# Patient Record
Sex: MALE | Race: WHITE | NOT HISPANIC OR LATINO | Employment: OTHER | URBAN - METROPOLITAN AREA
[De-identification: names, ages, dates, MRNs, and addresses within clinical notes are randomized per-mention and may not be internally consistent; named-entity substitution may affect disease eponyms.]

---

## 2021-10-04 ENCOUNTER — HOSPITAL ENCOUNTER (INPATIENT)
Facility: HOSPITAL | Age: 66
LOS: 39 days | Discharge: NURSING, REHAB OR LONG-TERM CARE | End: 2021-11-12
Attending: INTERNAL MEDICINE | Admitting: INTERNAL MEDICINE

## 2021-10-04 ENCOUNTER — APPOINTMENT (OUTPATIENT)
Dept: GENERAL RADIOLOGY | Facility: HOSPITAL | Age: 66
End: 2021-10-04

## 2021-10-04 DIAGNOSIS — R04.89 DIFFUSE PULMONARY ALVEOLAR HEMORRHAGE: ICD-10-CM

## 2021-10-04 DIAGNOSIS — R13.12 OROPHARYNGEAL DYSPHAGIA: ICD-10-CM

## 2021-10-04 DIAGNOSIS — R49.9 VOICE IMPAIRMENT: ICD-10-CM

## 2021-10-04 DIAGNOSIS — M31.0: ICD-10-CM

## 2021-10-04 DIAGNOSIS — Z00.6 EXAMINATION FOR NORMAL COMPARISON FOR CLINICAL RESEARCH: ICD-10-CM

## 2021-10-04 DIAGNOSIS — J96.01 ACUTE RESPIRATORY FAILURE WITH HYPOXIA (HCC): Primary | ICD-10-CM

## 2021-10-04 PROBLEM — J96.90 RESPIRATORY FAILURE (HCC): Status: ACTIVE | Noted: 2021-10-04

## 2021-10-04 PROBLEM — N17.9 ACUTE RENAL FAILURE (ARF) (HCC): Status: ACTIVE | Noted: 2021-10-04

## 2021-10-04 LAB
ABO GROUP BLD: NORMAL
AMORPH URATE CRY URNS QL MICRO: ABNORMAL /HPF
ANION GAP SERPL CALCULATED.3IONS-SCNC: 24 MMOL/L (ref 5–15)
APTT PPP: 25.2 SECONDS (ref 22–39)
ARTERIAL PATENCY WRIST A: ABNORMAL
ARTERIAL PATENCY WRIST A: ABNORMAL
ATMOSPHERIC PRESS: ABNORMAL MM[HG]
ATMOSPHERIC PRESS: ABNORMAL MM[HG]
BACTERIA UR QL AUTO: ABNORMAL /HPF
BASE EXCESS BLDA CALC-SCNC: -5.9 MMOL/L (ref 0–2)
BASE EXCESS BLDA CALC-SCNC: -6.3 MMOL/L (ref 0–2)
BASOPHILS # BLD AUTO: 0.01 10*3/MM3 (ref 0–0.2)
BASOPHILS NFR BLD AUTO: 0.1 % (ref 0–1.5)
BDY SITE: ABNORMAL
BDY SITE: ABNORMAL
BILIRUB UR QL STRIP: ABNORMAL
BLD GP AB SCN SERPL QL: POSITIVE
BODY TEMPERATURE: 37 C
BODY TEMPERATURE: 37 C
BUN SERPL-MCNC: 137 MG/DL (ref 8–23)
BUN/CREAT SERPL: 14.2 (ref 7–25)
CALCIUM SPEC-SCNC: 6.6 MG/DL (ref 8.6–10.5)
CHLORIDE SERPL-SCNC: 103 MMOL/L (ref 98–107)
CLARITY UR: ABNORMAL
CO2 BLDA-SCNC: 20.8 MMOL/L (ref 22–33)
CO2 BLDA-SCNC: 20.9 MMOL/L (ref 22–33)
CO2 SERPL-SCNC: 17 MMOL/L (ref 22–29)
COHGB MFR BLD: 1.4 % (ref 0–2)
COHGB MFR BLD: 1.8 % (ref 0–2)
COLOR UR: ABNORMAL
CREAT SERPL-MCNC: 9.64 MG/DL (ref 0.76–1.27)
D-LACTATE SERPL-SCNC: 1.9 MMOL/L (ref 0.5–2)
DEPRECATED RDW RBC AUTO: 52.3 FL (ref 37–54)
EOSINOPHIL # BLD AUTO: 0 10*3/MM3 (ref 0–0.4)
EOSINOPHIL NFR BLD AUTO: 0 % (ref 0.3–6.2)
EPAP: 0
ERYTHROCYTE [DISTWIDTH] IN BLOOD BY AUTOMATED COUNT: 17.2 % (ref 12.3–15.4)
GFR SERPL CREATININE-BSD FRML MDRD: 5 ML/MIN/1.73
GFR SERPL CREATININE-BSD FRML MDRD: ABNORMAL ML/MIN/{1.73_M2}
GLUCOSE BLDC GLUCOMTR-MCNC: 160 MG/DL (ref 70–130)
GLUCOSE SERPL-MCNC: 147 MG/DL (ref 65–99)
GLUCOSE UR STRIP-MCNC: NEGATIVE MG/DL
HCO3 BLDA-SCNC: 19.6 MMOL/L (ref 20–26)
HCO3 BLDA-SCNC: 19.7 MMOL/L (ref 20–26)
HCT VFR BLD AUTO: 22 % (ref 37.5–51)
HCT VFR BLD CALC: 22.6 %
HCT VFR BLD CALC: 23 %
HGB BLD-MCNC: 7.4 G/DL (ref 13–17.7)
HGB BLDA-MCNC: 7.4 G/DL (ref 13.5–17.5)
HGB BLDA-MCNC: 7.5 G/DL (ref 13.5–17.5)
HGB UR QL STRIP.AUTO: ABNORMAL
HYALINE CASTS UR QL AUTO: ABNORMAL /LPF
IMM GRANULOCYTES # BLD AUTO: 0.32 10*3/MM3 (ref 0–0.05)
IMM GRANULOCYTES NFR BLD AUTO: 2.2 % (ref 0–0.5)
INHALED O2 CONCENTRATION: 50 %
INHALED O2 CONCENTRATION: 50 %
INR PPP: 1.11 (ref 0.85–1.16)
IPAP: 0
KETONES UR QL STRIP: NEGATIVE
LEUKOCYTE ESTERASE UR QL STRIP.AUTO: ABNORMAL
LYMPHOCYTES # BLD AUTO: 0.69 10*3/MM3 (ref 0.7–3.1)
LYMPHOCYTES NFR BLD AUTO: 4.8 % (ref 19.6–45.3)
MAGNESIUM SERPL-MCNC: 2.7 MG/DL (ref 1.6–2.4)
MCH RBC QN AUTO: 33.6 PG (ref 26.6–33)
MCHC RBC AUTO-ENTMCNC: 33.6 G/DL (ref 31.5–35.7)
MCV RBC AUTO: 100 FL (ref 79–97)
METHGB BLD QL: 1.1 % (ref 0–1.5)
METHGB BLD QL: ABNORMAL
MODALITY: ABNORMAL
MODALITY: ABNORMAL
MONOCYTES # BLD AUTO: 0.87 10*3/MM3 (ref 0.1–0.9)
MONOCYTES NFR BLD AUTO: 6 % (ref 5–12)
NEUTROPHILS NFR BLD AUTO: 12.6 10*3/MM3 (ref 1.7–7)
NEUTROPHILS NFR BLD AUTO: 86.9 % (ref 42.7–76)
NITRITE UR QL STRIP: NEGATIVE
NOTE: ABNORMAL
NOTE: ABNORMAL
NRBC BLD AUTO-RTO: 0.3 /100 WBC (ref 0–0.2)
OXYHGB MFR BLDV: 88.7 % (ref 94–99)
OXYHGB MFR BLDV: 92.6 % (ref 94–99)
PAW @ PEAK INSP FLOW SETTING VENT: 0 CMH2O
PCO2 BLDA: 38.6 MM HG (ref 35–45)
PCO2 BLDA: 40 MM HG (ref 35–45)
PCO2 TEMP ADJ BLD: 38.6 MM HG (ref 35–48)
PCO2 TEMP ADJ BLD: 40 MM HG (ref 35–48)
PEEP RESPIRATORY: 5 CM[H2O]
PEEP RESPIRATORY: 5 CM[H2O]
PH BLDA: 7.3 PH UNITS (ref 7.35–7.45)
PH BLDA: 7.32 PH UNITS (ref 7.35–7.45)
PH UR STRIP.AUTO: 6.5 [PH] (ref 5–8)
PH, TEMP CORRECTED: 7.3 PH UNITS
PH, TEMP CORRECTED: 7.32 PH UNITS
PHOSPHATE SERPL-MCNC: 12.5 MG/DL (ref 2.5–4.5)
PLATELET # BLD AUTO: 144 10*3/MM3 (ref 140–450)
PMV BLD AUTO: 12.2 FL (ref 6–12)
PO2 BLDA: 67.2 MM HG (ref 83–108)
PO2 BLDA: 69.9 MM HG (ref 83–108)
PO2 TEMP ADJ BLD: 67.2 MM HG (ref 83–108)
PO2 TEMP ADJ BLD: 69.9 MM HG (ref 83–108)
POTASSIUM SERPL-SCNC: 5.3 MMOL/L (ref 3.5–5.2)
PROCALCITONIN SERPL-MCNC: 2.56 NG/ML (ref 0–0.25)
PROT UR QL STRIP: ABNORMAL
PROTHROMBIN TIME: 14 SECONDS (ref 11.4–14.4)
RBC # BLD AUTO: 2.2 10*6/MM3 (ref 4.14–5.8)
RBC # UR: ABNORMAL /HPF
REF LAB TEST METHOD: ABNORMAL
RH BLD: POSITIVE
SARS-COV-2 RNA RESP QL NAA+PROBE: NOT DETECTED
SODIUM SERPL-SCNC: 144 MMOL/L (ref 136–145)
SP GR UR STRIP: 1.02 (ref 1–1.03)
SQUAMOUS #/AREA URNS HPF: ABNORMAL /HPF
T&S EXPIRATION DATE: NORMAL
TOTAL RATE: 16 BREATHS/MINUTE
TOTAL RATE: 30 BREATHS/MINUTE
TROPONIN T SERPL-MCNC: 0.05 NG/ML (ref 0–0.03)
URATE CRY URNS QL MICRO: ABNORMAL /HPF
UROBILINOGEN UR QL STRIP: ABNORMAL
VENTILATOR MODE: ABNORMAL
VENTILATOR MODE: ABNORMAL
VT ON VENT VENT: 620 ML
WBC # BLD AUTO: 14.49 10*3/MM3 (ref 3.4–10.8)
WBC UR QL AUTO: ABNORMAL /HPF

## 2021-10-04 PROCEDURE — 80048 BASIC METABOLIC PNL TOTAL CA: CPT | Performed by: NURSE PRACTITIONER

## 2021-10-04 PROCEDURE — 84145 PROCALCITONIN (PCT): CPT | Performed by: NURSE PRACTITIONER

## 2021-10-04 PROCEDURE — 86850 RBC ANTIBODY SCREEN: CPT | Performed by: NURSE PRACTITIONER

## 2021-10-04 PROCEDURE — 82962 GLUCOSE BLOOD TEST: CPT

## 2021-10-04 PROCEDURE — 5A1955Z RESPIRATORY VENTILATION, GREATER THAN 96 CONSECUTIVE HOURS: ICD-10-PCS | Performed by: INTERNAL MEDICINE

## 2021-10-04 PROCEDURE — 85610 PROTHROMBIN TIME: CPT | Performed by: NURSE PRACTITIONER

## 2021-10-04 PROCEDURE — U0003 INFECTIOUS AGENT DETECTION BY NUCLEIC ACID (DNA OR RNA); SEVERE ACUTE RESPIRATORY SYNDROME CORONAVIRUS 2 (SARS-COV-2) (CORONAVIRUS DISEASE [COVID-19]), AMPLIFIED PROBE TECHNIQUE, MAKING USE OF HIGH THROUGHPUT TECHNOLOGIES AS DESCRIBED BY CMS-2020-01-R: HCPCS | Performed by: NURSE PRACTITIONER

## 2021-10-04 PROCEDURE — 93010 ELECTROCARDIOGRAM REPORT: CPT | Performed by: INTERNAL MEDICINE

## 2021-10-04 PROCEDURE — 86870 RBC ANTIBODY IDENTIFICATION: CPT | Performed by: NURSE PRACTITIONER

## 2021-10-04 PROCEDURE — 83050 HGB METHEMOGLOBIN QUAN: CPT

## 2021-10-04 PROCEDURE — 81001 URINALYSIS AUTO W/SCOPE: CPT | Performed by: NURSE PRACTITIONER

## 2021-10-04 PROCEDURE — 86900 BLOOD TYPING SEROLOGIC ABO: CPT

## 2021-10-04 PROCEDURE — 83605 ASSAY OF LACTIC ACID: CPT | Performed by: NURSE PRACTITIONER

## 2021-10-04 PROCEDURE — 86900 BLOOD TYPING SEROLOGIC ABO: CPT | Performed by: NURSE PRACTITIONER

## 2021-10-04 PROCEDURE — 86901 BLOOD TYPING SEROLOGIC RH(D): CPT | Performed by: NURSE PRACTITIONER

## 2021-10-04 PROCEDURE — 84100 ASSAY OF PHOSPHORUS: CPT | Performed by: NURSE PRACTITIONER

## 2021-10-04 PROCEDURE — 94799 UNLISTED PULMONARY SVC/PX: CPT

## 2021-10-04 PROCEDURE — 83520 IMMUNOASSAY QUANT NOS NONAB: CPT | Performed by: NURSE PRACTITIONER

## 2021-10-04 PROCEDURE — 82375 ASSAY CARBOXYHB QUANT: CPT

## 2021-10-04 PROCEDURE — 93005 ELECTROCARDIOGRAM TRACING: CPT | Performed by: NURSE PRACTITIONER

## 2021-10-04 PROCEDURE — 86901 BLOOD TYPING SEROLOGIC RH(D): CPT

## 2021-10-04 PROCEDURE — 94002 VENT MGMT INPAT INIT DAY: CPT

## 2021-10-04 PROCEDURE — 86256 FLUORESCENT ANTIBODY TITER: CPT | Performed by: NURSE PRACTITIONER

## 2021-10-04 PROCEDURE — 85730 THROMBOPLASTIN TIME PARTIAL: CPT | Performed by: NURSE PRACTITIONER

## 2021-10-04 PROCEDURE — U0005 INFEC AGEN DETEC AMPLI PROBE: HCPCS | Performed by: NURSE PRACTITIONER

## 2021-10-04 PROCEDURE — 82805 BLOOD GASES W/O2 SATURATION: CPT

## 2021-10-04 PROCEDURE — 85025 COMPLETE CBC W/AUTO DIFF WBC: CPT | Performed by: NURSE PRACTITIONER

## 2021-10-04 PROCEDURE — 84484 ASSAY OF TROPONIN QUANT: CPT | Performed by: NURSE PRACTITIONER

## 2021-10-04 PROCEDURE — 83516 IMMUNOASSAY NONANTIBODY: CPT | Performed by: NURSE PRACTITIONER

## 2021-10-04 PROCEDURE — 86920 COMPATIBILITY TEST SPIN: CPT

## 2021-10-04 PROCEDURE — 25010000002 PROPOFOL 10 MG/ML EMULSION: Performed by: INTERNAL MEDICINE

## 2021-10-04 PROCEDURE — 71045 X-RAY EXAM CHEST 1 VIEW: CPT

## 2021-10-04 PROCEDURE — 83735 ASSAY OF MAGNESIUM: CPT | Performed by: NURSE PRACTITIONER

## 2021-10-04 PROCEDURE — 36600 WITHDRAWAL OF ARTERIAL BLOOD: CPT

## 2021-10-04 PROCEDURE — 86922 COMPATIBILITY TEST ANTIGLOB: CPT

## 2021-10-04 PROCEDURE — 99291 CRITICAL CARE FIRST HOUR: CPT | Performed by: INTERNAL MEDICINE

## 2021-10-04 RX ORDER — DEXTROSE MONOHYDRATE 25 G/50ML
25 INJECTION, SOLUTION INTRAVENOUS
Status: DISCONTINUED | OUTPATIENT
Start: 2021-10-04 | End: 2021-11-07

## 2021-10-04 RX ORDER — CHLORHEXIDINE GLUCONATE 0.12 MG/ML
15 RINSE ORAL EVERY 12 HOURS SCHEDULED
Status: DISCONTINUED | OUTPATIENT
Start: 2021-10-04 | End: 2021-11-12 | Stop reason: HOSPADM

## 2021-10-04 RX ORDER — METHYLPREDNISOLONE SODIUM SUCCINATE 40 MG/ML
40 INJECTION, POWDER, LYOPHILIZED, FOR SOLUTION INTRAMUSCULAR; INTRAVENOUS EVERY 12 HOURS
Status: DISCONTINUED | OUTPATIENT
Start: 2021-10-04 | End: 2021-10-08

## 2021-10-04 RX ORDER — FAMOTIDINE 10 MG/ML
20 INJECTION, SOLUTION INTRAVENOUS DAILY
Status: DISCONTINUED | OUTPATIENT
Start: 2021-10-05 | End: 2021-10-21

## 2021-10-04 RX ORDER — NICOTINE POLACRILEX 4 MG
15 LOZENGE BUCCAL
Status: DISCONTINUED | OUTPATIENT
Start: 2021-10-04 | End: 2021-10-20

## 2021-10-04 RX ADMIN — CHLORHEXIDINE GLUCONATE 15 ML: 1.2 SOLUTION ORAL at 20:57

## 2021-10-04 RX ADMIN — PROPOFOL 50 MCG/KG/MIN: 10 INJECTION, EMULSION INTRAVENOUS at 23:02

## 2021-10-05 ENCOUNTER — APPOINTMENT (OUTPATIENT)
Dept: OTHER | Facility: HOSPITAL | Age: 66
End: 2021-10-05

## 2021-10-05 ENCOUNTER — APPOINTMENT (OUTPATIENT)
Dept: NEPHROLOGY | Facility: HOSPITAL | Age: 66
End: 2021-10-05

## 2021-10-05 ENCOUNTER — APPOINTMENT (OUTPATIENT)
Dept: GENERAL RADIOLOGY | Facility: HOSPITAL | Age: 66
End: 2021-10-05

## 2021-10-05 LAB
ABO GROUP BLD: NORMAL
ALBUMIN SERPL-MCNC: 2.7 G/DL (ref 3.5–5.2)
ALP SERPL-CCNC: 49 U/L (ref 39–117)
ALT SERPL W P-5'-P-CCNC: 16 U/L (ref 1–41)
ANION GAP SERPL CALCULATED.3IONS-SCNC: 26 MMOL/L (ref 5–15)
ANION GAP SERPL CALCULATED.3IONS-SCNC: 29 MMOL/L (ref 5–15)
ANTIBODY TO LOW FREQUENCY ANTIGEN: NORMAL
ARTERIAL PATENCY WRIST A: ABNORMAL
AST SERPL-CCNC: 19 U/L (ref 1–40)
ATMOSPHERIC PRESS: ABNORMAL MM[HG]
BASE EXCESS BLDA CALC-SCNC: -7.7 MMOL/L (ref 0–2)
BDY SITE: ABNORMAL
BILIRUB SERPL-MCNC: 0.5 MG/DL (ref 0–1.2)
BODY TEMPERATURE: 37 C
BUN SERPL-MCNC: 154 MG/DL (ref 8–23)
BUN SERPL-MCNC: 154 MG/DL (ref 8–23)
BUN/CREAT SERPL: 14.6 (ref 7–25)
CA-I SERPL ISE-MCNC: 1.11 MMOL/L (ref 1.12–1.32)
CALCIUM SPEC-SCNC: 6.5 MG/DL (ref 8.6–10.5)
CALCIUM SPEC-SCNC: 7.5 MG/DL (ref 8.6–10.5)
CHLORIDE SERPL-SCNC: 100 MMOL/L (ref 98–107)
CHLORIDE SERPL-SCNC: 101 MMOL/L (ref 98–107)
CHOLEST SERPL-MCNC: 223 MG/DL (ref 0–200)
CO2 BLDA-SCNC: 19.1 MMOL/L (ref 22–33)
CO2 SERPL-SCNC: 14 MMOL/L (ref 22–29)
CO2 SERPL-SCNC: 17 MMOL/L (ref 22–29)
COHGB MFR BLD: 1.8 % (ref 0–2)
COLD SCREEN: POSITIVE
CREAT SERPL-MCNC: 10.54 MG/DL (ref 0.76–1.27)
CREAT SERPL-MCNC: 10.76 MG/DL (ref 0.76–1.27)
CRP SERPL-MCNC: 2.26 MG/DL (ref 0–0.5)
DEPRECATED RDW RBC AUTO: 51.6 FL (ref 37–54)
EPAP: 0
ERYTHROCYTE [DISTWIDTH] IN BLOOD BY AUTOMATED COUNT: 17.5 % (ref 12.3–15.4)
GFR SERPL CREATININE-BSD FRML MDRD: 5 ML/MIN/1.73
GFR SERPL CREATININE-BSD FRML MDRD: ABNORMAL ML/MIN/{1.73_M2}
GLUCOSE BLDC GLUCOMTR-MCNC: 147 MG/DL (ref 70–130)
GLUCOSE BLDC GLUCOMTR-MCNC: 171 MG/DL (ref 70–130)
GLUCOSE BLDC GLUCOMTR-MCNC: 207 MG/DL (ref 70–130)
GLUCOSE BLDC GLUCOMTR-MCNC: 285 MG/DL (ref 70–130)
GLUCOSE BLDC GLUCOMTR-MCNC: 291 MG/DL (ref 70–130)
GLUCOSE SERPL-MCNC: 261 MG/DL (ref 65–99)
GLUCOSE SERPL-MCNC: 264 MG/DL (ref 65–99)
HCO3 BLDA-SCNC: 18 MMOL/L (ref 20–26)
HCT VFR BLD AUTO: 22 % (ref 37.5–51)
HCT VFR BLD CALC: 24.3 %
HGB BLD-MCNC: 7.8 G/DL (ref 13–17.7)
HGB BLDA-MCNC: 7.9 G/DL (ref 13.5–17.5)
INHALED O2 CONCENTRATION: 50 %
IPAP: 0
MAGNESIUM SERPL-MCNC: 2.9 MG/DL (ref 1.6–2.4)
MAGNESIUM SERPL-MCNC: 2.9 MG/DL (ref 1.6–2.4)
MCH RBC QN AUTO: 35.3 PG (ref 26.6–33)
MCHC RBC AUTO-ENTMCNC: 35.5 G/DL (ref 31.5–35.7)
MCV RBC AUTO: 99.5 FL (ref 79–97)
METHGB BLD QL: 1.5 % (ref 0–1.5)
MODALITY: ABNORMAL
NONSPECIFIC COLD ANTIBODY: NORMAL
NOTE: ABNORMAL
OXYHGB MFR BLDV: 89.4 % (ref 94–99)
PAW @ PEAK INSP FLOW SETTING VENT: 0 CMH2O
PCO2 BLDA: 36.5 MM HG (ref 35–45)
PCO2 TEMP ADJ BLD: 36.5 MM HG (ref 35–48)
PEEP RESPIRATORY: 5 CM[H2O]
PH BLDA: 7.3 PH UNITS (ref 7.35–7.45)
PH, TEMP CORRECTED: 7.3 PH UNITS
PHOSPHATE SERPL-MCNC: 14.1 MG/DL (ref 2.5–4.5)
PHOSPHATE SERPL-MCNC: 15.2 MG/DL (ref 2.5–4.5)
PLATELET # BLD AUTO: 159 10*3/MM3 (ref 140–450)
PMV BLD AUTO: 13.5 FL (ref 6–12)
PO2 BLDA: 71.2 MM HG (ref 83–108)
PO2 TEMP ADJ BLD: 71.2 MM HG (ref 83–108)
POTASSIUM SERPL-SCNC: 5.4 MMOL/L (ref 3.5–5.2)
POTASSIUM SERPL-SCNC: 5.4 MMOL/L (ref 3.5–5.2)
PREALB SERPL-MCNC: 18.6 MG/DL (ref 20–40)
PROT SERPL-MCNC: 5 G/DL (ref 6–8.5)
QT INTERVAL: 420 MS
QTC INTERVAL: 443 MS
RBC # BLD AUTO: 2.21 10*6/MM3 (ref 4.14–5.8)
RH BLD: POSITIVE
SODIUM SERPL-SCNC: 143 MMOL/L (ref 136–145)
SODIUM SERPL-SCNC: 144 MMOL/L (ref 136–145)
TOTAL RATE: 12 BREATHS/MINUTE
TRIGL SERPL-MCNC: 391 MG/DL (ref 0–150)
VENTILATOR MODE: ABNORMAL
WBC # BLD AUTO: 18.03 10*3/MM3 (ref 3.4–10.8)

## 2021-10-05 PROCEDURE — 94003 VENT MGMT INPAT SUBQ DAY: CPT

## 2021-10-05 PROCEDURE — P9047 ALBUMIN (HUMAN), 25%, 50ML: HCPCS

## 2021-10-05 PROCEDURE — 83735 ASSAY OF MAGNESIUM: CPT | Performed by: INTERNAL MEDICINE

## 2021-10-05 PROCEDURE — 86905 BLOOD TYPING RBC ANTIGENS: CPT

## 2021-10-05 PROCEDURE — 6A551Z3 PHERESIS OF PLASMA, MULTIPLE: ICD-10-PCS | Performed by: INTERNAL MEDICINE

## 2021-10-05 PROCEDURE — 3E0G76Z INTRODUCTION OF NUTRITIONAL SUBSTANCE INTO UPPER GI, VIA NATURAL OR ARTIFICIAL OPENING: ICD-10-PCS | Performed by: INTERNAL MEDICINE

## 2021-10-05 PROCEDURE — 82375 ASSAY CARBOXYHB QUANT: CPT

## 2021-10-05 PROCEDURE — 84478 ASSAY OF TRIGLYCERIDES: CPT | Performed by: INTERNAL MEDICINE

## 2021-10-05 PROCEDURE — 80069 RENAL FUNCTION PANEL: CPT | Performed by: INTERNAL MEDICINE

## 2021-10-05 PROCEDURE — 25010000002 PROPOFOL 10 MG/ML EMULSION: Performed by: INTERNAL MEDICINE

## 2021-10-05 PROCEDURE — 80053 COMPREHEN METABOLIC PANEL: CPT | Performed by: INTERNAL MEDICINE

## 2021-10-05 PROCEDURE — 82330 ASSAY OF CALCIUM: CPT | Performed by: INTERNAL MEDICINE

## 2021-10-05 PROCEDURE — 99291 CRITICAL CARE FIRST HOUR: CPT | Performed by: INTERNAL MEDICINE

## 2021-10-05 PROCEDURE — 36455 BLD EXCHANGE TRUJ OTH THN NB: CPT

## 2021-10-05 PROCEDURE — 83050 HGB METHEMOGLOBIN QUAN: CPT

## 2021-10-05 PROCEDURE — 86140 C-REACTIVE PROTEIN: CPT | Performed by: INTERNAL MEDICINE

## 2021-10-05 PROCEDURE — P9017 PLASMA 1 DONOR FRZ W/IN 8 HR: HCPCS

## 2021-10-05 PROCEDURE — 5A1D70Z PERFORMANCE OF URINARY FILTRATION, INTERMITTENT, LESS THAN 6 HOURS PER DAY: ICD-10-PCS | Performed by: INTERNAL MEDICINE

## 2021-10-05 PROCEDURE — 94799 UNLISTED PULMONARY SVC/PX: CPT

## 2021-10-05 PROCEDURE — 85027 COMPLETE CBC AUTOMATED: CPT | Performed by: NURSE PRACTITIONER

## 2021-10-05 PROCEDURE — 86927 PLASMA FRESH FROZEN: CPT

## 2021-10-05 PROCEDURE — 25010000002 ALBUMIN HUMAN 25% PER 50 ML: Performed by: INTERNAL MEDICINE

## 2021-10-05 PROCEDURE — 63710000001 INSULIN REGULAR HUMAN PER 5 UNITS: Performed by: INTERNAL MEDICINE

## 2021-10-05 PROCEDURE — 25010000002 METHYLPREDNISOLONE PER 40 MG: Performed by: INTERNAL MEDICINE

## 2021-10-05 PROCEDURE — 25010000002 CALCIUM GLUCONATE PER 10 ML: Performed by: INTERNAL MEDICINE

## 2021-10-05 PROCEDURE — 71045 X-RAY EXAM CHEST 1 VIEW: CPT

## 2021-10-05 PROCEDURE — 82962 GLUCOSE BLOOD TEST: CPT

## 2021-10-05 PROCEDURE — 25010000002 DIPHENHYDRAMINE PER 50 MG: Performed by: INTERNAL MEDICINE

## 2021-10-05 PROCEDURE — P9047 ALBUMIN (HUMAN), 25%, 50ML: HCPCS | Performed by: INTERNAL MEDICINE

## 2021-10-05 PROCEDURE — 82465 ASSAY BLD/SERUM CHOLESTEROL: CPT | Performed by: INTERNAL MEDICINE

## 2021-10-05 PROCEDURE — 25010000002 ALBUMIN HUMAN 25% PER 50 ML

## 2021-10-05 PROCEDURE — 25010000002 FENTANYL CITRATE (PF) 50 MCG/ML SOLUTION: Performed by: INTERNAL MEDICINE

## 2021-10-05 PROCEDURE — 84134 ASSAY OF PREALBUMIN: CPT | Performed by: INTERNAL MEDICINE

## 2021-10-05 PROCEDURE — 36600 WITHDRAWAL OF ARTERIAL BLOOD: CPT

## 2021-10-05 PROCEDURE — 82805 BLOOD GASES W/O2 SATURATION: CPT

## 2021-10-05 PROCEDURE — 84100 ASSAY OF PHOSPHORUS: CPT | Performed by: INTERNAL MEDICINE

## 2021-10-05 RX ORDER — ALBUMIN (HUMAN) 12.5 G/50ML
12.5 SOLUTION INTRAVENOUS AS NEEDED
Status: CANCELLED | OUTPATIENT
Start: 2021-10-06 | End: 2021-10-06

## 2021-10-05 RX ORDER — AMINO ACIDS/PROTEIN HYDROLYS 15G-100/30
30 LIQUID (ML) ORAL 2 TIMES DAILY
Status: DISCONTINUED | OUTPATIENT
Start: 2021-10-05 | End: 2021-10-07

## 2021-10-05 RX ORDER — CALCIUM GLUCONATE 94 MG/ML
1 INJECTION, SOLUTION INTRAVENOUS AS NEEDED
Status: DISCONTINUED | OUTPATIENT
Start: 2021-10-05 | End: 2021-10-05 | Stop reason: SDUPTHER

## 2021-10-05 RX ORDER — ACETAMINOPHEN 325 MG/1
650 TABLET ORAL EVERY 6 HOURS PRN
Status: DISCONTINUED | OUTPATIENT
Start: 2021-10-05 | End: 2021-10-20

## 2021-10-05 RX ORDER — MAGNESIUM SULFATE HEPTAHYDRATE 40 MG/ML
2 INJECTION, SOLUTION INTRAVENOUS AS NEEDED
Status: ACTIVE | OUTPATIENT
Start: 2021-10-05 | End: 2021-10-07

## 2021-10-05 RX ORDER — DIPHENHYDRAMINE HCL 50 MG
50 CAPSULE ORAL AS NEEDED
Status: COMPLETED | OUTPATIENT
Start: 2021-10-05 | End: 2021-10-06

## 2021-10-05 RX ORDER — CALCIUM GLUCONATE 94 MG/ML
2 INJECTION, SOLUTION INTRAVENOUS AS NEEDED
Status: DISCONTINUED | OUTPATIENT
Start: 2021-10-05 | End: 2021-10-05 | Stop reason: SDUPTHER

## 2021-10-05 RX ORDER — ALBUMIN (HUMAN) 12.5 G/50ML
SOLUTION INTRAVENOUS
Status: COMPLETED
Start: 2021-10-05 | End: 2021-10-05

## 2021-10-05 RX ORDER — DIPHENHYDRAMINE HCL 25 MG
25 CAPSULE ORAL AS NEEDED
Status: DISCONTINUED | OUTPATIENT
Start: 2021-10-05 | End: 2021-10-05

## 2021-10-05 RX ORDER — ANTICOAGULANT CITRATE DEXTROSE SOLUTION FORMULA A 12.25; 11; 3.65 G/500ML; G/500ML; G/500ML
0-2 SOLUTION INTRAVENOUS AS NEEDED
Status: DISCONTINUED | OUTPATIENT
Start: 2021-10-05 | End: 2021-10-11

## 2021-10-05 RX ORDER — DIPHENHYDRAMINE HYDROCHLORIDE 50 MG/ML
50 INJECTION INTRAMUSCULAR; INTRAVENOUS AS NEEDED
Status: COMPLETED | OUTPATIENT
Start: 2021-10-05 | End: 2021-10-06

## 2021-10-05 RX ORDER — FENTANYL CITRATE 50 UG/ML
50 INJECTION, SOLUTION INTRAMUSCULAR; INTRAVENOUS
Status: DISPENSED | OUTPATIENT
Start: 2021-10-05 | End: 2021-10-12

## 2021-10-05 RX ORDER — DIPHENHYDRAMINE HYDROCHLORIDE 50 MG/ML
25 INJECTION INTRAMUSCULAR; INTRAVENOUS AS NEEDED
Status: DISCONTINUED | OUTPATIENT
Start: 2021-10-05 | End: 2021-10-05

## 2021-10-05 RX ORDER — ATOVAQUONE 750 MG/5ML
1500 SUSPENSION ORAL
Status: DISCONTINUED | OUTPATIENT
Start: 2021-10-05 | End: 2021-10-11

## 2021-10-05 RX ORDER — NOREPINEPHRINE BIT/0.9 % NACL 8 MG/250ML
.02-.3 INFUSION BOTTLE (ML) INTRAVENOUS
Status: DISCONTINUED | OUTPATIENT
Start: 2021-10-05 | End: 2021-10-16

## 2021-10-05 RX ORDER — HEPARIN SODIUM 1000 [USP'U]/ML
1100 INJECTION, SOLUTION INTRAVENOUS; SUBCUTANEOUS AS NEEDED
Status: DISCONTINUED | OUTPATIENT
Start: 2021-10-05 | End: 2021-10-21

## 2021-10-05 RX ORDER — POTASSIUM CHLORIDE 29.8 MG/ML
20 INJECTION INTRAVENOUS AS NEEDED
Status: DISCONTINUED | OUTPATIENT
Start: 2021-10-05 | End: 2021-10-11

## 2021-10-05 RX ORDER — ALBUMIN (HUMAN) 12.5 G/50ML
12.5 SOLUTION INTRAVENOUS AS NEEDED
Status: COMPLETED | OUTPATIENT
Start: 2021-10-05 | End: 2021-10-06

## 2021-10-05 RX ADMIN — CALCIUM GLUCONATE 1 G: 98 INJECTION, SOLUTION INTRAVENOUS at 15:51

## 2021-10-05 RX ADMIN — METHYLPREDNISOLONE SODIUM SUCCINATE 40 MG: 40 INJECTION, POWDER, FOR SOLUTION INTRAMUSCULAR; INTRAVENOUS at 23:42

## 2021-10-05 RX ADMIN — CALCIUM GLUCONATE 1 G: 98 INJECTION, SOLUTION INTRAVENOUS at 16:24

## 2021-10-05 RX ADMIN — CALCIUM GLUCONATE 1 G: 98 INJECTION, SOLUTION INTRAVENOUS at 15:57

## 2021-10-05 RX ADMIN — METHYLPREDNISOLONE SODIUM SUCCINATE 40 MG: 40 INJECTION, POWDER, FOR SOLUTION INTRAMUSCULAR; INTRAVENOUS at 14:05

## 2021-10-05 RX ADMIN — PROPOFOL 50 MCG/KG/MIN: 10 INJECTION, EMULSION INTRAVENOUS at 05:18

## 2021-10-05 RX ADMIN — PROPOFOL 50 MCG/KG/MIN: 10 INJECTION, EMULSION INTRAVENOUS at 02:21

## 2021-10-05 RX ADMIN — CALCIUM GLUCONATE 1 G: 98 INJECTION, SOLUTION INTRAVENOUS at 15:21

## 2021-10-05 RX ADMIN — FAMOTIDINE 20 MG: 10 INJECTION, SOLUTION INTRAVENOUS at 09:18

## 2021-10-05 RX ADMIN — ALBUMIN HUMAN 25 G: 0.25 SOLUTION INTRAVENOUS at 12:23

## 2021-10-05 RX ADMIN — INSULIN HUMAN 4 UNITS: 100 INJECTION, SOLUTION PARENTERAL at 06:43

## 2021-10-05 RX ADMIN — ALBUMIN HUMAN 25 G: 0.25 SOLUTION INTRAVENOUS at 15:02

## 2021-10-05 RX ADMIN — FENTANYL CITRATE 50 MCG: 50 INJECTION INTRAMUSCULAR; INTRAVENOUS at 16:45

## 2021-10-05 RX ADMIN — ACETAMINOPHEN 650 MG: 325 TABLET, FILM COATED ORAL at 14:05

## 2021-10-05 RX ADMIN — CALCIUM GLUCONATE 1 G: 98 INJECTION, SOLUTION INTRAVENOUS at 15:58

## 2021-10-05 RX ADMIN — INSULIN HUMAN 4 UNITS: 100 INJECTION, SOLUTION PARENTERAL at 18:15

## 2021-10-05 RX ADMIN — CALCIUM GLUCONATE 1 G: 98 INJECTION, SOLUTION INTRAVENOUS at 14:56

## 2021-10-05 RX ADMIN — PROPOFOL 50 MCG/KG/MIN: 10 INJECTION, EMULSION INTRAVENOUS at 08:52

## 2021-10-05 RX ADMIN — PROPOFOL 50 MCG/KG/MIN: 10 INJECTION, EMULSION INTRAVENOUS at 23:42

## 2021-10-05 RX ADMIN — PROPOFOL 50 MCG/KG/MIN: 10 INJECTION, EMULSION INTRAVENOUS at 17:19

## 2021-10-05 RX ADMIN — PROPOFOL 50 MCG/KG/MIN: 10 INJECTION, EMULSION INTRAVENOUS at 13:43

## 2021-10-05 RX ADMIN — CALCIUM GLUCONATE 1 G: 98 INJECTION, SOLUTION INTRAVENOUS at 15:29

## 2021-10-05 RX ADMIN — DIPHENHYDRAMINE HYDROCHLORIDE 50 MG: 50 INJECTION INTRAMUSCULAR; INTRAVENOUS at 14:05

## 2021-10-05 RX ADMIN — CALCIUM GLUCONATE 1 G: 98 INJECTION, SOLUTION INTRAVENOUS at 15:13

## 2021-10-05 RX ADMIN — CHLORHEXIDINE GLUCONATE 15 ML: 1.2 SOLUTION ORAL at 20:28

## 2021-10-05 RX ADMIN — METHYLPREDNISOLONE SODIUM SUCCINATE 40 MG: 40 INJECTION, POWDER, FOR SOLUTION INTRAMUSCULAR; INTRAVENOUS at 00:04

## 2021-10-05 RX ADMIN — CHLORHEXIDINE GLUCONATE 15 ML: 1.2 SOLUTION ORAL at 09:18

## 2021-10-05 RX ADMIN — PROPOFOL 50 MCG/KG/MIN: 10 INJECTION, EMULSION INTRAVENOUS at 20:25

## 2021-10-05 RX ADMIN — CALCIUM GLUCONATE 1 G: 98 INJECTION, SOLUTION INTRAVENOUS at 15:02

## 2021-10-05 RX ADMIN — CALCIUM CHLORIDE, MAGNESIUM CHLORIDE, SODIUM CHLORIDE, SODIUM BICARBONATE, POTASSIUM CHLORIDE AND SODIUM PHOSPHATE DIBASIC DIHYDRATE 1000 ML/HR: 3.68; 3.05; 6.34; 3.09; .314; .187 INJECTION INTRAVENOUS at 23:11

## 2021-10-05 RX ADMIN — ATOVAQUONE 1500 MG: 750 SUSPENSION ORAL at 17:19

## 2021-10-05 RX ADMIN — INSULIN HUMAN 2 UNITS: 100 INJECTION, SOLUTION PARENTERAL at 23:42

## 2021-10-05 RX ADMIN — ALBUMIN HUMAN 25 G: 0.25 SOLUTION INTRAVENOUS at 12:03

## 2021-10-05 RX ADMIN — ALBUMIN HUMAN 12.5 G: 0.25 SOLUTION INTRAVENOUS at 14:57

## 2021-10-05 RX ADMIN — FENTANYL CITRATE 50 MCG: 50 INJECTION INTRAMUSCULAR; INTRAVENOUS at 11:18

## 2021-10-06 ENCOUNTER — APPOINTMENT (OUTPATIENT)
Dept: GENERAL RADIOLOGY | Facility: HOSPITAL | Age: 66
End: 2021-10-06

## 2021-10-06 ENCOUNTER — APPOINTMENT (OUTPATIENT)
Dept: NEPHROLOGY | Facility: HOSPITAL | Age: 66
End: 2021-10-06

## 2021-10-06 LAB
ABO GROUP BLD: NORMAL
ALBUMIN SERPL-MCNC: 3.3 G/DL (ref 3.5–5.2)
ALBUMIN SERPL-MCNC: 3.7 G/DL (ref 3.5–5.2)
ANION GAP SERPL CALCULATED.3IONS-SCNC: 19 MMOL/L (ref 5–15)
ANION GAP SERPL CALCULATED.3IONS-SCNC: 20 MMOL/L (ref 5–15)
ANION GAP SERPL CALCULATED.3IONS-SCNC: 20 MMOL/L (ref 5–15)
ARTERIAL PATENCY WRIST A: ABNORMAL
ATMOSPHERIC PRESS: ABNORMAL MM[HG]
BASE EXCESS BLDA CALC-SCNC: 1.4 MMOL/L (ref 0–2)
BASOPHILS # BLD AUTO: 0.01 10*3/MM3 (ref 0–0.2)
BASOPHILS NFR BLD AUTO: 0.1 % (ref 0–1.5)
BDY SITE: ABNORMAL
BH BB BLOOD EXPIRATION DATE: NORMAL
BH BB BLOOD TYPE BARCODE: 600
BH BB BLOOD TYPE BARCODE: 600
BH BB BLOOD TYPE BARCODE: 6200
BH BB DISPENSE STATUS: NORMAL
BH BB PRODUCT CODE: NORMAL
BH BB UNIT NUMBER: NORMAL
BLD GP AB SCN SERPL QL: NEGATIVE
BODY TEMPERATURE: 37 C
BUN SERPL-MCNC: 104 MG/DL (ref 8–23)
BUN SERPL-MCNC: 74 MG/DL (ref 8–23)
BUN SERPL-MCNC: 93 MG/DL (ref 8–23)
BUN/CREAT SERPL: 13.2 (ref 7–25)
BUN/CREAT SERPL: 14.1 (ref 7–25)
BUN/CREAT SERPL: 14.5 (ref 7–25)
CA-I SERPL ISE-MCNC: 1.01 MMOL/L (ref 1.12–1.32)
CA-I SERPL ISE-MCNC: 1.08 MMOL/L (ref 1.12–1.32)
CA-I SERPL ISE-MCNC: 1.3 MMOL/L (ref 1.12–1.32)
CALCIUM SPEC-SCNC: 11.3 MG/DL (ref 8.6–10.5)
CALCIUM SPEC-SCNC: 7.7 MG/DL (ref 8.6–10.5)
CALCIUM SPEC-SCNC: 7.9 MG/DL (ref 8.6–10.5)
CHLORIDE SERPL-SCNC: 100 MMOL/L (ref 98–107)
CHLORIDE SERPL-SCNC: 100 MMOL/L (ref 98–107)
CHLORIDE SERPL-SCNC: 97 MMOL/L (ref 98–107)
CO2 BLDA-SCNC: 27.4 MMOL/L (ref 22–33)
CO2 SERPL-SCNC: 23 MMOL/L (ref 22–29)
CO2 SERPL-SCNC: 23 MMOL/L (ref 22–29)
CO2 SERPL-SCNC: 26 MMOL/L (ref 22–29)
COHGB MFR BLD: 1.8 % (ref 0–2)
CREAT SERPL-MCNC: 5.09 MG/DL (ref 0.76–1.27)
CREAT SERPL-MCNC: 7.02 MG/DL (ref 0.76–1.27)
CREAT SERPL-MCNC: 7.36 MG/DL (ref 0.76–1.27)
DEPRECATED RDW RBC AUTO: 46.4 FL (ref 37–54)
DEPRECATED RDW RBC AUTO: 48.9 FL (ref 37–54)
EOSINOPHIL # BLD AUTO: 0.25 10*3/MM3 (ref 0–0.4)
EOSINOPHIL NFR BLD AUTO: 2 % (ref 0.3–6.2)
EPAP: 0
ERYTHROCYTE [DISTWIDTH] IN BLOOD BY AUTOMATED COUNT: 14.3 % (ref 12.3–15.4)
ERYTHROCYTE [DISTWIDTH] IN BLOOD BY AUTOMATED COUNT: 17.6 % (ref 12.3–15.4)
FIBRINOGEN PPP-MCNC: 268 MG/DL (ref 220–470)
GFR SERPL CREATININE-BSD FRML MDRD: 11 ML/MIN/1.73
GFR SERPL CREATININE-BSD FRML MDRD: 7 ML/MIN/1.73
GFR SERPL CREATININE-BSD FRML MDRD: 8 ML/MIN/1.73
GFR SERPL CREATININE-BSD FRML MDRD: ABNORMAL ML/MIN/{1.73_M2}
GLUCOSE BLDC GLUCOMTR-MCNC: 161 MG/DL (ref 70–130)
GLUCOSE BLDC GLUCOMTR-MCNC: 176 MG/DL (ref 70–130)
GLUCOSE BLDC GLUCOMTR-MCNC: 185 MG/DL (ref 70–130)
GLUCOSE BLDC GLUCOMTR-MCNC: 186 MG/DL (ref 70–130)
GLUCOSE SERPL-MCNC: 142 MG/DL (ref 65–99)
GLUCOSE SERPL-MCNC: 160 MG/DL (ref 65–99)
GLUCOSE SERPL-MCNC: 181 MG/DL (ref 65–99)
HAV IGM SERPL QL IA: NORMAL
HBV CORE IGM SERPL QL IA: NORMAL
HBV SURFACE AG SERPL QL IA: NORMAL
HCO3 BLDA-SCNC: 26.1 MMOL/L (ref 20–26)
HCT VFR BLD AUTO: 15.3 % (ref 37.5–51)
HCT VFR BLD AUTO: 19.2 % (ref 37.5–51)
HCT VFR BLD CALC: 16.8 %
HCV AB SER DONR QL: NORMAL
HGB BLD-MCNC: 5.3 G/DL (ref 13–17.7)
HGB BLD-MCNC: 6.6 G/DL (ref 13–17.7)
HGB BLDA-MCNC: 5.5 G/DL (ref 13.5–17.5)
IMM GRANULOCYTES # BLD AUTO: 0.34 10*3/MM3 (ref 0–0.05)
IMM GRANULOCYTES NFR BLD AUTO: 2.7 % (ref 0–0.5)
INHALED O2 CONCENTRATION: 60 %
IPAP: 0
LYMPHOCYTES # BLD AUTO: 0.83 10*3/MM3 (ref 0.7–3.1)
LYMPHOCYTES NFR BLD AUTO: 6.5 % (ref 19.6–45.3)
Lab: ABNORMAL
MAGNESIUM SERPL-MCNC: 2.3 MG/DL (ref 1.6–2.4)
MAGNESIUM SERPL-MCNC: 2.5 MG/DL (ref 1.6–2.4)
MCH RBC QN AUTO: 32.7 PG (ref 26.6–33)
MCH RBC QN AUTO: 33.8 PG (ref 26.6–33)
MCHC RBC AUTO-ENTMCNC: 34.4 G/DL (ref 31.5–35.7)
MCHC RBC AUTO-ENTMCNC: 34.6 G/DL (ref 31.5–35.7)
MCV RBC AUTO: 95 FL (ref 79–97)
MCV RBC AUTO: 97.5 FL (ref 79–97)
METHGB BLD QL: 1.5 % (ref 0–1.5)
MODALITY: ABNORMAL
MONOCYTES # BLD AUTO: 0.58 10*3/MM3 (ref 0.1–0.9)
MONOCYTES NFR BLD AUTO: 4.6 % (ref 5–12)
NEUTROPHILS NFR BLD AUTO: 10.67 10*3/MM3 (ref 1.7–7)
NEUTROPHILS NFR BLD AUTO: 84.1 % (ref 42.7–76)
NOTE: ABNORMAL
NOTIFIED BY: ABNORMAL
NOTIFIED WHO: ABNORMAL
NRBC BLD AUTO-RTO: 0.2 /100 WBC (ref 0–0.2)
OXYHGB MFR BLDV: 96.1 % (ref 94–99)
PAW @ PEAK INSP FLOW SETTING VENT: 0 CMH2O
PCO2 BLDA: 41.2 MM HG (ref 35–45)
PCO2 TEMP ADJ BLD: 41.2 MM HG (ref 35–48)
PEEP RESPIRATORY: 10 CM[H2O]
PH BLDA: 7.41 PH UNITS (ref 7.35–7.45)
PH, TEMP CORRECTED: 7.41 PH UNITS
PHOSPHATE SERPL-MCNC: 11.3 MG/DL (ref 2.5–4.5)
PHOSPHATE SERPL-MCNC: 12.3 MG/DL (ref 2.5–4.5)
PLATELET # BLD AUTO: 109 10*3/MM3 (ref 140–450)
PLATELET # BLD AUTO: 83 10*3/MM3 (ref 140–450)
PMV BLD AUTO: 13.2 FL (ref 6–12)
PMV BLD AUTO: 13.3 FL (ref 6–12)
PO2 BLDA: 120 MM HG (ref 83–108)
PO2 TEMP ADJ BLD: 120 MM HG (ref 83–108)
POTASSIUM SERPL-SCNC: 4.1 MMOL/L (ref 3.5–5.2)
POTASSIUM SERPL-SCNC: 4.2 MMOL/L (ref 3.5–5.2)
POTASSIUM SERPL-SCNC: 4.4 MMOL/L (ref 3.5–5.2)
RBC # BLD AUTO: 1.57 10*6/MM3 (ref 4.14–5.8)
RBC # BLD AUTO: 2.02 10*6/MM3 (ref 4.14–5.8)
RH BLD: POSITIVE
SODIUM SERPL-SCNC: 142 MMOL/L (ref 136–145)
SODIUM SERPL-SCNC: 143 MMOL/L (ref 136–145)
SODIUM SERPL-SCNC: 143 MMOL/L (ref 136–145)
T&S EXPIRATION DATE: NORMAL
TOTAL RATE: 0 BREATHS/MINUTE
UNIT  ABO: NORMAL
UNIT  RH: NORMAL
VENTILATOR MODE: ABNORMAL
WBC # BLD AUTO: 11.8 10*3/MM3 (ref 3.4–10.8)
WBC # BLD AUTO: 12.68 10*3/MM3 (ref 3.4–10.8)

## 2021-10-06 PROCEDURE — 71045 X-RAY EXAM CHEST 1 VIEW: CPT

## 2021-10-06 PROCEDURE — 86927 PLASMA FRESH FROZEN: CPT

## 2021-10-06 PROCEDURE — P9016 RBC LEUKOCYTES REDUCED: HCPCS

## 2021-10-06 PROCEDURE — 86900 BLOOD TYPING SEROLOGIC ABO: CPT | Performed by: INTERNAL MEDICINE

## 2021-10-06 PROCEDURE — 85025 COMPLETE CBC W/AUTO DIFF WBC: CPT | Performed by: INTERNAL MEDICINE

## 2021-10-06 PROCEDURE — 82962 GLUCOSE BLOOD TEST: CPT

## 2021-10-06 PROCEDURE — 99291 CRITICAL CARE FIRST HOUR: CPT | Performed by: INTERNAL MEDICINE

## 2021-10-06 PROCEDURE — 83050 HGB METHEMOGLOBIN QUAN: CPT

## 2021-10-06 PROCEDURE — 94799 UNLISTED PULMONARY SVC/PX: CPT

## 2021-10-06 PROCEDURE — 25010000002 CALCIUM GLUCONATE PER 10 ML: Performed by: INTERNAL MEDICINE

## 2021-10-06 PROCEDURE — 85027 COMPLETE CBC AUTOMATED: CPT | Performed by: INTERNAL MEDICINE

## 2021-10-06 PROCEDURE — 25010000002 DIPHENHYDRAMINE PER 50 MG: Performed by: INTERNAL MEDICINE

## 2021-10-06 PROCEDURE — P9017 PLASMA 1 DONOR FRZ W/IN 8 HR: HCPCS

## 2021-10-06 PROCEDURE — 86920 COMPATIBILITY TEST SPIN: CPT

## 2021-10-06 PROCEDURE — 25010000002 ALBUMIN HUMAN 5% PER 50 ML: Performed by: INTERNAL MEDICINE

## 2021-10-06 PROCEDURE — 82805 BLOOD GASES W/O2 SATURATION: CPT

## 2021-10-06 PROCEDURE — 80048 BASIC METABOLIC PNL TOTAL CA: CPT | Performed by: INTERNAL MEDICINE

## 2021-10-06 PROCEDURE — 86900 BLOOD TYPING SEROLOGIC ABO: CPT

## 2021-10-06 PROCEDURE — 25010000002 FENTANYL CITRATE (PF) 50 MCG/ML SOLUTION: Performed by: INTERNAL MEDICINE

## 2021-10-06 PROCEDURE — 82330 ASSAY OF CALCIUM: CPT | Performed by: INTERNAL MEDICINE

## 2021-10-06 PROCEDURE — 25010000002 PROPOFOL 10 MG/ML EMULSION: Performed by: INTERNAL MEDICINE

## 2021-10-06 PROCEDURE — 85384 FIBRINOGEN ACTIVITY: CPT | Performed by: INTERNAL MEDICINE

## 2021-10-06 PROCEDURE — 25010000002 FENTANYL CITRATE (PF) 2500 MCG/50ML SOLUTION: Performed by: INTERNAL MEDICINE

## 2021-10-06 PROCEDURE — 83735 ASSAY OF MAGNESIUM: CPT | Performed by: INTERNAL MEDICINE

## 2021-10-06 PROCEDURE — 25010000002 ALBUMIN HUMAN 25% PER 50 ML

## 2021-10-06 PROCEDURE — 80069 RENAL FUNCTION PANEL: CPT | Performed by: INTERNAL MEDICINE

## 2021-10-06 PROCEDURE — 36600 WITHDRAWAL OF ARTERIAL BLOOD: CPT

## 2021-10-06 PROCEDURE — 82375 ASSAY CARBOXYHB QUANT: CPT

## 2021-10-06 PROCEDURE — 36455 BLD EXCHANGE TRUJ OTH THN NB: CPT

## 2021-10-06 PROCEDURE — 86901 BLOOD TYPING SEROLOGIC RH(D): CPT | Performed by: INTERNAL MEDICINE

## 2021-10-06 PROCEDURE — P9047 ALBUMIN (HUMAN), 25%, 50ML: HCPCS

## 2021-10-06 PROCEDURE — 80074 ACUTE HEPATITIS PANEL: CPT | Performed by: INTERNAL MEDICINE

## 2021-10-06 PROCEDURE — 25010000002 METHYLPREDNISOLONE PER 40 MG: Performed by: INTERNAL MEDICINE

## 2021-10-06 PROCEDURE — 63710000001 INSULIN REGULAR HUMAN PER 5 UNITS: Performed by: INTERNAL MEDICINE

## 2021-10-06 PROCEDURE — 94003 VENT MGMT INPAT SUBQ DAY: CPT

## 2021-10-06 PROCEDURE — 86850 RBC ANTIBODY SCREEN: CPT | Performed by: INTERNAL MEDICINE

## 2021-10-06 PROCEDURE — P9041 ALBUMIN (HUMAN),5%, 50ML: HCPCS | Performed by: INTERNAL MEDICINE

## 2021-10-06 PROCEDURE — 86922 COMPATIBILITY TEST ANTIGLOB: CPT

## 2021-10-06 PROCEDURE — 74018 RADEX ABDOMEN 1 VIEW: CPT

## 2021-10-06 RX ORDER — DIPHENHYDRAMINE HCL 25 MG
25 CAPSULE ORAL ONCE
Status: DISCONTINUED | OUTPATIENT
Start: 2021-10-06 | End: 2021-10-07

## 2021-10-06 RX ORDER — MIDAZOLAM IN NACL,ISO-OSMOT/PF 50 MG/50ML
1-10 INFUSION BOTTLE (ML) INTRAVENOUS
Status: DISCONTINUED | OUTPATIENT
Start: 2021-10-06 | End: 2021-10-06 | Stop reason: ALTCHOICE

## 2021-10-06 RX ORDER — ALBUMIN (HUMAN) 12.5 G/50ML
SOLUTION INTRAVENOUS
Status: COMPLETED
Start: 2021-10-06 | End: 2021-10-06

## 2021-10-06 RX ORDER — ACETAMINOPHEN 650 MG/1
650 SUPPOSITORY RECTAL ONCE
Status: COMPLETED | OUTPATIENT
Start: 2021-10-06 | End: 2021-10-06

## 2021-10-06 RX ORDER — CALCIUM GLUCONATE 94 MG/ML
1 INJECTION, SOLUTION INTRAVENOUS AS NEEDED
Status: DISCONTINUED | OUTPATIENT
Start: 2021-10-06 | End: 2021-10-30

## 2021-10-06 RX ORDER — ACETAMINOPHEN 325 MG/1
650 TABLET ORAL ONCE
Status: COMPLETED | OUTPATIENT
Start: 2021-10-06 | End: 2021-10-06

## 2021-10-06 RX ORDER — ALBUMIN, HUMAN INJ 5% 5 %
25 SOLUTION INTRAVENOUS AS NEEDED
Status: DISCONTINUED | OUTPATIENT
Start: 2021-10-06 | End: 2021-10-23

## 2021-10-06 RX ORDER — ACETAMINOPHEN 160 MG/5ML
650 SOLUTION ORAL ONCE
Status: COMPLETED | OUTPATIENT
Start: 2021-10-06 | End: 2021-10-06

## 2021-10-06 RX ORDER — DIPHENHYDRAMINE HYDROCHLORIDE 50 MG/ML
25 INJECTION INTRAMUSCULAR; INTRAVENOUS ONCE
Status: DISCONTINUED | OUTPATIENT
Start: 2021-10-06 | End: 2021-10-07

## 2021-10-06 RX ORDER — ALBUMIN (HUMAN) 12.5 G/50ML
SOLUTION INTRAVENOUS
Status: ACTIVE
Start: 2021-10-06 | End: 2021-10-06

## 2021-10-06 RX ADMIN — CALCIUM GLUCONATE 1 G: 98 INJECTION, SOLUTION INTRAVENOUS at 11:25

## 2021-10-06 RX ADMIN — ACETAMINOPHEN 650 MG: 325 TABLET, FILM COATED ORAL at 10:22

## 2021-10-06 RX ADMIN — PROPOFOL 50 MCG/KG/MIN: 10 INJECTION, EMULSION INTRAVENOUS at 10:33

## 2021-10-06 RX ADMIN — CALCIUM CHLORIDE, MAGNESIUM CHLORIDE, SODIUM CHLORIDE, SODIUM BICARBONATE, POTASSIUM CHLORIDE AND SODIUM PHOSPHATE DIBASIC DIHYDRATE 1000 ML/HR: 3.68; 3.05; 6.34; 3.09; .314; .187 INJECTION INTRAVENOUS at 04:59

## 2021-10-06 RX ADMIN — CALCIUM GLUCONATE 1 G: 98 INJECTION, SOLUTION INTRAVENOUS at 11:01

## 2021-10-06 RX ADMIN — FAMOTIDINE 20 MG: 10 INJECTION, SOLUTION INTRAVENOUS at 09:54

## 2021-10-06 RX ADMIN — PROPOFOL 50 MCG/KG/MIN: 10 INJECTION, EMULSION INTRAVENOUS at 06:45

## 2021-10-06 RX ADMIN — ATOVAQUONE 1500 MG: 750 SUSPENSION ORAL at 13:16

## 2021-10-06 RX ADMIN — CALCIUM CHLORIDE, MAGNESIUM CHLORIDE, SODIUM CHLORIDE, SODIUM BICARBONATE, POTASSIUM CHLORIDE AND SODIUM PHOSPHATE DIBASIC DIHYDRATE 1000 ML/HR: 3.68; 3.05; 6.34; 3.09; .314; .187 INJECTION INTRAVENOUS at 17:58

## 2021-10-06 RX ADMIN — CALCIUM GLUCONATE 1 G: 98 INJECTION, SOLUTION INTRAVENOUS at 11:54

## 2021-10-06 RX ADMIN — FENTANYL CITRATE 50 MCG: 50 INJECTION INTRAMUSCULAR; INTRAVENOUS at 03:01

## 2021-10-06 RX ADMIN — Medication 1 MG/HR: at 03:39

## 2021-10-06 RX ADMIN — INSULIN HUMAN 2 UNITS: 100 INJECTION, SOLUTION PARENTERAL at 17:55

## 2021-10-06 RX ADMIN — CALCIUM GLUCONATE 1 G: 98 INJECTION, SOLUTION INTRAVENOUS at 12:34

## 2021-10-06 RX ADMIN — CALCIUM GLUCONATE 1 G: 98 INJECTION, SOLUTION INTRAVENOUS at 12:12

## 2021-10-06 RX ADMIN — CALCIUM GLUCONATE 1 G: 98 INJECTION, SOLUTION INTRAVENOUS at 12:20

## 2021-10-06 RX ADMIN — CALCIUM GLUCONATE 1 G: 98 INJECTION, SOLUTION INTRAVENOUS at 11:43

## 2021-10-06 RX ADMIN — ALBUMIN HUMAN 25 G: 0.25 SOLUTION INTRAVENOUS at 11:16

## 2021-10-06 RX ADMIN — ALBUMIN HUMAN 25 G: 0.05 INJECTION, SOLUTION INTRAVENOUS at 10:47

## 2021-10-06 RX ADMIN — CHLORHEXIDINE GLUCONATE 15 ML: 1.2 SOLUTION ORAL at 20:26

## 2021-10-06 RX ADMIN — INSULIN HUMAN 2 UNITS: 100 INJECTION, SOLUTION PARENTERAL at 06:34

## 2021-10-06 RX ADMIN — CALCIUM GLUCONATE 1 G: 98 INJECTION, SOLUTION INTRAVENOUS at 11:48

## 2021-10-06 RX ADMIN — CALCIUM CHLORIDE, MAGNESIUM CHLORIDE, SODIUM CHLORIDE, SODIUM BICARBONATE, POTASSIUM CHLORIDE AND SODIUM PHOSPHATE DIBASIC DIHYDRATE 1000 ML/HR: 3.68; 3.05; 6.34; 3.09; .314; .187 INJECTION INTRAVENOUS at 05:00

## 2021-10-06 RX ADMIN — INSULIN HUMAN 2 UNITS: 100 INJECTION, SOLUTION PARENTERAL at 13:11

## 2021-10-06 RX ADMIN — CALCIUM GLUCONATE 1 G: 98 INJECTION, SOLUTION INTRAVENOUS at 11:37

## 2021-10-06 RX ADMIN — FENTANYL CITRATE 50 MCG/HR: 50 INJECTION INTRAVENOUS at 12:30

## 2021-10-06 RX ADMIN — PROPOFOL 50 MCG/KG/MIN: 10 INJECTION, EMULSION INTRAVENOUS at 14:03

## 2021-10-06 RX ADMIN — CALCIUM GLUCONATE 1 G: 98 INJECTION, SOLUTION INTRAVENOUS at 12:07

## 2021-10-06 RX ADMIN — PROPOFOL 50 MCG/KG/MIN: 10 INJECTION, EMULSION INTRAVENOUS at 03:01

## 2021-10-06 RX ADMIN — PROPOFOL 40 MCG/KG/MIN: 10 INJECTION, EMULSION INTRAVENOUS at 21:14

## 2021-10-06 RX ADMIN — Medication 30 ML: at 20:26

## 2021-10-06 RX ADMIN — ALBUMIN HUMAN 25 G: 0.25 SOLUTION INTRAVENOUS at 11:08

## 2021-10-06 RX ADMIN — DIPHENHYDRAMINE HYDROCHLORIDE 50 MG: 50 INJECTION INTRAMUSCULAR; INTRAVENOUS at 10:22

## 2021-10-06 RX ADMIN — CHLORHEXIDINE GLUCONATE 15 ML: 1.2 SOLUTION ORAL at 09:54

## 2021-10-06 RX ADMIN — CALCIUM GLUCONATE 1 G: 98 INJECTION, SOLUTION INTRAVENOUS at 12:01

## 2021-10-06 RX ADMIN — CALCIUM GLUCONATE 1 G: 98 INJECTION, SOLUTION INTRAVENOUS at 12:02

## 2021-10-06 RX ADMIN — PROPOFOL 40 MCG/KG/MIN: 10 INJECTION, EMULSION INTRAVENOUS at 17:28

## 2021-10-06 RX ADMIN — METHYLPREDNISOLONE SODIUM SUCCINATE 40 MG: 40 INJECTION, POWDER, FOR SOLUTION INTRAMUSCULAR; INTRAVENOUS at 22:10

## 2021-10-06 RX ADMIN — CALCIUM GLUCONATE 1 G: 98 INJECTION, SOLUTION INTRAVENOUS at 11:57

## 2021-10-06 RX ADMIN — CALCIUM GLUCONATE 1 G: 98 INJECTION, SOLUTION INTRAVENOUS at 11:02

## 2021-10-06 RX ADMIN — METHYLPREDNISOLONE SODIUM SUCCINATE 40 MG: 40 INJECTION, POWDER, FOR SOLUTION INTRAMUSCULAR; INTRAVENOUS at 11:19

## 2021-10-07 ENCOUNTER — APPOINTMENT (OUTPATIENT)
Dept: NEPHROLOGY | Facility: HOSPITAL | Age: 66
End: 2021-10-07

## 2021-10-07 ENCOUNTER — APPOINTMENT (OUTPATIENT)
Dept: GENERAL RADIOLOGY | Facility: HOSPITAL | Age: 66
End: 2021-10-07

## 2021-10-07 LAB
ALBUMIN SERPL-MCNC: 3.5 G/DL (ref 3.5–5.2)
ALBUMIN SERPL-MCNC: 3.7 G/DL (ref 3.5–5.2)
ALBUMIN SERPL-MCNC: 3.9 G/DL (ref 3.5–5.2)
ANION GAP SERPL CALCULATED.3IONS-SCNC: 12 MMOL/L (ref 5–15)
ANION GAP SERPL CALCULATED.3IONS-SCNC: 13 MMOL/L (ref 5–15)
ANION GAP SERPL CALCULATED.3IONS-SCNC: 13 MMOL/L (ref 5–15)
ANION GAP SERPL CALCULATED.3IONS-SCNC: 14 MMOL/L (ref 5–15)
ARTERIAL PATENCY WRIST A: ABNORMAL
ATMOSPHERIC PRESS: ABNORMAL MM[HG]
BASE EXCESS BLDA CALC-SCNC: 0.1 MMOL/L (ref 0–2)
BDY SITE: ABNORMAL
BH BB BLOOD EXPIRATION DATE: NORMAL
BH BB BLOOD TYPE BARCODE: 600
BH BB BLOOD TYPE BARCODE: 6200
BH BB DISPENSE STATUS: NORMAL
BH BB PRODUCT CODE: NORMAL
BH BB UNIT NUMBER: NORMAL
BODY TEMPERATURE: 37 C
BUN SERPL-MCNC: 48 MG/DL (ref 8–23)
BUN SERPL-MCNC: 49 MG/DL (ref 8–23)
BUN SERPL-MCNC: 56 MG/DL (ref 8–23)
BUN SERPL-MCNC: 59 MG/DL (ref 8–23)
BUN/CREAT SERPL: 15.7 (ref 7–25)
BUN/CREAT SERPL: 15.9 (ref 7–25)
BUN/CREAT SERPL: 16.8 (ref 7–25)
BUN/CREAT SERPL: 17.1 (ref 7–25)
C-ANCA TITR SER IF: NORMAL TITER
CA-I SERPL ISE-MCNC: 1.15 MMOL/L (ref 1.12–1.32)
CA-I SERPL ISE-MCNC: 1.16 MMOL/L (ref 1.12–1.32)
CALCIUM SPEC-SCNC: 7.9 MG/DL (ref 8.6–10.5)
CALCIUM SPEC-SCNC: 8.1 MG/DL (ref 8.6–10.5)
CALCIUM SPEC-SCNC: 8.2 MG/DL (ref 8.6–10.5)
CALCIUM SPEC-SCNC: 9.1 MG/DL (ref 8.6–10.5)
CHLORIDE SERPL-SCNC: 100 MMOL/L (ref 98–107)
CHLORIDE SERPL-SCNC: 101 MMOL/L (ref 98–107)
CHLORIDE SERPL-SCNC: 102 MMOL/L (ref 98–107)
CHLORIDE SERPL-SCNC: 105 MMOL/L (ref 98–107)
CO2 BLDA-SCNC: 27.8 MMOL/L (ref 22–33)
CO2 SERPL-SCNC: 21 MMOL/L (ref 22–29)
CO2 SERPL-SCNC: 25 MMOL/L (ref 22–29)
CO2 SERPL-SCNC: 25 MMOL/L (ref 22–29)
CO2 SERPL-SCNC: 26 MMOL/L (ref 22–29)
COHGB MFR BLD: 1.6 % (ref 0–2)
CREAT SERPL-MCNC: 2.87 MG/DL (ref 0.76–1.27)
CREAT SERPL-MCNC: 3.05 MG/DL (ref 0.76–1.27)
CREAT SERPL-MCNC: 3.34 MG/DL (ref 0.76–1.27)
CREAT SERPL-MCNC: 3.72 MG/DL (ref 0.76–1.27)
CROSSMATCH INTERPRETATION: NORMAL
DEPRECATED RDW RBC AUTO: 45.5 FL (ref 37–54)
DEPRECATED RDW RBC AUTO: 49.1 FL (ref 37–54)
EPAP: 0
ERYTHROCYTE [DISTWIDTH] IN BLOOD BY AUTOMATED COUNT: 14.4 % (ref 12.3–15.4)
ERYTHROCYTE [DISTWIDTH] IN BLOOD BY AUTOMATED COUNT: 16.4 % (ref 12.3–15.4)
FIBRINOGEN PPP-MCNC: 294 MG/DL (ref 220–470)
GBM IGG SER-ACNC: 98 UNITS (ref 0–20)
GFR SERPL CREATININE-BSD FRML MDRD: 16 ML/MIN/1.73
GFR SERPL CREATININE-BSD FRML MDRD: 18 ML/MIN/1.73
GFR SERPL CREATININE-BSD FRML MDRD: 21 ML/MIN/1.73
GFR SERPL CREATININE-BSD FRML MDRD: 22 ML/MIN/1.73
GLUCOSE BLDC GLUCOMTR-MCNC: 157 MG/DL (ref 70–130)
GLUCOSE BLDC GLUCOMTR-MCNC: 166 MG/DL (ref 70–130)
GLUCOSE BLDC GLUCOMTR-MCNC: 177 MG/DL (ref 70–130)
GLUCOSE BLDC GLUCOMTR-MCNC: 196 MG/DL (ref 70–130)
GLUCOSE SERPL-MCNC: 148 MG/DL (ref 65–99)
GLUCOSE SERPL-MCNC: 176 MG/DL (ref 65–99)
GLUCOSE SERPL-MCNC: 179 MG/DL (ref 65–99)
GLUCOSE SERPL-MCNC: 229 MG/DL (ref 65–99)
HCO3 BLDA-SCNC: 26.3 MMOL/L (ref 20–26)
HCT VFR BLD AUTO: 26.3 % (ref 37.5–51)
HCT VFR BLD AUTO: 27.1 % (ref 37.5–51)
HCT VFR BLD CALC: 30.1 %
HGB BLD-MCNC: 9.3 G/DL (ref 13–17.7)
HGB BLD-MCNC: 9.4 G/DL (ref 13–17.7)
HGB BLDA-MCNC: 9.8 G/DL (ref 13.5–17.5)
INHALED O2 CONCENTRATION: 55 %
IPAP: 0
MAGNESIUM SERPL-MCNC: 2.4 MG/DL (ref 1.6–2.4)
MAGNESIUM SERPL-MCNC: 2.5 MG/DL (ref 1.6–2.4)
MAGNESIUM SERPL-MCNC: 2.6 MG/DL (ref 1.6–2.4)
MCH RBC QN AUTO: 31.8 PG (ref 26.6–33)
MCH RBC QN AUTO: 34.7 PG (ref 26.6–33)
MCHC RBC AUTO-ENTMCNC: 34.7 G/DL (ref 31.5–35.7)
MCHC RBC AUTO-ENTMCNC: 35.4 G/DL (ref 31.5–35.7)
MCV RBC AUTO: 91.6 FL (ref 79–97)
MCV RBC AUTO: 98.1 FL (ref 79–97)
METHGB BLD QL: 0.9 % (ref 0–1.5)
MODALITY: ABNORMAL
MYELOPEROXIDASE AB SER IA-ACNC: <9 U/ML (ref 0–9)
NOTE: ABNORMAL
OXYHGB MFR BLDV: 93.6 % (ref 94–99)
P-ANCA ATYPICAL TITR SER IF: NORMAL TITER
P-ANCA TITR SER IF: NORMAL TITER
PAW @ PEAK INSP FLOW SETTING VENT: 0 CMH2O
PCO2 BLDA: 49.3 MM HG (ref 35–45)
PCO2 TEMP ADJ BLD: 49.3 MM HG (ref 35–48)
PH BLDA: 7.34 PH UNITS (ref 7.35–7.45)
PH, TEMP CORRECTED: 7.34 PH UNITS
PHOSPHATE SERPL-MCNC: 6.7 MG/DL (ref 2.5–4.5)
PHOSPHATE SERPL-MCNC: 6.8 MG/DL (ref 2.5–4.5)
PHOSPHATE SERPL-MCNC: 8.3 MG/DL (ref 2.5–4.5)
PLATELET # BLD AUTO: 86 10*3/MM3 (ref 140–450)
PLATELET # BLD AUTO: 87 10*3/MM3 (ref 140–450)
PMV BLD AUTO: 12.7 FL (ref 6–12)
PMV BLD AUTO: 13.9 FL (ref 6–12)
PO2 BLDA: 85.1 MM HG (ref 83–108)
PO2 TEMP ADJ BLD: 85.1 MM HG (ref 83–108)
POTASSIUM SERPL-SCNC: 4.1 MMOL/L (ref 3.5–5.2)
POTASSIUM SERPL-SCNC: 4.1 MMOL/L (ref 3.5–5.2)
POTASSIUM SERPL-SCNC: 4.7 MMOL/L (ref 3.5–5.2)
POTASSIUM SERPL-SCNC: 4.8 MMOL/L (ref 3.5–5.2)
PROTEINASE3 AB SER IA-ACNC: <3.5 U/ML (ref 0–3.5)
RBC # BLD AUTO: 2.68 10*6/MM3 (ref 4.14–5.8)
RBC # BLD AUTO: 2.96 10*6/MM3 (ref 4.14–5.8)
SODIUM SERPL-SCNC: 139 MMOL/L (ref 136–145)
SODIUM SERPL-SCNC: 140 MMOL/L (ref 136–145)
TOTAL RATE: 0 BREATHS/MINUTE
UNIT  ABO: NORMAL
UNIT  RH: NORMAL
WBC # BLD AUTO: 9.5 10*3/MM3 (ref 3.4–10.8)
WBC # BLD AUTO: 9.95 10*3/MM3 (ref 3.4–10.8)

## 2021-10-07 PROCEDURE — 25010000002 PROPOFOL 10 MG/ML EMULSION: Performed by: INTERNAL MEDICINE

## 2021-10-07 PROCEDURE — 80048 BASIC METABOLIC PNL TOTAL CA: CPT | Performed by: INTERNAL MEDICINE

## 2021-10-07 PROCEDURE — 25010000002 CALCIUM GLUCONATE PER 10 ML: Performed by: INTERNAL MEDICINE

## 2021-10-07 PROCEDURE — P9017 PLASMA 1 DONOR FRZ W/IN 8 HR: HCPCS

## 2021-10-07 PROCEDURE — 99291 CRITICAL CARE FIRST HOUR: CPT | Performed by: INTERNAL MEDICINE

## 2021-10-07 PROCEDURE — 82375 ASSAY CARBOXYHB QUANT: CPT

## 2021-10-07 PROCEDURE — 63710000001 INSULIN REGULAR HUMAN PER 5 UNITS: Performed by: INTERNAL MEDICINE

## 2021-10-07 PROCEDURE — 82805 BLOOD GASES W/O2 SATURATION: CPT

## 2021-10-07 PROCEDURE — 36455 BLD EXCHANGE TRUJ OTH THN NB: CPT

## 2021-10-07 PROCEDURE — 25010000002 METHYLPREDNISOLONE PER 40 MG: Performed by: INTERNAL MEDICINE

## 2021-10-07 PROCEDURE — 85027 COMPLETE CBC AUTOMATED: CPT | Performed by: INTERNAL MEDICINE

## 2021-10-07 PROCEDURE — 25010000002 CYCLOPHOSPHAMIDE 1 GM/5ML SOLUTION 5 ML VIAL: Performed by: INTERNAL MEDICINE

## 2021-10-07 PROCEDURE — 80069 RENAL FUNCTION PANEL: CPT | Performed by: INTERNAL MEDICINE

## 2021-10-07 PROCEDURE — 94799 UNLISTED PULMONARY SVC/PX: CPT

## 2021-10-07 PROCEDURE — 36600 WITHDRAWAL OF ARTERIAL BLOOD: CPT

## 2021-10-07 PROCEDURE — 71045 X-RAY EXAM CHEST 1 VIEW: CPT

## 2021-10-07 PROCEDURE — 83735 ASSAY OF MAGNESIUM: CPT | Performed by: INTERNAL MEDICINE

## 2021-10-07 PROCEDURE — 63710000001 DIPHENHYDRAMINE PER 50 MG: Performed by: INTERNAL MEDICINE

## 2021-10-07 PROCEDURE — 86927 PLASMA FRESH FROZEN: CPT

## 2021-10-07 PROCEDURE — 82330 ASSAY OF CALCIUM: CPT | Performed by: INTERNAL MEDICINE

## 2021-10-07 PROCEDURE — 82962 GLUCOSE BLOOD TEST: CPT

## 2021-10-07 PROCEDURE — 25010000002 FENTANYL CITRATE (PF) 2500 MCG/50ML SOLUTION: Performed by: INTERNAL MEDICINE

## 2021-10-07 PROCEDURE — 83050 HGB METHEMOGLOBIN QUAN: CPT

## 2021-10-07 PROCEDURE — 94003 VENT MGMT INPAT SUBQ DAY: CPT

## 2021-10-07 PROCEDURE — P9047 ALBUMIN (HUMAN), 25%, 50ML: HCPCS

## 2021-10-07 PROCEDURE — 25010000002 ALBUMIN HUMAN 25% PER 50 ML

## 2021-10-07 PROCEDURE — 85384 FIBRINOGEN ACTIVITY: CPT | Performed by: INTERNAL MEDICINE

## 2021-10-07 RX ORDER — DIPHENHYDRAMINE HYDROCHLORIDE 50 MG/ML
25 INJECTION INTRAMUSCULAR; INTRAVENOUS ONCE
Status: COMPLETED | OUTPATIENT
Start: 2021-10-07 | End: 2021-10-07

## 2021-10-07 RX ORDER — ACETAMINOPHEN 160 MG/5ML
650 SOLUTION ORAL ONCE
Status: COMPLETED | OUTPATIENT
Start: 2021-10-07 | End: 2021-10-07

## 2021-10-07 RX ORDER — ACETAMINOPHEN 650 MG/1
650 SUPPOSITORY RECTAL ONCE
Status: COMPLETED | OUTPATIENT
Start: 2021-10-07 | End: 2021-10-07

## 2021-10-07 RX ORDER — CYCLOPHOSPHAMIDE 25 MG/1
150 CAPSULE ORAL DAILY
Status: DISCONTINUED | OUTPATIENT
Start: 2021-10-07 | End: 2021-10-07

## 2021-10-07 RX ORDER — CYCLOPHOSPHAMIDE 1 G/50ML
150 INJECTION, POWDER, FOR SOLUTION INTRAVENOUS; ORAL DAILY
Status: DISCONTINUED | OUTPATIENT
Start: 2021-10-07 | End: 2021-10-07

## 2021-10-07 RX ORDER — AMINO ACIDS/PROTEIN HYDROLYS 15G-100/30
30 LIQUID (ML) ORAL 4 TIMES DAILY
Status: DISCONTINUED | OUTPATIENT
Start: 2021-10-07 | End: 2021-10-13

## 2021-10-07 RX ORDER — ALBUMIN (HUMAN) 12.5 G/50ML
SOLUTION INTRAVENOUS
Status: COMPLETED
Start: 2021-10-07 | End: 2021-10-07

## 2021-10-07 RX ORDER — ACETAMINOPHEN 325 MG/1
650 TABLET ORAL ONCE
Status: COMPLETED | OUTPATIENT
Start: 2021-10-07 | End: 2021-10-07

## 2021-10-07 RX ORDER — DIPHENHYDRAMINE HCL 25 MG
25 CAPSULE ORAL ONCE
Status: COMPLETED | OUTPATIENT
Start: 2021-10-07 | End: 2021-10-07

## 2021-10-07 RX ADMIN — CALCIUM CHLORIDE, MAGNESIUM CHLORIDE, SODIUM CHLORIDE, SODIUM BICARBONATE, POTASSIUM CHLORIDE AND SODIUM PHOSPHATE DIBASIC DIHYDRATE 1000 ML/HR: 3.68; 3.05; 6.34; 3.09; .314; .187 INJECTION INTRAVENOUS at 18:03

## 2021-10-07 RX ADMIN — Medication 30 ML: at 18:10

## 2021-10-07 RX ADMIN — METHYLPREDNISOLONE SODIUM SUCCINATE 40 MG: 40 INJECTION, POWDER, FOR SOLUTION INTRAMUSCULAR; INTRAVENOUS at 22:52

## 2021-10-07 RX ADMIN — Medication 30 ML: at 08:07

## 2021-10-07 RX ADMIN — PROPOFOL 50 MCG/KG/MIN: 10 INJECTION, EMULSION INTRAVENOUS at 21:52

## 2021-10-07 RX ADMIN — CALCIUM CHLORIDE, MAGNESIUM CHLORIDE, SODIUM CHLORIDE, SODIUM BICARBONATE, POTASSIUM CHLORIDE AND SODIUM PHOSPHATE DIBASIC DIHYDRATE 1000 ML/HR: 3.68; 3.05; 6.34; 3.09; .314; .187 INJECTION INTRAVENOUS at 09:40

## 2021-10-07 RX ADMIN — INSULIN HUMAN 2 UNITS: 100 INJECTION, SOLUTION PARENTERAL at 23:08

## 2021-10-07 RX ADMIN — INSULIN HUMAN 2 UNITS: 100 INJECTION, SOLUTION PARENTERAL at 18:10

## 2021-10-07 RX ADMIN — Medication 30 ML: at 21:52

## 2021-10-07 RX ADMIN — INSULIN HUMAN 2 UNITS: 100 INJECTION, SOLUTION PARENTERAL at 14:27

## 2021-10-07 RX ADMIN — CHLORHEXIDINE GLUCONATE 15 ML: 1.2 SOLUTION ORAL at 08:06

## 2021-10-07 RX ADMIN — DIPHENHYDRAMINE HYDROCHLORIDE 25 MG: 25 CAPSULE ORAL at 08:06

## 2021-10-07 RX ADMIN — FAMOTIDINE 20 MG: 10 INJECTION, SOLUTION INTRAVENOUS at 08:07

## 2021-10-07 RX ADMIN — CALCIUM GLUCONATE 1 G: 98 INJECTION, SOLUTION INTRAVENOUS at 12:43

## 2021-10-07 RX ADMIN — METHYLPREDNISOLONE SODIUM SUCCINATE 40 MG: 40 INJECTION, POWDER, FOR SOLUTION INTRAMUSCULAR; INTRAVENOUS at 10:37

## 2021-10-07 RX ADMIN — ATOVAQUONE 1500 MG: 750 SUSPENSION ORAL at 14:27

## 2021-10-07 RX ADMIN — ACETAMINOPHEN ORAL SOLUTION 650 MG: 650 SOLUTION ORAL at 08:06

## 2021-10-07 RX ADMIN — CALCIUM GLUCONATE 1 G: 98 INJECTION, SOLUTION INTRAVENOUS at 13:07

## 2021-10-07 RX ADMIN — CALCIUM GLUCONATE 1 G: 98 INJECTION, SOLUTION INTRAVENOUS at 12:53

## 2021-10-07 RX ADMIN — PROPOFOL 50 MCG/KG/MIN: 10 INJECTION, EMULSION INTRAVENOUS at 17:42

## 2021-10-07 RX ADMIN — PROPOFOL 50 MCG/KG/MIN: 10 INJECTION, EMULSION INTRAVENOUS at 10:37

## 2021-10-07 RX ADMIN — CALCIUM CHLORIDE, MAGNESIUM CHLORIDE, SODIUM CHLORIDE, SODIUM BICARBONATE, POTASSIUM CHLORIDE AND SODIUM PHOSPHATE DIBASIC DIHYDRATE 1000 ML/HR: 3.68; 3.05; 6.34; 3.09; .314; .187 INJECTION INTRAVENOUS at 22:00

## 2021-10-07 RX ADMIN — ALBUMIN HUMAN 25 G: 0.25 SOLUTION INTRAVENOUS at 12:16

## 2021-10-07 RX ADMIN — PROPOFOL 50 MCG/KG/MIN: 10 INJECTION, EMULSION INTRAVENOUS at 14:24

## 2021-10-07 RX ADMIN — CYCLOPHOSPHAMIDE 150 MG: 200 INJECTION, SOLUTION INTRAVENOUS at 18:10

## 2021-10-07 RX ADMIN — CHLORHEXIDINE GLUCONATE 15 ML: 1.2 SOLUTION ORAL at 21:52

## 2021-10-07 RX ADMIN — FENTANYL CITRATE 100 MCG/HR: 50 INJECTION INTRAVENOUS at 10:37

## 2021-10-07 RX ADMIN — INSULIN HUMAN 2 UNITS: 100 INJECTION, SOLUTION PARENTERAL at 00:14

## 2021-10-07 RX ADMIN — CALCIUM GLUCONATE 1 G: 98 INJECTION, SOLUTION INTRAVENOUS at 13:24

## 2021-10-07 RX ADMIN — CALCIUM GLUCONATE 1 G: 98 INJECTION, SOLUTION INTRAVENOUS at 13:10

## 2021-10-07 RX ADMIN — PROPOFOL 50 MCG/KG/MIN: 10 INJECTION, EMULSION INTRAVENOUS at 08:08

## 2021-10-07 RX ADMIN — CALCIUM GLUCONATE 1 G: 98 INJECTION, SOLUTION INTRAVENOUS at 12:24

## 2021-10-07 RX ADMIN — PROPOFOL 40 MCG/KG/MIN: 10 INJECTION, EMULSION INTRAVENOUS at 02:40

## 2021-10-07 RX ADMIN — INSULIN HUMAN 2 UNITS: 100 INJECTION, SOLUTION PARENTERAL at 05:27

## 2021-10-08 ENCOUNTER — APPOINTMENT (OUTPATIENT)
Dept: NEPHROLOGY | Facility: HOSPITAL | Age: 66
End: 2021-10-08

## 2021-10-08 LAB
ALBUMIN SERPL-MCNC: 3.7 G/DL (ref 3.5–5.2)
ALBUMIN SERPL-MCNC: 3.7 G/DL (ref 3.5–5.2)
ALBUMIN SERPL-MCNC: 3.8 G/DL (ref 3.5–5.2)
ANION GAP SERPL CALCULATED.3IONS-SCNC: 11 MMOL/L (ref 5–15)
ANION GAP SERPL CALCULATED.3IONS-SCNC: 11 MMOL/L (ref 5–15)
ANION GAP SERPL CALCULATED.3IONS-SCNC: 12 MMOL/L (ref 5–15)
ANION GAP SERPL CALCULATED.3IONS-SCNC: 12 MMOL/L (ref 5–15)
ARTERIAL PATENCY WRIST A: ABNORMAL
ATMOSPHERIC PRESS: ABNORMAL MM[HG]
BASE EXCESS BLDA CALC-SCNC: 0.8 MMOL/L (ref 0–2)
BASE EXCESS BLDA CALC-SCNC: 3.3 MMOL/L (ref 0–2)
BASE EXCESS BLDA CALC-SCNC: 6 MMOL/L (ref 0–2)
BASE EXCESS BLDA CALC-SCNC: 6.4 MMOL/L (ref 0–2)
BDY SITE: ABNORMAL
BH BB BLOOD EXPIRATION DATE: NORMAL
BH BB BLOOD TYPE BARCODE: 600
BH BB BLOOD TYPE BARCODE: 600
BH BB BLOOD TYPE BARCODE: 6200
BH BB DISPENSE STATUS: NORMAL
BH BB PRODUCT CODE: NORMAL
BH BB UNIT NUMBER: NORMAL
BODY TEMPERATURE: 37 C
BUN SERPL-MCNC: 44 MG/DL (ref 8–23)
BUN SERPL-MCNC: 47 MG/DL (ref 8–23)
BUN SERPL-MCNC: 48 MG/DL (ref 8–23)
BUN SERPL-MCNC: 53 MG/DL (ref 8–23)
BUN/CREAT SERPL: 18.2 (ref 7–25)
BUN/CREAT SERPL: 18.3 (ref 7–25)
BUN/CREAT SERPL: 19 (ref 7–25)
BUN/CREAT SERPL: 23.2 (ref 7–25)
CA-I SERPL ISE-MCNC: 1 MMOL/L (ref 1.12–1.32)
CA-I SERPL ISE-MCNC: 1.14 MMOL/L (ref 1.12–1.32)
CA-I SERPL ISE-MCNC: 1.17 MMOL/L (ref 1.12–1.32)
CA-I SERPL ISE-MCNC: 1.19 MMOL/L (ref 1.12–1.32)
CALCIUM SPEC-SCNC: 7.9 MG/DL (ref 8.6–10.5)
CALCIUM SPEC-SCNC: 8 MG/DL (ref 8.6–10.5)
CALCIUM SPEC-SCNC: 8.2 MG/DL (ref 8.6–10.5)
CALCIUM SPEC-SCNC: 8.5 MG/DL (ref 8.6–10.5)
CHLORIDE SERPL-SCNC: 100 MMOL/L (ref 98–107)
CHLORIDE SERPL-SCNC: 101 MMOL/L (ref 98–107)
CHLORIDE SERPL-SCNC: 102 MMOL/L (ref 98–107)
CHLORIDE SERPL-SCNC: 103 MMOL/L (ref 98–107)
CO2 BLDA-SCNC: 28 MMOL/L (ref 22–33)
CO2 BLDA-SCNC: 29.9 MMOL/L (ref 22–33)
CO2 BLDA-SCNC: 32.5 MMOL/L (ref 22–33)
CO2 BLDA-SCNC: 34.6 MMOL/L (ref 22–33)
CO2 SERPL-SCNC: 22 MMOL/L (ref 22–29)
CO2 SERPL-SCNC: 24 MMOL/L (ref 22–29)
CO2 SERPL-SCNC: 25 MMOL/L (ref 22–29)
CO2 SERPL-SCNC: 26 MMOL/L (ref 22–29)
COHGB MFR BLD: 1.6 % (ref 0–2)
COHGB MFR BLD: 1.6 % (ref 0–2)
COHGB MFR BLD: 1.8 % (ref 0–2)
COHGB MFR BLD: 2.1 % (ref 0–2)
CREAT SERPL-MCNC: 2.28 MG/DL (ref 0.76–1.27)
CREAT SERPL-MCNC: 2.4 MG/DL (ref 0.76–1.27)
CREAT SERPL-MCNC: 2.47 MG/DL (ref 0.76–1.27)
CREAT SERPL-MCNC: 2.64 MG/DL (ref 0.76–1.27)
DEPRECATED RDW RBC AUTO: 50.4 FL (ref 37–54)
EPAP: 0
ERYTHROCYTE [DISTWIDTH] IN BLOOD BY AUTOMATED COUNT: 15.7 % (ref 12.3–15.4)
FIBRINOGEN PPP-MCNC: 314 MG/DL (ref 220–470)
GFR SERPL CREATININE-BSD FRML MDRD: 24 ML/MIN/1.73
GFR SERPL CREATININE-BSD FRML MDRD: 26 ML/MIN/1.73
GFR SERPL CREATININE-BSD FRML MDRD: 27 ML/MIN/1.73
GFR SERPL CREATININE-BSD FRML MDRD: 29 ML/MIN/1.73
GLUCOSE BLDC GLUCOMTR-MCNC: 177 MG/DL (ref 70–130)
GLUCOSE BLDC GLUCOMTR-MCNC: 182 MG/DL (ref 70–130)
GLUCOSE BLDC GLUCOMTR-MCNC: 200 MG/DL (ref 70–130)
GLUCOSE BLDC GLUCOMTR-MCNC: 211 MG/DL (ref 70–130)
GLUCOSE SERPL-MCNC: 171 MG/DL (ref 65–99)
GLUCOSE SERPL-MCNC: 172 MG/DL (ref 65–99)
GLUCOSE SERPL-MCNC: 180 MG/DL (ref 65–99)
GLUCOSE SERPL-MCNC: 231 MG/DL (ref 65–99)
HCO3 BLDA-SCNC: 26.6 MMOL/L (ref 20–26)
HCO3 BLDA-SCNC: 28.5 MMOL/L (ref 20–26)
HCO3 BLDA-SCNC: 31.1 MMOL/L (ref 20–26)
HCO3 BLDA-SCNC: 32.8 MMOL/L (ref 20–26)
HCT VFR BLD AUTO: 26.6 % (ref 37.5–51)
HCT VFR BLD CALC: 25.9 %
HCT VFR BLD CALC: 26.7 %
HCT VFR BLD CALC: 27.3 %
HCT VFR BLD CALC: 27.8 %
HGB BLD-MCNC: 8.7 G/DL (ref 13–17.7)
HGB BLDA-MCNC: 8.4 G/DL (ref 13.5–17.5)
HGB BLDA-MCNC: 8.7 G/DL (ref 13.5–17.5)
HGB BLDA-MCNC: 8.9 G/DL (ref 13.5–17.5)
HGB BLDA-MCNC: 9.1 G/DL (ref 13.5–17.5)
INHALED O2 CONCENTRATION: 100 %
INHALED O2 CONCENTRATION: 50 %
INHALED O2 CONCENTRATION: 50 %
INHALED O2 CONCENTRATION: 60 %
IPAP: 0
Lab: ABNORMAL
MAGNESIUM SERPL-MCNC: 2.4 MG/DL (ref 1.6–2.4)
MAGNESIUM SERPL-MCNC: 2.5 MG/DL (ref 1.6–2.4)
MAGNESIUM SERPL-MCNC: 2.5 MG/DL (ref 1.6–2.4)
MCH RBC QN AUTO: 32.6 PG (ref 26.6–33)
MCHC RBC AUTO-ENTMCNC: 32.7 G/DL (ref 31.5–35.7)
MCV RBC AUTO: 99.6 FL (ref 79–97)
METHGB BLD QL: 0.5 % (ref 0–1.5)
METHGB BLD QL: 0.9 % (ref 0–1.5)
METHGB BLD QL: 0.9 % (ref 0–1.5)
METHGB BLD QL: ABNORMAL
MODALITY: ABNORMAL
NOTE: ABNORMAL
NOTIFIED BY: ABNORMAL
NOTIFIED WHO: ABNORMAL
OXYHGB MFR BLDV: 90.7 % (ref 94–99)
OXYHGB MFR BLDV: 93.3 % (ref 94–99)
OXYHGB MFR BLDV: 97 % (ref 94–99)
OXYHGB MFR BLDV: ABNORMAL %
PAW @ PEAK INSP FLOW SETTING VENT: 0 CMH2O
PCO2 BLDA: 45.5 MM HG (ref 35–45)
PCO2 BLDA: 47 MM HG (ref 35–45)
PCO2 BLDA: 47.1 MM HG (ref 35–45)
PCO2 BLDA: 57.7 MM HG (ref 35–45)
PCO2 TEMP ADJ BLD: 45.5 MM HG (ref 35–48)
PCO2 TEMP ADJ BLD: 47 MM HG (ref 35–48)
PCO2 TEMP ADJ BLD: 47.1 MM HG (ref 35–48)
PCO2 TEMP ADJ BLD: 57.7 MM HG (ref 35–48)
PH BLDA: 7.36 PH UNITS (ref 7.35–7.45)
PH BLDA: 7.36 PH UNITS (ref 7.35–7.45)
PH BLDA: 7.41 PH UNITS (ref 7.35–7.45)
PH BLDA: 7.43 PH UNITS (ref 7.35–7.45)
PH, TEMP CORRECTED: 7.36 PH UNITS
PH, TEMP CORRECTED: 7.36 PH UNITS
PH, TEMP CORRECTED: 7.41 PH UNITS
PH, TEMP CORRECTED: 7.43 PH UNITS
PHOSPHATE SERPL-MCNC: 5.7 MG/DL (ref 2.5–4.5)
PHOSPHATE SERPL-MCNC: 6.2 MG/DL (ref 2.5–4.5)
PHOSPHATE SERPL-MCNC: 6.8 MG/DL (ref 2.5–4.5)
PLATELET # BLD AUTO: 81 10*3/MM3 (ref 140–450)
PMV BLD AUTO: 13.8 FL (ref 6–12)
PO2 BLDA: 159 MM HG (ref 83–108)
PO2 BLDA: 214 MM HG (ref 83–108)
PO2 BLDA: 66.6 MM HG (ref 83–108)
PO2 BLDA: 75 MM HG (ref 83–108)
PO2 TEMP ADJ BLD: 159 MM HG (ref 83–108)
PO2 TEMP ADJ BLD: 214 MM HG (ref 83–108)
PO2 TEMP ADJ BLD: 66.6 MM HG (ref 83–108)
PO2 TEMP ADJ BLD: 75 MM HG (ref 83–108)
POTASSIUM SERPL-SCNC: 4.2 MMOL/L (ref 3.5–5.2)
POTASSIUM SERPL-SCNC: 4.2 MMOL/L (ref 3.5–5.2)
POTASSIUM SERPL-SCNC: 4.6 MMOL/L (ref 3.5–5.2)
POTASSIUM SERPL-SCNC: 4.8 MMOL/L (ref 3.5–5.2)
RBC # BLD AUTO: 2.67 10*6/MM3 (ref 4.14–5.8)
SODIUM SERPL-SCNC: 135 MMOL/L (ref 136–145)
SODIUM SERPL-SCNC: 136 MMOL/L (ref 136–145)
SODIUM SERPL-SCNC: 138 MMOL/L (ref 136–145)
SODIUM SERPL-SCNC: 140 MMOL/L (ref 136–145)
TOTAL RATE: 0 BREATHS/MINUTE
UNIT  ABO: NORMAL
UNIT  RH: NORMAL
VENTILATOR MODE: ABNORMAL
WBC # BLD AUTO: 10.77 10*3/MM3 (ref 3.4–10.8)

## 2021-10-08 PROCEDURE — 86927 PLASMA FRESH FROZEN: CPT

## 2021-10-08 PROCEDURE — 63710000001 PREDNISONE PER 1 MG: Performed by: INTERNAL MEDICINE

## 2021-10-08 PROCEDURE — 82330 ASSAY OF CALCIUM: CPT | Performed by: INTERNAL MEDICINE

## 2021-10-08 PROCEDURE — 25010000002 CYCLOPHOSPHAMIDE PER 100 MG: Performed by: INTERNAL MEDICINE

## 2021-10-08 PROCEDURE — 94799 UNLISTED PULMONARY SVC/PX: CPT

## 2021-10-08 PROCEDURE — 99291 CRITICAL CARE FIRST HOUR: CPT | Performed by: INTERNAL MEDICINE

## 2021-10-08 PROCEDURE — 83735 ASSAY OF MAGNESIUM: CPT | Performed by: INTERNAL MEDICINE

## 2021-10-08 PROCEDURE — 85027 COMPLETE CBC AUTOMATED: CPT | Performed by: INTERNAL MEDICINE

## 2021-10-08 PROCEDURE — 25010000002 CALCIUM GLUCONATE PER 10 ML: Performed by: INTERNAL MEDICINE

## 2021-10-08 PROCEDURE — 82962 GLUCOSE BLOOD TEST: CPT

## 2021-10-08 PROCEDURE — 85384 FIBRINOGEN ACTIVITY: CPT | Performed by: INTERNAL MEDICINE

## 2021-10-08 PROCEDURE — 94003 VENT MGMT INPAT SUBQ DAY: CPT

## 2021-10-08 PROCEDURE — 36430 TRANSFUSION BLD/BLD COMPNT: CPT

## 2021-10-08 PROCEDURE — 83050 HGB METHEMOGLOBIN QUAN: CPT

## 2021-10-08 PROCEDURE — 80069 RENAL FUNCTION PANEL: CPT | Performed by: INTERNAL MEDICINE

## 2021-10-08 PROCEDURE — 63710000001 INSULIN REGULAR HUMAN PER 5 UNITS: Performed by: INTERNAL MEDICINE

## 2021-10-08 PROCEDURE — 25010000002 FENTANYL CITRATE (PF) 2500 MCG/50ML SOLUTION: Performed by: INTERNAL MEDICINE

## 2021-10-08 PROCEDURE — 36600 WITHDRAWAL OF ARTERIAL BLOOD: CPT

## 2021-10-08 PROCEDURE — 25010000002 PROPOFOL 10 MG/ML EMULSION: Performed by: INTERNAL MEDICINE

## 2021-10-08 PROCEDURE — 80048 BASIC METABOLIC PNL TOTAL CA: CPT | Performed by: INTERNAL MEDICINE

## 2021-10-08 PROCEDURE — 82805 BLOOD GASES W/O2 SATURATION: CPT

## 2021-10-08 PROCEDURE — P9017 PLASMA 1 DONOR FRZ W/IN 8 HR: HCPCS

## 2021-10-08 PROCEDURE — 82375 ASSAY CARBOXYHB QUANT: CPT

## 2021-10-08 PROCEDURE — 36455 BLD EXCHANGE TRUJ OTH THN NB: CPT

## 2021-10-08 PROCEDURE — 25010000002 MIDAZOLAM PER 1 MG: Performed by: INTERNAL MEDICINE

## 2021-10-08 RX ORDER — MIDAZOLAM HYDROCHLORIDE 1 MG/ML
4 INJECTION INTRAMUSCULAR; INTRAVENOUS ONCE
Status: COMPLETED | OUTPATIENT
Start: 2021-10-08 | End: 2021-10-08

## 2021-10-08 RX ORDER — MIDAZOLAM HYDROCHLORIDE 1 MG/ML
2 INJECTION INTRAMUSCULAR; INTRAVENOUS ONCE
Status: COMPLETED | OUTPATIENT
Start: 2021-10-08 | End: 2021-10-08

## 2021-10-08 RX ORDER — ALBUMIN (HUMAN) 12.5 G/50ML
SOLUTION INTRAVENOUS
Status: COMPLETED
Start: 2021-10-08 | End: 2021-10-13

## 2021-10-08 RX ORDER — PREDNISONE 20 MG/1
60 TABLET ORAL
Status: DISCONTINUED | OUTPATIENT
Start: 2021-10-08 | End: 2021-10-25

## 2021-10-08 RX ADMIN — INSULIN HUMAN 2 UNITS: 100 INJECTION, SOLUTION PARENTERAL at 23:49

## 2021-10-08 RX ADMIN — CALCIUM CHLORIDE, MAGNESIUM CHLORIDE, SODIUM CHLORIDE, SODIUM BICARBONATE, POTASSIUM CHLORIDE AND SODIUM PHOSPHATE DIBASIC DIHYDRATE 1000 ML/HR: 3.68; 3.05; 6.34; 3.09; .314; .187 INJECTION INTRAVENOUS at 08:54

## 2021-10-08 RX ADMIN — PROPOFOL 50 MCG/KG/MIN: 10 INJECTION, EMULSION INTRAVENOUS at 10:28

## 2021-10-08 RX ADMIN — CYCLOPHOSPHAMIDE 150 MG: 200 INJECTION, SOLUTION INTRAVENOUS at 18:29

## 2021-10-08 RX ADMIN — CALCIUM CHLORIDE, MAGNESIUM CHLORIDE, SODIUM CHLORIDE, SODIUM BICARBONATE, POTASSIUM CHLORIDE AND SODIUM PHOSPHATE DIBASIC DIHYDRATE 1000 ML/HR: 3.68; 3.05; 6.34; 3.09; .314; .187 INJECTION INTRAVENOUS at 23:04

## 2021-10-08 RX ADMIN — FENTANYL CITRATE 300 MCG/HR: 50 INJECTION INTRAVENOUS at 19:49

## 2021-10-08 RX ADMIN — CALCIUM CHLORIDE, MAGNESIUM CHLORIDE, SODIUM CHLORIDE, SODIUM BICARBONATE, POTASSIUM CHLORIDE AND SODIUM PHOSPHATE DIBASIC DIHYDRATE 1000 ML/HR: 3.68; 3.05; 6.34; 3.09; .314; .187 INJECTION INTRAVENOUS at 18:17

## 2021-10-08 RX ADMIN — CHLORHEXIDINE GLUCONATE 15 ML: 1.2 SOLUTION ORAL at 08:19

## 2021-10-08 RX ADMIN — CALCIUM GLUCONATE 1 G: 98 INJECTION, SOLUTION INTRAVENOUS at 13:10

## 2021-10-08 RX ADMIN — CALCIUM CHLORIDE, MAGNESIUM CHLORIDE, SODIUM CHLORIDE, SODIUM BICARBONATE, POTASSIUM CHLORIDE AND SODIUM PHOSPHATE DIBASIC DIHYDRATE 1000 ML/HR: 3.68; 3.05; 6.34; 3.09; .314; .187 INJECTION INTRAVENOUS at 03:40

## 2021-10-08 RX ADMIN — PROPOFOL 50 MCG/KG/MIN: 10 INJECTION, EMULSION INTRAVENOUS at 00:08

## 2021-10-08 RX ADMIN — INSULIN HUMAN 3 UNITS: 100 INJECTION, SOLUTION PARENTERAL at 18:29

## 2021-10-08 RX ADMIN — Medication 30 ML: at 17:09

## 2021-10-08 RX ADMIN — CALCIUM GLUCONATE 1 G: 98 INJECTION, SOLUTION INTRAVENOUS at 12:39

## 2021-10-08 RX ADMIN — Medication 30 ML: at 08:19

## 2021-10-08 RX ADMIN — PREDNISONE 60 MG: 20 TABLET ORAL at 10:28

## 2021-10-08 RX ADMIN — ATOVAQUONE 1500 MG: 750 SUSPENSION ORAL at 12:45

## 2021-10-08 RX ADMIN — INSULIN HUMAN 2 UNITS: 100 INJECTION, SOLUTION PARENTERAL at 05:07

## 2021-10-08 RX ADMIN — PROPOFOL 50 MCG/KG/MIN: 10 INJECTION, EMULSION INTRAVENOUS at 03:48

## 2021-10-08 RX ADMIN — CALCIUM GLUCONATE 1 G: 98 INJECTION, SOLUTION INTRAVENOUS at 12:17

## 2021-10-08 RX ADMIN — CHLORHEXIDINE GLUCONATE 15 ML: 1.2 SOLUTION ORAL at 20:19

## 2021-10-08 RX ADMIN — Medication 30 ML: at 20:19

## 2021-10-08 RX ADMIN — MIDAZOLAM HYDROCHLORIDE 4 MG: 1 INJECTION, SOLUTION INTRAMUSCULAR; INTRAVENOUS at 13:24

## 2021-10-08 RX ADMIN — FENTANYL CITRATE 150 MCG/HR: 50 INJECTION INTRAVENOUS at 03:48

## 2021-10-08 RX ADMIN — MIDAZOLAM HYDROCHLORIDE 2 MG: 1 INJECTION, SOLUTION INTRAMUSCULAR; INTRAVENOUS at 16:01

## 2021-10-08 RX ADMIN — CALCIUM GLUCONATE 1 G: 98 INJECTION, SOLUTION INTRAVENOUS at 13:21

## 2021-10-08 RX ADMIN — PROPOFOL 50 MCG/KG/MIN: 10 INJECTION, EMULSION INTRAVENOUS at 06:32

## 2021-10-08 RX ADMIN — FAMOTIDINE 20 MG: 10 INJECTION, SOLUTION INTRAVENOUS at 08:19

## 2021-10-08 RX ADMIN — CALCIUM GLUCONATE 1 G: 98 INJECTION, SOLUTION INTRAVENOUS at 11:44

## 2021-10-08 RX ADMIN — SODIUM CHLORIDE 2000 ML: 9 INJECTION, SOLUTION INTRAVENOUS at 11:48

## 2021-10-08 RX ADMIN — INSULIN HUMAN 3 UNITS: 100 INJECTION, SOLUTION PARENTERAL at 12:45

## 2021-10-08 RX ADMIN — Medication 30 ML: at 12:45

## 2021-10-09 ENCOUNTER — APPOINTMENT (OUTPATIENT)
Dept: GENERAL RADIOLOGY | Facility: HOSPITAL | Age: 66
End: 2021-10-09

## 2021-10-09 ENCOUNTER — APPOINTMENT (OUTPATIENT)
Dept: NEPHROLOGY | Facility: HOSPITAL | Age: 66
End: 2021-10-09

## 2021-10-09 LAB
ALBUMIN SERPL-MCNC: 3.4 G/DL (ref 3.5–5.2)
ALBUMIN SERPL-MCNC: 3.5 G/DL (ref 3.5–5.2)
ALBUMIN SERPL-MCNC: 3.5 G/DL (ref 3.5–5.2)
ANION GAP SERPL CALCULATED.3IONS-SCNC: 10 MMOL/L (ref 5–15)
ANION GAP SERPL CALCULATED.3IONS-SCNC: 11 MMOL/L (ref 5–15)
ANION GAP SERPL CALCULATED.3IONS-SCNC: 11 MMOL/L (ref 5–15)
ANION GAP SERPL CALCULATED.3IONS-SCNC: 12 MMOL/L (ref 5–15)
ARTERIAL PATENCY WRIST A: ABNORMAL
ATMOSPHERIC PRESS: ABNORMAL MM[HG]
BASE EXCESS BLDA CALC-SCNC: 0.1 MMOL/L (ref 0–2)
BDY SITE: ABNORMAL
BH BB BLOOD EXPIRATION DATE: NORMAL
BH BB BLOOD TYPE BARCODE: 600
BH BB BLOOD TYPE BARCODE: 600
BH BB BLOOD TYPE BARCODE: 6200
BH BB BLOOD TYPE BARCODE: NORMAL
BH BB DISPENSE STATUS: NORMAL
BH BB PRODUCT CODE: NORMAL
BH BB UNIT NUMBER: NORMAL
BODY TEMPERATURE: 37 C
BUN SERPL-MCNC: 48 MG/DL (ref 8–23)
BUN SERPL-MCNC: 48 MG/DL (ref 8–23)
BUN SERPL-MCNC: 52 MG/DL (ref 8–23)
BUN SERPL-MCNC: 55 MG/DL (ref 8–23)
BUN/CREAT SERPL: 20.5 (ref 7–25)
BUN/CREAT SERPL: 20.5 (ref 7–25)
BUN/CREAT SERPL: 21.2 (ref 7–25)
BUN/CREAT SERPL: 21.7 (ref 7–25)
CA-I SERPL ISE-MCNC: 1.16 MMOL/L (ref 1.12–1.32)
CA-I SERPL ISE-MCNC: 1.2 MMOL/L (ref 1.12–1.32)
CALCIUM SPEC-SCNC: 7.6 MG/DL (ref 8.6–10.5)
CALCIUM SPEC-SCNC: 7.9 MG/DL (ref 8.6–10.5)
CALCIUM SPEC-SCNC: 7.9 MG/DL (ref 8.6–10.5)
CALCIUM SPEC-SCNC: 8.7 MG/DL (ref 8.6–10.5)
CHLORIDE SERPL-SCNC: 101 MMOL/L (ref 98–107)
CHLORIDE SERPL-SCNC: 101 MMOL/L (ref 98–107)
CHLORIDE SERPL-SCNC: 102 MMOL/L (ref 98–107)
CHLORIDE SERPL-SCNC: 102 MMOL/L (ref 98–107)
CO2 BLDA-SCNC: 26.7 MMOL/L (ref 22–33)
CO2 SERPL-SCNC: 22 MMOL/L (ref 22–29)
CO2 SERPL-SCNC: 24 MMOL/L (ref 22–29)
CO2 SERPL-SCNC: 25 MMOL/L (ref 22–29)
CO2 SERPL-SCNC: 26 MMOL/L (ref 22–29)
COHGB MFR BLD: 2 % (ref 0–2)
CREAT SERPL-MCNC: 2.34 MG/DL (ref 0.76–1.27)
CREAT SERPL-MCNC: 2.34 MG/DL (ref 0.76–1.27)
CREAT SERPL-MCNC: 2.45 MG/DL (ref 0.76–1.27)
CREAT SERPL-MCNC: 2.54 MG/DL (ref 0.76–1.27)
DEPRECATED RDW RBC AUTO: 45.9 FL (ref 37–54)
EPAP: 0
ERYTHROCYTE [DISTWIDTH] IN BLOOD BY AUTOMATED COUNT: 14.8 % (ref 12.3–15.4)
FIBRINOGEN PPP-MCNC: 304 MG/DL (ref 220–470)
GFR SERPL CREATININE-BSD FRML MDRD: 25 ML/MIN/1.73
GFR SERPL CREATININE-BSD FRML MDRD: 26 ML/MIN/1.73
GFR SERPL CREATININE-BSD FRML MDRD: 28 ML/MIN/1.73
GFR SERPL CREATININE-BSD FRML MDRD: 28 ML/MIN/1.73
GLUCOSE BLDC GLUCOMTR-MCNC: 150 MG/DL (ref 70–130)
GLUCOSE BLDC GLUCOMTR-MCNC: 218 MG/DL (ref 70–130)
GLUCOSE BLDC GLUCOMTR-MCNC: 222 MG/DL (ref 70–130)
GLUCOSE BLDC GLUCOMTR-MCNC: 258 MG/DL (ref 70–130)
GLUCOSE SERPL-MCNC: 156 MG/DL (ref 65–99)
GLUCOSE SERPL-MCNC: 166 MG/DL (ref 65–99)
GLUCOSE SERPL-MCNC: 183 MG/DL (ref 65–99)
GLUCOSE SERPL-MCNC: 216 MG/DL (ref 65–99)
HCO3 BLDA-SCNC: 25.3 MMOL/L (ref 20–26)
HCT VFR BLD AUTO: 24.4 % (ref 37.5–51)
HCT VFR BLD CALC: 25.2 %
HGB BLD-MCNC: 8.7 G/DL (ref 13–17.7)
HGB BLDA-MCNC: 8.2 G/DL (ref 13.5–17.5)
INHALED O2 CONCENTRATION: 50 %
IPAP: 0
MAGNESIUM SERPL-MCNC: 2.4 MG/DL (ref 1.6–2.4)
MAGNESIUM SERPL-MCNC: 2.5 MG/DL (ref 1.6–2.4)
MAGNESIUM SERPL-MCNC: 2.6 MG/DL (ref 1.6–2.4)
MCH RBC QN AUTO: 34.1 PG (ref 26.6–33)
MCHC RBC AUTO-ENTMCNC: 35.7 G/DL (ref 31.5–35.7)
MCV RBC AUTO: 95.7 FL (ref 79–97)
METHGB BLD QL: 1.2 % (ref 0–1.5)
MODALITY: ABNORMAL
NOTE: ABNORMAL
OXYHGB MFR BLDV: 94 % (ref 94–99)
PAW @ PEAK INSP FLOW SETTING VENT: 0 CMH2O
PCO2 BLDA: 43.2 MM HG (ref 35–45)
PCO2 TEMP ADJ BLD: 43.2 MM HG (ref 35–48)
PH BLDA: 7.38 PH UNITS (ref 7.35–7.45)
PH, TEMP CORRECTED: 7.38 PH UNITS
PHOSPHATE SERPL-MCNC: 4.1 MG/DL (ref 2.5–4.5)
PHOSPHATE SERPL-MCNC: 4.9 MG/DL (ref 2.5–4.5)
PHOSPHATE SERPL-MCNC: 5.2 MG/DL (ref 2.5–4.5)
PLATELET # BLD AUTO: 102 10*3/MM3 (ref 140–450)
PMV BLD AUTO: 13.5 FL (ref 6–12)
PO2 BLDA: 85.1 MM HG (ref 83–108)
PO2 TEMP ADJ BLD: 85.1 MM HG (ref 83–108)
POTASSIUM SERPL-SCNC: 3.9 MMOL/L (ref 3.5–5.2)
POTASSIUM SERPL-SCNC: 4.1 MMOL/L (ref 3.5–5.2)
RBC # BLD AUTO: 2.55 10*6/MM3 (ref 4.14–5.8)
SODIUM SERPL-SCNC: 135 MMOL/L (ref 136–145)
SODIUM SERPL-SCNC: 136 MMOL/L (ref 136–145)
SODIUM SERPL-SCNC: 136 MMOL/L (ref 136–145)
SODIUM SERPL-SCNC: 140 MMOL/L (ref 136–145)
TOTAL RATE: 0 BREATHS/MINUTE
UNIT  ABO: NORMAL
UNIT  RH: NORMAL
WBC # BLD AUTO: 15.19 10*3/MM3 (ref 3.4–10.8)

## 2021-10-09 PROCEDURE — 83050 HGB METHEMOGLOBIN QUAN: CPT

## 2021-10-09 PROCEDURE — 82962 GLUCOSE BLOOD TEST: CPT

## 2021-10-09 PROCEDURE — 86927 PLASMA FRESH FROZEN: CPT

## 2021-10-09 PROCEDURE — 83735 ASSAY OF MAGNESIUM: CPT | Performed by: INTERNAL MEDICINE

## 2021-10-09 PROCEDURE — 36600 WITHDRAWAL OF ARTERIAL BLOOD: CPT

## 2021-10-09 PROCEDURE — 85027 COMPLETE CBC AUTOMATED: CPT | Performed by: INTERNAL MEDICINE

## 2021-10-09 PROCEDURE — 80069 RENAL FUNCTION PANEL: CPT | Performed by: INTERNAL MEDICINE

## 2021-10-09 PROCEDURE — 25010000002 CALCIUM GLUCONATE PER 10 ML: Performed by: INTERNAL MEDICINE

## 2021-10-09 PROCEDURE — 63710000001 INSULIN REGULAR HUMAN PER 5 UNITS: Performed by: INTERNAL MEDICINE

## 2021-10-09 PROCEDURE — 82330 ASSAY OF CALCIUM: CPT | Performed by: INTERNAL MEDICINE

## 2021-10-09 PROCEDURE — P9017 PLASMA 1 DONOR FRZ W/IN 8 HR: HCPCS

## 2021-10-09 PROCEDURE — 85384 FIBRINOGEN ACTIVITY: CPT | Performed by: INTERNAL MEDICINE

## 2021-10-09 PROCEDURE — 80048 BASIC METABOLIC PNL TOTAL CA: CPT | Performed by: INTERNAL MEDICINE

## 2021-10-09 PROCEDURE — 25010000002 FENTANYL CITRATE (PF) 2500 MCG/50ML SOLUTION: Performed by: INTERNAL MEDICINE

## 2021-10-09 PROCEDURE — 94003 VENT MGMT INPAT SUBQ DAY: CPT

## 2021-10-09 PROCEDURE — 94799 UNLISTED PULMONARY SVC/PX: CPT

## 2021-10-09 PROCEDURE — 71045 X-RAY EXAM CHEST 1 VIEW: CPT

## 2021-10-09 PROCEDURE — 36455 BLD EXCHANGE TRUJ OTH THN NB: CPT

## 2021-10-09 PROCEDURE — 82375 ASSAY CARBOXYHB QUANT: CPT

## 2021-10-09 PROCEDURE — 63710000001 PREDNISONE PER 1 MG: Performed by: INTERNAL MEDICINE

## 2021-10-09 PROCEDURE — 25010000002 HEPARIN (PORCINE) PER 1000 UNITS: Performed by: INTERNAL MEDICINE

## 2021-10-09 PROCEDURE — 25010000002 CYCLOPHOSPHAMIDE PER 100 MG: Performed by: INTERNAL MEDICINE

## 2021-10-09 PROCEDURE — 99291 CRITICAL CARE FIRST HOUR: CPT | Performed by: INTERNAL MEDICINE

## 2021-10-09 PROCEDURE — 82805 BLOOD GASES W/O2 SATURATION: CPT

## 2021-10-09 RX ORDER — LABETALOL HYDROCHLORIDE 5 MG/ML
20 INJECTION, SOLUTION INTRAVENOUS
Status: DISCONTINUED | OUTPATIENT
Start: 2021-10-09 | End: 2021-11-12 | Stop reason: HOSPADM

## 2021-10-09 RX ADMIN — CALCIUM CHLORIDE, MAGNESIUM CHLORIDE, SODIUM CHLORIDE, SODIUM BICARBONATE, POTASSIUM CHLORIDE AND SODIUM PHOSPHATE DIBASIC DIHYDRATE 1000 ML/HR: 3.68; 3.05; 6.34; 3.09; .314; .187 INJECTION INTRAVENOUS at 04:58

## 2021-10-09 RX ADMIN — LABETALOL 20 MG/4 ML (5 MG/ML) INTRAVENOUS SYRINGE 20 MG: at 14:19

## 2021-10-09 RX ADMIN — CYCLOPHOSPHAMIDE 150 MG: 200 INJECTION, SOLUTION INTRAVENOUS at 17:15

## 2021-10-09 RX ADMIN — INSULIN HUMAN 2 UNITS: 100 INJECTION, SOLUTION PARENTERAL at 05:33

## 2021-10-09 RX ADMIN — CALCIUM GLUCONATE 1 G: 98 INJECTION, SOLUTION INTRAVENOUS at 13:42

## 2021-10-09 RX ADMIN — HEPARIN SODIUM 1100 UNITS: 1000 INJECTION INTRAVENOUS; SUBCUTANEOUS at 07:00

## 2021-10-09 RX ADMIN — CHLORHEXIDINE GLUCONATE 15 ML: 1.2 SOLUTION ORAL at 08:06

## 2021-10-09 RX ADMIN — CALCIUM GLUCONATE 1 G: 98 INJECTION, SOLUTION INTRAVENOUS at 12:52

## 2021-10-09 RX ADMIN — CALCIUM GLUCONATE 1 G: 98 INJECTION, SOLUTION INTRAVENOUS at 13:10

## 2021-10-09 RX ADMIN — FENTANYL CITRATE 300 MCG/HR: 50 INJECTION INTRAVENOUS at 10:45

## 2021-10-09 RX ADMIN — FENTANYL CITRATE 300 MCG/HR: 50 INJECTION INTRAVENOUS at 03:00

## 2021-10-09 RX ADMIN — CALCIUM CHLORIDE, MAGNESIUM CHLORIDE, SODIUM CHLORIDE, SODIUM BICARBONATE, POTASSIUM CHLORIDE AND SODIUM PHOSPHATE DIBASIC DIHYDRATE 1000 ML/HR: 3.68; 3.05; 6.34; 3.09; .314; .187 INJECTION INTRAVENOUS at 19:59

## 2021-10-09 RX ADMIN — FENTANYL CITRATE 300 MCG/HR: 50 INJECTION INTRAVENOUS at 19:08

## 2021-10-09 RX ADMIN — Medication 30 ML: at 12:23

## 2021-10-09 RX ADMIN — INSULIN HUMAN 4 UNITS: 100 INJECTION, SOLUTION PARENTERAL at 17:44

## 2021-10-09 RX ADMIN — FAMOTIDINE 20 MG: 10 INJECTION, SOLUTION INTRAVENOUS at 08:06

## 2021-10-09 RX ADMIN — ATOVAQUONE 1500 MG: 750 SUSPENSION ORAL at 12:23

## 2021-10-09 RX ADMIN — Medication 30 ML: at 17:44

## 2021-10-09 RX ADMIN — ACETAMINOPHEN 650 MG: 325 TABLET, FILM COATED ORAL at 14:20

## 2021-10-09 RX ADMIN — Medication 30 ML: at 08:53

## 2021-10-09 RX ADMIN — Medication 30 ML: at 21:45

## 2021-10-09 RX ADMIN — CHLORHEXIDINE GLUCONATE 15 ML: 1.2 SOLUTION ORAL at 21:45

## 2021-10-09 RX ADMIN — CALCIUM GLUCONATE 1 G: 98 INJECTION, SOLUTION INTRAVENOUS at 13:30

## 2021-10-09 RX ADMIN — INSULIN HUMAN 3 UNITS: 100 INJECTION, SOLUTION PARENTERAL at 12:23

## 2021-10-09 RX ADMIN — PREDNISONE 60 MG: 20 TABLET ORAL at 14:20

## 2021-10-09 RX ADMIN — SODIUM CHLORIDE 2000 ML: 9 INJECTION, SOLUTION INTRAVENOUS at 13:33

## 2021-10-09 RX ADMIN — CALCIUM GLUCONATE 1 G: 98 INJECTION, SOLUTION INTRAVENOUS at 11:07

## 2021-10-09 RX ADMIN — LABETALOL 20 MG/4 ML (5 MG/ML) INTRAVENOUS SYRINGE 20 MG: at 18:02

## 2021-10-09 RX ADMIN — CALCIUM GLUCONATE 1 G: 98 INJECTION, SOLUTION INTRAVENOUS at 12:23

## 2021-10-10 ENCOUNTER — APPOINTMENT (OUTPATIENT)
Dept: NEPHROLOGY | Facility: HOSPITAL | Age: 66
End: 2021-10-10

## 2021-10-10 ENCOUNTER — APPOINTMENT (OUTPATIENT)
Dept: GENERAL RADIOLOGY | Facility: HOSPITAL | Age: 66
End: 2021-10-10

## 2021-10-10 PROBLEM — D62 ACUTE BLOOD LOSS ANEMIA: Status: ACTIVE | Noted: 2021-10-10

## 2021-10-10 LAB
ABO GROUP BLD: NORMAL
ALBUMIN SERPL-MCNC: 3.4 G/DL (ref 3.5–5.2)
ALBUMIN SERPL-MCNC: 3.4 G/DL (ref 3.5–5.2)
ALBUMIN SERPL-MCNC: 3.5 G/DL (ref 3.5–5.2)
ANION GAP SERPL CALCULATED.3IONS-SCNC: 11 MMOL/L (ref 5–15)
ANION GAP SERPL CALCULATED.3IONS-SCNC: 13 MMOL/L (ref 5–15)
ANION GAP SERPL CALCULATED.3IONS-SCNC: 13 MMOL/L (ref 5–15)
BH BB BLOOD EXPIRATION DATE: NORMAL
BH BB BLOOD TYPE BARCODE: 600
BH BB BLOOD TYPE BARCODE: 6200
BH BB DISPENSE STATUS: NORMAL
BH BB PRODUCT CODE: NORMAL
BH BB UNIT NUMBER: NORMAL
BLD GP AB SCN SERPL QL: NEGATIVE
BUN SERPL-MCNC: 49 MG/DL (ref 8–23)
BUN SERPL-MCNC: 55 MG/DL (ref 8–23)
BUN SERPL-MCNC: 59 MG/DL (ref 8–23)
BUN/CREAT SERPL: 20.2 (ref 7–25)
BUN/CREAT SERPL: 21.1 (ref 7–25)
BUN/CREAT SERPL: 21.9 (ref 7–25)
CA-I SERPL ISE-MCNC: 1.16 MMOL/L (ref 1.12–1.32)
CA-I SERPL ISE-MCNC: 1.26 MMOL/L (ref 1.12–1.32)
CALCIUM SPEC-SCNC: 7.9 MG/DL (ref 8.6–10.5)
CALCIUM SPEC-SCNC: 8 MG/DL (ref 8.6–10.5)
CALCIUM SPEC-SCNC: 8.9 MG/DL (ref 8.6–10.5)
CHLORIDE SERPL-SCNC: 103 MMOL/L (ref 98–107)
CHLORIDE SERPL-SCNC: 104 MMOL/L (ref 98–107)
CHLORIDE SERPL-SCNC: 99 MMOL/L (ref 98–107)
CO2 SERPL-SCNC: 22 MMOL/L (ref 22–29)
CO2 SERPL-SCNC: 24 MMOL/L (ref 22–29)
CO2 SERPL-SCNC: 28 MMOL/L (ref 22–29)
CREAT SERPL-MCNC: 2.43 MG/DL (ref 0.76–1.27)
CREAT SERPL-MCNC: 2.51 MG/DL (ref 0.76–1.27)
CREAT SERPL-MCNC: 2.79 MG/DL (ref 0.76–1.27)
DEPRECATED RDW RBC AUTO: 46.2 FL (ref 37–54)
DEPRECATED RDW RBC AUTO: 47.1 FL (ref 37–54)
ERYTHROCYTE [DISTWIDTH] IN BLOOD BY AUTOMATED COUNT: 13.9 % (ref 12.3–15.4)
ERYTHROCYTE [DISTWIDTH] IN BLOOD BY AUTOMATED COUNT: 13.9 % (ref 12.3–15.4)
FIBRINOGEN PPP-MCNC: 336 MG/DL (ref 220–470)
GFR SERPL CREATININE-BSD FRML MDRD: 23 ML/MIN/1.73
GFR SERPL CREATININE-BSD FRML MDRD: 26 ML/MIN/1.73
GFR SERPL CREATININE-BSD FRML MDRD: 27 ML/MIN/1.73
GLUCOSE BLDC GLUCOMTR-MCNC: 147 MG/DL (ref 70–130)
GLUCOSE BLDC GLUCOMTR-MCNC: 214 MG/DL (ref 70–130)
GLUCOSE BLDC GLUCOMTR-MCNC: 315 MG/DL (ref 70–130)
GLUCOSE SERPL-MCNC: 157 MG/DL (ref 65–99)
GLUCOSE SERPL-MCNC: 218 MG/DL (ref 65–99)
GLUCOSE SERPL-MCNC: 256 MG/DL (ref 65–99)
HCT VFR BLD AUTO: 21.8 % (ref 37.5–51)
HCT VFR BLD AUTO: 22.2 % (ref 37.5–51)
HGB BLD-MCNC: 7 G/DL (ref 13–17.7)
HGB BLD-MCNC: 7.5 G/DL (ref 13–17.7)
MAGNESIUM SERPL-MCNC: 2.5 MG/DL (ref 1.6–2.4)
MCH RBC QN AUTO: 30.8 PG (ref 26.6–33)
MCH RBC QN AUTO: 31.8 PG (ref 26.6–33)
MCHC RBC AUTO-ENTMCNC: 32.1 G/DL (ref 31.5–35.7)
MCHC RBC AUTO-ENTMCNC: 33.8 G/DL (ref 31.5–35.7)
MCV RBC AUTO: 94.1 FL (ref 79–97)
MCV RBC AUTO: 96 FL (ref 79–97)
PHOSPHATE SERPL-MCNC: 4.6 MG/DL (ref 2.5–4.5)
PHOSPHATE SERPL-MCNC: 5.2 MG/DL (ref 2.5–4.5)
PHOSPHATE SERPL-MCNC: 5.9 MG/DL (ref 2.5–4.5)
PLATELET # BLD AUTO: 101 10*3/MM3 (ref 140–450)
PLATELET # BLD AUTO: 116 10*3/MM3 (ref 140–450)
PMV BLD AUTO: 11.6 FL (ref 6–12)
PMV BLD AUTO: 13.2 FL (ref 6–12)
POTASSIUM SERPL-SCNC: 4.1 MMOL/L (ref 3.5–5.2)
POTASSIUM SERPL-SCNC: 4.3 MMOL/L (ref 3.5–5.2)
POTASSIUM SERPL-SCNC: 4.6 MMOL/L (ref 3.5–5.2)
RBC # BLD AUTO: 2.27 10*6/MM3 (ref 4.14–5.8)
RBC # BLD AUTO: 2.36 10*6/MM3 (ref 4.14–5.8)
RH BLD: POSITIVE
SODIUM SERPL-SCNC: 138 MMOL/L (ref 136–145)
SODIUM SERPL-SCNC: 139 MMOL/L (ref 136–145)
SODIUM SERPL-SCNC: 140 MMOL/L (ref 136–145)
T&S EXPIRATION DATE: NORMAL
UNIT  ABO: NORMAL
UNIT  RH: NORMAL
WBC # BLD AUTO: 10.5 10*3/MM3 (ref 3.4–10.8)
WBC # BLD AUTO: 15.72 10*3/MM3 (ref 3.4–10.8)

## 2021-10-10 PROCEDURE — 86920 COMPATIBILITY TEST SPIN: CPT

## 2021-10-10 PROCEDURE — 25010000002 FENTANYL CITRATE (PF) 2500 MCG/50ML SOLUTION: Performed by: INTERNAL MEDICINE

## 2021-10-10 PROCEDURE — 82330 ASSAY OF CALCIUM: CPT | Performed by: INTERNAL MEDICINE

## 2021-10-10 PROCEDURE — 82962 GLUCOSE BLOOD TEST: CPT

## 2021-10-10 PROCEDURE — P9017 PLASMA 1 DONOR FRZ W/IN 8 HR: HCPCS

## 2021-10-10 PROCEDURE — 86922 COMPATIBILITY TEST ANTIGLOB: CPT

## 2021-10-10 PROCEDURE — 86900 BLOOD TYPING SEROLOGIC ABO: CPT | Performed by: INTERNAL MEDICINE

## 2021-10-10 PROCEDURE — 83735 ASSAY OF MAGNESIUM: CPT | Performed by: INTERNAL MEDICINE

## 2021-10-10 PROCEDURE — 86900 BLOOD TYPING SEROLOGIC ABO: CPT

## 2021-10-10 PROCEDURE — 36455 BLD EXCHANGE TRUJ OTH THN NB: CPT

## 2021-10-10 PROCEDURE — 86850 RBC ANTIBODY SCREEN: CPT | Performed by: INTERNAL MEDICINE

## 2021-10-10 PROCEDURE — 80053 COMPREHEN METABOLIC PANEL: CPT | Performed by: INTERNAL MEDICINE

## 2021-10-10 PROCEDURE — 63710000001 INSULIN REGULAR HUMAN PER 5 UNITS: Performed by: INTERNAL MEDICINE

## 2021-10-10 PROCEDURE — 71045 X-RAY EXAM CHEST 1 VIEW: CPT

## 2021-10-10 PROCEDURE — 25010000002 CALCIUM GLUCONATE PER 10 ML: Performed by: INTERNAL MEDICINE

## 2021-10-10 PROCEDURE — 86927 PLASMA FRESH FROZEN: CPT

## 2021-10-10 PROCEDURE — 85027 COMPLETE CBC AUTOMATED: CPT | Performed by: INTERNAL MEDICINE

## 2021-10-10 PROCEDURE — 99291 CRITICAL CARE FIRST HOUR: CPT | Performed by: INTERNAL MEDICINE

## 2021-10-10 PROCEDURE — 94799 UNLISTED PULMONARY SVC/PX: CPT

## 2021-10-10 PROCEDURE — P9016 RBC LEUKOCYTES REDUCED: HCPCS

## 2021-10-10 PROCEDURE — 94003 VENT MGMT INPAT SUBQ DAY: CPT

## 2021-10-10 PROCEDURE — 25010000002 CYCLOPHOSPHAMIDE PER 100 MG: Performed by: INTERNAL MEDICINE

## 2021-10-10 PROCEDURE — 36430 TRANSFUSION BLD/BLD COMPNT: CPT

## 2021-10-10 PROCEDURE — 86901 BLOOD TYPING SEROLOGIC RH(D): CPT | Performed by: INTERNAL MEDICINE

## 2021-10-10 PROCEDURE — 63710000001 PREDNISONE PER 1 MG: Performed by: INTERNAL MEDICINE

## 2021-10-10 PROCEDURE — 80069 RENAL FUNCTION PANEL: CPT | Performed by: INTERNAL MEDICINE

## 2021-10-10 RX ORDER — CARVEDILOL 3.12 MG/1
3.12 TABLET ORAL 2 TIMES DAILY WITH MEALS
Status: DISCONTINUED | OUTPATIENT
Start: 2021-10-10 | End: 2021-10-11

## 2021-10-10 RX ORDER — ALBUMIN (HUMAN) 12.5 G/50ML
12.5 SOLUTION INTRAVENOUS AS NEEDED
Status: CANCELLED | OUTPATIENT
Start: 2021-10-10 | End: 2021-10-11

## 2021-10-10 RX ADMIN — Medication 30 ML: at 20:44

## 2021-10-10 RX ADMIN — Medication 30 ML: at 11:59

## 2021-10-10 RX ADMIN — PREDNISONE 60 MG: 20 TABLET ORAL at 13:46

## 2021-10-10 RX ADMIN — CALCIUM GLUCONATE 1 G: 98 INJECTION, SOLUTION INTRAVENOUS at 13:00

## 2021-10-10 RX ADMIN — INSULIN HUMAN 3 UNITS: 100 INJECTION, SOLUTION PARENTERAL at 11:45

## 2021-10-10 RX ADMIN — ACETAMINOPHEN 650 MG: 325 TABLET, FILM COATED ORAL at 17:27

## 2021-10-10 RX ADMIN — CALCIUM CHLORIDE, MAGNESIUM CHLORIDE, SODIUM CHLORIDE, SODIUM BICARBONATE, POTASSIUM CHLORIDE AND SODIUM PHOSPHATE DIBASIC DIHYDRATE 1000 ML/HR: 3.68; 3.05; 6.34; 3.09; .314; .187 INJECTION INTRAVENOUS at 01:35

## 2021-10-10 RX ADMIN — LABETALOL 20 MG/4 ML (5 MG/ML) INTRAVENOUS SYRINGE 20 MG: at 00:04

## 2021-10-10 RX ADMIN — FENTANYL CITRATE 300 MCG/HR: 50 INJECTION INTRAVENOUS at 11:46

## 2021-10-10 RX ADMIN — INSULIN HUMAN 3 UNITS: 100 INJECTION, SOLUTION PARENTERAL at 00:15

## 2021-10-10 RX ADMIN — CYCLOPHOSPHAMIDE 150 MG: 200 INJECTION, SOLUTION INTRAVENOUS at 17:28

## 2021-10-10 RX ADMIN — CALCIUM CHLORIDE, MAGNESIUM CHLORIDE, SODIUM CHLORIDE, SODIUM BICARBONATE, POTASSIUM CHLORIDE AND SODIUM PHOSPHATE DIBASIC DIHYDRATE 1000 ML/HR: 3.68; 3.05; 6.34; 3.09; .314; .187 INJECTION INTRAVENOUS at 07:23

## 2021-10-10 RX ADMIN — CALCIUM GLUCONATE 1 G: 98 INJECTION, SOLUTION INTRAVENOUS at 12:13

## 2021-10-10 RX ADMIN — ATOVAQUONE 1500 MG: 750 SUSPENSION ORAL at 11:46

## 2021-10-10 RX ADMIN — CALCIUM GLUCONATE 1 G: 98 INJECTION, SOLUTION INTRAVENOUS at 13:22

## 2021-10-10 RX ADMIN — SODIUM CHLORIDE 2000 ML: 9 INJECTION, SOLUTION INTRAVENOUS at 12:00

## 2021-10-10 RX ADMIN — CHLORHEXIDINE GLUCONATE 15 ML: 1.2 SOLUTION ORAL at 08:33

## 2021-10-10 RX ADMIN — CALCIUM GLUCONATE 1 G: 98 INJECTION, SOLUTION INTRAVENOUS at 12:54

## 2021-10-10 RX ADMIN — Medication 30 ML: at 17:28

## 2021-10-10 RX ADMIN — ANTICOAGULANT CITRATE DEXTROSE SOLUTION FORMULA A 1.1 ML: 12.25; 11; 3.65 SOLUTION INTRAVENOUS at 13:49

## 2021-10-10 RX ADMIN — CALCIUM GLUCONATE 1 G: 98 INJECTION, SOLUTION INTRAVENOUS at 11:58

## 2021-10-10 RX ADMIN — FAMOTIDINE 20 MG: 10 INJECTION, SOLUTION INTRAVENOUS at 08:33

## 2021-10-10 RX ADMIN — CALCIUM GLUCONATE 1 G: 98 INJECTION, SOLUTION INTRAVENOUS at 12:31

## 2021-10-10 RX ADMIN — FENTANYL CITRATE 300 MCG/HR: 50 INJECTION INTRAVENOUS at 20:48

## 2021-10-10 RX ADMIN — LABETALOL 20 MG/4 ML (5 MG/ML) INTRAVENOUS SYRINGE 20 MG: at 14:50

## 2021-10-10 RX ADMIN — FENTANYL CITRATE 300 MCG/HR: 50 INJECTION INTRAVENOUS at 00:15

## 2021-10-10 RX ADMIN — CHLORHEXIDINE GLUCONATE 15 ML: 1.2 SOLUTION ORAL at 20:45

## 2021-10-10 RX ADMIN — CARVEDILOL 3.12 MG: 3.12 TABLET, FILM COATED ORAL at 17:28

## 2021-10-10 RX ADMIN — Medication 30 ML: at 08:33

## 2021-10-10 RX ADMIN — INSULIN HUMAN 5 UNITS: 100 INJECTION, SOLUTION PARENTERAL at 17:50

## 2021-10-11 ENCOUNTER — APPOINTMENT (OUTPATIENT)
Dept: GENERAL RADIOLOGY | Facility: HOSPITAL | Age: 66
End: 2021-10-11

## 2021-10-11 ENCOUNTER — APPOINTMENT (OUTPATIENT)
Dept: NEPHROLOGY | Facility: HOSPITAL | Age: 66
End: 2021-10-11

## 2021-10-11 PROBLEM — R50.9 FEVER: Status: ACTIVE | Noted: 2021-10-11

## 2021-10-11 LAB
ALBUMIN SERPL-MCNC: 3.2 G/DL (ref 3.5–5.2)
ALBUMIN SERPL-MCNC: 3.3 G/DL (ref 3.5–5.2)
ALBUMIN SERPL-MCNC: 3.3 G/DL (ref 3.5–5.2)
ALBUMIN/GLOB SERPL: 1.5 G/DL
ALP SERPL-CCNC: 52 U/L (ref 39–117)
ALT SERPL W P-5'-P-CCNC: 18 U/L (ref 1–41)
ANION GAP SERPL CALCULATED.3IONS-SCNC: 13 MMOL/L (ref 5–15)
ANION GAP SERPL CALCULATED.3IONS-SCNC: 14 MMOL/L (ref 5–15)
ANION GAP SERPL CALCULATED.3IONS-SCNC: 14 MMOL/L (ref 5–15)
AST SERPL-CCNC: 22 U/L (ref 1–40)
BH BB BLOOD EXPIRATION DATE: NORMAL
BH BB BLOOD TYPE BARCODE: 600
BH BB BLOOD TYPE BARCODE: 6200
BH BB DISPENSE STATUS: NORMAL
BH BB PRODUCT CODE: NORMAL
BH BB UNIT NUMBER: NORMAL
BILIRUB SERPL-MCNC: 0.8 MG/DL (ref 0–1.2)
BUN SERPL-MCNC: 75 MG/DL (ref 8–23)
BUN SERPL-MCNC: 75 MG/DL (ref 8–23)
BUN SERPL-MCNC: 87 MG/DL (ref 8–23)
BUN/CREAT SERPL: 19.5 (ref 7–25)
BUN/CREAT SERPL: 19.5 (ref 7–25)
BUN/CREAT SERPL: 20.7 (ref 7–25)
CA-I SERPL ISE-MCNC: 1.19 MMOL/L (ref 1.12–1.32)
CA-I SERPL ISE-MCNC: 1.22 MMOL/L (ref 1.12–1.32)
CALCIUM SPEC-SCNC: 8.5 MG/DL (ref 8.6–10.5)
CHLORIDE SERPL-SCNC: 99 MMOL/L (ref 98–107)
CO2 SERPL-SCNC: 26 MMOL/L (ref 22–29)
CO2 SERPL-SCNC: 26 MMOL/L (ref 22–29)
CO2 SERPL-SCNC: 27 MMOL/L (ref 22–29)
CREAT SERPL-MCNC: 3.85 MG/DL (ref 0.76–1.27)
CREAT SERPL-MCNC: 3.85 MG/DL (ref 0.76–1.27)
CREAT SERPL-MCNC: 4.21 MG/DL (ref 0.76–1.27)
CROSSMATCH INTERPRETATION: NORMAL
DEPRECATED RDW RBC AUTO: 48.7 FL (ref 37–54)
ERYTHROCYTE [DISTWIDTH] IN BLOOD BY AUTOMATED COUNT: 15 % (ref 12.3–15.4)
FIBRINOGEN PPP-MCNC: 335 MG/DL (ref 220–470)
GFR SERPL CREATININE-BSD FRML MDRD: 14 ML/MIN/1.73
GFR SERPL CREATININE-BSD FRML MDRD: 16 ML/MIN/1.73
GFR SERPL CREATININE-BSD FRML MDRD: 16 ML/MIN/1.73
GFR SERPL CREATININE-BSD FRML MDRD: ABNORMAL ML/MIN/{1.73_M2}
GLOBULIN UR ELPH-MCNC: 2.2 GM/DL
GLUCOSE BLDC GLUCOMTR-MCNC: 195 MG/DL (ref 70–130)
GLUCOSE BLDC GLUCOMTR-MCNC: 229 MG/DL (ref 70–130)
GLUCOSE BLDC GLUCOMTR-MCNC: 271 MG/DL (ref 70–130)
GLUCOSE BLDC GLUCOMTR-MCNC: 281 MG/DL (ref 70–130)
GLUCOSE BLDC GLUCOMTR-MCNC: 337 MG/DL (ref 70–130)
GLUCOSE SERPL-MCNC: 221 MG/DL (ref 65–99)
GLUCOSE SERPL-MCNC: 262 MG/DL (ref 65–99)
GLUCOSE SERPL-MCNC: 262 MG/DL (ref 65–99)
HCT VFR BLD AUTO: 19.3 % (ref 37.5–51)
HGB BLD-MCNC: 6.9 G/DL (ref 13–17.7)
INR PPP: 1.09 (ref 0.85–1.16)
MAGNESIUM SERPL-MCNC: 2.7 MG/DL (ref 1.6–2.4)
MAGNESIUM SERPL-MCNC: 2.7 MG/DL (ref 1.6–2.4)
MCH RBC QN AUTO: 34.2 PG (ref 26.6–33)
MCHC RBC AUTO-ENTMCNC: 35.8 G/DL (ref 31.5–35.7)
MCV RBC AUTO: 95.5 FL (ref 79–97)
PHOSPHATE SERPL-MCNC: 5.9 MG/DL (ref 2.5–4.5)
PHOSPHATE SERPL-MCNC: 6.1 MG/DL (ref 2.5–4.5)
PLATELET # BLD AUTO: 135 10*3/MM3 (ref 140–450)
PMV BLD AUTO: 13.1 FL (ref 6–12)
POTASSIUM SERPL-SCNC: 3.9 MMOL/L (ref 3.5–5.2)
POTASSIUM SERPL-SCNC: 4.5 MMOL/L (ref 3.5–5.2)
POTASSIUM SERPL-SCNC: 4.5 MMOL/L (ref 3.5–5.2)
PROT SERPL-MCNC: 5.5 G/DL (ref 6–8.5)
PROTHROMBIN TIME: 13.7 SECONDS (ref 11.4–14.4)
RBC # BLD AUTO: 2.02 10*6/MM3 (ref 4.14–5.8)
SODIUM SERPL-SCNC: 139 MMOL/L (ref 136–145)
UNIT  ABO: NORMAL
UNIT  RH: NORMAL
WBC # BLD AUTO: 13.13 10*3/MM3 (ref 3.4–10.8)

## 2021-10-11 PROCEDURE — 63710000001 PREDNISONE PER 1 MG: Performed by: INTERNAL MEDICINE

## 2021-10-11 PROCEDURE — 83735 ASSAY OF MAGNESIUM: CPT | Performed by: INTERNAL MEDICINE

## 2021-10-11 PROCEDURE — 99291 CRITICAL CARE FIRST HOUR: CPT | Performed by: INTERNAL MEDICINE

## 2021-10-11 PROCEDURE — 86900 BLOOD TYPING SEROLOGIC ABO: CPT

## 2021-10-11 PROCEDURE — 94003 VENT MGMT INPAT SUBQ DAY: CPT

## 2021-10-11 PROCEDURE — 82330 ASSAY OF CALCIUM: CPT | Performed by: INTERNAL MEDICINE

## 2021-10-11 PROCEDURE — 25010000002 FENTANYL CITRATE (PF) 2500 MCG/50ML SOLUTION: Performed by: INTERNAL MEDICINE

## 2021-10-11 PROCEDURE — 82962 GLUCOSE BLOOD TEST: CPT

## 2021-10-11 PROCEDURE — 85384 FIBRINOGEN ACTIVITY: CPT | Performed by: INTERNAL MEDICINE

## 2021-10-11 PROCEDURE — 63710000001 INSULIN DETEMIR PER 5 UNITS: Performed by: INTERNAL MEDICINE

## 2021-10-11 PROCEDURE — 25010000002 CYCLOPHOSPHAMIDE PER 100 MG: Performed by: INTERNAL MEDICINE

## 2021-10-11 PROCEDURE — 25010000002 CALCIUM GLUCONATE PER 10 ML: Performed by: INTERNAL MEDICINE

## 2021-10-11 PROCEDURE — 86927 PLASMA FRESH FROZEN: CPT

## 2021-10-11 PROCEDURE — P9017 PLASMA 1 DONOR FRZ W/IN 8 HR: HCPCS

## 2021-10-11 PROCEDURE — 85027 COMPLETE CBC AUTOMATED: CPT | Performed by: INTERNAL MEDICINE

## 2021-10-11 PROCEDURE — 25010000002 DIPHENHYDRAMINE PER 50 MG: Performed by: INTERNAL MEDICINE

## 2021-10-11 PROCEDURE — 85610 PROTHROMBIN TIME: CPT | Performed by: INTERNAL MEDICINE

## 2021-10-11 PROCEDURE — 36455 BLD EXCHANGE TRUJ OTH THN NB: CPT

## 2021-10-11 PROCEDURE — 80069 RENAL FUNCTION PANEL: CPT | Performed by: INTERNAL MEDICINE

## 2021-10-11 PROCEDURE — 71045 X-RAY EXAM CHEST 1 VIEW: CPT

## 2021-10-11 PROCEDURE — 63710000001 INSULIN REGULAR HUMAN PER 5 UNITS: Performed by: INTERNAL MEDICINE

## 2021-10-11 PROCEDURE — P9016 RBC LEUKOCYTES REDUCED: HCPCS

## 2021-10-11 PROCEDURE — 94799 UNLISTED PULMONARY SVC/PX: CPT

## 2021-10-11 RX ORDER — DIPHENHYDRAMINE HYDROCHLORIDE 50 MG/ML
50 INJECTION INTRAMUSCULAR; INTRAVENOUS ONCE
Status: COMPLETED | OUTPATIENT
Start: 2021-10-11 | End: 2021-10-11

## 2021-10-11 RX ORDER — CARVEDILOL 3.12 MG/1
3.12 TABLET ORAL 2 TIMES DAILY WITH MEALS
Status: DISCONTINUED | OUTPATIENT
Start: 2021-10-11 | End: 2021-10-12

## 2021-10-11 RX ORDER — ATOVAQUONE 750 MG/5ML
1500 SUSPENSION ORAL
Status: DISCONTINUED | OUTPATIENT
Start: 2021-10-12 | End: 2021-10-20

## 2021-10-11 RX ADMIN — INSULIN DETEMIR 10 UNITS: 100 INJECTION, SOLUTION SUBCUTANEOUS at 10:06

## 2021-10-11 RX ADMIN — CALCIUM GLUCONATE 1 G: 98 INJECTION, SOLUTION INTRAVENOUS at 14:59

## 2021-10-11 RX ADMIN — Medication 75 MG: at 18:24

## 2021-10-11 RX ADMIN — FENTANYL CITRATE 200 MCG/HR: 50 INJECTION INTRAVENOUS at 20:25

## 2021-10-11 RX ADMIN — CHLORHEXIDINE GLUCONATE 15 ML: 1.2 SOLUTION ORAL at 20:25

## 2021-10-11 RX ADMIN — FENTANYL CITRATE 300 MCG/HR: 50 INJECTION INTRAVENOUS at 06:02

## 2021-10-11 RX ADMIN — ATOVAQUONE 1500 MG: 750 SUSPENSION ORAL at 11:34

## 2021-10-11 RX ADMIN — ACETAMINOPHEN 650 MG: 325 TABLET, FILM COATED ORAL at 05:19

## 2021-10-11 RX ADMIN — LABETALOL 20 MG/4 ML (5 MG/ML) INTRAVENOUS SYRINGE 20 MG: at 11:34

## 2021-10-11 RX ADMIN — DIPHENHYDRAMINE HYDROCHLORIDE 50 MG: 50 INJECTION INTRAMUSCULAR; INTRAVENOUS at 14:59

## 2021-10-11 RX ADMIN — CALCIUM GLUCONATE 1 G: 98 INJECTION, SOLUTION INTRAVENOUS at 15:43

## 2021-10-11 RX ADMIN — CARVEDILOL 3.12 MG: 3.12 TABLET, FILM COATED ORAL at 18:24

## 2021-10-11 RX ADMIN — Medication 30 ML: at 10:07

## 2021-10-11 RX ADMIN — CALCIUM GLUCONATE 1 G: 98 INJECTION, SOLUTION INTRAVENOUS at 15:26

## 2021-10-11 RX ADMIN — PREDNISONE 60 MG: 20 TABLET ORAL at 14:13

## 2021-10-11 RX ADMIN — INSULIN HUMAN 4 UNITS: 100 INJECTION, SOLUTION PARENTERAL at 00:03

## 2021-10-11 RX ADMIN — INSULIN HUMAN 4 UNITS: 100 INJECTION, SOLUTION PARENTERAL at 11:36

## 2021-10-11 RX ADMIN — Medication 30 ML: at 18:25

## 2021-10-11 RX ADMIN — Medication 30 ML: at 20:25

## 2021-10-11 RX ADMIN — CHLORHEXIDINE GLUCONATE 15 ML: 1.2 SOLUTION ORAL at 08:21

## 2021-10-11 RX ADMIN — INSULIN HUMAN 2 UNITS: 100 INJECTION, SOLUTION PARENTERAL at 18:27

## 2021-10-11 RX ADMIN — INSULIN HUMAN 3 UNITS: 100 INJECTION, SOLUTION PARENTERAL at 05:15

## 2021-10-11 RX ADMIN — FAMOTIDINE 20 MG: 10 INJECTION, SOLUTION INTRAVENOUS at 08:21

## 2021-10-11 RX ADMIN — CALCIUM GLUCONATE 1 G: 98 INJECTION, SOLUTION INTRAVENOUS at 16:05

## 2021-10-11 RX ADMIN — CALCIUM GLUCONATE 1 G: 98 INJECTION, SOLUTION INTRAVENOUS at 15:12

## 2021-10-11 RX ADMIN — CARVEDILOL 3.12 MG: 3.12 TABLET, FILM COATED ORAL at 10:07

## 2021-10-11 RX ADMIN — CALCIUM GLUCONATE 1 G: 98 INJECTION, SOLUTION INTRAVENOUS at 14:46

## 2021-10-12 ENCOUNTER — APPOINTMENT (OUTPATIENT)
Dept: CARDIOLOGY | Facility: HOSPITAL | Age: 66
End: 2021-10-12

## 2021-10-12 ENCOUNTER — APPOINTMENT (OUTPATIENT)
Dept: INTERVENTIONAL RADIOLOGY/VASCULAR | Facility: HOSPITAL | Age: 66
End: 2021-10-12

## 2021-10-12 ENCOUNTER — APPOINTMENT (OUTPATIENT)
Dept: GENERAL RADIOLOGY | Facility: HOSPITAL | Age: 66
End: 2021-10-12

## 2021-10-12 ENCOUNTER — APPOINTMENT (OUTPATIENT)
Dept: NEPHROLOGY | Facility: HOSPITAL | Age: 66
End: 2021-10-12

## 2021-10-12 LAB
ALBUMIN SERPL-MCNC: 3.3 G/DL (ref 3.5–5.2)
ALP SERPL-CCNC: 54 U/L (ref 39–117)
ALT SERPL W P-5'-P-CCNC: 20 U/L (ref 1–41)
ANION GAP SERPL CALCULATED.3IONS-SCNC: 14 MMOL/L (ref 5–15)
AST SERPL-CCNC: 21 U/L (ref 1–40)
BH BB BLOOD EXPIRATION DATE: NORMAL
BH BB BLOOD TYPE BARCODE: 600
BH BB BLOOD TYPE BARCODE: 6200
BH BB DISPENSE STATUS: NORMAL
BH BB PRODUCT CODE: NORMAL
BH BB UNIT NUMBER: NORMAL
BILIRUB SERPL-MCNC: 0.7 MG/DL (ref 0–1.2)
BUN SERPL-MCNC: 81 MG/DL (ref 8–23)
CA-I SERPL ISE-MCNC: 1.15 MMOL/L (ref 1.12–1.32)
CA-I SERPL ISE-MCNC: 1.19 MMOL/L (ref 1.12–1.32)
CALCIUM SPEC-SCNC: 8.1 MG/DL (ref 8.6–10.5)
CHLORIDE SERPL-SCNC: 99 MMOL/L (ref 98–107)
CHOLEST SERPL-MCNC: 133 MG/DL (ref 0–200)
CO2 SERPL-SCNC: 25 MMOL/L (ref 22–29)
CREAT SERPL-MCNC: 4.35 MG/DL (ref 0.76–1.27)
CROSSMATCH INTERPRETATION: NORMAL
CRP SERPL-MCNC: 4.86 MG/DL (ref 0–0.5)
DEPRECATED RDW RBC AUTO: 50.2 FL (ref 37–54)
ERYTHROCYTE [DISTWIDTH] IN BLOOD BY AUTOMATED COUNT: 15.6 % (ref 12.3–15.4)
FIBRINOGEN PPP-MCNC: 291 MG/DL (ref 220–470)
GLUCOSE BLDC GLUCOMTR-MCNC: 239 MG/DL (ref 70–130)
GLUCOSE BLDC GLUCOMTR-MCNC: 244 MG/DL (ref 70–130)
GLUCOSE BLDC GLUCOMTR-MCNC: 320 MG/DL (ref 70–130)
GLUCOSE BLDC GLUCOMTR-MCNC: 329 MG/DL (ref 70–130)
GLUCOSE SERPL-MCNC: 241 MG/DL (ref 65–99)
HCT VFR BLD AUTO: 23.1 % (ref 37.5–51)
HGB BLD-MCNC: 8 G/DL (ref 13–17.7)
INR PPP: 1.06 (ref 0.85–1.16)
MAGNESIUM SERPL-MCNC: 2.4 MG/DL (ref 1.6–2.4)
MCH RBC QN AUTO: 32.3 PG (ref 26.6–33)
MCHC RBC AUTO-ENTMCNC: 34.6 G/DL (ref 31.5–35.7)
MCV RBC AUTO: 93.1 FL (ref 79–97)
MRSA DNA SPEC QL NAA+PROBE: NEGATIVE
PHOSPHATE SERPL-MCNC: 5.3 MG/DL (ref 2.5–4.5)
PLATELET # BLD AUTO: 141 10*3/MM3 (ref 140–450)
PMV BLD AUTO: 12.3 FL (ref 6–12)
POTASSIUM SERPL-SCNC: 4.3 MMOL/L (ref 3.5–5.2)
PREALB SERPL-MCNC: 18.5 MG/DL (ref 20–40)
PROT SERPL-MCNC: 5.4 G/DL (ref 6–8.5)
PROTHROMBIN TIME: 13.5 SECONDS (ref 11.4–14.4)
RBC # BLD AUTO: 2.48 10*6/MM3 (ref 4.14–5.8)
SODIUM SERPL-SCNC: 138 MMOL/L (ref 136–145)
TRIGL SERPL-MCNC: 152 MG/DL (ref 0–150)
UNIT  ABO: NORMAL
UNIT  RH: NORMAL
WBC # BLD AUTO: 14.02 10*3/MM3 (ref 3.4–10.8)

## 2021-10-12 PROCEDURE — P9017 PLASMA 1 DONOR FRZ W/IN 8 HR: HCPCS

## 2021-10-12 PROCEDURE — 36558 INSERT TUNNELED CV CATH: CPT | Performed by: INTERNAL MEDICINE

## 2021-10-12 PROCEDURE — 63710000001 PREDNISONE PER 1 MG: Performed by: INTERNAL MEDICINE

## 2021-10-12 PROCEDURE — 94799 UNLISTED PULMONARY SVC/PX: CPT

## 2021-10-12 PROCEDURE — 82962 GLUCOSE BLOOD TEST: CPT

## 2021-10-12 PROCEDURE — 25010000002 PIPERACILLIN SOD-TAZOBACTAM PER 1 G: Performed by: INTERNAL MEDICINE

## 2021-10-12 PROCEDURE — 93306 TTE W/DOPPLER COMPLETE: CPT

## 2021-10-12 PROCEDURE — 76937 US GUIDE VASCULAR ACCESS: CPT

## 2021-10-12 PROCEDURE — 87186 SC STD MICRODIL/AGAR DIL: CPT | Performed by: INTERNAL MEDICINE

## 2021-10-12 PROCEDURE — 85027 COMPLETE CBC AUTOMATED: CPT | Performed by: INTERNAL MEDICINE

## 2021-10-12 PROCEDURE — 87205 SMEAR GRAM STAIN: CPT | Performed by: INTERNAL MEDICINE

## 2021-10-12 PROCEDURE — 84100 ASSAY OF PHOSPHORUS: CPT | Performed by: INTERNAL MEDICINE

## 2021-10-12 PROCEDURE — 94640 AIRWAY INHALATION TREATMENT: CPT

## 2021-10-12 PROCEDURE — 93306 TTE W/DOPPLER COMPLETE: CPT | Performed by: INTERNAL MEDICINE

## 2021-10-12 PROCEDURE — 63710000001 INSULIN DETEMIR PER 5 UNITS: Performed by: INTERNAL MEDICINE

## 2021-10-12 PROCEDURE — 36455 BLD EXCHANGE TRUJ OTH THN NB: CPT

## 2021-10-12 PROCEDURE — 94003 VENT MGMT INPAT SUBQ DAY: CPT

## 2021-10-12 PROCEDURE — 82465 ASSAY BLD/SERUM CHOLESTEROL: CPT | Performed by: INTERNAL MEDICINE

## 2021-10-12 PROCEDURE — 99291 CRITICAL CARE FIRST HOUR: CPT | Performed by: INTERNAL MEDICINE

## 2021-10-12 PROCEDURE — 85384 FIBRINOGEN ACTIVITY: CPT | Performed by: INTERNAL MEDICINE

## 2021-10-12 PROCEDURE — 87070 CULTURE OTHR SPECIMN AEROBIC: CPT | Performed by: INTERNAL MEDICINE

## 2021-10-12 PROCEDURE — 80053 COMPREHEN METABOLIC PANEL: CPT | Performed by: INTERNAL MEDICINE

## 2021-10-12 PROCEDURE — 25010000002 VANCOMYCIN 10 G RECONSTITUTED SOLUTION: Performed by: INTERNAL MEDICINE

## 2021-10-12 PROCEDURE — 77001 FLUOROGUIDE FOR VEIN DEVICE: CPT

## 2021-10-12 PROCEDURE — 85610 PROTHROMBIN TIME: CPT | Performed by: INTERNAL MEDICINE

## 2021-10-12 PROCEDURE — 82330 ASSAY OF CALCIUM: CPT | Performed by: INTERNAL MEDICINE

## 2021-10-12 PROCEDURE — 0JH60XZ INSERTION OF TUNNELED VASCULAR ACCESS DEVICE INTO CHEST SUBCUTANEOUS TISSUE AND FASCIA, OPEN APPROACH: ICD-10-PCS | Performed by: RADIOLOGY

## 2021-10-12 PROCEDURE — 87641 MR-STAPH DNA AMP PROBE: CPT | Performed by: INTERNAL MEDICINE

## 2021-10-12 PROCEDURE — 02HV33Z INSERTION OF INFUSION DEVICE INTO SUPERIOR VENA CAVA, PERCUTANEOUS APPROACH: ICD-10-PCS | Performed by: RADIOLOGY

## 2021-10-12 PROCEDURE — 25010000002 CALCIUM GLUCONATE PER 10 ML: Performed by: INTERNAL MEDICINE

## 2021-10-12 PROCEDURE — 25010000002 FENTANYL CITRATE (PF) 2500 MCG/50ML SOLUTION: Performed by: INTERNAL MEDICINE

## 2021-10-12 PROCEDURE — C1750 CATH, HEMODIALYSIS,LONG-TERM: HCPCS

## 2021-10-12 PROCEDURE — B548ZZA ULTRASONOGRAPHY OF SUPERIOR VENA CAVA, GUIDANCE: ICD-10-PCS | Performed by: RADIOLOGY

## 2021-10-12 PROCEDURE — 84134 ASSAY OF PREALBUMIN: CPT | Performed by: INTERNAL MEDICINE

## 2021-10-12 PROCEDURE — 86140 C-REACTIVE PROTEIN: CPT | Performed by: INTERNAL MEDICINE

## 2021-10-12 PROCEDURE — C1894 INTRO/SHEATH, NON-LASER: HCPCS

## 2021-10-12 PROCEDURE — 36430 TRANSFUSION BLD/BLD COMPNT: CPT

## 2021-10-12 PROCEDURE — 63710000001 INSULIN REGULAR HUMAN PER 5 UNITS: Performed by: INTERNAL MEDICINE

## 2021-10-12 PROCEDURE — 25010000002 HEPARIN (PORCINE) PER 1000 UNITS

## 2021-10-12 PROCEDURE — 86927 PLASMA FRESH FROZEN: CPT

## 2021-10-12 PROCEDURE — 84478 ASSAY OF TRIGLYCERIDES: CPT | Performed by: INTERNAL MEDICINE

## 2021-10-12 PROCEDURE — 25010000002 CYCLOPHOSPHAMIDE PER 100 MG: Performed by: INTERNAL MEDICINE

## 2021-10-12 PROCEDURE — 71045 X-RAY EXAM CHEST 1 VIEW: CPT

## 2021-10-12 PROCEDURE — 83735 ASSAY OF MAGNESIUM: CPT | Performed by: INTERNAL MEDICINE

## 2021-10-12 PROCEDURE — 36561 INSERT TUNNELED CV CATH: CPT | Performed by: RADIOLOGY

## 2021-10-12 PROCEDURE — 77001 FLUOROGUIDE FOR VEIN DEVICE: CPT | Performed by: RADIOLOGY

## 2021-10-12 RX ORDER — POLYETHYLENE GLYCOL 3350 17 G/17G
17 POWDER, FOR SOLUTION ORAL DAILY PRN
Status: DISCONTINUED | OUTPATIENT
Start: 2021-10-12 | End: 2021-10-20

## 2021-10-12 RX ORDER — BISACODYL 5 MG/1
5 TABLET, DELAYED RELEASE ORAL DAILY PRN
Status: DISCONTINUED | OUTPATIENT
Start: 2021-10-12 | End: 2021-10-20

## 2021-10-12 RX ORDER — IPRATROPIUM BROMIDE AND ALBUTEROL SULFATE 2.5; .5 MG/3ML; MG/3ML
3 SOLUTION RESPIRATORY (INHALATION)
Status: DISCONTINUED | OUTPATIENT
Start: 2021-10-12 | End: 2021-10-21

## 2021-10-12 RX ORDER — BISACODYL 10 MG
10 SUPPOSITORY, RECTAL RECTAL DAILY PRN
Status: DISCONTINUED | OUTPATIENT
Start: 2021-10-12 | End: 2021-10-12

## 2021-10-12 RX ORDER — BISACODYL 5 MG/1
5 TABLET, DELAYED RELEASE ORAL DAILY PRN
Status: DISCONTINUED | OUTPATIENT
Start: 2021-10-12 | End: 2021-10-12

## 2021-10-12 RX ORDER — DEXMEDETOMIDINE HYDROCHLORIDE 4 UG/ML
.2-1.5 INJECTION, SOLUTION INTRAVENOUS
Status: DISCONTINUED | OUTPATIENT
Start: 2021-10-12 | End: 2021-10-16

## 2021-10-12 RX ORDER — ALBUMIN (HUMAN) 12.5 G/50ML
12.5 SOLUTION INTRAVENOUS AS NEEDED
Status: CANCELLED | OUTPATIENT
Start: 2021-10-13 | End: 2021-10-13

## 2021-10-12 RX ORDER — AMOXICILLIN 250 MG
2 CAPSULE ORAL 2 TIMES DAILY
Status: DISCONTINUED | OUTPATIENT
Start: 2021-10-12 | End: 2021-10-20

## 2021-10-12 RX ORDER — HEPARIN SODIUM 1000 [USP'U]/ML
INJECTION, SOLUTION INTRAVENOUS; SUBCUTANEOUS
Status: COMPLETED
Start: 2021-10-12 | End: 2021-10-12

## 2021-10-12 RX ORDER — BISACODYL 10 MG
10 SUPPOSITORY, RECTAL RECTAL DAILY PRN
Status: DISCONTINUED | OUTPATIENT
Start: 2021-10-12 | End: 2021-10-20

## 2021-10-12 RX ORDER — LIDOCAINE HYDROCHLORIDE 10 MG/ML
INJECTION, SOLUTION EPIDURAL; INFILTRATION; INTRACAUDAL; PERINEURAL
Status: COMPLETED
Start: 2021-10-12 | End: 2021-10-12

## 2021-10-12 RX ORDER — POLYETHYLENE GLYCOL 3350 17 G/17G
17 POWDER, FOR SOLUTION ORAL DAILY PRN
Status: DISCONTINUED | OUTPATIENT
Start: 2021-10-12 | End: 2021-10-12

## 2021-10-12 RX ORDER — AMOXICILLIN 250 MG
2 CAPSULE ORAL 2 TIMES DAILY
Status: DISCONTINUED | OUTPATIENT
Start: 2021-10-12 | End: 2021-10-12

## 2021-10-12 RX ADMIN — Medication 30 ML: at 18:08

## 2021-10-12 RX ADMIN — PREDNISONE 60 MG: 20 TABLET ORAL at 13:43

## 2021-10-12 RX ADMIN — CHLORHEXIDINE GLUCONATE 15 ML: 1.2 SOLUTION ORAL at 20:38

## 2021-10-12 RX ADMIN — LABETALOL 20 MG/4 ML (5 MG/ML) INTRAVENOUS SYRINGE 20 MG: at 02:52

## 2021-10-12 RX ADMIN — CALCIUM GLUCONATE 1 G: 98 INJECTION, SOLUTION INTRAVENOUS at 10:22

## 2021-10-12 RX ADMIN — Medication 30 ML: at 12:07

## 2021-10-12 RX ADMIN — CALCIUM GLUCONATE 1 G: 98 INJECTION, SOLUTION INTRAVENOUS at 12:17

## 2021-10-12 RX ADMIN — CALCIUM GLUCONATE 1 G: 98 INJECTION, SOLUTION INTRAVENOUS at 11:31

## 2021-10-12 RX ADMIN — CALCIUM GLUCONATE 1 G: 98 INJECTION, SOLUTION INTRAVENOUS at 11:13

## 2021-10-12 RX ADMIN — ATOVAQUONE 1500 MG: 750 SUSPENSION ORAL at 12:07

## 2021-10-12 RX ADMIN — INSULIN DETEMIR 10 UNITS: 100 INJECTION, SOLUTION SUBCUTANEOUS at 08:08

## 2021-10-12 RX ADMIN — LIDOCAINE HYDROCHLORIDE 10 ML: 10 INJECTION, SOLUTION EPIDURAL; INFILTRATION; INTRACAUDAL; PERINEURAL at 15:41

## 2021-10-12 RX ADMIN — Medication 30 ML: at 09:00

## 2021-10-12 RX ADMIN — Medication 30 ML: at 20:38

## 2021-10-12 RX ADMIN — FAMOTIDINE 20 MG: 10 INJECTION, SOLUTION INTRAVENOUS at 08:08

## 2021-10-12 RX ADMIN — INSULIN HUMAN 3 UNITS: 100 INJECTION, SOLUTION PARENTERAL at 18:08

## 2021-10-12 RX ADMIN — DEXMEDETOMIDINE HYDROCHLORIDE 0.4 MCG/KG/HR: 4 INJECTION, SOLUTION INTRAVENOUS at 19:02

## 2021-10-12 RX ADMIN — TAZOBACTAM SODIUM AND PIPERACILLIN SODIUM 4.5 G: 500; 4 INJECTION, SOLUTION INTRAVENOUS at 10:55

## 2021-10-12 RX ADMIN — IPRATROPIUM BROMIDE AND ALBUTEROL SULFATE 3 ML: 2.5; .5 SOLUTION RESPIRATORY (INHALATION) at 22:56

## 2021-10-12 RX ADMIN — FENTANYL CITRATE 50 MCG/HR: 50 INJECTION INTRAVENOUS at 18:15

## 2021-10-12 RX ADMIN — DEXMEDETOMIDINE HYDROCHLORIDE 0.2 MCG/KG/HR: 4 INJECTION, SOLUTION INTRAVENOUS at 10:52

## 2021-10-12 RX ADMIN — CHLORHEXIDINE GLUCONATE 15 ML: 1.2 SOLUTION ORAL at 08:08

## 2021-10-12 RX ADMIN — SODIUM CHLORIDE 2000 ML: 9 INJECTION, SOLUTION INTRAVENOUS at 10:25

## 2021-10-12 RX ADMIN — INSULIN HUMAN 5 UNITS: 100 INJECTION, SOLUTION PARENTERAL at 12:06

## 2021-10-12 RX ADMIN — CARVEDILOL 3.12 MG: 3.12 TABLET, FILM COATED ORAL at 08:08

## 2021-10-12 RX ADMIN — VANCOMYCIN HYDROCHLORIDE 2000 MG: 10 INJECTION, POWDER, LYOPHILIZED, FOR SOLUTION INTRAVENOUS at 11:30

## 2021-10-12 RX ADMIN — INSULIN HUMAN 5 UNITS: 100 INJECTION, SOLUTION PARENTERAL at 00:30

## 2021-10-12 RX ADMIN — IPRATROPIUM BROMIDE AND ALBUTEROL SULFATE 3 ML: 2.5; .5 SOLUTION RESPIRATORY (INHALATION) at 19:05

## 2021-10-12 RX ADMIN — INSULIN HUMAN 3 UNITS: 100 INJECTION, SOLUTION PARENTERAL at 06:15

## 2021-10-12 RX ADMIN — Medication 75 MG: at 18:08

## 2021-10-12 RX ADMIN — CALCIUM GLUCONATE 1 G: 98 INJECTION, SOLUTION INTRAVENOUS at 11:50

## 2021-10-12 RX ADMIN — CALCIUM GLUCONATE 1 G: 98 INJECTION, SOLUTION INTRAVENOUS at 10:55

## 2021-10-12 RX ADMIN — HEPARIN SODIUM 3200 UNITS: 1000 INJECTION, SOLUTION INTRAVENOUS; SUBCUTANEOUS at 15:41

## 2021-10-12 RX ADMIN — INSULIN DETEMIR 10 UNITS: 100 INJECTION, SOLUTION SUBCUTANEOUS at 20:38

## 2021-10-13 ENCOUNTER — APPOINTMENT (OUTPATIENT)
Dept: CT IMAGING | Facility: HOSPITAL | Age: 66
End: 2021-10-13

## 2021-10-13 ENCOUNTER — APPOINTMENT (OUTPATIENT)
Dept: NEPHROLOGY | Facility: HOSPITAL | Age: 66
End: 2021-10-13

## 2021-10-13 ENCOUNTER — APPOINTMENT (OUTPATIENT)
Dept: GENERAL RADIOLOGY | Facility: HOSPITAL | Age: 66
End: 2021-10-13

## 2021-10-13 LAB
ALBUMIN SERPL-MCNC: 3.2 G/DL (ref 3.5–5.2)
ALBUMIN SERPL-MCNC: 3.5 G/DL (ref 3.5–5.2)
ALBUMIN/GLOB SERPL: 1.7 G/DL
ALP SERPL-CCNC: 56 U/L (ref 39–117)
ALT SERPL W P-5'-P-CCNC: 25 U/L (ref 1–41)
ANION GAP SERPL CALCULATED.3IONS-SCNC: 16 MMOL/L (ref 5–15)
ANION GAP SERPL CALCULATED.3IONS-SCNC: 16 MMOL/L (ref 5–15)
ARTERIAL PATENCY WRIST A: ABNORMAL
ASCENDING AORTA: 3.4 CM
AST SERPL-CCNC: 26 U/L (ref 1–40)
ATMOSPHERIC PRESS: ABNORMAL MM[HG]
BASE EXCESS BLDA CALC-SCNC: 6.6 MMOL/L (ref 0–2)
BASOPHILS # BLD AUTO: 0.02 10*3/MM3 (ref 0–0.2)
BASOPHILS NFR BLD AUTO: 0.2 % (ref 0–1.5)
BDY SITE: ABNORMAL
BH BB BLOOD EXPIRATION DATE: NORMAL
BH BB BLOOD TYPE BARCODE: 600
BH BB BLOOD TYPE BARCODE: 6200
BH BB DISPENSE STATUS: NORMAL
BH BB PRODUCT CODE: NORMAL
BH BB UNIT NUMBER: NORMAL
BH CV ECHO MEAS - AO MAX PG (FULL): 2 MMHG
BH CV ECHO MEAS - AO MAX PG: 11 MMHG
BH CV ECHO MEAS - AO MEAN PG (FULL): 1.7 MMHG
BH CV ECHO MEAS - AO MEAN PG: 6.5 MMHG
BH CV ECHO MEAS - AO ROOT AREA (BSA CORRECTED): 1.7
BH CV ECHO MEAS - AO ROOT AREA: 10.9 CM^2
BH CV ECHO MEAS - AO ROOT DIAM: 3.7 CM
BH CV ECHO MEAS - AO V2 MAX: 168.3 CM/SEC
BH CV ECHO MEAS - AO V2 MEAN: 122.3 CM/SEC
BH CV ECHO MEAS - AO V2 VTI: 34.9 CM
BH CV ECHO MEAS - ASC AORTA: 3.4 CM
BH CV ECHO MEAS - AVA(I,A): 3.5 CM^2
BH CV ECHO MEAS - AVA(I,D): 3.5 CM^2
BH CV ECHO MEAS - AVA(V,A): 3.4 CM^2
BH CV ECHO MEAS - AVA(V,D): 3.4 CM^2
BH CV ECHO MEAS - BSA(HAYCOCK): 2.2 M^2
BH CV ECHO MEAS - BSA: 2.1 M^2
BH CV ECHO MEAS - BZI_BMI: 34.1 KILOGRAMS/M^2
BH CV ECHO MEAS - BZI_METRIC_HEIGHT: 172.7 CM
BH CV ECHO MEAS - BZI_METRIC_WEIGHT: 101.6 KG
BH CV ECHO MEAS - EDV(CUBED): 49.5 ML
BH CV ECHO MEAS - EDV(MOD-SP2): 94.3 ML
BH CV ECHO MEAS - EDV(MOD-SP4): 116 ML
BH CV ECHO MEAS - EDV(TEICH): 57.1 ML
BH CV ECHO MEAS - EF(CUBED): 64.4 %
BH CV ECHO MEAS - EF(MOD-BP): 61.9 %
BH CV ECHO MEAS - EF(MOD-SP2): 60.9 %
BH CV ECHO MEAS - EF(MOD-SP4): 63.4 %
BH CV ECHO MEAS - EF(TEICH): 56.7 %
BH CV ECHO MEAS - ESV(CUBED): 17.7 ML
BH CV ECHO MEAS - ESV(MOD-SP2): 36.9 ML
BH CV ECHO MEAS - ESV(MOD-SP4): 42.4 ML
BH CV ECHO MEAS - ESV(TEICH): 24.7 ML
BH CV ECHO MEAS - FS: 29.1 %
BH CV ECHO MEAS - IVS/LVPW: 0.88
BH CV ECHO MEAS - IVSD: 1.3 CM
BH CV ECHO MEAS - LA DIMENSION: 4 CM
BH CV ECHO MEAS - LA/AO: 1.1
BH CV ECHO MEAS - LAD MAJOR: 4.6 CM
BH CV ECHO MEAS - LAT PEAK E' VEL: 7.9 CM/SEC
BH CV ECHO MEAS - LATERAL E/E' RATIO: 11.7
BH CV ECHO MEAS - LV DIASTOLIC VOL/BSA (35-75): 54.1 ML/M^2
BH CV ECHO MEAS - LV IVRT: 0.08 SEC
BH CV ECHO MEAS - LV MASS(C)D: 185.6 GRAMS
BH CV ECHO MEAS - LV MASS(C)DI: 86.5 GRAMS/M^2
BH CV ECHO MEAS - LV MAX PG: 9 MMHG
BH CV ECHO MEAS - LV MEAN PG: 4.8 MMHG
BH CV ECHO MEAS - LV SYSTOLIC VOL/BSA (12-30): 19.8 ML/M^2
BH CV ECHO MEAS - LV V1 MAX: 149.7 CM/SEC
BH CV ECHO MEAS - LV V1 MEAN: 101.8 CM/SEC
BH CV ECHO MEAS - LV V1 VTI: 31.5 CM
BH CV ECHO MEAS - LVIDD: 3.7 CM
BH CV ECHO MEAS - LVIDS: 2.6 CM
BH CV ECHO MEAS - LVLD AP2: 8.1 CM
BH CV ECHO MEAS - LVLD AP4: 8.8 CM
BH CV ECHO MEAS - LVLS AP2: 6.4 CM
BH CV ECHO MEAS - LVLS AP4: 7.2 CM
BH CV ECHO MEAS - LVOT AREA (M): 3.8 CM^2
BH CV ECHO MEAS - LVOT AREA: 3.8 CM^2
BH CV ECHO MEAS - LVOT DIAM: 2.2 CM
BH CV ECHO MEAS - LVPWD: 1.5 CM
BH CV ECHO MEAS - MED PEAK E' VEL: 7.1 CM/SEC
BH CV ECHO MEAS - MEDIAL E/E' RATIO: 13.2
BH CV ECHO MEAS - MV A MAX VEL: 89.2 CM/SEC
BH CV ECHO MEAS - MV DEC SLOPE: 367.4 CM/SEC^2
BH CV ECHO MEAS - MV DEC TIME: 0.29 SEC
BH CV ECHO MEAS - MV E MAX VEL: 93 CM/SEC
BH CV ECHO MEAS - MV E/A: 1
BH CV ECHO MEAS - MV MAX PG: 4.9 MMHG
BH CV ECHO MEAS - MV MEAN PG: 1.9 MMHG
BH CV ECHO MEAS - MV P1/2T MAX VEL: 105.9 CM/SEC
BH CV ECHO MEAS - MV P1/2T: 84.4 MSEC
BH CV ECHO MEAS - MV V2 MAX: 110.8 CM/SEC
BH CV ECHO MEAS - MV V2 MEAN: 61.3 CM/SEC
BH CV ECHO MEAS - MV V2 VTI: 39.9 CM
BH CV ECHO MEAS - MVA P1/2T LCG: 2.1 CM^2
BH CV ECHO MEAS - MVA(P1/2T): 2.6 CM^2
BH CV ECHO MEAS - MVA(VTI): 3 CM^2
BH CV ECHO MEAS - PA ACC TIME: 0.14 SEC
BH CV ECHO MEAS - PA MAX PG: 10.8 MMHG
BH CV ECHO MEAS - PA PR(ACCEL): 17.2 MMHG
BH CV ECHO MEAS - PA V2 MAX: 163.9 CM/SEC
BH CV ECHO MEAS - SI(AO): 177.6 ML/M^2
BH CV ECHO MEAS - SI(CUBED): 14.9 ML/M^2
BH CV ECHO MEAS - SI(LVOT): 56.1 ML/M^2
BH CV ECHO MEAS - SI(MOD-SP2): 26.8 ML/M^2
BH CV ECHO MEAS - SI(MOD-SP4): 34.3 ML/M^2
BH CV ECHO MEAS - SI(TEICH): 15.1 ML/M^2
BH CV ECHO MEAS - SV(AO): 380.8 ML
BH CV ECHO MEAS - SV(CUBED): 31.9 ML
BH CV ECHO MEAS - SV(LVOT): 120.4 ML
BH CV ECHO MEAS - SV(MOD-SP2): 57.4 ML
BH CV ECHO MEAS - SV(MOD-SP4): 73.6 ML
BH CV ECHO MEAS - SV(TEICH): 32.4 ML
BH CV ECHO MEAS - TAPSE (>1.6): 2 CM
BH CV ECHO MEASUREMENTS AVERAGE E/E' RATIO: 12.4
BH CV VAS BP LEFT ARM: NORMAL MMHG
BH CV XLRA - RV BASE: 3.9 CM
BH CV XLRA - RV LENGTH: 6.8 CM
BH CV XLRA - RV MID: 2.7 CM
BH CV XLRA - TDI S': 16.8 CM/SEC
BILIRUB SERPL-MCNC: 0.7 MG/DL (ref 0–1.2)
BODY TEMPERATURE: 37 C
BUN SERPL-MCNC: 120 MG/DL (ref 8–23)
BUN SERPL-MCNC: 121 MG/DL (ref 8–23)
BUN/CREAT SERPL: 18.8 (ref 7–25)
BUN/CREAT SERPL: 20 (ref 7–25)
CA-I SERPL ISE-MCNC: 1.11 MMOL/L (ref 1.12–1.32)
CA-I SERPL ISE-MCNC: 1.21 MMOL/L (ref 1.12–1.32)
CALCIUM SPEC-SCNC: 8.3 MG/DL (ref 8.6–10.5)
CALCIUM SPEC-SCNC: 9.1 MG/DL (ref 8.6–10.5)
CHLORIDE SERPL-SCNC: 94 MMOL/L (ref 98–107)
CHLORIDE SERPL-SCNC: 96 MMOL/L (ref 98–107)
CO2 BLDA-SCNC: 31.9 MMOL/L (ref 22–33)
CO2 SERPL-SCNC: 27 MMOL/L (ref 22–29)
CO2 SERPL-SCNC: 32 MMOL/L (ref 22–29)
COHGB MFR BLD: 1.7 % (ref 0–2)
CREAT SERPL-MCNC: 6.05 MG/DL (ref 0.76–1.27)
CREAT SERPL-MCNC: 6.37 MG/DL (ref 0.76–1.27)
CRP SERPL-MCNC: 4.06 MG/DL (ref 0–0.5)
DEPRECATED RDW RBC AUTO: 47.2 FL (ref 37–54)
EOSINOPHIL # BLD AUTO: 0.05 10*3/MM3 (ref 0–0.4)
EOSINOPHIL NFR BLD AUTO: 0.5 % (ref 0.3–6.2)
EPAP: 0
ERYTHROCYTE [DISTWIDTH] IN BLOOD BY AUTOMATED COUNT: 14.6 % (ref 12.3–15.4)
ERYTHROCYTE [SEDIMENTATION RATE] IN BLOOD: <1 MM/HR (ref 0–20)
FIBRINOGEN PPP-MCNC: 294 MG/DL (ref 220–470)
GFR SERPL CREATININE-BSD FRML MDRD: 9 ML/MIN/1.73
GFR SERPL CREATININE-BSD FRML MDRD: 9 ML/MIN/1.73
GFR SERPL CREATININE-BSD FRML MDRD: ABNORMAL ML/MIN/{1.73_M2}
GFR SERPL CREATININE-BSD FRML MDRD: ABNORMAL ML/MIN/{1.73_M2}
GLOBULIN UR ELPH-MCNC: 1.9 GM/DL
GLUCOSE BLDC GLUCOMTR-MCNC: 154 MG/DL (ref 70–130)
GLUCOSE BLDC GLUCOMTR-MCNC: 174 MG/DL (ref 70–130)
GLUCOSE BLDC GLUCOMTR-MCNC: 220 MG/DL (ref 70–130)
GLUCOSE BLDC GLUCOMTR-MCNC: 259 MG/DL (ref 70–130)
GLUCOSE SERPL-MCNC: 139 MG/DL (ref 65–99)
GLUCOSE SERPL-MCNC: 273 MG/DL (ref 65–99)
HCO3 BLDA-SCNC: 30.7 MMOL/L (ref 20–26)
HCT VFR BLD AUTO: 18.7 % (ref 37.5–51)
HCT VFR BLD AUTO: 21.3 % (ref 37.5–51)
HCT VFR BLD AUTO: 23.2 % (ref 37.5–51)
HCT VFR BLD CALC: 19.4 %
HGB BLD-MCNC: 6.3 G/DL (ref 13–17.7)
HGB BLD-MCNC: 7.3 G/DL (ref 13–17.7)
HGB BLD-MCNC: 7.9 G/DL (ref 13–17.7)
HGB BLDA-MCNC: 6.3 G/DL (ref 13.5–17.5)
IMM GRANULOCYTES # BLD AUTO: 0.15 10*3/MM3 (ref 0–0.05)
IMM GRANULOCYTES NFR BLD AUTO: 1.6 % (ref 0–0.5)
INHALED O2 CONCENTRATION: 65 %
IPAP: 0
LEFT ATRIUM VOLUME INDEX: 18.9 ML/M^2
LEFT ATRIUM VOLUME: 40.5 ML
LYMPHOCYTES # BLD AUTO: 0.76 10*3/MM3 (ref 0.7–3.1)
LYMPHOCYTES NFR BLD AUTO: 7.9 % (ref 19.6–45.3)
Lab: ABNORMAL
MAGNESIUM SERPL-MCNC: 2.5 MG/DL (ref 1.6–2.4)
MAGNESIUM SERPL-MCNC: 2.6 MG/DL (ref 1.6–2.4)
MCH RBC QN AUTO: 31.5 PG (ref 26.6–33)
MCHC RBC AUTO-ENTMCNC: 33.7 G/DL (ref 31.5–35.7)
MCV RBC AUTO: 93.5 FL (ref 79–97)
METHGB BLD QL: 1 % (ref 0–1.5)
MODALITY: ABNORMAL
MONOCYTES # BLD AUTO: 0.96 10*3/MM3 (ref 0.1–0.9)
MONOCYTES NFR BLD AUTO: 10 % (ref 5–12)
NEUTROPHILS NFR BLD AUTO: 7.63 10*3/MM3 (ref 1.7–7)
NEUTROPHILS NFR BLD AUTO: 79.8 % (ref 42.7–76)
NOTE: ABNORMAL
NOTIFIED BY: ABNORMAL
NOTIFIED WHO: ABNORMAL
NRBC BLD AUTO-RTO: 0 /100 WBC (ref 0–0.2)
OXYHGB MFR BLDV: 91.3 % (ref 94–99)
PAW @ PEAK INSP FLOW SETTING VENT: 0 CMH2O
PCO2 BLDA: 41.2 MM HG (ref 35–45)
PCO2 TEMP ADJ BLD: 41.2 MM HG (ref 35–48)
PEEP RESPIRATORY: 5 CM[H2O]
PH BLDA: 7.48 PH UNITS (ref 7.35–7.45)
PH, TEMP CORRECTED: 7.48 PH UNITS
PHOSPHATE SERPL-MCNC: 7.5 MG/DL (ref 2.5–4.5)
PHOSPHATE SERPL-MCNC: 8.9 MG/DL (ref 2.5–4.5)
PLATELET # BLD AUTO: 124 10*3/MM3 (ref 140–450)
PMV BLD AUTO: 12.6 FL (ref 6–12)
PO2 BLDA: 64.1 MM HG (ref 83–108)
PO2 TEMP ADJ BLD: 64.1 MM HG (ref 83–108)
POTASSIUM SERPL-SCNC: 3.7 MMOL/L (ref 3.5–5.2)
POTASSIUM SERPL-SCNC: 4.2 MMOL/L (ref 3.5–5.2)
PROCALCITONIN SERPL-MCNC: 1.29 NG/ML (ref 0–0.25)
PROT SERPL-MCNC: 5.1 G/DL (ref 6–8.5)
RBC # BLD AUTO: 2 10*6/MM3 (ref 4.14–5.8)
SODIUM SERPL-SCNC: 139 MMOL/L (ref 136–145)
SODIUM SERPL-SCNC: 142 MMOL/L (ref 136–145)
TOTAL RATE: 0 BREATHS/MINUTE
UNIT  ABO: NORMAL
UNIT  RH: NORMAL
VANCOMYCIN SERPL-MCNC: 22.1 MCG/ML (ref 5–40)
VENTILATOR MODE: ABNORMAL
WBC # BLD AUTO: 9.57 10*3/MM3 (ref 3.4–10.8)

## 2021-10-13 PROCEDURE — 82962 GLUCOSE BLOOD TEST: CPT

## 2021-10-13 PROCEDURE — 86927 PLASMA FRESH FROZEN: CPT

## 2021-10-13 PROCEDURE — 85025 COMPLETE CBC W/AUTO DIFF WBC: CPT | Performed by: INTERNAL MEDICINE

## 2021-10-13 PROCEDURE — 94799 UNLISTED PULMONARY SVC/PX: CPT

## 2021-10-13 PROCEDURE — 85018 HEMOGLOBIN: CPT | Performed by: NURSE PRACTITIONER

## 2021-10-13 PROCEDURE — 31645 BRNCHSC W/THER ASPIR 1ST: CPT | Performed by: INTERNAL MEDICINE

## 2021-10-13 PROCEDURE — 36430 TRANSFUSION BLD/BLD COMPNT: CPT

## 2021-10-13 PROCEDURE — 71250 CT THORAX DX C-: CPT

## 2021-10-13 PROCEDURE — 87186 SC STD MICRODIL/AGAR DIL: CPT | Performed by: INTERNAL MEDICINE

## 2021-10-13 PROCEDURE — 82330 ASSAY OF CALCIUM: CPT | Performed by: INTERNAL MEDICINE

## 2021-10-13 PROCEDURE — 85652 RBC SED RATE AUTOMATED: CPT | Performed by: INTERNAL MEDICINE

## 2021-10-13 PROCEDURE — 25010000002 PROPOFOL 10 MG/ML EMULSION: Performed by: INTERNAL MEDICINE

## 2021-10-13 PROCEDURE — 25010000002 PIPERACILLIN SOD-TAZOBACTAM PER 1 G: Performed by: INTERNAL MEDICINE

## 2021-10-13 PROCEDURE — P9016 RBC LEUKOCYTES REDUCED: HCPCS

## 2021-10-13 PROCEDURE — 94003 VENT MGMT INPAT SUBQ DAY: CPT

## 2021-10-13 PROCEDURE — 36600 WITHDRAWAL OF ARTERIAL BLOOD: CPT

## 2021-10-13 PROCEDURE — 86900 BLOOD TYPING SEROLOGIC ABO: CPT

## 2021-10-13 PROCEDURE — 87070 CULTURE OTHR SPECIMN AEROBIC: CPT | Performed by: INTERNAL MEDICINE

## 2021-10-13 PROCEDURE — 84145 PROCALCITONIN (PCT): CPT | Performed by: INTERNAL MEDICINE

## 2021-10-13 PROCEDURE — 36455 BLD EXCHANGE TRUJ OTH THN NB: CPT

## 2021-10-13 PROCEDURE — 80053 COMPREHEN METABOLIC PANEL: CPT | Performed by: INTERNAL MEDICINE

## 2021-10-13 PROCEDURE — 86140 C-REACTIVE PROTEIN: CPT | Performed by: INTERNAL MEDICINE

## 2021-10-13 PROCEDURE — 63710000001 INSULIN REGULAR HUMAN PER 5 UNITS: Performed by: INTERNAL MEDICINE

## 2021-10-13 PROCEDURE — 71045 X-RAY EXAM CHEST 1 VIEW: CPT

## 2021-10-13 PROCEDURE — P9017 PLASMA 1 DONOR FRZ W/IN 8 HR: HCPCS

## 2021-10-13 PROCEDURE — 25010000002 MEROPENEM PER 100 MG: Performed by: INTERNAL MEDICINE

## 2021-10-13 PROCEDURE — 63710000001 PREDNISONE PER 1 MG: Performed by: INTERNAL MEDICINE

## 2021-10-13 PROCEDURE — 25010000002 CALCIUM GLUCONATE PER 10 ML: Performed by: INTERNAL MEDICINE

## 2021-10-13 PROCEDURE — 82805 BLOOD GASES W/O2 SATURATION: CPT

## 2021-10-13 PROCEDURE — 87205 SMEAR GRAM STAIN: CPT | Performed by: INTERNAL MEDICINE

## 2021-10-13 PROCEDURE — 63710000001 INSULIN DETEMIR PER 5 UNITS: Performed by: INTERNAL MEDICINE

## 2021-10-13 PROCEDURE — 25010000002 CYCLOPHOSPHAMIDE PER 100 MG: Performed by: INTERNAL MEDICINE

## 2021-10-13 PROCEDURE — 80069 RENAL FUNCTION PANEL: CPT | Performed by: INTERNAL MEDICINE

## 2021-10-13 PROCEDURE — 85041 AUTOMATED RBC COUNT: CPT | Performed by: INTERNAL MEDICINE

## 2021-10-13 PROCEDURE — 83735 ASSAY OF MAGNESIUM: CPT | Performed by: INTERNAL MEDICINE

## 2021-10-13 PROCEDURE — 82955 ASSAY OF G6PD ENZYME: CPT | Performed by: INTERNAL MEDICINE

## 2021-10-13 PROCEDURE — 85384 FIBRINOGEN ACTIVITY: CPT | Performed by: INTERNAL MEDICINE

## 2021-10-13 PROCEDURE — 25010000002 FENTANYL CITRATE (PF) 2500 MCG/50ML SOLUTION: Performed by: INTERNAL MEDICINE

## 2021-10-13 PROCEDURE — 99291 CRITICAL CARE FIRST HOUR: CPT | Performed by: INTERNAL MEDICINE

## 2021-10-13 PROCEDURE — 25010000002 ALBUMIN HUMAN 25% PER 50 ML

## 2021-10-13 PROCEDURE — 80202 ASSAY OF VANCOMYCIN: CPT | Performed by: INTERNAL MEDICINE

## 2021-10-13 PROCEDURE — 87077 CULTURE AEROBIC IDENTIFY: CPT | Performed by: INTERNAL MEDICINE

## 2021-10-13 PROCEDURE — 85014 HEMATOCRIT: CPT | Performed by: NURSE PRACTITIONER

## 2021-10-13 PROCEDURE — 74176 CT ABD & PELVIS W/O CONTRAST: CPT

## 2021-10-13 PROCEDURE — 84100 ASSAY OF PHOSPHORUS: CPT | Performed by: INTERNAL MEDICINE

## 2021-10-13 PROCEDURE — 83050 HGB METHEMOGLOBIN QUAN: CPT

## 2021-10-13 PROCEDURE — 82375 ASSAY CARBOXYHB QUANT: CPT

## 2021-10-13 PROCEDURE — P9047 ALBUMIN (HUMAN), 25%, 50ML: HCPCS

## 2021-10-13 RX ORDER — ANTICOAGULANT CITRATE DEXTROSE SOLUTION FORMULA A 12.25; 11; 3.65 G/500ML; G/500ML; G/500ML
0-2 SOLUTION INTRAVENOUS AS NEEDED
Status: DISCONTINUED | OUTPATIENT
Start: 2021-10-13 | End: 2021-10-21

## 2021-10-13 RX ORDER — POTASSIUM CHLORIDE 29.8 MG/ML
20 INJECTION INTRAVENOUS AS NEEDED
Status: DISCONTINUED | OUTPATIENT
Start: 2021-10-13 | End: 2021-10-21

## 2021-10-13 RX ORDER — GLYCOPYRROLATE 1 MG/1
2 TABLET ORAL 3 TIMES DAILY
Status: DISCONTINUED | OUTPATIENT
Start: 2021-10-13 | End: 2021-10-16

## 2021-10-13 RX ORDER — MAGNESIUM SULFATE HEPTAHYDRATE 40 MG/ML
2 INJECTION, SOLUTION INTRAVENOUS AS NEEDED
Status: ACTIVE | OUTPATIENT
Start: 2021-10-13 | End: 2021-10-15

## 2021-10-13 RX ADMIN — CALCIUM GLUCONATE 1 G: 98 INJECTION, SOLUTION INTRAVENOUS at 13:41

## 2021-10-13 RX ADMIN — DOCUSATE SODIUM 50 MG AND SENNOSIDES 8.6 MG 2 TABLET: 8.6; 5 TABLET, FILM COATED ORAL at 21:50

## 2021-10-13 RX ADMIN — CALCIUM GLUCONATE 1 G: 98 INJECTION, SOLUTION INTRAVENOUS at 13:35

## 2021-10-13 RX ADMIN — FENTANYL CITRATE 50 MCG/HR: 50 INJECTION INTRAVENOUS at 05:42

## 2021-10-13 RX ADMIN — ALBUMIN HUMAN 50 G: 0.25 SOLUTION INTRAVENOUS at 13:20

## 2021-10-13 RX ADMIN — FAMOTIDINE 20 MG: 10 INJECTION, SOLUTION INTRAVENOUS at 08:30

## 2021-10-13 RX ADMIN — INSULIN HUMAN 2 UNITS: 100 INJECTION, SOLUTION PARENTERAL at 17:15

## 2021-10-13 RX ADMIN — TAZOBACTAM SODIUM AND PIPERACILLIN SODIUM 4.5 G: 500; 4 INJECTION, SOLUTION INTRAVENOUS at 00:50

## 2021-10-13 RX ADMIN — CALCIUM GLUCONATE 1 G: 98 INJECTION, SOLUTION INTRAVENOUS at 12:49

## 2021-10-13 RX ADMIN — GLYCOPYRROLATE 2 MG: 1 TABLET ORAL at 20:18

## 2021-10-13 RX ADMIN — ATOVAQUONE 1500 MG: 750 SUSPENSION ORAL at 12:08

## 2021-10-13 RX ADMIN — CALCIUM CHLORIDE, MAGNESIUM CHLORIDE, SODIUM CHLORIDE, SODIUM BICARBONATE, POTASSIUM CHLORIDE AND SODIUM PHOSPHATE DIBASIC DIHYDRATE 1000 ML/HR: 3.68; 3.05; 6.34; 3.09; .314; .187 INJECTION INTRAVENOUS at 15:35

## 2021-10-13 RX ADMIN — TAZOBACTAM SODIUM AND PIPERACILLIN SODIUM 4.5 G: 500; 4 INJECTION, SOLUTION INTRAVENOUS at 12:07

## 2021-10-13 RX ADMIN — MEROPENEM 1 G: 1 INJECTION, POWDER, FOR SOLUTION INTRAVENOUS at 18:28

## 2021-10-13 RX ADMIN — Medication 75 MG: at 17:16

## 2021-10-13 RX ADMIN — IPRATROPIUM BROMIDE AND ALBUTEROL SULFATE 3 ML: 2.5; .5 SOLUTION RESPIRATORY (INHALATION) at 19:38

## 2021-10-13 RX ADMIN — INSULIN DETEMIR 10 UNITS: 100 INJECTION, SOLUTION SUBCUTANEOUS at 08:30

## 2021-10-13 RX ADMIN — CHLORHEXIDINE GLUCONATE 15 ML: 1.2 SOLUTION ORAL at 20:18

## 2021-10-13 RX ADMIN — IPRATROPIUM BROMIDE AND ALBUTEROL SULFATE 3 ML: 2.5; .5 SOLUTION RESPIRATORY (INHALATION) at 03:03

## 2021-10-13 RX ADMIN — CHLORHEXIDINE GLUCONATE 15 ML: 1.2 SOLUTION ORAL at 08:30

## 2021-10-13 RX ADMIN — GLYCOPYRROLATE 2 MG: 1 TABLET ORAL at 17:16

## 2021-10-13 RX ADMIN — IPRATROPIUM BROMIDE AND ALBUTEROL SULFATE 3 ML: 2.5; .5 SOLUTION RESPIRATORY (INHALATION) at 12:18

## 2021-10-13 RX ADMIN — CALCIUM GLUCONATE 1 G: 98 INJECTION, SOLUTION INTRAVENOUS at 13:09

## 2021-10-13 RX ADMIN — CALCIUM GLUCONATE 1 G: 98 INJECTION, SOLUTION INTRAVENOUS at 12:13

## 2021-10-13 RX ADMIN — PROPOFOL 30 MCG/KG/MIN: 10 INJECTION, EMULSION INTRAVENOUS at 17:37

## 2021-10-13 RX ADMIN — IPRATROPIUM BROMIDE AND ALBUTEROL SULFATE 3 ML: 2.5; .5 SOLUTION RESPIRATORY (INHALATION) at 07:20

## 2021-10-13 RX ADMIN — INSULIN DETEMIR 10 UNITS: 100 INJECTION, SOLUTION SUBCUTANEOUS at 20:19

## 2021-10-13 RX ADMIN — SODIUM CHLORIDE 2000 ML: 9 INJECTION, SOLUTION INTRAVENOUS at 12:10

## 2021-10-13 RX ADMIN — Medication 30 ML: at 08:30

## 2021-10-13 RX ADMIN — IPRATROPIUM BROMIDE AND ALBUTEROL SULFATE 3 ML: 2.5; .5 SOLUTION RESPIRATORY (INHALATION) at 22:32

## 2021-10-13 RX ADMIN — INSULIN HUMAN 5 UNITS: 100 INJECTION, SOLUTION PARENTERAL at 00:34

## 2021-10-13 RX ADMIN — PROPOFOL 30 MCG/KG/MIN: 10 INJECTION, EMULSION INTRAVENOUS at 22:50

## 2021-10-13 RX ADMIN — DEXMEDETOMIDINE HYDROCHLORIDE 0.4 MCG/KG/HR: 4 INJECTION, SOLUTION INTRAVENOUS at 04:05

## 2021-10-13 RX ADMIN — CALCIUM GLUCONATE 1 G: 98 INJECTION, SOLUTION INTRAVENOUS at 13:51

## 2021-10-13 RX ADMIN — CALCIUM GLUCONATE 1 G: 98 INJECTION, SOLUTION INTRAVENOUS at 12:31

## 2021-10-13 RX ADMIN — PROPOFOL 30 MCG/KG/MIN: 10 INJECTION, EMULSION INTRAVENOUS at 09:54

## 2021-10-13 RX ADMIN — INSULIN HUMAN 4 UNITS: 100 INJECTION, SOLUTION PARENTERAL at 05:27

## 2021-10-13 RX ADMIN — PREDNISONE 60 MG: 20 TABLET ORAL at 14:09

## 2021-10-13 RX ADMIN — IPRATROPIUM BROMIDE AND ALBUTEROL SULFATE 3 ML: 2.5; .5 SOLUTION RESPIRATORY (INHALATION) at 15:20

## 2021-10-13 RX ADMIN — INSULIN HUMAN 2 UNITS: 100 INJECTION, SOLUTION PARENTERAL at 12:14

## 2021-10-13 RX ADMIN — PROPOFOL 30 MCG/KG/MIN: 10 INJECTION, EMULSION INTRAVENOUS at 13:03

## 2021-10-14 ENCOUNTER — APPOINTMENT (OUTPATIENT)
Dept: GENERAL RADIOLOGY | Facility: HOSPITAL | Age: 66
End: 2021-10-14

## 2021-10-14 ENCOUNTER — APPOINTMENT (OUTPATIENT)
Dept: NEPHROLOGY | Facility: HOSPITAL | Age: 66
End: 2021-10-14

## 2021-10-14 ENCOUNTER — ANESTHESIA EVENT (OUTPATIENT)
Dept: PERIOP | Facility: HOSPITAL | Age: 66
End: 2021-10-14

## 2021-10-14 LAB
ABO GROUP BLD: NORMAL
ALBUMIN SERPL-MCNC: 3.9 G/DL (ref 3.5–5.2)
ALBUMIN SERPL-MCNC: 3.9 G/DL (ref 3.5–5.2)
ALBUMIN SERPL-MCNC: 4.1 G/DL (ref 3.5–5.2)
ALBUMIN SERPL-MCNC: 4.2 G/DL (ref 3.5–5.2)
ALBUMIN/GLOB SERPL: 1.8 G/DL
ALP SERPL-CCNC: 59 U/L (ref 39–117)
ALT SERPL W P-5'-P-CCNC: 24 U/L (ref 1–41)
ANION GAP SERPL CALCULATED.3IONS-SCNC: 11 MMOL/L (ref 5–15)
ANION GAP SERPL CALCULATED.3IONS-SCNC: 15 MMOL/L (ref 5–15)
ANION GAP SERPL CALCULATED.3IONS-SCNC: 15 MMOL/L (ref 5–15)
ANION GAP SERPL CALCULATED.3IONS-SCNC: 16 MMOL/L (ref 5–15)
APTT PPP: 29.8 SECONDS (ref 22–39)
ARTERIAL PATENCY WRIST A: ABNORMAL
AST SERPL-CCNC: 25 U/L (ref 1–40)
ATMOSPHERIC PRESS: ABNORMAL MM[HG]
BACTERIA SPEC RESP CULT: ABNORMAL
BACTERIA SPEC RESP CULT: ABNORMAL
BASE EXCESS BLDA CALC-SCNC: 4.8 MMOL/L (ref 0–2)
BASOPHILS # BLD AUTO: 0.02 10*3/MM3 (ref 0–0.2)
BASOPHILS NFR BLD AUTO: 0.2 % (ref 0–1.5)
BDY SITE: ABNORMAL
BH BB BLOOD EXPIRATION DATE: NORMAL
BH BB BLOOD TYPE BARCODE: 600
BH BB BLOOD TYPE BARCODE: 6200
BH BB DISPENSE STATUS: NORMAL
BH BB PRODUCT CODE: NORMAL
BH BB UNIT NUMBER: NORMAL
BILIRUB SERPL-MCNC: 1 MG/DL (ref 0–1.2)
BLD GP AB SCN SERPL QL: NEGATIVE
BODY TEMPERATURE: 37 C
BUN SERPL-MCNC: 59 MG/DL (ref 8–23)
BUN SERPL-MCNC: 71 MG/DL (ref 8–23)
BUN SERPL-MCNC: 85 MG/DL (ref 8–23)
BUN SERPL-MCNC: 85 MG/DL (ref 8–23)
BUN/CREAT SERPL: 18.2 (ref 7–25)
BUN/CREAT SERPL: 19.5 (ref 7–25)
BUN/CREAT SERPL: 19.6 (ref 7–25)
BUN/CREAT SERPL: 19.6 (ref 7–25)
CA-I SERPL ISE-MCNC: 1.1 MMOL/L (ref 1.12–1.32)
CA-I SERPL ISE-MCNC: 1.14 MMOL/L (ref 1.12–1.32)
CA-I SERPL ISE-MCNC: 1.19 MMOL/L (ref 1.12–1.32)
CA-I SERPL ISE-MCNC: 1.22 MMOL/L (ref 1.12–1.32)
CALCIUM SPEC-SCNC: 8.4 MG/DL (ref 8.6–10.5)
CALCIUM SPEC-SCNC: 8.5 MG/DL (ref 8.6–10.5)
CALCIUM SPEC-SCNC: 8.5 MG/DL (ref 8.6–10.5)
CALCIUM SPEC-SCNC: 9.1 MG/DL (ref 8.6–10.5)
CATHETER CULTURE: ABNORMAL
CATHETER CULTURE: ABNORMAL
CHLORIDE SERPL-SCNC: 97 MMOL/L (ref 98–107)
CHLORIDE SERPL-SCNC: 97 MMOL/L (ref 98–107)
CHLORIDE SERPL-SCNC: 99 MMOL/L (ref 98–107)
CHLORIDE SERPL-SCNC: 99 MMOL/L (ref 98–107)
CO2 BLDA-SCNC: 29.8 MMOL/L (ref 22–33)
CO2 SERPL-SCNC: 24 MMOL/L (ref 22–29)
CO2 SERPL-SCNC: 26 MMOL/L (ref 22–29)
CO2 SERPL-SCNC: 26 MMOL/L (ref 22–29)
CO2 SERPL-SCNC: 30 MMOL/L (ref 22–29)
COHGB MFR BLD: 1.5 % (ref 0–2)
CREAT SERPL-MCNC: 3.24 MG/DL (ref 0.76–1.27)
CREAT SERPL-MCNC: 3.64 MG/DL (ref 0.76–1.27)
CREAT SERPL-MCNC: 4.34 MG/DL (ref 0.76–1.27)
CREAT SERPL-MCNC: 4.34 MG/DL (ref 0.76–1.27)
CROSSMATCH INTERPRETATION: NORMAL
CRP SERPL-MCNC: 1.99 MG/DL (ref 0–0.5)
DEPRECATED RDW RBC AUTO: 45.3 FL (ref 37–54)
DEPRECATED RDW RBC AUTO: 46.8 FL (ref 37–54)
EOSINOPHIL # BLD AUTO: 0.09 10*3/MM3 (ref 0–0.4)
EOSINOPHIL NFR BLD AUTO: 0.9 % (ref 0.3–6.2)
EPAP: 0
ERYTHROCYTE [DISTWIDTH] IN BLOOD BY AUTOMATED COUNT: 14.3 % (ref 12.3–15.4)
ERYTHROCYTE [DISTWIDTH] IN BLOOD BY AUTOMATED COUNT: 14.5 % (ref 12.3–15.4)
ERYTHROCYTE [SEDIMENTATION RATE] IN BLOOD: 2 MM/HR (ref 0–20)
FIBRINOGEN PPP-MCNC: 291 MG/DL (ref 220–470)
GFR SERPL CREATININE-BSD FRML MDRD: 14 ML/MIN/1.73
GFR SERPL CREATININE-BSD FRML MDRD: 14 ML/MIN/1.73
GFR SERPL CREATININE-BSD FRML MDRD: 17 ML/MIN/1.73
GFR SERPL CREATININE-BSD FRML MDRD: 19 ML/MIN/1.73
GFR SERPL CREATININE-BSD FRML MDRD: ABNORMAL ML/MIN/{1.73_M2}
GFR SERPL CREATININE-BSD FRML MDRD: ABNORMAL ML/MIN/{1.73_M2}
GLOBULIN UR ELPH-MCNC: 2.2 GM/DL
GLUCOSE BLDC GLUCOMTR-MCNC: 140 MG/DL (ref 70–130)
GLUCOSE BLDC GLUCOMTR-MCNC: 141 MG/DL (ref 70–130)
GLUCOSE BLDC GLUCOMTR-MCNC: 147 MG/DL (ref 70–130)
GLUCOSE BLDC GLUCOMTR-MCNC: 167 MG/DL (ref 70–130)
GLUCOSE SERPL-MCNC: 118 MG/DL (ref 65–99)
GLUCOSE SERPL-MCNC: 129 MG/DL (ref 65–99)
GLUCOSE SERPL-MCNC: 195 MG/DL (ref 65–99)
GLUCOSE SERPL-MCNC: 195 MG/DL (ref 65–99)
GRAM STN SPEC: ABNORMAL
HCO3 BLDA-SCNC: 28.6 MMOL/L (ref 20–26)
HCT VFR BLD AUTO: 21.1 % (ref 37.5–51)
HCT VFR BLD AUTO: 26.5 % (ref 37.5–51)
HCT VFR BLD CALC: 21.8 %
HGB BLD-MCNC: 7.3 G/DL (ref 13–17.7)
HGB BLD-MCNC: 8.7 G/DL (ref 13–17.7)
HGB BLDA-MCNC: 7.1 G/DL (ref 13.5–17.5)
IMM GRANULOCYTES # BLD AUTO: 0.13 10*3/MM3 (ref 0–0.05)
IMM GRANULOCYTES NFR BLD AUTO: 1.3 % (ref 0–0.5)
INHALED O2 CONCENTRATION: 50 %
INR PPP: 1.02 (ref 0.85–1.16)
IPAP: 0
LYMPHOCYTES # BLD AUTO: 0.83 10*3/MM3 (ref 0.7–3.1)
LYMPHOCYTES NFR BLD AUTO: 8.3 % (ref 19.6–45.3)
MAGNESIUM SERPL-MCNC: 2.5 MG/DL (ref 1.6–2.4)
MCH RBC QN AUTO: 30.7 PG (ref 26.6–33)
MCH RBC QN AUTO: 30.8 PG (ref 26.6–33)
MCHC RBC AUTO-ENTMCNC: 32.8 G/DL (ref 31.5–35.7)
MCHC RBC AUTO-ENTMCNC: 34.6 G/DL (ref 31.5–35.7)
MCV RBC AUTO: 89 FL (ref 79–97)
MCV RBC AUTO: 93.6 FL (ref 79–97)
METHGB BLD QL: 1.2 % (ref 0–1.5)
MODALITY: ABNORMAL
MONOCYTES # BLD AUTO: 0.79 10*3/MM3 (ref 0.1–0.9)
MONOCYTES NFR BLD AUTO: 7.9 % (ref 5–12)
NEUTROPHILS NFR BLD AUTO: 8.17 10*3/MM3 (ref 1.7–7)
NEUTROPHILS NFR BLD AUTO: 81.4 % (ref 42.7–76)
NOTE: ABNORMAL
NRBC BLD AUTO-RTO: 0 /100 WBC (ref 0–0.2)
OXYHGB MFR BLDV: 94.5 % (ref 94–99)
PAW @ PEAK INSP FLOW SETTING VENT: 0 CMH2O
PCO2 BLDA: 38.1 MM HG (ref 35–45)
PCO2 TEMP ADJ BLD: 38.1 MM HG (ref 35–48)
PEEP RESPIRATORY: 10 CM[H2O]
PH BLDA: 7.49 PH UNITS (ref 7.35–7.45)
PH, TEMP CORRECTED: 7.49 PH UNITS
PHOSPHATE SERPL-MCNC: 5.3 MG/DL (ref 2.5–4.5)
PHOSPHATE SERPL-MCNC: 5.7 MG/DL (ref 2.5–4.5)
PHOSPHATE SERPL-MCNC: 6.5 MG/DL (ref 2.5–4.5)
PLATELET # BLD AUTO: 113 10*3/MM3 (ref 140–450)
PLATELET # BLD AUTO: 119 10*3/MM3 (ref 140–450)
PMV BLD AUTO: 11.9 FL (ref 6–12)
PMV BLD AUTO: 12.4 FL (ref 6–12)
PO2 BLDA: 78.6 MM HG (ref 83–108)
PO2 TEMP ADJ BLD: 78.6 MM HG (ref 83–108)
POTASSIUM SERPL-SCNC: 4 MMOL/L (ref 3.5–5.2)
POTASSIUM SERPL-SCNC: 4.4 MMOL/L (ref 3.5–5.2)
POTASSIUM SERPL-SCNC: 4.4 MMOL/L (ref 3.5–5.2)
POTASSIUM SERPL-SCNC: 4.5 MMOL/L (ref 3.5–5.2)
PROCALCITONIN SERPL-MCNC: 1.01 NG/ML (ref 0–0.25)
PROT SERPL-MCNC: 6.1 G/DL (ref 6–8.5)
PROTHROMBIN TIME: 13.1 SECONDS (ref 11.4–14.4)
RBC # BLD AUTO: 2.37 10*6/MM3 (ref 4.14–5.8)
RBC # BLD AUTO: 2.83 10*6/MM3 (ref 4.14–5.8)
RETICS # AUTO: 0.07 10*6/MM3 (ref 0.02–0.13)
RETICS/RBC NFR AUTO: 3.29 % (ref 0.7–1.9)
RH BLD: POSITIVE
SODIUM SERPL-SCNC: 138 MMOL/L (ref 136–145)
SODIUM SERPL-SCNC: 138 MMOL/L (ref 136–145)
SODIUM SERPL-SCNC: 139 MMOL/L (ref 136–145)
SODIUM SERPL-SCNC: 140 MMOL/L (ref 136–145)
T&S EXPIRATION DATE: NORMAL
TOTAL RATE: 0 BREATHS/MINUTE
UNIT  ABO: NORMAL
UNIT  RH: NORMAL
VENTILATOR MODE: ABNORMAL
WBC # BLD AUTO: 10.03 10*3/MM3 (ref 3.4–10.8)
WBC # BLD AUTO: 11.29 10*3/MM3 (ref 3.4–10.8)

## 2021-10-14 PROCEDURE — 25010000002 MEROPENEM PER 100 MG: Performed by: INTERNAL MEDICINE

## 2021-10-14 PROCEDURE — 80069 RENAL FUNCTION PANEL: CPT | Performed by: INTERNAL MEDICINE

## 2021-10-14 PROCEDURE — 85045 AUTOMATED RETICULOCYTE COUNT: CPT | Performed by: INTERNAL MEDICINE

## 2021-10-14 PROCEDURE — 86927 PLASMA FRESH FROZEN: CPT

## 2021-10-14 PROCEDURE — 25010000002 CYCLOPHOSPHAMIDE PER 100 MG

## 2021-10-14 PROCEDURE — 85652 RBC SED RATE AUTOMATED: CPT | Performed by: INTERNAL MEDICINE

## 2021-10-14 PROCEDURE — 36455 BLD EXCHANGE TRUJ OTH THN NB: CPT

## 2021-10-14 PROCEDURE — 25010000002 CEFEPIME PER 500 MG: Performed by: INTERNAL MEDICINE

## 2021-10-14 PROCEDURE — 86900 BLOOD TYPING SEROLOGIC ABO: CPT | Performed by: NURSE PRACTITIONER

## 2021-10-14 PROCEDURE — 82330 ASSAY OF CALCIUM: CPT | Performed by: INTERNAL MEDICINE

## 2021-10-14 PROCEDURE — 63710000001 INSULIN REGULAR HUMAN PER 5 UNITS: Performed by: INTERNAL MEDICINE

## 2021-10-14 PROCEDURE — 85610 PROTHROMBIN TIME: CPT | Performed by: INTERNAL MEDICINE

## 2021-10-14 PROCEDURE — 25010000002 FENTANYL CITRATE (PF) 2500 MCG/50ML SOLUTION: Performed by: NURSE PRACTITIONER

## 2021-10-14 PROCEDURE — 63710000001 INSULIN DETEMIR PER 5 UNITS: Performed by: INTERNAL MEDICINE

## 2021-10-14 PROCEDURE — 94799 UNLISTED PULMONARY SVC/PX: CPT

## 2021-10-14 PROCEDURE — P9016 RBC LEUKOCYTES REDUCED: HCPCS

## 2021-10-14 PROCEDURE — 82962 GLUCOSE BLOOD TEST: CPT

## 2021-10-14 PROCEDURE — 86140 C-REACTIVE PROTEIN: CPT | Performed by: INTERNAL MEDICINE

## 2021-10-14 PROCEDURE — 82805 BLOOD GASES W/O2 SATURATION: CPT

## 2021-10-14 PROCEDURE — 25010000002 PROPOFOL 10 MG/ML EMULSION: Performed by: INTERNAL MEDICINE

## 2021-10-14 PROCEDURE — 94003 VENT MGMT INPAT SUBQ DAY: CPT

## 2021-10-14 PROCEDURE — 83735 ASSAY OF MAGNESIUM: CPT | Performed by: INTERNAL MEDICINE

## 2021-10-14 PROCEDURE — 86901 BLOOD TYPING SEROLOGIC RH(D): CPT | Performed by: NURSE PRACTITIONER

## 2021-10-14 PROCEDURE — P9017 PLASMA 1 DONOR FRZ W/IN 8 HR: HCPCS

## 2021-10-14 PROCEDURE — 86922 COMPATIBILITY TEST ANTIGLOB: CPT

## 2021-10-14 PROCEDURE — 86920 COMPATIBILITY TEST SPIN: CPT

## 2021-10-14 PROCEDURE — 85025 COMPLETE CBC W/AUTO DIFF WBC: CPT | Performed by: INTERNAL MEDICINE

## 2021-10-14 PROCEDURE — 36430 TRANSFUSION BLD/BLD COMPNT: CPT

## 2021-10-14 PROCEDURE — 84145 PROCALCITONIN (PCT): CPT | Performed by: INTERNAL MEDICINE

## 2021-10-14 PROCEDURE — 83050 HGB METHEMOGLOBIN QUAN: CPT

## 2021-10-14 PROCEDURE — 36600 WITHDRAWAL OF ARTERIAL BLOOD: CPT

## 2021-10-14 PROCEDURE — 99291 CRITICAL CARE FIRST HOUR: CPT | Performed by: INTERNAL MEDICINE

## 2021-10-14 PROCEDURE — 71045 X-RAY EXAM CHEST 1 VIEW: CPT

## 2021-10-14 PROCEDURE — 86850 RBC ANTIBODY SCREEN: CPT | Performed by: NURSE PRACTITIONER

## 2021-10-14 PROCEDURE — 25010000002 CALCIUM GLUCONATE PER 10 ML: Performed by: INTERNAL MEDICINE

## 2021-10-14 PROCEDURE — 85384 FIBRINOGEN ACTIVITY: CPT | Performed by: INTERNAL MEDICINE

## 2021-10-14 PROCEDURE — 80053 COMPREHEN METABOLIC PANEL: CPT | Performed by: INTERNAL MEDICINE

## 2021-10-14 PROCEDURE — 63710000001 PREDNISONE PER 1 MG: Performed by: INTERNAL MEDICINE

## 2021-10-14 PROCEDURE — 86900 BLOOD TYPING SEROLOGIC ABO: CPT

## 2021-10-14 PROCEDURE — 85027 COMPLETE CBC AUTOMATED: CPT | Performed by: INTERNAL MEDICINE

## 2021-10-14 PROCEDURE — 82375 ASSAY CARBOXYHB QUANT: CPT

## 2021-10-14 PROCEDURE — 83516 IMMUNOASSAY NONANTIBODY: CPT | Performed by: INTERNAL MEDICINE

## 2021-10-14 PROCEDURE — 85730 THROMBOPLASTIN TIME PARTIAL: CPT | Performed by: INTERNAL MEDICINE

## 2021-10-14 PROCEDURE — 25010000002 CYCLOPHOSPHAMIDE PER 100 MG: Performed by: INTERNAL MEDICINE

## 2021-10-14 PROCEDURE — 85060 BLOOD SMEAR INTERPRETATION: CPT | Performed by: INTERNAL MEDICINE

## 2021-10-14 RX ORDER — SODIUM CHLORIDE 0.9 % (FLUSH) 0.9 %
10 SYRINGE (ML) INJECTION EVERY 12 HOURS SCHEDULED
Status: CANCELLED | OUTPATIENT
Start: 2021-10-14

## 2021-10-14 RX ORDER — FAMOTIDINE 10 MG/ML
20 INJECTION, SOLUTION INTRAVENOUS ONCE
Status: CANCELLED | OUTPATIENT
Start: 2021-10-14 | End: 2021-10-14

## 2021-10-14 RX ORDER — LIDOCAINE HYDROCHLORIDE 10 MG/ML
0.5 INJECTION, SOLUTION EPIDURAL; INFILTRATION; INTRACAUDAL; PERINEURAL ONCE AS NEEDED
Status: CANCELLED | OUTPATIENT
Start: 2021-10-14

## 2021-10-14 RX ORDER — SODIUM CHLORIDE, SODIUM LACTATE, POTASSIUM CHLORIDE, CALCIUM CHLORIDE 600; 310; 30; 20 MG/100ML; MG/100ML; MG/100ML; MG/100ML
9 INJECTION, SOLUTION INTRAVENOUS CONTINUOUS
Status: CANCELLED | OUTPATIENT
Start: 2021-10-14

## 2021-10-14 RX ORDER — FAMOTIDINE 20 MG/1
20 TABLET, FILM COATED ORAL ONCE
Status: CANCELLED | OUTPATIENT
Start: 2021-10-14 | End: 2021-10-14

## 2021-10-14 RX ORDER — MIDAZOLAM HYDROCHLORIDE 1 MG/ML
0.5 INJECTION INTRAMUSCULAR; INTRAVENOUS
Status: CANCELLED | OUTPATIENT
Start: 2021-10-14

## 2021-10-14 RX ORDER — SODIUM CHLORIDE 0.9 % (FLUSH) 0.9 %
10 SYRINGE (ML) INJECTION AS NEEDED
Status: CANCELLED | OUTPATIENT
Start: 2021-10-14

## 2021-10-14 RX ADMIN — CALCIUM CHLORIDE, MAGNESIUM CHLORIDE, SODIUM CHLORIDE, SODIUM BICARBONATE, POTASSIUM CHLORIDE AND SODIUM PHOSPHATE DIBASIC DIHYDRATE 1000 ML/HR: 3.68; 3.05; 6.34; 3.09; .314; .187 INJECTION INTRAVENOUS at 20:50

## 2021-10-14 RX ADMIN — IPRATROPIUM BROMIDE AND ALBUTEROL SULFATE 3 ML: 2.5; .5 SOLUTION RESPIRATORY (INHALATION) at 15:32

## 2021-10-14 RX ADMIN — FENTANYL CITRATE 200 MCG/HR: 50 INJECTION INTRAVENOUS at 17:27

## 2021-10-14 RX ADMIN — CALCIUM GLUCONATE 1 G: 98 INJECTION, SOLUTION INTRAVENOUS at 11:46

## 2021-10-14 RX ADMIN — DEXMEDETOMIDINE HYDROCHLORIDE 0.8 MCG/KG/HR: 4 INJECTION, SOLUTION INTRAVENOUS at 11:28

## 2021-10-14 RX ADMIN — INSULIN HUMAN 2 UNITS: 100 INJECTION, SOLUTION PARENTERAL at 23:34

## 2021-10-14 RX ADMIN — INSULIN HUMAN 5 UNITS: 100 INJECTION, SOLUTION PARENTERAL at 23:34

## 2021-10-14 RX ADMIN — Medication 75 MG: at 18:19

## 2021-10-14 RX ADMIN — CALCIUM CHLORIDE, MAGNESIUM CHLORIDE, SODIUM CHLORIDE, SODIUM BICARBONATE, POTASSIUM CHLORIDE AND SODIUM PHOSPHATE DIBASIC DIHYDRATE 1000 ML/HR: 3.68; 3.05; 6.34; 3.09; .314; .187 INJECTION INTRAVENOUS at 15:36

## 2021-10-14 RX ADMIN — INSULIN HUMAN 5 UNITS: 100 INJECTION, SOLUTION PARENTERAL at 19:28

## 2021-10-14 RX ADMIN — GLYCOPYRROLATE 2 MG: 1 TABLET ORAL at 15:42

## 2021-10-14 RX ADMIN — CALCIUM GLUCONATE 1 G: 98 INJECTION, SOLUTION INTRAVENOUS at 10:48

## 2021-10-14 RX ADMIN — CALCIUM CHLORIDE, MAGNESIUM CHLORIDE, SODIUM CHLORIDE, SODIUM BICARBONATE, POTASSIUM CHLORIDE AND SODIUM PHOSPHATE DIBASIC DIHYDRATE 1000 ML/HR: 3.68; 3.05; 6.34; 3.09; .314; .187 INJECTION INTRAVENOUS at 08:22

## 2021-10-14 RX ADMIN — CALCIUM GLUCONATE 1 G: 98 INJECTION, SOLUTION INTRAVENOUS at 11:02

## 2021-10-14 RX ADMIN — INSULIN DETEMIR 10 UNITS: 100 INJECTION, SOLUTION SUBCUTANEOUS at 08:22

## 2021-10-14 RX ADMIN — ATOVAQUONE 1500 MG: 750 SUSPENSION ORAL at 13:05

## 2021-10-14 RX ADMIN — IPRATROPIUM BROMIDE AND ALBUTEROL SULFATE 3 ML: 2.5; .5 SOLUTION RESPIRATORY (INHALATION) at 19:06

## 2021-10-14 RX ADMIN — IPRATROPIUM BROMIDE AND ALBUTEROL SULFATE 3 ML: 2.5; .5 SOLUTION RESPIRATORY (INHALATION) at 22:53

## 2021-10-14 RX ADMIN — CALCIUM GLUCONATE 1 G: 98 INJECTION, SOLUTION INTRAVENOUS at 12:06

## 2021-10-14 RX ADMIN — INSULIN HUMAN 5 UNITS: 100 INJECTION, SOLUTION PARENTERAL at 00:22

## 2021-10-14 RX ADMIN — FAMOTIDINE 20 MG: 10 INJECTION, SOLUTION INTRAVENOUS at 08:22

## 2021-10-14 RX ADMIN — PROPOFOL 35 MCG/KG/MIN: 10 INJECTION, EMULSION INTRAVENOUS at 04:10

## 2021-10-14 RX ADMIN — FENTANYL CITRATE 100 MCG/HR: 50 INJECTION INTRAVENOUS at 04:11

## 2021-10-14 RX ADMIN — CEFEPIME HYDROCHLORIDE 2 G: 2 INJECTION, POWDER, FOR SOLUTION INTRAVENOUS at 19:42

## 2021-10-14 RX ADMIN — INSULIN HUMAN 5 UNITS: 100 INJECTION, SOLUTION PARENTERAL at 13:06

## 2021-10-14 RX ADMIN — DEXMEDETOMIDINE HYDROCHLORIDE 0.5 MCG/KG/HR: 4 INJECTION, SOLUTION INTRAVENOUS at 17:27

## 2021-10-14 RX ADMIN — MEROPENEM 1 G: 1 INJECTION, POWDER, FOR SOLUTION INTRAVENOUS at 06:23

## 2021-10-14 RX ADMIN — GLYCOPYRROLATE 2 MG: 1 TABLET ORAL at 20:01

## 2021-10-14 RX ADMIN — CALCIUM GLUCONATE 1 G: 98 INJECTION, SOLUTION INTRAVENOUS at 11:22

## 2021-10-14 RX ADMIN — IPRATROPIUM BROMIDE AND ALBUTEROL SULFATE 3 ML: 2.5; .5 SOLUTION RESPIRATORY (INHALATION) at 12:59

## 2021-10-14 RX ADMIN — GLYCOPYRROLATE 2 MG: 1 TABLET ORAL at 08:22

## 2021-10-14 RX ADMIN — CALCIUM GLUCONATE 1 G: 98 INJECTION, SOLUTION INTRAVENOUS at 12:19

## 2021-10-14 RX ADMIN — CHLORHEXIDINE GLUCONATE 15 ML: 1.2 SOLUTION ORAL at 20:01

## 2021-10-14 RX ADMIN — PREDNISONE 60 MG: 20 TABLET ORAL at 13:05

## 2021-10-14 RX ADMIN — IPRATROPIUM BROMIDE AND ALBUTEROL SULFATE 3 ML: 2.5; .5 SOLUTION RESPIRATORY (INHALATION) at 02:52

## 2021-10-14 RX ADMIN — IPRATROPIUM BROMIDE AND ALBUTEROL SULFATE 3 ML: 2.5; .5 SOLUTION RESPIRATORY (INHALATION) at 07:50

## 2021-10-14 RX ADMIN — CHLORHEXIDINE GLUCONATE 15 ML: 1.2 SOLUTION ORAL at 08:22

## 2021-10-14 RX ADMIN — INSULIN DETEMIR 10 UNITS: 100 INJECTION, SOLUTION SUBCUTANEOUS at 20:01

## 2021-10-14 RX ADMIN — INSULIN HUMAN 3 UNITS: 100 INJECTION, SOLUTION PARENTERAL at 00:22

## 2021-10-15 ENCOUNTER — APPOINTMENT (OUTPATIENT)
Dept: GENERAL RADIOLOGY | Facility: HOSPITAL | Age: 66
End: 2021-10-15

## 2021-10-15 ENCOUNTER — ANESTHESIA (OUTPATIENT)
Dept: PERIOP | Facility: HOSPITAL | Age: 66
End: 2021-10-15

## 2021-10-15 ENCOUNTER — APPOINTMENT (OUTPATIENT)
Dept: NEPHROLOGY | Facility: HOSPITAL | Age: 66
End: 2021-10-15

## 2021-10-15 LAB
ALBUMIN SERPL-MCNC: 3.5 G/DL (ref 3.5–5.2)
ALBUMIN SERPL-MCNC: 3.6 G/DL (ref 3.5–5.2)
ALBUMIN SERPL-MCNC: 4 G/DL (ref 3.5–5.2)
ALBUMIN SERPL-MCNC: 4.2 G/DL (ref 3.5–5.2)
ALBUMIN/GLOB SERPL: 1.5 G/DL
ALP SERPL-CCNC: 74 U/L (ref 39–117)
ALT SERPL W P-5'-P-CCNC: 24 U/L (ref 1–41)
ANION GAP SERPL CALCULATED.3IONS-SCNC: 11 MMOL/L (ref 5–15)
ANION GAP SERPL CALCULATED.3IONS-SCNC: 11 MMOL/L (ref 5–15)
ANION GAP SERPL CALCULATED.3IONS-SCNC: 12 MMOL/L (ref 5–15)
ANION GAP SERPL CALCULATED.3IONS-SCNC: 14 MMOL/L (ref 5–15)
ARTERIAL PATENCY WRIST A: ABNORMAL
AST SERPL-CCNC: 22 U/L (ref 1–40)
ATMOSPHERIC PRESS: ABNORMAL MM[HG]
BACTERIA SPEC RESP CULT: ABNORMAL
BACTERIA SPEC RESP CULT: ABNORMAL
BASE EXCESS BLDA CALC-SCNC: 2.8 MMOL/L (ref 0–2)
BASOPHILS # BLD AUTO: 0.06 10*3/MM3 (ref 0–0.2)
BASOPHILS NFR BLD AUTO: 0.4 % (ref 0–1.5)
BDY SITE: ABNORMAL
BH BB BLOOD EXPIRATION DATE: NORMAL
BH BB BLOOD TYPE BARCODE: 600
BH BB BLOOD TYPE BARCODE: 6200
BH BB DISPENSE STATUS: NORMAL
BH BB PRODUCT CODE: NORMAL
BH BB UNIT NUMBER: NORMAL
BILIRUB SERPL-MCNC: 1.4 MG/DL (ref 0–1.2)
BODY TEMPERATURE: 37 C
BUN SERPL-MCNC: 41 MG/DL (ref 8–23)
BUN SERPL-MCNC: 45 MG/DL (ref 8–23)
BUN SERPL-MCNC: 46 MG/DL (ref 8–23)
BUN SERPL-MCNC: 52 MG/DL (ref 8–23)
BUN/CREAT SERPL: 16.3 (ref 7–25)
BUN/CREAT SERPL: 17.5 (ref 7–25)
BUN/CREAT SERPL: 17.8 (ref 7–25)
BUN/CREAT SERPL: 19.3 (ref 7–25)
CA-I SERPL ISE-MCNC: 1.1 MMOL/L (ref 1.12–1.32)
CA-I SERPL ISE-MCNC: 1.17 MMOL/L (ref 1.12–1.32)
CA-I SERPL ISE-MCNC: 1.21 MMOL/L (ref 1.12–1.32)
CALCIUM SPEC-SCNC: 8 MG/DL (ref 8.6–10.5)
CALCIUM SPEC-SCNC: 8.2 MG/DL (ref 8.6–10.5)
CALCIUM SPEC-SCNC: 8.4 MG/DL (ref 8.6–10.5)
CALCIUM SPEC-SCNC: 9.3 MG/DL (ref 8.6–10.5)
CHLORIDE SERPL-SCNC: 100 MMOL/L (ref 98–107)
CHLORIDE SERPL-SCNC: 101 MMOL/L (ref 98–107)
CHLORIDE SERPL-SCNC: 97 MMOL/L (ref 98–107)
CHLORIDE SERPL-SCNC: 99 MMOL/L (ref 98–107)
CO2 BLDA-SCNC: 28.5 MMOL/L (ref 22–33)
CO2 SERPL-SCNC: 23 MMOL/L (ref 22–29)
CO2 SERPL-SCNC: 24 MMOL/L (ref 22–29)
CO2 SERPL-SCNC: 26 MMOL/L (ref 22–29)
CO2 SERPL-SCNC: 29 MMOL/L (ref 22–29)
COHGB MFR BLD: 2.1 % (ref 0–2)
CREAT SERPL-MCNC: 2.52 MG/DL (ref 0.76–1.27)
CREAT SERPL-MCNC: 2.57 MG/DL (ref 0.76–1.27)
CREAT SERPL-MCNC: 2.59 MG/DL (ref 0.76–1.27)
CREAT SERPL-MCNC: 2.69 MG/DL (ref 0.76–1.27)
CROSSMATCH INTERPRETATION: NORMAL
CYTOLOGIST CVX/VAG CYTO: NORMAL
DEPRECATED RDW RBC AUTO: 47.7 FL (ref 37–54)
EOSINOPHIL # BLD AUTO: 0.14 10*3/MM3 (ref 0–0.4)
EOSINOPHIL NFR BLD AUTO: 1 % (ref 0.3–6.2)
EPAP: 0
ERYTHROCYTE [DISTWIDTH] IN BLOOD BY AUTOMATED COUNT: 14.6 % (ref 12.3–15.4)
FIBRINOGEN PPP-MCNC: 317 MG/DL (ref 220–470)
G6PD BLD QN: 378 U/10E12 RBC (ref 127–427)
GFR SERPL CREATININE-BSD FRML MDRD: 24 ML/MIN/1.73
GFR SERPL CREATININE-BSD FRML MDRD: 25 ML/MIN/1.73
GFR SERPL CREATININE-BSD FRML MDRD: 25 ML/MIN/1.73
GFR SERPL CREATININE-BSD FRML MDRD: 26 ML/MIN/1.73
GLOBULIN UR ELPH-MCNC: 2.7 GM/DL
GLUCOSE BLDC GLUCOMTR-MCNC: 107 MG/DL (ref 70–130)
GLUCOSE BLDC GLUCOMTR-MCNC: 192 MG/DL (ref 70–130)
GLUCOSE BLDC GLUCOMTR-MCNC: 205 MG/DL (ref 70–130)
GLUCOSE BLDC GLUCOMTR-MCNC: 58 MG/DL (ref 70–130)
GLUCOSE BLDC GLUCOMTR-MCNC: 97 MG/DL (ref 70–130)
GLUCOSE SERPL-MCNC: 150 MG/DL (ref 65–99)
GLUCOSE SERPL-MCNC: 159 MG/DL (ref 65–99)
GLUCOSE SERPL-MCNC: 72 MG/DL (ref 65–99)
GLUCOSE SERPL-MCNC: 91 MG/DL (ref 65–99)
GRAM STN SPEC: ABNORMAL
HCO3 BLDA-SCNC: 27.3 MMOL/L (ref 20–26)
HCT VFR BLD AUTO: 26.1 % (ref 37.5–51)
HCT VFR BLD CALC: 27 %
HGB BLD-MCNC: 8.9 G/DL (ref 13–17.7)
HGB BLDA-MCNC: 8.8 G/DL (ref 13.5–17.5)
IMM GRANULOCYTES # BLD AUTO: 0.25 10*3/MM3 (ref 0–0.05)
IMM GRANULOCYTES NFR BLD AUTO: 1.7 % (ref 0–0.5)
INHALED O2 CONCENTRATION: 40 %
IPAP: 0
LYMPHOCYTES # BLD AUTO: 1.91 10*3/MM3 (ref 0.7–3.1)
LYMPHOCYTES NFR BLD AUTO: 13 % (ref 19.6–45.3)
MAGNESIUM SERPL-MCNC: 2.3 MG/DL (ref 1.6–2.4)
MAGNESIUM SERPL-MCNC: 2.4 MG/DL (ref 1.6–2.4)
MAGNESIUM SERPL-MCNC: 2.5 MG/DL (ref 1.6–2.4)
MCH RBC QN AUTO: 31.3 PG (ref 26.6–33)
MCHC RBC AUTO-ENTMCNC: 34.1 G/DL (ref 31.5–35.7)
MCV RBC AUTO: 91.9 FL (ref 79–97)
METHGB BLD QL: 0.8 % (ref 0–1.5)
MODALITY: ABNORMAL
MONOCYTES # BLD AUTO: 1.4 10*3/MM3 (ref 0.1–0.9)
MONOCYTES NFR BLD AUTO: 9.5 % (ref 5–12)
NEUTROPHILS NFR BLD AUTO: 10.94 10*3/MM3 (ref 1.7–7)
NEUTROPHILS NFR BLD AUTO: 74.4 % (ref 42.7–76)
NOTE: ABNORMAL
NRBC BLD AUTO-RTO: 0 /100 WBC (ref 0–0.2)
OXYHGB MFR BLDV: 90.7 % (ref 94–99)
PATH INTERP BLD-IMP: NORMAL
PAW @ PEAK INSP FLOW SETTING VENT: 0 CMH2O
PCO2 BLDA: 40.8 MM HG (ref 35–45)
PCO2 TEMP ADJ BLD: 40.8 MM HG (ref 35–48)
PEEP RESPIRATORY: 10 CM[H2O]
PH BLDA: 7.43 PH UNITS (ref 7.35–7.45)
PH, TEMP CORRECTED: 7.43 PH UNITS
PHOSPHATE SERPL-MCNC: 5 MG/DL (ref 2.5–4.5)
PHOSPHATE SERPL-MCNC: 5.8 MG/DL (ref 2.5–4.5)
PHOSPHATE SERPL-MCNC: 6 MG/DL (ref 2.5–4.5)
PLATELET # BLD AUTO: 123 10*3/MM3 (ref 140–450)
PMV BLD AUTO: 12.4 FL (ref 6–12)
PO2 BLDA: 65.1 MM HG (ref 83–108)
PO2 TEMP ADJ BLD: 65.1 MM HG (ref 83–108)
POTASSIUM SERPL-SCNC: 4.3 MMOL/L (ref 3.5–5.2)
POTASSIUM SERPL-SCNC: 4.4 MMOL/L (ref 3.5–5.2)
POTASSIUM SERPL-SCNC: 4.5 MMOL/L (ref 3.5–5.2)
POTASSIUM SERPL-SCNC: 4.8 MMOL/L (ref 3.5–5.2)
PROCALCITONIN SERPL-MCNC: 0.76 NG/ML (ref 0–0.25)
PROT SERPL-MCNC: 6.7 G/DL (ref 6–8.5)
RBC # BLD AUTO: 2.41 X10E6/UL (ref 4.14–5.8)
RBC # BLD AUTO: 2.84 10*6/MM3 (ref 4.14–5.8)
SODIUM SERPL-SCNC: 136 MMOL/L (ref 136–145)
SODIUM SERPL-SCNC: 136 MMOL/L (ref 136–145)
SODIUM SERPL-SCNC: 137 MMOL/L (ref 136–145)
SODIUM SERPL-SCNC: 138 MMOL/L (ref 136–145)
TOTAL RATE: 0 BREATHS/MINUTE
UNIT  ABO: NORMAL
UNIT  RH: NORMAL
VENTILATOR MODE: ABNORMAL
WBC # BLD AUTO: 14.7 10*3/MM3 (ref 3.4–10.8)

## 2021-10-15 PROCEDURE — 36430 TRANSFUSION BLD/BLD COMPNT: CPT

## 2021-10-15 PROCEDURE — P9017 PLASMA 1 DONOR FRZ W/IN 8 HR: HCPCS

## 2021-10-15 PROCEDURE — 36455 BLD EXCHANGE TRUJ OTH THN NB: CPT

## 2021-10-15 PROCEDURE — 85025 COMPLETE CBC W/AUTO DIFF WBC: CPT | Performed by: INTERNAL MEDICINE

## 2021-10-15 PROCEDURE — 71045 X-RAY EXAM CHEST 1 VIEW: CPT

## 2021-10-15 PROCEDURE — 94003 VENT MGMT INPAT SUBQ DAY: CPT

## 2021-10-15 PROCEDURE — 36600 WITHDRAWAL OF ARTERIAL BLOOD: CPT

## 2021-10-15 PROCEDURE — 25010000002 PHENYLEPHRINE 10 MG/ML SOLUTION: Performed by: NURSE ANESTHETIST, CERTIFIED REGISTERED

## 2021-10-15 PROCEDURE — 25010000002 CEFEPIME PER 500 MG: Performed by: INTERNAL MEDICINE

## 2021-10-15 PROCEDURE — 99291 CRITICAL CARE FIRST HOUR: CPT | Performed by: INTERNAL MEDICINE

## 2021-10-15 PROCEDURE — 25010000002 CEFEPIME PER 500 MG: Performed by: SURGERY

## 2021-10-15 PROCEDURE — 82805 BLOOD GASES W/O2 SATURATION: CPT

## 2021-10-15 PROCEDURE — 80069 RENAL FUNCTION PANEL: CPT | Performed by: SURGERY

## 2021-10-15 PROCEDURE — 0BJ08ZZ INSPECTION OF TRACHEOBRONCHIAL TREE, VIA NATURAL OR ARTIFICIAL OPENING ENDOSCOPIC: ICD-10-PCS | Performed by: SURGERY

## 2021-10-15 PROCEDURE — 80053 COMPREHEN METABOLIC PANEL: CPT | Performed by: INTERNAL MEDICINE

## 2021-10-15 PROCEDURE — 82375 ASSAY CARBOXYHB QUANT: CPT

## 2021-10-15 PROCEDURE — 25010000002 CYCLOPHOSPHAMIDE PER 100 MG: Performed by: SURGERY

## 2021-10-15 PROCEDURE — 25010000002 CALCIUM GLUCONATE PER 10 ML: Performed by: INTERNAL MEDICINE

## 2021-10-15 PROCEDURE — 63710000001 INSULIN DETEMIR PER 5 UNITS: Performed by: SURGERY

## 2021-10-15 PROCEDURE — 83735 ASSAY OF MAGNESIUM: CPT | Performed by: INTERNAL MEDICINE

## 2021-10-15 PROCEDURE — 25010000002 FENTANYL CITRATE (PF) 2500 MCG/50ML SOLUTION: Performed by: SURGERY

## 2021-10-15 PROCEDURE — 80069 RENAL FUNCTION PANEL: CPT | Performed by: INTERNAL MEDICINE

## 2021-10-15 PROCEDURE — 0B110F4 BYPASS TRACHEA TO CUTANEOUS WITH TRACHEOSTOMY DEVICE, OPEN APPROACH: ICD-10-PCS | Performed by: SURGERY

## 2021-10-15 PROCEDURE — 0DH63UZ INSERTION OF FEEDING DEVICE INTO STOMACH, PERCUTANEOUS APPROACH: ICD-10-PCS | Performed by: SURGERY

## 2021-10-15 PROCEDURE — 94799 UNLISTED PULMONARY SVC/PX: CPT

## 2021-10-15 PROCEDURE — 85384 FIBRINOGEN ACTIVITY: CPT | Performed by: INTERNAL MEDICINE

## 2021-10-15 PROCEDURE — 83735 ASSAY OF MAGNESIUM: CPT | Performed by: SURGERY

## 2021-10-15 PROCEDURE — 86927 PLASMA FRESH FROZEN: CPT

## 2021-10-15 PROCEDURE — 63710000001 INSULIN REGULAR HUMAN PER 5 UNITS: Performed by: SURGERY

## 2021-10-15 PROCEDURE — 82330 ASSAY OF CALCIUM: CPT | Performed by: SURGERY

## 2021-10-15 PROCEDURE — 82962 GLUCOSE BLOOD TEST: CPT

## 2021-10-15 PROCEDURE — 83050 HGB METHEMOGLOBIN QUAN: CPT

## 2021-10-15 PROCEDURE — 25010000002 PHENYLEPHRINE 10 MG/ML SOLUTION 1 ML VIAL: Performed by: NURSE ANESTHETIST, CERTIFIED REGISTERED

## 2021-10-15 PROCEDURE — 63710000001 PREDNISONE PER 1 MG: Performed by: INTERNAL MEDICINE

## 2021-10-15 PROCEDURE — 82330 ASSAY OF CALCIUM: CPT | Performed by: INTERNAL MEDICINE

## 2021-10-15 PROCEDURE — 84145 PROCALCITONIN (PCT): CPT | Performed by: INTERNAL MEDICINE

## 2021-10-15 PROCEDURE — 25010000002 FENTANYL CITRATE (PF) 2500 MCG/50ML SOLUTION: Performed by: NURSE PRACTITIONER

## 2021-10-15 RX ORDER — PHENYLEPHRINE HYDROCHLORIDE 10 MG/ML
INJECTION INTRAVENOUS AS NEEDED
Status: DISCONTINUED | OUTPATIENT
Start: 2021-10-15 | End: 2021-10-15 | Stop reason: SURG

## 2021-10-15 RX ORDER — IPRATROPIUM BROMIDE AND ALBUTEROL SULFATE 2.5; .5 MG/3ML; MG/3ML
SOLUTION RESPIRATORY (INHALATION)
Status: COMPLETED
Start: 2021-10-15 | End: 2021-10-18

## 2021-10-15 RX ORDER — HYDROMORPHONE HYDROCHLORIDE 1 MG/ML
0.5 INJECTION, SOLUTION INTRAMUSCULAR; INTRAVENOUS; SUBCUTANEOUS
Status: CANCELLED | OUTPATIENT
Start: 2021-10-15

## 2021-10-15 RX ORDER — DEXTROSE MONOHYDRATE 50 MG/ML
50 INJECTION, SOLUTION INTRAVENOUS CONTINUOUS
Status: DISCONTINUED | OUTPATIENT
Start: 2021-10-15 | End: 2021-10-20

## 2021-10-15 RX ORDER — FOLIC ACID/VIT B COMPLEX AND C 0.8 MG
1 TABLET ORAL DAILY
Status: DISCONTINUED | OUTPATIENT
Start: 2021-10-15 | End: 2021-10-20

## 2021-10-15 RX ORDER — IPRATROPIUM BROMIDE AND ALBUTEROL SULFATE 2.5; .5 MG/3ML; MG/3ML
3 SOLUTION RESPIRATORY (INHALATION) ONCE AS NEEDED
Status: CANCELLED | OUTPATIENT
Start: 2021-10-15

## 2021-10-15 RX ORDER — SODIUM CHLORIDE 9 MG/ML
INJECTION, SOLUTION INTRAVENOUS CONTINUOUS PRN
Status: DISCONTINUED | OUTPATIENT
Start: 2021-10-15 | End: 2021-10-15 | Stop reason: SURG

## 2021-10-15 RX ORDER — ROCURONIUM BROMIDE 10 MG/ML
INJECTION, SOLUTION INTRAVENOUS AS NEEDED
Status: DISCONTINUED | OUTPATIENT
Start: 2021-10-15 | End: 2021-10-15 | Stop reason: SURG

## 2021-10-15 RX ORDER — FENTANYL CITRATE 50 UG/ML
50 INJECTION, SOLUTION INTRAMUSCULAR; INTRAVENOUS
Status: CANCELLED | OUTPATIENT
Start: 2021-10-15

## 2021-10-15 RX ORDER — GLYCOPYRROLATE 0.2 MG/ML
INJECTION INTRAMUSCULAR; INTRAVENOUS AS NEEDED
Status: DISCONTINUED | OUTPATIENT
Start: 2021-10-15 | End: 2021-10-15 | Stop reason: SURG

## 2021-10-15 RX ADMIN — CALCIUM GLUCONATE 1 G: 98 INJECTION, SOLUTION INTRAVENOUS at 12:06

## 2021-10-15 RX ADMIN — IPRATROPIUM BROMIDE AND ALBUTEROL SULFATE 3 ML: 2.5; .5 SOLUTION RESPIRATORY (INHALATION) at 07:24

## 2021-10-15 RX ADMIN — ROCURONIUM BROMIDE 20 MG: 10 INJECTION, SOLUTION INTRAVENOUS at 14:56

## 2021-10-15 RX ADMIN — FENTANYL CITRATE 250 MCG/HR: 50 INJECTION INTRAVENOUS at 10:13

## 2021-10-15 RX ADMIN — PREDNISONE 60 MG: 20 TABLET ORAL at 13:57

## 2021-10-15 RX ADMIN — GLYCOPYRROLATE 2 MG: 1 TABLET ORAL at 20:10

## 2021-10-15 RX ADMIN — CALCIUM CHLORIDE, MAGNESIUM CHLORIDE, SODIUM CHLORIDE, SODIUM BICARBONATE, POTASSIUM CHLORIDE AND SODIUM PHOSPHATE DIBASIC DIHYDRATE 1000 ML/HR: 3.68; 3.05; 6.34; 3.09; .314; .187 INJECTION INTRAVENOUS at 02:36

## 2021-10-15 RX ADMIN — CALCIUM CHLORIDE, MAGNESIUM CHLORIDE, SODIUM CHLORIDE, SODIUM BICARBONATE, POTASSIUM CHLORIDE AND SODIUM PHOSPHATE DIBASIC DIHYDRATE 1100 ML/HR: 3.68; 3.05; 6.34; 3.09; .314; .187 INJECTION INTRAVENOUS at 23:20

## 2021-10-15 RX ADMIN — CEFEPIME HYDROCHLORIDE 2 G: 2 INJECTION, POWDER, FOR SOLUTION INTRAVENOUS at 04:07

## 2021-10-15 RX ADMIN — PHENYLEPHRINE HYDROCHLORIDE 200 MCG: 10 INJECTION, SOLUTION INTRAVENOUS at 14:46

## 2021-10-15 RX ADMIN — INSULIN DETEMIR 10 UNITS: 100 INJECTION, SOLUTION SUBCUTANEOUS at 20:10

## 2021-10-15 RX ADMIN — DEXMEDETOMIDINE HYDROCHLORIDE 0.3 MCG/KG/HR: 4 INJECTION, SOLUTION INTRAVENOUS at 04:39

## 2021-10-15 RX ADMIN — CALCIUM GLUCONATE 1 G: 98 INJECTION, SOLUTION INTRAVENOUS at 13:07

## 2021-10-15 RX ADMIN — IPRATROPIUM BROMIDE AND ALBUTEROL SULFATE 3 ML: 2.5; .5 SOLUTION RESPIRATORY (INHALATION) at 02:51

## 2021-10-15 RX ADMIN — PHENYLEPHRINE HYDROCHLORIDE 0.5 MCG/KG/MIN: 10 INJECTION INTRAVENOUS at 15:00

## 2021-10-15 RX ADMIN — SODIUM CHLORIDE: 9 INJECTION, SOLUTION INTRAVENOUS at 14:32

## 2021-10-15 RX ADMIN — SODIUM CHLORIDE 2000 ML: 9 INJECTION, SOLUTION INTRAVENOUS at 12:07

## 2021-10-15 RX ADMIN — DEXTROSE MONOHYDRATE 25 G: 500 INJECTION PARENTERAL at 05:18

## 2021-10-15 RX ADMIN — ROCURONIUM BROMIDE 50 MG: 10 INJECTION, SOLUTION INTRAVENOUS at 15:14

## 2021-10-15 RX ADMIN — IPRATROPIUM BROMIDE AND ALBUTEROL SULFATE 3 ML: 2.5; .5 SOLUTION RESPIRATORY (INHALATION) at 19:41

## 2021-10-15 RX ADMIN — Medication 1 TABLET: at 13:57

## 2021-10-15 RX ADMIN — ROCURONIUM BROMIDE 20 MG: 10 INJECTION, SOLUTION INTRAVENOUS at 14:48

## 2021-10-15 RX ADMIN — FAMOTIDINE 20 MG: 10 INJECTION, SOLUTION INTRAVENOUS at 08:00

## 2021-10-15 RX ADMIN — CALCIUM GLUCONATE 1 G: 98 INJECTION, SOLUTION INTRAVENOUS at 12:22

## 2021-10-15 RX ADMIN — CALCIUM GLUCONATE 1 G: 98 INJECTION, SOLUTION INTRAVENOUS at 12:36

## 2021-10-15 RX ADMIN — INSULIN HUMAN 3 UNITS: 100 INJECTION, SOLUTION PARENTERAL at 18:41

## 2021-10-15 RX ADMIN — INSULIN HUMAN 2 UNITS: 100 INJECTION, SOLUTION PARENTERAL at 23:18

## 2021-10-15 RX ADMIN — IPRATROPIUM BROMIDE AND ALBUTEROL SULFATE 3 ML: 2.5; .5 SOLUTION RESPIRATORY (INHALATION) at 12:21

## 2021-10-15 RX ADMIN — CHLORHEXIDINE GLUCONATE 15 ML: 1.2 SOLUTION ORAL at 08:00

## 2021-10-15 RX ADMIN — GLYCOPYRROLATE 2 MG: 1 TABLET ORAL at 08:00

## 2021-10-15 RX ADMIN — ATOVAQUONE 1500 MG: 750 SUSPENSION ORAL at 13:57

## 2021-10-15 RX ADMIN — CALCIUM CHLORIDE, MAGNESIUM CHLORIDE, SODIUM CHLORIDE, SODIUM BICARBONATE, POTASSIUM CHLORIDE AND SODIUM PHOSPHATE DIBASIC DIHYDRATE 1000 ML/HR: 3.68; 3.05; 6.34; 3.09; .314; .187 INJECTION INTRAVENOUS at 18:08

## 2021-10-15 RX ADMIN — CEFEPIME HYDROCHLORIDE 2 G: 2 INJECTION, POWDER, FOR SOLUTION INTRAVENOUS at 20:13

## 2021-10-15 RX ADMIN — ROCURONIUM BROMIDE 60 MG: 10 INJECTION, SOLUTION INTRAVENOUS at 14:35

## 2021-10-15 RX ADMIN — CEFEPIME HYDROCHLORIDE 2 G: 2 INJECTION, POWDER, FOR SOLUTION INTRAVENOUS at 13:57

## 2021-10-15 RX ADMIN — CALCIUM CHLORIDE, MAGNESIUM CHLORIDE, SODIUM CHLORIDE, SODIUM BICARBONATE, POTASSIUM CHLORIDE AND SODIUM PHOSPHATE DIBASIC DIHYDRATE 1000 ML/HR: 3.68; 3.05; 6.34; 3.09; .314; .187 INJECTION INTRAVENOUS at 18:57

## 2021-10-15 RX ADMIN — FENTANYL CITRATE 100 MCG/HR: 50 INJECTION INTRAVENOUS at 23:19

## 2021-10-15 RX ADMIN — CALCIUM GLUCONATE 1 G: 98 INJECTION, SOLUTION INTRAVENOUS at 13:29

## 2021-10-15 RX ADMIN — DEXTROSE MONOHYDRATE 50 ML/HR: 50 INJECTION, SOLUTION INTRAVENOUS at 10:19

## 2021-10-15 RX ADMIN — CALCIUM GLUCONATE 1 G: 98 INJECTION, SOLUTION INTRAVENOUS at 12:47

## 2021-10-15 RX ADMIN — IPRATROPIUM BROMIDE AND ALBUTEROL SULFATE 3 ML: 2.5; .5 SOLUTION RESPIRATORY (INHALATION) at 23:04

## 2021-10-15 RX ADMIN — CHLORHEXIDINE GLUCONATE 15 ML: 1.2 SOLUTION ORAL at 20:10

## 2021-10-15 RX ADMIN — Medication 75 MG: at 18:41

## 2021-10-15 RX ADMIN — DEXMEDETOMIDINE HYDROCHLORIDE 0.4 MCG/KG/HR: 4 INJECTION, SOLUTION INTRAVENOUS at 16:35

## 2021-10-15 RX ADMIN — GLYCOPYRROLATE 0.4 MCG: 0.2 INJECTION INTRAMUSCULAR; INTRAVENOUS at 14:51

## 2021-10-15 NOTE — ANESTHESIA PREPROCEDURE EVALUATION
Anesthesia Evaluation     Patient summary reviewed and Nursing notes reviewed                Airway   Comment: INTUBATED/VENTILATED  Dental - normal exam     Pulmonary - negative pulmonary ROS and normal exam     ROS comment: ACUTE RESPIRATORY FAILURE WITH HYPOXIA  Cardiovascular - normal exam    (+) hypertension,       Neuro/Psych- negative ROS  GI/Hepatic/Renal/Endo    (+)   renal disease (GOODPASTEUR'S SYNDROME),     Musculoskeletal (-) negative ROS    Abdominal  - normal exam    Bowel sounds: normal.   Substance History - negative use     OB/GYN negative ob/gyn ROS         Other                        Anesthesia Plan    ASA 4     general     intravenous induction     Anesthetic plan, all risks, benefits, and alternatives have been provided, discussed and informed consent has been obtained with: patient.    Plan discussed with CRNA.

## 2021-10-15 NOTE — ANESTHESIA POSTPROCEDURE EVALUATION
Patient: Keshav Dickens    Procedure Summary     Date: 10/15/21 Room / Location:  [facility] / [facility]    Anesthesia Start: 1432 Anesthesia Stop: 1556    Procedure: TRACHEOSTOMY AND PERCUTANEOUS ENDOSCOPIC GASTROSTOMY TUBE INSERTION (N/A ) Diagnosis:     Surgeons: Abdon Roberts MD Provider: Gustabo Santos MD    Anesthesia Type: general ASA Status: 4          Anesthesia Type: general    Vitals  Vitals Value Taken Time   BP 89/56 10/15/21 1555   Temp     Pulse 79 10/15/21 1555   Resp     SpO2 90 % 10/15/21 1555           Post Anesthesia Care and Evaluation    Patient location during evaluation: ICU  Airway patency: patent  Anesthetic complications: No anesthetic complications  PONV Status: none  Cardiovascular status: acceptable  Respiratory status: acceptable, trach and ventilator

## 2021-10-16 ENCOUNTER — APPOINTMENT (OUTPATIENT)
Dept: GENERAL RADIOLOGY | Facility: HOSPITAL | Age: 66
End: 2021-10-16

## 2021-10-16 ENCOUNTER — APPOINTMENT (OUTPATIENT)
Dept: NEPHROLOGY | Facility: HOSPITAL | Age: 66
End: 2021-10-16

## 2021-10-16 LAB
ALBUMIN SERPL-MCNC: 3.7 G/DL (ref 3.5–5.2)
ALBUMIN SERPL-MCNC: 3.7 G/DL (ref 3.5–5.2)
ALBUMIN SERPL-MCNC: 4 G/DL (ref 3.5–5.2)
ALBUMIN SERPL-MCNC: 4.3 G/DL (ref 3.5–5.2)
ALBUMIN/GLOB SERPL: 1.4 G/DL
ALP SERPL-CCNC: 71 U/L (ref 39–117)
ALT SERPL W P-5'-P-CCNC: 26 U/L (ref 1–41)
ANION GAP SERPL CALCULATED.3IONS-SCNC: 11 MMOL/L (ref 5–15)
ANION GAP SERPL CALCULATED.3IONS-SCNC: 13 MMOL/L (ref 5–15)
ANION GAP SERPL CALCULATED.3IONS-SCNC: 15 MMOL/L (ref 5–15)
ANION GAP SERPL CALCULATED.3IONS-SCNC: 16 MMOL/L (ref 5–15)
ARTERIAL PATENCY WRIST A: ABNORMAL
AST SERPL-CCNC: 27 U/L (ref 1–40)
ATMOSPHERIC PRESS: ABNORMAL MM[HG]
BASE EXCESS BLDA CALC-SCNC: 2 MMOL/L (ref 0–2)
BASOPHILS # BLD AUTO: 0.07 10*3/MM3 (ref 0–0.2)
BASOPHILS NFR BLD AUTO: 0.4 % (ref 0–1.5)
BDY SITE: ABNORMAL
BH BB BLOOD EXPIRATION DATE: NORMAL
BH BB BLOOD TYPE BARCODE: 600
BH BB BLOOD TYPE BARCODE: 6200
BH BB DISPENSE STATUS: NORMAL
BH BB PRODUCT CODE: NORMAL
BH BB UNIT NUMBER: NORMAL
BILIRUB SERPL-MCNC: 1.3 MG/DL (ref 0–1.2)
BODY TEMPERATURE: 37 C
BUN SERPL-MCNC: 42 MG/DL (ref 8–23)
BUN SERPL-MCNC: 42 MG/DL (ref 8–23)
BUN SERPL-MCNC: 44 MG/DL (ref 8–23)
BUN SERPL-MCNC: 45 MG/DL (ref 8–23)
BUN/CREAT SERPL: 15.9 (ref 7–25)
BUN/CREAT SERPL: 16.6 (ref 7–25)
BUN/CREAT SERPL: 17.5 (ref 7–25)
BUN/CREAT SERPL: 17.8 (ref 7–25)
CA-I SERPL ISE-MCNC: 1.12 MMOL/L (ref 1.12–1.32)
CA-I SERPL ISE-MCNC: 1.12 MMOL/L (ref 1.12–1.32)
CA-I SERPL ISE-MCNC: 1.19 MMOL/L (ref 1.12–1.32)
CA-I SERPL ISE-MCNC: 1.19 MMOL/L (ref 1.12–1.32)
CALCIUM SPEC-SCNC: 8.2 MG/DL (ref 8.6–10.5)
CALCIUM SPEC-SCNC: 8.3 MG/DL (ref 8.6–10.5)
CALCIUM SPEC-SCNC: 8.6 MG/DL (ref 8.6–10.5)
CALCIUM SPEC-SCNC: 9.5 MG/DL (ref 8.6–10.5)
CHLORIDE SERPL-SCNC: 102 MMOL/L (ref 98–107)
CHLORIDE SERPL-SCNC: 98 MMOL/L (ref 98–107)
CHLORIDE SERPL-SCNC: 98 MMOL/L (ref 98–107)
CHLORIDE SERPL-SCNC: 99 MMOL/L (ref 98–107)
CO2 BLDA-SCNC: 27.5 MMOL/L (ref 22–33)
CO2 SERPL-SCNC: 21 MMOL/L (ref 22–29)
CO2 SERPL-SCNC: 21 MMOL/L (ref 22–29)
CO2 SERPL-SCNC: 23 MMOL/L (ref 22–29)
CO2 SERPL-SCNC: 29 MMOL/L (ref 22–29)
COHGB MFR BLD: 1.7 % (ref 0–2)
CREAT SERPL-MCNC: 2.36 MG/DL (ref 0.76–1.27)
CREAT SERPL-MCNC: 2.51 MG/DL (ref 0.76–1.27)
CREAT SERPL-MCNC: 2.53 MG/DL (ref 0.76–1.27)
CREAT SERPL-MCNC: 2.83 MG/DL (ref 0.76–1.27)
DEPRECATED RDW RBC AUTO: 48.3 FL (ref 37–54)
EOSINOPHIL # BLD AUTO: 0.05 10*3/MM3 (ref 0–0.4)
EOSINOPHIL NFR BLD AUTO: 0.3 % (ref 0.3–6.2)
EPAP: 0
ERYTHROCYTE [DISTWIDTH] IN BLOOD BY AUTOMATED COUNT: 14.5 % (ref 12.3–15.4)
FERRITIN SERPL-MCNC: 535.7 NG/ML (ref 30–400)
FIBRINOGEN PPP-MCNC: 366 MG/DL (ref 220–470)
GFR SERPL CREATININE-BSD FRML MDRD: 22 ML/MIN/1.73
GFR SERPL CREATININE-BSD FRML MDRD: 25 ML/MIN/1.73
GFR SERPL CREATININE-BSD FRML MDRD: 26 ML/MIN/1.73
GFR SERPL CREATININE-BSD FRML MDRD: 28 ML/MIN/1.73
GLOBULIN UR ELPH-MCNC: 2.8 GM/DL
GLUCOSE BLDC GLUCOMTR-MCNC: 133 MG/DL (ref 70–130)
GLUCOSE BLDC GLUCOMTR-MCNC: 136 MG/DL (ref 70–130)
GLUCOSE BLDC GLUCOMTR-MCNC: 151 MG/DL (ref 70–130)
GLUCOSE BLDC GLUCOMTR-MCNC: 162 MG/DL (ref 70–130)
GLUCOSE SERPL-MCNC: 145 MG/DL (ref 65–99)
GLUCOSE SERPL-MCNC: 146 MG/DL (ref 65–99)
GLUCOSE SERPL-MCNC: 158 MG/DL (ref 65–99)
GLUCOSE SERPL-MCNC: 188 MG/DL (ref 65–99)
HCO3 BLDA-SCNC: 26.3 MMOL/L (ref 20–26)
HCT VFR BLD AUTO: 29.3 % (ref 37.5–51)
HCT VFR BLD CALC: 27.4 %
HGB BLD-MCNC: 9.6 G/DL (ref 13–17.7)
HGB BLDA-MCNC: 9 G/DL (ref 13.5–17.5)
IMM GRANULOCYTES # BLD AUTO: 0.22 10*3/MM3 (ref 0–0.05)
IMM GRANULOCYTES NFR BLD AUTO: 1.3 % (ref 0–0.5)
INHALED O2 CONCENTRATION: 50 %
IPAP: 0
IRON 24H UR-MRATE: 34 MCG/DL (ref 59–158)
IRON SATN MFR SERPL: 10 % (ref 20–50)
LYMPHOCYTES # BLD AUTO: 1.11 10*3/MM3 (ref 0.7–3.1)
LYMPHOCYTES NFR BLD AUTO: 6.4 % (ref 19.6–45.3)
MAGNESIUM SERPL-MCNC: 2.3 MG/DL (ref 1.6–2.4)
MAGNESIUM SERPL-MCNC: 2.3 MG/DL (ref 1.6–2.4)
MAGNESIUM SERPL-MCNC: 2.4 MG/DL (ref 1.6–2.4)
MCH RBC QN AUTO: 31.3 PG (ref 26.6–33)
MCHC RBC AUTO-ENTMCNC: 32.8 G/DL (ref 31.5–35.7)
MCV RBC AUTO: 95.4 FL (ref 79–97)
METHGB BLD QL: 0.9 % (ref 0–1.5)
MODALITY: ABNORMAL
MONOCYTES # BLD AUTO: 1.36 10*3/MM3 (ref 0.1–0.9)
MONOCYTES NFR BLD AUTO: 7.8 % (ref 5–12)
NEUTROPHILS NFR BLD AUTO: 14.56 10*3/MM3 (ref 1.7–7)
NEUTROPHILS NFR BLD AUTO: 83.8 % (ref 42.7–76)
NOTE: ABNORMAL
NRBC BLD AUTO-RTO: 0 /100 WBC (ref 0–0.2)
OXYHGB MFR BLDV: 81 % (ref 94–99)
PAW @ PEAK INSP FLOW SETTING VENT: 0 CMH2O
PCO2 BLDA: 38.6 MM HG (ref 35–45)
PCO2 TEMP ADJ BLD: 38.6 MM HG (ref 35–48)
PEEP RESPIRATORY: 10 CM[H2O]
PH BLDA: 7.44 PH UNITS (ref 7.35–7.45)
PH, TEMP CORRECTED: 7.44 PH UNITS
PHOSPHATE SERPL-MCNC: 5.4 MG/DL (ref 2.5–4.5)
PHOSPHATE SERPL-MCNC: 6.4 MG/DL (ref 2.5–4.5)
PHOSPHATE SERPL-MCNC: 6.4 MG/DL (ref 2.5–4.5)
PLATELET # BLD AUTO: 117 10*3/MM3 (ref 140–450)
PMV BLD AUTO: 13.3 FL (ref 6–12)
PO2 BLDA: 49.2 MM HG (ref 83–108)
PO2 TEMP ADJ BLD: 49.2 MM HG (ref 83–108)
POTASSIUM SERPL-SCNC: 3.8 MMOL/L (ref 3.5–5.2)
POTASSIUM SERPL-SCNC: 4.9 MMOL/L (ref 3.5–5.2)
POTASSIUM SERPL-SCNC: 5.1 MMOL/L (ref 3.5–5.2)
POTASSIUM SERPL-SCNC: 5.6 MMOL/L (ref 3.5–5.2)
PROT SERPL-MCNC: 6.8 G/DL (ref 6–8.5)
RBC # BLD AUTO: 3.07 10*6/MM3 (ref 4.14–5.8)
SODIUM SERPL-SCNC: 135 MMOL/L (ref 136–145)
SODIUM SERPL-SCNC: 135 MMOL/L (ref 136–145)
SODIUM SERPL-SCNC: 136 MMOL/L (ref 136–145)
SODIUM SERPL-SCNC: 140 MMOL/L (ref 136–145)
TIBC SERPL-MCNC: 352 MCG/DL (ref 298–536)
TOTAL RATE: 14 BREATHS/MINUTE
TRANSFERRIN SERPL-MCNC: 236 MG/DL (ref 200–360)
UNIT  ABO: NORMAL
UNIT  RH: NORMAL
VENTILATOR MODE: ABNORMAL
VIT B12 BLD-MCNC: 789 PG/ML (ref 211–946)
WBC # BLD AUTO: 17.37 10*3/MM3 (ref 3.4–10.8)

## 2021-10-16 PROCEDURE — 25010000002 CALCIUM GLUCONATE PER 10 ML: Performed by: SURGERY

## 2021-10-16 PROCEDURE — 82607 VITAMIN B-12: CPT | Performed by: SURGERY

## 2021-10-16 PROCEDURE — 84466 ASSAY OF TRANSFERRIN: CPT | Performed by: SURGERY

## 2021-10-16 PROCEDURE — 80053 COMPREHEN METABOLIC PANEL: CPT | Performed by: SURGERY

## 2021-10-16 PROCEDURE — 85384 FIBRINOGEN ACTIVITY: CPT | Performed by: SURGERY

## 2021-10-16 PROCEDURE — 87070 CULTURE OTHR SPECIMN AEROBIC: CPT | Performed by: INTERNAL MEDICINE

## 2021-10-16 PROCEDURE — 83050 HGB METHEMOGLOBIN QUAN: CPT

## 2021-10-16 PROCEDURE — 25010000002 METOCLOPRAMIDE PER 10 MG: Performed by: INTERNAL MEDICINE

## 2021-10-16 PROCEDURE — 99291 CRITICAL CARE FIRST HOUR: CPT | Performed by: INTERNAL MEDICINE

## 2021-10-16 PROCEDURE — 94799 UNLISTED PULMONARY SVC/PX: CPT

## 2021-10-16 PROCEDURE — 63710000001 INSULIN DETEMIR PER 5 UNITS: Performed by: INTERNAL MEDICINE

## 2021-10-16 PROCEDURE — 82962 GLUCOSE BLOOD TEST: CPT

## 2021-10-16 PROCEDURE — 25010000002 VANCOMYCIN 10 G RECONSTITUTED SOLUTION

## 2021-10-16 PROCEDURE — 25010000002 ONDANSETRON PER 1 MG: Performed by: INTERNAL MEDICINE

## 2021-10-16 PROCEDURE — 25010000002 METHYLNALTREXONE 12 MG/0.6ML SOLUTION: Performed by: INTERNAL MEDICINE

## 2021-10-16 PROCEDURE — 85025 COMPLETE CBC W/AUTO DIFF WBC: CPT | Performed by: SURGERY

## 2021-10-16 PROCEDURE — 25010000002 MICAFUNGIN SODIUM 100 MG RECONSTITUTED SOLUTION: Performed by: INTERNAL MEDICINE

## 2021-10-16 PROCEDURE — P9047 ALBUMIN (HUMAN), 25%, 50ML: HCPCS | Performed by: NURSE PRACTITIONER

## 2021-10-16 PROCEDURE — 36600 WITHDRAWAL OF ARTERIAL BLOOD: CPT

## 2021-10-16 PROCEDURE — 83540 ASSAY OF IRON: CPT | Performed by: SURGERY

## 2021-10-16 PROCEDURE — 25010000002 ALBUMIN HUMAN 25% PER 50 ML: Performed by: NURSE PRACTITIONER

## 2021-10-16 PROCEDURE — 25010000002 CYCLOPHOSPHAMIDE PER 100 MG: Performed by: SURGERY

## 2021-10-16 PROCEDURE — 25010000002 MEROPENEM PER 100 MG: Performed by: INTERNAL MEDICINE

## 2021-10-16 PROCEDURE — 63710000001 PREDNISONE PER 1 MG: Performed by: SURGERY

## 2021-10-16 PROCEDURE — 87205 SMEAR GRAM STAIN: CPT | Performed by: INTERNAL MEDICINE

## 2021-10-16 PROCEDURE — 25010000002 HEPARIN (PORCINE) PER 1000 UNITS: Performed by: SURGERY

## 2021-10-16 PROCEDURE — 82728 ASSAY OF FERRITIN: CPT | Performed by: SURGERY

## 2021-10-16 PROCEDURE — 71045 X-RAY EXAM CHEST 1 VIEW: CPT

## 2021-10-16 PROCEDURE — P9017 PLASMA 1 DONOR FRZ W/IN 8 HR: HCPCS

## 2021-10-16 PROCEDURE — 86927 PLASMA FRESH FROZEN: CPT

## 2021-10-16 PROCEDURE — 82330 ASSAY OF CALCIUM: CPT | Performed by: SURGERY

## 2021-10-16 PROCEDURE — 25010000002 CEFEPIME PER 500 MG: Performed by: SURGERY

## 2021-10-16 PROCEDURE — 80069 RENAL FUNCTION PANEL: CPT | Performed by: SURGERY

## 2021-10-16 PROCEDURE — 87040 BLOOD CULTURE FOR BACTERIA: CPT | Performed by: INTERNAL MEDICINE

## 2021-10-16 PROCEDURE — 82330 ASSAY OF CALCIUM: CPT | Performed by: INTERNAL MEDICINE

## 2021-10-16 PROCEDURE — 94003 VENT MGMT INPAT SUBQ DAY: CPT

## 2021-10-16 PROCEDURE — 82375 ASSAY CARBOXYHB QUANT: CPT

## 2021-10-16 PROCEDURE — 83735 ASSAY OF MAGNESIUM: CPT | Performed by: SURGERY

## 2021-10-16 PROCEDURE — 82805 BLOOD GASES W/O2 SATURATION: CPT

## 2021-10-16 RX ORDER — DIAZEPAM 5 MG/ML
10 INJECTION, SOLUTION INTRAMUSCULAR; INTRAVENOUS
Status: DISCONTINUED | OUTPATIENT
Start: 2021-10-16 | End: 2021-10-20

## 2021-10-16 RX ORDER — ALBUMIN (HUMAN) 12.5 G/50ML
50 SOLUTION INTRAVENOUS ONCE
Status: COMPLETED | OUTPATIENT
Start: 2021-10-16 | End: 2021-10-16

## 2021-10-16 RX ORDER — DIAZEPAM 5 MG/1
10 TABLET ORAL EVERY 6 HOURS
Status: DISCONTINUED | OUTPATIENT
Start: 2021-10-16 | End: 2021-10-18

## 2021-10-16 RX ORDER — ONDANSETRON 2 MG/ML
4 INJECTION INTRAMUSCULAR; INTRAVENOUS EVERY 6 HOURS PRN
Status: DISCONTINUED | OUTPATIENT
Start: 2021-10-16 | End: 2021-11-12 | Stop reason: HOSPADM

## 2021-10-16 RX ORDER — OXYCODONE HCL 5 MG/5 ML
10 SOLUTION, ORAL ORAL EVERY 6 HOURS
Status: DISCONTINUED | OUTPATIENT
Start: 2021-10-16 | End: 2021-10-20

## 2021-10-16 RX ORDER — METOCLOPRAMIDE HYDROCHLORIDE 5 MG/ML
10 INJECTION INTRAMUSCULAR; INTRAVENOUS EVERY 6 HOURS
Status: DISCONTINUED | OUTPATIENT
Start: 2021-10-16 | End: 2021-10-20

## 2021-10-16 RX ADMIN — CHLORHEXIDINE GLUCONATE 15 ML: 1.2 SOLUTION ORAL at 08:18

## 2021-10-16 RX ADMIN — ONDANSETRON 4 MG: 2 INJECTION INTRAMUSCULAR; INTRAVENOUS at 15:00

## 2021-10-16 RX ADMIN — CALCIUM GLUCONATE 1 G: 98 INJECTION, SOLUTION INTRAVENOUS at 14:23

## 2021-10-16 RX ADMIN — DEXMEDETOMIDINE HYDROCHLORIDE 0.2 MCG/KG/HR: 4 INJECTION, SOLUTION INTRAVENOUS at 04:20

## 2021-10-16 RX ADMIN — CALCIUM CHLORIDE, MAGNESIUM CHLORIDE, SODIUM CHLORIDE, SODIUM BICARBONATE, POTASSIUM CHLORIDE AND SODIUM PHOSPHATE DIBASIC DIHYDRATE 800 ML/HR: 3.68; 3.05; 6.34; 3.09; .314; .187 INJECTION INTRAVENOUS at 02:03

## 2021-10-16 RX ADMIN — MICAFUNGIN SODIUM 100 MG: 100 INJECTION, POWDER, LYOPHILIZED, FOR SOLUTION INTRAVENOUS at 12:26

## 2021-10-16 RX ADMIN — CALCIUM GLUCONATE 1 G: 98 INJECTION, SOLUTION INTRAVENOUS at 14:02

## 2021-10-16 RX ADMIN — CEFEPIME HYDROCHLORIDE 2 G: 2 INJECTION, POWDER, FOR SOLUTION INTRAVENOUS at 03:49

## 2021-10-16 RX ADMIN — CALCIUM CHLORIDE, MAGNESIUM CHLORIDE, SODIUM CHLORIDE, SODIUM BICARBONATE, POTASSIUM CHLORIDE AND SODIUM PHOSPHATE DIBASIC DIHYDRATE 1000 ML/HR: 3.68; 3.05; 6.34; 3.09; .314; .187 INJECTION INTRAVENOUS at 21:55

## 2021-10-16 RX ADMIN — METOCLOPRAMIDE 10 MG: 5 INJECTION, SOLUTION INTRAMUSCULAR; INTRAVENOUS at 10:34

## 2021-10-16 RX ADMIN — MEROPENEM 1 G: 1 INJECTION, POWDER, FOR SOLUTION INTRAVENOUS at 14:57

## 2021-10-16 RX ADMIN — CALCIUM GLUCONATE 1 G: 98 INJECTION, SOLUTION INTRAVENOUS at 13:04

## 2021-10-16 RX ADMIN — METOCLOPRAMIDE 10 MG: 5 INJECTION, SOLUTION INTRAMUSCULAR; INTRAVENOUS at 16:21

## 2021-10-16 RX ADMIN — METOCLOPRAMIDE 10 MG: 5 INJECTION, SOLUTION INTRAMUSCULAR; INTRAVENOUS at 21:33

## 2021-10-16 RX ADMIN — IPRATROPIUM BROMIDE AND ALBUTEROL SULFATE 3 ML: 2.5; .5 SOLUTION RESPIRATORY (INHALATION) at 23:22

## 2021-10-16 RX ADMIN — CHLORHEXIDINE GLUCONATE 15 ML: 1.2 SOLUTION ORAL at 20:30

## 2021-10-16 RX ADMIN — CALCIUM GLUCONATE 1 G: 98 INJECTION, SOLUTION INTRAVENOUS at 14:14

## 2021-10-16 RX ADMIN — CALCIUM CHLORIDE, MAGNESIUM CHLORIDE, SODIUM CHLORIDE, SODIUM BICARBONATE, POTASSIUM CHLORIDE AND SODIUM PHOSPHATE DIBASIC DIHYDRATE 800 ML/HR: 3.68; 3.05; 6.34; 3.09; .314; .187 INJECTION INTRAVENOUS at 09:18

## 2021-10-16 RX ADMIN — PREDNISONE 60 MG: 20 TABLET ORAL at 15:00

## 2021-10-16 RX ADMIN — METHYLNALTREXONE BROMIDE 12 MG: 12 INJECTION, SOLUTION SUBCUTANEOUS at 10:34

## 2021-10-16 RX ADMIN — IPRATROPIUM BROMIDE AND ALBUTEROL SULFATE 3 ML: 2.5; .5 SOLUTION RESPIRATORY (INHALATION) at 15:43

## 2021-10-16 RX ADMIN — IPRATROPIUM BROMIDE AND ALBUTEROL SULFATE 3 ML: 2.5; .5 SOLUTION RESPIRATORY (INHALATION) at 07:23

## 2021-10-16 RX ADMIN — VANCOMYCIN HYDROCHLORIDE 2000 MG: 10 INJECTION, POWDER, LYOPHILIZED, FOR SOLUTION INTRAVENOUS at 14:57

## 2021-10-16 RX ADMIN — CALCIUM CHLORIDE, MAGNESIUM CHLORIDE, SODIUM CHLORIDE, SODIUM BICARBONATE, POTASSIUM CHLORIDE AND SODIUM PHOSPHATE DIBASIC DIHYDRATE 1000 ML/HR: 3.68; 3.05; 6.34; 3.09; .314; .187 INJECTION INTRAVENOUS at 00:07

## 2021-10-16 RX ADMIN — Medication 75 MG: at 18:18

## 2021-10-16 RX ADMIN — CALCIUM CHLORIDE, MAGNESIUM CHLORIDE, SODIUM CHLORIDE, SODIUM BICARBONATE, POTASSIUM CHLORIDE AND SODIUM PHOSPHATE DIBASIC DIHYDRATE 1100 ML/HR: 3.68; 3.05; 6.34; 3.09; .314; .187 INJECTION INTRAVENOUS at 09:38

## 2021-10-16 RX ADMIN — IPRATROPIUM BROMIDE AND ALBUTEROL SULFATE 3 ML: 2.5; .5 SOLUTION RESPIRATORY (INHALATION) at 19:12

## 2021-10-16 RX ADMIN — DIAZEPAM 10 MG: 5 TABLET ORAL at 21:33

## 2021-10-16 RX ADMIN — DOCUSATE SODIUM 50 MG AND SENNOSIDES 8.6 MG 2 TABLET: 8.6; 5 TABLET, FILM COATED ORAL at 20:30

## 2021-10-16 RX ADMIN — OXYCODONE HYDROCHLORIDE 10 MG: 5 SOLUTION ORAL at 16:21

## 2021-10-16 RX ADMIN — CALCIUM GLUCONATE 1 G: 98 INJECTION, SOLUTION INTRAVENOUS at 13:29

## 2021-10-16 RX ADMIN — ALBUMIN HUMAN 50 G: 0.25 SOLUTION INTRAVENOUS at 06:51

## 2021-10-16 RX ADMIN — OXYCODONE HYDROCHLORIDE 10 MG: 5 SOLUTION ORAL at 21:33

## 2021-10-16 RX ADMIN — CALCIUM CHLORIDE, MAGNESIUM CHLORIDE, SODIUM CHLORIDE, SODIUM BICARBONATE, POTASSIUM CHLORIDE AND SODIUM PHOSPHATE DIBASIC DIHYDRATE 1100 ML/HR: 3.68; 3.05; 6.34; 3.09; .314; .187 INJECTION INTRAVENOUS at 04:47

## 2021-10-16 RX ADMIN — CALCIUM CHLORIDE, MAGNESIUM CHLORIDE, SODIUM CHLORIDE, SODIUM BICARBONATE, POTASSIUM CHLORIDE AND SODIUM PHOSPHATE DIBASIC DIHYDRATE 1100 ML/HR: 3.68; 3.05; 6.34; 3.09; .314; .187 INJECTION INTRAVENOUS at 19:59

## 2021-10-16 RX ADMIN — CALCIUM CHLORIDE, MAGNESIUM CHLORIDE, SODIUM CHLORIDE, SODIUM BICARBONATE, POTASSIUM CHLORIDE AND SODIUM PHOSPHATE DIBASIC DIHYDRATE 700 ML/HR: 3.68; 3.05; 6.34; 3.09; .314; .187 INJECTION INTRAVENOUS at 22:19

## 2021-10-16 RX ADMIN — CALCIUM GLUCONATE 1 G: 98 INJECTION, SOLUTION INTRAVENOUS at 13:42

## 2021-10-16 RX ADMIN — INSULIN DETEMIR 5 UNITS: 100 INJECTION, SOLUTION SUBCUTANEOUS at 20:29

## 2021-10-16 RX ADMIN — Medication 75 MG: at 18:17

## 2021-10-16 RX ADMIN — DIAZEPAM 10 MG: 5 TABLET ORAL at 16:21

## 2021-10-16 RX ADMIN — ATOVAQUONE 1500 MG: 750 SUSPENSION ORAL at 15:00

## 2021-10-16 RX ADMIN — IPRATROPIUM BROMIDE AND ALBUTEROL SULFATE 3 ML: 2.5; .5 SOLUTION RESPIRATORY (INHALATION) at 03:26

## 2021-10-16 RX ADMIN — CALCIUM CHLORIDE, MAGNESIUM CHLORIDE, SODIUM CHLORIDE, SODIUM BICARBONATE, POTASSIUM CHLORIDE AND SODIUM PHOSPHATE DIBASIC DIHYDRATE 1000 ML/HR: 3.68; 3.05; 6.34; 3.09; .314; .187 INJECTION INTRAVENOUS at 05:47

## 2021-10-16 RX ADMIN — CALCIUM CHLORIDE, MAGNESIUM CHLORIDE, SODIUM CHLORIDE, SODIUM BICARBONATE, POTASSIUM CHLORIDE AND SODIUM PHOSPHATE DIBASIC DIHYDRATE 1000 ML/HR: 3.68; 3.05; 6.34; 3.09; .314; .187 INJECTION INTRAVENOUS at 16:45

## 2021-10-16 RX ADMIN — ONDANSETRON 4 MG: 2 INJECTION INTRAMUSCULAR; INTRAVENOUS at 21:33

## 2021-10-16 RX ADMIN — FAMOTIDINE 20 MG: 10 INJECTION, SOLUTION INTRAVENOUS at 08:18

## 2021-10-16 RX ADMIN — HEPARIN SODIUM 1100 UNITS: 1000 INJECTION INTRAVENOUS; SUBCUTANEOUS at 10:19

## 2021-10-17 ENCOUNTER — APPOINTMENT (OUTPATIENT)
Dept: NEPHROLOGY | Facility: HOSPITAL | Age: 66
End: 2021-10-17

## 2021-10-17 ENCOUNTER — APPOINTMENT (OUTPATIENT)
Dept: GENERAL RADIOLOGY | Facility: HOSPITAL | Age: 66
End: 2021-10-17

## 2021-10-17 LAB
ABO GROUP BLD: NORMAL
ALBUMIN SERPL-MCNC: 3.7 G/DL (ref 3.5–5.2)
ALBUMIN SERPL-MCNC: 3.7 G/DL (ref 3.5–5.2)
ALBUMIN SERPL-MCNC: 3.8 G/DL (ref 3.5–5.2)
ALBUMIN SERPL-MCNC: 3.9 G/DL (ref 3.5–5.2)
ALBUMIN/GLOB SERPL: 1.6 G/DL
ALP SERPL-CCNC: 56 U/L (ref 39–117)
ALT SERPL W P-5'-P-CCNC: 21 U/L (ref 1–41)
ANION GAP SERPL CALCULATED.3IONS-SCNC: 13 MMOL/L (ref 5–15)
ANION GAP SERPL CALCULATED.3IONS-SCNC: 14 MMOL/L (ref 5–15)
ANION GAP SERPL CALCULATED.3IONS-SCNC: 14 MMOL/L (ref 5–15)
ANION GAP SERPL CALCULATED.3IONS-SCNC: 15 MMOL/L (ref 5–15)
ARTERIAL PATENCY WRIST A: ABNORMAL
AST SERPL-CCNC: 25 U/L (ref 1–40)
ATMOSPHERIC PRESS: ABNORMAL MM[HG]
BASE EXCESS BLDA CALC-SCNC: 2.2 MMOL/L (ref 0–2)
BASOPHILS # BLD AUTO: 0.05 10*3/MM3 (ref 0–0.2)
BASOPHILS NFR BLD AUTO: 0.3 % (ref 0–1.5)
BDY SITE: ABNORMAL
BH BB BLOOD EXPIRATION DATE: NORMAL
BH BB BLOOD TYPE BARCODE: 600
BH BB BLOOD TYPE BARCODE: 6200
BH BB DISPENSE STATUS: NORMAL
BH BB PRODUCT CODE: NORMAL
BH BB UNIT NUMBER: NORMAL
BILIRUB SERPL-MCNC: 1.6 MG/DL (ref 0–1.2)
BLD GP AB SCN SERPL QL: NEGATIVE
BODY TEMPERATURE: 37 C
BUN SERPL-MCNC: 34 MG/DL (ref 8–23)
BUN SERPL-MCNC: 35 MG/DL (ref 8–23)
BUN SERPL-MCNC: 39 MG/DL (ref 8–23)
BUN SERPL-MCNC: 39 MG/DL (ref 8–23)
BUN/CREAT SERPL: 14 (ref 7–25)
BUN/CREAT SERPL: 14.7 (ref 7–25)
BUN/CREAT SERPL: 15 (ref 7–25)
BUN/CREAT SERPL: 15.3 (ref 7–25)
CA-I SERPL ISE-MCNC: 1.14 MMOL/L (ref 1.12–1.32)
CA-I SERPL ISE-MCNC: 1.15 MMOL/L (ref 1.12–1.32)
CA-I SERPL ISE-MCNC: 1.19 MMOL/L (ref 1.12–1.32)
CALCIUM SPEC-SCNC: 8.2 MG/DL (ref 8.6–10.5)
CALCIUM SPEC-SCNC: 8.4 MG/DL (ref 8.6–10.5)
CALCIUM SPEC-SCNC: 8.5 MG/DL (ref 8.6–10.5)
CALCIUM SPEC-SCNC: 8.9 MG/DL (ref 8.6–10.5)
CHLORIDE SERPL-SCNC: 100 MMOL/L (ref 98–107)
CHLORIDE SERPL-SCNC: 101 MMOL/L (ref 98–107)
CHLORIDE SERPL-SCNC: 98 MMOL/L (ref 98–107)
CHLORIDE SERPL-SCNC: 99 MMOL/L (ref 98–107)
CO2 BLDA-SCNC: 27.6 MMOL/L (ref 22–33)
CO2 SERPL-SCNC: 22 MMOL/L (ref 22–29)
CO2 SERPL-SCNC: 24 MMOL/L (ref 22–29)
CO2 SERPL-SCNC: 24 MMOL/L (ref 22–29)
CO2 SERPL-SCNC: 25 MMOL/L (ref 22–29)
COHGB MFR BLD: 2.1 % (ref 0–2)
CREAT SERPL-MCNC: 2.32 MG/DL (ref 0.76–1.27)
CREAT SERPL-MCNC: 2.5 MG/DL (ref 0.76–1.27)
CREAT SERPL-MCNC: 2.55 MG/DL (ref 0.76–1.27)
CREAT SERPL-MCNC: 2.6 MG/DL (ref 0.76–1.27)
DEPRECATED RDW RBC AUTO: 48.2 FL (ref 37–54)
DEPRECATED RDW RBC AUTO: 50.9 FL (ref 37–54)
EOSINOPHIL # BLD AUTO: 0.02 10*3/MM3 (ref 0–0.4)
EOSINOPHIL NFR BLD AUTO: 0.1 % (ref 0.3–6.2)
EPAP: 0
ERYTHROCYTE [DISTWIDTH] IN BLOOD BY AUTOMATED COUNT: 14.6 % (ref 12.3–15.4)
ERYTHROCYTE [DISTWIDTH] IN BLOOD BY AUTOMATED COUNT: 14.7 % (ref 12.3–15.4)
FIBRINOGEN PPP-MCNC: 302 MG/DL (ref 220–470)
GFR SERPL CREATININE-BSD FRML MDRD: 25 ML/MIN/1.73
GFR SERPL CREATININE-BSD FRML MDRD: 25 ML/MIN/1.73
GFR SERPL CREATININE-BSD FRML MDRD: 26 ML/MIN/1.73
GFR SERPL CREATININE-BSD FRML MDRD: 28 ML/MIN/1.73
GLOBULIN UR ELPH-MCNC: 2.4 GM/DL
GLUCOSE BLDC GLUCOMTR-MCNC: 146 MG/DL (ref 70–130)
GLUCOSE BLDC GLUCOMTR-MCNC: 158 MG/DL (ref 70–130)
GLUCOSE BLDC GLUCOMTR-MCNC: 94 MG/DL (ref 70–130)
GLUCOSE BLDC GLUCOMTR-MCNC: 95 MG/DL (ref 70–130)
GLUCOSE SERPL-MCNC: 109 MG/DL (ref 65–99)
GLUCOSE SERPL-MCNC: 140 MG/DL (ref 65–99)
GLUCOSE SERPL-MCNC: 160 MG/DL (ref 65–99)
GLUCOSE SERPL-MCNC: 92 MG/DL (ref 65–99)
HCO3 BLDA-SCNC: 26.4 MMOL/L (ref 20–26)
HCT VFR BLD AUTO: 21.9 % (ref 37.5–51)
HCT VFR BLD AUTO: 22.2 % (ref 37.5–51)
HCT VFR BLD CALC: 21.1 %
HGB BLD-MCNC: 7 G/DL (ref 13–17.7)
HGB BLD-MCNC: 7.6 G/DL (ref 13–17.7)
HGB BLDA-MCNC: 6.9 G/DL (ref 13.5–17.5)
IMM GRANULOCYTES # BLD AUTO: 0.2 10*3/MM3 (ref 0–0.05)
IMM GRANULOCYTES NFR BLD AUTO: 1.3 % (ref 0–0.5)
INHALED O2 CONCENTRATION: 50 %
IPAP: 0
LYMPHOCYTES # BLD AUTO: 1.21 10*3/MM3 (ref 0.7–3.1)
LYMPHOCYTES NFR BLD AUTO: 7.7 % (ref 19.6–45.3)
MAGNESIUM SERPL-MCNC: 2.3 MG/DL (ref 1.6–2.4)
MAGNESIUM SERPL-MCNC: 2.5 MG/DL (ref 1.6–2.4)
MAGNESIUM SERPL-MCNC: 2.5 MG/DL (ref 1.6–2.4)
MCH RBC QN AUTO: 31 PG (ref 26.6–33)
MCH RBC QN AUTO: 31.3 PG (ref 26.6–33)
MCHC RBC AUTO-ENTMCNC: 32 G/DL (ref 31.5–35.7)
MCHC RBC AUTO-ENTMCNC: 34.2 G/DL (ref 31.5–35.7)
MCV RBC AUTO: 90.6 FL (ref 79–97)
MCV RBC AUTO: 97.8 FL (ref 79–97)
METHGB BLD QL: 1.2 % (ref 0–1.5)
MODALITY: ABNORMAL
MONOCYTES # BLD AUTO: 0.93 10*3/MM3 (ref 0.1–0.9)
MONOCYTES NFR BLD AUTO: 5.9 % (ref 5–12)
NEUTROPHILS NFR BLD AUTO: 13.33 10*3/MM3 (ref 1.7–7)
NEUTROPHILS NFR BLD AUTO: 84.7 % (ref 42.7–76)
NOTE: ABNORMAL
NRBC BLD AUTO-RTO: 0 /100 WBC (ref 0–0.2)
OXYHGB MFR BLDV: 94.1 % (ref 94–99)
PAW @ PEAK INSP FLOW SETTING VENT: 0 CMH2O
PCO2 BLDA: 37.9 MM HG (ref 35–45)
PCO2 TEMP ADJ BLD: 37.9 MM HG (ref 35–48)
PEEP RESPIRATORY: 5 CM[H2O]
PH BLDA: 7.45 PH UNITS (ref 7.35–7.45)
PH, TEMP CORRECTED: 7.45 PH UNITS
PHOSPHATE SERPL-MCNC: 5.1 MG/DL (ref 2.5–4.5)
PHOSPHATE SERPL-MCNC: 5.9 MG/DL (ref 2.5–4.5)
PHOSPHATE SERPL-MCNC: 6.4 MG/DL (ref 2.5–4.5)
PLATELET # BLD AUTO: 103 10*3/MM3 (ref 140–450)
PLATELET # BLD AUTO: 112 10*3/MM3 (ref 140–450)
PMV BLD AUTO: 12.1 FL (ref 6–12)
PMV BLD AUTO: 12.8 FL (ref 6–12)
PO2 BLDA: 84.1 MM HG (ref 83–108)
PO2 TEMP ADJ BLD: 84.1 MM HG (ref 83–108)
POTASSIUM SERPL-SCNC: 4.2 MMOL/L (ref 3.5–5.2)
POTASSIUM SERPL-SCNC: 4.3 MMOL/L (ref 3.5–5.2)
POTASSIUM SERPL-SCNC: 4.4 MMOL/L (ref 3.5–5.2)
POTASSIUM SERPL-SCNC: 4.7 MMOL/L (ref 3.5–5.2)
PROCALCITONIN SERPL-MCNC: 0.91 NG/ML (ref 0–0.25)
PROT SERPL-MCNC: 6.3 G/DL (ref 6–8.5)
PSV: 10 CMH2O
RBC # BLD AUTO: 2.24 10*6/MM3 (ref 4.14–5.8)
RBC # BLD AUTO: 2.45 10*6/MM3 (ref 4.14–5.8)
RH BLD: POSITIVE
SODIUM SERPL-SCNC: 137 MMOL/L (ref 136–145)
SODIUM SERPL-SCNC: 138 MMOL/L (ref 136–145)
T&S EXPIRATION DATE: NORMAL
TOTAL RATE: 0 BREATHS/MINUTE
UNIT  ABO: NORMAL
UNIT  RH: NORMAL
VANCOMYCIN SERPL-MCNC: 20.7 MCG/ML (ref 5–40)
VENTILATOR MODE: ABNORMAL
WBC # BLD AUTO: 11.81 10*3/MM3 (ref 3.4–10.8)
WBC # BLD AUTO: 15.74 10*3/MM3 (ref 3.4–10.8)

## 2021-10-17 PROCEDURE — 25010000002 MICAFUNGIN SODIUM 100 MG RECONSTITUTED SOLUTION: Performed by: INTERNAL MEDICINE

## 2021-10-17 PROCEDURE — 80053 COMPREHEN METABOLIC PANEL: CPT | Performed by: INTERNAL MEDICINE

## 2021-10-17 PROCEDURE — 86900 BLOOD TYPING SEROLOGIC ABO: CPT

## 2021-10-17 PROCEDURE — 86922 COMPATIBILITY TEST ANTIGLOB: CPT

## 2021-10-17 PROCEDURE — 63710000001 INSULIN REGULAR HUMAN PER 5 UNITS: Performed by: SURGERY

## 2021-10-17 PROCEDURE — 94003 VENT MGMT INPAT SUBQ DAY: CPT

## 2021-10-17 PROCEDURE — 82805 BLOOD GASES W/O2 SATURATION: CPT

## 2021-10-17 PROCEDURE — P9016 RBC LEUKOCYTES REDUCED: HCPCS

## 2021-10-17 PROCEDURE — 63710000001 PREDNISONE PER 1 MG: Performed by: SURGERY

## 2021-10-17 PROCEDURE — 86927 PLASMA FRESH FROZEN: CPT

## 2021-10-17 PROCEDURE — 25010000002 CYCLOPHOSPHAMIDE PER 100 MG: Performed by: SURGERY

## 2021-10-17 PROCEDURE — 82330 ASSAY OF CALCIUM: CPT | Performed by: SURGERY

## 2021-10-17 PROCEDURE — 85025 COMPLETE CBC W/AUTO DIFF WBC: CPT | Performed by: INTERNAL MEDICINE

## 2021-10-17 PROCEDURE — 80069 RENAL FUNCTION PANEL: CPT | Performed by: SURGERY

## 2021-10-17 PROCEDURE — 36430 TRANSFUSION BLD/BLD COMPNT: CPT

## 2021-10-17 PROCEDURE — 80202 ASSAY OF VANCOMYCIN: CPT

## 2021-10-17 PROCEDURE — 86900 BLOOD TYPING SEROLOGIC ABO: CPT | Performed by: INTERNAL MEDICINE

## 2021-10-17 PROCEDURE — 94799 UNLISTED PULMONARY SVC/PX: CPT

## 2021-10-17 PROCEDURE — 25010000002 CALCIUM GLUCONATE PER 10 ML: Performed by: SURGERY

## 2021-10-17 PROCEDURE — 36455 BLD EXCHANGE TRUJ OTH THN NB: CPT

## 2021-10-17 PROCEDURE — 25010000002 ONDANSETRON PER 1 MG: Performed by: INTERNAL MEDICINE

## 2021-10-17 PROCEDURE — 25010000002 MEROPENEM PER 100 MG: Performed by: INTERNAL MEDICINE

## 2021-10-17 PROCEDURE — 86901 BLOOD TYPING SEROLOGIC RH(D): CPT | Performed by: INTERNAL MEDICINE

## 2021-10-17 PROCEDURE — 82962 GLUCOSE BLOOD TEST: CPT

## 2021-10-17 PROCEDURE — 97110 THERAPEUTIC EXERCISES: CPT

## 2021-10-17 PROCEDURE — 86850 RBC ANTIBODY SCREEN: CPT | Performed by: INTERNAL MEDICINE

## 2021-10-17 PROCEDURE — 71045 X-RAY EXAM CHEST 1 VIEW: CPT

## 2021-10-17 PROCEDURE — P9017 PLASMA 1 DONOR FRZ W/IN 8 HR: HCPCS

## 2021-10-17 PROCEDURE — 36600 WITHDRAWAL OF ARTERIAL BLOOD: CPT

## 2021-10-17 PROCEDURE — 99291 CRITICAL CARE FIRST HOUR: CPT | Performed by: INTERNAL MEDICINE

## 2021-10-17 PROCEDURE — 86920 COMPATIBILITY TEST SPIN: CPT

## 2021-10-17 PROCEDURE — 25010000002 METOCLOPRAMIDE PER 10 MG: Performed by: INTERNAL MEDICINE

## 2021-10-17 PROCEDURE — 97162 PT EVAL MOD COMPLEX 30 MIN: CPT

## 2021-10-17 PROCEDURE — 83050 HGB METHEMOGLOBIN QUAN: CPT

## 2021-10-17 PROCEDURE — 85384 FIBRINOGEN ACTIVITY: CPT | Performed by: SURGERY

## 2021-10-17 PROCEDURE — 25010000002 VANCOMYCIN PER 500 MG

## 2021-10-17 PROCEDURE — 83516 IMMUNOASSAY NONANTIBODY: CPT | Performed by: INTERNAL MEDICINE

## 2021-10-17 PROCEDURE — 82375 ASSAY CARBOXYHB QUANT: CPT

## 2021-10-17 PROCEDURE — 84145 PROCALCITONIN (PCT): CPT | Performed by: INTERNAL MEDICINE

## 2021-10-17 PROCEDURE — 83735 ASSAY OF MAGNESIUM: CPT | Performed by: SURGERY

## 2021-10-17 PROCEDURE — 85027 COMPLETE CBC AUTOMATED: CPT | Performed by: INTERNAL MEDICINE

## 2021-10-17 PROCEDURE — 25010000002 DIAZEPAM PER 5 MG: Performed by: INTERNAL MEDICINE

## 2021-10-17 RX ORDER — L.ACID,PARA/B.BIFIDUM/S.THERM 8B CELL
1 CAPSULE ORAL DAILY
Status: DISCONTINUED | OUTPATIENT
Start: 2021-10-17 | End: 2021-11-08

## 2021-10-17 RX ORDER — VANCOMYCIN HYDROCHLORIDE 1 G/200ML
10 INJECTION, SOLUTION INTRAVENOUS ONCE
Status: COMPLETED | OUTPATIENT
Start: 2021-10-17 | End: 2021-10-17

## 2021-10-17 RX ADMIN — CALCIUM CHLORIDE, MAGNESIUM CHLORIDE, SODIUM CHLORIDE, SODIUM BICARBONATE, POTASSIUM CHLORIDE AND SODIUM PHOSPHATE DIBASIC DIHYDRATE 1100 ML/HR: 3.68; 3.05; 6.34; 3.09; .314; .187 INJECTION INTRAVENOUS at 05:57

## 2021-10-17 RX ADMIN — ATOVAQUONE 1500 MG: 750 SUSPENSION ORAL at 11:12

## 2021-10-17 RX ADMIN — DIAZEPAM 10 MG: 5 TABLET ORAL at 03:55

## 2021-10-17 RX ADMIN — FAMOTIDINE 20 MG: 10 INJECTION, SOLUTION INTRAVENOUS at 08:26

## 2021-10-17 RX ADMIN — CALCIUM GLUCONATE 1 G: 98 INJECTION, SOLUTION INTRAVENOUS at 13:54

## 2021-10-17 RX ADMIN — IPRATROPIUM BROMIDE AND ALBUTEROL SULFATE 3 ML: 2.5; .5 SOLUTION RESPIRATORY (INHALATION) at 19:04

## 2021-10-17 RX ADMIN — CALCIUM CHLORIDE, MAGNESIUM CHLORIDE, SODIUM CHLORIDE, SODIUM BICARBONATE, POTASSIUM CHLORIDE AND SODIUM PHOSPHATE DIBASIC DIHYDRATE 1000 ML/HR: 3.68; 3.05; 6.34; 3.09; .314; .187 INJECTION INTRAVENOUS at 03:55

## 2021-10-17 RX ADMIN — CALCIUM CHLORIDE, MAGNESIUM CHLORIDE, SODIUM CHLORIDE, SODIUM BICARBONATE, POTASSIUM CHLORIDE AND SODIUM PHOSPHATE DIBASIC DIHYDRATE 700 ML/HR: 3.68; 3.05; 6.34; 3.09; .314; .187 INJECTION INTRAVENOUS at 06:18

## 2021-10-17 RX ADMIN — CALCIUM CHLORIDE, MAGNESIUM CHLORIDE, SODIUM CHLORIDE, SODIUM BICARBONATE, POTASSIUM CHLORIDE AND SODIUM PHOSPHATE DIBASIC DIHYDRATE 1000 ML/HR: 3.68; 3.05; 6.34; 3.09; .314; .187 INJECTION INTRAVENOUS at 22:45

## 2021-10-17 RX ADMIN — Medication 75 MG: at 17:48

## 2021-10-17 RX ADMIN — CALCIUM GLUCONATE 1 G: 98 INJECTION, SOLUTION INTRAVENOUS at 12:58

## 2021-10-17 RX ADMIN — CALCIUM CHLORIDE, MAGNESIUM CHLORIDE, SODIUM CHLORIDE, SODIUM BICARBONATE, POTASSIUM CHLORIDE AND SODIUM PHOSPHATE DIBASIC DIHYDRATE 1100 ML/HR: 3.68; 3.05; 6.34; 3.09; .314; .187 INJECTION INTRAVENOUS at 23:43

## 2021-10-17 RX ADMIN — DOCUSATE SODIUM 50 MG AND SENNOSIDES 8.6 MG 2 TABLET: 8.6; 5 TABLET, FILM COATED ORAL at 08:26

## 2021-10-17 RX ADMIN — MICAFUNGIN SODIUM 100 MG: 100 INJECTION, POWDER, LYOPHILIZED, FOR SOLUTION INTRAVENOUS at 11:47

## 2021-10-17 RX ADMIN — CALCIUM GLUCONATE 1 G: 98 INJECTION, SOLUTION INTRAVENOUS at 13:42

## 2021-10-17 RX ADMIN — OXYCODONE HYDROCHLORIDE 5 MG: 5 SOLUTION ORAL at 11:12

## 2021-10-17 RX ADMIN — OXYCODONE HYDROCHLORIDE 10 MG: 5 SOLUTION ORAL at 21:52

## 2021-10-17 RX ADMIN — LABETALOL 20 MG/4 ML (5 MG/ML) INTRAVENOUS SYRINGE 20 MG: at 14:40

## 2021-10-17 RX ADMIN — MEROPENEM 1 G: 1 INJECTION, POWDER, FOR SOLUTION INTRAVENOUS at 00:01

## 2021-10-17 RX ADMIN — DIAZEPAM 10 MG: 5 INJECTION, SOLUTION INTRAMUSCULAR; INTRAVENOUS at 06:05

## 2021-10-17 RX ADMIN — IPRATROPIUM BROMIDE AND ALBUTEROL SULFATE 3 ML: 2.5; .5 SOLUTION RESPIRATORY (INHALATION) at 07:22

## 2021-10-17 RX ADMIN — METOCLOPRAMIDE 10 MG: 5 INJECTION, SOLUTION INTRAMUSCULAR; INTRAVENOUS at 17:21

## 2021-10-17 RX ADMIN — VANCOMYCIN HYDROCHLORIDE 1000 MG: 1 INJECTION, SOLUTION INTRAVENOUS at 10:38

## 2021-10-17 RX ADMIN — METOCLOPRAMIDE 10 MG: 5 INJECTION, SOLUTION INTRAMUSCULAR; INTRAVENOUS at 10:26

## 2021-10-17 RX ADMIN — PREDNISONE 60 MG: 20 TABLET ORAL at 14:37

## 2021-10-17 RX ADMIN — METOCLOPRAMIDE 10 MG: 5 INJECTION, SOLUTION INTRAMUSCULAR; INTRAVENOUS at 21:52

## 2021-10-17 RX ADMIN — OXYCODONE HYDROCHLORIDE 10 MG: 5 SOLUTION ORAL at 17:21

## 2021-10-17 RX ADMIN — CALCIUM CHLORIDE, MAGNESIUM CHLORIDE, SODIUM CHLORIDE, SODIUM BICARBONATE, POTASSIUM CHLORIDE AND SODIUM PHOSPHATE DIBASIC DIHYDRATE 600 ML/HR: 3.68; 3.05; 6.34; 3.09; .314; .187 INJECTION INTRAVENOUS at 18:05

## 2021-10-17 RX ADMIN — DIAZEPAM 10 MG: 5 TABLET ORAL at 10:26

## 2021-10-17 RX ADMIN — ONDANSETRON 4 MG: 2 INJECTION INTRAMUSCULAR; INTRAVENOUS at 08:26

## 2021-10-17 RX ADMIN — CHLORHEXIDINE GLUCONATE 15 ML: 1.2 SOLUTION ORAL at 08:26

## 2021-10-17 RX ADMIN — CHLORHEXIDINE GLUCONATE 15 ML: 1.2 SOLUTION ORAL at 20:12

## 2021-10-17 RX ADMIN — DIAZEPAM 10 MG: 5 TABLET ORAL at 21:52

## 2021-10-17 RX ADMIN — CALCIUM CHLORIDE, MAGNESIUM CHLORIDE, SODIUM CHLORIDE, SODIUM BICARBONATE, POTASSIUM CHLORIDE AND SODIUM PHOSPHATE DIBASIC DIHYDRATE 1100 ML/HR: 3.68; 3.05; 6.34; 3.09; .314; .187 INJECTION INTRAVENOUS at 11:10

## 2021-10-17 RX ADMIN — Medication 1 CAPSULE: at 11:10

## 2021-10-17 RX ADMIN — CALCIUM CHLORIDE, MAGNESIUM CHLORIDE, SODIUM CHLORIDE, SODIUM BICARBONATE, POTASSIUM CHLORIDE AND SODIUM PHOSPHATE DIBASIC DIHYDRATE 1000 ML/HR: 3.68; 3.05; 6.34; 3.09; .314; .187 INJECTION INTRAVENOUS at 17:47

## 2021-10-17 RX ADMIN — INSULIN HUMAN 2 UNITS: 100 INJECTION, SOLUTION PARENTERAL at 17:32

## 2021-10-17 RX ADMIN — LABETALOL 20 MG/4 ML (5 MG/ML) INTRAVENOUS SYRINGE 20 MG: at 17:21

## 2021-10-17 RX ADMIN — IPRATROPIUM BROMIDE AND ALBUTEROL SULFATE 3 ML: 2.5; .5 SOLUTION RESPIRATORY (INHALATION) at 04:06

## 2021-10-17 RX ADMIN — MEROPENEM 1 G: 1 INJECTION, POWDER, FOR SOLUTION INTRAVENOUS at 13:10

## 2021-10-17 RX ADMIN — CALCIUM GLUCONATE 1 G: 98 INJECTION, SOLUTION INTRAVENOUS at 13:22

## 2021-10-17 RX ADMIN — METOCLOPRAMIDE 10 MG: 5 INJECTION, SOLUTION INTRAMUSCULAR; INTRAVENOUS at 03:55

## 2021-10-17 RX ADMIN — CALCIUM CHLORIDE, MAGNESIUM CHLORIDE, SODIUM CHLORIDE, SODIUM BICARBONATE, POTASSIUM CHLORIDE AND SODIUM PHOSPHATE DIBASIC DIHYDRATE 1100 ML/HR: 3.68; 3.05; 6.34; 3.09; .314; .187 INJECTION INTRAVENOUS at 00:42

## 2021-10-17 RX ADMIN — OXYCODONE HYDROCHLORIDE 10 MG: 5 SOLUTION ORAL at 03:55

## 2021-10-17 RX ADMIN — OXYCODONE HYDROCHLORIDE 5 MG: 5 SOLUTION ORAL at 10:27

## 2021-10-17 RX ADMIN — DIAZEPAM 10 MG: 5 TABLET ORAL at 17:21

## 2021-10-17 RX ADMIN — DOCUSATE SODIUM 50 MG AND SENNOSIDES 8.6 MG 2 TABLET: 8.6; 5 TABLET, FILM COATED ORAL at 20:12

## 2021-10-17 RX ADMIN — MEROPENEM 1 G: 1 INJECTION, POWDER, FOR SOLUTION INTRAVENOUS at 23:43

## 2021-10-17 RX ADMIN — CALCIUM GLUCONATE 1 G: 98 INJECTION, SOLUTION INTRAVENOUS at 14:10

## 2021-10-17 RX ADMIN — POLYETHYLENE GLYCOL 3350 17 G: 17 POWDER, FOR SOLUTION ORAL at 08:26

## 2021-10-17 RX ADMIN — CALCIUM CHLORIDE, MAGNESIUM CHLORIDE, SODIUM CHLORIDE, SODIUM BICARBONATE, POTASSIUM CHLORIDE AND SODIUM PHOSPHATE DIBASIC DIHYDRATE 1100 ML/HR: 3.68; 3.05; 6.34; 3.09; .314; .187 INJECTION INTRAVENOUS at 19:28

## 2021-10-17 RX ADMIN — CALCIUM GLUCONATE 1 G: 98 INJECTION, SOLUTION INTRAVENOUS at 14:17

## 2021-10-17 RX ADMIN — IPRATROPIUM BROMIDE AND ALBUTEROL SULFATE 3 ML: 2.5; .5 SOLUTION RESPIRATORY (INHALATION) at 12:20

## 2021-10-17 RX ADMIN — IPRATROPIUM BROMIDE AND ALBUTEROL SULFATE 3 ML: 2.5; .5 SOLUTION RESPIRATORY (INHALATION) at 15:53

## 2021-10-17 RX ADMIN — Medication 1 TABLET: at 08:26

## 2021-10-18 ENCOUNTER — APPOINTMENT (OUTPATIENT)
Dept: GENERAL RADIOLOGY | Facility: HOSPITAL | Age: 66
End: 2021-10-18

## 2021-10-18 LAB
ALBUMIN SERPL-MCNC: 3.3 G/DL (ref 3.5–5.2)
ALBUMIN SERPL-MCNC: 3.9 G/DL (ref 3.5–5.2)
ALBUMIN SERPL-MCNC: 4.4 G/DL (ref 3.5–5.2)
ANION GAP SERPL CALCULATED.3IONS-SCNC: 12 MMOL/L (ref 5–15)
ANION GAP SERPL CALCULATED.3IONS-SCNC: 12 MMOL/L (ref 5–15)
ANION GAP SERPL CALCULATED.3IONS-SCNC: 13 MMOL/L (ref 5–15)
ANION GAP SERPL CALCULATED.3IONS-SCNC: 15 MMOL/L (ref 5–15)
BACTERIA SPEC RESP CULT: NORMAL
BH BB BLOOD EXPIRATION DATE: NORMAL
BH BB BLOOD TYPE BARCODE: 600
BH BB BLOOD TYPE BARCODE: 6200
BH BB DISPENSE STATUS: NORMAL
BH BB PRODUCT CODE: NORMAL
BH BB UNIT NUMBER: NORMAL
BUN SERPL-MCNC: 32 MG/DL (ref 8–23)
BUN SERPL-MCNC: 34 MG/DL (ref 8–23)
BUN SERPL-MCNC: 34 MG/DL (ref 8–23)
BUN SERPL-MCNC: 35 MG/DL (ref 8–23)
BUN/CREAT SERPL: 13.3 (ref 7–25)
BUN/CREAT SERPL: 14 (ref 7–25)
BUN/CREAT SERPL: 14.3 (ref 7–25)
BUN/CREAT SERPL: 14.8 (ref 7–25)
CA-I SERPL ISE-MCNC: 1.11 MMOL/L (ref 1.12–1.32)
CA-I SERPL ISE-MCNC: 1.16 MMOL/L (ref 1.12–1.32)
CA-I SERPL ISE-MCNC: 1.19 MMOL/L (ref 1.12–1.32)
CA-I SERPL ISE-MCNC: 1.2 MMOL/L (ref 1.12–1.32)
CALCIUM SPEC-SCNC: 7.5 MG/DL (ref 8.6–10.5)
CALCIUM SPEC-SCNC: 8.3 MG/DL (ref 8.6–10.5)
CALCIUM SPEC-SCNC: 8.4 MG/DL (ref 8.6–10.5)
CALCIUM SPEC-SCNC: 8.9 MG/DL (ref 8.6–10.5)
CHLORIDE SERPL-SCNC: 100 MMOL/L (ref 98–107)
CHLORIDE SERPL-SCNC: 100 MMOL/L (ref 98–107)
CHLORIDE SERPL-SCNC: 98 MMOL/L (ref 98–107)
CHLORIDE SERPL-SCNC: 98 MMOL/L (ref 98–107)
CO2 SERPL-SCNC: 21 MMOL/L (ref 22–29)
CO2 SERPL-SCNC: 22 MMOL/L (ref 22–29)
CO2 SERPL-SCNC: 25 MMOL/L (ref 22–29)
CO2 SERPL-SCNC: 25 MMOL/L (ref 22–29)
CREAT SERPL-MCNC: 2.29 MG/DL (ref 0.76–1.27)
CREAT SERPL-MCNC: 2.37 MG/DL (ref 0.76–1.27)
CREAT SERPL-MCNC: 2.38 MG/DL (ref 0.76–1.27)
CREAT SERPL-MCNC: 2.56 MG/DL (ref 0.76–1.27)
CROSSMATCH INTERPRETATION: NORMAL
DEPRECATED RDW RBC AUTO: 51.8 FL (ref 37–54)
ERYTHROCYTE [DISTWIDTH] IN BLOOD BY AUTOMATED COUNT: 15.3 % (ref 12.3–15.4)
FIBRINOGEN PPP-MCNC: 305 MG/DL (ref 220–470)
GBM IGG SER-ACNC: 29 UNITS (ref 0–20)
GFR SERPL CREATININE-BSD FRML MDRD: 25 ML/MIN/1.73
GFR SERPL CREATININE-BSD FRML MDRD: 27 ML/MIN/1.73
GFR SERPL CREATININE-BSD FRML MDRD: 27 ML/MIN/1.73
GFR SERPL CREATININE-BSD FRML MDRD: 29 ML/MIN/1.73
GLUCOSE BLDC GLUCOMTR-MCNC: 105 MG/DL (ref 70–130)
GLUCOSE BLDC GLUCOMTR-MCNC: 119 MG/DL (ref 70–130)
GLUCOSE BLDC GLUCOMTR-MCNC: 162 MG/DL (ref 70–130)
GLUCOSE BLDC GLUCOMTR-MCNC: 232 MG/DL (ref 70–130)
GLUCOSE SERPL-MCNC: 100 MG/DL (ref 65–99)
GLUCOSE SERPL-MCNC: 133 MG/DL (ref 65–99)
GLUCOSE SERPL-MCNC: 154 MG/DL (ref 65–99)
GLUCOSE SERPL-MCNC: 202 MG/DL (ref 65–99)
GRAM STN SPEC: NORMAL
HCT VFR BLD AUTO: 21.5 % (ref 37.5–51)
HGB BLD-MCNC: 7.1 G/DL (ref 13–17.7)
MAGNESIUM SERPL-MCNC: 2.4 MG/DL (ref 1.6–2.4)
MAGNESIUM SERPL-MCNC: 2.5 MG/DL (ref 1.6–2.4)
MAGNESIUM SERPL-MCNC: 2.7 MG/DL (ref 1.6–2.4)
MCH RBC QN AUTO: 31.3 PG (ref 26.6–33)
MCHC RBC AUTO-ENTMCNC: 33 G/DL (ref 31.5–35.7)
MCV RBC AUTO: 94.7 FL (ref 79–97)
PHOSPHATE SERPL-MCNC: 4.8 MG/DL (ref 2.5–4.5)
PHOSPHATE SERPL-MCNC: 5.2 MG/DL (ref 2.5–4.5)
PHOSPHATE SERPL-MCNC: 6.9 MG/DL (ref 2.5–4.5)
PLATELET # BLD AUTO: 135 10*3/MM3 (ref 140–450)
PMV BLD AUTO: 13.1 FL (ref 6–12)
POTASSIUM SERPL-SCNC: 4.5 MMOL/L (ref 3.5–5.2)
POTASSIUM SERPL-SCNC: 4.6 MMOL/L (ref 3.5–5.2)
POTASSIUM SERPL-SCNC: 4.7 MMOL/L (ref 3.5–5.2)
POTASSIUM SERPL-SCNC: 5.3 MMOL/L (ref 3.5–5.2)
RBC # BLD AUTO: 2.27 10*6/MM3 (ref 4.14–5.8)
SODIUM SERPL-SCNC: 134 MMOL/L (ref 136–145)
SODIUM SERPL-SCNC: 135 MMOL/L (ref 136–145)
SODIUM SERPL-SCNC: 135 MMOL/L (ref 136–145)
SODIUM SERPL-SCNC: 137 MMOL/L (ref 136–145)
UNIT  ABO: NORMAL
UNIT  RH: NORMAL
VANCOMYCIN SERPL-MCNC: 13.5 MCG/ML (ref 5–40)
WBC # BLD AUTO: 12.63 10*3/MM3 (ref 3.4–10.8)

## 2021-10-18 PROCEDURE — 82962 GLUCOSE BLOOD TEST: CPT

## 2021-10-18 PROCEDURE — 85384 FIBRINOGEN ACTIVITY: CPT | Performed by: SURGERY

## 2021-10-18 PROCEDURE — 94799 UNLISTED PULMONARY SVC/PX: CPT

## 2021-10-18 PROCEDURE — 36455 BLD EXCHANGE TRUJ OTH THN NB: CPT

## 2021-10-18 PROCEDURE — P9017 PLASMA 1 DONOR FRZ W/IN 8 HR: HCPCS

## 2021-10-18 PROCEDURE — 86927 PLASMA FRESH FROZEN: CPT

## 2021-10-18 PROCEDURE — 82330 ASSAY OF CALCIUM: CPT | Performed by: SURGERY

## 2021-10-18 PROCEDURE — 82330 ASSAY OF CALCIUM: CPT | Performed by: INTERNAL MEDICINE

## 2021-10-18 PROCEDURE — 25010000002 MEROPENEM PER 100 MG: Performed by: INTERNAL MEDICINE

## 2021-10-18 PROCEDURE — 80048 BASIC METABOLIC PNL TOTAL CA: CPT | Performed by: SURGERY

## 2021-10-18 PROCEDURE — 80202 ASSAY OF VANCOMYCIN: CPT

## 2021-10-18 PROCEDURE — 85027 COMPLETE CBC AUTOMATED: CPT | Performed by: SURGERY

## 2021-10-18 PROCEDURE — 99291 CRITICAL CARE FIRST HOUR: CPT | Performed by: INTERNAL MEDICINE

## 2021-10-18 PROCEDURE — 97110 THERAPEUTIC EXERCISES: CPT

## 2021-10-18 PROCEDURE — 80069 RENAL FUNCTION PANEL: CPT | Performed by: SURGERY

## 2021-10-18 PROCEDURE — 25010000002 VANCOMYCIN PER 500 MG

## 2021-10-18 PROCEDURE — 25010000002 MICAFUNGIN SODIUM 100 MG RECONSTITUTED SOLUTION: Performed by: INTERNAL MEDICINE

## 2021-10-18 PROCEDURE — 83735 ASSAY OF MAGNESIUM: CPT | Performed by: SURGERY

## 2021-10-18 PROCEDURE — 25010000002 METOCLOPRAMIDE PER 10 MG: Performed by: INTERNAL MEDICINE

## 2021-10-18 PROCEDURE — 63710000001 PREDNISONE PER 1 MG: Performed by: SURGERY

## 2021-10-18 PROCEDURE — 25010000002 CYCLOPHOSPHAMIDE PER 100 MG: Performed by: SURGERY

## 2021-10-18 PROCEDURE — 71045 X-RAY EXAM CHEST 1 VIEW: CPT

## 2021-10-18 PROCEDURE — 63710000001 INSULIN REGULAR HUMAN PER 5 UNITS: Performed by: SURGERY

## 2021-10-18 PROCEDURE — 039B3ZZ DRAINAGE OF RIGHT RADIAL ARTERY, PERCUTANEOUS APPROACH: ICD-10-PCS

## 2021-10-18 PROCEDURE — 94003 VENT MGMT INPAT SUBQ DAY: CPT

## 2021-10-18 PROCEDURE — 63710000001 INSULIN DETEMIR PER 5 UNITS: Performed by: INTERNAL MEDICINE

## 2021-10-18 PROCEDURE — 25010000002 CALCIUM GLUCONATE PER 10 ML: Performed by: SURGERY

## 2021-10-18 PROCEDURE — 25010000002 EPOETIN ALFA-EPBX 10000 UNIT/ML SOLUTION: Performed by: INTERNAL MEDICINE

## 2021-10-18 RX ORDER — VANCOMYCIN HYDROCHLORIDE 1 G/200ML
1000 INJECTION, SOLUTION INTRAVENOUS ONCE
Status: COMPLETED | OUTPATIENT
Start: 2021-10-18 | End: 2021-10-18

## 2021-10-18 RX ORDER — FERROUS SULFATE 300 MG/5ML
300 LIQUID (ML) ORAL DAILY
Status: DISCONTINUED | OUTPATIENT
Start: 2021-10-18 | End: 2021-10-20

## 2021-10-18 RX ORDER — DIAZEPAM 5 MG/1
10 TABLET ORAL EVERY 12 HOURS
Status: DISCONTINUED | OUTPATIENT
Start: 2021-10-21 | End: 2021-10-21

## 2021-10-18 RX ORDER — AMINO ACIDS/PROTEIN HYDROLYS 15G-100/30
30 LIQUID (ML) ORAL 3 TIMES DAILY
Status: DISCONTINUED | OUTPATIENT
Start: 2021-10-18 | End: 2021-11-01

## 2021-10-18 RX ORDER — DIAZEPAM 5 MG/1
10 TABLET ORAL EVERY 8 HOURS
Status: COMPLETED | OUTPATIENT
Start: 2021-10-18 | End: 2021-10-20

## 2021-10-18 RX ORDER — DIAZEPAM 5 MG/1
10 TABLET ORAL EVERY 24 HOURS
Status: DISCONTINUED | OUTPATIENT
Start: 2021-10-24 | End: 2021-10-21

## 2021-10-18 RX ADMIN — CALCIUM GLUCONATE 1 G: 98 INJECTION, SOLUTION INTRAVENOUS at 16:25

## 2021-10-18 RX ADMIN — METOCLOPRAMIDE 10 MG: 5 INJECTION, SOLUTION INTRAMUSCULAR; INTRAVENOUS at 22:21

## 2021-10-18 RX ADMIN — IPRATROPIUM BROMIDE AND ALBUTEROL SULFATE 3 ML: 2.5; .5 SOLUTION RESPIRATORY (INHALATION) at 03:35

## 2021-10-18 RX ADMIN — OXYCODONE HYDROCHLORIDE 10 MG: 5 SOLUTION ORAL at 22:21

## 2021-10-18 RX ADMIN — CALCIUM GLUCONATE 1 G: 98 INJECTION, SOLUTION INTRAVENOUS at 17:21

## 2021-10-18 RX ADMIN — CHLORHEXIDINE GLUCONATE 15 ML: 1.2 SOLUTION ORAL at 10:11

## 2021-10-18 RX ADMIN — METOCLOPRAMIDE 10 MG: 5 INJECTION, SOLUTION INTRAMUSCULAR; INTRAVENOUS at 04:00

## 2021-10-18 RX ADMIN — OXYCODONE HYDROCHLORIDE 10 MG: 5 SOLUTION ORAL at 03:59

## 2021-10-18 RX ADMIN — METOCLOPRAMIDE 10 MG: 5 INJECTION, SOLUTION INTRAMUSCULAR; INTRAVENOUS at 17:38

## 2021-10-18 RX ADMIN — OXYCODONE HYDROCHLORIDE 10 MG: 5 SOLUTION ORAL at 10:11

## 2021-10-18 RX ADMIN — CHLORHEXIDINE GLUCONATE 15 ML: 1.2 SOLUTION ORAL at 20:04

## 2021-10-18 RX ADMIN — CALCIUM CHLORIDE, MAGNESIUM CHLORIDE, SODIUM CHLORIDE, SODIUM BICARBONATE, POTASSIUM CHLORIDE AND SODIUM PHOSPHATE DIBASIC DIHYDRATE 1000 ML/HR: 3.68; 3.05; 6.34; 3.09; .314; .187 INJECTION INTRAVENOUS at 10:31

## 2021-10-18 RX ADMIN — INSULIN HUMAN 3 UNITS: 100 INJECTION, SOLUTION PARENTERAL at 18:54

## 2021-10-18 RX ADMIN — CALCIUM GLUCONATE 1 G: 98 INJECTION, SOLUTION INTRAVENOUS at 18:08

## 2021-10-18 RX ADMIN — Medication 75 MG: at 17:39

## 2021-10-18 RX ADMIN — Medication 1 CAPSULE: at 10:12

## 2021-10-18 RX ADMIN — IPRATROPIUM BROMIDE AND ALBUTEROL SULFATE 3 ML: 2.5; .5 SOLUTION RESPIRATORY (INHALATION) at 07:49

## 2021-10-18 RX ADMIN — EPOETIN ALFA-EPBX 10000 UNITS: 10000 INJECTION, SOLUTION INTRAVENOUS; SUBCUTANEOUS at 14:09

## 2021-10-18 RX ADMIN — DIAZEPAM 10 MG: 5 TABLET ORAL at 17:38

## 2021-10-18 RX ADMIN — MINERAL SUPPLEMENT IRON 300 MG / 5 ML STRENGTH LIQUID 100 PER BOX UNFLAVORED 300 MG: at 12:10

## 2021-10-18 RX ADMIN — MEROPENEM 1 G: 1 INJECTION, POWDER, FOR SOLUTION INTRAVENOUS at 11:32

## 2021-10-18 RX ADMIN — CALCIUM CHLORIDE, MAGNESIUM CHLORIDE, SODIUM CHLORIDE, SODIUM BICARBONATE, POTASSIUM CHLORIDE AND SODIUM PHOSPHATE DIBASIC DIHYDRATE 600 ML/HR: 3.68; 3.05; 6.34; 3.09; .314; .187 INJECTION INTRAVENOUS at 19:29

## 2021-10-18 RX ADMIN — VANCOMYCIN HYDROCHLORIDE 1000 MG: 1 INJECTION, SOLUTION INTRAVENOUS at 14:36

## 2021-10-18 RX ADMIN — ATOVAQUONE 1500 MG: 750 SUSPENSION ORAL at 11:32

## 2021-10-18 RX ADMIN — CALCIUM CHLORIDE, MAGNESIUM CHLORIDE, SODIUM CHLORIDE, SODIUM BICARBONATE, POTASSIUM CHLORIDE AND SODIUM PHOSPHATE DIBASIC DIHYDRATE 1000 ML/HR: 3.68; 3.05; 6.34; 3.09; .314; .187 INJECTION INTRAVENOUS at 19:29

## 2021-10-18 RX ADMIN — INSULIN DETEMIR 5 UNITS: 100 INJECTION, SOLUTION SUBCUTANEOUS at 10:12

## 2021-10-18 RX ADMIN — DIAZEPAM 10 MG: 5 TABLET ORAL at 10:35

## 2021-10-18 RX ADMIN — CALCIUM CHLORIDE, MAGNESIUM CHLORIDE, SODIUM CHLORIDE, SODIUM BICARBONATE, POTASSIUM CHLORIDE AND SODIUM PHOSPHATE DIBASIC DIHYDRATE 1100 ML/HR: 3.68; 3.05; 6.34; 3.09; .314; .187 INJECTION INTRAVENOUS at 05:22

## 2021-10-18 RX ADMIN — CALCIUM GLUCONATE 1 G: 98 INJECTION, SOLUTION INTRAVENOUS at 17:45

## 2021-10-18 RX ADMIN — IPRATROPIUM BROMIDE AND ALBUTEROL SULFATE 3 ML: 2.5; .5 SOLUTION RESPIRATORY (INHALATION) at 15:27

## 2021-10-18 RX ADMIN — PREDNISONE 60 MG: 20 TABLET ORAL at 14:10

## 2021-10-18 RX ADMIN — IPRATROPIUM BROMIDE AND ALBUTEROL SULFATE 3 ML: 2.5; .5 SOLUTION RESPIRATORY (INHALATION) at 00:01

## 2021-10-18 RX ADMIN — Medication 30 ML: at 20:04

## 2021-10-18 RX ADMIN — DIAZEPAM 10 MG: 5 TABLET ORAL at 04:00

## 2021-10-18 RX ADMIN — IPRATROPIUM BROMIDE AND ALBUTEROL SULFATE 3 ML: 2.5; .5 SOLUTION RESPIRATORY (INHALATION) at 19:03

## 2021-10-18 RX ADMIN — MICAFUNGIN SODIUM 100 MG: 100 INJECTION, POWDER, LYOPHILIZED, FOR SOLUTION INTRAVENOUS at 11:32

## 2021-10-18 RX ADMIN — CALCIUM GLUCONATE 1 G: 98 INJECTION, SOLUTION INTRAVENOUS at 18:21

## 2021-10-18 RX ADMIN — METOCLOPRAMIDE 10 MG: 5 INJECTION, SOLUTION INTRAMUSCULAR; INTRAVENOUS at 10:12

## 2021-10-18 RX ADMIN — INSULIN DETEMIR 5 UNITS: 100 INJECTION, SOLUTION SUBCUTANEOUS at 20:04

## 2021-10-18 RX ADMIN — CALCIUM CHLORIDE, MAGNESIUM CHLORIDE, SODIUM CHLORIDE, SODIUM BICARBONATE, POTASSIUM CHLORIDE AND SODIUM PHOSPHATE DIBASIC DIHYDRATE 1000 ML/HR: 3.68; 3.05; 6.34; 3.09; .314; .187 INJECTION INTRAVENOUS at 05:22

## 2021-10-18 RX ADMIN — OXYCODONE HYDROCHLORIDE 10 MG: 5 SOLUTION ORAL at 17:38

## 2021-10-18 RX ADMIN — Medication 1 TABLET: at 10:12

## 2021-10-18 RX ADMIN — Medication 75 MG: at 18:55

## 2021-10-18 RX ADMIN — IPRATROPIUM BROMIDE AND ALBUTEROL SULFATE 3 ML: 2.5; .5 SOLUTION RESPIRATORY (INHALATION) at 12:26

## 2021-10-18 RX ADMIN — FAMOTIDINE 20 MG: 10 INJECTION, SOLUTION INTRAVENOUS at 10:12

## 2021-10-18 RX ADMIN — CALCIUM CHLORIDE, MAGNESIUM CHLORIDE, SODIUM CHLORIDE, SODIUM BICARBONATE, POTASSIUM CHLORIDE AND SODIUM PHOSPHATE DIBASIC DIHYDRATE 1100 ML/HR: 3.68; 3.05; 6.34; 3.09; .314; .187 INJECTION INTRAVENOUS at 19:29

## 2021-10-18 RX ADMIN — CALCIUM GLUCONATE 1 G: 98 INJECTION, SOLUTION INTRAVENOUS at 16:00

## 2021-10-18 RX ADMIN — CALCIUM CHLORIDE, MAGNESIUM CHLORIDE, SODIUM CHLORIDE, SODIUM BICARBONATE, POTASSIUM CHLORIDE AND SODIUM PHOSPHATE DIBASIC DIHYDRATE 1100 ML/HR: 3.68; 3.05; 6.34; 3.09; .314; .187 INJECTION INTRAVENOUS at 10:24

## 2021-10-19 ENCOUNTER — APPOINTMENT (OUTPATIENT)
Dept: GENERAL RADIOLOGY | Facility: HOSPITAL | Age: 66
End: 2021-10-19

## 2021-10-19 PROBLEM — J18.9 SEPSIS DUE TO PNEUMONIA (HCC): Status: ACTIVE | Noted: 2021-10-19

## 2021-10-19 PROBLEM — A41.9 SEPSIS DUE TO PNEUMONIA (HCC): Status: ACTIVE | Noted: 2021-10-19

## 2021-10-19 PROBLEM — R50.9 FEVER: Status: RESOLVED | Noted: 2021-10-11 | Resolved: 2021-10-19

## 2021-10-19 PROBLEM — R04.89 DIFFUSE PULMONARY ALVEOLAR HEMORRHAGE: Status: RESOLVED | Noted: 2021-10-04 | Resolved: 2021-10-19

## 2021-10-19 PROBLEM — J96.90 RESPIRATORY FAILURE (HCC): Status: RESOLVED | Noted: 2021-10-04 | Resolved: 2021-10-19

## 2021-10-19 LAB
ALBUMIN SERPL-MCNC: 3.8 G/DL (ref 3.5–5.2)
ALBUMIN SERPL-MCNC: 4 G/DL (ref 3.5–5.2)
ALBUMIN SERPL-MCNC: 4 G/DL (ref 3.5–5.2)
ALP SERPL-CCNC: 60 U/L (ref 39–117)
ALT SERPL W P-5'-P-CCNC: 16 U/L (ref 1–41)
ANION GAP SERPL CALCULATED.3IONS-SCNC: 10 MMOL/L (ref 5–15)
ANION GAP SERPL CALCULATED.3IONS-SCNC: 13 MMOL/L (ref 5–15)
ANION GAP SERPL CALCULATED.3IONS-SCNC: 8 MMOL/L (ref 5–15)
ARTERIAL PATENCY WRIST A: ABNORMAL
AST SERPL-CCNC: 22 U/L (ref 1–40)
ATMOSPHERIC PRESS: ABNORMAL MM[HG]
BASE EXCESS BLDA CALC-SCNC: 1.1 MMOL/L (ref 0–2)
BDY SITE: ABNORMAL
BH BB BLOOD EXPIRATION DATE: NORMAL
BH BB BLOOD TYPE BARCODE: 600
BH BB BLOOD TYPE BARCODE: 6200
BH BB DISPENSE STATUS: NORMAL
BH BB PRODUCT CODE: NORMAL
BH BB UNIT NUMBER: NORMAL
BILIRUB SERPL-MCNC: 1.8 MG/DL (ref 0–1.2)
BODY TEMPERATURE: 37 C
BUN SERPL-MCNC: 32 MG/DL (ref 8–23)
BUN SERPL-MCNC: 32 MG/DL (ref 8–23)
BUN SERPL-MCNC: 37 MG/DL (ref 8–23)
BUN/CREAT SERPL: 14.9 (ref 7–25)
BUN/CREAT SERPL: 15.3 (ref 7–25)
CA-I SERPL ISE-MCNC: 1.17 MMOL/L (ref 1.12–1.32)
CA-I SERPL ISE-MCNC: 1.19 MMOL/L (ref 1.12–1.32)
CA-I SERPL ISE-MCNC: 1.21 MMOL/L (ref 1.12–1.32)
CALCIUM SPEC-SCNC: 8.2 MG/DL (ref 8.6–10.5)
CALCIUM SPEC-SCNC: 8.4 MG/DL (ref 8.6–10.5)
CALCIUM SPEC-SCNC: 8.6 MG/DL (ref 8.6–10.5)
CHLORIDE SERPL-SCNC: 100 MMOL/L (ref 98–107)
CHLORIDE SERPL-SCNC: 100 MMOL/L (ref 98–107)
CHLORIDE SERPL-SCNC: 102 MMOL/L (ref 98–107)
CHOLEST SERPL-MCNC: 138 MG/DL (ref 0–200)
CO2 BLDA-SCNC: 26.3 MMOL/L (ref 22–33)
CO2 SERPL-SCNC: 23 MMOL/L (ref 22–29)
CO2 SERPL-SCNC: 24 MMOL/L (ref 22–29)
CO2 SERPL-SCNC: 28 MMOL/L (ref 22–29)
COHGB MFR BLD: 2 % (ref 0–2)
CREAT SERPL-MCNC: 2.15 MG/DL (ref 0.76–1.27)
CREAT SERPL-MCNC: 2.2 MG/DL (ref 0.76–1.27)
CREAT SERPL-MCNC: 2.42 MG/DL (ref 0.76–1.27)
CRP SERPL-MCNC: 3.5 MG/DL (ref 0–0.5)
DEPRECATED RDW RBC AUTO: 50.4 FL (ref 37–54)
EPAP: 0
ERYTHROCYTE [DISTWIDTH] IN BLOOD BY AUTOMATED COUNT: 14.7 % (ref 12.3–15.4)
FIBRINOGEN PPP-MCNC: 243 MG/DL (ref 220–470)
GFR SERPL CREATININE-BSD FRML MDRD: 27 ML/MIN/1.73
GFR SERPL CREATININE-BSD FRML MDRD: 31 ML/MIN/1.73
GLUCOSE BLDC GLUCOMTR-MCNC: 103 MG/DL (ref 70–130)
GLUCOSE BLDC GLUCOMTR-MCNC: 135 MG/DL (ref 70–130)
GLUCOSE BLDC GLUCOMTR-MCNC: 206 MG/DL (ref 70–130)
GLUCOSE BLDC GLUCOMTR-MCNC: 209 MG/DL (ref 70–130)
GLUCOSE SERPL-MCNC: 100 MG/DL (ref 65–99)
GLUCOSE SERPL-MCNC: 152 MG/DL (ref 65–99)
GLUCOSE SERPL-MCNC: 179 MG/DL (ref 65–99)
HCO3 BLDA-SCNC: 25.2 MMOL/L (ref 20–26)
HCT VFR BLD AUTO: 17.1 % (ref 37.5–51)
HCT VFR BLD AUTO: 23.7 % (ref 37.5–51)
HCT VFR BLD CALC: 21.2 %
HGB BLD-MCNC: 5.6 G/DL (ref 13–17.7)
HGB BLD-MCNC: 7.7 G/DL (ref 13–17.7)
HGB BLDA-MCNC: 6.9 G/DL (ref 13.5–17.5)
INHALED O2 CONCENTRATION: 35 %
IPAP: 0
MAGNESIUM SERPL-MCNC: 2.5 MG/DL (ref 1.6–2.4)
MAGNESIUM SERPL-MCNC: 2.5 MG/DL (ref 1.6–2.4)
MAGNESIUM SERPL-MCNC: 2.6 MG/DL (ref 1.6–2.4)
MCH RBC QN AUTO: 30.8 PG (ref 26.6–33)
MCHC RBC AUTO-ENTMCNC: 32.7 G/DL (ref 31.5–35.7)
MCV RBC AUTO: 94 FL (ref 79–97)
METHGB BLD QL: -0.1 % (ref 0–1.5)
MODALITY: ABNORMAL
NOTE: ABNORMAL
OXYHGB MFR BLDV: 97.7 % (ref 94–99)
PAW @ PEAK INSP FLOW SETTING VENT: 0 CMH2O
PCO2 BLDA: 36.6 MM HG (ref 35–45)
PCO2 TEMP ADJ BLD: 36.6 MM HG (ref 35–48)
PH BLDA: 7.45 PH UNITS (ref 7.35–7.45)
PH, TEMP CORRECTED: 7.45 PH UNITS
PHOSPHATE SERPL-MCNC: 4 MG/DL (ref 2.5–4.5)
PHOSPHATE SERPL-MCNC: 4.7 MG/DL (ref 2.5–4.5)
PHOSPHATE SERPL-MCNC: 6 MG/DL (ref 2.5–4.5)
PLATELET # BLD AUTO: 123 10*3/MM3 (ref 140–450)
PMV BLD AUTO: 12.8 FL (ref 6–12)
PO2 BLDA: 116 MM HG (ref 83–108)
PO2 TEMP ADJ BLD: 116 MM HG (ref 83–108)
POTASSIUM SERPL-SCNC: 4.4 MMOL/L (ref 3.5–5.2)
POTASSIUM SERPL-SCNC: 4.5 MMOL/L (ref 3.5–5.2)
POTASSIUM SERPL-SCNC: 4.7 MMOL/L (ref 3.5–5.2)
PREALB SERPL-MCNC: 20.4 MG/DL (ref 20–40)
PROT SERPL-MCNC: 6.4 G/DL (ref 6–8.5)
RBC # BLD AUTO: 1.82 10*6/MM3 (ref 4.14–5.8)
SODIUM SERPL-SCNC: 134 MMOL/L (ref 136–145)
SODIUM SERPL-SCNC: 136 MMOL/L (ref 136–145)
SODIUM SERPL-SCNC: 138 MMOL/L (ref 136–145)
TOTAL RATE: 0 BREATHS/MINUTE
TRIGL SERPL-MCNC: 151 MG/DL (ref 0–150)
UNIT  ABO: NORMAL
UNIT  RH: NORMAL
WBC # BLD AUTO: 8.3 10*3/MM3 (ref 3.4–10.8)

## 2021-10-19 PROCEDURE — 63710000001 PREDNISONE PER 1 MG: Performed by: SURGERY

## 2021-10-19 PROCEDURE — 63710000001 INSULIN DETEMIR PER 5 UNITS: Performed by: INTERNAL MEDICINE

## 2021-10-19 PROCEDURE — 80069 RENAL FUNCTION PANEL: CPT | Performed by: SURGERY

## 2021-10-19 PROCEDURE — 84478 ASSAY OF TRIGLYCERIDES: CPT | Performed by: SURGERY

## 2021-10-19 PROCEDURE — 25010000002 VANCOMYCIN PER 500 MG

## 2021-10-19 PROCEDURE — 82962 GLUCOSE BLOOD TEST: CPT

## 2021-10-19 PROCEDURE — 86140 C-REACTIVE PROTEIN: CPT | Performed by: SURGERY

## 2021-10-19 PROCEDURE — 25010000002 METOCLOPRAMIDE PER 10 MG: Performed by: INTERNAL MEDICINE

## 2021-10-19 PROCEDURE — 71045 X-RAY EXAM CHEST 1 VIEW: CPT

## 2021-10-19 PROCEDURE — P9016 RBC LEUKOCYTES REDUCED: HCPCS

## 2021-10-19 PROCEDURE — 94799 UNLISTED PULMONARY SVC/PX: CPT

## 2021-10-19 PROCEDURE — 25010000002 DIAZEPAM PER 5 MG: Performed by: INTERNAL MEDICINE

## 2021-10-19 PROCEDURE — 83050 HGB METHEMOGLOBIN QUAN: CPT

## 2021-10-19 PROCEDURE — 86900 BLOOD TYPING SEROLOGIC ABO: CPT

## 2021-10-19 PROCEDURE — 85014 HEMATOCRIT: CPT | Performed by: INTERNAL MEDICINE

## 2021-10-19 PROCEDURE — 63710000001 INSULIN REGULAR HUMAN PER 5 UNITS: Performed by: SURGERY

## 2021-10-19 PROCEDURE — 36600 WITHDRAWAL OF ARTERIAL BLOOD: CPT

## 2021-10-19 PROCEDURE — 82375 ASSAY CARBOXYHB QUANT: CPT

## 2021-10-19 PROCEDURE — 25010000002 MEROPENEM PER 100 MG: Performed by: INTERNAL MEDICINE

## 2021-10-19 PROCEDURE — 84134 ASSAY OF PREALBUMIN: CPT | Performed by: SURGERY

## 2021-10-19 PROCEDURE — 94003 VENT MGMT INPAT SUBQ DAY: CPT

## 2021-10-19 PROCEDURE — 85018 HEMOGLOBIN: CPT | Performed by: INTERNAL MEDICINE

## 2021-10-19 PROCEDURE — 82465 ASSAY BLD/SERUM CHOLESTEROL: CPT | Performed by: SURGERY

## 2021-10-19 PROCEDURE — 84100 ASSAY OF PHOSPHORUS: CPT | Performed by: SURGERY

## 2021-10-19 PROCEDURE — 80053 COMPREHEN METABOLIC PANEL: CPT | Performed by: SURGERY

## 2021-10-19 PROCEDURE — 99291 CRITICAL CARE FIRST HOUR: CPT | Performed by: INTERNAL MEDICINE

## 2021-10-19 PROCEDURE — 25010000002 MICAFUNGIN SODIUM 100 MG RECONSTITUTED SOLUTION: Performed by: INTERNAL MEDICINE

## 2021-10-19 PROCEDURE — 83735 ASSAY OF MAGNESIUM: CPT | Performed by: SURGERY

## 2021-10-19 PROCEDURE — 82330 ASSAY OF CALCIUM: CPT | Performed by: SURGERY

## 2021-10-19 PROCEDURE — 25010000002 CYCLOPHOSPHAMIDE PER 100 MG: Performed by: SURGERY

## 2021-10-19 PROCEDURE — 82805 BLOOD GASES W/O2 SATURATION: CPT

## 2021-10-19 RX ORDER — VANCOMYCIN HYDROCHLORIDE 1 G/200ML
1000 INJECTION, SOLUTION INTRAVENOUS EVERY 24 HOURS
Status: DISCONTINUED | OUTPATIENT
Start: 2021-10-19 | End: 2021-10-19

## 2021-10-19 RX ADMIN — DIAZEPAM 10 MG: 5 TABLET ORAL at 09:18

## 2021-10-19 RX ADMIN — CALCIUM CHLORIDE, MAGNESIUM CHLORIDE, SODIUM CHLORIDE, SODIUM BICARBONATE, POTASSIUM CHLORIDE AND SODIUM PHOSPHATE DIBASIC DIHYDRATE 600 ML/HR: 3.68; 3.05; 6.34; 3.09; .314; .187 INJECTION INTRAVENOUS at 19:59

## 2021-10-19 RX ADMIN — IPRATROPIUM BROMIDE AND ALBUTEROL SULFATE 3 ML: 2.5; .5 SOLUTION RESPIRATORY (INHALATION) at 15:07

## 2021-10-19 RX ADMIN — CALCIUM CHLORIDE, MAGNESIUM CHLORIDE, SODIUM CHLORIDE, SODIUM BICARBONATE, POTASSIUM CHLORIDE AND SODIUM PHOSPHATE DIBASIC DIHYDRATE 1100 ML/HR: 3.68; 3.05; 6.34; 3.09; .314; .187 INJECTION INTRAVENOUS at 04:54

## 2021-10-19 RX ADMIN — MEROPENEM 1 G: 1 INJECTION, POWDER, FOR SOLUTION INTRAVENOUS at 00:04

## 2021-10-19 RX ADMIN — Medication 75 MG: at 18:38

## 2021-10-19 RX ADMIN — MICAFUNGIN SODIUM 100 MG: 100 INJECTION, POWDER, LYOPHILIZED, FOR SOLUTION INTRAVENOUS at 11:16

## 2021-10-19 RX ADMIN — Medication 1 CAPSULE: at 09:28

## 2021-10-19 RX ADMIN — CALCIUM CHLORIDE, MAGNESIUM CHLORIDE, SODIUM CHLORIDE, SODIUM BICARBONATE, POTASSIUM CHLORIDE AND SODIUM PHOSPHATE DIBASIC DIHYDRATE 1100 ML/HR: 3.68; 3.05; 6.34; 3.09; .314; .187 INJECTION INTRAVENOUS at 14:50

## 2021-10-19 RX ADMIN — DOCUSATE SODIUM 50 MG AND SENNOSIDES 8.6 MG 2 TABLET: 8.6; 5 TABLET, FILM COATED ORAL at 20:45

## 2021-10-19 RX ADMIN — CALCIUM CHLORIDE, MAGNESIUM CHLORIDE, SODIUM CHLORIDE, SODIUM BICARBONATE, POTASSIUM CHLORIDE AND SODIUM PHOSPHATE DIBASIC DIHYDRATE 1100 ML/HR: 3.68; 3.05; 6.34; 3.09; .314; .187 INJECTION INTRAVENOUS at 10:04

## 2021-10-19 RX ADMIN — DIAZEPAM 5 MG: 5 INJECTION, SOLUTION INTRAMUSCULAR; INTRAVENOUS at 21:09

## 2021-10-19 RX ADMIN — METOCLOPRAMIDE 10 MG: 5 INJECTION, SOLUTION INTRAMUSCULAR; INTRAVENOUS at 10:15

## 2021-10-19 RX ADMIN — MINERAL SUPPLEMENT IRON 300 MG / 5 ML STRENGTH LIQUID 100 PER BOX UNFLAVORED 300 MG: at 09:18

## 2021-10-19 RX ADMIN — IPRATROPIUM BROMIDE AND ALBUTEROL SULFATE 3 ML: 2.5; .5 SOLUTION RESPIRATORY (INHALATION) at 22:36

## 2021-10-19 RX ADMIN — MEROPENEM 1 G: 1 INJECTION, POWDER, FOR SOLUTION INTRAVENOUS at 12:38

## 2021-10-19 RX ADMIN — Medication 30 ML: at 15:01

## 2021-10-19 RX ADMIN — FAMOTIDINE 20 MG: 10 INJECTION, SOLUTION INTRAVENOUS at 09:18

## 2021-10-19 RX ADMIN — CALCIUM CHLORIDE, MAGNESIUM CHLORIDE, SODIUM CHLORIDE, SODIUM BICARBONATE, POTASSIUM CHLORIDE AND SODIUM PHOSPHATE DIBASIC DIHYDRATE 600 ML/HR: 3.68; 3.05; 6.34; 3.09; .314; .187 INJECTION INTRAVENOUS at 04:05

## 2021-10-19 RX ADMIN — CALCIUM CHLORIDE, MAGNESIUM CHLORIDE, SODIUM CHLORIDE, SODIUM BICARBONATE, POTASSIUM CHLORIDE AND SODIUM PHOSPHATE DIBASIC DIHYDRATE 600 ML/HR: 3.68; 3.05; 6.34; 3.09; .314; .187 INJECTION INTRAVENOUS at 13:15

## 2021-10-19 RX ADMIN — IPRATROPIUM BROMIDE AND ALBUTEROL SULFATE 3 ML: 2.5; .5 SOLUTION RESPIRATORY (INHALATION) at 08:03

## 2021-10-19 RX ADMIN — DIAZEPAM 10 MG: 5 TABLET ORAL at 02:03

## 2021-10-19 RX ADMIN — CALCIUM CHLORIDE, MAGNESIUM CHLORIDE, SODIUM CHLORIDE, SODIUM BICARBONATE, POTASSIUM CHLORIDE AND SODIUM PHOSPHATE DIBASIC DIHYDRATE 1100 ML/HR: 3.68; 3.05; 6.34; 3.09; .314; .187 INJECTION INTRAVENOUS at 19:59

## 2021-10-19 RX ADMIN — ATOVAQUONE 1500 MG: 750 SUSPENSION ORAL at 13:31

## 2021-10-19 RX ADMIN — METOCLOPRAMIDE 10 MG: 5 INJECTION, SOLUTION INTRAMUSCULAR; INTRAVENOUS at 04:05

## 2021-10-19 RX ADMIN — INSULIN DETEMIR 5 UNITS: 100 INJECTION, SOLUTION SUBCUTANEOUS at 20:45

## 2021-10-19 RX ADMIN — CALCIUM CHLORIDE, MAGNESIUM CHLORIDE, SODIUM CHLORIDE, SODIUM BICARBONATE, POTASSIUM CHLORIDE AND SODIUM PHOSPHATE DIBASIC DIHYDRATE 1000 ML/HR: 3.68; 3.05; 6.34; 3.09; .314; .187 INJECTION INTRAVENOUS at 11:35

## 2021-10-19 RX ADMIN — Medication 30 ML: at 20:48

## 2021-10-19 RX ADMIN — VANCOMYCIN HYDROCHLORIDE 1000 MG: 1 INJECTION, SOLUTION INTRAVENOUS at 13:32

## 2021-10-19 RX ADMIN — DIAZEPAM 10 MG: 5 TABLET ORAL at 17:23

## 2021-10-19 RX ADMIN — Medication 75 MG: at 17:24

## 2021-10-19 RX ADMIN — CALCIUM CHLORIDE, MAGNESIUM CHLORIDE, SODIUM CHLORIDE, SODIUM BICARBONATE, POTASSIUM CHLORIDE AND SODIUM PHOSPHATE DIBASIC DIHYDRATE 1100 ML/HR: 3.68; 3.05; 6.34; 3.09; .314; .187 INJECTION INTRAVENOUS at 00:04

## 2021-10-19 RX ADMIN — CHLORHEXIDINE GLUCONATE 15 ML: 1.2 SOLUTION ORAL at 09:18

## 2021-10-19 RX ADMIN — CALCIUM CHLORIDE, MAGNESIUM CHLORIDE, SODIUM CHLORIDE, SODIUM BICARBONATE, POTASSIUM CHLORIDE AND SODIUM PHOSPHATE DIBASIC DIHYDRATE 1000 ML/HR: 3.68; 3.05; 6.34; 3.09; .314; .187 INJECTION INTRAVENOUS at 05:48

## 2021-10-19 RX ADMIN — Medication 30 ML: at 09:33

## 2021-10-19 RX ADMIN — INSULIN HUMAN 2 UNITS: 100 INJECTION, SOLUTION PARENTERAL at 00:04

## 2021-10-19 RX ADMIN — OXYCODONE HYDROCHLORIDE 10 MG: 5 SOLUTION ORAL at 10:15

## 2021-10-19 RX ADMIN — CALCIUM CHLORIDE, MAGNESIUM CHLORIDE, SODIUM CHLORIDE, SODIUM BICARBONATE, POTASSIUM CHLORIDE AND SODIUM PHOSPHATE DIBASIC DIHYDRATE 1000 ML/HR: 3.68; 3.05; 6.34; 3.09; .314; .187 INJECTION INTRAVENOUS at 17:13

## 2021-10-19 RX ADMIN — Medication 1 TABLET: at 09:18

## 2021-10-19 RX ADMIN — INSULIN HUMAN 3 UNITS: 100 INJECTION, SOLUTION PARENTERAL at 18:06

## 2021-10-19 RX ADMIN — IPRATROPIUM BROMIDE AND ALBUTEROL SULFATE 3 ML: 2.5; .5 SOLUTION RESPIRATORY (INHALATION) at 12:32

## 2021-10-19 RX ADMIN — OXYCODONE HYDROCHLORIDE 10 MG: 5 SOLUTION ORAL at 04:05

## 2021-10-19 RX ADMIN — IPRATROPIUM BROMIDE AND ALBUTEROL SULFATE 3 ML: 2.5; .5 SOLUTION RESPIRATORY (INHALATION) at 03:35

## 2021-10-19 RX ADMIN — CALCIUM CHLORIDE, MAGNESIUM CHLORIDE, SODIUM CHLORIDE, SODIUM BICARBONATE, POTASSIUM CHLORIDE AND SODIUM PHOSPHATE DIBASIC DIHYDRATE 1000 ML/HR: 3.68; 3.05; 6.34; 3.09; .314; .187 INJECTION INTRAVENOUS at 00:40

## 2021-10-19 RX ADMIN — METOCLOPRAMIDE 10 MG: 5 INJECTION, SOLUTION INTRAMUSCULAR; INTRAVENOUS at 22:15

## 2021-10-19 RX ADMIN — INSULIN DETEMIR 5 UNITS: 100 INJECTION, SOLUTION SUBCUTANEOUS at 09:18

## 2021-10-19 RX ADMIN — CHLORHEXIDINE GLUCONATE 15 ML: 1.2 SOLUTION ORAL at 20:45

## 2021-10-19 RX ADMIN — METOCLOPRAMIDE 10 MG: 5 INJECTION, SOLUTION INTRAMUSCULAR; INTRAVENOUS at 17:23

## 2021-10-19 RX ADMIN — OXYCODONE HYDROCHLORIDE 10 MG: 5 SOLUTION ORAL at 17:24

## 2021-10-19 RX ADMIN — OXYCODONE HYDROCHLORIDE 10 MG: 5 SOLUTION ORAL at 22:14

## 2021-10-19 RX ADMIN — IPRATROPIUM BROMIDE AND ALBUTEROL SULFATE 3 ML: 2.5; .5 SOLUTION RESPIRATORY (INHALATION) at 00:17

## 2021-10-19 RX ADMIN — PREDNISONE 60 MG: 20 TABLET ORAL at 14:52

## 2021-10-20 ENCOUNTER — APPOINTMENT (OUTPATIENT)
Dept: GENERAL RADIOLOGY | Facility: HOSPITAL | Age: 66
End: 2021-10-20

## 2021-10-20 LAB
ALBUMIN SERPL-MCNC: 3.6 G/DL (ref 3.5–5.2)
ALBUMIN SERPL-MCNC: 3.9 G/DL (ref 3.5–5.2)
ALBUMIN SERPL-MCNC: 4 G/DL (ref 3.5–5.2)
ANION GAP SERPL CALCULATED.3IONS-SCNC: 10 MMOL/L (ref 5–15)
ANION GAP SERPL CALCULATED.3IONS-SCNC: 11 MMOL/L (ref 5–15)
ANION GAP SERPL CALCULATED.3IONS-SCNC: 11 MMOL/L (ref 5–15)
ANION GAP SERPL CALCULATED.3IONS-SCNC: 15 MMOL/L (ref 5–15)
BH BB BLOOD EXPIRATION DATE: NORMAL
BH BB BLOOD TYPE BARCODE: 6200
BH BB DISPENSE STATUS: NORMAL
BH BB PRODUCT CODE: NORMAL
BH BB UNIT NUMBER: NORMAL
BUN SERPL-MCNC: 34 MG/DL (ref 8–23)
BUN SERPL-MCNC: 35 MG/DL (ref 8–23)
BUN SERPL-MCNC: 35 MG/DL (ref 8–23)
BUN SERPL-MCNC: 36 MG/DL (ref 8–23)
BUN/CREAT SERPL: 15.8 (ref 7–25)
BUN/CREAT SERPL: 15.8 (ref 7–25)
BUN/CREAT SERPL: 16.4 (ref 7–25)
BUN/CREAT SERPL: 17.2 (ref 7–25)
CA-I SERPL ISE-MCNC: 1.13 MMOL/L (ref 1.12–1.32)
CA-I SERPL ISE-MCNC: 1.17 MMOL/L (ref 1.12–1.32)
CA-I SERPL ISE-MCNC: 1.17 MMOL/L (ref 1.12–1.32)
CALCIUM SPEC-SCNC: 7.9 MG/DL (ref 8.6–10.5)
CALCIUM SPEC-SCNC: 8 MG/DL (ref 8.6–10.5)
CALCIUM SPEC-SCNC: 8.1 MG/DL (ref 8.6–10.5)
CALCIUM SPEC-SCNC: 8.1 MG/DL (ref 8.6–10.5)
CHLORIDE SERPL-SCNC: 100 MMOL/L (ref 98–107)
CHLORIDE SERPL-SCNC: 100 MMOL/L (ref 98–107)
CHLORIDE SERPL-SCNC: 117 MMOL/L (ref 98–107)
CHLORIDE SERPL-SCNC: 99 MMOL/L (ref 98–107)
CO2 SERPL-SCNC: 21 MMOL/L (ref 22–29)
CO2 SERPL-SCNC: 24 MMOL/L (ref 22–29)
CREAT SERPL-MCNC: 2.04 MG/DL (ref 0.76–1.27)
CREAT SERPL-MCNC: 2.07 MG/DL (ref 0.76–1.27)
CREAT SERPL-MCNC: 2.21 MG/DL (ref 0.76–1.27)
CREAT SERPL-MCNC: 2.28 MG/DL (ref 0.76–1.27)
CROSSMATCH INTERPRETATION: NORMAL
DEPRECATED RDW RBC AUTO: 56.3 FL (ref 37–54)
ERYTHROCYTE [DISTWIDTH] IN BLOOD BY AUTOMATED COUNT: 17.6 % (ref 12.3–15.4)
FIBRINOGEN PPP-MCNC: 268 MG/DL (ref 220–470)
GBM IGG SER-ACNC: 18 UNITS (ref 0–20)
GFR SERPL CREATININE-BSD FRML MDRD: 29 ML/MIN/1.73
GFR SERPL CREATININE-BSD FRML MDRD: 30 ML/MIN/1.73
GFR SERPL CREATININE-BSD FRML MDRD: 32 ML/MIN/1.73
GFR SERPL CREATININE-BSD FRML MDRD: 33 ML/MIN/1.73
GLUCOSE BLDC GLUCOMTR-MCNC: 134 MG/DL (ref 70–130)
GLUCOSE BLDC GLUCOMTR-MCNC: 155 MG/DL (ref 70–130)
GLUCOSE BLDC GLUCOMTR-MCNC: 190 MG/DL (ref 70–130)
GLUCOSE BLDC GLUCOMTR-MCNC: 208 MG/DL (ref 70–130)
GLUCOSE SERPL-MCNC: 114 MG/DL (ref 65–99)
GLUCOSE SERPL-MCNC: 138 MG/DL (ref 65–99)
GLUCOSE SERPL-MCNC: 194 MG/DL (ref 65–99)
GLUCOSE SERPL-MCNC: 205 MG/DL (ref 65–99)
HCT VFR BLD AUTO: 22.6 % (ref 37.5–51)
HGB BLD-MCNC: 7.5 G/DL (ref 13–17.7)
MAGNESIUM SERPL-MCNC: 2.6 MG/DL (ref 1.6–2.4)
MAGNESIUM SERPL-MCNC: 2.7 MG/DL (ref 1.6–2.4)
MAGNESIUM SERPL-MCNC: 3 MG/DL (ref 1.6–2.4)
MCH RBC QN AUTO: 29.6 PG (ref 26.6–33)
MCHC RBC AUTO-ENTMCNC: 33.2 G/DL (ref 31.5–35.7)
MCV RBC AUTO: 89.3 FL (ref 79–97)
PHOSPHATE SERPL-MCNC: 3.6 MG/DL (ref 2.5–4.5)
PHOSPHATE SERPL-MCNC: 4 MG/DL (ref 2.5–4.5)
PHOSPHATE SERPL-MCNC: 4.8 MG/DL (ref 2.5–4.5)
PLATELET # BLD AUTO: 166 10*3/MM3 (ref 140–450)
PMV BLD AUTO: 12.7 FL (ref 6–12)
POTASSIUM SERPL-SCNC: 4.3 MMOL/L (ref 3.5–5.2)
POTASSIUM SERPL-SCNC: 4.4 MMOL/L (ref 3.5–5.2)
POTASSIUM SERPL-SCNC: 5.2 MMOL/L (ref 3.5–5.2)
POTASSIUM SERPL-SCNC: 5.3 MMOL/L (ref 3.5–5.2)
RBC # BLD AUTO: 2.53 10*6/MM3 (ref 4.14–5.8)
SODIUM SERPL-SCNC: 134 MMOL/L (ref 136–145)
SODIUM SERPL-SCNC: 134 MMOL/L (ref 136–145)
SODIUM SERPL-SCNC: 135 MMOL/L (ref 136–145)
SODIUM SERPL-SCNC: 153 MMOL/L (ref 136–145)
UNIT  ABO: NORMAL
UNIT  RH: NORMAL
WBC # BLD AUTO: 10.4 10*3/MM3 (ref 3.4–10.8)

## 2021-10-20 PROCEDURE — 80048 BASIC METABOLIC PNL TOTAL CA: CPT | Performed by: SURGERY

## 2021-10-20 PROCEDURE — 63710000001 INSULIN REGULAR HUMAN PER 5 UNITS: Performed by: SURGERY

## 2021-10-20 PROCEDURE — 94799 UNLISTED PULMONARY SVC/PX: CPT

## 2021-10-20 PROCEDURE — 99291 CRITICAL CARE FIRST HOUR: CPT | Performed by: INTERNAL MEDICINE

## 2021-10-20 PROCEDURE — 63710000001 INSULIN DETEMIR PER 5 UNITS: Performed by: INTERNAL MEDICINE

## 2021-10-20 PROCEDURE — 25010000002 DIAZEPAM PER 5 MG: Performed by: INTERNAL MEDICINE

## 2021-10-20 PROCEDURE — 85384 FIBRINOGEN ACTIVITY: CPT | Performed by: SURGERY

## 2021-10-20 PROCEDURE — 25010000002 MICAFUNGIN SODIUM 100 MG RECONSTITUTED SOLUTION: Performed by: INTERNAL MEDICINE

## 2021-10-20 PROCEDURE — 82962 GLUCOSE BLOOD TEST: CPT

## 2021-10-20 PROCEDURE — 63710000001 PREDNISONE PER 1 MG: Performed by: SURGERY

## 2021-10-20 PROCEDURE — 94003 VENT MGMT INPAT SUBQ DAY: CPT

## 2021-10-20 PROCEDURE — 25010000002 ALBUMIN HUMAN 5% PER 50 ML: Performed by: SURGERY

## 2021-10-20 PROCEDURE — P9041 ALBUMIN (HUMAN),5%, 50ML: HCPCS | Performed by: SURGERY

## 2021-10-20 PROCEDURE — 71045 X-RAY EXAM CHEST 1 VIEW: CPT

## 2021-10-20 PROCEDURE — 97110 THERAPEUTIC EXERCISES: CPT

## 2021-10-20 PROCEDURE — 25010000002 MEROPENEM PER 100 MG: Performed by: INTERNAL MEDICINE

## 2021-10-20 PROCEDURE — 82330 ASSAY OF CALCIUM: CPT | Performed by: SURGERY

## 2021-10-20 PROCEDURE — 25010000002 METOCLOPRAMIDE PER 10 MG: Performed by: INTERNAL MEDICINE

## 2021-10-20 PROCEDURE — 25010000002 CYCLOPHOSPHAMIDE PER 100 MG: Performed by: SURGERY

## 2021-10-20 PROCEDURE — 80069 RENAL FUNCTION PANEL: CPT | Performed by: SURGERY

## 2021-10-20 PROCEDURE — 85027 COMPLETE CBC AUTOMATED: CPT | Performed by: SURGERY

## 2021-10-20 PROCEDURE — 83735 ASSAY OF MAGNESIUM: CPT | Performed by: SURGERY

## 2021-10-20 RX ORDER — ACETAMINOPHEN 325 MG/1
650 TABLET ORAL EVERY 6 HOURS PRN
Status: DISCONTINUED | OUTPATIENT
Start: 2021-10-20 | End: 2021-11-12 | Stop reason: HOSPADM

## 2021-10-20 RX ORDER — FOLIC ACID/VIT B COMPLEX AND C 0.8 MG
1 TABLET ORAL DAILY
Status: DISCONTINUED | OUTPATIENT
Start: 2021-10-20 | End: 2021-11-12 | Stop reason: HOSPADM

## 2021-10-20 RX ORDER — OXYCODONE HCL 5 MG/5 ML
5 SOLUTION, ORAL ORAL EVERY 12 HOURS
Status: DISCONTINUED | OUTPATIENT
Start: 2021-10-23 | End: 2021-10-21

## 2021-10-20 RX ORDER — AMOXICILLIN 250 MG
2 CAPSULE ORAL 2 TIMES DAILY
Status: DISCONTINUED | OUTPATIENT
Start: 2021-10-20 | End: 2021-10-21

## 2021-10-20 RX ORDER — POLYETHYLENE GLYCOL 3350 17 G/17G
17 POWDER, FOR SOLUTION ORAL DAILY PRN
Status: DISCONTINUED | OUTPATIENT
Start: 2021-10-20 | End: 2021-10-21

## 2021-10-20 RX ORDER — DIAZEPAM 5 MG/ML
5 INJECTION, SOLUTION INTRAMUSCULAR; INTRAVENOUS EVERY 4 HOURS PRN
Status: DISCONTINUED | OUTPATIENT
Start: 2021-10-20 | End: 2021-10-29

## 2021-10-20 RX ORDER — OXYCODONE HCL 5 MG/5 ML
5 SOLUTION, ORAL ORAL EVERY 8 HOURS SCHEDULED
Status: DISCONTINUED | OUTPATIENT
Start: 2021-10-20 | End: 2021-10-21

## 2021-10-20 RX ORDER — METOCLOPRAMIDE HYDROCHLORIDE 5 MG/ML
10 INJECTION INTRAMUSCULAR; INTRAVENOUS EVERY 6 HOURS PRN
Status: DISCONTINUED | OUTPATIENT
Start: 2021-10-20 | End: 2021-10-27

## 2021-10-20 RX ORDER — ATOVAQUONE 750 MG/5ML
1500 SUSPENSION ORAL
Status: DISCONTINUED | OUTPATIENT
Start: 2021-10-20 | End: 2021-11-12 | Stop reason: HOSPADM

## 2021-10-20 RX ORDER — FERROUS SULFATE 300 MG/5ML
300 LIQUID (ML) ORAL DAILY
Status: DISCONTINUED | OUTPATIENT
Start: 2021-10-20 | End: 2021-11-12 | Stop reason: HOSPADM

## 2021-10-20 RX ORDER — OXYCODONE HCL 5 MG/5 ML
5 SOLUTION, ORAL ORAL DAILY
Status: DISCONTINUED | OUTPATIENT
Start: 2021-10-26 | End: 2021-10-21

## 2021-10-20 RX ORDER — BISACODYL 10 MG
10 SUPPOSITORY, RECTAL RECTAL DAILY PRN
Status: DISCONTINUED | OUTPATIENT
Start: 2021-10-20 | End: 2021-10-21

## 2021-10-20 RX ORDER — OXYCODONE HCL 5 MG/5 ML
10 SOLUTION, ORAL ORAL EVERY 8 HOURS SCHEDULED
Status: DISCONTINUED | OUTPATIENT
Start: 2021-10-20 | End: 2021-10-20

## 2021-10-20 RX ADMIN — Medication 1 CAPSULE: at 08:12

## 2021-10-20 RX ADMIN — CALCIUM CHLORIDE, MAGNESIUM CHLORIDE, SODIUM CHLORIDE, SODIUM BICARBONATE, POTASSIUM CHLORIDE AND SODIUM PHOSPHATE DIBASIC DIHYDRATE 1000 ML/HR: 3.68; 3.05; 6.34; 3.09; .314; .187 INJECTION INTRAVENOUS at 16:57

## 2021-10-20 RX ADMIN — IPRATROPIUM BROMIDE AND ALBUTEROL SULFATE 3 ML: 2.5; .5 SOLUTION RESPIRATORY (INHALATION) at 16:15

## 2021-10-20 RX ADMIN — CALCIUM CHLORIDE, MAGNESIUM CHLORIDE, SODIUM CHLORIDE, SODIUM BICARBONATE, POTASSIUM CHLORIDE AND SODIUM PHOSPHATE DIBASIC DIHYDRATE 600 ML/HR: 3.68; 3.05; 6.34; 3.09; .314; .187 INJECTION INTRAVENOUS at 03:19

## 2021-10-20 RX ADMIN — INSULIN DETEMIR 5 UNITS: 100 INJECTION, SOLUTION SUBCUTANEOUS at 08:12

## 2021-10-20 RX ADMIN — IPRATROPIUM BROMIDE AND ALBUTEROL SULFATE 3 ML: 2.5; .5 SOLUTION RESPIRATORY (INHALATION) at 13:04

## 2021-10-20 RX ADMIN — CALCIUM CHLORIDE, MAGNESIUM CHLORIDE, SODIUM CHLORIDE, SODIUM BICARBONATE, POTASSIUM CHLORIDE AND SODIUM PHOSPHATE DIBASIC DIHYDRATE 1100 ML/HR: 3.68; 3.05; 6.34; 3.09; .314; .187 INJECTION INTRAVENOUS at 03:19

## 2021-10-20 RX ADMIN — DIAZEPAM 10 MG: 5 TABLET ORAL at 17:36

## 2021-10-20 RX ADMIN — CHLORHEXIDINE GLUCONATE 15 ML: 1.2 SOLUTION ORAL at 20:01

## 2021-10-20 RX ADMIN — MEROPENEM 1 G: 1 INJECTION, POWDER, FOR SOLUTION INTRAVENOUS at 23:06

## 2021-10-20 RX ADMIN — DIAZEPAM 10 MG: 5 TABLET ORAL at 02:20

## 2021-10-20 RX ADMIN — IPRATROPIUM BROMIDE AND ALBUTEROL SULFATE 3 ML: 2.5; .5 SOLUTION RESPIRATORY (INHALATION) at 19:14

## 2021-10-20 RX ADMIN — MICAFUNGIN SODIUM 100 MG: 100 INJECTION, POWDER, LYOPHILIZED, FOR SOLUTION INTRAVENOUS at 11:00

## 2021-10-20 RX ADMIN — MEROPENEM 1 G: 1 INJECTION, POWDER, FOR SOLUTION INTRAVENOUS at 00:13

## 2021-10-20 RX ADMIN — Medication 1 TABLET: at 08:11

## 2021-10-20 RX ADMIN — ALBUMIN HUMAN 25 G: 0.05 INJECTION, SOLUTION INTRAVENOUS at 20:33

## 2021-10-20 RX ADMIN — Medication 30 ML: at 20:04

## 2021-10-20 RX ADMIN — Medication 75 MG: at 17:36

## 2021-10-20 RX ADMIN — CALCIUM CHLORIDE, MAGNESIUM CHLORIDE, SODIUM CHLORIDE, SODIUM BICARBONATE, POTASSIUM CHLORIDE AND SODIUM PHOSPHATE DIBASIC DIHYDRATE 1100 ML/HR: 3.68; 3.05; 6.34; 3.09; .314; .187 INJECTION INTRAVENOUS at 12:49

## 2021-10-20 RX ADMIN — FAMOTIDINE 20 MG: 10 INJECTION, SOLUTION INTRAVENOUS at 08:11

## 2021-10-20 RX ADMIN — PREDNISONE 60 MG: 20 TABLET ORAL at 13:11

## 2021-10-20 RX ADMIN — INSULIN HUMAN 3 UNITS: 100 INJECTION, SOLUTION PARENTERAL at 00:13

## 2021-10-20 RX ADMIN — CALCIUM CHLORIDE, MAGNESIUM CHLORIDE, SODIUM CHLORIDE, SODIUM BICARBONATE, POTASSIUM CHLORIDE AND SODIUM PHOSPHATE DIBASIC DIHYDRATE 1100 ML/HR: 3.68; 3.05; 6.34; 3.09; .314; .187 INJECTION INTRAVENOUS at 22:35

## 2021-10-20 RX ADMIN — INSULIN HUMAN 3 UNITS: 100 INJECTION, SOLUTION PARENTERAL at 17:35

## 2021-10-20 RX ADMIN — CALCIUM CHLORIDE, MAGNESIUM CHLORIDE, SODIUM CHLORIDE, SODIUM BICARBONATE, POTASSIUM CHLORIDE AND SODIUM PHOSPHATE DIBASIC DIHYDRATE 1000 ML/HR: 3.68; 3.05; 6.34; 3.09; .314; .187 INJECTION INTRAVENOUS at 03:19

## 2021-10-20 RX ADMIN — OXYCODONE HYDROCHLORIDE 5 MG: 5 SOLUTION ORAL at 12:07

## 2021-10-20 RX ADMIN — CALCIUM CHLORIDE, MAGNESIUM CHLORIDE, SODIUM CHLORIDE, SODIUM BICARBONATE, POTASSIUM CHLORIDE AND SODIUM PHOSPHATE DIBASIC DIHYDRATE 600 ML/HR: 3.68; 3.05; 6.34; 3.09; .314; .187 INJECTION INTRAVENOUS at 20:39

## 2021-10-20 RX ADMIN — CALCIUM CHLORIDE, MAGNESIUM CHLORIDE, SODIUM CHLORIDE, SODIUM BICARBONATE, POTASSIUM CHLORIDE AND SODIUM PHOSPHATE DIBASIC DIHYDRATE 1000 ML/HR: 3.68; 3.05; 6.34; 3.09; .314; .187 INJECTION INTRAVENOUS at 09:50

## 2021-10-20 RX ADMIN — MEROPENEM 1 G: 1 INJECTION, POWDER, FOR SOLUTION INTRAVENOUS at 12:07

## 2021-10-20 RX ADMIN — METOCLOPRAMIDE 10 MG: 5 INJECTION, SOLUTION INTRAMUSCULAR; INTRAVENOUS at 04:04

## 2021-10-20 RX ADMIN — OXYCODONE HYDROCHLORIDE 10 MG: 5 SOLUTION ORAL at 04:04

## 2021-10-20 RX ADMIN — INSULIN DETEMIR 5 UNITS: 100 INJECTION, SOLUTION SUBCUTANEOUS at 20:02

## 2021-10-20 RX ADMIN — DIAZEPAM 10 MG: 5 TABLET ORAL at 10:33

## 2021-10-20 RX ADMIN — INSULIN HUMAN 2 UNITS: 100 INJECTION, SOLUTION PARENTERAL at 13:11

## 2021-10-20 RX ADMIN — OXYCODONE HYDROCHLORIDE 5 MG: 5 SOLUTION ORAL at 17:36

## 2021-10-20 RX ADMIN — CALCIUM CHLORIDE, MAGNESIUM CHLORIDE, SODIUM CHLORIDE, SODIUM BICARBONATE, POTASSIUM CHLORIDE AND SODIUM PHOSPHATE DIBASIC DIHYDRATE 600 ML/HR: 3.68; 3.05; 6.34; 3.09; .314; .187 INJECTION INTRAVENOUS at 22:35

## 2021-10-20 RX ADMIN — CHLORHEXIDINE GLUCONATE 15 ML: 1.2 SOLUTION ORAL at 08:12

## 2021-10-20 RX ADMIN — Medication 30 ML: at 17:44

## 2021-10-20 RX ADMIN — DIAZEPAM 5 MG: 5 INJECTION, SOLUTION INTRAMUSCULAR; INTRAVENOUS at 21:38

## 2021-10-20 RX ADMIN — Medication 30 ML: at 10:33

## 2021-10-20 RX ADMIN — MINERAL SUPPLEMENT IRON 300 MG / 5 ML STRENGTH LIQUID 100 PER BOX UNFLAVORED 300 MG: at 08:12

## 2021-10-20 RX ADMIN — IPRATROPIUM BROMIDE AND ALBUTEROL SULFATE 3 ML: 2.5; .5 SOLUTION RESPIRATORY (INHALATION) at 23:14

## 2021-10-20 RX ADMIN — IPRATROPIUM BROMIDE AND ALBUTEROL SULFATE 3 ML: 2.5; .5 SOLUTION RESPIRATORY (INHALATION) at 06:53

## 2021-10-20 RX ADMIN — DOCUSATE SODIUM 50 MG AND SENNOSIDES 8.6 MG 2 TABLET: 8.6; 5 TABLET, FILM COATED ORAL at 20:01

## 2021-10-20 RX ADMIN — ATOVAQUONE 1500 MG: 750 SUSPENSION ORAL at 12:07

## 2021-10-20 RX ADMIN — CALCIUM CHLORIDE, MAGNESIUM CHLORIDE, SODIUM CHLORIDE, SODIUM BICARBONATE, POTASSIUM CHLORIDE AND SODIUM PHOSPHATE DIBASIC DIHYDRATE 1100 ML/HR: 3.68; 3.05; 6.34; 3.09; .314; .187 INJECTION INTRAVENOUS at 07:40

## 2021-10-20 RX ADMIN — IPRATROPIUM BROMIDE AND ALBUTEROL SULFATE 3 ML: 2.5; .5 SOLUTION RESPIRATORY (INHALATION) at 03:56

## 2021-10-20 RX ADMIN — CALCIUM CHLORIDE, MAGNESIUM CHLORIDE, SODIUM CHLORIDE, SODIUM BICARBONATE, POTASSIUM CHLORIDE AND SODIUM PHOSPHATE DIBASIC DIHYDRATE 600 ML/HR: 3.68; 3.05; 6.34; 3.09; .314; .187 INJECTION INTRAVENOUS at 06:43

## 2021-10-20 RX ADMIN — CALCIUM CHLORIDE, MAGNESIUM CHLORIDE, SODIUM CHLORIDE, SODIUM BICARBONATE, POTASSIUM CHLORIDE AND SODIUM PHOSPHATE DIBASIC DIHYDRATE 1100 ML/HR: 3.68; 3.05; 6.34; 3.09; .314; .187 INJECTION INTRAVENOUS at 17:28

## 2021-10-21 ENCOUNTER — APPOINTMENT (OUTPATIENT)
Dept: NEPHROLOGY | Facility: HOSPITAL | Age: 66
End: 2021-10-21

## 2021-10-21 ENCOUNTER — APPOINTMENT (OUTPATIENT)
Dept: GENERAL RADIOLOGY | Facility: HOSPITAL | Age: 66
End: 2021-10-21

## 2021-10-21 LAB
ABO GROUP BLD: NORMAL
ALBUMIN SERPL-MCNC: 3.9 G/DL (ref 3.5–5.2)
ALBUMIN SERPL-MCNC: 3.9 G/DL (ref 3.5–5.2)
ANION GAP SERPL CALCULATED.3IONS-SCNC: 11 MMOL/L (ref 5–15)
ANION GAP SERPL CALCULATED.3IONS-SCNC: 12 MMOL/L (ref 5–15)
ANION GAP SERPL CALCULATED.3IONS-SCNC: 14 MMOL/L (ref 5–15)
ARTERIAL PATENCY WRIST A: ABNORMAL
ATMOSPHERIC PRESS: ABNORMAL MM[HG]
BACTERIA SPEC AEROBE CULT: NORMAL
BACTERIA SPEC AEROBE CULT: NORMAL
BASE EXCESS BLDA CALC-SCNC: 1.9 MMOL/L (ref 0–2)
BDY SITE: ABNORMAL
BLD GP AB SCN SERPL QL: NEGATIVE
BODY TEMPERATURE: 37 C
BUN SERPL-MCNC: 34 MG/DL (ref 8–23)
BUN SERPL-MCNC: 42 MG/DL (ref 8–23)
BUN SERPL-MCNC: 50 MG/DL (ref 8–23)
BUN/CREAT SERPL: 16.2 (ref 7–25)
BUN/CREAT SERPL: 16.3 (ref 7–25)
BUN/CREAT SERPL: 17.7 (ref 7–25)
CA-I SERPL ISE-MCNC: 1.08 MMOL/L (ref 1.12–1.32)
CA-I SERPL ISE-MCNC: 1.11 MMOL/L (ref 1.12–1.32)
CALCIUM SPEC-SCNC: 7.7 MG/DL (ref 8.6–10.5)
CALCIUM SPEC-SCNC: 7.8 MG/DL (ref 8.6–10.5)
CALCIUM SPEC-SCNC: 7.8 MG/DL (ref 8.6–10.5)
CHLORIDE SERPL-SCNC: 101 MMOL/L (ref 98–107)
CHLORIDE SERPL-SCNC: 101 MMOL/L (ref 98–107)
CHLORIDE SERPL-SCNC: 102 MMOL/L (ref 98–107)
CO2 BLDA-SCNC: 26.4 MMOL/L (ref 22–33)
CO2 SERPL-SCNC: 21 MMOL/L (ref 22–29)
CO2 SERPL-SCNC: 22 MMOL/L (ref 22–29)
CO2 SERPL-SCNC: 22 MMOL/L (ref 22–29)
COHGB MFR BLD: 2.4 % (ref 0–2)
CREAT SERPL-MCNC: 2.1 MG/DL (ref 0.76–1.27)
CREAT SERPL-MCNC: 2.37 MG/DL (ref 0.76–1.27)
CREAT SERPL-MCNC: 3.06 MG/DL (ref 0.76–1.27)
DEPRECATED RDW RBC AUTO: 56 FL (ref 37–54)
EPAP: 0
ERYTHROCYTE [DISTWIDTH] IN BLOOD BY AUTOMATED COUNT: 17.6 % (ref 12.3–15.4)
FIBRINOGEN PPP-MCNC: 230 MG/DL (ref 220–470)
GFR SERPL CREATININE-BSD FRML MDRD: 20 ML/MIN/1.73
GFR SERPL CREATININE-BSD FRML MDRD: 27 ML/MIN/1.73
GFR SERPL CREATININE-BSD FRML MDRD: 32 ML/MIN/1.73
GLUCOSE BLDC GLUCOMTR-MCNC: 108 MG/DL (ref 70–130)
GLUCOSE BLDC GLUCOMTR-MCNC: 166 MG/DL (ref 70–130)
GLUCOSE BLDC GLUCOMTR-MCNC: 232 MG/DL (ref 70–130)
GLUCOSE SERPL-MCNC: 140 MG/DL (ref 65–99)
GLUCOSE SERPL-MCNC: 186 MG/DL (ref 65–99)
GLUCOSE SERPL-MCNC: 86 MG/DL (ref 65–99)
HCO3 BLDA-SCNC: 25.4 MMOL/L (ref 20–26)
HCT VFR BLD AUTO: 17 % (ref 37.5–51)
HCT VFR BLD AUTO: 26.6 % (ref 37.5–51)
HCT VFR BLD CALC: 27.8 %
HGB BLD-MCNC: 5.6 G/DL (ref 13–17.7)
HGB BLD-MCNC: 8.8 G/DL (ref 13–17.7)
HGB BLDA-MCNC: 9.1 G/DL (ref 13.5–17.5)
INHALED O2 CONCENTRATION: 35 %
IPAP: 0
MAGNESIUM SERPL-MCNC: 2.6 MG/DL (ref 1.6–2.4)
MAGNESIUM SERPL-MCNC: 2.7 MG/DL (ref 1.6–2.4)
MCH RBC QN AUTO: 30.4 PG (ref 26.6–33)
MCHC RBC AUTO-ENTMCNC: 32.9 G/DL (ref 31.5–35.7)
MCV RBC AUTO: 92.4 FL (ref 79–97)
METHGB BLD QL: 0.9 % (ref 0–1.5)
MODALITY: ABNORMAL
NOTE: ABNORMAL
OXYHGB MFR BLDV: 92.2 % (ref 94–99)
PAW @ PEAK INSP FLOW SETTING VENT: 0 CMH2O
PCO2 BLDA: 34.1 MM HG (ref 35–45)
PCO2 TEMP ADJ BLD: 34.1 MM HG (ref 35–48)
PH BLDA: 7.48 PH UNITS (ref 7.35–7.45)
PH, TEMP CORRECTED: 7.48 PH UNITS
PHOSPHATE SERPL-MCNC: 4.5 MG/DL (ref 2.5–4.5)
PHOSPHATE SERPL-MCNC: 4.7 MG/DL (ref 2.5–4.5)
PLATELET # BLD AUTO: 137 10*3/MM3 (ref 140–450)
PMV BLD AUTO: 12.4 FL (ref 6–12)
PO2 BLDA: 66.5 MM HG (ref 83–108)
PO2 TEMP ADJ BLD: 66.5 MM HG (ref 83–108)
POTASSIUM SERPL-SCNC: 4.1 MMOL/L (ref 3.5–5.2)
POTASSIUM SERPL-SCNC: 4.1 MMOL/L (ref 3.5–5.2)
POTASSIUM SERPL-SCNC: 4.6 MMOL/L (ref 3.5–5.2)
RBC # BLD AUTO: 1.84 10*6/MM3 (ref 4.14–5.8)
RH BLD: POSITIVE
SODIUM SERPL-SCNC: 134 MMOL/L (ref 136–145)
SODIUM SERPL-SCNC: 136 MMOL/L (ref 136–145)
SODIUM SERPL-SCNC: 136 MMOL/L (ref 136–145)
T&S EXPIRATION DATE: NORMAL
TOTAL RATE: 0 BREATHS/MINUTE
WBC # BLD AUTO: 9.21 10*3/MM3 (ref 3.4–10.8)

## 2021-10-21 PROCEDURE — 86900 BLOOD TYPING SEROLOGIC ABO: CPT | Performed by: INTERNAL MEDICINE

## 2021-10-21 PROCEDURE — 63710000001 INSULIN DETEMIR PER 5 UNITS: Performed by: INTERNAL MEDICINE

## 2021-10-21 PROCEDURE — 86922 COMPATIBILITY TEST ANTIGLOB: CPT

## 2021-10-21 PROCEDURE — 25010000002 MICAFUNGIN SODIUM 100 MG RECONSTITUTED SOLUTION: Performed by: INTERNAL MEDICINE

## 2021-10-21 PROCEDURE — 25010000002 HEPARIN (PORCINE) PER 1000 UNITS: Performed by: INTERNAL MEDICINE

## 2021-10-21 PROCEDURE — 86901 BLOOD TYPING SEROLOGIC RH(D): CPT | Performed by: INTERNAL MEDICINE

## 2021-10-21 PROCEDURE — P9016 RBC LEUKOCYTES REDUCED: HCPCS

## 2021-10-21 PROCEDURE — 82330 ASSAY OF CALCIUM: CPT | Performed by: SURGERY

## 2021-10-21 PROCEDURE — 83050 HGB METHEMOGLOBIN QUAN: CPT

## 2021-10-21 PROCEDURE — 25010000002 HEPARIN (PORCINE) PER 1000 UNITS: Performed by: SURGERY

## 2021-10-21 PROCEDURE — 94799 UNLISTED PULMONARY SVC/PX: CPT

## 2021-10-21 PROCEDURE — 85014 HEMATOCRIT: CPT | Performed by: INTERNAL MEDICINE

## 2021-10-21 PROCEDURE — 36600 WITHDRAWAL OF ARTERIAL BLOOD: CPT

## 2021-10-21 PROCEDURE — 82962 GLUCOSE BLOOD TEST: CPT

## 2021-10-21 PROCEDURE — 80069 RENAL FUNCTION PANEL: CPT | Performed by: SURGERY

## 2021-10-21 PROCEDURE — 85018 HEMOGLOBIN: CPT | Performed by: INTERNAL MEDICINE

## 2021-10-21 PROCEDURE — 25010000002 MEROPENEM PER 100 MG: Performed by: INTERNAL MEDICINE

## 2021-10-21 PROCEDURE — 63710000001 INSULIN REGULAR HUMAN PER 5 UNITS: Performed by: SURGERY

## 2021-10-21 PROCEDURE — 86920 COMPATIBILITY TEST SPIN: CPT

## 2021-10-21 PROCEDURE — 82805 BLOOD GASES W/O2 SATURATION: CPT

## 2021-10-21 PROCEDURE — 85027 COMPLETE CBC AUTOMATED: CPT | Performed by: SURGERY

## 2021-10-21 PROCEDURE — 63710000001 PREDNISONE PER 1 MG: Performed by: SURGERY

## 2021-10-21 PROCEDURE — 94003 VENT MGMT INPAT SUBQ DAY: CPT

## 2021-10-21 PROCEDURE — 86900 BLOOD TYPING SEROLOGIC ABO: CPT

## 2021-10-21 PROCEDURE — 25010000002 DIAZEPAM PER 5 MG: Performed by: INTERNAL MEDICINE

## 2021-10-21 PROCEDURE — 71045 X-RAY EXAM CHEST 1 VIEW: CPT

## 2021-10-21 PROCEDURE — 82375 ASSAY CARBOXYHB QUANT: CPT

## 2021-10-21 PROCEDURE — 80048 BASIC METABOLIC PNL TOTAL CA: CPT | Performed by: SURGERY

## 2021-10-21 PROCEDURE — 83735 ASSAY OF MAGNESIUM: CPT | Performed by: SURGERY

## 2021-10-21 PROCEDURE — 86850 RBC ANTIBODY SCREEN: CPT | Performed by: INTERNAL MEDICINE

## 2021-10-21 PROCEDURE — 25010000002 CYCLOPHOSPHAMIDE PER 100 MG: Performed by: INTERNAL MEDICINE

## 2021-10-21 PROCEDURE — 85384 FIBRINOGEN ACTIVITY: CPT | Performed by: SURGERY

## 2021-10-21 PROCEDURE — 99291 CRITICAL CARE FIRST HOUR: CPT | Performed by: INTERNAL MEDICINE

## 2021-10-21 RX ORDER — OXYCODONE HCL 5 MG/5 ML
5 SOLUTION, ORAL ORAL EVERY 6 HOURS PRN
Status: DISCONTINUED | OUTPATIENT
Start: 2021-10-21 | End: 2021-10-23

## 2021-10-21 RX ORDER — POLYETHYLENE GLYCOL 3350 17 G/17G
17 POWDER, FOR SOLUTION ORAL DAILY PRN
Status: DISCONTINUED | OUTPATIENT
Start: 2021-10-21 | End: 2021-11-08

## 2021-10-21 RX ORDER — FAMOTIDINE 20 MG/1
20 TABLET, FILM COATED ORAL DAILY
Status: DISCONTINUED | OUTPATIENT
Start: 2021-10-21 | End: 2021-10-23

## 2021-10-21 RX ORDER — HEPARIN SODIUM 1000 [USP'U]/ML
1600 INJECTION, SOLUTION INTRAVENOUS; SUBCUTANEOUS AS NEEDED
Status: DISCONTINUED | OUTPATIENT
Start: 2021-10-21 | End: 2021-11-12 | Stop reason: HOSPADM

## 2021-10-21 RX ORDER — IPRATROPIUM BROMIDE AND ALBUTEROL SULFATE 2.5; .5 MG/3ML; MG/3ML
3 SOLUTION RESPIRATORY (INHALATION)
Status: DISCONTINUED | OUTPATIENT
Start: 2021-10-21 | End: 2021-10-25

## 2021-10-21 RX ORDER — HYDROMORPHONE HYDROCHLORIDE 1 MG/ML
0.5 INJECTION, SOLUTION INTRAMUSCULAR; INTRAVENOUS; SUBCUTANEOUS EVERY 4 HOURS PRN
Status: DISCONTINUED | OUTPATIENT
Start: 2021-10-21 | End: 2021-10-29

## 2021-10-21 RX ORDER — BISACODYL 10 MG
10 SUPPOSITORY, RECTAL RECTAL DAILY PRN
Status: DISCONTINUED | OUTPATIENT
Start: 2021-10-21 | End: 2021-11-08

## 2021-10-21 RX ORDER — AMOXICILLIN 250 MG
2 CAPSULE ORAL 2 TIMES DAILY PRN
Status: DISCONTINUED | OUTPATIENT
Start: 2021-10-21 | End: 2021-11-12 | Stop reason: HOSPADM

## 2021-10-21 RX ADMIN — IPRATROPIUM BROMIDE AND ALBUTEROL SULFATE 3 ML: 2.5; .5 SOLUTION RESPIRATORY (INHALATION) at 19:34

## 2021-10-21 RX ADMIN — IPRATROPIUM BROMIDE AND ALBUTEROL SULFATE 3 ML: 2.5; .5 SOLUTION RESPIRATORY (INHALATION) at 06:39

## 2021-10-21 RX ADMIN — INSULIN HUMAN 2 UNITS: 100 INJECTION, SOLUTION PARENTERAL at 00:54

## 2021-10-21 RX ADMIN — IPRATROPIUM BROMIDE AND ALBUTEROL SULFATE 3 ML: 2.5; .5 SOLUTION RESPIRATORY (INHALATION) at 11:13

## 2021-10-21 RX ADMIN — IPRATROPIUM BROMIDE AND ALBUTEROL SULFATE 3 ML: 2.5; .5 SOLUTION RESPIRATORY (INHALATION) at 03:22

## 2021-10-21 RX ADMIN — INSULIN DETEMIR 5 UNITS: 100 INJECTION, SOLUTION SUBCUTANEOUS at 20:53

## 2021-10-21 RX ADMIN — MINERAL SUPPLEMENT IRON 300 MG / 5 ML STRENGTH LIQUID 100 PER BOX UNFLAVORED 300 MG: at 10:37

## 2021-10-21 RX ADMIN — ATOVAQUONE 1500 MG: 750 SUSPENSION ORAL at 15:13

## 2021-10-21 RX ADMIN — Medication 75 MG: at 17:05

## 2021-10-21 RX ADMIN — Medication 30 ML: at 15:15

## 2021-10-21 RX ADMIN — PREDNISONE 60 MG: 20 TABLET ORAL at 15:14

## 2021-10-21 RX ADMIN — Medication 1 TABLET: at 10:36

## 2021-10-21 RX ADMIN — INSULIN HUMAN 2 UNITS: 100 INJECTION, SOLUTION PARENTERAL at 17:03

## 2021-10-21 RX ADMIN — OXYCODONE HYDROCHLORIDE 5 MG: 5 SOLUTION ORAL at 23:32

## 2021-10-21 RX ADMIN — Medication 30 ML: at 11:01

## 2021-10-21 RX ADMIN — ACETAMINOPHEN 650 MG: 325 TABLET, FILM COATED ORAL at 20:53

## 2021-10-21 RX ADMIN — Medication 30 ML: at 20:54

## 2021-10-21 RX ADMIN — DIAZEPAM 5 MG: 5 INJECTION, SOLUTION INTRAMUSCULAR; INTRAVENOUS at 22:33

## 2021-10-21 RX ADMIN — FAMOTIDINE 20 MG: 10 INJECTION, SOLUTION INTRAVENOUS at 10:36

## 2021-10-21 RX ADMIN — Medication 1 CAPSULE: at 10:36

## 2021-10-21 RX ADMIN — MEROPENEM 1 G: 1 INJECTION, POWDER, FOR SOLUTION INTRAVENOUS at 15:15

## 2021-10-21 RX ADMIN — MICAFUNGIN SODIUM 100 MG: 100 INJECTION, POWDER, LYOPHILIZED, FOR SOLUTION INTRAVENOUS at 15:13

## 2021-10-21 RX ADMIN — HEPARIN SODIUM 1600 UNITS: 1000 INJECTION INTRAVENOUS; SUBCUTANEOUS at 14:09

## 2021-10-21 RX ADMIN — CHLORHEXIDINE GLUCONATE 15 ML: 1.2 SOLUTION ORAL at 20:53

## 2021-10-21 RX ADMIN — HEPARIN SODIUM 1100 UNITS: 1000 INJECTION INTRAVENOUS; SUBCUTANEOUS at 00:31

## 2021-10-21 RX ADMIN — DIAZEPAM 10 MG: 5 TABLET ORAL at 10:36

## 2021-10-21 RX ADMIN — CHLORHEXIDINE GLUCONATE 15 ML: 1.2 SOLUTION ORAL at 10:37

## 2021-10-21 RX ADMIN — INSULIN DETEMIR 5 UNITS: 100 INJECTION, SOLUTION SUBCUTANEOUS at 10:37

## 2021-10-21 RX ADMIN — FAMOTIDINE 20 MG: 20 TABLET, FILM COATED ORAL at 15:13

## 2021-10-22 LAB
ANION GAP SERPL CALCULATED.3IONS-SCNC: 15 MMOL/L (ref 5–15)
BH BB BLOOD EXPIRATION DATE: NORMAL
BH BB BLOOD EXPIRATION DATE: NORMAL
BH BB BLOOD TYPE BARCODE: 6200
BH BB BLOOD TYPE BARCODE: 6200
BH BB DISPENSE STATUS: NORMAL
BH BB DISPENSE STATUS: NORMAL
BH BB PRODUCT CODE: NORMAL
BH BB PRODUCT CODE: NORMAL
BH BB UNIT NUMBER: NORMAL
BH BB UNIT NUMBER: NORMAL
BUN SERPL-MCNC: 48 MG/DL (ref 8–23)
BUN/CREAT SERPL: 13 (ref 7–25)
CALCIUM SPEC-SCNC: 8 MG/DL (ref 8.6–10.5)
CHLORIDE SERPL-SCNC: 100 MMOL/L (ref 98–107)
CO2 SERPL-SCNC: 21 MMOL/L (ref 22–29)
CREAT SERPL-MCNC: 3.68 MG/DL (ref 0.76–1.27)
CROSSMATCH INTERPRETATION: NORMAL
CROSSMATCH INTERPRETATION: NORMAL
DEPRECATED RDW RBC AUTO: 54 FL (ref 37–54)
ERYTHROCYTE [DISTWIDTH] IN BLOOD BY AUTOMATED COUNT: 17.8 % (ref 12.3–15.4)
FIBRINOGEN PPP-MCNC: 312 MG/DL (ref 220–470)
GFR SERPL CREATININE-BSD FRML MDRD: 17 ML/MIN/1.73
GLUCOSE BLDC GLUCOMTR-MCNC: 133 MG/DL (ref 70–130)
GLUCOSE BLDC GLUCOMTR-MCNC: 143 MG/DL (ref 70–130)
GLUCOSE BLDC GLUCOMTR-MCNC: 163 MG/DL (ref 70–130)
GLUCOSE BLDC GLUCOMTR-MCNC: 344 MG/DL (ref 70–130)
GLUCOSE SERPL-MCNC: 211 MG/DL (ref 65–99)
HCT VFR BLD AUTO: 26.1 % (ref 37.5–51)
HGB BLD-MCNC: 8.5 G/DL (ref 13–17.7)
MCH RBC QN AUTO: 29.6 PG (ref 26.6–33)
MCHC RBC AUTO-ENTMCNC: 32.6 G/DL (ref 31.5–35.7)
MCV RBC AUTO: 90.9 FL (ref 79–97)
PLATELET # BLD AUTO: 172 10*3/MM3 (ref 140–450)
PMV BLD AUTO: 12.1 FL (ref 6–12)
POTASSIUM SERPL-SCNC: 4.7 MMOL/L (ref 3.5–5.2)
RBC # BLD AUTO: 2.87 10*6/MM3 (ref 4.14–5.8)
SODIUM SERPL-SCNC: 136 MMOL/L (ref 136–145)
UNIT  ABO: NORMAL
UNIT  ABO: NORMAL
UNIT  RH: NORMAL
UNIT  RH: NORMAL
WBC # BLD AUTO: 10.81 10*3/MM3 (ref 3.4–10.8)

## 2021-10-22 PROCEDURE — 94799 UNLISTED PULMONARY SVC/PX: CPT

## 2021-10-22 PROCEDURE — 99291 CRITICAL CARE FIRST HOUR: CPT | Performed by: INTERNAL MEDICINE

## 2021-10-22 PROCEDURE — 80048 BASIC METABOLIC PNL TOTAL CA: CPT | Performed by: SURGERY

## 2021-10-22 PROCEDURE — 63710000001 INSULIN REGULAR HUMAN PER 5 UNITS: Performed by: SURGERY

## 2021-10-22 PROCEDURE — 25010000002 DIAZEPAM PER 5 MG: Performed by: INTERNAL MEDICINE

## 2021-10-22 PROCEDURE — 85384 FIBRINOGEN ACTIVITY: CPT | Performed by: SURGERY

## 2021-10-22 PROCEDURE — 63710000001 INSULIN DETEMIR PER 5 UNITS: Performed by: INTERNAL MEDICINE

## 2021-10-22 PROCEDURE — 97110 THERAPEUTIC EXERCISES: CPT

## 2021-10-22 PROCEDURE — 25010000002 MEROPENEM PER 100 MG: Performed by: INTERNAL MEDICINE

## 2021-10-22 PROCEDURE — 94003 VENT MGMT INPAT SUBQ DAY: CPT

## 2021-10-22 PROCEDURE — 82962 GLUCOSE BLOOD TEST: CPT

## 2021-10-22 PROCEDURE — 85027 COMPLETE CBC AUTOMATED: CPT | Performed by: SURGERY

## 2021-10-22 PROCEDURE — 63710000001 PREDNISONE PER 1 MG: Performed by: SURGERY

## 2021-10-22 PROCEDURE — 25010000002 CYCLOPHOSPHAMIDE PER 100 MG: Performed by: INTERNAL MEDICINE

## 2021-10-22 RX ORDER — GUAR GUM
1 PACKET (EA) ORAL 3 TIMES DAILY
Status: DISCONTINUED | OUTPATIENT
Start: 2021-10-22 | End: 2021-10-23

## 2021-10-22 RX ORDER — CHOLECALCIFEROL (VITAMIN D3) 125 MCG
10 CAPSULE ORAL NIGHTLY
Status: DISCONTINUED | OUTPATIENT
Start: 2021-10-22 | End: 2021-10-23

## 2021-10-22 RX ORDER — LOPERAMIDE HYDROCHLORIDE 2 MG/1
2 CAPSULE ORAL 4 TIMES DAILY PRN
Status: DISCONTINUED | OUTPATIENT
Start: 2021-10-22 | End: 2021-10-23

## 2021-10-22 RX ADMIN — CHLORHEXIDINE GLUCONATE 15 ML: 1.2 SOLUTION ORAL at 09:45

## 2021-10-22 RX ADMIN — IPRATROPIUM BROMIDE AND ALBUTEROL SULFATE 3 ML: 2.5; .5 SOLUTION RESPIRATORY (INHALATION) at 00:50

## 2021-10-22 RX ADMIN — IPRATROPIUM BROMIDE AND ALBUTEROL SULFATE 3 ML: 2.5; .5 SOLUTION RESPIRATORY (INHALATION) at 07:06

## 2021-10-22 RX ADMIN — PREDNISONE 60 MG: 20 TABLET ORAL at 14:08

## 2021-10-22 RX ADMIN — ATOVAQUONE 1500 MG: 750 SUSPENSION ORAL at 12:39

## 2021-10-22 RX ADMIN — LOPERAMIDE HYDROCHLORIDE 2 MG: 2 CAPSULE ORAL at 12:38

## 2021-10-22 RX ADMIN — Medication 30 ML: at 20:38

## 2021-10-22 RX ADMIN — Medication 30 ML: at 15:58

## 2021-10-22 RX ADMIN — INSULIN HUMAN 5 UNITS: 100 INJECTION, SOLUTION PARENTERAL at 23:17

## 2021-10-22 RX ADMIN — INSULIN HUMAN 2 UNITS: 100 INJECTION, SOLUTION PARENTERAL at 12:38

## 2021-10-22 RX ADMIN — IPRATROPIUM BROMIDE AND ALBUTEROL SULFATE 3 ML: 2.5; .5 SOLUTION RESPIRATORY (INHALATION) at 19:27

## 2021-10-22 RX ADMIN — INSULIN DETEMIR 5 UNITS: 100 INJECTION, SOLUTION SUBCUTANEOUS at 09:45

## 2021-10-22 RX ADMIN — IPRATROPIUM BROMIDE AND ALBUTEROL SULFATE 3 ML: 2.5; .5 SOLUTION RESPIRATORY (INHALATION) at 12:08

## 2021-10-22 RX ADMIN — MEROPENEM 1 G: 1 INJECTION, POWDER, FOR SOLUTION INTRAVENOUS at 12:39

## 2021-10-22 RX ADMIN — MINERAL SUPPLEMENT IRON 300 MG / 5 ML STRENGTH LIQUID 100 PER BOX UNFLAVORED 300 MG: at 09:45

## 2021-10-22 RX ADMIN — DIAZEPAM 5 MG: 5 INJECTION, SOLUTION INTRAMUSCULAR; INTRAVENOUS at 05:57

## 2021-10-22 RX ADMIN — INSULIN DETEMIR 5 UNITS: 100 INJECTION, SOLUTION SUBCUTANEOUS at 20:26

## 2021-10-22 RX ADMIN — FAMOTIDINE 20 MG: 20 TABLET, FILM COATED ORAL at 09:45

## 2021-10-22 RX ADMIN — Medication 1 PACKET: at 20:37

## 2021-10-22 RX ADMIN — Medication 100 MG: at 17:33

## 2021-10-22 RX ADMIN — Medication 1 TABLET: at 09:45

## 2021-10-22 RX ADMIN — CHLORHEXIDINE GLUCONATE 15 ML: 1.2 SOLUTION ORAL at 20:25

## 2021-10-22 RX ADMIN — Medication 10 MG: at 20:26

## 2021-10-22 RX ADMIN — Medication 1 CAPSULE: at 09:45

## 2021-10-22 RX ADMIN — Medication 1 PACKET: at 15:58

## 2021-10-22 RX ADMIN — INSULIN HUMAN 3 UNITS: 100 INJECTION, SOLUTION PARENTERAL at 01:12

## 2021-10-22 RX ADMIN — Medication 30 ML: at 09:45

## 2021-10-23 ENCOUNTER — APPOINTMENT (OUTPATIENT)
Dept: NEPHROLOGY | Facility: HOSPITAL | Age: 66
End: 2021-10-23

## 2021-10-23 LAB
ANION GAP SERPL CALCULATED.3IONS-SCNC: 20 MMOL/L (ref 5–15)
ARTERIAL PATENCY WRIST A: ABNORMAL
ATMOSPHERIC PRESS: ABNORMAL MM[HG]
BASE EXCESS BLDA CALC-SCNC: -4.6 MMOL/L (ref 0–2)
BDY SITE: ABNORMAL
BODY TEMPERATURE: 37 C
BUN SERPL-MCNC: 94 MG/DL (ref 8–23)
BUN/CREAT SERPL: 16 (ref 7–25)
CALCIUM SPEC-SCNC: 8.2 MG/DL (ref 8.6–10.5)
CHLORIDE SERPL-SCNC: 95 MMOL/L (ref 98–107)
CO2 BLDA-SCNC: 21 MMOL/L (ref 22–33)
CO2 SERPL-SCNC: 18 MMOL/L (ref 22–29)
COHGB MFR BLD: 2 % (ref 0–2)
CREAT SERPL-MCNC: 5.87 MG/DL (ref 0.76–1.27)
DEPRECATED RDW RBC AUTO: 54.4 FL (ref 37–54)
EPAP: 0
ERYTHROCYTE [DISTWIDTH] IN BLOOD BY AUTOMATED COUNT: 17.7 % (ref 12.3–15.4)
FIBRINOGEN PPP-MCNC: 342 MG/DL (ref 220–470)
GFR SERPL CREATININE-BSD FRML MDRD: 10 ML/MIN/1.73
GFR SERPL CREATININE-BSD FRML MDRD: ABNORMAL ML/MIN/{1.73_M2}
GLUCOSE BLDC GLUCOMTR-MCNC: 126 MG/DL (ref 70–130)
GLUCOSE BLDC GLUCOMTR-MCNC: 244 MG/DL (ref 70–130)
GLUCOSE BLDC GLUCOMTR-MCNC: 261 MG/DL (ref 70–130)
GLUCOSE BLDC GLUCOMTR-MCNC: 294 MG/DL (ref 70–130)
GLUCOSE SERPL-MCNC: 247 MG/DL (ref 65–99)
HCO3 BLDA-SCNC: 20 MMOL/L (ref 20–26)
HCT VFR BLD AUTO: 26.9 % (ref 37.5–51)
HCT VFR BLD CALC: 27.7 %
HGB BLD-MCNC: 8.6 G/DL (ref 13–17.7)
HGB BLDA-MCNC: 9 G/DL (ref 13.5–17.5)
INHALED O2 CONCENTRATION: 30 %
IPAP: 0
MCH RBC QN AUTO: 29.6 PG (ref 26.6–33)
MCHC RBC AUTO-ENTMCNC: 32 G/DL (ref 31.5–35.7)
MCV RBC AUTO: 92.4 FL (ref 79–97)
METHGB BLD QL: 0.7 % (ref 0–1.5)
MODALITY: ABNORMAL
NOTE: ABNORMAL
OXYHGB MFR BLDV: 92.1 % (ref 94–99)
PAW @ PEAK INSP FLOW SETTING VENT: 0 CMH2O
PCO2 BLDA: 33.8 MM HG (ref 35–45)
PCO2 TEMP ADJ BLD: 33.8 MM HG (ref 35–48)
PH BLDA: 7.38 PH UNITS (ref 7.35–7.45)
PH, TEMP CORRECTED: 7.38 PH UNITS
PLATELET # BLD AUTO: 257 10*3/MM3 (ref 140–450)
PMV BLD AUTO: 12 FL (ref 6–12)
PO2 BLDA: 71.3 MM HG (ref 83–108)
PO2 TEMP ADJ BLD: 71.3 MM HG (ref 83–108)
POTASSIUM SERPL-SCNC: 5 MMOL/L (ref 3.5–5.2)
RBC # BLD AUTO: 2.91 10*6/MM3 (ref 4.14–5.8)
SODIUM SERPL-SCNC: 133 MMOL/L (ref 136–145)
TOTAL RATE: 0 BREATHS/MINUTE
WBC # BLD AUTO: 13.87 10*3/MM3 (ref 3.4–10.8)

## 2021-10-23 PROCEDURE — 25010000002 ONDANSETRON PER 1 MG: Performed by: INTERNAL MEDICINE

## 2021-10-23 PROCEDURE — 82805 BLOOD GASES W/O2 SATURATION: CPT

## 2021-10-23 PROCEDURE — 80048 BASIC METABOLIC PNL TOTAL CA: CPT | Performed by: SURGERY

## 2021-10-23 PROCEDURE — 63710000001 INSULIN DETEMIR PER 5 UNITS: Performed by: INTERNAL MEDICINE

## 2021-10-23 PROCEDURE — 82375 ASSAY CARBOXYHB QUANT: CPT

## 2021-10-23 PROCEDURE — P9047 ALBUMIN (HUMAN), 25%, 50ML: HCPCS

## 2021-10-23 PROCEDURE — 94799 UNLISTED PULMONARY SVC/PX: CPT

## 2021-10-23 PROCEDURE — 63710000001 INSULIN REGULAR HUMAN PER 5 UNITS: Performed by: SURGERY

## 2021-10-23 PROCEDURE — 25010000002 MEROPENEM PER 100 MG: Performed by: INTERNAL MEDICINE

## 2021-10-23 PROCEDURE — 85027 COMPLETE CBC AUTOMATED: CPT | Performed by: SURGERY

## 2021-10-23 PROCEDURE — 82962 GLUCOSE BLOOD TEST: CPT

## 2021-10-23 PROCEDURE — 99291 CRITICAL CARE FIRST HOUR: CPT | Performed by: INTERNAL MEDICINE

## 2021-10-23 PROCEDURE — 94003 VENT MGMT INPAT SUBQ DAY: CPT

## 2021-10-23 PROCEDURE — 63710000001 PREDNISONE PER 1 MG: Performed by: SURGERY

## 2021-10-23 PROCEDURE — 36600 WITHDRAWAL OF ARTERIAL BLOOD: CPT

## 2021-10-23 PROCEDURE — 83050 HGB METHEMOGLOBIN QUAN: CPT

## 2021-10-23 PROCEDURE — 85384 FIBRINOGEN ACTIVITY: CPT | Performed by: SURGERY

## 2021-10-23 PROCEDURE — 25010000002 ALBUMIN HUMAN 25% PER 50 ML

## 2021-10-23 PROCEDURE — 25010000002 CYCLOPHOSPHAMIDE PER 100 MG: Performed by: INTERNAL MEDICINE

## 2021-10-23 RX ORDER — OXYCODONE HCL 5 MG/5 ML
5 SOLUTION, ORAL ORAL EVERY 6 HOURS PRN
Status: DISCONTINUED | OUTPATIENT
Start: 2021-10-23 | End: 2021-11-12 | Stop reason: HOSPADM

## 2021-10-23 RX ORDER — LOPERAMIDE HYDROCHLORIDE 2 MG/1
2 CAPSULE ORAL 4 TIMES DAILY PRN
Status: DISCONTINUED | OUTPATIENT
Start: 2021-10-23 | End: 2021-11-09

## 2021-10-23 RX ORDER — ALBUMIN (HUMAN) 12.5 G/50ML
SOLUTION INTRAVENOUS
Status: COMPLETED
Start: 2021-10-23 | End: 2021-10-23

## 2021-10-23 RX ORDER — CHOLECALCIFEROL (VITAMIN D3) 125 MCG
10 CAPSULE ORAL NIGHTLY
Status: DISCONTINUED | OUTPATIENT
Start: 2021-10-23 | End: 2021-11-04

## 2021-10-23 RX ORDER — FAMOTIDINE 20 MG/1
20 TABLET, FILM COATED ORAL DAILY
Status: DISCONTINUED | OUTPATIENT
Start: 2021-10-24 | End: 2021-11-12 | Stop reason: HOSPADM

## 2021-10-23 RX ORDER — GUAR GUM
1 PACKET (EA) ORAL 3 TIMES DAILY
Status: DISCONTINUED | OUTPATIENT
Start: 2021-10-23 | End: 2021-11-08

## 2021-10-23 RX ORDER — ALBUMIN (HUMAN) 12.5 G/50ML
12.5 SOLUTION INTRAVENOUS AS NEEDED
Status: DISPENSED | OUTPATIENT
Start: 2021-10-23 | End: 2021-10-24

## 2021-10-23 RX ADMIN — Medication 10 MG: at 20:35

## 2021-10-23 RX ADMIN — Medication 30 ML: at 16:22

## 2021-10-23 RX ADMIN — ATOVAQUONE 1500 MG: 750 SUSPENSION ORAL at 13:26

## 2021-10-23 RX ADMIN — INSULIN HUMAN 3 UNITS: 100 INJECTION, SOLUTION PARENTERAL at 05:59

## 2021-10-23 RX ADMIN — INSULIN DETEMIR 5 UNITS: 100 INJECTION, SOLUTION SUBCUTANEOUS at 09:15

## 2021-10-23 RX ADMIN — Medication 30 ML: at 09:14

## 2021-10-23 RX ADMIN — MINERAL SUPPLEMENT IRON 300 MG / 5 ML STRENGTH LIQUID 100 PER BOX UNFLAVORED 300 MG: at 09:14

## 2021-10-23 RX ADMIN — FAMOTIDINE 20 MG: 20 TABLET, FILM COATED ORAL at 09:15

## 2021-10-23 RX ADMIN — ALBUMIN HUMAN 25 G: 0.25 SOLUTION INTRAVENOUS at 11:35

## 2021-10-23 RX ADMIN — INSULIN HUMAN 3 UNITS: 100 INJECTION, SOLUTION PARENTERAL at 17:52

## 2021-10-23 RX ADMIN — IPRATROPIUM BROMIDE AND ALBUTEROL SULFATE 3 ML: 2.5; .5 SOLUTION RESPIRATORY (INHALATION) at 13:20

## 2021-10-23 RX ADMIN — Medication 1 PACKET: at 20:44

## 2021-10-23 RX ADMIN — INSULIN DETEMIR 5 UNITS: 100 INJECTION, SOLUTION SUBCUTANEOUS at 20:35

## 2021-10-23 RX ADMIN — CHLORHEXIDINE GLUCONATE 15 ML: 1.2 SOLUTION ORAL at 09:20

## 2021-10-23 RX ADMIN — MEROPENEM 1 G: 1 INJECTION, POWDER, FOR SOLUTION INTRAVENOUS at 16:14

## 2021-10-23 RX ADMIN — Medication 1 PACKET: at 09:14

## 2021-10-23 RX ADMIN — Medication 1 PACKET: at 16:15

## 2021-10-23 RX ADMIN — Medication 100 MG: at 17:53

## 2021-10-23 RX ADMIN — Medication 30 ML: at 20:37

## 2021-10-23 RX ADMIN — IPRATROPIUM BROMIDE AND ALBUTEROL SULFATE 3 ML: 2.5; .5 SOLUTION RESPIRATORY (INHALATION) at 01:22

## 2021-10-23 RX ADMIN — IPRATROPIUM BROMIDE AND ALBUTEROL SULFATE 3 ML: 2.5; .5 SOLUTION RESPIRATORY (INHALATION) at 07:35

## 2021-10-23 RX ADMIN — Medication 1 TABLET: at 09:15

## 2021-10-23 RX ADMIN — Medication 1 CAPSULE: at 09:15

## 2021-10-23 RX ADMIN — IPRATROPIUM BROMIDE AND ALBUTEROL SULFATE 3 ML: 2.5; .5 SOLUTION RESPIRATORY (INHALATION) at 18:50

## 2021-10-23 RX ADMIN — PREDNISONE 60 MG: 20 TABLET ORAL at 13:26

## 2021-10-23 RX ADMIN — ONDANSETRON 4 MG: 2 INJECTION INTRAMUSCULAR; INTRAVENOUS at 02:01

## 2021-10-23 RX ADMIN — CHLORHEXIDINE GLUCONATE 15 ML: 1.2 SOLUTION ORAL at 20:35

## 2021-10-24 ENCOUNTER — APPOINTMENT (OUTPATIENT)
Dept: GENERAL RADIOLOGY | Facility: HOSPITAL | Age: 66
End: 2021-10-24

## 2021-10-24 LAB
ANION GAP SERPL CALCULATED.3IONS-SCNC: 19 MMOL/L (ref 5–15)
BUN SERPL-MCNC: 67 MG/DL (ref 8–23)
BUN/CREAT SERPL: 14.8 (ref 7–25)
CALCIUM SPEC-SCNC: 8.3 MG/DL (ref 8.6–10.5)
CHLORIDE SERPL-SCNC: 96 MMOL/L (ref 98–107)
CO2 SERPL-SCNC: 20 MMOL/L (ref 22–29)
CREAT SERPL-MCNC: 4.53 MG/DL (ref 0.76–1.27)
DEPRECATED RDW RBC AUTO: 55.1 FL (ref 37–54)
ERYTHROCYTE [DISTWIDTH] IN BLOOD BY AUTOMATED COUNT: 17.8 % (ref 12.3–15.4)
FIBRINOGEN PPP-MCNC: 265 MG/DL (ref 220–470)
GFR SERPL CREATININE-BSD FRML MDRD: 13 ML/MIN/1.73
GFR SERPL CREATININE-BSD FRML MDRD: ABNORMAL ML/MIN/{1.73_M2}
GLUCOSE BLDC GLUCOMTR-MCNC: 190 MG/DL (ref 70–130)
GLUCOSE BLDC GLUCOMTR-MCNC: 203 MG/DL (ref 70–130)
GLUCOSE BLDC GLUCOMTR-MCNC: 231 MG/DL (ref 70–130)
GLUCOSE BLDC GLUCOMTR-MCNC: 263 MG/DL (ref 70–130)
GLUCOSE BLDC GLUCOMTR-MCNC: 291 MG/DL (ref 70–130)
GLUCOSE SERPL-MCNC: 210 MG/DL (ref 65–99)
HCT VFR BLD AUTO: 25.6 % (ref 37.5–51)
HGB BLD-MCNC: 8.3 G/DL (ref 13–17.7)
MCH RBC QN AUTO: 29.7 PG (ref 26.6–33)
MCHC RBC AUTO-ENTMCNC: 32.4 G/DL (ref 31.5–35.7)
MCV RBC AUTO: 91.8 FL (ref 79–97)
PLATELET # BLD AUTO: 224 10*3/MM3 (ref 140–450)
PMV BLD AUTO: 12.2 FL (ref 6–12)
POTASSIUM SERPL-SCNC: 4.7 MMOL/L (ref 3.5–5.2)
RBC # BLD AUTO: 2.79 10*6/MM3 (ref 4.14–5.8)
SODIUM SERPL-SCNC: 135 MMOL/L (ref 136–145)
WBC # BLD AUTO: 10.68 10*3/MM3 (ref 3.4–10.8)

## 2021-10-24 PROCEDURE — 94799 UNLISTED PULMONARY SVC/PX: CPT

## 2021-10-24 PROCEDURE — 82962 GLUCOSE BLOOD TEST: CPT

## 2021-10-24 PROCEDURE — 25010000002 MEROPENEM PER 100 MG: Performed by: INTERNAL MEDICINE

## 2021-10-24 PROCEDURE — 63710000001 INSULIN REGULAR HUMAN PER 5 UNITS: Performed by: SURGERY

## 2021-10-24 PROCEDURE — 94003 VENT MGMT INPAT SUBQ DAY: CPT

## 2021-10-24 PROCEDURE — 25010000002 CYCLOPHOSPHAMIDE PER 100 MG: Performed by: INTERNAL MEDICINE

## 2021-10-24 PROCEDURE — 25010000002 ALBUMIN HUMAN 25% PER 50 ML: Performed by: INTERNAL MEDICINE

## 2021-10-24 PROCEDURE — 63710000001 INSULIN DETEMIR PER 5 UNITS: Performed by: INTERNAL MEDICINE

## 2021-10-24 PROCEDURE — 85027 COMPLETE CBC AUTOMATED: CPT | Performed by: SURGERY

## 2021-10-24 PROCEDURE — 63710000001 PREDNISONE PER 1 MG: Performed by: SURGERY

## 2021-10-24 PROCEDURE — 85384 FIBRINOGEN ACTIVITY: CPT | Performed by: SURGERY

## 2021-10-24 PROCEDURE — 71045 X-RAY EXAM CHEST 1 VIEW: CPT

## 2021-10-24 PROCEDURE — 80048 BASIC METABOLIC PNL TOTAL CA: CPT | Performed by: SURGERY

## 2021-10-24 PROCEDURE — P9047 ALBUMIN (HUMAN), 25%, 50ML: HCPCS | Performed by: INTERNAL MEDICINE

## 2021-10-24 PROCEDURE — 99291 CRITICAL CARE FIRST HOUR: CPT | Performed by: INTERNAL MEDICINE

## 2021-10-24 RX ORDER — NOREPINEPHRINE BIT/0.9 % NACL 8 MG/250ML
INFUSION BOTTLE (ML) INTRAVENOUS
Status: COMPLETED
Start: 2021-10-24 | End: 2021-10-24

## 2021-10-24 RX ORDER — NOREPINEPHRINE BIT/0.9 % NACL 8 MG/250ML
.02-.3 INFUSION BOTTLE (ML) INTRAVENOUS
Status: DISCONTINUED | OUTPATIENT
Start: 2021-10-24 | End: 2021-10-27

## 2021-10-24 RX ADMIN — INSULIN HUMAN 3 UNITS: 100 INJECTION, SOLUTION PARENTERAL at 13:11

## 2021-10-24 RX ADMIN — Medication 1 PACKET: at 17:07

## 2021-10-24 RX ADMIN — Medication 10 MG: at 19:45

## 2021-10-24 RX ADMIN — Medication 0.02 MCG/KG/MIN: at 03:23

## 2021-10-24 RX ADMIN — FAMOTIDINE 20 MG: 20 TABLET, FILM COATED ORAL at 08:18

## 2021-10-24 RX ADMIN — MINERAL SUPPLEMENT IRON 300 MG / 5 ML STRENGTH LIQUID 100 PER BOX UNFLAVORED 300 MG: at 08:18

## 2021-10-24 RX ADMIN — Medication 1 PACKET: at 19:45

## 2021-10-24 RX ADMIN — LOPERAMIDE HYDROCHLORIDE 2 MG: 2 CAPSULE ORAL at 01:54

## 2021-10-24 RX ADMIN — IPRATROPIUM BROMIDE AND ALBUTEROL SULFATE 3 ML: 2.5; .5 SOLUTION RESPIRATORY (INHALATION) at 13:31

## 2021-10-24 RX ADMIN — PREDNISONE 60 MG: 20 TABLET ORAL at 13:11

## 2021-10-24 RX ADMIN — Medication 1 TABLET: at 08:17

## 2021-10-24 RX ADMIN — ACETAMINOPHEN 650 MG: 325 TABLET, FILM COATED ORAL at 00:04

## 2021-10-24 RX ADMIN — Medication 1 PACKET: at 08:18

## 2021-10-24 RX ADMIN — IPRATROPIUM BROMIDE AND ALBUTEROL SULFATE 3 ML: 2.5; .5 SOLUTION RESPIRATORY (INHALATION) at 00:59

## 2021-10-24 RX ADMIN — Medication 1 CAPSULE: at 08:17

## 2021-10-24 RX ADMIN — CHLORHEXIDINE GLUCONATE 15 ML: 1.2 SOLUTION ORAL at 19:45

## 2021-10-24 RX ADMIN — INSULIN HUMAN 3 UNITS: 100 INJECTION, SOLUTION PARENTERAL at 05:54

## 2021-10-24 RX ADMIN — INSULIN DETEMIR 5 UNITS: 100 INJECTION, SOLUTION SUBCUTANEOUS at 19:46

## 2021-10-24 RX ADMIN — CHLORHEXIDINE GLUCONATE 15 ML: 1.2 SOLUTION ORAL at 08:18

## 2021-10-24 RX ADMIN — Medication 100 MG: at 17:06

## 2021-10-24 RX ADMIN — MEROPENEM 1 G: 1 INJECTION, POWDER, FOR SOLUTION INTRAVENOUS at 11:33

## 2021-10-24 RX ADMIN — ATOVAQUONE 1500 MG: 750 SUSPENSION ORAL at 11:33

## 2021-10-24 RX ADMIN — IPRATROPIUM BROMIDE AND ALBUTEROL SULFATE 3 ML: 2.5; .5 SOLUTION RESPIRATORY (INHALATION) at 18:40

## 2021-10-24 RX ADMIN — ALBUMIN HUMAN 12.5 G: 0.25 SOLUTION INTRAVENOUS at 03:16

## 2021-10-24 RX ADMIN — INSULIN DETEMIR 5 UNITS: 100 INJECTION, SOLUTION SUBCUTANEOUS at 08:18

## 2021-10-24 RX ADMIN — INSULIN HUMAN 4 UNITS: 100 INJECTION, SOLUTION PARENTERAL at 00:19

## 2021-10-24 RX ADMIN — Medication 30 ML: at 17:06

## 2021-10-24 RX ADMIN — Medication 30 ML: at 08:18

## 2021-10-24 RX ADMIN — IPRATROPIUM BROMIDE AND ALBUTEROL SULFATE 3 ML: 2.5; .5 SOLUTION RESPIRATORY (INHALATION) at 08:05

## 2021-10-24 RX ADMIN — Medication 30 ML: at 19:45

## 2021-10-25 ENCOUNTER — APPOINTMENT (OUTPATIENT)
Dept: NEPHROLOGY | Facility: HOSPITAL | Age: 66
End: 2021-10-25

## 2021-10-25 PROBLEM — Z87.891 HISTORY OF TOBACCO ABUSE: Status: ACTIVE | Noted: 2021-10-25

## 2021-10-25 PROBLEM — R73.9 HYPERGLYCEMIA: Status: ACTIVE | Noted: 2021-10-25

## 2021-10-25 PROBLEM — I95.0 IDIOPATHIC HYPOTENSION: Status: ACTIVE | Noted: 2021-10-25

## 2021-10-25 LAB
ANION GAP SERPL CALCULATED.3IONS-SCNC: 20 MMOL/L (ref 5–15)
BUN SERPL-MCNC: 117 MG/DL (ref 8–23)
BUN/CREAT SERPL: 16.2 (ref 7–25)
CALCIUM SPEC-SCNC: 8.1 MG/DL (ref 8.6–10.5)
CHLORIDE SERPL-SCNC: 94 MMOL/L (ref 98–107)
CO2 SERPL-SCNC: 18 MMOL/L (ref 22–29)
CREAT SERPL-MCNC: 7.23 MG/DL (ref 0.76–1.27)
DEPRECATED RDW RBC AUTO: 54.8 FL (ref 37–54)
ERYTHROCYTE [DISTWIDTH] IN BLOOD BY AUTOMATED COUNT: 17.2 % (ref 12.3–15.4)
FIBRINOGEN PPP-MCNC: 297 MG/DL (ref 220–470)
GFR SERPL CREATININE-BSD FRML MDRD: 8 ML/MIN/1.73
GFR SERPL CREATININE-BSD FRML MDRD: ABNORMAL ML/MIN/{1.73_M2}
GLUCOSE BLDC GLUCOMTR-MCNC: 187 MG/DL (ref 70–130)
GLUCOSE BLDC GLUCOMTR-MCNC: 196 MG/DL (ref 70–130)
GLUCOSE BLDC GLUCOMTR-MCNC: 201 MG/DL (ref 70–130)
GLUCOSE BLDC GLUCOMTR-MCNC: 214 MG/DL (ref 70–130)
GLUCOSE SERPL-MCNC: 227 MG/DL (ref 65–99)
HCT VFR BLD AUTO: 23 % (ref 37.5–51)
HGB BLD-MCNC: 7.5 G/DL (ref 13–17.7)
MCH RBC QN AUTO: 29.2 PG (ref 26.6–33)
MCHC RBC AUTO-ENTMCNC: 32.6 G/DL (ref 31.5–35.7)
MCV RBC AUTO: 89.5 FL (ref 79–97)
PLATELET # BLD AUTO: 244 10*3/MM3 (ref 140–450)
PMV BLD AUTO: 12.1 FL (ref 6–12)
POTASSIUM SERPL-SCNC: 4.8 MMOL/L (ref 3.5–5.2)
RBC # BLD AUTO: 2.57 10*6/MM3 (ref 4.14–5.8)
SODIUM SERPL-SCNC: 132 MMOL/L (ref 136–145)
WBC # BLD AUTO: 9.73 10*3/MM3 (ref 3.4–10.8)

## 2021-10-25 PROCEDURE — P9047 ALBUMIN (HUMAN), 25%, 50ML: HCPCS

## 2021-10-25 PROCEDURE — 82962 GLUCOSE BLOOD TEST: CPT

## 2021-10-25 PROCEDURE — 80048 BASIC METABOLIC PNL TOTAL CA: CPT | Performed by: SURGERY

## 2021-10-25 PROCEDURE — 94003 VENT MGMT INPAT SUBQ DAY: CPT

## 2021-10-25 PROCEDURE — 25010000002 EPOETIN ALFA-EPBX 10000 UNIT/ML SOLUTION: Performed by: INTERNAL MEDICINE

## 2021-10-25 PROCEDURE — 63710000001 INSULIN DETEMIR PER 5 UNITS: Performed by: INTERNAL MEDICINE

## 2021-10-25 PROCEDURE — 94799 UNLISTED PULMONARY SVC/PX: CPT

## 2021-10-25 PROCEDURE — 85027 COMPLETE CBC AUTOMATED: CPT | Performed by: SURGERY

## 2021-10-25 PROCEDURE — 25010000002 MEROPENEM PER 100 MG: Performed by: INTERNAL MEDICINE

## 2021-10-25 PROCEDURE — 99233 SBSQ HOSP IP/OBS HIGH 50: CPT | Performed by: INTERNAL MEDICINE

## 2021-10-25 PROCEDURE — 63710000001 INSULIN REGULAR HUMAN PER 5 UNITS: Performed by: SURGERY

## 2021-10-25 PROCEDURE — 25010000002 CYCLOPHOSPHAMIDE PER 100 MG: Performed by: INTERNAL MEDICINE

## 2021-10-25 PROCEDURE — 25010000002 DIAZEPAM PER 5 MG: Performed by: INTERNAL MEDICINE

## 2021-10-25 PROCEDURE — 63710000001 PREDNISONE PER 5 MG: Performed by: INTERNAL MEDICINE

## 2021-10-25 PROCEDURE — 25010000002 ALBUMIN HUMAN 25% PER 50 ML

## 2021-10-25 PROCEDURE — 97530 THERAPEUTIC ACTIVITIES: CPT

## 2021-10-25 PROCEDURE — 85384 FIBRINOGEN ACTIVITY: CPT | Performed by: SURGERY

## 2021-10-25 PROCEDURE — 94760 N-INVAS EAR/PLS OXIMETRY 1: CPT

## 2021-10-25 PROCEDURE — 63710000001 PREDNISONE PER 1 MG: Performed by: INTERNAL MEDICINE

## 2021-10-25 RX ORDER — IPRATROPIUM BROMIDE AND ALBUTEROL SULFATE 2.5; .5 MG/3ML; MG/3ML
3 SOLUTION RESPIRATORY (INHALATION) EVERY 6 HOURS PRN
Status: DISCONTINUED | OUTPATIENT
Start: 2021-10-25 | End: 2021-11-05

## 2021-10-25 RX ORDER — ALBUMIN (HUMAN) 12.5 G/50ML
12.5 SOLUTION INTRAVENOUS AS NEEDED
Status: ACTIVE | OUTPATIENT
Start: 2021-10-25 | End: 2021-10-25

## 2021-10-25 RX ORDER — ALBUMIN (HUMAN) 12.5 G/50ML
SOLUTION INTRAVENOUS
Status: COMPLETED
Start: 2021-10-25 | End: 2021-10-25

## 2021-10-25 RX ADMIN — FAMOTIDINE 20 MG: 20 TABLET, FILM COATED ORAL at 09:15

## 2021-10-25 RX ADMIN — ALBUMIN (HUMAN) 25 G: 12.5 SOLUTION INTRAVENOUS at 14:30

## 2021-10-25 RX ADMIN — Medication 30 ML: at 19:24

## 2021-10-25 RX ADMIN — INSULIN HUMAN 2 UNITS: 100 INJECTION, SOLUTION PARENTERAL at 05:58

## 2021-10-25 RX ADMIN — INSULIN HUMAN 4 UNITS: 100 INJECTION, SOLUTION PARENTERAL at 00:52

## 2021-10-25 RX ADMIN — MINERAL SUPPLEMENT IRON 300 MG / 5 ML STRENGTH LIQUID 100 PER BOX UNFLAVORED 300 MG: at 09:14

## 2021-10-25 RX ADMIN — Medication 1 PACKET: at 09:20

## 2021-10-25 RX ADMIN — Medication 30 ML: at 20:19

## 2021-10-25 RX ADMIN — CHLORHEXIDINE GLUCONATE 15 ML: 1.2 SOLUTION ORAL at 20:18

## 2021-10-25 RX ADMIN — INSULIN DETEMIR 5 UNITS: 100 INJECTION, SOLUTION SUBCUTANEOUS at 20:19

## 2021-10-25 RX ADMIN — ALBUMIN HUMAN 25 G: 0.25 SOLUTION INTRAVENOUS at 14:30

## 2021-10-25 RX ADMIN — Medication 1 PACKET: at 19:24

## 2021-10-25 RX ADMIN — IPRATROPIUM BROMIDE AND ALBUTEROL SULFATE 3 ML: 2.5; .5 SOLUTION RESPIRATORY (INHALATION) at 07:46

## 2021-10-25 RX ADMIN — MEROPENEM 1 G: 1 INJECTION, POWDER, FOR SOLUTION INTRAVENOUS at 12:20

## 2021-10-25 RX ADMIN — EPOETIN ALFA-EPBX 10000 UNITS: 10000 INJECTION, SOLUTION INTRAVENOUS; SUBCUTANEOUS at 13:56

## 2021-10-25 RX ADMIN — Medication 30 ML: at 09:20

## 2021-10-25 RX ADMIN — CHLORHEXIDINE GLUCONATE 15 ML: 1.2 SOLUTION ORAL at 09:14

## 2021-10-25 RX ADMIN — Medication 1 TABLET: at 09:15

## 2021-10-25 RX ADMIN — Medication 1 CAPSULE: at 09:15

## 2021-10-25 RX ADMIN — Medication 10 MG: at 20:18

## 2021-10-25 RX ADMIN — INSULIN HUMAN 3 UNITS: 100 INJECTION, SOLUTION PARENTERAL at 23:43

## 2021-10-25 RX ADMIN — INSULIN HUMAN 3 UNITS: 100 INJECTION, SOLUTION PARENTERAL at 12:19

## 2021-10-25 RX ADMIN — IPRATROPIUM BROMIDE AND ALBUTEROL SULFATE 3 ML: 2.5; .5 SOLUTION RESPIRATORY (INHALATION) at 00:58

## 2021-10-25 RX ADMIN — Medication 100 MG: at 18:15

## 2021-10-25 RX ADMIN — Medication 1 PACKET: at 20:19

## 2021-10-25 RX ADMIN — INSULIN DETEMIR 5 UNITS: 100 INJECTION, SOLUTION SUBCUTANEOUS at 09:14

## 2021-10-25 RX ADMIN — DIAZEPAM 5 MG: 5 INJECTION, SOLUTION INTRAMUSCULAR; INTRAVENOUS at 15:51

## 2021-10-25 RX ADMIN — INSULIN HUMAN 2 UNITS: 100 INJECTION, SOLUTION PARENTERAL at 18:14

## 2021-10-25 RX ADMIN — PREDNISONE 50 MG: 20 TABLET ORAL at 13:55

## 2021-10-25 RX ADMIN — ATOVAQUONE 1500 MG: 750 SUSPENSION ORAL at 12:19

## 2021-10-26 ENCOUNTER — APPOINTMENT (OUTPATIENT)
Dept: NEPHROLOGY | Facility: HOSPITAL | Age: 66
End: 2021-10-26

## 2021-10-26 LAB
ALBUMIN SERPL-MCNC: 4.5 G/DL (ref 3.5–5.2)
ALP SERPL-CCNC: 78 U/L (ref 39–117)
ALT SERPL W P-5'-P-CCNC: 37 U/L (ref 1–41)
ANION GAP SERPL CALCULATED.3IONS-SCNC: 18 MMOL/L (ref 5–15)
AST SERPL-CCNC: 47 U/L (ref 1–40)
BILIRUB SERPL-MCNC: 0.7 MG/DL (ref 0–1.2)
BUN SERPL-MCNC: 74 MG/DL (ref 8–23)
CALCIUM SPEC-SCNC: 8.7 MG/DL (ref 8.6–10.5)
CHLORIDE SERPL-SCNC: 95 MMOL/L (ref 98–107)
CHOLEST SERPL-MCNC: 176 MG/DL (ref 0–200)
CO2 SERPL-SCNC: 20 MMOL/L (ref 22–29)
CREAT SERPL-MCNC: 4.74 MG/DL (ref 0.76–1.27)
CRP SERPL-MCNC: 2.48 MG/DL (ref 0–0.5)
DEPRECATED RDW RBC AUTO: 53.8 FL (ref 37–54)
ERYTHROCYTE [DISTWIDTH] IN BLOOD BY AUTOMATED COUNT: 17.2 % (ref 12.3–15.4)
FIBRINOGEN PPP-MCNC: 322 MG/DL (ref 220–470)
GLUCOSE BLDC GLUCOMTR-MCNC: 141 MG/DL (ref 70–130)
GLUCOSE BLDC GLUCOMTR-MCNC: 164 MG/DL (ref 70–130)
GLUCOSE BLDC GLUCOMTR-MCNC: 166 MG/DL (ref 70–130)
GLUCOSE BLDC GLUCOMTR-MCNC: 190 MG/DL (ref 70–130)
GLUCOSE SERPL-MCNC: 118 MG/DL (ref 65–99)
HCT VFR BLD AUTO: 25.1 % (ref 37.5–51)
HGB BLD-MCNC: 8.3 G/DL (ref 13–17.7)
MAGNESIUM SERPL-MCNC: 2.5 MG/DL (ref 1.6–2.4)
MCH RBC QN AUTO: 29.2 PG (ref 26.6–33)
MCHC RBC AUTO-ENTMCNC: 33.1 G/DL (ref 31.5–35.7)
MCV RBC AUTO: 88.4 FL (ref 79–97)
PHOSPHATE SERPL-MCNC: 2.2 MG/DL (ref 2.5–4.5)
PHOSPHATE SERPL-MCNC: 4.9 MG/DL (ref 2.5–4.5)
PLATELET # BLD AUTO: 239 10*3/MM3 (ref 140–450)
PMV BLD AUTO: 12.1 FL (ref 6–12)
POTASSIUM SERPL-SCNC: 3.9 MMOL/L (ref 3.5–5.2)
PREALB SERPL-MCNC: 34.1 MG/DL (ref 20–40)
PROT SERPL-MCNC: 7 G/DL (ref 6–8.5)
RBC # BLD AUTO: 2.84 10*6/MM3 (ref 4.14–5.8)
SODIUM SERPL-SCNC: 133 MMOL/L (ref 136–145)
TRIGL SERPL-MCNC: 291 MG/DL (ref 0–150)
WBC # BLD AUTO: 8.96 10*3/MM3 (ref 3.4–10.8)

## 2021-10-26 PROCEDURE — 82465 ASSAY BLD/SERUM CHOLESTEROL: CPT | Performed by: SURGERY

## 2021-10-26 PROCEDURE — 94003 VENT MGMT INPAT SUBQ DAY: CPT

## 2021-10-26 PROCEDURE — 85384 FIBRINOGEN ACTIVITY: CPT | Performed by: SURGERY

## 2021-10-26 PROCEDURE — 86140 C-REACTIVE PROTEIN: CPT | Performed by: SURGERY

## 2021-10-26 PROCEDURE — 63710000001 PREDNISONE PER 5 MG: Performed by: INTERNAL MEDICINE

## 2021-10-26 PROCEDURE — 63710000001 PREDNISONE PER 1 MG: Performed by: INTERNAL MEDICINE

## 2021-10-26 PROCEDURE — 25010000002 EPOETIN ALFA-EPBX 10000 UNIT/ML SOLUTION: Performed by: INTERNAL MEDICINE

## 2021-10-26 PROCEDURE — 94799 UNLISTED PULMONARY SVC/PX: CPT

## 2021-10-26 PROCEDURE — 84478 ASSAY OF TRIGLYCERIDES: CPT | Performed by: SURGERY

## 2021-10-26 PROCEDURE — 82962 GLUCOSE BLOOD TEST: CPT

## 2021-10-26 PROCEDURE — 63710000001 INSULIN REGULAR HUMAN PER 5 UNITS: Performed by: SURGERY

## 2021-10-26 PROCEDURE — 63710000001 INSULIN DETEMIR PER 5 UNITS: Performed by: INTERNAL MEDICINE

## 2021-10-26 PROCEDURE — 84100 ASSAY OF PHOSPHORUS: CPT | Performed by: INTERNAL MEDICINE

## 2021-10-26 PROCEDURE — 99233 SBSQ HOSP IP/OBS HIGH 50: CPT | Performed by: INTERNAL MEDICINE

## 2021-10-26 PROCEDURE — 83735 ASSAY OF MAGNESIUM: CPT | Performed by: SURGERY

## 2021-10-26 PROCEDURE — 80053 COMPREHEN METABOLIC PANEL: CPT | Performed by: SURGERY

## 2021-10-26 PROCEDURE — 85027 COMPLETE CBC AUTOMATED: CPT | Performed by: SURGERY

## 2021-10-26 PROCEDURE — 97530 THERAPEUTIC ACTIVITIES: CPT

## 2021-10-26 PROCEDURE — 25010000002 CYCLOPHOSPHAMIDE PER 100 MG: Performed by: INTERNAL MEDICINE

## 2021-10-26 PROCEDURE — 84100 ASSAY OF PHOSPHORUS: CPT | Performed by: SURGERY

## 2021-10-26 PROCEDURE — 84134 ASSAY OF PREALBUMIN: CPT | Performed by: SURGERY

## 2021-10-26 RX ADMIN — Medication 30 ML: at 16:27

## 2021-10-26 RX ADMIN — CHLORHEXIDINE GLUCONATE 15 ML: 1.2 SOLUTION ORAL at 09:17

## 2021-10-26 RX ADMIN — Medication 1 PACKET: at 21:11

## 2021-10-26 RX ADMIN — PREDNISONE 50 MG: 20 TABLET ORAL at 13:29

## 2021-10-26 RX ADMIN — Medication 1 TABLET: at 09:17

## 2021-10-26 RX ADMIN — Medication 1 PACKET: at 09:17

## 2021-10-26 RX ADMIN — INSULIN HUMAN 2 UNITS: 100 INJECTION, SOLUTION PARENTERAL at 14:41

## 2021-10-26 RX ADMIN — INSULIN DETEMIR 5 UNITS: 100 INJECTION, SOLUTION SUBCUTANEOUS at 09:17

## 2021-10-26 RX ADMIN — FAMOTIDINE 20 MG: 20 TABLET, FILM COATED ORAL at 09:17

## 2021-10-26 RX ADMIN — MINERAL SUPPLEMENT IRON 300 MG / 5 ML STRENGTH LIQUID 100 PER BOX UNFLAVORED 300 MG: at 09:17

## 2021-10-26 RX ADMIN — POTASSIUM & SODIUM PHOSPHATES POWDER PACK 280-160-250 MG 1 PACKET: 280-160-250 PACK at 16:29

## 2021-10-26 RX ADMIN — Medication 100 MG: at 18:10

## 2021-10-26 RX ADMIN — Medication 30 ML: at 11:00

## 2021-10-26 RX ADMIN — CHLORHEXIDINE GLUCONATE 15 ML: 1.2 SOLUTION ORAL at 21:10

## 2021-10-26 RX ADMIN — INSULIN HUMAN 2 UNITS: 100 INJECTION, SOLUTION PARENTERAL at 18:10

## 2021-10-26 RX ADMIN — Medication 10 MG: at 21:10

## 2021-10-26 RX ADMIN — INSULIN DETEMIR 5 UNITS: 100 INJECTION, SOLUTION SUBCUTANEOUS at 21:10

## 2021-10-26 RX ADMIN — ATOVAQUONE 1500 MG: 750 SUSPENSION ORAL at 11:00

## 2021-10-26 RX ADMIN — EPOETIN ALFA-EPBX 10000 UNITS: 10000 INJECTION, SOLUTION INTRAVENOUS; SUBCUTANEOUS at 11:00

## 2021-10-26 RX ADMIN — Medication 1 PACKET: at 16:27

## 2021-10-26 RX ADMIN — Medication 30 ML: at 21:10

## 2021-10-26 RX ADMIN — Medication 1 CAPSULE: at 09:17

## 2021-10-27 LAB
ANION GAP SERPL CALCULATED.3IONS-SCNC: 17 MMOL/L (ref 5–15)
ARTERIAL PATENCY WRIST A: ABNORMAL
ATMOSPHERIC PRESS: ABNORMAL MM[HG]
BASE EXCESS BLDA CALC-SCNC: -2.5 MMOL/L (ref 0–2)
BDY SITE: ABNORMAL
BODY TEMPERATURE: 37 C
BUN SERPL-MCNC: 67 MG/DL (ref 8–23)
BUN/CREAT SERPL: 15.3 (ref 7–25)
CALCIUM SPEC-SCNC: 8.6 MG/DL (ref 8.6–10.5)
CHLORIDE SERPL-SCNC: 95 MMOL/L (ref 98–107)
CO2 BLDA-SCNC: 22.2 MMOL/L (ref 22–33)
CO2 SERPL-SCNC: 21 MMOL/L (ref 22–29)
COHGB MFR BLD: 1.5 % (ref 0–2)
CREAT SERPL-MCNC: 4.38 MG/DL (ref 0.76–1.27)
DEPRECATED RDW RBC AUTO: 55.4 FL (ref 37–54)
EPAP: 0
ERYTHROCYTE [DISTWIDTH] IN BLOOD BY AUTOMATED COUNT: 17.2 % (ref 12.3–15.4)
FIBRINOGEN PPP-MCNC: 406 MG/DL (ref 220–470)
GFR SERPL CREATININE-BSD FRML MDRD: 14 ML/MIN/1.73
GFR SERPL CREATININE-BSD FRML MDRD: ABNORMAL ML/MIN/{1.73_M2}
GLUCOSE BLDC GLUCOMTR-MCNC: 105 MG/DL (ref 70–130)
GLUCOSE BLDC GLUCOMTR-MCNC: 204 MG/DL (ref 70–130)
GLUCOSE BLDC GLUCOMTR-MCNC: 205 MG/DL (ref 70–130)
GLUCOSE BLDC GLUCOMTR-MCNC: 220 MG/DL (ref 70–130)
GLUCOSE SERPL-MCNC: 128 MG/DL (ref 65–99)
HCO3 BLDA-SCNC: 21.2 MMOL/L (ref 20–26)
HCT VFR BLD AUTO: 26.2 % (ref 37.5–51)
HCT VFR BLD CALC: 26.1 %
HGB BLD-MCNC: 8.7 G/DL (ref 13–17.7)
HGB BLDA-MCNC: 8.5 G/DL (ref 13.5–17.5)
INHALED O2 CONCENTRATION: 35 %
IPAP: 0
MCH RBC QN AUTO: 30 PG (ref 26.6–33)
MCHC RBC AUTO-ENTMCNC: 33.2 G/DL (ref 31.5–35.7)
MCV RBC AUTO: 90.3 FL (ref 79–97)
METHGB BLD QL: 0.8 % (ref 0–1.5)
MODALITY: ABNORMAL
NOTE: ABNORMAL
OXYHGB MFR BLDV: 94.7 % (ref 94–99)
PAW @ PEAK INSP FLOW SETTING VENT: 0 CMH2O
PCO2 BLDA: 31.4 MM HG (ref 35–45)
PCO2 TEMP ADJ BLD: 31.4 MM HG (ref 35–48)
PH BLDA: 7.44 PH UNITS (ref 7.35–7.45)
PH, TEMP CORRECTED: 7.44 PH UNITS
PLATELET # BLD AUTO: 221 10*3/MM3 (ref 140–450)
PMV BLD AUTO: 12 FL (ref 6–12)
PO2 BLDA: 78.2 MM HG (ref 83–108)
PO2 TEMP ADJ BLD: 78.2 MM HG (ref 83–108)
POTASSIUM SERPL-SCNC: 4 MMOL/L (ref 3.5–5.2)
RBC # BLD AUTO: 2.9 10*6/MM3 (ref 4.14–5.8)
SODIUM SERPL-SCNC: 133 MMOL/L (ref 136–145)
TOTAL RATE: 0 BREATHS/MINUTE
WBC # BLD AUTO: 9.5 10*3/MM3 (ref 3.4–10.8)

## 2021-10-27 PROCEDURE — 63710000001 PREDNISONE PER 1 MG: Performed by: INTERNAL MEDICINE

## 2021-10-27 PROCEDURE — 25010000002 CYCLOPHOSPHAMIDE PER 100 MG: Performed by: INTERNAL MEDICINE

## 2021-10-27 PROCEDURE — 63710000001 PREDNISONE PER 5 MG: Performed by: INTERNAL MEDICINE

## 2021-10-27 PROCEDURE — 94003 VENT MGMT INPAT SUBQ DAY: CPT

## 2021-10-27 PROCEDURE — 97530 THERAPEUTIC ACTIVITIES: CPT

## 2021-10-27 PROCEDURE — 83050 HGB METHEMOGLOBIN QUAN: CPT

## 2021-10-27 PROCEDURE — 85027 COMPLETE CBC AUTOMATED: CPT | Performed by: SURGERY

## 2021-10-27 PROCEDURE — 80048 BASIC METABOLIC PNL TOTAL CA: CPT | Performed by: SURGERY

## 2021-10-27 PROCEDURE — 99233 SBSQ HOSP IP/OBS HIGH 50: CPT | Performed by: INTERNAL MEDICINE

## 2021-10-27 PROCEDURE — 94799 UNLISTED PULMONARY SVC/PX: CPT

## 2021-10-27 PROCEDURE — 36600 WITHDRAWAL OF ARTERIAL BLOOD: CPT

## 2021-10-27 PROCEDURE — 85384 FIBRINOGEN ACTIVITY: CPT | Performed by: SURGERY

## 2021-10-27 PROCEDURE — 82805 BLOOD GASES W/O2 SATURATION: CPT

## 2021-10-27 PROCEDURE — 63710000001 INSULIN REGULAR HUMAN PER 5 UNITS: Performed by: SURGERY

## 2021-10-27 PROCEDURE — 63710000001 INSULIN DETEMIR PER 5 UNITS: Performed by: INTERNAL MEDICINE

## 2021-10-27 PROCEDURE — 82962 GLUCOSE BLOOD TEST: CPT

## 2021-10-27 PROCEDURE — 82375 ASSAY CARBOXYHB QUANT: CPT

## 2021-10-27 RX ADMIN — INSULIN DETEMIR 5 UNITS: 100 INJECTION, SOLUTION SUBCUTANEOUS at 09:52

## 2021-10-27 RX ADMIN — MINERAL SUPPLEMENT IRON 300 MG / 5 ML STRENGTH LIQUID 100 PER BOX UNFLAVORED 300 MG: at 09:51

## 2021-10-27 RX ADMIN — Medication 30 ML: at 16:06

## 2021-10-27 RX ADMIN — Medication 1 CAPSULE: at 09:51

## 2021-10-27 RX ADMIN — INSULIN DETEMIR 5 UNITS: 100 INJECTION, SOLUTION SUBCUTANEOUS at 19:54

## 2021-10-27 RX ADMIN — Medication 10 MG: at 19:53

## 2021-10-27 RX ADMIN — Medication 100 MG: at 18:28

## 2021-10-27 RX ADMIN — CHLORHEXIDINE GLUCONATE 15 ML: 1.2 SOLUTION ORAL at 19:54

## 2021-10-27 RX ADMIN — CHLORHEXIDINE GLUCONATE 15 ML: 1.2 SOLUTION ORAL at 09:51

## 2021-10-27 RX ADMIN — Medication 1 PACKET: at 19:53

## 2021-10-27 RX ADMIN — PREDNISONE 50 MG: 20 TABLET ORAL at 14:07

## 2021-10-27 RX ADMIN — INSULIN HUMAN 2 UNITS: 100 INJECTION, SOLUTION PARENTERAL at 00:52

## 2021-10-27 RX ADMIN — ATOVAQUONE 1500 MG: 750 SUSPENSION ORAL at 12:35

## 2021-10-27 RX ADMIN — FAMOTIDINE 20 MG: 20 TABLET, FILM COATED ORAL at 09:51

## 2021-10-27 RX ADMIN — Medication 30 ML: at 19:53

## 2021-10-27 RX ADMIN — INSULIN HUMAN 3 UNITS: 100 INJECTION, SOLUTION PARENTERAL at 12:37

## 2021-10-27 RX ADMIN — INSULIN HUMAN 3 UNITS: 100 INJECTION, SOLUTION PARENTERAL at 18:25

## 2021-10-27 RX ADMIN — Medication 1 PACKET: at 16:06

## 2021-10-27 RX ADMIN — Medication 30 ML: at 09:51

## 2021-10-27 RX ADMIN — Medication 1 PACKET: at 09:51

## 2021-10-27 RX ADMIN — Medication 1 TABLET: at 09:51

## 2021-10-28 ENCOUNTER — APPOINTMENT (OUTPATIENT)
Dept: NEPHROLOGY | Facility: HOSPITAL | Age: 66
End: 2021-10-28

## 2021-10-28 LAB
ANION GAP SERPL CALCULATED.3IONS-SCNC: 21 MMOL/L (ref 5–15)
BUN SERPL-MCNC: 111 MG/DL (ref 8–23)
BUN/CREAT SERPL: 15.9 (ref 7–25)
CALCIUM SPEC-SCNC: 8.4 MG/DL (ref 8.6–10.5)
CHLORIDE SERPL-SCNC: 92 MMOL/L (ref 98–107)
CO2 SERPL-SCNC: 18 MMOL/L (ref 22–29)
CREAT SERPL-MCNC: 6.99 MG/DL (ref 0.76–1.27)
DEPRECATED RDW RBC AUTO: 56.9 FL (ref 37–54)
ERYTHROCYTE [DISTWIDTH] IN BLOOD BY AUTOMATED COUNT: 16.8 % (ref 12.3–15.4)
FIBRINOGEN PPP-MCNC: 405 MG/DL (ref 220–470)
GFR SERPL CREATININE-BSD FRML MDRD: 8 ML/MIN/1.73
GFR SERPL CREATININE-BSD FRML MDRD: ABNORMAL ML/MIN/{1.73_M2}
GLUCOSE BLDC GLUCOMTR-MCNC: 102 MG/DL (ref 70–130)
GLUCOSE BLDC GLUCOMTR-MCNC: 129 MG/DL (ref 70–130)
GLUCOSE BLDC GLUCOMTR-MCNC: 159 MG/DL (ref 70–130)
GLUCOSE BLDC GLUCOMTR-MCNC: 226 MG/DL (ref 70–130)
GLUCOSE SERPL-MCNC: 159 MG/DL (ref 65–99)
HCT VFR BLD AUTO: 24.8 % (ref 37.5–51)
HGB BLD-MCNC: 8 G/DL (ref 13–17.7)
MAGNESIUM SERPL-MCNC: 2.8 MG/DL (ref 1.6–2.4)
MCH RBC QN AUTO: 30.4 PG (ref 26.6–33)
MCHC RBC AUTO-ENTMCNC: 32.3 G/DL (ref 31.5–35.7)
MCV RBC AUTO: 94.3 FL (ref 79–97)
PHOSPHATE SERPL-MCNC: 7.9 MG/DL (ref 2.5–4.5)
PLATELET # BLD AUTO: 209 10*3/MM3 (ref 140–450)
PMV BLD AUTO: 12.1 FL (ref 6–12)
POTASSIUM SERPL-SCNC: 4.5 MMOL/L (ref 3.5–5.2)
RBC # BLD AUTO: 2.63 10*6/MM3 (ref 4.14–5.8)
SODIUM SERPL-SCNC: 131 MMOL/L (ref 136–145)
WBC # BLD AUTO: 10.32 10*3/MM3 (ref 3.4–10.8)

## 2021-10-28 PROCEDURE — 63710000001 PREDNISONE PER 5 MG: Performed by: INTERNAL MEDICINE

## 2021-10-28 PROCEDURE — 83735 ASSAY OF MAGNESIUM: CPT | Performed by: INTERNAL MEDICINE

## 2021-10-28 PROCEDURE — 25010000002 HEPARIN (PORCINE) PER 1000 UNITS: Performed by: INTERNAL MEDICINE

## 2021-10-28 PROCEDURE — 25010000002 ALBUMIN HUMAN 25% PER 50 ML: Performed by: INTERNAL MEDICINE

## 2021-10-28 PROCEDURE — 80048 BASIC METABOLIC PNL TOTAL CA: CPT | Performed by: SURGERY

## 2021-10-28 PROCEDURE — 85384 FIBRINOGEN ACTIVITY: CPT | Performed by: SURGERY

## 2021-10-28 PROCEDURE — 82962 GLUCOSE BLOOD TEST: CPT

## 2021-10-28 PROCEDURE — 94799 UNLISTED PULMONARY SVC/PX: CPT

## 2021-10-28 PROCEDURE — 63710000001 INSULIN DETEMIR PER 5 UNITS: Performed by: INTERNAL MEDICINE

## 2021-10-28 PROCEDURE — 94003 VENT MGMT INPAT SUBQ DAY: CPT

## 2021-10-28 PROCEDURE — 25010000002 CYCLOPHOSPHAMIDE PER 100 MG: Performed by: INTERNAL MEDICINE

## 2021-10-28 PROCEDURE — 84100 ASSAY OF PHOSPHORUS: CPT | Performed by: INTERNAL MEDICINE

## 2021-10-28 PROCEDURE — P9047 ALBUMIN (HUMAN), 25%, 50ML: HCPCS | Performed by: INTERNAL MEDICINE

## 2021-10-28 PROCEDURE — 99233 SBSQ HOSP IP/OBS HIGH 50: CPT | Performed by: INTERNAL MEDICINE

## 2021-10-28 PROCEDURE — 85027 COMPLETE CBC AUTOMATED: CPT | Performed by: SURGERY

## 2021-10-28 PROCEDURE — 97530 THERAPEUTIC ACTIVITIES: CPT

## 2021-10-28 PROCEDURE — 63710000001 PREDNISONE PER 1 MG: Performed by: INTERNAL MEDICINE

## 2021-10-28 PROCEDURE — 63710000001 INSULIN REGULAR HUMAN PER 5 UNITS: Performed by: SURGERY

## 2021-10-28 RX ORDER — POLYETHYLENE GLYCOL 3350 17 G/17G
17 POWDER, FOR SOLUTION ORAL DAILY PRN
Start: 2021-10-28 | End: 2021-11-12 | Stop reason: HOSPADM

## 2021-10-28 RX ORDER — L.ACID,PARA/B.BIFIDUM/S.THERM 8B CELL
1 CAPSULE ORAL DAILY
Start: 2021-10-28 | End: 2021-11-12 | Stop reason: HOSPADM

## 2021-10-28 RX ORDER — ACETAMINOPHEN 325 MG/1
650 TABLET ORAL EVERY 6 HOURS PRN
Start: 2021-10-28

## 2021-10-28 RX ORDER — DIAZEPAM 5 MG/ML
5 INJECTION, SOLUTION INTRAMUSCULAR; INTRAVENOUS EVERY 4 HOURS PRN
Start: 2021-10-28 | End: 2021-11-12 | Stop reason: HOSPADM

## 2021-10-28 RX ORDER — CHLORHEXIDINE GLUCONATE 0.12 MG/ML
15 RINSE ORAL EVERY 12 HOURS SCHEDULED
Start: 2021-10-28

## 2021-10-28 RX ORDER — PREDNISONE 50 MG/1
50 TABLET ORAL
Start: 2021-10-28 | End: 2021-11-12 | Stop reason: HOSPADM

## 2021-10-28 RX ORDER — FERROUS SULFATE 300 MG/5ML
300 LIQUID (ML) ORAL DAILY
Start: 2021-10-28

## 2021-10-28 RX ORDER — AMINO ACIDS/PROTEIN HYDROLYS 15G-100/30
30 LIQUID (ML) ORAL 3 TIMES DAILY
Start: 2021-10-28 | End: 2021-11-12 | Stop reason: HOSPADM

## 2021-10-28 RX ORDER — BISACODYL 10 MG
10 SUPPOSITORY, RECTAL RECTAL DAILY PRN
Start: 2021-10-28 | End: 2021-11-12 | Stop reason: HOSPADM

## 2021-10-28 RX ORDER — IPRATROPIUM BROMIDE AND ALBUTEROL SULFATE 2.5; .5 MG/3ML; MG/3ML
3 SOLUTION RESPIRATORY (INHALATION) EVERY 6 HOURS PRN
Qty: 360 ML
Start: 2021-10-28 | End: 2021-11-12 | Stop reason: HOSPADM

## 2021-10-28 RX ORDER — HYDROMORPHONE HYDROCHLORIDE 1 MG/ML
0.5 INJECTION, SOLUTION INTRAMUSCULAR; INTRAVENOUS; SUBCUTANEOUS EVERY 4 HOURS PRN
Start: 2021-10-28 | End: 2021-11-12 | Stop reason: HOSPADM

## 2021-10-28 RX ORDER — FOLIC ACID/VIT B COMPLEX AND C 0.8 MG
1 TABLET ORAL DAILY
Qty: 30 TABLET
Start: 2021-10-28

## 2021-10-28 RX ORDER — ONDANSETRON 2 MG/ML
4 INJECTION INTRAMUSCULAR; INTRAVENOUS EVERY 6 HOURS PRN
Start: 2021-10-28 | End: 2021-11-12 | Stop reason: HOSPADM

## 2021-10-28 RX ORDER — LOPERAMIDE HYDROCHLORIDE 2 MG/1
2 CAPSULE ORAL 4 TIMES DAILY PRN
Start: 2021-10-28 | End: 2021-11-12 | Stop reason: HOSPADM

## 2021-10-28 RX ORDER — OXYCODONE HCL 5 MG/5 ML
5 SOLUTION, ORAL ORAL EVERY 6 HOURS PRN
Start: 2021-10-28 | End: 2021-11-12 | Stop reason: HOSPADM

## 2021-10-28 RX ORDER — ALBUMIN (HUMAN) 12.5 G/50ML
SOLUTION INTRAVENOUS
Status: COMPLETED
Start: 2021-10-28 | End: 2021-10-31

## 2021-10-28 RX ORDER — AMOXICILLIN 250 MG
2 CAPSULE ORAL 2 TIMES DAILY PRN
Start: 2021-10-28

## 2021-10-28 RX ORDER — FAMOTIDINE 20 MG/1
20 TABLET, FILM COATED ORAL DAILY
Start: 2021-10-28

## 2021-10-28 RX ORDER — ALBUMIN (HUMAN) 12.5 G/50ML
12.5 SOLUTION INTRAVENOUS AS NEEDED
Status: ACTIVE | OUTPATIENT
Start: 2021-10-28 | End: 2021-10-29

## 2021-10-28 RX ORDER — GUAR GUM
1 PACKET (EA) ORAL 3 TIMES DAILY
Start: 2021-10-28 | End: 2021-11-12 | Stop reason: HOSPADM

## 2021-10-28 RX ORDER — CHOLECALCIFEROL (VITAMIN D3) 125 MCG
10 CAPSULE ORAL NIGHTLY
Start: 2021-10-28 | End: 2021-11-12 | Stop reason: HOSPADM

## 2021-10-28 RX ORDER — ATOVAQUONE 750 MG/5ML
1500 SUSPENSION ORAL
Qty: 300 ML | Refills: 0
Start: 2021-10-28 | End: 2021-11-27

## 2021-10-28 RX ADMIN — Medication 10 MG: at 19:50

## 2021-10-28 RX ADMIN — ALBUMIN HUMAN 50 G: 0.25 SOLUTION INTRAVENOUS at 08:21

## 2021-10-28 RX ADMIN — Medication 30 ML: at 13:09

## 2021-10-28 RX ADMIN — INSULIN DETEMIR 5 UNITS: 100 INJECTION, SOLUTION SUBCUTANEOUS at 13:08

## 2021-10-28 RX ADMIN — INSULIN DETEMIR 5 UNITS: 100 INJECTION, SOLUTION SUBCUTANEOUS at 19:50

## 2021-10-28 RX ADMIN — PREDNISONE 50 MG: 20 TABLET ORAL at 13:08

## 2021-10-28 RX ADMIN — Medication 30 ML: at 19:51

## 2021-10-28 RX ADMIN — CHLORHEXIDINE GLUCONATE 15 ML: 1.2 SOLUTION ORAL at 13:06

## 2021-10-28 RX ADMIN — Medication 1 CAPSULE: at 13:07

## 2021-10-28 RX ADMIN — Medication 1 TABLET: at 13:06

## 2021-10-28 RX ADMIN — ATOVAQUONE 1500 MG: 750 SUSPENSION ORAL at 13:07

## 2021-10-28 RX ADMIN — CHLORHEXIDINE GLUCONATE 15 ML: 1.2 SOLUTION ORAL at 19:50

## 2021-10-28 RX ADMIN — Medication 1 PACKET: at 19:51

## 2021-10-28 RX ADMIN — INSULIN HUMAN 2 UNITS: 100 INJECTION, SOLUTION PARENTERAL at 23:57

## 2021-10-28 RX ADMIN — HEPARIN SODIUM 1600 UNITS: 1000 INJECTION INTRAVENOUS; SUBCUTANEOUS at 12:25

## 2021-10-28 RX ADMIN — INSULIN HUMAN 3 UNITS: 100 INJECTION, SOLUTION PARENTERAL at 00:20

## 2021-10-28 RX ADMIN — MINERAL SUPPLEMENT IRON 300 MG / 5 ML STRENGTH LIQUID 100 PER BOX UNFLAVORED 300 MG: at 13:07

## 2021-10-28 RX ADMIN — INSULIN HUMAN 3 UNITS: 100 INJECTION, SOLUTION PARENTERAL at 18:02

## 2021-10-28 RX ADMIN — FAMOTIDINE 20 MG: 20 TABLET, FILM COATED ORAL at 13:08

## 2021-10-28 RX ADMIN — Medication 100 MG: at 18:06

## 2021-10-28 RX ADMIN — Medication 1 PACKET: at 13:09

## 2021-10-29 LAB
ALBUMIN SERPL-MCNC: 4.2 G/DL (ref 3.5–5.2)
ANION GAP SERPL CALCULATED.3IONS-SCNC: 16 MMOL/L (ref 5–15)
ARTERIAL PATENCY WRIST A: NORMAL
ARTERIAL PATENCY WRIST A: POSITIVE
ATMOSPHERIC PRESS: ABNORMAL MM[HG]
ATMOSPHERIC PRESS: NORMAL MM[HG]
BASE EXCESS BLDA CALC-SCNC: 3.9 MMOL/L (ref 0–2)
BASE EXCESS BLDA CALC-SCNC: NORMAL MMOL/L
BDY SITE: ABNORMAL
BDY SITE: NORMAL
BODY TEMPERATURE: 37 C
BODY TEMPERATURE: NORMAL
BUN SERPL-MCNC: 69 MG/DL (ref 8–23)
BUN/CREAT SERPL: 15.1 (ref 7–25)
CALCIUM SPEC-SCNC: 8.9 MG/DL (ref 8.6–10.5)
CHLORIDE SERPL-SCNC: 96 MMOL/L (ref 98–107)
CO2 BLDA-SCNC: 28.6 MMOL/L (ref 22–33)
CO2 BLDA-SCNC: NORMAL MMOL/L
CO2 SERPL-SCNC: 26 MMOL/L (ref 22–29)
COHGB MFR BLD: 1.5 % (ref 0–2)
COHGB MFR BLD: NORMAL %
CREAT SERPL-MCNC: 4.57 MG/DL (ref 0.76–1.27)
DEPRECATED RDW RBC AUTO: 55.2 FL (ref 37–54)
EPAP: 0
EPAP: 0
ERYTHROCYTE [DISTWIDTH] IN BLOOD BY AUTOMATED COUNT: 17.2 % (ref 12.3–15.4)
FIBRINOGEN PPP-MCNC: 384 MG/DL (ref 220–470)
GFR SERPL CREATININE-BSD FRML MDRD: 13 ML/MIN/1.73
GFR SERPL CREATININE-BSD FRML MDRD: ABNORMAL ML/MIN/{1.73_M2}
GLUCOSE BLDC GLUCOMTR-MCNC: 188 MG/DL (ref 70–130)
GLUCOSE BLDC GLUCOMTR-MCNC: 199 MG/DL (ref 70–130)
GLUCOSE BLDC GLUCOMTR-MCNC: 208 MG/DL (ref 70–130)
GLUCOSE BLDC GLUCOMTR-MCNC: 232 MG/DL (ref 70–130)
GLUCOSE BLDC GLUCOMTR-MCNC: 96 MG/DL (ref 70–130)
GLUCOSE SERPL-MCNC: 86 MG/DL (ref 65–99)
HCO3 BLDA-SCNC: 27.4 MMOL/L (ref 20–26)
HCO3 BLDA-SCNC: NORMAL MMOL/L
HCT VFR BLD AUTO: 25.4 % (ref 37.5–51)
HCT VFR BLD CALC: 26.6 % (ref 38–51)
HCT VFR BLD CALC: NORMAL %
HGB BLD-MCNC: 8.7 G/DL (ref 13–17.7)
HGB BLDA-MCNC: 8.7 G/DL (ref 13.5–17.5)
HGB BLDA-MCNC: NORMAL G/DL
INHALED O2 CONCENTRATION: 35 %
INHALED O2 CONCENTRATION: NORMAL %
IPAP: 0
IPAP: 0
MCH RBC QN AUTO: 31.9 PG (ref 26.6–33)
MCHC RBC AUTO-ENTMCNC: 34.3 G/DL (ref 31.5–35.7)
MCV RBC AUTO: 93 FL (ref 79–97)
METHGB BLD QL: 1 % (ref 0–1.5)
METHGB BLD QL: NORMAL
MODALITY: ABNORMAL
MODALITY: NORMAL
NOTE: ABNORMAL
NOTE: NORMAL
OXYHGB MFR BLDV: 93.8 % (ref 94–99)
OXYHGB MFR BLDV: NORMAL %
PAW @ PEAK INSP FLOW SETTING VENT: 0 CMH2O
PAW @ PEAK INSP FLOW SETTING VENT: 0 CMH2O
PCO2 BLDA: 36.2 MM HG (ref 35–45)
PCO2 BLDA: NORMAL MM[HG]
PCO2 TEMP ADJ BLD: 36.2 MM HG (ref 35–48)
PCO2 TEMP ADJ BLD: NORMAL MM[HG]
PH BLDA: 7.49 PH UNITS (ref 7.35–7.45)
PH BLDA: NORMAL [PH]
PH, TEMP CORRECTED: 7.49 PH UNITS
PH, TEMP CORRECTED: NORMAL
PHOSPHATE SERPL-MCNC: 4.3 MG/DL (ref 2.5–4.5)
PLATELET # BLD AUTO: 227 10*3/MM3 (ref 140–450)
PMV BLD AUTO: 12.4 FL (ref 6–12)
PO2 BLDA: 71.7 MM HG (ref 83–108)
PO2 BLDA: NORMAL MM[HG]
PO2 TEMP ADJ BLD: 71.7 MM HG (ref 83–108)
PO2 TEMP ADJ BLD: NORMAL MM[HG]
POTASSIUM SERPL-SCNC: 4 MMOL/L (ref 3.5–5.2)
RBC # BLD AUTO: 2.73 10*6/MM3 (ref 4.14–5.8)
SODIUM SERPL-SCNC: 138 MMOL/L (ref 136–145)
TOTAL RATE: 0 BREATHS/MINUTE
TOTAL RATE: 0 BREATHS/MINUTE
WBC # BLD AUTO: 12.23 10*3/MM3 (ref 3.4–10.8)

## 2021-10-29 PROCEDURE — 63710000001 PREDNISONE PER 1 MG: Performed by: INTERNAL MEDICINE

## 2021-10-29 PROCEDURE — 82962 GLUCOSE BLOOD TEST: CPT

## 2021-10-29 PROCEDURE — 99232 SBSQ HOSP IP/OBS MODERATE 35: CPT | Performed by: INTERNAL MEDICINE

## 2021-10-29 PROCEDURE — 94799 UNLISTED PULMONARY SVC/PX: CPT

## 2021-10-29 PROCEDURE — 36600 WITHDRAWAL OF ARTERIAL BLOOD: CPT

## 2021-10-29 PROCEDURE — 82375 ASSAY CARBOXYHB QUANT: CPT

## 2021-10-29 PROCEDURE — 63710000001 INSULIN REGULAR HUMAN PER 5 UNITS: Performed by: SURGERY

## 2021-10-29 PROCEDURE — 63710000001 PREDNISONE PER 5 MG: Performed by: INTERNAL MEDICINE

## 2021-10-29 PROCEDURE — 82805 BLOOD GASES W/O2 SATURATION: CPT

## 2021-10-29 PROCEDURE — 85384 FIBRINOGEN ACTIVITY: CPT | Performed by: SURGERY

## 2021-10-29 PROCEDURE — 80069 RENAL FUNCTION PANEL: CPT | Performed by: INTERNAL MEDICINE

## 2021-10-29 PROCEDURE — 25010000002 CYCLOPHOSPHAMIDE PER 100 MG: Performed by: INTERNAL MEDICINE

## 2021-10-29 PROCEDURE — 63710000001 INSULIN DETEMIR PER 5 UNITS: Performed by: INTERNAL MEDICINE

## 2021-10-29 PROCEDURE — 97530 THERAPEUTIC ACTIVITIES: CPT

## 2021-10-29 PROCEDURE — 85027 COMPLETE CBC AUTOMATED: CPT | Performed by: SURGERY

## 2021-10-29 PROCEDURE — 83050 HGB METHEMOGLOBIN QUAN: CPT

## 2021-10-29 RX ORDER — CLONAZEPAM 0.5 MG/1
0.5 TABLET ORAL 2 TIMES DAILY PRN
Status: DISCONTINUED | OUTPATIENT
Start: 2021-10-29 | End: 2021-11-04

## 2021-10-29 RX ADMIN — PREDNISONE 50 MG: 20 TABLET ORAL at 14:02

## 2021-10-29 RX ADMIN — INSULIN DETEMIR 5 UNITS: 100 INJECTION, SOLUTION SUBCUTANEOUS at 09:21

## 2021-10-29 RX ADMIN — INSULIN HUMAN 2 UNITS: 100 INJECTION, SOLUTION PARENTERAL at 12:18

## 2021-10-29 RX ADMIN — CHLORHEXIDINE GLUCONATE 15 ML: 1.2 SOLUTION ORAL at 09:20

## 2021-10-29 RX ADMIN — ATOVAQUONE 1500 MG: 750 SUSPENSION ORAL at 12:18

## 2021-10-29 RX ADMIN — INSULIN HUMAN 3 UNITS: 100 INJECTION, SOLUTION PARENTERAL at 18:15

## 2021-10-29 RX ADMIN — MINERAL SUPPLEMENT IRON 300 MG / 5 ML STRENGTH LIQUID 100 PER BOX UNFLAVORED 300 MG: at 09:21

## 2021-10-29 RX ADMIN — Medication 30 ML: at 09:21

## 2021-10-29 RX ADMIN — Medication 1 PACKET: at 09:21

## 2021-10-29 RX ADMIN — CHLORHEXIDINE GLUCONATE 15 ML: 1.2 SOLUTION ORAL at 20:24

## 2021-10-29 RX ADMIN — Medication 1 PACKET: at 20:24

## 2021-10-29 RX ADMIN — Medication 1 CAPSULE: at 09:20

## 2021-10-29 RX ADMIN — Medication 30 ML: at 20:24

## 2021-10-29 RX ADMIN — FAMOTIDINE 20 MG: 20 TABLET, FILM COATED ORAL at 09:20

## 2021-10-29 RX ADMIN — CLONAZEPAM 0.5 MG: 0.5 TABLET ORAL at 15:02

## 2021-10-29 RX ADMIN — Medication 100 MG: at 18:16

## 2021-10-29 RX ADMIN — INSULIN HUMAN 2 UNITS: 100 INJECTION, SOLUTION PARENTERAL at 23:45

## 2021-10-29 RX ADMIN — Medication 30 ML: at 18:15

## 2021-10-29 RX ADMIN — Medication 10 MG: at 20:24

## 2021-10-29 RX ADMIN — SERTRALINE 50 MG: 50 TABLET, FILM COATED ORAL at 09:20

## 2021-10-29 RX ADMIN — INSULIN DETEMIR 5 UNITS: 100 INJECTION, SOLUTION SUBCUTANEOUS at 20:24

## 2021-10-29 RX ADMIN — Medication 1 PACKET: at 18:15

## 2021-10-29 RX ADMIN — Medication 1 TABLET: at 09:20

## 2021-10-30 ENCOUNTER — APPOINTMENT (OUTPATIENT)
Dept: NEPHROLOGY | Facility: HOSPITAL | Age: 66
End: 2021-10-30

## 2021-10-30 LAB
ANION GAP SERPL CALCULATED.3IONS-SCNC: 22 MMOL/L (ref 5–15)
ARTERIAL PATENCY WRIST A: ABNORMAL
ATMOSPHERIC PRESS: ABNORMAL MM[HG]
BASE EXCESS BLDA CALC-SCNC: 0.4 MMOL/L (ref 0–2)
BDY SITE: ABNORMAL
BODY TEMPERATURE: 37 C
BUN SERPL-MCNC: 111 MG/DL (ref 8–23)
BUN/CREAT SERPL: 16.4 (ref 7–25)
CALCIUM SPEC-SCNC: 8.9 MG/DL (ref 8.6–10.5)
CHLORIDE SERPL-SCNC: 92 MMOL/L (ref 98–107)
CO2 BLDA-SCNC: 25.2 MMOL/L (ref 22–33)
CO2 SERPL-SCNC: 21 MMOL/L (ref 22–29)
COHGB MFR BLD: 1.6 % (ref 0–2)
CREAT SERPL-MCNC: 6.76 MG/DL (ref 0.76–1.27)
DEPRECATED RDW RBC AUTO: 53.4 FL (ref 37–54)
ERYTHROCYTE [DISTWIDTH] IN BLOOD BY AUTOMATED COUNT: 17.2 % (ref 12.3–15.4)
GFR SERPL CREATININE-BSD FRML MDRD: 8 ML/MIN/1.73
GFR SERPL CREATININE-BSD FRML MDRD: ABNORMAL ML/MIN/{1.73_M2}
GLUCOSE BLDC GLUCOMTR-MCNC: 105 MG/DL (ref 70–130)
GLUCOSE BLDC GLUCOMTR-MCNC: 178 MG/DL (ref 70–130)
GLUCOSE BLDC GLUCOMTR-MCNC: 204 MG/DL (ref 70–130)
GLUCOSE BLDC GLUCOMTR-MCNC: 232 MG/DL (ref 70–130)
GLUCOSE BLDC GLUCOMTR-MCNC: 91 MG/DL (ref 70–130)
GLUCOSE SERPL-MCNC: 93 MG/DL (ref 65–99)
HCO3 BLDA-SCNC: 24.2 MMOL/L (ref 20–26)
HCT VFR BLD AUTO: 25.3 % (ref 37.5–51)
HCT VFR BLD CALC: 25.4 % (ref 38–51)
HGB BLD-MCNC: 8.6 G/DL (ref 13–17.7)
HGB BLDA-MCNC: 8.3 G/DL (ref 13.5–17.5)
INHALED O2 CONCENTRATION: 35 %
MCH RBC QN AUTO: 31.7 PG (ref 26.6–33)
MCHC RBC AUTO-ENTMCNC: 34 G/DL (ref 31.5–35.7)
MCV RBC AUTO: 93.4 FL (ref 79–97)
METHGB BLD QL: 0.2 % (ref 0–1.5)
MODALITY: ABNORMAL
NOTE: ABNORMAL
OXYHGB MFR BLDV: 94.2 % (ref 94–99)
PCO2 BLDA: 34.1 MM HG (ref 35–45)
PCO2 TEMP ADJ BLD: 34.1 MM HG (ref 35–48)
PH BLDA: 7.46 PH UNITS (ref 7.35–7.45)
PH, TEMP CORRECTED: 7.46 PH UNITS
PLATELET # BLD AUTO: 228 10*3/MM3 (ref 140–450)
PMV BLD AUTO: 12.3 FL (ref 6–12)
PO2 BLDA: 71.3 MM HG (ref 83–108)
PO2 TEMP ADJ BLD: 71.3 MM HG (ref 83–108)
POTASSIUM SERPL-SCNC: 4.3 MMOL/L (ref 3.5–5.2)
RBC # BLD AUTO: 2.71 10*6/MM3 (ref 4.14–5.8)
SODIUM SERPL-SCNC: 135 MMOL/L (ref 136–145)
VENTILATOR MODE: ABNORMAL
WBC # BLD AUTO: 12.51 10*3/MM3 (ref 3.4–10.8)

## 2021-10-30 PROCEDURE — 25010000002 HEPARIN (PORCINE) PER 1000 UNITS: Performed by: INTERNAL MEDICINE

## 2021-10-30 PROCEDURE — 36600 WITHDRAWAL OF ARTERIAL BLOOD: CPT

## 2021-10-30 PROCEDURE — 63710000001 INSULIN DETEMIR PER 5 UNITS: Performed by: INTERNAL MEDICINE

## 2021-10-30 PROCEDURE — 80048 BASIC METABOLIC PNL TOTAL CA: CPT | Performed by: INTERNAL MEDICINE

## 2021-10-30 PROCEDURE — 63710000001 PREDNISONE PER 5 MG: Performed by: INTERNAL MEDICINE

## 2021-10-30 PROCEDURE — 63710000001 INSULIN REGULAR HUMAN PER 5 UNITS: Performed by: SURGERY

## 2021-10-30 PROCEDURE — P9047 ALBUMIN (HUMAN), 25%, 50ML: HCPCS

## 2021-10-30 PROCEDURE — 82805 BLOOD GASES W/O2 SATURATION: CPT

## 2021-10-30 PROCEDURE — 25010000002 EPOETIN ALFA-EPBX 10000 UNIT/ML SOLUTION: Performed by: INTERNAL MEDICINE

## 2021-10-30 PROCEDURE — 94799 UNLISTED PULMONARY SVC/PX: CPT

## 2021-10-30 PROCEDURE — 25010000002 ALBUMIN HUMAN 25% PER 50 ML

## 2021-10-30 PROCEDURE — 99232 SBSQ HOSP IP/OBS MODERATE 35: CPT | Performed by: INTERNAL MEDICINE

## 2021-10-30 PROCEDURE — 25010000002 CYCLOPHOSPHAMIDE PER 100 MG: Performed by: INTERNAL MEDICINE

## 2021-10-30 PROCEDURE — 82962 GLUCOSE BLOOD TEST: CPT

## 2021-10-30 PROCEDURE — 83050 HGB METHEMOGLOBIN QUAN: CPT

## 2021-10-30 PROCEDURE — 63710000001 PREDNISONE PER 1 MG: Performed by: INTERNAL MEDICINE

## 2021-10-30 PROCEDURE — 82375 ASSAY CARBOXYHB QUANT: CPT

## 2021-10-30 PROCEDURE — 85027 COMPLETE CBC AUTOMATED: CPT | Performed by: SURGERY

## 2021-10-30 RX ORDER — ALBUMIN (HUMAN) 12.5 G/50ML
12.5 SOLUTION INTRAVENOUS AS NEEDED
Status: ACTIVE | OUTPATIENT
Start: 2021-10-30 | End: 2021-10-30

## 2021-10-30 RX ORDER — ALBUMIN (HUMAN) 12.5 G/50ML
SOLUTION INTRAVENOUS
Status: COMPLETED
Start: 2021-10-30 | End: 2021-10-30

## 2021-10-30 RX ADMIN — Medication 1 CAPSULE: at 08:34

## 2021-10-30 RX ADMIN — Medication 30 ML: at 20:11

## 2021-10-30 RX ADMIN — EPOETIN ALFA-EPBX 10000 UNITS: 10000 INJECTION, SOLUTION INTRAVENOUS; SUBCUTANEOUS at 08:40

## 2021-10-30 RX ADMIN — ALBUMIN (HUMAN) 25 G: 12.5 SOLUTION INTRAVENOUS at 09:51

## 2021-10-30 RX ADMIN — HEPARIN SODIUM 1600 UNITS: 1000 INJECTION INTRAVENOUS; SUBCUTANEOUS at 12:30

## 2021-10-30 RX ADMIN — CHLORHEXIDINE GLUCONATE 15 ML: 1.2 SOLUTION ORAL at 08:33

## 2021-10-30 RX ADMIN — Medication 1 PACKET: at 08:33

## 2021-10-30 RX ADMIN — MINERAL SUPPLEMENT IRON 300 MG / 5 ML STRENGTH LIQUID 100 PER BOX UNFLAVORED 300 MG: at 08:33

## 2021-10-30 RX ADMIN — INSULIN DETEMIR 5 UNITS: 100 INJECTION, SOLUTION SUBCUTANEOUS at 20:13

## 2021-10-30 RX ADMIN — INSULIN DETEMIR 5 UNITS: 100 INJECTION, SOLUTION SUBCUTANEOUS at 08:40

## 2021-10-30 RX ADMIN — Medication 30 ML: at 08:33

## 2021-10-30 RX ADMIN — ALBUMIN HUMAN 25 G: 0.25 SOLUTION INTRAVENOUS at 10:30

## 2021-10-30 RX ADMIN — Medication 1 PACKET: at 20:11

## 2021-10-30 RX ADMIN — INSULIN HUMAN 3 UNITS: 100 INJECTION, SOLUTION PARENTERAL at 23:32

## 2021-10-30 RX ADMIN — Medication 100 MG: at 18:07

## 2021-10-30 RX ADMIN — PREDNISONE 50 MG: 20 TABLET ORAL at 14:24

## 2021-10-30 RX ADMIN — ATOVAQUONE 1500 MG: 750 SUSPENSION ORAL at 12:08

## 2021-10-30 RX ADMIN — ALBUMIN HUMAN 25 G: 0.25 SOLUTION INTRAVENOUS at 09:51

## 2021-10-30 RX ADMIN — Medication 1 PACKET: at 16:04

## 2021-10-30 RX ADMIN — FAMOTIDINE 20 MG: 20 TABLET, FILM COATED ORAL at 08:34

## 2021-10-30 RX ADMIN — INSULIN HUMAN 2 UNITS: 100 INJECTION, SOLUTION PARENTERAL at 18:49

## 2021-10-30 RX ADMIN — CHLORHEXIDINE GLUCONATE 15 ML: 1.2 SOLUTION ORAL at 20:11

## 2021-10-30 RX ADMIN — CLONAZEPAM 0.5 MG: 0.5 TABLET ORAL at 20:11

## 2021-10-30 RX ADMIN — Medication 1 TABLET: at 08:34

## 2021-10-30 RX ADMIN — Medication 30 ML: at 16:05

## 2021-10-30 RX ADMIN — Medication 10 MG: at 20:11

## 2021-10-30 RX ADMIN — SERTRALINE 50 MG: 50 TABLET, FILM COATED ORAL at 08:34

## 2021-10-30 RX ADMIN — ALBUMIN (HUMAN) 25 G: 12.5 SOLUTION INTRAVENOUS at 10:30

## 2021-10-31 LAB
ALBUMIN SERPL-MCNC: 4.2 G/DL (ref 3.5–5.2)
ANION GAP SERPL CALCULATED.3IONS-SCNC: 13 MMOL/L (ref 5–15)
ARTERIAL PATENCY WRIST A: ABNORMAL
ATMOSPHERIC PRESS: ABNORMAL MM[HG]
BASE EXCESS BLDA CALC-SCNC: 3.1 MMOL/L (ref 0–2)
BDY SITE: ABNORMAL
BODY TEMPERATURE: 37 C
BUN SERPL-MCNC: 69 MG/DL (ref 8–23)
BUN/CREAT SERPL: 15.4 (ref 7–25)
CALCIUM SPEC-SCNC: 8.6 MG/DL (ref 8.6–10.5)
CHLORIDE SERPL-SCNC: 96 MMOL/L (ref 98–107)
CO2 BLDA-SCNC: 28.2 MMOL/L (ref 22–33)
CO2 SERPL-SCNC: 27 MMOL/L (ref 22–29)
COHGB MFR BLD: 1.7 % (ref 0–2)
CREAT SERPL-MCNC: 4.48 MG/DL (ref 0.76–1.27)
DEPRECATED RDW RBC AUTO: 56.8 FL (ref 37–54)
EPAP: 0
ERYTHROCYTE [DISTWIDTH] IN BLOOD BY AUTOMATED COUNT: 17.7 % (ref 12.3–15.4)
GFR SERPL CREATININE-BSD FRML MDRD: 13 ML/MIN/1.73
GFR SERPL CREATININE-BSD FRML MDRD: ABNORMAL ML/MIN/{1.73_M2}
GLUCOSE BLDC GLUCOMTR-MCNC: 112 MG/DL (ref 70–130)
GLUCOSE BLDC GLUCOMTR-MCNC: 135 MG/DL (ref 70–130)
GLUCOSE BLDC GLUCOMTR-MCNC: 155 MG/DL (ref 70–130)
GLUCOSE BLDC GLUCOMTR-MCNC: 206 MG/DL (ref 70–130)
GLUCOSE SERPL-MCNC: 134 MG/DL (ref 65–99)
HCO3 BLDA-SCNC: 27 MMOL/L (ref 20–26)
HCT VFR BLD AUTO: 21.3 % (ref 37.5–51)
HCT VFR BLD CALC: 23.2 % (ref 38–51)
HGB BLD-MCNC: 7.1 G/DL (ref 13–17.7)
HGB BLDA-MCNC: 7.6 G/DL (ref 13.5–17.5)
INHALED O2 CONCENTRATION: 40 %
IPAP: 0
MCH RBC QN AUTO: 32.3 PG (ref 26.6–33)
MCHC RBC AUTO-ENTMCNC: 33.3 G/DL (ref 31.5–35.7)
MCV RBC AUTO: 96.8 FL (ref 79–97)
METHGB BLD QL: ABNORMAL
MODALITY: ABNORMAL
NOTE: ABNORMAL
OXYHGB MFR BLDV: 92.4 % (ref 94–99)
PAW @ PEAK INSP FLOW SETTING VENT: 0 CMH2O
PCO2 BLDA: 37.3 MM HG (ref 35–45)
PCO2 TEMP ADJ BLD: 37.3 MM HG (ref 35–48)
PH BLDA: 7.47 PH UNITS (ref 7.35–7.45)
PH, TEMP CORRECTED: 7.47 PH UNITS
PHOSPHATE SERPL-MCNC: 4.5 MG/DL (ref 2.5–4.5)
PLATELET # BLD AUTO: 202 10*3/MM3 (ref 140–450)
PMV BLD AUTO: 12.5 FL (ref 6–12)
PO2 BLDA: 61.5 MM HG (ref 83–108)
PO2 TEMP ADJ BLD: 61.5 MM HG (ref 83–108)
POTASSIUM SERPL-SCNC: 4.2 MMOL/L (ref 3.5–5.2)
RBC # BLD AUTO: 2.2 10*6/MM3 (ref 4.14–5.8)
SODIUM SERPL-SCNC: 136 MMOL/L (ref 136–145)
TOTAL RATE: 0 BREATHS/MINUTE
WBC # BLD AUTO: 8.04 10*3/MM3 (ref 3.4–10.8)

## 2021-10-31 PROCEDURE — 83050 HGB METHEMOGLOBIN QUAN: CPT

## 2021-10-31 PROCEDURE — 25010000002 CYCLOPHOSPHAMIDE PER 100 MG: Performed by: INTERNAL MEDICINE

## 2021-10-31 PROCEDURE — 63710000001 INSULIN DETEMIR PER 5 UNITS: Performed by: INTERNAL MEDICINE

## 2021-10-31 PROCEDURE — 85027 COMPLETE CBC AUTOMATED: CPT | Performed by: SURGERY

## 2021-10-31 PROCEDURE — 97530 THERAPEUTIC ACTIVITIES: CPT

## 2021-10-31 PROCEDURE — 63710000001 PREDNISONE PER 5 MG: Performed by: INTERNAL MEDICINE

## 2021-10-31 PROCEDURE — 25010000002 ALBUMIN HUMAN 25% PER 50 ML

## 2021-10-31 PROCEDURE — 80069 RENAL FUNCTION PANEL: CPT | Performed by: INTERNAL MEDICINE

## 2021-10-31 PROCEDURE — 82962 GLUCOSE BLOOD TEST: CPT

## 2021-10-31 PROCEDURE — 82375 ASSAY CARBOXYHB QUANT: CPT

## 2021-10-31 PROCEDURE — 94799 UNLISTED PULMONARY SVC/PX: CPT

## 2021-10-31 PROCEDURE — 25010000002 HEPARIN (PORCINE) PER 1000 UNITS: Performed by: INTERNAL MEDICINE

## 2021-10-31 PROCEDURE — 82805 BLOOD GASES W/O2 SATURATION: CPT

## 2021-10-31 PROCEDURE — 63710000001 INSULIN REGULAR HUMAN PER 5 UNITS: Performed by: SURGERY

## 2021-10-31 PROCEDURE — P9047 ALBUMIN (HUMAN), 25%, 50ML: HCPCS

## 2021-10-31 PROCEDURE — 36600 WITHDRAWAL OF ARTERIAL BLOOD: CPT

## 2021-10-31 PROCEDURE — 99232 SBSQ HOSP IP/OBS MODERATE 35: CPT | Performed by: INTERNAL MEDICINE

## 2021-10-31 PROCEDURE — 63710000001 PREDNISONE PER 1 MG: Performed by: INTERNAL MEDICINE

## 2021-10-31 RX ORDER — HEPARIN SODIUM 5000 [USP'U]/ML
5000 INJECTION, SOLUTION INTRAVENOUS; SUBCUTANEOUS EVERY 8 HOURS SCHEDULED
Status: DISCONTINUED | OUTPATIENT
Start: 2021-10-31 | End: 2021-10-31

## 2021-10-31 RX ORDER — HEPARIN SODIUM 5000 [USP'U]/ML
5000 INJECTION, SOLUTION INTRAVENOUS; SUBCUTANEOUS EVERY 12 HOURS SCHEDULED
Status: DISCONTINUED | OUTPATIENT
Start: 2021-10-31 | End: 2021-11-12 | Stop reason: HOSPADM

## 2021-10-31 RX ADMIN — Medication 30 ML: at 21:31

## 2021-10-31 RX ADMIN — INSULIN HUMAN 2 UNITS: 100 INJECTION, SOLUTION PARENTERAL at 17:49

## 2021-10-31 RX ADMIN — Medication 30 ML: at 08:10

## 2021-10-31 RX ADMIN — SERTRALINE 50 MG: 50 TABLET, FILM COATED ORAL at 08:11

## 2021-10-31 RX ADMIN — HEPARIN SODIUM 5000 UNITS: 5000 INJECTION, SOLUTION INTRAVENOUS; SUBCUTANEOUS at 12:19

## 2021-10-31 RX ADMIN — HEPARIN SODIUM 5000 UNITS: 5000 INJECTION, SOLUTION INTRAVENOUS; SUBCUTANEOUS at 21:31

## 2021-10-31 RX ADMIN — Medication 30 ML: at 16:14

## 2021-10-31 RX ADMIN — Medication 1 PACKET: at 16:15

## 2021-10-31 RX ADMIN — INSULIN DETEMIR 5 UNITS: 100 INJECTION, SOLUTION SUBCUTANEOUS at 08:10

## 2021-10-31 RX ADMIN — PREDNISONE 50 MG: 20 TABLET ORAL at 16:14

## 2021-10-31 RX ADMIN — CHLORHEXIDINE GLUCONATE 15 ML: 1.2 SOLUTION ORAL at 21:31

## 2021-10-31 RX ADMIN — Medication 10 MG: at 21:31

## 2021-10-31 RX ADMIN — CHLORHEXIDINE GLUCONATE 15 ML: 1.2 SOLUTION ORAL at 08:11

## 2021-10-31 RX ADMIN — FAMOTIDINE 20 MG: 20 TABLET, FILM COATED ORAL at 08:11

## 2021-10-31 RX ADMIN — Medication 1 PACKET: at 21:30

## 2021-10-31 RX ADMIN — Medication 100 MG: at 17:50

## 2021-10-31 RX ADMIN — Medication 1 TABLET: at 08:11

## 2021-10-31 RX ADMIN — ALBUMIN (HUMAN) 12.5 G: 12.5 SOLUTION INTRAVENOUS at 00:05

## 2021-10-31 RX ADMIN — ALBUMIN HUMAN 12.5 G: 0.25 SOLUTION INTRAVENOUS at 00:05

## 2021-10-31 RX ADMIN — INSULIN DETEMIR 5 UNITS: 100 INJECTION, SOLUTION SUBCUTANEOUS at 21:31

## 2021-10-31 RX ADMIN — Medication 1 PACKET: at 08:11

## 2021-10-31 RX ADMIN — MINERAL SUPPLEMENT IRON 300 MG / 5 ML STRENGTH LIQUID 100 PER BOX UNFLAVORED 300 MG: at 08:11

## 2021-10-31 RX ADMIN — ATOVAQUONE 1500 MG: 750 SUSPENSION ORAL at 12:19

## 2021-10-31 RX ADMIN — Medication 1 CAPSULE: at 08:11

## 2021-11-01 LAB
ALBUMIN SERPL-MCNC: 4.1 G/DL (ref 3.5–5.2)
ANION GAP SERPL CALCULATED.3IONS-SCNC: 17 MMOL/L (ref 5–15)
BUN SERPL-MCNC: 106 MG/DL (ref 8–23)
BUN/CREAT SERPL: 16.7 (ref 7–25)
CALCIUM SPEC-SCNC: 8.6 MG/DL (ref 8.6–10.5)
CHLORIDE SERPL-SCNC: 92 MMOL/L (ref 98–107)
CO2 SERPL-SCNC: 23 MMOL/L (ref 22–29)
CREAT SERPL-MCNC: 6.35 MG/DL (ref 0.76–1.27)
DEPRECATED RDW RBC AUTO: 54.6 FL (ref 37–54)
ERYTHROCYTE [DISTWIDTH] IN BLOOD BY AUTOMATED COUNT: 17.4 % (ref 12.3–15.4)
GFR SERPL CREATININE-BSD FRML MDRD: 9 ML/MIN/1.73
GFR SERPL CREATININE-BSD FRML MDRD: ABNORMAL ML/MIN/{1.73_M2}
GLUCOSE BLDC GLUCOMTR-MCNC: 117 MG/DL (ref 70–130)
GLUCOSE BLDC GLUCOMTR-MCNC: 152 MG/DL (ref 70–130)
GLUCOSE BLDC GLUCOMTR-MCNC: 197 MG/DL (ref 70–130)
GLUCOSE BLDC GLUCOMTR-MCNC: 201 MG/DL (ref 70–130)
GLUCOSE SERPL-MCNC: 162 MG/DL (ref 65–99)
HCT VFR BLD AUTO: 23 % (ref 37.5–51)
HGB BLD-MCNC: 7.8 G/DL (ref 13–17.7)
MCH RBC QN AUTO: 32.5 PG (ref 26.6–33)
MCHC RBC AUTO-ENTMCNC: 33.9 G/DL (ref 31.5–35.7)
MCV RBC AUTO: 95.8 FL (ref 79–97)
PHOSPHATE SERPL-MCNC: 4.5 MG/DL (ref 2.5–4.5)
PLATELET # BLD AUTO: 212 10*3/MM3 (ref 140–450)
PMV BLD AUTO: 12.4 FL (ref 6–12)
POTASSIUM SERPL-SCNC: 4.3 MMOL/L (ref 3.5–5.2)
RBC # BLD AUTO: 2.4 10*6/MM3 (ref 4.14–5.8)
SODIUM SERPL-SCNC: 132 MMOL/L (ref 136–145)
WBC # BLD AUTO: 9 10*3/MM3 (ref 3.4–10.8)

## 2021-11-01 PROCEDURE — 80069 RENAL FUNCTION PANEL: CPT | Performed by: INTERNAL MEDICINE

## 2021-11-01 PROCEDURE — L8501 TRACHEOSTOMY SPEAKING VALVE: HCPCS

## 2021-11-01 PROCEDURE — 92610 EVALUATE SWALLOWING FUNCTION: CPT

## 2021-11-01 PROCEDURE — 63710000001 PREDNISONE PER 1 MG: Performed by: INTERNAL MEDICINE

## 2021-11-01 PROCEDURE — 82962 GLUCOSE BLOOD TEST: CPT

## 2021-11-01 PROCEDURE — 92597 ORAL SPEECH DEVICE EVAL: CPT

## 2021-11-01 PROCEDURE — 85027 COMPLETE CBC AUTOMATED: CPT | Performed by: SURGERY

## 2021-11-01 PROCEDURE — 25010000002 CYCLOPHOSPHAMIDE PER 100 MG: Performed by: INTERNAL MEDICINE

## 2021-11-01 PROCEDURE — 25010000002 HEPARIN (PORCINE) PER 1000 UNITS: Performed by: INTERNAL MEDICINE

## 2021-11-01 PROCEDURE — 94799 UNLISTED PULMONARY SVC/PX: CPT

## 2021-11-01 PROCEDURE — 97530 THERAPEUTIC ACTIVITIES: CPT

## 2021-11-01 PROCEDURE — 63710000001 INSULIN REGULAR HUMAN PER 5 UNITS: Performed by: SURGERY

## 2021-11-01 PROCEDURE — 99233 SBSQ HOSP IP/OBS HIGH 50: CPT | Performed by: INTERNAL MEDICINE

## 2021-11-01 PROCEDURE — 63710000001 INSULIN DETEMIR PER 5 UNITS: Performed by: INTERNAL MEDICINE

## 2021-11-01 RX ORDER — AMINO ACIDS/PROTEIN HYDROLYS 15G-100/30
30 LIQUID (ML) ORAL 2 TIMES DAILY
Status: DISCONTINUED | OUTPATIENT
Start: 2021-11-01 | End: 2021-11-02

## 2021-11-01 RX ORDER — MELATONIN
2000 DAILY
Status: DISCONTINUED | OUTPATIENT
Start: 2021-11-01 | End: 2021-11-02

## 2021-11-01 RX ORDER — PREDNISONE 20 MG/1
40 TABLET ORAL
Status: DISCONTINUED | OUTPATIENT
Start: 2021-11-01 | End: 2021-11-10

## 2021-11-01 RX ADMIN — ATOVAQUONE 1500 MG: 750 SUSPENSION ORAL at 14:19

## 2021-11-01 RX ADMIN — INSULIN HUMAN 2 UNITS: 100 INJECTION, SOLUTION PARENTERAL at 06:08

## 2021-11-01 RX ADMIN — FAMOTIDINE 20 MG: 20 TABLET, FILM COATED ORAL at 08:00

## 2021-11-01 RX ADMIN — Medication 100 MG: at 17:10

## 2021-11-01 RX ADMIN — Medication 1 CAPSULE: at 08:00

## 2021-11-01 RX ADMIN — INSULIN HUMAN 3 UNITS: 100 INJECTION, SOLUTION PARENTERAL at 01:00

## 2021-11-01 RX ADMIN — Medication 30 ML: at 08:01

## 2021-11-01 RX ADMIN — HEPARIN SODIUM 5000 UNITS: 5000 INJECTION, SOLUTION INTRAVENOUS; SUBCUTANEOUS at 08:00

## 2021-11-01 RX ADMIN — MINERAL SUPPLEMENT IRON 300 MG / 5 ML STRENGTH LIQUID 100 PER BOX UNFLAVORED 300 MG: at 08:00

## 2021-11-01 RX ADMIN — HEPARIN SODIUM 5000 UNITS: 5000 INJECTION, SOLUTION INTRAVENOUS; SUBCUTANEOUS at 20:18

## 2021-11-01 RX ADMIN — Medication 30 ML: at 20:24

## 2021-11-01 RX ADMIN — SERTRALINE 50 MG: 50 TABLET, FILM COATED ORAL at 08:00

## 2021-11-01 RX ADMIN — CLONAZEPAM 0.5 MG: 0.5 TABLET ORAL at 20:17

## 2021-11-01 RX ADMIN — Medication 1 PACKET: at 08:01

## 2021-11-01 RX ADMIN — Medication 1 TABLET: at 08:00

## 2021-11-01 RX ADMIN — PREDNISONE 40 MG: 20 TABLET ORAL at 14:19

## 2021-11-01 RX ADMIN — INSULIN HUMAN 3 UNITS: 100 INJECTION, SOLUTION PARENTERAL at 18:40

## 2021-11-01 RX ADMIN — Medication 1 PACKET: at 17:10

## 2021-11-01 RX ADMIN — INSULIN DETEMIR 5 UNITS: 100 INJECTION, SOLUTION SUBCUTANEOUS at 08:00

## 2021-11-01 RX ADMIN — Medication 10 MG: at 20:17

## 2021-11-01 RX ADMIN — Medication 2000 UNITS: at 14:19

## 2021-11-01 RX ADMIN — INSULIN DETEMIR 5 UNITS: 100 INJECTION, SOLUTION SUBCUTANEOUS at 20:17

## 2021-11-01 RX ADMIN — CHLORHEXIDINE GLUCONATE 15 ML: 1.2 SOLUTION ORAL at 08:00

## 2021-11-01 RX ADMIN — CHLORHEXIDINE GLUCONATE 15 ML: 1.2 SOLUTION ORAL at 20:17

## 2021-11-01 RX ADMIN — Medication 1 PACKET: at 20:24

## 2021-11-01 NOTE — CASE MANAGEMENT/SOCIAL WORK
Continued Stay Note   Michelle     Patient Name: Keshav Dickens  MRN: [id number]  Today's Date: 11/1/2021    Admit Date: 10/4/2021     Discharge Plan     Row Name 11/01/21 1119       Plan    Plan ongoing    Patient/Family in Agreement with Plan yes    Plan Comments Mr. Dickens has been on trach collar for over 72 hours.  He is more alert and starting to participate more with physical therapy.  I spoke with wife at bedside and explained that we have still not got a facility to accept patient because of the Cytoxan.  CM following.    Final Discharge Disposition Code 30 - still a patient               Discharge Codes    No documentation.                     Patricia Disla RN

## 2021-11-01 NOTE — PROGRESS NOTES
Infectious Disease Progress Note  Keshav Dickens  1955  [id number]    Date of Consult: 10/13/2021  Admission Date: 10/4/2021    Requesting Provider: Dr Forrester  Evaluating Physician: Rodríguez Ruff MD    Chief Complaint: hemoptysis    Reason for Consultation: GNR VAP    History of present illness:   Patient is a 66 y.o.  Yr old male with history of DM2, tobacco abuse.  Initially admitted to [facility] on 9/27/2021 with complaints of hemoptysis for 2 to 3 weeks.  Intubated on 10/1.  CT scan with diffuse groundglass opacities and renal failure.  Due to concern for pulmonary renal syndrome he was treated with pulse dose steroids.  Anti-GBM antibody positive concerning for Goodpasture's syndrome.  Renal biopsy also confirmed the diagnosis.  Transferred to [facility] on 10/4.  Nephrology has been following and he is getting plasmapheresis; also high-dose steroids and Cytoxan.    On 10/10 he developed fever up to 101.  Also had leukocytosis up to 15,000.  He had been on atovaquone for PCP prophylaxis.  Started on vancomycin and Zosyn on 10/12.  Respiratory culture from 10/12 with heavy growth of gram-negative bacilli.  And BAL on 10/13 office gram-negative bacilli.  Recurrent hemoptysis per RN.  Intubated, sedated.  60% FiO2.  Trach and PEG planned. Fever curve improved.     10/14/21: Fevers improved. Less blood from ET suction. 50% FiO2 from vent. Sputum culture with Klebsiella aerogenes.     10/15/2021: Underwent tracheostomy and PEG tube placement earlier today.  Transfer back to ICU, relatively stable.  60% FiO2, sedated.  Less blood from endotracheal secretions per staff.     10/16/21: Worse overnight with hypertension and some low-grade temperatures despite CRRT.  Had an episode of emesis and likely aspiration per RN.  Ventilatory requirement up slightly. Still sedated    10/17/21: Improved overnight.  Fever curve improved.  O2 settings down.  Weaning sedation.  No  further episodes of emesis.  Anuric. No diarrhea.     10/18/21: Patient follow-up improved over the past 48 hours.  Afebrile.  No acute new issues overnight.  More comfortable on ventilator.  More sedated today. fever subglottic secretions    10/19/21: no acute new concerns. Minimally responsive. On trach collar O2. No further hemoptysis or sputum production.    10/20/2021: Slow improvement.  Currently trach collar oxygen.  Still very weak, deconditioned.  Weaning sedation.  Still has blood-tinged respiratory secretions.  No other new concerns noted per ICU team    10/21/21: Low-grade temps but no true fevers. Stable, low oxygenation settings.  Ongoing blood-tinged tracheal secretions, slightly worse today. Transitioning to intermittent hemodialysis. Hemoglobin dropped to 5.6 and required transfusion.  No external signs of bleeding other than out of tracheostomy  per RN. Possible transfer to LTAC on Monday    10/22/21: On ventilator overnight, 30% FiO2 and trach collar trials during the day.  Still significant blood-tinged, frothy sputum per RN.  Afebrile.  Tolerating tube feeds.  Had bowel movement.  More alert today and starting to follow some simple commands    11/1/21: Still in the ICU but more alert and interactive on trach collar oxygen.  Blood pressure stable.  Afebrile.  Completed IV antibiotics as planned on 10/25.  Still on atovaquone.  Difficult placement due to need for Cytoxan per case management.  Significant pulmonary secretions per nursing staff.     Review of Systems  Follow some basic commands.  Shakes his head no when asked if he has any chest pain or abdominal pain or productive cough     No Known Allergies    Antibiotics:  Anti-Infectives (From admission, onward)    Ordered     Dose/Rate Route Frequency Start Stop    10/28/21 1214  atovaquone (MEPRON) 750 MG/5ML suspension        Ordering Provider: Deb Ovalle APRN    1,500 mg Per G Tube Daily With Lunch 10/28/21 0000 11/27/21  2359    10/21/21 1002  meropenem (MERREM) 1 g/100 mL 0.9% NS (mbp)        Ordering Provider: Rodríguez Ruff MD    1 g  over 3 Hours Intravenous Every 24 Hours 10/21/21 1200 10/25/21 1520    10/20/21 0805  atovaquone (MEPRON) suspension 1,500 mg        Ordering Provider: Genet Bui MD    1,500 mg Per PEG Tube Daily With Lunch 10/20/21 1200 11/26/21 2359    10/18/21 1220  vancomycin (VANCOCIN) in iso-osmotic dextrose IVPB 1 g (premix) 200 mL        Ordering Provider: Rafal Salcido, PharmD    1,000 mg  over 60 Minutes Intravenous Once 10/18/21 1315 10/18/21 1536    10/17/21 0825  vancomycin (VANCOCIN) in iso-osmotic dextrose IVPB 1 g (premix) 200 mL        Ordering Provider: Autumn Lozano, PharmD    10 mg/kg × 102 kg  over 60 Minutes Intravenous Once 10/17/21 1100 10/17/21 1138    10/16/21 1121  vancomycin 2000 mg/500 mL 0.9% NS IVPB ([facility])        Ordering Provider: Autumn Lozano, PharmD    20 mg/kg × 102 kg  over 120 Minutes Intravenous Once 10/16/21 1300 10/16/21 1657    10/13/21 1755  meropenem (MERREM) 1 g/100 mL 0.9% NS (mbp)        Ordering Provider: Rodríguez Ruff MD    1 g  over 30 Minutes Intravenous Once 10/13/21 1845 10/13/21 1858    10/12/21 0954  vancomycin 2000 mg/500 mL 0.9% NS IVPB ([facility])        Ordering Provider: Sravan Forrester MD    20 mg/kg × 102 kg Intravenous Once 10/12/21 1045 10/12/21 1130    10/12/21 0954  piperacillin-tazobactam (ZOSYN) 4.5 g in iso-osmotic dextrose 100 mL IVPB (premix)        Ordering Provider: Sravan Forrester MD    4.5 g  over 30 Minutes Intravenous Once 10/12/21 1045 10/12/21 1125          Other Medications:  Current Facility-Administered Medications   Medication Dose Route Frequency Provider Last Rate Last Admin   • acetaminophen (TYLENOL) tablet 650 mg  650 mg Per PEG Tube Q6H PRN Genet Bui MD   650 mg at 10/24/21 0004   • atovaquone (MEPRON) suspension 1,500 mg  1,500 mg Per PEG Tube Daily With Lunch  Genet Bui MD   1,500 mg at 10/31/21 1219   • b complex-vitamin c-folic acid (NEPHRO-MELE) tablet 1 tablet  1 tablet Per PEG Tube Daily Genet Bui MD   1 tablet at 11/01/21 0800   • sennosides-docusate (PERICOLACE) 8.6-50 MG per tablet 2 tablet  2 tablet Per PEG Tube BID PRN Genet Bui MD        And   • polyethylene glycol (MIRALAX) packet 17 g  17 g Per PEG Tube Daily PRN Genet Bui MD        And   • bisacodyl (DULCOLAX) suppository 10 mg  10 mg Rectal Daily PRN Genet Bui MD       • chlorhexidine (PERIDEX) 0.12 % solution 15 mL  15 mL Mouth/Throat Q12H Abdon Roberts MD   15 mL at 11/01/21 0800   • clonazePAM (KlonoPIN) tablet 0.5 mg  0.5 mg Oral BID PRN Nacho Ba DO   0.5 mg at 10/30/21 2011   • cyclophosphamide (CYTOXAN) 100 mg compound  100 mg Nasogastric Daily Torsten Drake MD   100 mg at 10/31/21 1750   • dextrose (D50W) 25 g/ 50mL Intravenous Solution 25 g  25 g Intravenous Q15 Min PRN Abdon Roberts MD   25 g at 10/15/21 0518   • epoetin tatum-epbx (RETACRIT) injection 10,000 Units  10,000 Units Subcutaneous Once per day on Tue Thu Sat Torsten Drake MD   10,000 Units at 10/30/21 0840   • famotidine (PEPCID) tablet 20 mg  20 mg Per G Tube Daily Genet Bui MD   20 mg at 11/01/21 0800   • ferrous sulfate 300 (60 Fe) MG/5ML syrup 300 mg  300 mg Per PEG Tube Daily Genet Bui MD   300 mg at 11/01/21 0800   • glucagon (human recombinant) (GLUCAGEN DIAGNOSTIC) injection 1 mg  1 mg Subcutaneous Q15 Min PRN Abdon Roberts MD       • heparin (porcine) 5000 UNIT/ML injection 5,000 Units  5,000 Units Subcutaneous Q12H Nacho Ba DO   5,000 Units at 11/01/21 0800   • heparin (porcine) injection 1,600 Units  1,600 Units Intracatheter Torsten Newsome MD   1,600 Units at 10/30/21 1230   • insulin detemir (LEVEMIR) injection 5 Units  5 Units Subcutaneous Q12H Sravan Forrester MD    "5 Units at 11/01/21 0800   • insulin regular (humuLIN R,novoLIN R) injection 0-7 Units  0-7 Units Subcutaneous Q6H Abdon Roberts MD   2 Units at 11/01/21 0608   • ipratropium-albuterol (DUO-NEB) nebulizer solution 3 mL  3 mL Nebulization Q6H PRN Nacho Ba DO       • labetalol (NORMODYNE,TRANDATE) injection 20 mg  20 mg Intravenous Q2H PRN Abdon Roberts MD   20 mg at 10/17/21 1721   • lactobacillus acidophilus (RISAQUAD) capsule 1 capsule  1 capsule Per PEG Tube Daily Rodríguez Ruff MD   1 capsule at 11/01/21 0800   • loperamide (IMODIUM) capsule 2 mg  2 mg Per G Tube 4x Daily PRN Genet Bui MD   2 mg at 10/24/21 0154   • melatonin tablet 10 mg  10 mg Per G Tube Nightly Genet Bui MD   10 mg at 10/31/21 2131   • Nutrisource fiber pack 1 packet  1 packet Per G Tube TID Genet Bui MD   1 packet at 11/01/21 0801   • ondansetron (ZOFRAN) injection 4 mg  4 mg Intravenous Q6H PRN Sravan Forrester MD   4 mg at 10/23/21 0201   • oxyCODONE (ROXICODONE) 5 MG/5ML solution 5 mg  5 mg Per G Tube Q6H PRN Genet Bui MD       • potassium & sodium phosphates (PHOS-NAK) 280-160-250 MG packet - for Phosphorus 1.3-2.5 mg/dL  1 packet Oral BID PRN Janak Menendez MD   1 packet at 10/26/21 1629   • predniSONE (DELTASONE) tablet 40 mg  40 mg Per G Tube Daily With Breakfast Dagoberto Dawkins MD       • PRO-STAT oral liquid 30 mL  30 mL Per G Tube TID Genet Bui MD   30 mL at 11/01/21 0801   • sertraline (ZOLOFT) tablet 50 mg  50 mg Oral Daily Nacho Ba DO   50 mg at 11/01/21 0800       Physical Exam:   Vital Signs   /72   Pulse 66   Temp 98.2 °F (36.8 °C) (Oral)   Resp 20   Ht 172.7 cm (67.99\")   Wt 102 kg (224 lb 13.9 oz)   SpO2 96%   BMI 34.20 kg/m²     GENERAL: Chronically ill-appearing.  Very weak, but improved and more interactive today compared to last week.  HEENT: Normocephalic, " atraumatic.    Neck:   Tracheostomy in place.  Light yellow mucoid secretions noted.  HEART: RRR; No murmur.  LUNGS: Clear bilaterally.  No cough noted trach collar O2.   ABDOMEN: Soft, nondistended. No rebound or guarding. PEG tube with TF running  EXT:  No edema.  : Without Cedeno catheter.  MSK: no major joint swelling noted.    SKIN: no rash  NEURO: Follows basic commands, but very weak  Dialysis catheter in right chest    Laboratory Data    Results from last 7 days   Lab Units 11/01/21  0331 10/31/21  0419 10/30/21  0416   WBC 10*3/mm3 9.00 8.04 12.51*   HEMOGLOBIN g/dL 7.8* 7.1* 8.6*   HEMATOCRIT % 23.0* 21.3* 25.3*   PLATELETS 10*3/mm3 212 202 228     Results from last 7 days   Lab Units 11/01/21  0331   SODIUM mmol/L 132*   POTASSIUM mmol/L 4.3   CHLORIDE mmol/L 92*   CO2 mmol/L 23.0   BUN mg/dL 106*   CREATININE mg/dL 6.35*   GLUCOSE mg/dL 162*   CALCIUM mg/dL 8.6     Estimated Creatinine Clearance: 12.9 mL/min (A) (by C-G formula based on SCr of 6.35 mg/dL (H)).  Results from last 7 days   Lab Units 10/26/21  0340   ALK PHOS U/L 78   BILIRUBIN mg/dL 0.7   ALT (SGPT) U/L 37   AST (SGOT) U/L 47*         Results from last 7 days   Lab Units 10/26/21  0340   CRP mg/dL 2.48*       Microbiology:  Covid screening negative on admission  10/12 respiratory culture with heavy growth gram-negative bacilli  MRSA nares screening negative  10/12 Catheter tip culture with skin mal  10/13 BAL culture with Klebsiella aerogenes    10/16 blood cultures negative   10/16 respiratory culture negative      Radiology:  No radiology results for the last day       Impression:   1. Klebsiella aerogenes ventilator associated pneumonia: From respiratory culture x2.   Repeat cultures negative.  O2 settings improved.  Completed antibiotics on 10/25 is planned.  2. Possible aspiration overnight 10/15:   3. Recurrent hemoptysis: Improved  4. Acute blood loss anemia: Due to above.   5. Severe bilateral groundglass opacities  6. Acute  hypoxic respiratory failure: s/p trach on 10/15. improved  7. 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 On Cytoxan and steroids   8. Renal failure, anuric. On dialysis   9. Immunocompromise host: Cytoxan and high-dose steroids  10. DM2  11. Tobacco abuse    PLAN:   - Microbiology data reviewed. No new culture data    - Stable off antibiotics since 10/25     - Continue atovaquone for PCP prophylaxis, until on less than 15 mg of prednisone daily.  - Slowly weaning prednisone per nephrology    Immunocompromised host.  Placement complicated by need for Cytoxan per case management.  I will follow intermittently.  Please call if any new concerns develop    Rodríguez Ruff MD  11/1/2021

## 2021-11-01 NOTE — THERAPY EVALUATION
Acute Care - Speech Language Pathology Initial Evaluation  [facility]   Trach/Speaking Valve Evaluation  Clinical Swallow Evaluation     Patient Name: Keshav Dickens  : 1955  MRN: [id number]  Today's Date: 2021               Admit Date: 10/4/2021     Visit Dx:    ICD-10-CM ICD-9-CM   1. Acute respiratory failure with hypoxia  J96.01 518.81   2. Examination for normal comparison for clinical research  Z00.6 V70.7   3. Diffuse pulmonary alveolar hemorrhage  R04.89 786.30   4. 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.0 446.21   5. Voice impairment  R49.9 784.40   6. Dysphagia, unspecified type  R13.10 787.20     Patient Active Problem List   Diagnosis   • Anti-GBM nephritis with pulmonary hemorrhage (HCC)   • Acute respiratory failure with hypoxia   • Acute renal failure (ARF) (HCC)   • Acute blood loss anemia   • Sepsis due to pneumonia (HCC);Klebsiella aerogenes   • Idiopathic hypotension   • Hyperglycemia   • History of tobacco abuse     Past Medical History:   Diagnosis Date   • Goodpasture's syndrome  10/4/2021   • Hypertension      Past Surgical History:   Procedure Laterality Date   • CHOLECYSTECTOMY     • TRACHEOSTOMY AND PEG TUBE INSERTION N/A 10/15/2021    Procedure: TRACHEOSTOMY AND PERCUTANEOUS ENDOSCOPIC GASTROSTOMY TUBE INSERTION;  Surgeon: Abdon Roberts MD;  Location: [facility];  Service: General;  Laterality: N/A;       SLP Recommendation and Plan  SLP Diagnosis: Voice/communication impairment 2' trach. PMV left at bedside. Pt to wear PMV w/ SLP supervision only at this time. (21)        Swallow Criteria for Skilled Therapeutic Interventions Met: demonstrates skilled criteria (21)  SLC Criteria for Skilled Therapy Interventions Met: yes (21)  Anticipated Discharge Disposition (SLP): long term acute care facility, anticipate therapy at next level of care (21)     Therapy Frequency (Swallow): PRN (21)  Predicted  Duration Therapy Intervention (Days): until discharge (11/01/21 1110)                    SLP EVALUATION (last 72 hours)     SLP SLC Evaluation     Row Name 11/01/21 1110                   Communication Assessment/Intervention    Care Plan Review evaluation/treatment results reviewed; care plan/treatment goals reviewed; risks/benefits reviewed; current/potential barriers reviewed; patient/other agree to care plan  -RD                  General Information    Patient Profile Reviewed yes  -RD        Pertinent History Of Current Problem Adm w/ acute respiratory failure. Intubated 10/4-10/15/21 s/p shiley 8 cuffed trach 10/15. Now on TCTs. Good pasture's syndrome,  -RD        Precautions/Limitations, Vision WFL; for purposes of eval  -RD        Precautions/Limitations, Hearing WFL; for purposes of eval  -RD        Patient Level of Education unknown  -RD        Prior Level of Function-Communication unknown  -RD        Plans/Goals Discussed with patient; agreed upon  -RD        Barriers to Rehab medically complex  -RD        Patient's Goals for Discharge patient could not state  -RD                  Pain    Additional Documentation Pain Scale: FACES Pre/Post-Treatment (Group)  -RD                  Pain Scale: FACES Pre/Post-Treatment    Pain: FACES Scale, Pretreatment 0-->no hurt  -RD        Posttreatment Pain Rating 0-->no hurt  -RD                  Speaking Valve    Respiratory Status trach collar; suctioned prior to intervention; other (see comments)  by RN after cuff deflation  -RD        Pretreatment Heart Rate (beats/min) 72  -RD        Pre SpO2 (%) 99  -RD        Pretreatment Cuff Status Inflated  Deflated prior to PMV placement  -RD        Trach Type Shiley; size 8; cuffed  -RD        Types of Intervention tracheostomy speaking valve  -RD        Speaking Valve Type PMV-2000 (clear)  -RD        Phonation with Occlusion speaking valve; audible at 1 foot; other (see comments)  w/ coughing  -RD        Vocal Quality on  Phonation noticeably hoarse; other (see comments)  difficult to fully assess  -RD        Secretion Description thick; white; continuous  -RD        Response to Intervention speaking valve; not tolerated; cough; secretions interfered; change in vital signs; other (see comments)  excessive coughing, increase in HR  -RD        Minutes Tolerated speaking valve; < 1 minutes  -RD        Posttreatment Heart Rate (beats/min) 103  -RD        Post SpO2 (%) 97  -RD        Posttreatment Cuff Status Inflated  -RD        At Conclusion speaking valve removed; speaking valve at bedside; cuff re-inflated; notified RN/LPN  -RD        Speaking Valve Placement Recommendation SLP only  -RD        Speaking Valve, Comment PMV evaluation complete. Pt tolerated cuff deflation w/o difficulty. RN deep suctioned after cuff deflation. PMV placed & pt tolerated for < 1 min, but then began coughing excessively. Cough was audible w/ PMV in place, however had to remove valve 2' excessive coughing & increase in HR. Cuff re-inflated. RN repeated deep suctioning & SLP suctioned secretions from trach continuously once valve removed. Pt continued to cough, so unable to try speaking valve again today. SLP will f/u for PMV during treatment. RN present & notified. Cuff left inflated upon completion of eval & PMV left & bedside.  -RD                  SLP Clinical Impressions    SLP Diagnosis Voice/communication impairment 2' trach. PMV left at bedside. Pt to wear PMV w/ SLP supervision only at this time.  -RD        Rehab Potential/Prognosis good  -RD        SLC Criteria for Skilled Therapy Interventions Met yes  -RD        Functional Impact difficulty communicating wants, needs; difficulty communicating in an emergency; difficulty in expressing complex messages; functional impact in social situations  -RD                  Recommendations    Therapy Frequency (SLP SLC) 5 days per week  -RD        Predicted Duration Therapy Intervention (Days) until  discharge  -RD        Anticipated Discharge Disposition (SLP) long term acute care facility; anticipate therapy at next level of care  -RD              User Key  (r) = Recorded By, (t) = Taken By, (c) = Cosigned By    Initials Name Effective Dates    Brittany Cotton MS CCC-SLP 06/16/21 -                    EDUCATION  The patient has been educated in the following areas:     Communication Impairment Speaking Valve Voice Disorder.           SLP GOALS     Row Name 11/01/21 1110             Tolerate Speaking Valve Placement Goal 1 (SLP)    Tolerate Speaking Valve Placement Goal 1 (SLP) speaking valve >30 min; 80%; with minimal cues (75-90%)  -RD      Time Frame (Tolerate Speaking Valve Placement Goal 1, SLP) short term goal (STG)  -RD              Audible Speech with Speaking Valve Goal 1 (SLP)    Audible Speech Goal 1 (SLP) 3 feet; 80%; with minimal cues (75-90%)  -RD      Time Frame (Audible Speech Goal 1, SLP) short term goal (STG)  -RD              Additional Goal 1 (SLP)    Additional Goal 1, SLP LTG: Pt will improve communication w/ use of PMV to functionally communicate wants/needs w/ 90% accuracy w/ min cues  -RD      Time Frame (Additional Goal 1, SLP) by discharge  -RD            User Key  (r) = Recorded By, (t) = Taken By, (c) = Cosigned By    Initials Name Provider Type    Brittany Cotton MS CCC-SLP Speech and Language Pathologist                        Time Calculation:      Time Calculation- SLP     Row Name 11/01/21 1332             Time Calculation- SLP    SLP Start Time 1110  -RD      SLP Received On 11/01/21  -RD              Untimed Charges    SLP Eval/Re-eval  ST Eval Oral Pharyng Swallow - 70399; ST Eval Fit Voice Prosthetic - 61992  -RD      11243-JF Eval Fit Voice Prosthetic Minutes 45  -RD      51359-JK Eval Oral Pharyng Swallow Minutes 30  -RD              Total Minutes    Untimed Charges Total Minutes 75  -RD       Total Minutes 75  -RD            User Key  (r) = Recorded By, (t)  = Taken By, (c) = Cosigned By    Initials Name Provider Type    RD Brittany Beckford, MS CCC-SLP Speech and Language Pathologist                Therapy Charges for Today     Code Description Service Date Service Provider Modifiers Qty    14728652653 HC ST EVAL ORAL PHARYNG SWALLOW 2 2021 Brittany Beckford, MS CCC-SLP GN 1    81123963277 HC ST EVALUATION FIT VOICE PROSTH 3 2021 Brittany Beckford, MS CCC-SLP  1    01727442563 HC VALVE TRACH PMV SERIES 2021 Brittany Beckford, MS CCC-SLP  1                     Brittanythao Beckford MS CCC-SLP  2021 and Acute Care - Speech Language Pathology   Swallow Initial Evaluation [facility]     Patient Name: Keshav Dickens  : 1955  MRN: [id number]  Today's Date: 2021               Admit Date: 10/4/2021    Visit Dx:     ICD-10-CM ICD-9-CM   1. Acute respiratory failure with hypoxia  J96.01 518.81   2. Examination for normal comparison for clinical research  Z00.6 V70.7   3. Diffuse pulmonary alveolar hemorrhage  R04.89 786.30   4. 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.0 446.21   5. Voice impairment  R49.9 784.40   6. Dysphagia, unspecified type  R13.10 787.20     Patient Active Problem List   Diagnosis   • Anti-GBM nephritis with pulmonary hemorrhage (HCC)   • Acute respiratory failure with hypoxia   • Acute renal failure (ARF) (HCC)   • Acute blood loss anemia   • Sepsis due to pneumonia (HCC);Klebsiella aerogenes   • Idiopathic hypotension   • Hyperglycemia   • History of tobacco abuse     Past Medical History:   Diagnosis Date   • Goodpasture's syndrome  10/4/2021   • Hypertension      Past Surgical History:   Procedure Laterality Date   • CHOLECYSTECTOMY     • TRACHEOSTOMY AND PEG TUBE INSERTION N/A 10/15/2021    Procedure: TRACHEOSTOMY AND PERCUTANEOUS ENDOSCOPIC GASTROSTOMY TUBE INSERTION;  Surgeon: Abdon Roberts MD;  Location: [facility];  Service: General;  Laterality: N/A;       SLP Recommendation and Plan  SLP  Swallowing Diagnosis: suspected pharyngeal dysphagia (11/01/21 1110)  SLP Diet Recommendation: NPO, long term alternate methods of nutrition/hydration (11/01/21 1110)  Recommended Precautions and Strategies: general aspiration precautions (11/01/21 1110)  SLP Rec. for Method of Medication Administration: meds via alternate route (11/01/21 1110)     Monitor for Signs of Aspiration: yes, notify SLP if any concerns (11/01/21 1110)  Recommended Diagnostics: reassess via clinical swallow evaluation (11/01/21 1110)  Swallow Criteria for Skilled Therapeutic Interventions Met: demonstrates skilled criteria (11/01/21 1110)  Anticipated Discharge Disposition (SLP): long term acute care facility, anticipate therapy at next level of care (11/01/21 1110)  Rehab Potential/Prognosis, Swallowing: good, to achieve stated therapy goals (11/01/21 1110)  Therapy Frequency (Swallow): PRN (11/01/21 1110)  Predicted Duration Therapy Intervention (Days): until discharge (11/01/21 1110)                                SWALLOW EVALUATION (last 72 hours)     SLP Adult Swallow Evaluation     Row Name 11/01/21 1110                   Rehab Evaluation    Document Type evaluation  -RD        Subjective Information no complaints  -RD        Patient Observations alert; cooperative; agree to therapy  -RD        Patient/Family/Caregiver Comments/Observations no family present, RN in room  -RD        Patient Effort good  -RD                  General Information    Current Method of Nutrition NPO; gastrostomy feedings  -RD        Prior Level of Function-Communication unknown  -RD        Prior Level of Function-Swallowing unknown  -RD        Patient's Goals for Discharge patient did not state; patient could not state  -RD                  Oral Motor Structure and Function    Dentition Assessment natural, present and adequate  -RD        Secretion Management requires suctioning to control secretions; problems swallowing secretions  -RD        Mucosal  Quality moist, healthy; sticky  -RD        Volitional Swallow delayed  -RD        Volitional Cough WFL  -RD                  Oral Musculature and Cranial Nerve Assessment    Oral Motor General Assessment WFL  -RD                  General Eating/Swallowing Observations    Respiratory Support Currently in Use tracheostomy; trach collar  -RD                  Clinical Swallow Eval    Clinical Swallow Evaluation Summary Clinical dysphagia eval complete. Oral motor was WFL. Swallow was delayed per palpation. Pt w/ excessive coughing & continuous secretions requiring suctioning, so not appropriate for PO trials at this time. Deferred PO 2' high risk for aspiration. Safest NPO, meds via alt route. SLP will f/u for repeat clinical dysphagia eval to determine instrumental readiness as appropriate.  -RD                  Clinical Impression    SLP Swallowing Diagnosis suspected pharyngeal dysphagia  -RD        Functional Impact risk of aspiration/pneumonia  -RD        Rehab Potential/Prognosis, Swallowing good, to achieve stated therapy goals  -RD        Swallow Criteria for Skilled Therapeutic Interventions Met demonstrates skilled criteria  -RD                  Recommendations    Therapy Frequency (Swallow) PRN  -RD        SLP Diet Recommendation NPO; long term alternate methods of nutrition/hydration  -RD        Recommended Diagnostics reassess via clinical swallow evaluation  -RD        Recommended Precautions and Strategies general aspiration precautions  -RD        Oral Care Recommendations Oral Care BID/PRN; Suction toothbrush  -RD        SLP Rec. for Method of Medication Administration meds via alternate route  -RD        Monitor for Signs of Aspiration yes; notify SLP if any concerns  -RD              User Key  (r) = Recorded By, (t) = Taken By, (c) = Cosigned By    Initials Name Effective Dates    Brittany Cotton MS CCC-SLP 06/16/21 -                 EDUCATION  The patient has been educated in the following  areas:   Dysphagia (Swallowing Impairment) Oral Care/Hydration NPO rationale.        SLP GOALS     Row Name 11/01/21 1110             Tolerate Speaking Valve Placement Goal 1 (SLP)    Tolerate Speaking Valve Placement Goal 1 (SLP) speaking valve >30 min; 80%; with minimal cues (75-90%)  -RD      Time Frame (Tolerate Speaking Valve Placement Goal 1, SLP) short term goal (STG)  -RD              Audible Speech with Speaking Valve Goal 1 (SLP)    Audible Speech Goal 1 (SLP) 3 feet; 80%; with minimal cues (75-90%)  -RD      Time Frame (Audible Speech Goal 1, SLP) short term goal (STG)  -RD              Additional Goal 1 (SLP)    Additional Goal 1, SLP LTG: Pt will improve communication w/ use of PMV to functionally communicate wants/needs w/ 90% accuracy w/ min cues  -RD      Time Frame (Additional Goal 1, SLP) by discharge  -RD            User Key  (r) = Recorded By, (t) = Taken By, (c) = Cosigned By    Initials Name Provider Type    Brittany Cotton MS CCC-SLP Speech and Language Pathologist                   Time Calculation:    Time Calculation- SLP     Row Name 11/01/21 1332             Time Calculation- SLP    SLP Start Time 1110  -RD      SLP Received On 11/01/21  -RD              Untimed Charges    SLP Eval/Re-eval  ST Eval Oral Pharyng Swallow - 95070; ST Eval Fit Voice Prosthetic - 83551  -RD      41836-DY Eval Fit Voice Prosthetic Minutes 45  -RD      46370-SP Eval Oral Pharyng Swallow Minutes 30  -RD              Total Minutes    Untimed Charges Total Minutes 75  -RD       Total Minutes 75  -RD            User Key  (r) = Recorded By, (t) = Taken By, (c) = Cosigned By    Initials Name Provider Type    Brittany Cotton MS CCC-SLP Speech and Language Pathologist                Therapy Charges for Today     Code Description Service Date Service Provider Modifiers Qty    44891464038 HC ST EVAL ORAL PHARYNG SWALLOW 2 11/1/2021 Brittany Beckford MS CCC-SLP GN 1    [id number]  ST EVALUATION FIT VOICE  PROSTH 3 11/1/2021 Brittany Beckford, MS CCC-SLP  1    81774888889 HC VALVE TRACH PMV SERIES 11/1/2021 Brittany Beckford, MS CCC-SLP  1        Patient was not wearing a face mask and did exhibit coughing during this therapy encounter.  Procedure performed was aerosolizing, involved close contact (within 6 feet for at least 15 minutes or longer), and did not involve contact with infectious secretions or specimens.  Therapist used appropriate personal protective equipment including gloves, standard procedure mask and eye protection.  Appropriate PPE was worn during the entire therapy session.  Hand hygiene was completed before and after therapy session.        Brittany Beckford MS CCC-SLP  11/1/2021

## 2021-11-01 NOTE — PLAN OF CARE
Goal Outcome Evaluation:  Plan of Care Reviewed With: patient   SLP evaluation completed. Will address voice/communication impairment 2' trach during PMV treatment. Will address dysphagia w/ plan for repeat clinical dysphagia evaluation as appropriate. Please see note for further details and recommendations.

## 2021-11-01 NOTE — PROGRESS NOTES
"                  Clinical Nutrition     Nutrition Support Management   Reason for Visit:   MDR, Follow-up protocol, EN      Patient Name: Keshav Dickens  YOB: 1955  MRN: [id number]  Date of Encounter: 11/01/21 09:19 EDT  Admission date: 10/4/2021    Nutrition Assessment   Assessment     Applicable diagnosis/conditions/procedures this adm:  Acute respiratory failure with hypoxia  Intubated  Anti-GBM nephritis with pulmonary hemorrhage (HCC)  Acute renal failure (ARF) (HCC)  Plasmapheresis initiated 10-5-21, ended 10-18-21  CRRT (10/6) --> d/c'd (10/10) --> iHD started (10/11) --> CRRT resumed (10/13) --> iHD resumed (10/21)  S/p trach and PEG (10/15)  Sepsis due to pneumonia (HCC);Klebsiella aerogenes  Idiopathic hypotension  Hyperglycemia      PMH: He  has a past medical history of Goodpasture's syndrome  (10/4/2021) and Hypertension.   PSxH: He  has a past surgical history that includes Cholecystectomy and tracheostomy and peg tube insertion (N/A, 10/15/2021).         Reported/Observed/Food/Nutrition Related History:     11/1) Pt continue to tolerate EN. Will adjust EN order today. Pt has been on trach collar >72 hrs. Planning to consult SLP.   Per I&O over the past 24 hrs:  1 bowel movement    10/28) No noted s/s of EN intolerance. EN rate per current order this morning. HD today.     10/26) HD today.       Anthropometrics     Height: 172.7 cm (67.99\")  Last filed wt: Weight: 102 kg (224 lb 13.9 oz) (10/12/21 1644)  Weight Method: Bed scale    BMI: BMI (Calculated): 34.2  Obese Class I: 30-34.9kg/m2    Ideal Body Weight (IBW) (kg): 70.87  145% IBW  ABW: 172lb/78kg    Date Weight (kg) Weight (lbs) Weight Method   10/12/2021 102 kg 224 lb 13.9 oz -   10/4/2021 102 kg 224 lb 13.9 oz Bed scale       Labs reviewed     Results from last 7 days   Lab Units 11/01/21  0331 10/31/21  0422 10/30/21  7196 10/29/21  0415 10/29/21  0415 10/28/21  0405 10/28/21  0405 10/26/21  1153 10/26/21  0340   GLUCOSE mg/dL " 162* 134* 93   < > 86   < > 159*   < > 118*   BUN mg/dL 106* 69* 111*   < > 69*   < > 111*   < > 74*   CREATININE mg/dL 6.35* 4.48* 6.76*   < > 4.57*   < > 6.99*   < > 4.74*   SODIUM mmol/L 132* 136 135*   < > 138   < > 131*   < > 133*   CHLORIDE mmol/L 92* 96* 92*   < > 96*   < > 92*   < > 95*   POTASSIUM mmol/L 4.3 4.2 4.3   < > 4.0   < > 4.5   < > 3.9   PHOSPHORUS mg/dL 4.5 4.5  --   --  4.3   < > 7.9*   < > 4.9*   MAGNESIUM mg/dL  --   --   --   --   --   --  2.8*  --  2.5*   ALT (SGPT) U/L  --   --   --   --   --   --   --   --  37    < > = values in this interval not displayed.     Results from last 7 days   Lab Units 11/01/21  0331 10/31/21  0422 10/29/21  0415 10/26/21  0340 10/26/21  0340   ALBUMIN g/dL 4.10 4.20 4.20   < > 4.50   PREALBUMIN mg/dL  --   --   --   --  34.1   CRP mg/dL  --   --   --   --  2.48*   CHOLESTEROL mg/dL  --   --   --   --  176   TRIGLYCERIDES mg/dL  --   --   --   --  291*    < > = values in this interval not displayed.       Results from last 7 days   Lab Units 11/01/21  0536 10/31/21  2312 10/31/21  1708 10/31/21  1235 10/31/21  0512 10/30/21  2309   GLUCOSE mg/dL 152* 206* 155* 112 135* 204*     No results found for: HGBA1C    Medications reviewed   Pertinent: nephrovite, pepcid, ferrous sulfate, insulin, risaquad, melatonin, steroid  PRN: klonopin    Needs Assessment 11/1   Height used: 68 in/173 cm  Weight used: 224 lb/102 kg  IBW: 154 lb/70 kg    Estimated calorie needs: ~1800 kcal/day  20-30 kcal/kg IBW= 8532-3297 kcal    Estimated protein needs: ~102 g pro/day  1.0-1.2 g/kg actual weight= 102-122 g pro         Current Nutrition Prescription     PO: NPO Diet    EN: NovaSource Renal at 38 ml/hr (goal volume= 750 ml/day) with 1 ProStat 3x/day and 1 packet FiberCel 3x/day and free water at 10 ml/hr  Route: PEG  Provides at goal volume: 1830 kcal, 113 g pro, 15 g fiber, 19 meq K, 764 mg phos, 538 ml water from EN, 738 ml water total      EN delivery:  3 Days:  817 ml, 109% +3  ProStats and 3 FiberCel  1961 kcal, 109%  119 g pro, 117%  15 g fiber  21 meq K  819 mg phos  586 ml water from EN  952 ml water total      Nutrition Diagnosis     10/26  Problem Less than optimal enteral nutrition composition or modality   Etiology Clinical status, needs assessment    Signs/Symptoms EN providing >100% est energy and protein needs, delivery >100% daily goal volume    Status: resolved    10-5-21, 10-22  Problem Inadequate energy intake   Etiology Per Clinical Status   Signs/Symptoms 84% goal volume EN         Nutrition Intervention      Follow treatment progress, Care plan reviewed, Nutrition support order placed   -Will adjust EN to better meet pt's needs:  NovaSource Renal at 40 ml/hr (goal volume= 800 ml/day) with 1 ProStat 2x/day and 1 Fibercel 3x/day and free water at 10 ml/hr via PEG    -Provides at goal volume:  1827 kcal, 102%  102 g pro, 100%  15 g fiber  20 meq K  755 mg phos  574 ml water from EN  774 ml water total        Goal:   General: Nutrition support treatment  PO: n/a  EN/PN: Adjust EN        Monitoring/Evaluation:   Per protocol, I&O, Pertinent labs, EN delivery/tolerance, Weight, Symptoms      Jena Pang, MS RD/LD CNSC  Time Spent: 45 minutes

## 2021-11-01 NOTE — PROGRESS NOTES
"   LOS: 28 days    Patient Care Team:  Andree Langston MD as PCP - General (Internal Medicine)    Reason For Visit:  F/U PIOTR.   Subjective           Review of Systems:    Pulm: No soa   CV:  No CP. GI. NO N/V/D. \" HUNGRY \"      Objective     atovaquone, 1,500 mg, Per PEG Tube, Daily With Lunch  b complex-vitamin c-folic acid, 1 tablet, Per PEG Tube, Daily  chlorhexidine, 15 mL, Mouth/Throat, Q12H  custom medication builder, 100 mg, Nasogastric, Daily  epoetin tatum/tatum-epbx, 10,000 Units, Subcutaneous, Once per day on Tue Thu Sat  famotidine, 20 mg, Per G Tube, Daily  ferrous sulfate, 300 mg, Per PEG Tube, Daily  heparin (porcine), 5,000 Units, Subcutaneous, Q12H  insulin detemir, 5 Units, Subcutaneous, Q12H  insulin regular, 0-7 Units, Subcutaneous, Q6H  lactobacillus acidophilus, 1 capsule, Per PEG Tube, Daily  melatonin, 10 mg, Per G Tube, Nightly  Nutrisource fiber, 1 packet, Per G Tube, TID  predniSONE, 50 mg, Per G Tube, Daily With Breakfast  PRO-STAT, 30 mL, Per G Tube, TID  sertraline, 50 mg, Oral, Daily             Vital Signs:  Blood pressure 125/72, pulse 66, temperature 98.2 °F (36.8 °C), temperature source Oral, resp. rate 20, height 172.7 cm (67.99\"), weight 102 kg (224 lb 13.9 oz), SpO2 96 %.    Flowsheet Rows      First Filed Value   Admission Height 172.7 cm (68\") Documented at 10/04/2021 2132   Admission Weight 102 kg (224 lb 13.9 oz) Documented at 10/04/2021 2100          10/31 0701 - 11/01 0700  In: 1319   Out: -     Physical Exam:    General Appearance: NAD, alert and cooperative, Ox3. + THD CATH. + TRACH.   Eyes: PER, conjunctivae and sclerae normal, no icterus  Lungs: respirations regular and unlabored, no crepitus, clear to auscultation  Heart/CV: regular rhythm & normal rate, no murmur, no gallop, no rub and no edema  Abdomen: not distended, soft, non-tender, no masses,  bowel sounds present. + PEG.   Skin: No rash, Warm and dry    Radiology:            Labs:  Results from last 7 days "   Lab Units 11/01/21  0331 10/31/21  0419 10/30/21  0416   WBC 10*3/mm3 9.00 8.04 12.51*   HEMOGLOBIN g/dL 7.8* 7.1* 8.6*   HEMATOCRIT % 23.0* 21.3* 25.3*   PLATELETS 10*3/mm3 212 202 228     Results from last 7 days   Lab Units 11/01/21  0331 10/31/21  0422 10/30/21  0416 10/29/21  0415 10/28/21  0405 10/28/21  0405 10/26/21  1153 10/26/21  0340   SODIUM mmol/L 132* 136 135* 138   < > 131*   < > 133*   POTASSIUM mmol/L 4.3 4.2 4.3 4.0   < > 4.5   < > 3.9   CHLORIDE mmol/L 92* 96* 92* 96*   < > 92*   < > 95*   CO2 mmol/L 23.0 27.0 21.0* 26.0   < > 18.0*   < > 20.0*   BUN mg/dL 106* 69* 111* 69*   < > 111*   < > 74*   CREATININE mg/dL 6.35* 4.48* 6.76* 4.57*   < > 6.99*   < > 4.74*   CALCIUM mg/dL 8.6 8.6 8.9 8.9   < > 8.4*   < > 8.7   PHOSPHORUS mg/dL 4.5 4.5  --  4.3  --  7.9*   < > 4.9*   MAGNESIUM mg/dL  --   --   --   --   --  2.8*  --  2.5*   ALBUMIN g/dL 4.10 4.20  --  4.20  --   --   --  4.50    < > = values in this interval not displayed.     Results from last 7 days   Lab Units 11/01/21 0331   GLUCOSE mg/dL 162*       Results from last 7 days   Lab Units 10/26/21  0340   ALK PHOS U/L 78   BILIRUBIN mg/dL 0.7   ALT (SGPT) U/L 37   AST (SGOT) U/L 47*     Results from last 7 days   Lab Units 10/31/21  0921   PH, ARTERIAL pH units 7.468*   PO2 ART mm Hg 61.5*   PCO2, ARTERIAL mm Hg 37.3   HCO3 ART mmol/L 27.0*             Estimated Creatinine Clearance: 12.9 mL/min (A) (by C-G formula based on SCr of 6.35 mg/dL (H)).      Assessment       Acute respiratory failure with hypoxia    Anti-GBM nephritis with pulmonary hemorrhage (HCC)    Acute renal failure (ARF) (HCC)    Acute blood loss anemia    Sepsis due to pneumonia (HCC);Klebsiella aerogenes    Idiopathic hypotension    Hyperglycemia    History of tobacco abuse            Impression: PIOTR WITH NO RECOVERY. OLIGURIC. BX PROVEN GOODPASTURES. COMPLETED 2 WEEKS OF TPE AND NOW ON PO CYTOXAN AND PREDNISONE. HIGH BUN WITH STEROIDS.  ANEMIA.            Recommendations: SLOWLY DECREASE PREDNISONE. WOULD COMPLETE 3 MONTHS OF CYTOXAN IN EARLY JAN 2022. HD ON 11/2/21.      Dagoberto Dawkins MD  11/01/21  09:57 EDT

## 2021-11-01 NOTE — PROGRESS NOTES
INTENSIVIST / PULMONARY FOLLOW UP NOTE     Hospital:  LOS: 28 days   Mr. Keshav Dickens, 66 y.o. male is followed for:     Acute respiratory failure with hypoxia    Anti-GBM nephritis with pulmonary hemorrhage (HCC)    Acute renal failure (ARF) (HCC)    Acute blood loss anemia    Sepsis due to pneumonia (HCC);Klebsiella aerogenes    Idiopathic hypotension    Hyperglycemia    History of tobacco abuse          SUBJECTIVE   Remains on trach collar    The patient's relevant past medical, surgical, family, and social history were reviewed    Allergies and medications were reviewed    ROS:  Per subjective, all other systems were reviewed and were negative        OBJECTIVE     Vital Sign Min/Max for last 24 hours:  Temp  Min: 98.2 °F (36.8 °C)  Max: 98.7 °F (37.1 °C)   BP  Min: 110/63  Max: 139/70   Pulse  Min: 64  Max: 83   Resp  Min: 18  Max: 20   SpO2  Min: 93 %  Max: 99 %   No data recorded     Physical Exam:  General Appearance:  no acute distress  Eyes:  No scleral icterus or pallor, pupils normal  Ears, Nose, Mouth, Throat:  tracheostomy  Neck:  Trachea midline, thyroid normal  Respiratory: clear to auscultation bilaterally, normal effort  Cardiovascular:  Regular rate and rhythm, no murmurs, no peripheral edema  Gastrointestinal:  Soft, nontender, no hepatosplenomegaly, PEG  Skin:  Normal temperature, no rash  Psychiatric:  no agitation  Neuro:  No new focal neurologic deficits observed      Telemetry:              Hemodynamics:   CVP:     PAP:     PAOP:     CO:     CI:     SVI:     SVR:       SpO2: 96 % SpO2  Min: 93 %  Max: 99 %   Device:      Flow Rate:   No data recorded     Mechanical Ventilator Settings:            Resp Rate (Set): 12 Resp Rate (Observed) Vent: 23   FiO2 (%): 35 %    PEEP/CPAP (cm H2O): 5 cm H20 Plateau Pressure (cm H2O): 13 cm H2O    Minute Ventilation (L/min) (Obs): 7.82 L/min    I:E Ratio (Obs): 15.0:1     Intake/Ouptut 24 hrs (7:00AM - 6:59 AM)  Intake & Output (last 3 days)       10/29  0701  10/30 0700 10/30 0701  10/31 0700 10/31 0701 11/01 0700 11/01 0701 11/02 0700    I.V. (mL/kg)  100 (1)      Other 213 244 391     NG/ 793 928     IV Piggyback  200      Total Intake(mL/kg) 1193 (11.7) 1337 (13.1) 1319 (12.9)     Emesis/NG output 10       Other  1330      Stool 0       Total Output 10 1330      Net +1183 +7 +1319             Stool Unmeasured Occurrence 1 x  1 x           Lines, Drains & Airways     Active LDAs     Name Placement date Placement time Site Days    Peripheral IV 10/09/21 2200 Anterior; Left Forearm 10/09/21  2200  Forearm  2    Peripheral IV 10/12/21 1110 Anterior; Right Forearm 10/12/21  1110  Forearm  less than 1    NG/OG Tube Nasoduodenal 10 Fr Right nostril 10/06/21  1000  Right nostril  6    Rectal Tube With balloon 10/11/21  1200  --  1    ETT  10/04/21  1200   8                Hematology:  Results from last 7 days   Lab Units 11/01/21  0331 10/31/21  0419 10/30/21  0416 10/29/21  0415 10/28/21  0405 10/27/21  0352 10/26/21  0340   WBC 10*3/mm3 9.00 8.04 12.51* 12.23* 10.32 9.50 8.96   HEMOGLOBIN g/dL 7.8* 7.1* 8.6* 8.7* 8.0* 8.7* 8.3*   HEMATOCRIT % 23.0* 21.3* 25.3* 25.4* 24.8* 26.2* 25.1*   PLATELETS 10*3/mm3 212 202 228 227 209 221 239     Electrolytes, Magnesium and Phosphorus:  Results from last 7 days   Lab Units 11/01/21  0331 10/31/21  0422 10/30/21  0416 10/29/21  0415 10/28/21  0405 10/27/21  0352 10/26/21  1153 10/26/21  0340   SODIUM mmol/L 132* 136 135* 138 131* 133*  --  133*   CHLORIDE mmol/L 92* 96* 92* 96* 92* 95*  --  95*   POTASSIUM mmol/L 4.3 4.2 4.3 4.0 4.5 4.0  --  3.9   CO2 mmol/L 23.0 27.0 21.0* 26.0 18.0* 21.0*  --  20.0*   MAGNESIUM mg/dL  --   --   --   --  2.8*  --   --  2.5*   PHOSPHORUS mg/dL 4.5 4.5  --  4.3 7.9*  --  2.2* 4.9*     Renal:  Results from last 7 days   Lab Units 11/01/21  0331 10/31/21  0422 10/30/21  0416 10/29/21  0415 10/28/21  0405 10/27/21  0352 10/26/21  0340   CREATININE mg/dL 6.35* 4.48* 6.76* 4.57* 6.99* 4.38*  4.74*   BUN mg/dL 106* 69* 111* 69* 111* 67* 74*     Estimated Creatinine Clearance: 12.9 mL/min (A) (by C-G formula based on SCr of 6.35 mg/dL (H)).  Hepatic:  Results from last 7 days   Lab Units 10/26/21  0340   ALK PHOS U/L 78   BILIRUBIN mg/dL 0.7   ALT (SGPT) U/L 37   AST (SGOT) U/L 47*     Arterial Blood Gases:  Results from last 7 days   Lab Units 10/31/21  0921 10/30/21  0815 10/29/21  0557 10/27/21  2322   PH, ARTERIAL pH units 7.468* 7.458* 7.488* 7.439   PCO2, ARTERIAL mm Hg 37.3 34.1* 36.2 31.4*   PO2 ART mm Hg 61.5* 71.3* 71.7* 78.2*   FIO2 % 40 35 35 35             Lab Results   Component Value Date    LACTATE 1.9 10/04/2021       Relevant imaging studies and labs from 11/01/21 were reviewed and interpreted by me    Medications (drips):       atovaquone, 1,500 mg, Per PEG Tube, Daily With Lunch  b complex-vitamin c-folic acid, 1 tablet, Per PEG Tube, Daily  chlorhexidine, 15 mL, Mouth/Throat, Q12H  custom medication builder, 100 mg, Nasogastric, Daily  epoetin tatum/tatum-epbx, 10,000 Units, Subcutaneous, Once per day on Tue Thu Sat  famotidine, 20 mg, Per G Tube, Daily  ferrous sulfate, 300 mg, Per PEG Tube, Daily  heparin (porcine), 5,000 Units, Subcutaneous, Q12H  insulin detemir, 5 Units, Subcutaneous, Q12H  insulin regular, 0-7 Units, Subcutaneous, Q6H  lactobacillus acidophilus, 1 capsule, Per PEG Tube, Daily  melatonin, 10 mg, Per G Tube, Nightly  Nutrisource fiber, 1 packet, Per G Tube, TID  predniSONE, 40 mg, Per G Tube, Daily With Breakfast  PRO-STAT, 30 mL, Per G Tube, BID  sertraline, 50 mg, Oral, Daily        Assessment/Plan   IMPRESSION / PLAN     Inpatient Problem List:  66 y.o.male:  Active Hospital Problems    Diagnosis    • **Acute respiratory failure with hypoxia    • Idiopathic hypotension    • Hyperglycemia    • History of tobacco abuse    • Sepsis due to pneumonia (HCC);Klebsiella aerogenes    • Acute blood loss anemia    • Anti-GBM nephritis with pulmonary hemorrhage (HCC)    •  Acute renal failure (ARF) (HCC)         Impression & Plan:  66 y.o.male with relevant PMH of 45 py smoking quit 1 month PTA, HTN, T2DM admitted initially to [facility] 9/27/21 with 2-3 week h/o hemoptysis.  He required intubation on 10/1/21.  CTA at OSH showed diffuse GGO and 8 mm RML nodule.  Patient was also noted to be in Renal failure with concerns for Pulmonary-Renal syndrome.  He was treated with 3 days of pulse IV solumedrol. Ultimately anti-GBM Ab was positive concerning for Goodpastures.  PAIGE / ANCA / RF / Mycoplasma IgM were negative.  Patient had temporary HD catheter placed at OSH for PIOTR and HD was started.    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    He was transferred to [facility] 10/4/2021 for higher level of care.  Daily plasma exchange was initiated on 10/5/21 for total of 14 sessions, and he has continued on moderate dose steroids after receiving pulse.  Patient has also been initiated on Cytoxan.  Has required multiple transfusions.      Eventually had Trach / PEG on 10/15/21.    Anxiety  Depression  Continue klonopin and zoloft    Acute Hypoxemic Respiratory Failure  Diffuse Alveolar Hemorrhage  Goodpasture's Syndrome  Prolonged Mechanical Ventilation  Scheduled nebs. Steroids / cytoxan / plasma exchange per nephrology.  PJP prophylaxis.  Patient is heavily immunosuppressed and will be for some time.  Appreciate ID assistance with abx.   Has been on trach collar for 72 hours.  Leave cuff de-flatted on trach collar.  Will ask speech to eval for swallowing and PMV.    Plans in place to continue cytoxan for 3 months.  Slowly taper pred.  Stop PJP prophylaxis when on < 15 mg Pred.    Klebsiella Aerogenes Pneumonia  Completed abx on 10/25    PIOTR  As above, Dialysis per nephrology.  If unable to control uremia will need to d/c prostat.    Anemia  Has required multiple  transfusions.  Tranfuse for hgb < 7.  No signs of active bleeding.      T2DM A1c  Steroid Induced Hyperglycemia  Titrate SQ insulin    DVT / PUD Prophylaxis  SCDs / Heparin / Pepcid    Nutrition  Tube Feeds    Dispo  Trach / PEG 10/15.  Unfortunately cytoxan has to be specially compounded to go in G-tube.  Once able to swallow with ST and take pill form this will expand options for rehab.    PT/OT    Plan of care and goals reviewed with multidisciplinary team at daily rounds    High Risk secondary to severe life threatening good pastures syndrome requiring prolonged mechanical ventilation, dialysis, plasma exchange, etc.  High risk for worsening / relapse.       Sravan Forrester MD  Intensive Care Medicine  11/01/21 12:38 EDT

## 2021-11-01 NOTE — THERAPY TREATMENT NOTE
Patient Name: Keshav Dickens  : 1955    MRN: [id number]                              Today's Date: 2021       Admit Date: 10/4/2021    Visit Dx:     ICD-10-CM ICD-9-CM   1. Acute respiratory failure with hypoxia  J96.01 518.81   2. Examination for normal comparison for clinical research  Z00.6 V70.7   3. Diffuse pulmonary alveolar hemorrhage  R04.89 786.30   4. 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.0 446.21     Patient Active Problem List   Diagnosis   • Anti-GBM nephritis with pulmonary hemorrhage (HCC)   • Acute respiratory failure with hypoxia   • Acute renal failure (ARF) (HCC)   • Acute blood loss anemia   • Sepsis due to pneumonia (HCC);Klebsiella aerogenes   • Idiopathic hypotension   • Hyperglycemia   • History of tobacco abuse     Past Medical History:   Diagnosis Date   • Goodpasture's syndrome  10/4/2021   • Hypertension      Past Surgical History:   Procedure Laterality Date   • CHOLECYSTECTOMY     • TRACHEOSTOMY AND PEG TUBE INSERTION N/A 10/15/2021    Procedure: TRACHEOSTOMY AND PERCUTANEOUS ENDOSCOPIC GASTROSTOMY TUBE INSERTION;  Surgeon: Abdon Roberts MD;  Location: [facility];  Service: General;  Laterality: N/A;      General Information     Row Name 21 1013          Physical Therapy Time and Intention    Document Type therapy note (daily note)  -AE     Mode of Treatment physical therapy  -AE     Row Name 21 1013          General Information    Patient Profile Reviewed yes  -AE     Existing Precautions/Restrictions fall; oxygen therapy device and L/min  -AE     Barriers to Rehab medically complex  -AE     Row Name 21 1013          Cognition    Orientation Status (Cognition) unable/difficult to assess  Difficult to assess due to trach collar, patient alert and following commands  -AE     Row Name 21 1013          Safety Issues, Functional Mobility    Safety Issues Affecting Function (Mobility) insight into deficits/self-awareness; safety  precaution awareness; safety precautions follow-through/compliance; sequencing abilities  -AE     Impairments Affecting Function (Mobility) balance; endurance/activity tolerance; postural/trunk control; strength  -AE     Comment, Safety Issues/Impairments (Mobility) VC's to improve sequencing, maintain balance, and proper hand placement during mobility.  -AE           User Key  (r) = Recorded By, (t) = Taken By, (c) = Cosigned By    Initials Name Provider Type    AE Sukumar Ray, PT Physical Therapist               Mobility     Row Name 11/01/21 1015          Bed Mobility    Bed Mobility rolling left; scooting/bridging; supine-sit  -AE     Rolling Left [place] (Bed Mobility) maximum assist (25% patient effort); 2 person assist  -AE     Scooting/Bridging [place] (Bed Mobility) maximum assist (25% patient effort); 2 person assist  -AE     Supine-Sit [place] (Bed Mobility) maximum assist (25% patient effort); 2 person assist  -AE     Assistive Device (Bed Mobility) draw sheet; head of bed elevated  -AE     Comment (Bed Mobility) Pt required max A for all bed mobility. VC's to improve independence, balance, and sequencing.  -AE     Row Name 11/01/21 1015          Transfers    Comment (Transfers) Pt completed STS x2 with a few steps to the chair. Frequent cues to improve upright standing posture, sequencing, and weight shifting. Pt with difficulty maintaining feet shoulder width apart while completing transfer.  -AE     Row Name 11/01/21 1015          Sit-Stand Transfer    Sit-Stand [place] (Transfers) maximum assist (25% patient effort); 2 person assist; verbal cues; nonverbal cues (demo/gesture)  -AE     Row Name 11/01/21 1015          Gait/Stairs (Locomotion)    [place] Level (Gait) maximum assist (25% patient effort); 2 person assist; verbal cues; nonverbal cues (demo/gesture)  -AE     Distance in Feet (Gait) 2  -AE     Deviations/Abnormal Patterns (Gait) bilateral deviations; base of  support, narrow; kayla decreased; gait speed decreased; festinating/shuffling; stride length decreased; weight shifting decreased  -AE     Bilateral Gait Deviations forward flexed posture; heel strike decreased; knee buckling, bilateral  -AE     Comment (Gait/Stairs) Pt completed a few steps from bed to chair with decreased step length, decreased GARY, and shuffling of feet. Pt with difficulty advancing BLE, bilateral knee buckling noted during transfer.  -AE           User Key  (r) = Recorded By, (t) = Taken By, (c) = Cosigned By    Initials Name Provider Type    Sukumar Forman PT Physical Therapist               Obj/Interventions     Row Name 11/01/21 1025          Balance    Balance Assessment sitting static balance; sitting dynamic balance; standing static balance; standing dynamic balance  -AE     Static Sitting Balance moderate impairment; unsupported; sitting, edge of bed  -AE     Dynamic Sitting Balance moderate impairment; supported; sitting, edge of bed  -AE     Static Standing Balance severe impairment; supported; standing  -AE     Dynamic Standing Balance severe impairment; supported; standing  -AE     Comment, Balance Pt unable to maintain upright sitting posture while sitting at EOB. Pt with decreased standing balance with narrow GARY. Frequent cues to maintain balance, upright posture.  -AE           User Key  (r) = Recorded By, (t) = Taken By, (c) = Cosigned By    Initials Name Provider Type    Sukumar Forman PT Physical Therapist               Goals/Plan    No documentation.                Clinical Impression     Row Name 11/01/21 1028          Pain    Additional Documentation Pain Scale: FACES Pre/Post-Treatment (Group)  -AE     Row Name 11/01/21 1028          Pain Scale: FACES Pre/Post-Treatment    Pain: FACES Scale, Pretreatment 0-->no hurt  -AE     Posttreatment Pain Rating 0-->no hurt  -AE     Row Name 11/01/21 1028          Plan of Care Review    Plan of Care Reviewed With patient   -AE     Progress improving  -AE     Outcome Summary Pt was able to perform STS x2 with a few steps from bed to chair. Pt required max A x2 to complete STS with frequent cues to improve upright standing posture and widen GARY. Pt continues to demonstrate narrow GARY with standing. Pt continues to improve mobility overall. Continue to progress per pt tolerance.  -AE     Row Name 11/01/21 1028          Vital Signs    Pre Systolic BP Rehab 144  -AE     Pre Treatment Diastolic BP 76  -AE     Post Systolic BP Rehab 139  -AE     Post Treatment Diastolic BP 71  -AE     Pretreatment Heart Rate (beats/min) 72  -AE     Posttreatment Heart Rate (beats/min) 73  -AE     Pre SpO2 (%) 99  -AE     O2 Delivery Pre Treatment trach collar  -AE     Post SpO2 (%) 100  -AE     O2 Delivery Post Treatment trach collar  -AE     Pre Patient Position Supine  -AE     Intra Patient Position Standing  -AE     Post Patient Position Sitting  -AE     Row Name 11/01/21 1028          Positioning and Restraints    Pre-Treatment Position in bed  -AE     Post Treatment Position chair  -AE     In Chair notified nsg; reclined; call light within reach; encouraged to call for assist; exit alarm on; with family/caregiver; RUE elevated; LUE elevated; on mechanical lift sling  -AE           User Key  (r) = Recorded By, (t) = Taken By, (c) = Cosigned By    Initials Name Provider Type    AE Sukumar Ray, PT Physical Therapist               Outcome Measures     Row Name 11/01/21 1078          How much help from another person do you currently need...    Turning from your back to your side while in flat bed without using bedrails? 2  -AE     Moving from lying on back to sitting on the side of a flat bed without bedrails? 1  -AE     Moving to and from a bed to a chair (including a wheelchair)? 2  -AE     Standing up from a chair using your arms (e.g., wheelchair, bedside chair)? 2  -AE     Climbing 3-5 steps with a railing? 1  -AE     To walk in hospital room? 1   -AE     AM-PAC 6 Clicks Score (PT) 9  -AE     Row Name 11/01/21 1134          Functional Assessment    Outcome Measure Options AM-PAC 6 Clicks Basic Mobility (PT)  -AE           User Key  (r) = Recorded By, (t) = Taken By, (c) = Cosigned By    Initials Name Provider Type    Sukumar Forman PT Physical Therapist                             Physical Therapy Education                 Title: PT OT SLP Therapies (In Progress)     Topic: Physical Therapy (In Progress)     Point: Mobility training (In Progress)     Learning Progress Summary           Patient Acceptance, E, NR by AE at 11/1/2021 0910    Acceptance, E, NR by TANK at 10/31/2021 1145    Acceptance, E, NR by JB1 at 10/31/2021 0243    Acceptance, E, NR by JB1 at 10/30/2021 0334    Acceptance, E, VU by TANK at 10/29/2021 0815    Comment: wife instructed in ROM exercises to do with patient throughout the day she is able to verbalize understanding.    Acceptance, E, NR by AE at 10/28/2021 1425    Acceptance, E, NR by KG at 10/27/2021 0927    Acceptance, E, NR by TANK at 10/26/2021 1300    Acceptance, E, NR by KG at 10/25/2021 0828    Acceptance, E, NR by AE at 10/22/2021 0825    Acceptance, E, NR by KG at 10/20/2021 0943    Acceptance, E, NR by KG at 10/18/2021 1017    Acceptance, E, NR by KG at 10/17/2021 1138   Family Acceptance, E, NR by KG at 10/27/2021 0927    Acceptance, E, NR by KG at 10/25/2021 0828    Acceptance, E, NR by KG at 10/20/2021 0943    Acceptance, E, NR by KG at 10/17/2021 1138   Significant Other Acceptance, E, VU by TANK at 10/29/2021 0815    Comment: wife instructed in ROM exercises to do with patient throughout the day she is able to verbalize understanding.                   Point: Home exercise program (In Progress)     Learning Progress Summary           Patient Acceptance, E, NR by AE at 11/1/2021 0910    Acceptance, E, NR by TANK at 10/31/2021 1145    Acceptance, E, VU by TANK at 10/29/2021 0815    Comment: wife instructed in ROM exercises to  do with patient throughout the day she is able to verbalize understanding.    Acceptance, E, NR by AE at 10/28/2021 1425    Acceptance, E, NR by KG at 10/27/2021 0927    Acceptance, E, NR by TANK at 10/26/2021 1300    Acceptance, E, NR by KG at 10/25/2021 0828    Acceptance, E, NR by AE at 10/22/2021 0825    Acceptance, E, NR by KG at 10/20/2021 0943    Acceptance, E, NR by KG at 10/18/2021 1017    Acceptance, E, NR by KG at 10/17/2021 1138   Family Acceptance, E, NR by KG at 10/27/2021 0927    Acceptance, E, NR by KG at 10/25/2021 0828    Acceptance, E, NR by KG at 10/20/2021 0943    Acceptance, E, NR by KG at 10/17/2021 1138   Significant Other Acceptance, E, VU by TANK at 10/29/2021 0815    Comment: wife instructed in ROM exercises to do with patient throughout the day she is able to verbalize understanding.                   Point: Body mechanics (In Progress)     Learning Progress Summary           Patient Acceptance, E, NR by AE at 11/1/2021 0910    Acceptance, E, NR by TANK at 10/31/2021 1145    Acceptance, E, NR by JB1 at 10/31/2021 0243    Acceptance, E, NR by JB1 at 10/30/2021 0334    Acceptance, E, VU by TANK at 10/29/2021 0815    Comment: wife instructed in ROM exercises to do with patient throughout the day she is able to verbalize understanding.    Acceptance, E, NR by AE at 10/28/2021 1425    Acceptance, E, NR by KG at 10/27/2021 0927    Acceptance, E, NR by TANK at 10/26/2021 1300    Acceptance, E, NR by KG at 10/25/2021 0828    Acceptance, E, NR by AE at 10/22/2021 0825    Acceptance, E, NR by KG at 10/20/2021 0943    Acceptance, E, NR by KG at 10/18/2021 1017    Acceptance, E, NR by KG at 10/17/2021 1138   Family Acceptance, E, NR by KG at 10/27/2021 0927    Acceptance, E, NR by KG at 10/25/2021 0828    Acceptance, E, NR by KG at 10/20/2021 0943    Acceptance, E, NR by KG at 10/17/2021 1138   Significant Other Acceptance, E, VU by TANK at 10/29/2021 0815    Comment: wife instructed in ROM exercises to do with  patient throughout the day she is able to verbalize understanding.                   Point: Precautions (In Progress)     Learning Progress Summary           Patient Acceptance, E, NR by AE at 11/1/2021 0910    Acceptance, E, NR by TANK at 10/31/2021 1145    Acceptance, E, NR by JB1 at 10/30/2021 0334    Acceptance, E, VU by TANK at 10/29/2021 0815    Comment: wife instructed in ROM exercises to do with patient throughout the day she is able to verbalize understanding.    Acceptance, E, NR by AE at 10/28/2021 1425    Acceptance, E, NR by KG at 10/27/2021 0927    Acceptance, E, NR by TANK at 10/26/2021 1300    Acceptance, E, NR by KG at 10/25/2021 0828    Acceptance, E, NR by AE at 10/22/2021 0825    Acceptance, E, NR by KG at 10/20/2021 0943    Acceptance, E, NR by KG at 10/18/2021 1017    Acceptance, E, NR by KG at 10/17/2021 1138   Family Acceptance, E, NR by KG at 10/27/2021 0927    Acceptance, E, NR by KG at 10/25/2021 0828    Acceptance, E, NR by KG at 10/20/2021 0943    Acceptance, E, NR by KG at 10/17/2021 1138   Significant Other Acceptance, E, VU by TANK at 10/29/2021 0815    Comment: wife instructed in ROM exercises to do with patient throughout the day she is able to verbalize understanding.                               User Key     Initials Effective Dates Name Provider Type Discipline     06/16/21 -  Karrie Cortez, PT Physical Therapist PT    1 06/16/21 -  Ian Washington, RN Registered Nurse Nurse    KG 05/22/20 -  Joanna Clark PT Physical Therapist PT    AE 09/21/21 -  Sukumar Ray PT Physical Therapist PT              PT Recommendation and Plan     Plan of Care Reviewed With: patient  Progress: improving  Outcome Summary: Pt was able to perform STS x2 with a few steps from bed to chair. Pt required max A x2 to complete STS with frequent cues to improve upright standing posture and widen GARY. Pt continues to demonstrate narrow GARY with standing. Pt continues to improve mobility  overall. Continue to progress per pt tolerance.     Time Calculation:    PT Charges     Row Name 11/01/21 1135             Time Calculation    Start Time 0910  -AE      PT Received On 11/01/21  -AE      PT Goal Re-Cert Due Date 11/06/21  -AE              Time Calculation- PT    Total Timed Code Minutes- PT 25 minute(s)  -AE              Timed Charges    34563 - PT Therapeutic Activity Minutes 25  -AE              Total Minutes    Timed Charges Total Minutes 25  -AE       Total Minutes 25  -AE            User Key  (r) = Recorded By, (t) = Taken By, (c) = Cosigned By    Initials Name Provider Type    AE Sukumar Ray, PT Physical Therapist              Therapy Charges for Today     Code Description Service Date Service Provider Modifiers Qty    68993500519 HC PT THERAPEUTIC ACT EA 15 MIN 11/1/2021 Sukumar Ray, PT GP 2    90914529951 HC PT THER SUPP EA 15 MIN 11/1/2021 Sukumar Ray, PT GP 2          PT G-Codes  Outcome Measure Options: AM-PAC 6 Clicks Basic Mobility (PT)  AM-PAC 6 Clicks Score (PT): 9    Sukumar Ray PT  11/1/2021

## 2021-11-01 NOTE — PLAN OF CARE
Goal Outcome Evaluation:  Plan of Care Reviewed With: patient        Progress: improving  Outcome Summary: Pt was able to perform STS x2 with a few steps from bed to chair. Pt required max A x2 to complete STS with frequent cues to improve upright standing posture and widen GARY. Pt continues to demonstrate narrow GARY with standing. Pt continues to improve mobility overall. Continue to progress per pt tolerance.

## 2021-11-02 ENCOUNTER — APPOINTMENT (OUTPATIENT)
Dept: NEPHROLOGY | Facility: HOSPITAL | Age: 66
End: 2021-11-02

## 2021-11-02 ENCOUNTER — APPOINTMENT (OUTPATIENT)
Dept: GENERAL RADIOLOGY | Facility: HOSPITAL | Age: 66
End: 2021-11-02

## 2021-11-02 LAB
ALBUMIN SERPL-MCNC: 4 G/DL (ref 3.5–5.2)
ALP SERPL-CCNC: 78 U/L (ref 39–117)
ALT SERPL W P-5'-P-CCNC: 25 U/L (ref 1–41)
ANION GAP SERPL CALCULATED.3IONS-SCNC: 22 MMOL/L (ref 5–15)
AST SERPL-CCNC: 23 U/L (ref 1–40)
BASOPHILS # BLD AUTO: 0.04 10*3/MM3 (ref 0–0.2)
BASOPHILS NFR BLD AUTO: 0.4 % (ref 0–1.5)
BILIRUB SERPL-MCNC: 0.6 MG/DL (ref 0–1.2)
BUN SERPL-MCNC: 145 MG/DL (ref 8–23)
CALCIUM SPEC-SCNC: 8.8 MG/DL (ref 8.6–10.5)
CHLORIDE SERPL-SCNC: 89 MMOL/L (ref 98–107)
CHOLEST SERPL-MCNC: 147 MG/DL (ref 0–200)
CO2 SERPL-SCNC: 21 MMOL/L (ref 22–29)
CREAT SERPL-MCNC: 7.85 MG/DL (ref 0.76–1.27)
CRP SERPL-MCNC: 2.55 MG/DL (ref 0–0.5)
DEPRECATED RDW RBC AUTO: 53.9 FL (ref 37–54)
EOSINOPHIL # BLD AUTO: 0.07 10*3/MM3 (ref 0–0.4)
EOSINOPHIL NFR BLD AUTO: 0.8 % (ref 0.3–6.2)
ERYTHROCYTE [DISTWIDTH] IN BLOOD BY AUTOMATED COUNT: 17.3 % (ref 12.3–15.4)
GLUCOSE BLDC GLUCOMTR-MCNC: 116 MG/DL (ref 70–130)
GLUCOSE BLDC GLUCOMTR-MCNC: 143 MG/DL (ref 70–130)
GLUCOSE BLDC GLUCOMTR-MCNC: 214 MG/DL (ref 70–130)
GLUCOSE BLDC GLUCOMTR-MCNC: 237 MG/DL (ref 70–130)
GLUCOSE SERPL-MCNC: 120 MG/DL (ref 65–99)
HCT VFR BLD AUTO: 20.8 % (ref 37.5–51)
HGB BLD-MCNC: 7.2 G/DL (ref 13–17.7)
IMM GRANULOCYTES # BLD AUTO: 0.2 10*3/MM3 (ref 0–0.05)
IMM GRANULOCYTES NFR BLD AUTO: 2.2 % (ref 0–0.5)
LYMPHOCYTES # BLD AUTO: 0.77 10*3/MM3 (ref 0.7–3.1)
LYMPHOCYTES NFR BLD AUTO: 8.6 % (ref 19.6–45.3)
MAGNESIUM SERPL-MCNC: 2.9 MG/DL (ref 1.6–2.4)
MAGNESIUM SERPL-MCNC: 2.9 MG/DL (ref 1.6–2.4)
MCH RBC QN AUTO: 32.6 PG (ref 26.6–33)
MCHC RBC AUTO-ENTMCNC: 34.6 G/DL (ref 31.5–35.7)
MCV RBC AUTO: 94.1 FL (ref 79–97)
MONOCYTES # BLD AUTO: 0.6 10*3/MM3 (ref 0.1–0.9)
MONOCYTES NFR BLD AUTO: 6.7 % (ref 5–12)
NEUTROPHILS NFR BLD AUTO: 7.25 10*3/MM3 (ref 1.7–7)
NEUTROPHILS NFR BLD AUTO: 81.3 % (ref 42.7–76)
NRBC BLD AUTO-RTO: 0 /100 WBC (ref 0–0.2)
PHOSPHATE SERPL-MCNC: 4.7 MG/DL (ref 2.5–4.5)
PHOSPHATE SERPL-MCNC: 4.7 MG/DL (ref 2.5–4.5)
PLATELET # BLD AUTO: 198 10*3/MM3 (ref 140–450)
PMV BLD AUTO: 12.5 FL (ref 6–12)
POTASSIUM SERPL-SCNC: 4.2 MMOL/L (ref 3.5–5.2)
PREALB SERPL-MCNC: 28.2 MG/DL (ref 20–40)
PROT SERPL-MCNC: 6.3 G/DL (ref 6–8.5)
RBC # BLD AUTO: 2.21 10*6/MM3 (ref 4.14–5.8)
SODIUM SERPL-SCNC: 132 MMOL/L (ref 136–145)
TRIGL SERPL-MCNC: 315 MG/DL (ref 0–150)
WBC # BLD AUTO: 8.93 10*3/MM3 (ref 3.4–10.8)

## 2021-11-02 PROCEDURE — 25010000002 EPOETIN ALFA-EPBX 10000 UNIT/ML SOLUTION: Performed by: INTERNAL MEDICINE

## 2021-11-02 PROCEDURE — 84100 ASSAY OF PHOSPHORUS: CPT | Performed by: SURGERY

## 2021-11-02 PROCEDURE — 63710000001 INSULIN DETEMIR PER 5 UNITS: Performed by: INTERNAL MEDICINE

## 2021-11-02 PROCEDURE — 80053 COMPREHEN METABOLIC PANEL: CPT | Performed by: SURGERY

## 2021-11-02 PROCEDURE — 71045 X-RAY EXAM CHEST 1 VIEW: CPT

## 2021-11-02 PROCEDURE — 82465 ASSAY BLD/SERUM CHOLESTEROL: CPT | Performed by: SURGERY

## 2021-11-02 PROCEDURE — 85025 COMPLETE CBC W/AUTO DIFF WBC: CPT | Performed by: INTERNAL MEDICINE

## 2021-11-02 PROCEDURE — 92507 TX SP LANG VOICE COMM INDIV: CPT | Performed by: SPEECH-LANGUAGE PATHOLOGIST

## 2021-11-02 PROCEDURE — 97530 THERAPEUTIC ACTIVITIES: CPT

## 2021-11-02 PROCEDURE — 25010000002 ALBUMIN HUMAN 25% PER 50 ML: Performed by: INTERNAL MEDICINE

## 2021-11-02 PROCEDURE — 82962 GLUCOSE BLOOD TEST: CPT

## 2021-11-02 PROCEDURE — 25010000002 CYCLOPHOSPHAMIDE PER 100 MG: Performed by: INTERNAL MEDICINE

## 2021-11-02 PROCEDURE — 94799 UNLISTED PULMONARY SVC/PX: CPT

## 2021-11-02 PROCEDURE — 84134 ASSAY OF PREALBUMIN: CPT | Performed by: SURGERY

## 2021-11-02 PROCEDURE — 25010000002 HEPARIN (PORCINE) PER 1000 UNITS: Performed by: INTERNAL MEDICINE

## 2021-11-02 PROCEDURE — 83735 ASSAY OF MAGNESIUM: CPT | Performed by: INTERNAL MEDICINE

## 2021-11-02 PROCEDURE — 63710000001 PREDNISONE PER 1 MG: Performed by: INTERNAL MEDICINE

## 2021-11-02 PROCEDURE — 63710000001 INSULIN REGULAR HUMAN PER 5 UNITS: Performed by: SURGERY

## 2021-11-02 PROCEDURE — P9047 ALBUMIN (HUMAN), 25%, 50ML: HCPCS | Performed by: INTERNAL MEDICINE

## 2021-11-02 PROCEDURE — 84478 ASSAY OF TRIGLYCERIDES: CPT | Performed by: SURGERY

## 2021-11-02 PROCEDURE — 86140 C-REACTIVE PROTEIN: CPT | Performed by: SURGERY

## 2021-11-02 PROCEDURE — 83735 ASSAY OF MAGNESIUM: CPT | Performed by: SURGERY

## 2021-11-02 PROCEDURE — 99233 SBSQ HOSP IP/OBS HIGH 50: CPT | Performed by: INTERNAL MEDICINE

## 2021-11-02 PROCEDURE — 84100 ASSAY OF PHOSPHORUS: CPT | Performed by: INTERNAL MEDICINE

## 2021-11-02 PROCEDURE — 92610 EVALUATE SWALLOWING FUNCTION: CPT | Performed by: SPEECH-LANGUAGE PATHOLOGIST

## 2021-11-02 RX ORDER — ALBUMIN (HUMAN) 12.5 G/50ML
12.5 SOLUTION INTRAVENOUS AS NEEDED
Status: DISPENSED | OUTPATIENT
Start: 2021-11-02 | End: 2021-11-02

## 2021-11-02 RX ORDER — DEXTROMETHORPHAN POLISTIREX 30 MG/5ML
60 SUSPENSION ORAL EVERY 12 HOURS SCHEDULED
Status: DISCONTINUED | OUTPATIENT
Start: 2021-11-02 | End: 2021-11-08

## 2021-11-02 RX ORDER — MELATONIN
2000 DAILY
Status: DISCONTINUED | OUTPATIENT
Start: 2021-11-03 | End: 2021-11-12 | Stop reason: HOSPADM

## 2021-11-02 RX ORDER — DEXTROMETHORPHAN POLISTIREX 30 MG/5ML
60 SUSPENSION ORAL EVERY 12 HOURS SCHEDULED
Status: DISCONTINUED | OUTPATIENT
Start: 2021-11-02 | End: 2021-11-02

## 2021-11-02 RX ADMIN — ATOVAQUONE 1500 MG: 750 SUSPENSION ORAL at 13:00

## 2021-11-02 RX ADMIN — HEPARIN SODIUM 5000 UNITS: 5000 INJECTION, SOLUTION INTRAVENOUS; SUBCUTANEOUS at 09:20

## 2021-11-02 RX ADMIN — OXYCODONE HYDROCHLORIDE 5 MG: 5 SOLUTION ORAL at 19:51

## 2021-11-02 RX ADMIN — CHLORHEXIDINE GLUCONATE 15 ML: 1.2 SOLUTION ORAL at 20:40

## 2021-11-02 RX ADMIN — SERTRALINE 50 MG: 50 TABLET, FILM COATED ORAL at 13:01

## 2021-11-02 RX ADMIN — Medication 1 PACKET: at 20:43

## 2021-11-02 RX ADMIN — PREDNISONE 40 MG: 20 TABLET ORAL at 13:10

## 2021-11-02 RX ADMIN — ALBUMIN HUMAN 12.5 G: 0.25 SOLUTION INTRAVENOUS at 08:47

## 2021-11-02 RX ADMIN — CHLORHEXIDINE GLUCONATE 15 ML: 1.2 SOLUTION ORAL at 09:20

## 2021-11-02 RX ADMIN — HEPARIN SODIUM 5000 UNITS: 5000 INJECTION, SOLUTION INTRAVENOUS; SUBCUTANEOUS at 20:41

## 2021-11-02 RX ADMIN — Medication 2000 UNITS: at 09:21

## 2021-11-02 RX ADMIN — INSULIN DETEMIR 5 UNITS: 100 INJECTION, SOLUTION SUBCUTANEOUS at 20:47

## 2021-11-02 RX ADMIN — INSULIN HUMAN 2 UNITS: 100 INJECTION, SOLUTION PARENTERAL at 01:25

## 2021-11-02 RX ADMIN — MINERAL SUPPLEMENT IRON 300 MG / 5 ML STRENGTH LIQUID 100 PER BOX UNFLAVORED 300 MG: at 13:00

## 2021-11-02 RX ADMIN — Medication 60 MG: at 11:15

## 2021-11-02 RX ADMIN — CLONAZEPAM 0.5 MG: 0.5 TABLET ORAL at 16:14

## 2021-11-02 RX ADMIN — Medication 1 TABLET: at 09:21

## 2021-11-02 RX ADMIN — Medication 10 MG: at 20:40

## 2021-11-02 RX ADMIN — FAMOTIDINE 20 MG: 20 TABLET, FILM COATED ORAL at 09:20

## 2021-11-02 RX ADMIN — Medication 100 MG: at 17:54

## 2021-11-02 RX ADMIN — Medication 1 PACKET: at 09:22

## 2021-11-02 RX ADMIN — HEPARIN SODIUM 1600 UNITS: 1000 INJECTION INTRAVENOUS; SUBCUTANEOUS at 12:46

## 2021-11-02 RX ADMIN — EPOETIN ALFA-EPBX 10000 UNITS: 10000 INJECTION, SOLUTION INTRAVENOUS; SUBCUTANEOUS at 09:19

## 2021-11-02 RX ADMIN — Medication 1 PACKET: at 16:14

## 2021-11-02 RX ADMIN — INSULIN DETEMIR 5 UNITS: 100 INJECTION, SOLUTION SUBCUTANEOUS at 09:20

## 2021-11-02 RX ADMIN — INSULIN HUMAN 3 UNITS: 100 INJECTION, SOLUTION PARENTERAL at 23:09

## 2021-11-02 RX ADMIN — Medication 1 CAPSULE: at 09:20

## 2021-11-02 RX ADMIN — Medication 60 MG: at 20:43

## 2021-11-02 RX ADMIN — INSULIN HUMAN 3 UNITS: 100 INJECTION, SOLUTION PARENTERAL at 17:54

## 2021-11-02 NOTE — THERAPY TREATMENT NOTE
Patient Name: Keshav Dickens  : 1955    MRN: [id number]                              Today's Date: 2021       Admit Date: 10/4/2021    Visit Dx:     ICD-10-CM ICD-9-CM   1. Acute respiratory failure with hypoxia  J96.01 518.81   2. Examination for normal comparison for clinical research  Z00.6 V70.7   3. Diffuse pulmonary alveolar hemorrhage  R04.89 786.30   4. 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.0 446.21   5. Voice impairment  R49.9 784.40   6. Dysphagia, unspecified type  R13.10 787.20     Patient Active Problem List   Diagnosis   • Anti-GBM nephritis with pulmonary hemorrhage (HCC)   • Acute respiratory failure with hypoxia   • Acute renal failure (ARF) (HCC)   • Acute blood loss anemia   • Sepsis due to pneumonia (HCC);Klebsiella aerogenes   • Idiopathic hypotension   • Hyperglycemia   • History of tobacco abuse     Past Medical History:   Diagnosis Date   • Goodpasture's syndrome  10/4/2021   • Hypertension      Past Surgical History:   Procedure Laterality Date   • CHOLECYSTECTOMY     • TRACHEOSTOMY AND PEG TUBE INSERTION N/A 10/15/2021    Procedure: TRACHEOSTOMY AND PERCUTANEOUS ENDOSCOPIC GASTROSTOMY TUBE INSERTION;  Surgeon: Abdon Roberts MD;  Location: [facility];  Service: General;  Laterality: N/A;      General Information     Row Name 21 588          Physical Therapy Time and Intention    Document Type therapy note (daily note)  -AE     Mode of Treatment physical therapy  -AE     Row Name 21 5602          General Information    Patient Profile Reviewed yes  -AE     Existing Precautions/Restrictions fall; oxygen therapy device and L/min  -AE     Barriers to Rehab medically complex  -AE     Row Name 21 1557          Cognition    Orientation Status (Cognition) unable/difficult to assess  Pt with trach collar; pt following commands and alert  -AE     Row Name 21 6369          Safety Issues, Functional Mobility    Safety Issues Affecting Function  (Mobility) awareness of need for assistance; insight into deficits/self-awareness; safety precaution awareness; safety precautions follow-through/compliance; sequencing abilities  -AE     Impairments Affecting Function (Mobility) balance; endurance/activity tolerance; postural/trunk control; strength  -AE     Comment, Safety Issues/Impairments (Mobility) VC's to improve sequencing, maintain balance, and improving independence with all mobility.  -AE           User Key  (r) = Recorded By, (t) = Taken By, (c) = Cosigned By    Initials Name Provider Type    AE Sukumar Ray, PT Physical Therapist               Mobility     Row Name 11/02/21 1557          Bed Mobility    Bed Mobility rolling right; scooting/bridging; supine-sit  -AE     Rolling Right Leland (Bed Mobility) maximum assist (25% patient effort); 2 person assist  -AE     Scooting/Bridging Leland (Bed Mobility) maximum assist (25% patient effort); 2 person assist  -AE     Supine-Sit Leland (Bed Mobility) maximum assist (25% patient effort); 2 person assist  -AE     Assistive Device (Bed Mobility) draw sheet; head of bed elevated  -AE     Comment (Bed Mobility) Pt required max A for all mobility. VCs' to improve balance, sequencing, and improving independence.  -AE     Row Name 11/02/21 0273          Transfers    Comment (Transfers) Pt completed STS x2 with 3-4 steps from bed. Frequent cues to improve upright standing posture, pt only able to maintain 50-75% upright. Pt with difficulty maintaining feet shoulder width apart.  -AE     Row Name 11/02/21 1551          Sit-Stand Transfer    Sit-Stand Leland (Transfers) maximum assist (25% patient effort); 2 person assist; verbal cues; nonverbal cues (demo/gesture)  -AE     Assistive Device (Sit-Stand Transfers) other (see comments)  BUE support  -AE     Row Name 11/02/21 9495          Gait/Stairs (Locomotion)    Leland Level (Gait) maximum assist (25% patient effort); 2 person  assist; verbal cues; nonverbal cues (demo/gesture)  -AE     Assistive Device (Gait) --  BUE support  -AE     Distance in Feet (Gait) 3  -AE     Deviations/Abnormal Patterns (Gait) bilateral deviations; base of support, narrow; kayla decreased; festinating/shuffling; gait speed decreased; stride length decreased  -AE     Bilateral Gait Deviations forward flexed posture; heel strike decreased; knee buckling, bilateral  -AE     Comment (Gait/Stairs) Pt ambulated 4 steps from bed with decreased step length, increased shuffling of feet, and decreased balance. Pt with difficulty advancing BLE, bilateral knee buckling upon standing.  -AE           User Key  (r) = Recorded By, (t) = Taken By, (c) = Cosigned By    Initials Name Provider Type    AE Sukumar Ray PT Physical Therapist               Obj/Interventions     Row Name 11/02/21 1608          Balance    Balance Assessment sitting static balance; sitting dynamic balance; standing static balance; standing dynamic balance  -AE     Static Sitting Balance moderate impairment; unsupported; sitting, edge of bed  -AE     Dynamic Sitting Balance moderate impairment; unsupported; sitting, edge of bed  -AE     Static Standing Balance severe impairment; supported; standing  -AE     Dynamic Standing Balance severe impairment; supported; standing  -AE     Comment, Balance Pt with increased difficulty improving upright standing posture. Narrow GARY noted with all activity despite frequent cues to improve.  -AE           User Key  (r) = Recorded By, (t) = Taken By, (c) = Cosigned By    Initials Name Provider Type    Sukumar Forman PT Physical Therapist               Goals/Plan    No documentation.                Clinical Impression     Row Name 11/02/21 1607          Pain    Additional Documentation Pain Scale: FACES Pre/Post-Treatment (Group)  -AE     Row Name 11/02/21 1607          Pain Scale: FACES Pre/Post-Treatment    Pain: FACES Scale, Pretreatment 0-->no hurt  -AE      Posttreatment Pain Rating 0-->no hurt  -AE     Row Name 11/02/21 1607          Plan of Care Review    Plan of Care Reviewed With patient  -AE     Progress improving  -AE     Outcome Summary Pt completed STS x2 with 3-4 steps from bed before requiring a seated rest break. Pt required max A x2 to complete STS with frequent cues to improve upright standing posture and widen GARY which pt was unable to follow effectively, pt with 50-75% upright. Pt continues to improve mobility. Continue to progress per pt tolerance.  -AE     Row Name 11/02/21 1607          Vital Signs    Pre Systolic BP Rehab 108  -AE     Pre Treatment Diastolic BP 64  -AE     Post Systolic BP Rehab 110  -AE     Post Treatment Diastolic BP 89  -AE     Pretreatment Heart Rate (beats/min) 82  -AE     Posttreatment Heart Rate (beats/min) 83  -AE     Pre SpO2 (%) 99  -AE     O2 Delivery Pre Treatment trach collar  -AE     Post SpO2 (%) 97  -AE     O2 Delivery Post Treatment trach collar  -AE     Pre Patient Position Supine  -AE     Intra Patient Position Standing  -AE     Post Patient Position Sitting  -AE     Row Name 11/02/21 1607          Positioning and Restraints    Pre-Treatment Position in bed  -AE     Post Treatment Position chair  -AE     In Chair notified nsg; reclined; call light within reach; encouraged to call for assist; RUE elevated; LUE elevated; waffle cushion; on mechanical lift sling  -AE           User Key  (r) = Recorded By, (t) = Taken By, (c) = Cosigned By    Initials Name Provider Type    AE Sukumar Ray, PT Physical Therapist               Outcome Measures     Row Name 11/02/21 1610 11/02/21 0800       How much help from another person do you currently need...    Turning from your back to your side while in flat bed without using bedrails? 2  -AE 4  -TOMEKA    Moving from lying on back to sitting on the side of a flat bed without bedrails? 2  -AE 3  -TOMEKA    Moving to and from a bed to a chair (including a wheelchair)? 2  -AE 2   -TOMEKA    Standing up from a chair using your arms (e.g., wheelchair, bedside chair)? 2  -AE 2  -TOMEKA    Climbing 3-5 steps with a railing? 1  -AE 2  -TOMEKA    To walk in hospital room? 2  -AE 2  -TOMEKA    AM-PAC 6 Clicks Score (PT) 11  -AE 15  -TOMEKA    Row Name 11/02/21 1610          Functional Assessment    Outcome Measure Options AM-PAC 6 Clicks Basic Mobility (PT)  -AE           User Key  (r) = Recorded By, (t) = Taken By, (c) = Cosigned By    Initials Name Provider Type    Pamela Medrano RN Registered Nurse    Sukumar Forman, PT Physical Therapist                             Physical Therapy Education                 Title: PT OT SLP Therapies (In Progress)     Topic: Physical Therapy (In Progress)     Point: Mobility training (In Progress)     Learning Progress Summary           Patient Acceptance, E, NR by AE at 11/2/2021 1440    Acceptance, E, NR by AE at 11/1/2021 0910    Acceptance, E, NR by TANK at 10/31/2021 1145    Acceptance, E, NR by JB1 at 10/31/2021 0243    Acceptance, E, NR by JB1 at 10/30/2021 0334    Acceptance, E, VU by TANK at 10/29/2021 0815    Comment: wife instructed in ROM exercises to do with patient throughout the day she is able to verbalize understanding.    Acceptance, E, NR by AE at 10/28/2021 1425    Acceptance, E, NR by KG at 10/27/2021 0927    Acceptance, E, NR by TANK at 10/26/2021 1300    Acceptance, E, NR by KG at 10/25/2021 0828    Acceptance, E, NR by AE at 10/22/2021 0825    Acceptance, E, NR by KG at 10/20/2021 0943    Acceptance, E, NR by KG at 10/18/2021 1017    Acceptance, E, NR by KG at 10/17/2021 1138   Family Acceptance, E, NR by KG at 10/27/2021 0927    Acceptance, E, NR by KG at 10/25/2021 0828    Acceptance, E, NR by KG at 10/20/2021 0943    Acceptance, E, NR by KG at 10/17/2021 1138   Significant Other Acceptance, E, VU by TANK at 10/29/2021 0815    Comment: wife instructed in ROM exercises to do with patient throughout the day she is able to verbalize understanding.                    Point: Home exercise program (In Progress)     Learning Progress Summary           Patient Acceptance, E, NR by AE at 11/2/2021 1440    Acceptance, E, NR by AE at 11/1/2021 0910    Acceptance, E, NR by TANK at 10/31/2021 1145    Acceptance, E, VU by TANK at 10/29/2021 0815    Comment: wife instructed in ROM exercises to do with patient throughout the day she is able to verbalize understanding.    Acceptance, E, NR by AE at 10/28/2021 1425    Acceptance, E, NR by KG at 10/27/2021 0927    Acceptance, E, NR by TANK at 10/26/2021 1300    Acceptance, E, NR by KG at 10/25/2021 0828    Acceptance, E, NR by AE at 10/22/2021 0825    Acceptance, E, NR by KG at 10/20/2021 0943    Acceptance, E, NR by KG at 10/18/2021 1017    Acceptance, E, NR by KG at 10/17/2021 1138   Family Acceptance, E, NR by KG at 10/27/2021 0927    Acceptance, E, NR by KG at 10/25/2021 0828    Acceptance, E, NR by KG at 10/20/2021 0943    Acceptance, E, NR by KG at 10/17/2021 1138   Significant Other Acceptance, E, VU by TANK at 10/29/2021 0815    Comment: wife instructed in ROM exercises to do with patient throughout the day she is able to verbalize understanding.                   Point: Body mechanics (In Progress)     Learning Progress Summary           Patient Acceptance, E, NR by AE at 11/2/2021 1440    Acceptance, E, NR by AE at 11/1/2021 0910    Acceptance, E, NR by TANK at 10/31/2021 1145    Acceptance, E, NR by JB1 at 10/31/2021 0243    Acceptance, E, NR by JB1 at 10/30/2021 0334    Acceptance, E, VU by TANK at 10/29/2021 0815    Comment: wife instructed in ROM exercises to do with patient throughout the day she is able to verbalize understanding.    Acceptance, E, NR by AE at 10/28/2021 1425    Acceptance, E, NR by KG at 10/27/2021 0927    Acceptance, E, NR by TANK at 10/26/2021 1300    Acceptance, E, NR by KG at 10/25/2021 0828    Acceptance, E, NR by AE at 10/22/2021 0825    Acceptance, E, NR by KG at 10/20/2021 0943    Acceptance, E, NR by KG  at 10/18/2021 1017    Acceptance, E, NR by KG at 10/17/2021 1138   Family Acceptance, E, NR by KG at 10/27/2021 0927    Acceptance, E, NR by KG at 10/25/2021 0828    Acceptance, E, NR by KG at 10/20/2021 0943    Acceptance, E, NR by KG at 10/17/2021 1138   Significant Other Acceptance, E, VU by TAKN at 10/29/2021 0815    Comment: wife instructed in ROM exercises to do with patient throughout the day she is able to verbalize understanding.                   Point: Precautions (In Progress)     Learning Progress Summary           Patient Acceptance, E, NR by AE at 11/2/2021 1440    Acceptance, E, NR by AE at 11/1/2021 0910    Acceptance, E, NR by TANK at 10/31/2021 1145    Acceptance, E, NR by JB1 at 10/30/2021 0334    Acceptance, E, VU by TANK at 10/29/2021 0815    Comment: wife instructed in ROM exercises to do with patient throughout the day she is able to verbalize understanding.    Acceptance, E, NR by AE at 10/28/2021 1425    Acceptance, E, NR by KG at 10/27/2021 0927    Acceptance, E, NR by TANK at 10/26/2021 1300    Acceptance, E, NR by KG at 10/25/2021 0828    Acceptance, E, NR by AE at 10/22/2021 0825    Acceptance, E, NR by KG at 10/20/2021 0943    Acceptance, E, NR by KG at 10/18/2021 1017    Acceptance, E, NR by KG at 10/17/2021 1138   Family Acceptance, E, NR by KG at 10/27/2021 0927    Acceptance, E, NR by KG at 10/25/2021 0828    Acceptance, E, NR by KG at 10/20/2021 0943    Acceptance, E, NR by KG at 10/17/2021 1138   Significant Other Acceptance, E, VU by TANK at 10/29/2021 0815    Comment: wife instructed in ROM exercises to do with patient throughout the day she is able to verbalize understanding.                               User Key     Initials Effective Dates Name Provider Type Discipline     06/16/21 -  Burlager, Karrie A, PT Physical Therapist PT    JB1 06/16/21 -  Ian Washington, RN Registered Nurse Nurse    KG 05/22/20 -  Joanna Clark, PT Physical Therapist PT    AE 09/21/21 -   Sukumar Ray, PT Physical Therapist PT              PT Recommendation and Plan     Plan of Care Reviewed With: patient  Progress: improving  Outcome Summary: Pt completed STS x2 with 3-4 steps from bed before requiring a seated rest break. Pt required max A x2 to complete STS with frequent cues to improve upright standing posture and widen GARY which pt was unable to follow effectively, pt with 50-75% upright. Pt continues to improve mobility. Continue to progress per pt tolerance.     Time Calculation:    PT Charges     Row Name 11/02/21 1612             Time Calculation    Start Time 1440  -AE      PT Received On 11/02/21  -AE      PT Goal Re-Cert Due Date 11/06/21  -AE              Time Calculation- PT    Total Timed Code Minutes- PT 23 minute(s)  -AE              Timed Charges    21988 - PT Therapeutic Activity Minutes 23  -AE              Total Minutes    Timed Charges Total Minutes 23  -AE       Total Minutes 23  -AE            User Key  (r) = Recorded By, (t) = Taken By, (c) = Cosigned By    Initials Name Provider Type    AE Sukumar Ray PT Physical Therapist              Therapy Charges for Today     Code Description Service Date Service Provider Modifiers Qty    77080610838 HC PT THERAPEUTIC ACT EA 15 MIN 11/1/2021 Sukumar Ray, PT GP 2    20125735200 HC PT THER SUPP EA 15 MIN 11/1/2021 Sukumar Ray, PT GP 2    58064721362 HC PT THERAPEUTIC ACT EA 15 MIN 11/2/2021 Sukumar Ray, PT GP 2          PT G-Codes  Outcome Measure Options: AM-PAC 6 Clicks Basic Mobility (PT)  AM-PAC 6 Clicks Score (PT): 11    Sukumar Ray PT  11/2/2021

## 2021-11-02 NOTE — THERAPY RE-EVALUATION
Acute Care - Speech Language Pathology   Swallow Re-Evaluation [facility]   Clinical Swallow Re-Evaluation  + PMV therapy note       Patient Name: Keshav Dickens  : 1955  MRN: [id number]  Today's Date: 2021               Admit Date: 10/4/2021    Visit Dx:     ICD-10-CM ICD-9-CM   1. Acute respiratory failure with hypoxia  J96.01 518.81   2. Examination for normal comparison for clinical research  Z00.6 V70.7   3. Diffuse pulmonary alveolar hemorrhage  R04.89 786.30   4. 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.0 446.21   5. Voice impairment  R49.9 784.40   6. Dysphagia, unspecified type  R13.10 787.20     Patient Active Problem List   Diagnosis   • Anti-GBM nephritis with pulmonary hemorrhage (HCC)   • Acute respiratory failure with hypoxia   • Acute renal failure (ARF) (HCC)   • Acute blood loss anemia   • Sepsis due to pneumonia (HCC);Klebsiella aerogenes   • Idiopathic hypotension   • Hyperglycemia   • History of tobacco abuse     Past Medical History:   Diagnosis Date   • Goodpasture's syndrome  10/4/2021   • Hypertension      Past Surgical History:   Procedure Laterality Date   • CHOLECYSTECTOMY     • TRACHEOSTOMY AND PEG TUBE INSERTION N/A 10/15/2021    Procedure: TRACHEOSTOMY AND PERCUTANEOUS ENDOSCOPIC GASTROSTOMY TUBE INSERTION;  Surgeon: Abdon Roberts MD;  Location: [facility];  Service: General;  Laterality: N/A;       SLP Recommendation and Plan  SLP Swallowing Diagnosis: suspected pharyngeal dysphagia (21 1300)  SLP Diet Recommendation: NPO, long term alternate methods of nutrition/hydration (21 1300)  Recommended Precautions and Strategies: general aspiration precautions (21 1300)  SLP Rec. for Method of Medication Administration: meds via alternate route (21 1300)     Monitor for Signs of Aspiration: yes, notify SLP if any concerns (21 1300)  Recommended Diagnostics: reassess via clinical swallow evaluation, reassess via FEES, other (see  comments) (FEES pending progress) (11/02/21 1300)  Swallow Criteria for Skilled Therapeutic Interventions Met: demonstrates skilled criteria (11/02/21 1300)  Anticipated Discharge Disposition (SLP): long term acute care facility, anticipate therapy at next level of care (11/02/21 1300)  Rehab Potential/Prognosis, Swallowing: good, to achieve stated therapy goals (11/02/21 1300)     Predicted Duration Therapy Intervention (Days): until discharge (11/02/21 1300)          Plan for Continued Treatment (SLP): CSE completed, continue NPO and f/u for re-eval and continued PMV tx (11/02/21 1300)              Plan of Care Reviewed With: patient  Progress: no change      SWALLOW EVALUATION (last 72 hours)     SLP Adult Swallow Evaluation     Row Name 11/02/21 1300       Rehab Evaluation    Document Type re-evaluation  -    Subjective Information no complaints  -CJ    Patient Observations alert; cooperative; agree to therapy  -    Patient/Family/Caregiver Comments/Observations No family present  -    Care Plan Review evaluation/treatment results reviewed; care plan/treatment goals reviewed; patient/other agree to care plan  -    Patient Effort good  -            General Information    Patient Profile Reviewed yes  -CJ    Pertinent History Of Current Problem see initial eval; pt still on trach collar plan for downsize to size 6 per MD  -CJ    Current Method of Nutrition NPO; gastrostomy feedings  -CJ    Precautions/Limitations, Vision WFL; for purposes of eval  -CJ    Precautions/Limitations, Hearing WFL; for purposes of eval  -CJ    Prior Level of Function-Communication unknown  -CJ    Prior Level of Function-Swallowing unknown  -    Plans/Goals Discussed with patient; agreed upon  -    Barriers to Rehab medically complex  -    Patient's Goals for Discharge patient did not state  -            Pain    Additional Documentation Pain Scale: FACES Pre/Post-Treatment (Group)  -CJ            Pain Scale: FACES  Pre/Post-Treatment    Pain: FACES Scale, Pretreatment 0-->no hurt  -CJ    Posttreatment Pain Rating 0-->no hurt  -CJ            Oral Motor Structure and Function    Dentition Assessment natural, present and adequate  -    Secretion Management requires suctioning to control secretions  -    Mucosal Quality moist, healthy; sticky  -    Volitional Swallow delayed  -CJ    Volitional Cough WFL  -            Oral Musculature and Cranial Nerve Assessment    Oral Motor General Assessment WFL  -            General Eating/Swallowing Observations    Respiratory Support Currently in Use tracheostomy; trach collar  -    Eating/Swallowing Skills fed by SLP  -CJ    Positioning During Eating upright in bed  -CJ    Utensils Used spoon  -CJ    Consistencies Trialed ice chips; thin liquids  -    Pre SpO2 (%) 98  -CJ    Post SpO2 (%) 97  -CJ            Clinical Swallow Eval    Pharyngeal Phase suspected pharyngeal impairment  -    Clinical Swallow Evaluation Summary CSE completed this pm. Mr. Dickens is positioned upright and centered in bed to accept po presentations of ice chips and thin liquids via spoon only. PMV in place for evaluation, tolerating w/o significant distress. Pt evidenced w/ cough response after thin liquids via spoon. Pt w/ continuous excessive coughing w/ minimal secretions from trach. Further po presentations deferred 2/2 continuous coughing. PMV removed as well. D/w MD who reports plan to downsize to shiley size 6 today. MD requests deferral today and f/u for FEES readiness tomorrow after downsize. Will continue NPO at this time w/ meds via non-oral method.  -CJ            Pharyngeal Phase Concerns    Pharyngeal Phase Concerns cough  -CJ    Cough thin  -    Pharyngeal Phase Concerns, Comment severe excessive coughing  -            Clinical Impression    SLP Swallowing Diagnosis suspected pharyngeal dysphagia  -    Functional Impact risk of aspiration/pneumonia  -    Rehab  Potential/Prognosis, Swallowing good, to achieve stated therapy goals  -    Swallow Criteria for Skilled Therapeutic Interventions Met demonstrates skilled criteria  -    Plan for Continued Treatment (SLP) CSE completed, continue NPO and f/u for re-eval and continued PMV tx  -            Recommendations    Therapy Frequency (Swallow) --    Predicted Duration Therapy Intervention (Days) until discharge  -    SLP Diet Recommendation NPO; long term alternate methods of nutrition/hydration  -    Recommended Diagnostics reassess via clinical swallow evaluation; reassess via FEES; other (see comments)  FEES pending progress  -    Recommended Precautions and Strategies general aspiration precautions  -    Oral Care Recommendations Oral Care BID/PRN; Suction toothbrush  -    SLP Rec. for Method of Medication Administration meds via alternate route  -    Monitor for Signs of Aspiration yes; notify SLP if any concerns  -    Anticipated Discharge Disposition (SLP) long term acute care facility; anticipate therapy at next level of care  -          User Key  (r) = Recorded By, (t) = Taken By, (c) = Cosigned By    Initials Name Effective Dates    RD Brittany Beckford, MS CCC-SLP 06/16/21 -     Noreen Rod, MS CCC-SLP 06/16/21 -                 EDUCATION  The patient has been educated in the following areas:   Speaking Valve Dysphagia (Swallowing Impairment) Oral Care/Hydration NPO rationale.        SLP GOALS     Row Name 11/02/21 1300       Tolerate Speaking Valve Placement Goal 1 (SLP)    Tolerate Speaking Valve Placement Goal 1 (SLP) speaking valve >30 min; 80%; with minimal cues (75-90%)  -CJ    Time Frame (Tolerate Speaking Valve Placement Goal 1, SLP) short term goal (STG)  -CJ    Barriers (Tolerate Speaking Valve Placement 1, SLP) excessive coughing  -CJ    Progress (Tolerate Speaking Valve Placement Goal 1, SLP) 20%; with moderate cues (50-74%); with maximum cues (25-49%)  -CJ     Progress/Outcomes (Tolerate Speaking Valve Placement Goal 1, SLP) continuing progress toward goal  -CJ    Comment (Tolerate Speaking Valve Placement Goal 1, SLP) ~5 minutes  -CJ           Audible Speech with Speaking Valve Goal 1 (SLP)    Audible Speech Goal 1 (SLP) 3 feet; 80%; with minimal cues (75-90%)  -CJ    Time Frame (Audible Speech Goal 1, SLP) short term goal (STG)  -CJ    Barriers (Audible Speech Goal 1, SLP) excessive coughing  -CJ    Progress (Audible Speech Goal 1, SLP) 30%; with maximum cues (25-49%)  -CJ    Progress/Outcomes (Audible Speech Goal 1, SLP) continuing progress toward goal  -CJ    Comment (Audible Speech Goal 1, SLP) ~1 foot  -CJ           Additional Goal 1 (SLP)    Additional Goal 1, SLP LTG: Pt will improve communication w/ use of PMV to functionally communicate wants/needs w/ 90% accuracy w/ min cues  -CJ    Time Frame (Additional Goal 1, SLP) by discharge  -CJ    Progress/Outcomes (Additional Goal 1, SLP) continuing progress toward goal  -CJ          User Key  (r) = Recorded By, (t) = Taken By, (c) = Cosigned By    Initials Name Provider Type    Brittany Cotton MS CCC-SLP Speech and Language Pathologist    Noreen Rod, MS CCC-SLP Speech and Language Pathologist                   Time Calculation:    Time Calculation- SLP     Row Name 11/02/21 1357             Time Calculation- SLP    SLP Start Time 1300  -CJ              Untimed Charges    79686-SV Eval Oral Pharyng Swallow Minutes 45  -CJ      88126-EQ Treatment/ST Modification Prosth Aug Alter  25  -CJ              Total Minutes    Untimed Charges Total Minutes 70  -CJ       Total Minutes 70  -CJ            User Key  (r) = Recorded By, (t) = Taken By, (c) = Cosigned By    Initials Name Provider Type    Noreen Rod, MS CCC-SLP Speech and Language Pathologist                Therapy Charges for Today     Code Description Service Date Service Provider Modifiers Qty    05405987437  ST EVAL ORAL PHARYNG SWALLOW 3  11/2/2021 Noreen Koenig, MS CCC-SLP GN 1    [id number]  ST TREATMENT SPEECH 2 11/2/2021 Noreen Koenig, MS CCC-SLP GN 1      Patient was not wearing a face mask and did exhibit coughing during this therapy encounter.  Procedure performed was aerosolizing, involved close contact (within 6 feet for at least 15 minutes or longer), and did not involve contact with infectious secretions or specimens.  Therapist used appropriate personal protective equipment including gloves, standard procedure mask and eye protection.  Appropriate PPE was worn during the entire therapy session.  Hand hygiene was completed before and after therapy session.            MS LINA Styles  11/2/2021

## 2021-11-02 NOTE — CASE MANAGEMENT/SOCIAL WORK
Continued Stay Note   [place]     Patient Name: Keshav Dickens  MRN: [id number]  Today's Date: 11/2/2021    Admit Date: 10/4/2021     Discharge Plan     Row Name 11/02/21 0715       Plan    Plan update    Plan Comments CC [facility] declined due to Cytoxan    Final Discharge Disposition Code 30 - still a patient               Discharge Codes    No documentation.                     Patricia Disla RN

## 2021-11-02 NOTE — PROGRESS NOTES
"   LOS: 29 days    Patient Care Team:  Andree Langston MD as PCP - General (Internal Medicine)    Reason For Visit:  F/U PIOTR. SEEN ON DIALYSIS  Subjective           Review of Systems:    Pulm: No soa   CV:  No CP      Objective     atovaquone, 1,500 mg, Per PEG Tube, Daily With Lunch  b complex-vitamin c-folic acid, 1 tablet, Per PEG Tube, Daily  chlorhexidine, 15 mL, Mouth/Throat, Q12H  cholecalciferol, 2,000 Units, Oral, Daily  custom medication builder, 100 mg, Nasogastric, Daily  epoetin tatum/tatum-epbx, 10,000 Units, Subcutaneous, Once per day on Tue Thu Sat  famotidine, 20 mg, Per G Tube, Daily  ferrous sulfate, 300 mg, Per PEG Tube, Daily  heparin (porcine), 5,000 Units, Subcutaneous, Q12H  insulin detemir, 5 Units, Subcutaneous, Q12H  insulin regular, 0-7 Units, Subcutaneous, Q6H  lactobacillus acidophilus, 1 capsule, Per PEG Tube, Daily  melatonin, 10 mg, Per G Tube, Nightly  Nutrisource fiber, 1 packet, Per G Tube, TID  predniSONE, 40 mg, Per G Tube, Daily With Breakfast  PRO-STAT, 30 mL, Per G Tube, BID  sertraline, 50 mg, Oral, Daily             Vital Signs:  Blood pressure 128/66, pulse 76, temperature 98.4 °F (36.9 °C), temperature source Oral, resp. rate 20, height 172.7 cm (67.99\"), weight 102 kg (224 lb 13.9 oz), SpO2 99 %.    Flowsheet Rows      First Filed Value   Admission Height 172.7 cm (68\") Documented at 10/04/2021 2132   Admission Weight 102 kg (224 lb 13.9 oz) Documented at 10/04/2021 2100          11/01 0701 - 11/02 0700  In: 1030   Out: -     Physical Exam:    General Appearance: NAD, alert and cooperative. + TRACH. + THD CATH  Eyes: PER, conjunctivae and sclerae normal, no icterus  Lungs: respirations regular and unlabored, no crepitus, RHONCHI  Heart/CV: regular rhythm & normal rate, no murmur, no gallop, no rub and no edema  Abdomen: not distended, soft, non-tender, no masses,  bowel sounds present. PEG.  Skin: No rash, Warm and dry    Radiology:            Labs:  Results from last 7 " days   Lab Units 11/02/21  0356 11/01/21  0331 10/31/21  0419   WBC 10*3/mm3 8.93 9.00 8.04   HEMOGLOBIN g/dL 7.2* 7.8* 7.1*   HEMATOCRIT % 20.8* 23.0* 21.3*   PLATELETS 10*3/mm3 198 212 202     Results from last 7 days   Lab Units 11/02/21  0356 11/01/21  0331 10/31/21  0422 10/30/21  0416 10/29/21  0415 10/29/21  0415 10/28/21  0405 10/28/21  0405   SODIUM mmol/L 132* 132* 136 135*   < > 138   < > 131*   POTASSIUM mmol/L 4.2 4.3 4.2 4.3   < > 4.0   < > 4.5   CHLORIDE mmol/L 89* 92* 96* 92*   < > 96*   < > 92*   CO2 mmol/L 21.0* 23.0 27.0 21.0*   < > 26.0   < > 18.0*   BUN mg/dL 145* 106* 69* 111*   < > 69*   < > 111*   CREATININE mg/dL 7.85* 6.35* 4.48* 6.76*   < > 4.57*   < > 6.99*   CALCIUM mg/dL 8.8 8.6 8.6 8.9   < > 8.9   < > 8.4*   PHOSPHORUS mg/dL 4.7*  4.7* 4.5 4.5  --   --  4.3   < > 7.9*   MAGNESIUM mg/dL 2.9*  2.9*  --   --   --   --   --   --  2.8*   ALBUMIN g/dL 4.00 4.10 4.20  --   --  4.20  --   --     < > = values in this interval not displayed.     Results from last 7 days   Lab Units 11/02/21 0356   GLUCOSE mg/dL 120*       Results from last 7 days   Lab Units 11/02/21 0356   ALK PHOS U/L 78   BILIRUBIN mg/dL 0.6   ALT (SGPT) U/L 25   AST (SGOT) U/L 23     Results from last 7 days   Lab Units 10/31/21  0921   PH, ARTERIAL pH units 7.468*   PO2 ART mm Hg 61.5*   PCO2, ARTERIAL mm Hg 37.3   HCO3 ART mmol/L 27.0*             Estimated Creatinine Clearance: 10.4 mL/min (A) (by C-G formula based on SCr of 7.85 mg/dL (H)).      Assessment       Acute respiratory failure with hypoxia    Anti-GBM nephritis with pulmonary hemorrhage (HCC)    Acute renal failure (ARF) (HCC)    Acute blood loss anemia    Sepsis due to pneumonia (HCC);Klebsiella aerogenes    Idiopathic hypotension    Hyperglycemia    History of tobacco abuse            Impression: ANURIC PIOTR. HIGH BUN WITH STEROIDS ( DECREASED PREDNISONE TODAY ) AND HI PROTEIN WITH PROSTAT. ANEMIA.            Recommendations: HD FOR 4.5 HRS TODAY.  SWALLOWING EVALUATION AGAIN TODAY TO SEE IF PATIENT CAN TAKE CYTOXAN CAPSULES.      Dagoberto Dawkins MD  11/02/21  09:21 EDT

## 2021-11-02 NOTE — PROGRESS NOTES
"                  Clinical Nutrition     Nutrition Support Management   Reason for Visit:   MDR, Follow-up protocol, EN      Patient Name: Keshav Dickens  YOB: 1955  MRN: [id number]  Date of Encounter: 11/02/21 09:33 EDT  Admission date: 10/4/2021    Nutrition Assessment   Assessment     Applicable diagnosis/conditions/procedures this adm:  Acute respiratory failure with hypoxia  Intubated  Anti-GBM nephritis with pulmonary hemorrhage (HCC)  Goodpasture's syndrome  PIOTR  Plasmapheresis initiated 10-5-21, ended 10-18-21  CRRT (10/6) --> d/c'd (10/10) --> iHD started (10/11) --> CRRT resumed (10/13) --> iHD resumed (10/21)  S/p trach and PEG (10/15)  Sepsis due to pneumonia (HCC);Klebsiella aerogenes  Idiopathic hypotension  Hyperglycemia      PMH: He  has a past medical history of Goodpasture's syndrome  (10/4/2021) and Hypertension.   PSxH: He  has a past surgical history that includes Cholecystectomy and tracheostomy and peg tube insertion (N/A, 10/15/2021).         Reported/Observed/Food/Nutrition Related History:     11/2) Pt continues to tolerate EN. Will adjust EN order today. Receiving HD this AM. SLP following. Waiting for pt to be able to safely swallow cytoxan pill for pt to be able to be transferred to LTACH. Pt with orders to the floor.   Per I&O- last bowel movement documented (10/31-11/1)    11/1) Pt continue to tolerate EN. Will adjust EN order today. Pt has been on trach collar >72 hrs. Planning to consult SLP.   Per I&O over the past 24 hrs:  1 bowel movement    10/28) No noted s/s of EN intolerance. EN rate per current order this morning. HD today.     10/26) HD today.       Anthropometrics     Height: 172.7 cm (67.99\")  Last filed wt: Weight: 102 kg (224 lb 13.9 oz) (10/12/21 1644)  Weight Method: Bed scale    BMI: BMI (Calculated): 34.2  Obese Class I: 30-34.9kg/m2    Ideal Body Weight (IBW) (kg): 70.87  145% IBW  ABW: 172lb/78kg    Date Weight (kg) Weight (lbs) Weight Method "   10/12/2021 102 kg 224 lb 13.9 oz -   10/4/2021 102 kg 224 lb 13.9 oz Bed scale       Labs reviewed     Results from last 7 days   Lab Units 11/02/21  0356 11/01/21  0331 10/31/21  0422 10/29/21  0415 10/28/21  0405   GLUCOSE mg/dL 120* 162* 134*   < > 159*   BUN mg/dL 145* 106* 69*   < > 111*   CREATININE mg/dL 7.85* 6.35* 4.48*   < > 6.99*   SODIUM mmol/L 132* 132* 136   < > 131*   CHLORIDE mmol/L 89* 92* 96*   < > 92*   POTASSIUM mmol/L 4.2 4.3 4.2   < > 4.5   PHOSPHORUS mg/dL 4.7*  4.7* 4.5 4.5   < > 7.9*   MAGNESIUM mg/dL 2.9*  2.9*  --   --   --  2.8*   ALT (SGPT) U/L 25  --   --   --   --     < > = values in this interval not displayed.     Results from last 7 days   Lab Units 11/02/21  0356 11/01/21  0331 10/31/21  0422   ALBUMIN g/dL 4.00 4.10 4.20   CRP mg/dL 2.55*  --   --    CHOLESTEROL mg/dL 147  --   --    TRIGLYCERIDES mg/dL 315*  --   --        Results from last 7 days   Lab Units 11/02/21  0526 11/01/21  2307 11/01/21  1729 11/01/21  1151 11/01/21  0536 10/31/21  2312   GLUCOSE mg/dL 116 197* 201* 117 152* 206*     No results found for: HGBA1C    Medications reviewed   Pertinent: cytoxan, nephrovite, vitamin D3, pepcid, ferrous sulfate, insulin, risaquad, melatonin, steroid  PRN: klonopin    Needs Assessment 11/2   Height used: 68 in/173 cm  Weight used: 224 lb/102 kg  IBW: 154 lb/70 kg    Estimated calorie needs: ~1900 kcal/day  20-30 kcal/kg IBW= 0151-8169 kcal    Estimated protein needs: ~90 g pro/day  0.8-1.0-1.2 g/kg actual weight= -122 g pro         Current Nutrition Prescription     PO: NPO Diet    EN: NovaSource Renal at 40 ml/hr (goal volume= 800 ml/day) with 1 ProStat 2x/day and 1 packet FiberCel 3x/day and free water at 10 ml/hr  Route: PEG  Provides at goal volume: 1827 kcal, 102 g pro, 15 g fiber, 20 meq K, 755 mg phos, 574 ml water from EN, 774 ml water total      EN delivery:  1 Day:  818 ml, 108% +2 ProStats and 3 FiberCel  1863 kcal, 98%  104 g pro, 116%  15 g fiber  20 meq  K  769 mg phos  587 ml water from EN  799 ml water total      Nutrition Diagnosis     10/26  Problem Less than optimal enteral nutrition composition or modality   Etiology Clinical status, needs assessment    Signs/Symptoms EN providing >100% est energy and protein needs, delivery >100% daily goal volume    Status: resolved    10-5-21, 10-22  Problem Inadequate energy intake   Etiology Per Clinical Status   Signs/Symptoms 84% goal volume EN         Nutrition Intervention      Follow treatment progress, Care plan reviewed, Nutrition support order placed   -Will adjust EN to better meet pt's needs:  NovaSource Renal at 50 ml/hr (goal volume= 1000 ml/day) with 1 Fibercel 3x/day and free water at 10 ml/hr via PEG    -Provides at goal volume:  2027 kcal, 107%  90 g pro, 100%  15 g fiber  24 meq K  819 mg phos  717 ml water from EN  917 ml water total        Goal:   General: Nutrition support treatment  PO: n/a  EN/PN: Adjust EN        Monitoring/Evaluation:   Per protocol, I&O, Pertinent labs, EN delivery/tolerance, Weight, Symptoms      Jena Pang, MS RD/LD CNSC  Time Spent: 45 minutes

## 2021-11-02 NOTE — PLAN OF CARE
Goal Outcome Evaluation:  Plan of Care Reviewed With: patient        Progress: improving  Outcome Summary: Pt completed STS x2 with 3-4 steps from bed before requiring a seated rest break. Pt required max A x2 to complete STS with frequent cues to improve upright standing posture and widen GARY which pt was unable to follow effectively, pt with 50-75% upright. Pt continues to improve mobility. Continue to progress per pt tolerance.

## 2021-11-02 NOTE — PROGRESS NOTES
INTENSIVIST / PULMONARY FOLLOW UP NOTE     Hospital:  LOS: 29 days   Mr. Keshav Dickens, 66 y.o. male is followed for:     Acute respiratory failure with hypoxia    Anti-GBM nephritis with pulmonary hemorrhage (HCC)    Acute renal failure (ARF) (HCC)    Acute blood loss anemia    Sepsis due to pneumonia (HCC);Klebsiella aerogenes    Idiopathic hypotension    Hyperglycemia    History of tobacco abuse          SUBJECTIVE   Remains on trach collar    The patient's relevant past medical, surgical, family, and social history were reviewed    Allergies and medications were reviewed    ROS:  Per subjective, all other systems were reviewed and were negative        OBJECTIVE     Vital Sign Min/Max for last 24 hours:  Temp  Min: 97.9 °F (36.6 °C)  Max: 98.5 °F (36.9 °C)   BP  Min: 98/60  Max: 183/88   Pulse  Min: 64  Max: 97   Resp  Min: 18  Max: 22   SpO2  Min: 92 %  Max: 99 %   No data recorded     Physical Exam:  General Appearance:  no acute distress  Eyes:  No scleral icterus or pallor, pupils normal  Ears, Nose, Mouth, Throat:  tracheostomy  Neck:  Trachea midline, thyroid normal  Respiratory: clear to auscultation bilaterally, normal effort  Cardiovascular:  Regular rate and rhythm, no murmurs, no peripheral edema  Gastrointestinal:  Soft, nontender, no hepatosplenomegaly, PEG  Skin:  Normal temperature, no rash  Psychiatric:  no agitation  Neuro:  No new focal neurologic deficits observed      Telemetry:              Hemodynamics:   CVP:     PAP:     PAOP:     CO:     CI:     SVI:     SVR:       SpO2: 96 % SpO2  Min: 92 %  Max: 99 %   Device:      Flow Rate:   No data recorded     Mechanical Ventilator Settings:            Resp Rate (Set): 12 Resp Rate (Observed) Vent: 23   FiO2 (%): 35 %    PEEP/CPAP (cm H2O): 5 cm H20 Plateau Pressure (cm H2O): 13 cm H2O    Minute Ventilation (L/min) (Obs): 7.82 L/min    I:E Ratio (Obs): 15.0:1     Intake/Ouptut 24 hrs (7:00AM - 6:59 AM)  Intake & Output (last 3 days)       10/30  0701  10/31 0700 10/31 0701  11/01 0700 11/01 0701 11/02 0700 11/02 0701 11/03 0700    I.V. (mL/kg) 100 (1)       Other 244 391 212     NG/ 928 818     IV Piggyback 200       Total Intake(mL/kg) 1337 (13.1) 1319 (12.9) 1030 (10.1)     Emesis/NG output        Other 1330       Stool        Total Output 1330       Net +7 +1319 +1030             Stool Unmeasured Occurrence  1 x            Lines, Drains & Airways     Active LDAs     Name Placement date Placement time Site Days    Peripheral IV 10/09/21 2200 Anterior; Left Forearm 10/09/21  2200  Forearm  2    Peripheral IV 10/12/21 1110 Anterior; Right Forearm 10/12/21  1110  Forearm  less than 1    NG/OG Tube Nasoduodenal 10 Fr Right nostril 10/06/21  1000  Right nostril  6    Rectal Tube With balloon 10/11/21  1200  --  1    ETT  10/04/21  1200   8                Hematology:  Results from last 7 days   Lab Units 11/02/21  0356 11/01/21  0331 10/31/21  0419 10/30/21  0416 10/29/21  0415 10/28/21  0405 10/27/21  0352   WBC 10*3/mm3 8.93 9.00 8.04 12.51* 12.23* 10.32 9.50   HEMOGLOBIN g/dL 7.2* 7.8* 7.1* 8.6* 8.7* 8.0* 8.7*   HEMATOCRIT % 20.8* 23.0* 21.3* 25.3* 25.4* 24.8* 26.2*   PLATELETS 10*3/mm3 198 212 202 228 227 209 221     Electrolytes, Magnesium and Phosphorus:  Results from last 7 days   Lab Units 11/02/21  0356 11/01/21  0331 10/31/21  0422 10/30/21  0416 10/29/21  0415 10/28/21  0405 10/27/21  0352 10/26/21  1153   SODIUM mmol/L 132* 132* 136 135* 138 131* 133*  --    CHLORIDE mmol/L 89* 92* 96* 92* 96* 92* 95*  --    POTASSIUM mmol/L 4.2 4.3 4.2 4.3 4.0 4.5 4.0  --    CO2 mmol/L 21.0* 23.0 27.0 21.0* 26.0 18.0* 21.0*  --    MAGNESIUM mg/dL 2.9*  2.9*  --   --   --   --  2.8*  --   --    PHOSPHORUS mg/dL 4.7*  4.7* 4.5 4.5  --  4.3 7.9*  --  2.2*     Renal:  Results from last 7 days   Lab Units 11/02/21  0356 11/01/21  0331 10/31/21  0422 10/30/21  0416 10/29/21  0415 10/28/21  0405 10/27/21  0352   CREATININE mg/dL 7.85* 6.35* 4.48* 6.76* 4.57*  6.99* 4.38*   BUN mg/dL 145* 106* 69* 111* 69* 111* 67*     Estimated Creatinine Clearance: 10.4 mL/min (A) (by C-G formula based on SCr of 7.85 mg/dL (H)).  Hepatic:  Results from last 7 days   Lab Units 11/02/21  0356   ALK PHOS U/L 78   BILIRUBIN mg/dL 0.6   ALT (SGPT) U/L 25   AST (SGOT) U/L 23     Arterial Blood Gases:  Results from last 7 days   Lab Units 10/31/21  0921 10/30/21  0815 10/29/21  0557 10/27/21  2322   PH, ARTERIAL pH units 7.468* 7.458* 7.488* 7.439   PCO2, ARTERIAL mm Hg 37.3 34.1* 36.2 31.4*   PO2 ART mm Hg 61.5* 71.3* 71.7* 78.2*   FIO2 % 40 35 35 35             Lab Results   Component Value Date    LACTATE 1.9 10/04/2021       Relevant imaging studies and labs from 11/02/21 were reviewed and interpreted by me    Medications (drips):       atovaquone, 1,500 mg, Per PEG Tube, Daily With Lunch  b complex-vitamin c-folic acid, 1 tablet, Per PEG Tube, Daily  chlorhexidine, 15 mL, Mouth/Throat, Q12H  cholecalciferol, 2,000 Units, Oral, Daily  custom medication builder, 100 mg, Nasogastric, Daily  dextromethorphan polistirex ER, 60 mg, Oral, Q12H  epoetin tatum/tatum-epbx, 10,000 Units, Subcutaneous, Once per day on Tue Thu Sat  famotidine, 20 mg, Per G Tube, Daily  ferrous sulfate, 300 mg, Per PEG Tube, Daily  heparin (porcine), 5,000 Units, Subcutaneous, Q12H  insulin detemir, 5 Units, Subcutaneous, Q12H  insulin regular, 0-7 Units, Subcutaneous, Q6H  lactobacillus acidophilus, 1 capsule, Per PEG Tube, Daily  melatonin, 10 mg, Per G Tube, Nightly  Nutrisource fiber, 1 packet, Per G Tube, TID  predniSONE, 40 mg, Per G Tube, Daily With Breakfast  sertraline, 50 mg, Oral, Daily        Assessment/Plan   IMPRESSION / PLAN     Inpatient Problem List:  66 y.o.male:  Active Hospital Problems    Diagnosis    • **Acute respiratory failure with hypoxia    • Idiopathic hypotension    • Hyperglycemia    • History of tobacco abuse    • Sepsis due to pneumonia (HCC);Klebsiella aerogenes    • Acute blood loss  anemia    • Anti-GBM nephritis with pulmonary hemorrhage (HCC)    • Acute renal failure (ARF) (HCC)         Impression & Plan:  66 y.o.male with relevant PMH of 45 py smoking quit 1 month PTA, HTN, T2DM admitted initially to [facility] 9/27/21 with 2-3 week h/o hemoptysis.  He required intubation on 10/1/21.  CTA at OSH showed diffuse GGO and 8 mm RML nodule.  Patient was also noted to be in Renal failure with concerns for Pulmonary-Renal syndrome.  He was treated with 3 days of pulse IV solumedrol. Ultimately anti-GBM Ab was positive concerning for Goodpastures.  PAIGE / ANCA / RF / Mycoplasma IgM were negative.  Patient had temporary HD catheter placed at OSH for PIOTR and HD was started.    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    He was transferred to [facility] 10/4/2021 for higher level of care.  Daily plasma exchange was initiated on 10/5/21 for total of 14 sessions, and he has continued on moderate dose steroids after receiving pulse.  Patient has also been initiated on Cytoxan.  Has required multiple transfusions.      Eventually had Trach / PEG on 10/15/21.    Anxiety  Depression  Continue klonopin and zoloft    Acute Hypoxemic Respiratory Failure  Diffuse Alveolar Hemorrhage  Goodpasture's Syndrome  Prolonged Mechanical Ventilation  Scheduled nebs. Steroids / cytoxan / plasma exchange per nephrology.  PJP prophylaxis.  Patient is heavily immunosuppressed and will be for some time.  Appreciate ID assistance with abx.   Has been on trach collar for 4 days.   Speech following for swallowing and PMV.  Will change 8 cuffed shiley trach to 6-0 cuff less trach.  Add scheduled delsym for coughing.    Plans in place to continue cytoxan for 3 months.  Slowly taper pred.  Stop PJP prophylaxis when on < 15 mg Pred.    Klebsiella Aerogenes Pneumonia  Completed abx on 10/25    PIOTR  As above, Dialysis  per nephrology.  D/c prostat until uremia improved.    Anemia  Has required multiple transfusions.  Tranfuse for hgb < 7.  No signs of active bleeding.      T2DM A1c  Steroid Induced Hyperglycemia  Titrate SQ insulin    DVT / PUD Prophylaxis  SCDs / Heparin / Pepcid    Nutrition  Tube Feeds    Dispo  Trach / PEG 10/15.  Unfortunately cytoxan has to be specially compounded to go in G-tube.  Once able to swallow with ST and take pill form this will expand options for rehab.    PT/OT    Plan of care and goals reviewed with multidisciplinary team at daily rounds    High Risk secondary to severe life threatening good pastures syndrome requiring prolonged mechanical ventilation, dialysis, plasma exchange, etc.  High risk for worsening / relapse.    To tele       Sravan Forrester MD  Intensive Care Medicine  11/02/21 11:31 EDT

## 2021-11-03 ENCOUNTER — APPOINTMENT (OUTPATIENT)
Dept: GENERAL RADIOLOGY | Facility: HOSPITAL | Age: 66
End: 2021-11-03

## 2021-11-03 LAB
ALBUMIN SERPL-MCNC: 4.2 G/DL (ref 3.5–5.2)
ALBUMIN/GLOB SERPL: 1.8 G/DL
ALP SERPL-CCNC: 88 U/L (ref 39–117)
ALT SERPL W P-5'-P-CCNC: 27 U/L (ref 1–41)
ANION GAP SERPL CALCULATED.3IONS-SCNC: 14 MMOL/L (ref 5–15)
AST SERPL-CCNC: 29 U/L (ref 1–40)
BASOPHILS # BLD AUTO: 0.07 10*3/MM3 (ref 0–0.2)
BASOPHILS NFR BLD AUTO: 0.7 % (ref 0–1.5)
BILIRUB SERPL-MCNC: 0.9 MG/DL (ref 0–1.2)
BUN SERPL-MCNC: 67 MG/DL (ref 8–23)
BUN/CREAT SERPL: 13.4 (ref 7–25)
CALCIUM SPEC-SCNC: 8.8 MG/DL (ref 8.6–10.5)
CHLORIDE SERPL-SCNC: 92 MMOL/L (ref 98–107)
CO2 SERPL-SCNC: 25 MMOL/L (ref 22–29)
CREAT SERPL-MCNC: 5.01 MG/DL (ref 0.76–1.27)
DEPRECATED RDW RBC AUTO: 56.5 FL (ref 37–54)
EOSINOPHIL # BLD AUTO: 0.09 10*3/MM3 (ref 0–0.4)
EOSINOPHIL NFR BLD AUTO: 0.9 % (ref 0.3–6.2)
ERYTHROCYTE [DISTWIDTH] IN BLOOD BY AUTOMATED COUNT: 18.2 % (ref 12.3–15.4)
GFR SERPL CREATININE-BSD FRML MDRD: 12 ML/MIN/1.73
GFR SERPL CREATININE-BSD FRML MDRD: ABNORMAL ML/MIN/{1.73_M2}
GLOBULIN UR ELPH-MCNC: 2.4 GM/DL
GLUCOSE BLDC GLUCOMTR-MCNC: 117 MG/DL (ref 70–130)
GLUCOSE BLDC GLUCOMTR-MCNC: 151 MG/DL (ref 70–130)
GLUCOSE BLDC GLUCOMTR-MCNC: 152 MG/DL (ref 70–130)
GLUCOSE BLDC GLUCOMTR-MCNC: 210 MG/DL (ref 70–130)
GLUCOSE BLDC GLUCOMTR-MCNC: 221 MG/DL (ref 70–130)
GLUCOSE SERPL-MCNC: 115 MG/DL (ref 65–99)
HCT VFR BLD AUTO: 22.7 % (ref 37.5–51)
HGB BLD-MCNC: 7.8 G/DL (ref 13–17.7)
IMM GRANULOCYTES # BLD AUTO: 0.27 10*3/MM3 (ref 0–0.05)
IMM GRANULOCYTES NFR BLD AUTO: 2.7 % (ref 0–0.5)
LYMPHOCYTES # BLD AUTO: 1.02 10*3/MM3 (ref 0.7–3.1)
LYMPHOCYTES NFR BLD AUTO: 10.2 % (ref 19.6–45.3)
MAGNESIUM SERPL-MCNC: 2.5 MG/DL (ref 1.6–2.4)
MCH RBC QN AUTO: 33.2 PG (ref 26.6–33)
MCHC RBC AUTO-ENTMCNC: 34.4 G/DL (ref 31.5–35.7)
MCV RBC AUTO: 96.6 FL (ref 79–97)
MONOCYTES # BLD AUTO: 1 10*3/MM3 (ref 0.1–0.9)
MONOCYTES NFR BLD AUTO: 10 % (ref 5–12)
NEUTROPHILS NFR BLD AUTO: 7.57 10*3/MM3 (ref 1.7–7)
NEUTROPHILS NFR BLD AUTO: 75.5 % (ref 42.7–76)
NRBC BLD AUTO-RTO: 0 /100 WBC (ref 0–0.2)
PHOSPHATE SERPL-MCNC: 3.5 MG/DL (ref 2.5–4.5)
PLATELET # BLD AUTO: 191 10*3/MM3 (ref 140–450)
PMV BLD AUTO: 12.2 FL (ref 6–12)
POTASSIUM SERPL-SCNC: 3.9 MMOL/L (ref 3.5–5.2)
PROT SERPL-MCNC: 6.6 G/DL (ref 6–8.5)
RBC # BLD AUTO: 2.35 10*6/MM3 (ref 4.14–5.8)
SODIUM SERPL-SCNC: 131 MMOL/L (ref 136–145)
WBC # BLD AUTO: 10.02 10*3/MM3 (ref 3.4–10.8)

## 2021-11-03 PROCEDURE — 25010000002 HEPARIN (PORCINE) PER 1000 UNITS: Performed by: INTERNAL MEDICINE

## 2021-11-03 PROCEDURE — 63710000001 INSULIN DETEMIR PER 5 UNITS: Performed by: INTERNAL MEDICINE

## 2021-11-03 PROCEDURE — 63710000001 PREDNISONE PER 1 MG: Performed by: INTERNAL MEDICINE

## 2021-11-03 PROCEDURE — 82962 GLUCOSE BLOOD TEST: CPT

## 2021-11-03 PROCEDURE — 25010000002 CYCLOPHOSPHAMIDE PER 100 MG: Performed by: INTERNAL MEDICINE

## 2021-11-03 PROCEDURE — 94799 UNLISTED PULMONARY SVC/PX: CPT

## 2021-11-03 PROCEDURE — 83735 ASSAY OF MAGNESIUM: CPT | Performed by: INTERNAL MEDICINE

## 2021-11-03 PROCEDURE — 80053 COMPREHEN METABOLIC PANEL: CPT | Performed by: INTERNAL MEDICINE

## 2021-11-03 PROCEDURE — 71045 X-RAY EXAM CHEST 1 VIEW: CPT

## 2021-11-03 PROCEDURE — 92610 EVALUATE SWALLOWING FUNCTION: CPT

## 2021-11-03 PROCEDURE — 99233 SBSQ HOSP IP/OBS HIGH 50: CPT | Performed by: INTERNAL MEDICINE

## 2021-11-03 PROCEDURE — 63710000001 INSULIN REGULAR HUMAN PER 5 UNITS: Performed by: SURGERY

## 2021-11-03 PROCEDURE — 97530 THERAPEUTIC ACTIVITIES: CPT

## 2021-11-03 PROCEDURE — 92507 TX SP LANG VOICE COMM INDIV: CPT

## 2021-11-03 PROCEDURE — 25010000002 FENTANYL CITRATE (PF) 50 MCG/ML SOLUTION

## 2021-11-03 PROCEDURE — 85025 COMPLETE CBC W/AUTO DIFF WBC: CPT | Performed by: INTERNAL MEDICINE

## 2021-11-03 PROCEDURE — 84100 ASSAY OF PHOSPHORUS: CPT | Performed by: INTERNAL MEDICINE

## 2021-11-03 RX ORDER — IPRATROPIUM BROMIDE AND ALBUTEROL SULFATE 2.5; .5 MG/3ML; MG/3ML
3 SOLUTION RESPIRATORY (INHALATION)
Status: DISCONTINUED | OUTPATIENT
Start: 2021-11-03 | End: 2021-11-04

## 2021-11-03 RX ORDER — QUETIAPINE FUMARATE 25 MG/1
50 TABLET, FILM COATED ORAL NIGHTLY
Status: DISCONTINUED | OUTPATIENT
Start: 2021-11-03 | End: 2021-11-04

## 2021-11-03 RX ORDER — GABAPENTIN 100 MG/1
100 CAPSULE ORAL EVERY 8 HOURS SCHEDULED
Status: DISCONTINUED | OUTPATIENT
Start: 2021-11-03 | End: 2021-11-04

## 2021-11-03 RX ORDER — FENTANYL CITRATE 50 UG/ML
25 INJECTION, SOLUTION INTRAMUSCULAR; INTRAVENOUS ONCE
Status: COMPLETED | OUTPATIENT
Start: 2021-11-03 | End: 2021-11-03

## 2021-11-03 RX ORDER — FENTANYL CITRATE 50 UG/ML
INJECTION, SOLUTION INTRAMUSCULAR; INTRAVENOUS
Status: COMPLETED
Start: 2021-11-03 | End: 2021-11-03

## 2021-11-03 RX ORDER — GUAIFENESIN AND CODEINE PHOSPHATE 100; 10 MG/5ML; MG/5ML
10 SOLUTION ORAL 3 TIMES DAILY
Status: DISCONTINUED | OUTPATIENT
Start: 2021-11-03 | End: 2021-11-04

## 2021-11-03 RX ORDER — BENZONATATE 100 MG/1
200 CAPSULE ORAL 3 TIMES DAILY
Status: DISCONTINUED | OUTPATIENT
Start: 2021-11-03 | End: 2021-11-03

## 2021-11-03 RX ADMIN — INSULIN DETEMIR 5 UNITS: 100 INJECTION, SOLUTION SUBCUTANEOUS at 20:31

## 2021-11-03 RX ADMIN — INSULIN HUMAN 3 UNITS: 100 INJECTION, SOLUTION PARENTERAL at 17:39

## 2021-11-03 RX ADMIN — INSULIN DETEMIR 5 UNITS: 100 INJECTION, SOLUTION SUBCUTANEOUS at 08:14

## 2021-11-03 RX ADMIN — Medication 1 PACKET: at 08:15

## 2021-11-03 RX ADMIN — IPRATROPIUM BROMIDE AND ALBUTEROL SULFATE 3 ML: 2.5; .5 SOLUTION RESPIRATORY (INHALATION) at 13:39

## 2021-11-03 RX ADMIN — IPRATROPIUM BROMIDE AND ALBUTEROL SULFATE 3 ML: 2.5; .5 SOLUTION RESPIRATORY (INHALATION) at 15:45

## 2021-11-03 RX ADMIN — ATOVAQUONE 1500 MG: 750 SUSPENSION ORAL at 12:39

## 2021-11-03 RX ADMIN — GUAIFENESIN AND CODEINE PHOSPHATE 10 ML: 10; 100 LIQUID ORAL at 17:18

## 2021-11-03 RX ADMIN — GUAIFENESIN AND CODEINE PHOSPHATE 10 ML: 10; 100 LIQUID ORAL at 08:10

## 2021-11-03 RX ADMIN — Medication 10 MG: at 20:30

## 2021-11-03 RX ADMIN — OXYCODONE HYDROCHLORIDE 5 MG: 5 SOLUTION ORAL at 01:10

## 2021-11-03 RX ADMIN — GUAIFENESIN AND CODEINE PHOSPHATE 10 ML: 10; 100 LIQUID ORAL at 21:09

## 2021-11-03 RX ADMIN — GABAPENTIN 100 MG: 100 CAPSULE ORAL at 13:55

## 2021-11-03 RX ADMIN — CHLORHEXIDINE GLUCONATE 15 ML: 1.2 SOLUTION ORAL at 20:32

## 2021-11-03 RX ADMIN — Medication 2000 UNITS: at 08:10

## 2021-11-03 RX ADMIN — QUETIAPINE FUMARATE 50 MG: 25 TABLET ORAL at 20:31

## 2021-11-03 RX ADMIN — Medication 1 TABLET: at 08:10

## 2021-11-03 RX ADMIN — Medication 1 PACKET: at 21:07

## 2021-11-03 RX ADMIN — HEPARIN SODIUM 5000 UNITS: 5000 INJECTION, SOLUTION INTRAVENOUS; SUBCUTANEOUS at 20:31

## 2021-11-03 RX ADMIN — HEPARIN SODIUM 5000 UNITS: 5000 INJECTION, SOLUTION INTRAVENOUS; SUBCUTANEOUS at 08:09

## 2021-11-03 RX ADMIN — IPRATROPIUM BROMIDE AND ALBUTEROL SULFATE 3 ML: 2.5; .5 SOLUTION RESPIRATORY (INHALATION) at 20:34

## 2021-11-03 RX ADMIN — CHLORHEXIDINE GLUCONATE 15 ML: 1.2 SOLUTION ORAL at 08:14

## 2021-11-03 RX ADMIN — Medication 60 MG: at 20:33

## 2021-11-03 RX ADMIN — Medication 100 MG: at 17:19

## 2021-11-03 RX ADMIN — SERTRALINE HYDROCHLORIDE 50 MG: 50 TABLET ORAL at 08:10

## 2021-11-03 RX ADMIN — FAMOTIDINE 20 MG: 20 TABLET, FILM COATED ORAL at 08:10

## 2021-11-03 RX ADMIN — GABAPENTIN 100 MG: 100 CAPSULE ORAL at 21:07

## 2021-11-03 RX ADMIN — MINERAL SUPPLEMENT IRON 300 MG / 5 ML STRENGTH LIQUID 100 PER BOX UNFLAVORED 300 MG: at 08:09

## 2021-11-03 RX ADMIN — Medication 1 PACKET: at 17:18

## 2021-11-03 RX ADMIN — FENTANYL CITRATE 25 MCG: 50 INJECTION, SOLUTION INTRAMUSCULAR; INTRAVENOUS at 07:38

## 2021-11-03 RX ADMIN — Medication 60 MG: at 08:10

## 2021-11-03 RX ADMIN — PREDNISONE 40 MG: 20 TABLET ORAL at 13:55

## 2021-11-03 RX ADMIN — Medication 1 CAPSULE: at 08:10

## 2021-11-03 NOTE — PLAN OF CARE
Goal Outcome Evaluation:         Pt is alert and oriented. Sats >90% on trach collar. NSR with stable BP. Active bowel sounds. Pt remains anuric. Speech eval today- fitted with a passy julio c valve. No complaints of pain. Stood with physical therapy and attempted a few steps. Pt is extremely weak but very willing and determined. Will continue to monitor.

## 2021-11-03 NOTE — THERAPY TREATMENT NOTE
Patient Name: Keshav Dickens  : 1955    MRN: [id number]                              Today's Date: 11/3/2021       Admit Date: 10/4/2021    Visit Dx:     ICD-10-CM ICD-9-CM   1. Acute respiratory failure with hypoxia  J96.01 518.81   2. Examination for normal comparison for clinical research  Z00.6 V70.7   3. Diffuse pulmonary alveolar hemorrhage  R04.89 786.30   4. 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.0 446.21   5. Voice impairment  R49.9 784.40   6. Dysphagia, unspecified type  R13.10 787.20     Patient Active Problem List   Diagnosis   • Anti-GBM nephritis with pulmonary hemorrhage (HCC)   • Acute respiratory failure with hypoxia   • Acute renal failure (ARF) (HCC)   • Acute blood loss anemia   • Sepsis due to pneumonia (HCC);Klebsiella aerogenes   • Idiopathic hypotension   • Hyperglycemia   • History of tobacco abuse     Past Medical History:   Diagnosis Date   • Goodpasture's syndrome  10/4/2021   • Hypertension      Past Surgical History:   Procedure Laterality Date   • CHOLECYSTECTOMY     • TRACHEOSTOMY AND PEG TUBE INSERTION N/A 10/15/2021    Procedure: TRACHEOSTOMY AND PERCUTANEOUS ENDOSCOPIC GASTROSTOMY TUBE INSERTION;  Surgeon: Abdon Roberts MD;  Location: [facility];  Service: General;  Laterality: N/A;      General Information     Row Name 21 1526          Physical Therapy Time and Intention    Document Type therapy note (daily note)  -KG     Mode of Treatment physical therapy  -KG     Row Name 21 1526          General Information    Existing Precautions/Restrictions fall; oxygen therapy device and L/min; other (see comments)  trach; PEG  -KG     Row Name 21 1526          Cognition    Orientation Status (Cognition) oriented to; person; place; situation  -KG     Row Name 21 1526          Safety Issues, Functional Mobility    Safety Issues Affecting Function (Mobility) awareness of need for assistance; insight into deficits/self-awareness; safety  precaution awareness; safety precautions follow-through/compliance  -KG     Impairments Affecting Function (Mobility) balance; coordination; endurance/activity tolerance; postural/trunk control; shortness of breath; strength  -KG           User Key  (r) = Recorded By, (t) = Taken By, (c) = Cosigned By    Initials Name Provider Type    KG Joanna Clark, PT Physical Therapist               Mobility     Row Name 11/03/21 1526          Bed Mobility    Bed Mobility rolling left; rolling right  -KG     Rolling Left [place] (Bed Mobility) maximum assist (25% patient effort); 2 person assist; verbal cues  -KG     Rolling Right [place] (Bed Mobility) maximum assist (25% patient effort); 2 person assist; verbal cues  -KG     Assistive Device (Bed Mobility) draw sheet  -KG     Comment (Bed Mobility) Pt rolled L and R for placement of sling. VC's for sequencing.  -KG     Row Name 11/03/21 1526          Transfers    Comment (Transfers) Pt transferred from bed to chair with mechanical lift. Pt performed STS x3 from chair with blocking of joey knees and B UE support. VC's for upright posture upon initial stand.  -KG     Row Name 11/03/21 1526          Bed-Chair Transfer    Bed-Chair [place] (Transfers) dependent (less than 25% patient effort); 2 person assist; verbal cues  -KG     Assistive Device (Bed-Chair Transfers) lift device  -KG     Row Name 11/03/21 1526          Sit-Stand Transfer    Sit-Stand [place] (Transfers) maximum assist (25% patient effort); 2 person assist; verbal cues  -KG     Assistive Device (Sit-Stand Transfers) other (see comments)  B UE support  -KG     Row Name 11/03/21 1526          Gait/Stairs (Locomotion)    [place] Level (Gait) maximum assist (25% patient effort); 2 person assist; verbal cues  -KG     Assistive Device (Gait) other (see comments)  B UE support  -KG     Distance in Feet (Gait) 4  -KG     Deviations/Abnormal Patterns (Gait) bilateral deviations; base of  support, narrow; kayla decreased; festinating/shuffling; stride length decreased  -KG     Bilateral Gait Deviations forward flexed posture; heel strike decreased; knee buckling, bilateral; weight shift ability decreased  -KG     Comment (Gait/Stairs) Pt demonstrated step to gait pattern with slow kayla and short, shuffled steps. Pt with very forward flexed posture and narrow GARY; unable to correct despite max verbal and tactile cues. Pt with frequent episodes of knee buckling. Max cueing for upright posture and increased stride length with wider GARY. Pt required physical assistance for weight shifitng to advance LEs. Limited by increased fatigue and weakness. Chair follow closely behind.  -KG           User Key  (r) = Recorded By, (t) = Taken By, (c) = Cosigned By    Initials Name Provider Type    Joanna Boo PT Physical Therapist               Obj/Interventions     Row Name 11/03/21 1529          Balance    Balance Assessment sitting static balance; standing static balance; standing dynamic balance  -KG     Static Sitting Balance mild impairment; supported; sitting in chair  -KG     Static Standing Balance severe impairment; supported; standing  -KG     Dynamic Standing Balance severe impairment; supported; standing  -KG           User Key  (r) = Recorded By, (t) = Taken By, (c) = Cosigned By    Initials Name Provider Type    Joanna Boo PT Physical Therapist               Goals/Plan    No documentation.                Clinical Impression     Row Name 11/03/21 1529          Pain    Additional Documentation Pain Scale: Numbers Pre/Post-Treatment (Group)  -KG     Row Name 11/03/21 1529          Pain Scale: Numbers Pre/Post-Treatment    Pretreatment Pain Rating 0/10 - no pain  -KG     Row Name 11/03/21 1529          Plan of Care Review    Plan of Care Reviewed With patient  -KG     Progress improving  -KG     Outcome Summary Pt performed STS x3 from chair and ambulated 4ft with maxA x2  and B UE support. Pt required max cueing for upright posture and increased stride length. Pt unable to achieve full upright posture with trunk and knee extension. Physical assistance required to assist with weight shifting in order to advance LEs. Pt limited by increased fatigue and weakness. Chair follow closely behind. Pt motivated to progress. Continue to progress as appropriate.  -KG     Row Name 11/03/21 1529          Vital Signs    Pre Systolic BP Rehab 107  -KG     Pre Treatment Diastolic BP 57  -KG     Post Systolic BP Rehab 111  -KG     Post Treatment Diastolic BP 64  -KG     Pretreatment Heart Rate (beats/min) 81  -KG     Posttreatment Heart Rate (beats/min) 81  -KG     Pre SpO2 (%) 99  -KG     O2 Delivery Pre Treatment trach collar  -KG     Post SpO2 (%) 92  -KG     O2 Delivery Post Treatment trach collar  -KG     Pre Patient Position Supine  -KG     Intra Patient Position Standing  -KG     Post Patient Position Sitting  -KG     Row Name 11/03/21 1529          Positioning and Restraints    Pre-Treatment Position in bed  -KG     Post Treatment Position chair  -KG     In Chair notified nsg; reclined; call light within reach; encouraged to call for assist; exit alarm on; with family/caregiver; RUE elevated; LUE elevated; waffle cushion; on mechanical lift sling; legs elevated  -KG           User Key  (r) = Recorded By, (t) = Taken By, (c) = Cosigned By    Initials Name Provider Type    KG Joanna Clark, PT Physical Therapist               Outcome Measures     Row Name 11/03/21 1531 11/03/21 0800       How much help from another person do you currently need...    Turning from your back to your side while in flat bed without using bedrails? 2  -KG 2  -TOMEKA    Moving from lying on back to sitting on the side of a flat bed without bedrails? 2  -KG 2  -TOMEKA    Moving to and from a bed to a chair (including a wheelchair)? 2  -KG 2  -TOMEKA    Standing up from a chair using your arms (e.g., wheelchair, bedside  chair)? 2  -KG 2  -TOMEKA    Climbing 3-5 steps with a railing? 1  -KG 1  -TOMEKA    To walk in hospital room? 2  -KG 2  -TOMEKA    AM-PAC 6 Clicks Score (PT) 11  -KG 11  -TOMEKA    Row Name 11/03/21 1531          Functional Assessment    Outcome Measure Options AM-PAC 6 Clicks Basic Mobility (PT)  -KG           User Key  (r) = Recorded By, (t) = Taken By, (c) = Cosigned By    Initials Name Provider Type    Pamela Medrano, RN Registered Nurse    Joanna Boo, PT Physical Therapist                             Physical Therapy Education                 Title: PT OT SLP Therapies (In Progress)     Topic: Physical Therapy (In Progress)     Point: Mobility training (In Progress)     Learning Progress Summary           Patient Acceptance, E, NR by KG at 11/3/2021 1432    Acceptance, E, NR by AE at 11/2/2021 1440    Acceptance, E, NR by AE at 11/1/2021 0910    Acceptance, E, NR by TANK at 10/31/2021 1145    Acceptance, E, NR by JB1 at 10/31/2021 0243    Acceptance, E, NR by JB1 at 10/30/2021 0334    Acceptance, E, VU by TANK at 10/29/2021 0815    Comment: wife instructed in ROM exercises to do with patient throughout the day she is able to verbalize understanding.    Acceptance, E, NR by AE at 10/28/2021 1425    Acceptance, E, NR by KG at 10/27/2021 0927    Acceptance, E, NR by TANK at 10/26/2021 1300    Acceptance, E, NR by KG at 10/25/2021 0828    Acceptance, E, NR by AE at 10/22/2021 0825    Acceptance, E, NR by KG at 10/20/2021 0943    Acceptance, E, NR by KG at 10/18/2021 1017    Acceptance, E, NR by KG at 10/17/2021 1138   Family Acceptance, E, NR by KG at 10/27/2021 0927    Acceptance, E, NR by KG at 10/25/2021 0828    Acceptance, E, NR by KG at 10/20/2021 0943    Acceptance, E, NR by KG at 10/17/2021 1138   Significant Other Acceptance, E, VU by TANK at 10/29/2021 0815    Comment: wife instructed in ROM exercises to do with patient throughout the day she is able to verbalize understanding.                   Point: Home  exercise program (In Progress)     Learning Progress Summary           Patient Acceptance, E, NR by KG at 11/3/2021 1432    Acceptance, E, NR by AE at 11/2/2021 1440    Acceptance, E, NR by AE at 11/1/2021 0910    Acceptance, E, NR by TANK at 10/31/2021 1145    Acceptance, E, VU by TANK at 10/29/2021 0815    Comment: wife instructed in ROM exercises to do with patient throughout the day she is able to verbalize understanding.    Acceptance, E, NR by AE at 10/28/2021 1425    Acceptance, E, NR by KG at 10/27/2021 0927    Acceptance, E, NR by TANK at 10/26/2021 1300    Acceptance, E, NR by KG at 10/25/2021 0828    Acceptance, E, NR by AE at 10/22/2021 0825    Acceptance, E, NR by KG at 10/20/2021 0943    Acceptance, E, NR by KG at 10/18/2021 1017    Acceptance, E, NR by KG at 10/17/2021 1138   Family Acceptance, E, NR by KG at 10/27/2021 0927    Acceptance, E, NR by KG at 10/25/2021 0828    Acceptance, E, NR by KG at 10/20/2021 0943    Acceptance, E, NR by KG at 10/17/2021 1138   Significant Other Acceptance, E, VU by TANK at 10/29/2021 0815    Comment: wife instructed in ROM exercises to do with patient throughout the day she is able to verbalize understanding.                   Point: Body mechanics (In Progress)     Learning Progress Summary           Patient Acceptance, E, NR by KG at 11/3/2021 1432    Acceptance, E, NR by AE at 11/2/2021 1440    Acceptance, E, NR by AE at 11/1/2021 0910    Acceptance, E, NR by TANK at 10/31/2021 1145    Acceptance, E, NR by JB1 at 10/31/2021 0243    Acceptance, E, NR by JB1 at 10/30/2021 0334    Acceptance, E, VU by TANK at 10/29/2021 0815    Comment: wife instructed in ROM exercises to do with patient throughout the day she is able to verbalize understanding.    Acceptance, E, NR by AE at 10/28/2021 1425    Acceptance, E, NR by KG at 10/27/2021 0927    Acceptance, E, NR by TANK at 10/26/2021 1300    Acceptance, E, NR by KG at 10/25/2021 0828    Acceptance, E, NR by AE at 10/22/2021 0834     Acceptance, E, NR by KG at 10/20/2021 0943    Acceptance, E, NR by KG at 10/18/2021 1017    Acceptance, E, NR by KG at 10/17/2021 1138   Family Acceptance, E, NR by KG at 10/27/2021 0927    Acceptance, E, NR by KG at 10/25/2021 0828    Acceptance, E, NR by KG at 10/20/2021 0943    Acceptance, E, NR by KG at 10/17/2021 1138   Significant Other Acceptance, E, VU by TANK at 10/29/2021 0815    Comment: wife instructed in ROM exercises to do with patient throughout the day she is able to verbalize understanding.                   Point: Precautions (In Progress)     Learning Progress Summary           Patient Acceptance, E, NR by KG at 11/3/2021 1432    Acceptance, E, NR by AE at 11/2/2021 1440    Acceptance, E, NR by AE at 11/1/2021 0910    Acceptance, E, NR by TANK at 10/31/2021 1145    Acceptance, E, NR by JB1 at 10/30/2021 0334    Acceptance, E, VU by TANK at 10/29/2021 0815    Comment: wife instructed in ROM exercises to do with patient throughout the day she is able to verbalize understanding.    Acceptance, E, NR by AE at 10/28/2021 1425    Acceptance, E, NR by KG at 10/27/2021 0927    Acceptance, E, NR by TANK at 10/26/2021 1300    Acceptance, E, NR by KG at 10/25/2021 0828    Acceptance, E, NR by AE at 10/22/2021 0825    Acceptance, E, NR by KG at 10/20/2021 0943    Acceptance, E, NR by KG at 10/18/2021 1017    Acceptance, E, NR by KG at 10/17/2021 1138   Family Acceptance, E, NR by KG at 10/27/2021 0927    Acceptance, E, NR by KG at 10/25/2021 0828    Acceptance, E, NR by KG at 10/20/2021 0943    Acceptance, E, NR by KG at 10/17/2021 1138   Significant Other Acceptance, E, VU by TANK at 10/29/2021 0815    Comment: wife instructed in ROM exercises to do with patient throughout the day she is able to verbalize understanding.                               User Key     Initials Effective Dates Name Provider Type Discipline    TANK 06/16/21 -  Karrie Cortez, PT Physical Therapist PT    JB1 06/16/21 -  Ian Washington  RN Registered Nurse Nurse    KG 05/22/20 -  Joanna Clark, PT Physical Therapist PT    AE 09/21/21 -  Sukumar Ray PT Physical Therapist PT              PT Recommendation and Plan  Planned Therapy Interventions (PT): balance training, bed mobility training, strengthening, transfer training  Plan of Care Reviewed With: patient  Progress: improving  Outcome Summary: Pt performed STS x3 from chair and ambulated 4ft with maxA x2 and B UE support. Pt required max cueing for upright posture and increased stride length. Pt unable to achieve full upright posture with trunk and knee extension. Physical assistance required to assist with weight shifting in order to advance LEs. Pt limited by increased fatigue and weakness. Chair follow closely behind. Pt motivated to progress. Continue to progress as appropriate.     Time Calculation:    PT Charges     Row Name 11/03/21 1432             Time Calculation    Start Time 1432  -KG      PT Received On 11/03/21  -KG      PT Goal Re-Cert Due Date 11/06/21  -KG              Time Calculation- PT    Total Timed Code Minutes- PT 38 minute(s)  -KG              Timed Charges    97060 - PT Therapeutic Activity Minutes 38  -KG              Total Minutes    Timed Charges Total Minutes 38  -KG       Total Minutes 38  -KG            User Key  (r) = Recorded By, (t) = Taken By, (c) = Cosigned By    Initials Name Provider Type    KG Joanna Clark, PT Physical Therapist              Therapy Charges for Today     Code Description Service Date Service Provider Modifiers Qty    [id number]  PT THERAPEUTIC ACT EA 15 MIN 11/3/2021 Joanna Clark, PT GP 3          PT G-Codes  Outcome Measure Options: AM-PAC 6 Clicks Basic Mobility (PT)  AM-PAC 6 Clicks Score (PT): 11    Kalpana Clark PT  11/3/2021

## 2021-11-03 NOTE — THERAPY RE-EVALUATION
Acute Care - Speech Language Pathology Treatment Note  [facility]   PMV treatment  +Clinical Swallow Re-Evaluation     Patient Name: Keshav Dickens  : 1955  MRN: [id number]  Today's Date: 11/3/2021               Admit Date: 10/4/2021     Visit Dx:    ICD-10-CM ICD-9-CM   1. Acute respiratory failure with hypoxia  J96.01 518.81   2. Examination for normal comparison for clinical research  Z00.6 V70.7   3. Diffuse pulmonary alveolar hemorrhage  R04.89 786.30   4. 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.0 446.21   5. Voice impairment  R49.9 784.40   6. Dysphagia, unspecified type  R13.10 787.20     Patient Active Problem List   Diagnosis   • Anti-GBM nephritis with pulmonary hemorrhage (HCC)   • Acute respiratory failure with hypoxia   • Acute renal failure (ARF) (HCC)   • Acute blood loss anemia   • Sepsis due to pneumonia (HCC);Klebsiella aerogenes   • Idiopathic hypotension   • Hyperglycemia   • History of tobacco abuse     Past Medical History:   Diagnosis Date   • Goodpasture's syndrome  10/4/2021   • Hypertension      Past Surgical History:   Procedure Laterality Date   • CHOLECYSTECTOMY     • TRACHEOSTOMY AND PEG TUBE INSERTION N/A 10/15/2021    Procedure: TRACHEOSTOMY AND PERCUTANEOUS ENDOSCOPIC GASTROSTOMY TUBE INSERTION;  Surgeon: Abdon Roberts MD;  Location: [facility];  Service: General;  Laterality: N/A;       SLP Recommendation and Plan           Swallow Criteria for Skilled Therapeutic Interventions Met: demonstrates skilled criteria (21)  Anticipated Discharge Disposition (SLP): unknown, anticipate therapy at next level of care (21)     Therapy Frequency (Swallow): PRN (21)  Predicted Duration Therapy Intervention (Days): until discharge (21)  Daily Summary of Progress (SLP): progress toward functional goals as expected (21)  Plan for Continued Treatment (SLP): Saw for PMV treatment. Pt now downsized to brennan 6  cuffless/fenestrated trach. Pt able to voice around his trach, but vocal quality & loudness greatly improved w/ PMV placement.  Pt also able to cough secretions to oral cavity w/ PMV in place. No significant changes in vital signs w/ PMV in place. Required cues for suctioning of secretions in oral cavity. Pt w/ impaired cognition & is aware that he would not remember to call for help to remove PMV before sleeping, so spouse notified to call RN before she leaves. Educated on precautions r/t PMV. Okay to wear PMV w/ RN/RT/SLP supervision. Continue to address. (11/03/21 1515)       Plan of Care Reviewed With: patient (11/03/21 1548)  Progress: improving (11/03/21 1548)      SLP EVALUATION (last 72 hours)     SLP SLC Evaluation     Row Name 11/03/21 2885                Communication Assessment/Intervention    Document Type  therapy note (daily note)  -RD          SLP Clinical Impressions    Daily Summary of Progress (SLP) progress toward functional goals as expected  -RD       Barriers to Overall Progress (SLP) cognition  -RD       Plan for Continued Treatment (SLP) Saw for PMV treatment. Pt now downsized to shiley 6 cuffless/fenestrated trach. Pt able to voice around his trach, but vocal quality & loudness greatly improved w/ PMV placement.  Pt also able to cough secretions to oral cavity w/ PMV in place. No significant changes in vital signs w/ PMV in place. Required cues for suctioning of secretions in oral cavity. Pt w/ impaired cognition & is aware that he would not remember to call for help to remove PMV before sleeping, so spouse notified to call RN before she leaves. Educated on precautions r/t PMV. Okay to wear PMV w/ RN/RT/SLP supervision. Continue to address.  -RD               Recommendations    Therapy Frequency (SLP SLC) 5 days per week  -RD       Predicted Duration Therapy Intervention (Days)        Anticipated Discharge Disposition (SLP) Anticipate therapy at next level of care             User Key  (r)  = Recorded By, (t) = Taken By, (c) = Cosigned By    Initials Name Effective Dates    RD Brittany Beckford MS CCC-SLP 06/16/21 -                    EDUCATION  The patient has been educated in the following areas:     Communication Impairment Speaking Valve.           SLP GOALS     Row Name 11/03/21 1515 11/02/21 1300 11/01/21 1110       Tolerate Speaking Valve Placement Goal 1 (SLP)    Tolerate Speaking Valve Placement Goal 1 (SLP) speaking valve >30 min; 80%; with minimal cues (75-90%)  -RD speaking valve >30 min; 80%; with minimal cues (75-90%)  -CJ speaking valve >30 min; 80%; with minimal cues (75-90%)  -RD    Time Frame (Tolerate Speaking Valve Placement Goal 1, SLP) short term goal (STG)  -RD short term goal (STG)  -CJ short term goal (STG)  -RD    Barriers (Tolerate Speaking Valve Placement 1, SLP) -- excessive coughing  -CJ --    Progress (Tolerate Speaking Valve Placement Goal 1, SLP) 60%; with minimal cues (75-90%)  -RD 20%; with moderate cues (50-74%); with maximum cues (25-49%)  -CJ --    Progress/Outcomes (Tolerate Speaking Valve Placement Goal 1, SLP) good progress toward goal  -RD continuing progress toward goal  -CJ --    Comment (Tolerate Speaking Valve Placement Goal 1, SLP) able to tolerate 15-20 min before requesting removal to sleep. RN called to ask pt about removal.  -RD ~5 minutes  -CJ --       Audible Speech with Speaking Valve Goal 1 (SLP)    Audible Speech Goal 1 (SLP) 3 feet; 80%; with minimal cues (75-90%)  -RD 3 feet; 80%; with minimal cues (75-90%)  -CJ 3 feet; 80%; with minimal cues (75-90%)  -RD    Time Frame (Audible Speech Goal 1, SLP) short term goal (STG)  -RD short term goal (STG)  -CJ short term goal (STG)  -RD    Barriers (Audible Speech Goal 1, SLP) -- excessive coughing  -CJ --    Progress (Audible Speech Goal 1, SLP) 60%; with minimal cues (75-90%)  -RD 30%; with maximum cues (25-49%)  -CJ --    Progress/Outcomes (Audible Speech Goal 1, SLP) good progress toward goal  -RD  continuing progress toward goal  -CJ --    Comment (Audible Speech Goal 1, SLP) 3 ft  -RD ~1 foot  -CJ --       Additional Goal 1 (SLP)    Additional Goal 1, SLP LTG: Pt will improve communication w/ use of PMV to functionally communicate wants/needs w/ 90% accuracy w/ min cues  -RD LTG: Pt will improve communication w/ use of PMV to functionally communicate wants/needs w/ 90% accuracy w/ min cues  -CJ LTG: Pt will improve communication w/ use of PMV to functionally communicate wants/needs w/ 90% accuracy w/ min cues  -RD    Time Frame (Additional Goal 1, SLP) by discharge  -RD by discharge  -CJ by discharge  -RD    Progress/Outcomes (Additional Goal 1, SLP) continuing progress toward goal  -RD continuing progress toward goal  -CJ --          User Key  (r) = Recorded By, (t) = Taken By, (c) = Cosigned By    Initials Name Provider Type    Brittany Cotton MS CCC-SLP Speech and Language Pathologist    Noreen Rod, MS CCC-SLP Speech and Language Pathologist                        Time Calculation:      Time Calculation- SLP     Row Name 11/03/21 1606             Time Calculation- SLP    SLP Start Time 1515  -RD      SLP Received On 11/03/21  -RD              Untimed Charges    SLP Eval/Re-eval  ST Eval Oral Pharyng Swallow - 42544  -RD      39030-NG Eval Oral Pharyng Swallow Minutes 30  -RD      06577-WB Treatment/ST Modification Prosth Aug Alter  30  -RD              Total Minutes    Untimed Charges Total Minutes 60  -RD       Total Minutes 60  -RD            User Key  (r) = Recorded By, (t) = Taken By, (c) = Cosigned By    Initials Name Provider Type    Brittany Cotton MS CCC-SLP Speech and Language Pathologist                Therapy Charges for Today     Code Description Service Date Service Provider Modifiers Qty    52769706411 HC ST EVAL ORAL PHARYNG SWALLOW 2 11/3/2021 Brittany Beckford MS CCC-SLP GN 1    40398952031 HC ST TREATMENT SPEECH 2 11/3/2021 Brittany Beckford MS CCC-[place] GN 1                      Brittany Beckford MS CCC-SLP  11/3/2021 and Acute Care - Speech Language Pathology   Swallow Re-Evaluation  [place]     Patient Name: Keshav Dickens  : 1955  MRN: [id number]  Today's Date: 11/3/2021               Admit Date: 10/4/2021    Visit Dx:     ICD-10-CM ICD-9-CM   1. Acute respiratory failure with hypoxia  J96.01 518.81   2. Examination for normal comparison for clinical research  Z00.6 V70.7   3. Diffuse pulmonary alveolar hemorrhage  R04.89 786.30   4. 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.0 446.21   5. Voice impairment  R49.9 784.40   6. Dysphagia, unspecified type  R13.10 787.20     Patient Active Problem List   Diagnosis   • Anti-GBM nephritis with pulmonary hemorrhage (HCC)   • Acute respiratory failure with hypoxia   • Acute renal failure (ARF) (HCC)   • Acute blood loss anemia   • Sepsis due to pneumonia (HCC);Klebsiella aerogenes   • Idiopathic hypotension   • Hyperglycemia   • History of tobacco abuse     Past Medical History:   Diagnosis Date   • Goodpasture's syndrome  10/4/2021   • Hypertension      Past Surgical History:   Procedure Laterality Date   • CHOLECYSTECTOMY     • TRACHEOSTOMY AND PEG TUBE INSERTION N/A 10/15/2021    Procedure: TRACHEOSTOMY AND PERCUTANEOUS ENDOSCOPIC GASTROSTOMY TUBE INSERTION;  Surgeon: Abdon Roberts MD;  Location: [facility];  Service: General;  Laterality: N/A;       SLP Recommendation and Plan  SLP Swallowing Diagnosis: suspected pharyngeal dysphagia (21)  SLP Diet Recommendation: NPO, long term alternate methods of nutrition/hydration (21)  Recommended Precautions and Strategies: general aspiration precautions (21)  SLP Rec. for Method of Medication Administration: meds via alternate route (21)     Monitor for Signs of Aspiration: yes, notify SLP if any concerns (21)  Recommended Diagnostics: reassess via FEES (21)  Swallow Criteria for Skilled  Therapeutic Interventions Met: demonstrates skilled criteria (11/03/21 1515)  Anticipated Discharge Disposition (SLP): unknown, anticipate therapy at next level of care (11/03/21 1515)  Rehab Potential/Prognosis, Swallowing: good, to achieve stated therapy goals (11/03/21 1515)  Therapy Frequency (Swallow): PRN (11/03/21 1515)  Predicted Duration Therapy Intervention (Days): until discharge (11/03/21 1515)     Daily Summary of Progress (SLP): progress toward functional goals as expected (11/03/21 1515)    Plan for Continued Treatment (SLP): Saw for PMV treatment. Pt now downsized to shiley 6 cuffless/fenestrated trach. Pt able to voice around his trach, but vocal quality & loudness greatly improved w/ PMV placement.  Pt also able to cough secretions to oral cavity w/ PMV in place. No significant changes in vital signs w/ PMV in place. Required cues for suctioning of secretions in oral cavity. Pt w/ impaired cognition & is aware that he would not remember to call for help to remove PMV before sleeping, so spouse notified to call RN before she leaves. Educated on precautions r/t PMV. Okay to wear PMV w/ RN/RT/SLP supervision. Continue to address. (11/03/21 1515)              Plan of Care Reviewed With: patient  Progress: improving      SWALLOW EVALUATION (last 72 hours)     SLP Adult Swallow Evaluation     Row Name 11/03/21 1515             Rehab Evaluation    Document Type Re-evaluation      Subjective Information no complaints  -RD      Patient Observations alert; cooperative; agree to therapy  -RD      Patient/Family/Caregiver Comments/Observations spouse present  -RD      Care Plan Review evaluation/treatment results reviewed; care plan/treatment goals reviewed; risks/benefits reviewed; current/potential barriers reviewed; patient/other agree to care plan  -RD      Care Plan Review, Other Participant(s) spouse  -RD      Patient Effort good  -RD              General Information    Patient Profile Reviewed yes  -RD       Pertinent History Of Current Problem Adm w/ acute respiratory failure. Intubated 10/4-10/15/21 s/p shiley 8 cuffed trach 10/15. Now on TCTs. Good pasture's syndrome, AKF, sepsis. Downsized yesterday to cuffless/fenestrated shiley size 6 trach. Pt accidently pulled out trach this AM, so saw in PM when trach replaced.  -RD      Current Method of Nutrition NPO; gastrostomy feedings  -RD      Precautions/Limitations, Vision WFL; for purposes of eval  -RD      Precautions/Limitations, Hearing WFL; for purposes of eval  -RD      Prior Level of Function-Communication unknown  -RD      Prior Level of Function-Swallowing unknown  -RD      Plans/Goals Discussed with patient; spouse/S.O.; agreed upon  -RD      Barriers to Rehab medically complex; cognitive status  -RD      Patient's Goals for Discharge return to PO diet  -RD      Family Goals for Discharge patient able to eat/drink without coughing/choking; patient able to return to PO diet  -RD              Pain    Additional Documentation Pain Scale: Numbers Pre/Post-Treatment (Group)  -RD              Pain Scale: Numbers Pre/Post-Treatment    Pretreatment Pain Rating 0/10 - no pain  -RD      Posttreatment Pain Rating 0/10 - no pain  -RD              Pain Scale: FACES Pre/Post-Treatment    Pain: FACES Scale, Pretreatment --      Posttreatment Pain Rating --              Oral Motor Structure and Function    Dentition Assessment natural, present and adequate  -RD      Secretion Management problems swallowing secretions  -RD      Mucosal Quality moist, healthy  -RD      Volitional Swallow delayed  -RD      Volitional Cough WFL  -RD              Oral Musculature and Cranial Nerve Assessment    Oral Motor General Assessment WFL  -RD              General Eating/Swallowing Observations    Respiratory Support Currently in Use tracheostomy; trach collar  -RD      Eating/Swallowing Skills fed by SLP  -RD      Positioning During Eating upright 90 degree; upright in chair  -RD       Utensils Used spoon  -RD      Consistencies Trialed ice chips; thin liquids; pureed  -RD      Pre SpO2 (%) --      Post SpO2 (%) --              Clinical Swallow Eval    Pharyngeal Phase suspected pharyngeal impairment  -RD      Esophageal Phase unremarkable  -RD      Clinical Swallow Evaluation Summary --              Pharyngeal Phase Concerns    Pharyngeal Phase Concerns wet vocal quality; throat clear  -RD      Wet Vocal Quality thin; pudding  -RD      Cough --      Throat Clear thin  -RD      Pharyngeal Phase Concerns, Comment PMV in place. Overt s/s of aspiration c/b inconsistent wet vocal quality w/ thins & pureed trials. Delayed throat clearing after thins, but also throat clearing/coughing at times on secretions. Pt able to cough secretions to oral cavity w/ PMV on today. Required cues to suction oral cavity. No excessive coughing today, appears appropriate for FEES tomorrow to see if safe for any PO.  -RD              Clinical Impression    SLP Swallowing Diagnosis suspected pharyngeal dysphagia  -RD      Functional Impact risk of aspiration/pneumonia  -RD      Rehab Potential/Prognosis, Swallowing good, to achieve stated therapy goals  -RD      Swallow Criteria for Skilled Therapeutic Interventions Met demonstrates skilled criteria  -RD      Plan for Continued Treatment (SLP) --              Recommendations    Therapy Frequency (Swallow) PRN  -RD      Predicted Duration Therapy Intervention (Days) until discharge  -RD      SLP Diet Recommendation NPO; long term alternate methods of nutrition/hydration  -RD      Recommended Diagnostics reassess via FEES  -RD      Recommended Precautions and Strategies general aspiration precautions  -RD      Oral Care Recommendations Oral Care BID/PRN; Suction toothbrush  -RD      SLP Rec. for Method of Medication Administration meds via alternate route  -RD      Monitor for Signs of Aspiration yes; notify SLP if any concerns  -RD      Anticipated Discharge Disposition  (SLP) unknown; anticipate therapy at next level of care  -RD            User Key  (r) = Recorded By, (t) = Taken By, (c) = Cosigned By    Initials Name Effective Dates    RD HarinderapoorvaBrittany barragan BETHANY, MS CCC-SLP 06/16/21 -     Noreen Rod, MS CCC-SLP 06/16/21 -                 EDUCATION  The patient has been educated in the following areas:   Dysphagia (Swallowing Impairment) Oral Care/Hydration NPO rationale.        SLP GOALS     Row Name 11/03/21 1515 11/02/21 1300 11/01/21 1110       Tolerate Speaking Valve Placement Goal 1 (SLP)    Tolerate Speaking Valve Placement Goal 1 (SLP) speaking valve >30 min; 80%; with minimal cues (75-90%)  -RD speaking valve >30 min; 80%; with minimal cues (75-90%)  -CJ speaking valve >30 min; 80%; with minimal cues (75-90%)  -RD    Time Frame (Tolerate Speaking Valve Placement Goal 1, SLP) short term goal (STG)  -RD short term goal (STG)  -CJ short term goal (STG)  -RD    Barriers (Tolerate Speaking Valve Placement 1, SLP) -- excessive coughing  -CJ --    Progress (Tolerate Speaking Valve Placement Goal 1, SLP) 60%; with minimal cues (75-90%)  -RD 20%; with moderate cues (50-74%); with maximum cues (25-49%)  -CJ --    Progress/Outcomes (Tolerate Speaking Valve Placement Goal 1, SLP) good progress toward goal  -RD continuing progress toward goal  -CJ --    Comment (Tolerate Speaking Valve Placement Goal 1, SLP) able to tolerate 15-20 min before requesting removal to sleep. RN called to ask pt about removal.  -RD ~5 minutes  -CJ --       Audible Speech with Speaking Valve Goal 1 (SLP)    Audible Speech Goal 1 (SLP) 3 feet; 80%; with minimal cues (75-90%)  -RD 3 feet; 80%; with minimal cues (75-90%)  -CJ 3 feet; 80%; with minimal cues (75-90%)  -RD    Time Frame (Audible Speech Goal 1, SLP) short term goal (STG)  -RD short term goal (STG)  -CJ short term goal (STG)  -RD    Barriers (Audible Speech Goal 1, SLP) -- excessive coughing  -CJ --    Progress (Audible Speech Goal 1, SLP) 60%;  with minimal cues (75-90%)  -RD 30%; with maximum cues (25-49%)  -CJ --    Progress/Outcomes (Audible Speech Goal 1, SLP) good progress toward goal  -RD continuing progress toward goal  -CJ --    Comment (Audible Speech Goal 1, SLP) 3 ft  -RD ~1 foot  -CJ --       Additional Goal 1 (SLP)    Additional Goal 1, SLP LTG: Pt will improve communication w/ use of PMV to functionally communicate wants/needs w/ 90% accuracy w/ min cues  -RD LTG: Pt will improve communication w/ use of PMV to functionally communicate wants/needs w/ 90% accuracy w/ min cues  -CJ LTG: Pt will improve communication w/ use of PMV to functionally communicate wants/needs w/ 90% accuracy w/ min cues  -RD    Time Frame (Additional Goal 1, SLP) by discharge  -RD by discharge  -CJ by discharge  -RD    Progress/Outcomes (Additional Goal 1, SLP) continuing progress toward goal  -RD continuing progress toward goal  -CJ --          User Key  (r) = Recorded By, (t) = Taken By, (c) = Cosigned By    Initials Name Provider Type    Brittany Cotton MS CCC-SLP Speech and Language Pathologist    Noreen Rod MS CCC-SLP Speech and Language Pathologist                   Time Calculation:    Time Calculation- SLP     Row Name 11/03/21 1606             Time Calculation- SLP    SLP Start Time 1515  -RD      SLP Received On 11/03/21  -RD              Untimed Charges    SLP Eval/Re-eval  ST Eval Oral Pharyng Swallow - 03964  -RD      50016-HT Eval Oral Pharyng Swallow Minutes 30  -RD      12538-CV Treatment/ST Modification Prosth Aug Alter  30  -RD              Total Minutes    Untimed Charges Total Minutes 60  -RD       Total Minutes 60  -RD            User Key  (r) = Recorded By, (t) = Taken By, (c) = Cosigned By    Initials Name Provider Type    Brittany Cotton MS CCC-SLP Speech and Language Pathologist                Therapy Charges for Today     Code Description Service Date Service Provider Modifiers Qty    89344841432 HC ST EVAL ORAL PHARYNG  SWALLOW 2 11/3/2021 Brittany Beckford, MS CCC-SLP GN 1    [id number]  ST TREATMENT SPEECH 2 11/3/2021 Brittany Beckford, MS GARZA-SLP GN 1            Patient was not wearing a face mask and did exhibit coughing during this therapy encounter.  Procedure performed was aerosolizing, involved close contact (within 6 feet for at least 15 minutes or longer), and did not involve contact with infectious secretions or specimens.  Therapist used appropriate personal protective equipment including gloves, standard procedure mask and eye protection.  Appropriate PPE was worn during the entire therapy session.  Hand hygiene was completed before and after therapy session.       MS LINA Ty  11/3/2021

## 2021-11-03 NOTE — PROGRESS NOTES
Multidisciplinary Rounds    Time: 20min  Patient Name: Keshav Dickens  Date of Encounter: 11/03/21 09:21 EDT  MRN: [id number]  Admission date: 10/4/2021      Reason for visit: MDR. RD to continue to follow per protocol.     Additional information obtained during MDR: EN adjusted yesterday (11/2), pt tolerating, with 98% of EN goal volume delivered over the past 24 hrs. SLP bedside eval (11/2) rec NPO, alternate methods for nutrition/hydration and meds via alternate route.     Per I&O- last bowel movement documented (10/31-11/1)    Current diet: NPO Diet  Oral nutrition supplement:     EN: [employer] Renal at 50 ml/hr and 1 packet fiber 3x/day and free water at 10 ml/hr via PEG        Intervention:  Follow treatment plan  Care plan reviewed    Follow up:   Per protocol      Jena Pang MS RD/LD [facility]  09:21 EDT

## 2021-11-03 NOTE — PLAN OF CARE
Goal Outcome Evaluation:  Plan of Care Reviewed With: patient            Alert, oriented to person, place, follows commands. Very restless/anxious overnight, had trouble sleeping.   -Trach collar 28%, no distress, good cough, moderate thick tan/dark yellow secretions  -VSS

## 2021-11-03 NOTE — PLAN OF CARE
Goal Outcome Evaluation:  Plan of Care Reviewed With: patient        Progress: improving  Outcome Summary: Pt performed STS x3 from chair and ambulated 4ft with maxA x2 and B UE support. Pt required max cueing for upright posture and increased stride length. Pt unable to achieve full upright posture with trunk and knee extension. Physical assistance required to assist with weight shifting in order to advance LEs. Pt limited by increased fatigue and weakness. Chair follow closely behind. Pt motivated to progress. Continue to progress as appropriate.

## 2021-11-03 NOTE — PROGRESS NOTES
"   LOS: 30 days    Patient Care Team:  Andree Langston MD as PCP - General (Internal Medicine)    Reason For Visit:  F/U PIOTR  Subjective           Review of Systems:    Pulm: No soa   CV:  No CP. GI: NO N/V/D.      Objective     atovaquone, 1,500 mg, Per PEG Tube, Daily With Lunch  b complex-vitamin c-folic acid, 1 tablet, Per PEG Tube, Daily  chlorhexidine, 15 mL, Mouth/Throat, Q12H  cholecalciferol, 2,000 Units, Per PEG Tube, Daily  custom medication builder, 100 mg, Nasogastric, Daily  dextromethorphan polistirex ER, 60 mg, Per PEG Tube, Q12H  epoetin tatum/tatum-epbx, 10,000 Units, Subcutaneous, Once per day on Tue Thu Sat  famotidine, 20 mg, Per G Tube, Daily  ferrous sulfate, 300 mg, Per PEG Tube, Daily  gabapentin, 100 mg, Oral, Q8H  guaiFENesin-codeine, 10 mL, Oral, TID  heparin (porcine), 5,000 Units, Subcutaneous, Q12H  insulin detemir, 5 Units, Subcutaneous, Q12H  insulin regular, 0-7 Units, Subcutaneous, Q6H  ipratropium-albuterol, 3 mL, Nebulization, 4x Daily - RT  lactobacillus acidophilus, 1 capsule, Per PEG Tube, Daily  melatonin, 10 mg, Per G Tube, Nightly  Nutrisource fiber, 1 packet, Per G Tube, TID  predniSONE, 40 mg, Per G Tube, Daily With Breakfast  QUEtiapine, 50 mg, Oral, Nightly  sertraline, 50 mg, Per PEG Tube, Daily             Vital Signs:  Blood pressure 104/51, pulse 87, temperature 98.7 °F (37.1 °C), temperature source Oral, resp. rate 20, height 172.7 cm (67.99\"), weight 81 kg (178 lb 9.2 oz), SpO2 92 %.    Flowsheet Rows      First Filed Value   Admission Height 172.7 cm (68\") Documented at 10/04/2021 2132   Admission Weight 102 kg (224 lb 13.9 oz) Documented at 10/04/2021 2100          11/02 0701 - 11/03 0700  In: 1180.5   Out: 3220     Physical Exam:    General Appearance: NAD, alert and cooperative, Ox3. + TRACH. + THD CATH.  Eyes: PER, conjunctivae and sclerae normal, no icterus  Lungs: respirations regular and unlabored, no crepitus. FEW RHONCHI  Heart/CV: regular rhythm & " normal rate, no murmur, no gallop, no rub and TR edema  Abdomen: not distended, soft, non-tender, no masses,  bowel sounds present. PEG  Skin: No rash, Warm and dry    Radiology:            Labs:  Results from last 7 days   Lab Units 11/03/21  0552 11/02/21 0356 11/01/21  0331   WBC 10*3/mm3 10.02 8.93 9.00   HEMOGLOBIN g/dL 7.8* 7.2* 7.8*   HEMATOCRIT % 22.7* 20.8* 23.0*   PLATELETS 10*3/mm3 191 198 212     Results from last 7 days   Lab Units 11/03/21  0552 11/02/21  0356 11/01/21  0331 10/31/21  0422 10/29/21  0415 10/28/21  0405   SODIUM mmol/L 131* 132* 132* 136   < > 131*   POTASSIUM mmol/L 3.9 4.2 4.3 4.2   < > 4.5   CHLORIDE mmol/L 92* 89* 92* 96*   < > 92*   CO2 mmol/L 25.0 21.0* 23.0 27.0   < > 18.0*   BUN mg/dL 67* 145* 106* 69*   < > 111*   CREATININE mg/dL 5.01* 7.85* 6.35* 4.48*   < > 6.99*   CALCIUM mg/dL 8.8 8.8 8.6 8.6   < > 8.4*   PHOSPHORUS mg/dL 3.5 4.7*  4.7* 4.5 4.5   < > 7.9*   MAGNESIUM mg/dL 2.5* 2.9*  2.9*  --   --   --  2.8*   ALBUMIN g/dL 4.20 4.00 4.10 4.20   < >  --     < > = values in this interval not displayed.     Results from last 7 days   Lab Units 11/03/21  0552   GLUCOSE mg/dL 115*       Results from last 7 days   Lab Units 11/03/21  0552   ALK PHOS U/L 88   BILIRUBIN mg/dL 0.9   ALT (SGPT) U/L 27   AST (SGOT) U/L 29     Results from last 7 days   Lab Units 10/31/21  0921   PH, ARTERIAL pH units 7.468*   PO2 ART mm Hg 61.5*   PCO2, ARTERIAL mm Hg 37.3   HCO3 ART mmol/L 27.0*             Estimated Creatinine Clearance: 16.2 mL/min (A) (by C-G formula based on SCr of 5.01 mg/dL (H)).      Assessment       Acute respiratory failure with hypoxia    Anti-GBM nephritis with pulmonary hemorrhage (HCC)    Acute renal failure (ARF) (HCC)    Acute blood loss anemia    Sepsis due to pneumonia (HCC);Klebsiella aerogenes    Idiopathic hypotension    Hyperglycemia    History of tobacco abuse            Impression: ANURIC PIOTR. GOODPASTURES.  ANEMIA. HYPONATREMIA.            Recommendations:  HD 11/4/21. WILL NEED ORAL CYTOXAN FOR A COUPLE OF MORE MONTHS IF ABLE. SLOWLY TAPER PREDNISONE. COMPLETED 2 WEEKS OF TPE TX'S.       Dagoberto Dawkins MD  11/03/21  10:10 EDT

## 2021-11-03 NOTE — PROGRESS NOTES
INTENSIVIST / PULMONARY FOLLOW UP NOTE     Hospital:  LOS: 30 days   Mr. Keshav Dickens, 66 y.o. male is followed for:     Acute respiratory failure with hypoxia    Anti-GBM nephritis with pulmonary hemorrhage (HCC)    Acute renal failure (ARF) (HCC)    Acute blood loss anemia    Sepsis due to pneumonia (HCC);Klebsiella aerogenes    Idiopathic hypotension    Hyperglycemia    History of tobacco abuse          SUBJECTIVE   Patient coughed out tracheostomy this morning    The patient's relevant past medical, surgical, family, and social history were reviewed    Allergies and medications were reviewed    ROS:  Per subjective, all other systems were reviewed and were negative        OBJECTIVE     Vital Sign Min/Max for last 24 hours:  Temp  Min: 97.2 °F (36.2 °C)  Max: 98.7 °F (37.1 °C)   BP  Min: 90/47  Max: 171/95   Pulse  Min: 76  Max: 110   Resp  Min: 20  Max: 22   SpO2  Min: 89 %  Max: 100 %   No data recorded     Physical Exam:  General Appearance:  no acute distress  Eyes:  No scleral icterus or pallor, pupils normal  Ears, Nose, Mouth, Throat:  tracheostomy  Neck:  Trachea midline, thyroid normal  Respiratory: clear to auscultation bilaterally, normal effort  Cardiovascular:  Regular rate and rhythm, no murmurs, no peripheral edema  Gastrointestinal:  Soft, nontender, no hepatosplenomegaly, PEG  Skin:  Normal temperature, no rash  Psychiatric:  no agitation  Neuro:  No new focal neurologic deficits observed      Telemetry:              Hemodynamics:   CVP:     PAP:     PAOP:     CO:     CI:     SVI:     SVR:       SpO2: 92 % SpO2  Min: 89 %  Max: 100 %   Device:      Flow Rate:   No data recorded     Mechanical Ventilator Settings:            Resp Rate (Set): 12 Resp Rate (Observed) Vent: 23   FiO2 (%): 35 %    PEEP/CPAP (cm H2O): 5 cm H20 Plateau Pressure (cm H2O): 13 cm H2O    Minute Ventilation (L/min) (Obs): 7.82 L/min    I:E Ratio (Obs): 15.0:1     Intake/Ouptut 24 hrs (7:00AM - 6:59 AM)  Intake & Output (last  3 days)       10/31 0701  11/01 0700 11/01 0701 11/02 0700 11/02 0701 11/03 0700 11/03 0701 11/04 0700    I.V. (mL/kg)        Other 391 212 198     NG/ 818 982.5     IV Piggyback        Total Intake(mL/kg) 1319 (12.9) 1030 (10.1) 1180.5 (14.6)     Other   3220     Total Output   3220     Net +1319 +1030 -2039.5             Stool Unmeasured Occurrence 1 x             Lines, Drains & Airways     Active LDAs     Name Placement date Placement time Site Days    Peripheral IV 10/09/21 2200 Anterior; Left Forearm 10/09/21  2200  Forearm  2    Peripheral IV 10/12/21 1110 Anterior; Right Forearm 10/12/21  1110  Forearm  less than 1    NG/OG Tube Nasoduodenal 10 Fr Right nostril 10/06/21  1000  Right nostril  6    Rectal Tube With balloon 10/11/21  1200  --  1    ETT  10/04/21  1200   8                Hematology:  Results from last 7 days   Lab Units 11/03/21  0552 11/02/21 0356 11/01/21  0331 10/31/21  0419 10/30/21  0416 10/29/21  0415 10/28/21  0405   WBC 10*3/mm3 10.02 8.93 9.00 8.04 12.51* 12.23* 10.32   HEMOGLOBIN g/dL 7.8* 7.2* 7.8* 7.1* 8.6* 8.7* 8.0*   HEMATOCRIT % 22.7* 20.8* 23.0* 21.3* 25.3* 25.4* 24.8*   PLATELETS 10*3/mm3 191 198 212 202 228 227 209     Electrolytes, Magnesium and Phosphorus:  Results from last 7 days   Lab Units 11/03/21  0552 11/02/21 0356 11/01/21  0331 10/31/21  0422 10/30/21  0416 10/29/21  0415 10/28/21  0405   SODIUM mmol/L 131* 132* 132* 136 135* 138 131*   CHLORIDE mmol/L 92* 89* 92* 96* 92* 96* 92*   POTASSIUM mmol/L 3.9 4.2 4.3 4.2 4.3 4.0 4.5   CO2 mmol/L 25.0 21.0* 23.0 27.0 21.0* 26.0 18.0*   MAGNESIUM mg/dL 2.5* 2.9*  2.9*  --   --   --   --  2.8*   PHOSPHORUS mg/dL 3.5 4.7*  4.7* 4.5 4.5  --  4.3 7.9*     Renal:  Results from last 7 days   Lab Units 11/03/21  0552 11/02/21  0356 11/01/21  0331 10/31/21  0422 10/30/21  0416 10/29/21  0415 10/28/21  0405   CREATININE mg/dL 5.01* 7.85* 6.35* 4.48* 6.76* 4.57* 6.99*   BUN mg/dL 67* 145* 106* 69* 111* 69* 111*      Estimated Creatinine Clearance: 16.2 mL/min (A) (by C-G formula based on SCr of 5.01 mg/dL (H)).  Hepatic:  Results from last 7 days   Lab Units 11/03/21  0552 11/02/21  0356   ALK PHOS U/L 88 78   BILIRUBIN mg/dL 0.9 0.6   ALT (SGPT) U/L 27 25   AST (SGOT) U/L 29 23     Arterial Blood Gases:  Results from last 7 days   Lab Units 10/31/21  0921 10/30/21  0815 10/29/21  0557 10/27/21  2322   PH, ARTERIAL pH units 7.468* 7.458* 7.488* 7.439   PCO2, ARTERIAL mm Hg 37.3 34.1* 36.2 31.4*   PO2 ART mm Hg 61.5* 71.3* 71.7* 78.2*   FIO2 % 40 35 35 35             Lab Results   Component Value Date    LACTATE 1.9 10/04/2021       Relevant imaging studies and labs from 11/03/21 were reviewed and interpreted by me    Medications (drips):       atovaquone, 1,500 mg, Per PEG Tube, Daily With Lunch  b complex-vitamin c-folic acid, 1 tablet, Per PEG Tube, Daily  chlorhexidine, 15 mL, Mouth/Throat, Q12H  cholecalciferol, 2,000 Units, Per PEG Tube, Daily  custom medication builder, 100 mg, Nasogastric, Daily  dextromethorphan polistirex ER, 60 mg, Per PEG Tube, Q12H  epoetin tatum/tatum-epbx, 10,000 Units, Subcutaneous, Once per day on Tue Thu Sat  famotidine, 20 mg, Per G Tube, Daily  ferrous sulfate, 300 mg, Per PEG Tube, Daily  gabapentin, 100 mg, Oral, Q8H  guaiFENesin-codeine, 10 mL, Oral, TID  heparin (porcine), 5,000 Units, Subcutaneous, Q12H  insulin detemir, 5 Units, Subcutaneous, Q12H  insulin regular, 0-7 Units, Subcutaneous, Q6H  ipratropium-albuterol, 3 mL, Nebulization, 4x Daily - RT  lactobacillus acidophilus, 1 capsule, Per PEG Tube, Daily  melatonin, 10 mg, Per G Tube, Nightly  Nutrisource fiber, 1 packet, Per G Tube, TID  predniSONE, 40 mg, Per G Tube, Daily With Breakfast  QUEtiapine, 50 mg, Oral, Nightly  sertraline, 50 mg, Per PEG Tube, Daily        Assessment/Plan   IMPRESSION / PLAN     Inpatient Problem List:  66 y.o.male:  Active Hospital Problems    Diagnosis    • **Acute respiratory failure with hypoxia     • Idiopathic hypotension    • Hyperglycemia    • History of tobacco abuse    • Sepsis due to pneumonia (HCC);Klebsiella aerogenes    • Acute blood loss anemia    • Anti-GBM nephritis with pulmonary hemorrhage (HCC)    • Acute renal failure (ARF) (HCC)         Impression & Plan:  66 y.o.male with relevant PMH of 45 py smoking quit 1 month PTA, HTN, T2DM admitted initially to [facility] 9/27/21 with 2-3 week h/o hemoptysis.  He required intubation on 10/1/21.  CTA at OSH showed diffuse GGO and 8 mm RML nodule.  Patient was also noted to be in Renal failure with concerns for Pulmonary-Renal syndrome.  He was treated with 3 days of pulse IV solumedrol. Ultimately anti-GBM Ab was positive concerning for Goodpastures.  PAIGE / ANCA / RF / Mycoplasma IgM were negative.  Patient had temporary HD catheter placed at OSH for PIOTR and HD was started.    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    He was transferred to [facility] 10/4/2021 for higher level of care.  Daily plasma exchange was initiated on 10/5/21 for total of 14 sessions, and he has continued on moderate dose steroids after receiving pulse.  Patient has also been initiated on Cytoxan.  Has required multiple transfusions.      Eventually had Trach / PEG on 10/15/21.    Patient coughed out trach early this morning.  RT / APRNs had some difficulty getting it to seat all the way in.  Bronchoscope used by myself to direct trach back in to place.    Anxiety  Depression  Continue klonopin and zoloft    Acute Hypoxemic Respiratory Failure  Diffuse Alveolar Hemorrhage  Goodpasture's Syndrome  Prolonged Mechanical Ventilation  Scheduled nebs. Steroids / cytoxan / plasma exchange per nephrology.  PJP prophylaxis.  Patient is heavily immunosuppressed and will be for some time.  Appreciate ID assistance with abx.   Has been on trach collar for 5 days.    Speech following for swallowing and PMV.  Changed to 6-0 cuffless fenestrated trach 11/2.  Brisk cough reflex.  Continue Delsym.  Add Codeine and low dose gabapentin.  Once coughing / secretions improved can start capping trials.    Plans in place to continue cytoxan for 3 months.  Slowly taper pred.  Stop PJP prophylaxis when on < 15 mg Pred.  Pred taper per Nephrology.    Klebsiella Aerogenes Pneumonia  Completed abx on 10/25.  Re-check culture, still with purulent secretions.      PIOTR  As above, Dialysis per nephrology.  D/cd prostat until uremia improves.    Anemia  Has required multiple transfusions.  Tranfuse for hgb < 7.  No signs of active bleeding.      T2DM A1c  Steroid Induced Hyperglycemia  Titrate SQ insulin    DVT / PUD Prophylaxis  SCDs / Heparin / Pepcid    Nutrition  Tube Feeds    Dispo  Trach / PEG 10/15.  Unfortunately cytoxan has to be specially compounded to go in G-tube.  Once able to swallow with ST and take pill form this will expand options for rehab.    PT/OT    Plan of care and goals reviewed with multidisciplinary team at daily rounds    High Risk secondary to severe life threatening good pastures syndrome requiring prolonged mechanical ventilation, dialysis, plasma exchange, etc.  Trach dislodgement today, therefore needs to stay in ICU.  High risk for worsening / relapse.         Sravan Forrester MD  Intensive Care Medicine  11/03/21 10:40 EDT

## 2021-11-03 NOTE — PLAN OF CARE
Goal Outcome Evaluation:  Plan of Care Reviewed With: patient  Progress: improving   SLP re-evaluation and treatment completed. Will address dysphagia w/ plan for FEES tomorrow as appropriate. Continue to address PMV during treatment. Pt may wear PMV w/ RN/RT/SLP supervision given cognition. Please see note for further details and recommendations.

## 2021-11-03 NOTE — CASE MANAGEMENT/SOCIAL WORK
Continued Stay Note  EASTON Martinez     Patient Name: Keshav Dickens  MRN: [id number]  Today's Date: 11/3/2021    Admit Date: 10/4/2021     Discharge Plan     Row Name 11/03/21 1058       Plan    Plan ongoing    Patient/Family in Agreement with Plan yes    Plan Comments I saw Mr. Dickens in room.  He is trach collar at 30% O2.  PT has been working with him.  Being evaluated by SLP.  If he can be able to swallow his cytoxan in pill form will aid in finding rehab placement.  CM following.    Final Discharge Disposition Code 30 - still a patient               Discharge Codes    No documentation.                     Patricia Disla RN

## 2021-11-04 ENCOUNTER — APPOINTMENT (OUTPATIENT)
Dept: NEPHROLOGY | Facility: HOSPITAL | Age: 66
End: 2021-11-04

## 2021-11-04 LAB
ALBUMIN SERPL-MCNC: 4.2 G/DL (ref 3.5–5.2)
ALBUMIN/GLOB SERPL: 1.5 G/DL
ALP SERPL-CCNC: 88 U/L (ref 39–117)
ALT SERPL W P-5'-P-CCNC: 26 U/L (ref 1–41)
ANION GAP SERPL CALCULATED.3IONS-SCNC: 18 MMOL/L (ref 5–15)
AST SERPL-CCNC: 23 U/L (ref 1–40)
BASOPHILS # BLD AUTO: 0.13 10*3/MM3 (ref 0–0.2)
BASOPHILS NFR BLD AUTO: 0.7 % (ref 0–1.5)
BILIRUB SERPL-MCNC: 0.7 MG/DL (ref 0–1.2)
BUN SERPL-MCNC: 93 MG/DL (ref 8–23)
BUN/CREAT SERPL: 14 (ref 7–25)
CALCIUM SPEC-SCNC: 8.8 MG/DL (ref 8.6–10.5)
CHLORIDE SERPL-SCNC: 89 MMOL/L (ref 98–107)
CO2 SERPL-SCNC: 25 MMOL/L (ref 22–29)
CREAT SERPL-MCNC: 6.66 MG/DL (ref 0.76–1.27)
DEPRECATED RDW RBC AUTO: 58.1 FL (ref 37–54)
EOSINOPHIL # BLD AUTO: 0.1 10*3/MM3 (ref 0–0.4)
EOSINOPHIL NFR BLD AUTO: 0.5 % (ref 0.3–6.2)
ERYTHROCYTE [DISTWIDTH] IN BLOOD BY AUTOMATED COUNT: 18.6 % (ref 12.3–15.4)
GFR SERPL CREATININE-BSD FRML MDRD: 8 ML/MIN/1.73
GFR SERPL CREATININE-BSD FRML MDRD: ABNORMAL ML/MIN/{1.73_M2}
GLOBULIN UR ELPH-MCNC: 2.8 GM/DL
GLUCOSE BLDC GLUCOMTR-MCNC: 132 MG/DL (ref 70–130)
GLUCOSE BLDC GLUCOMTR-MCNC: 187 MG/DL (ref 70–130)
GLUCOSE BLDC GLUCOMTR-MCNC: 200 MG/DL (ref 70–130)
GLUCOSE BLDC GLUCOMTR-MCNC: 210 MG/DL (ref 70–130)
GLUCOSE BLDC GLUCOMTR-MCNC: 220 MG/DL (ref 70–130)
GLUCOSE SERPL-MCNC: 164 MG/DL (ref 65–99)
HCT VFR BLD AUTO: 25.4 % (ref 37.5–51)
HGB BLD-MCNC: 8.3 G/DL (ref 13–17.7)
IMM GRANULOCYTES # BLD AUTO: 0.66 10*3/MM3 (ref 0–0.05)
IMM GRANULOCYTES NFR BLD AUTO: 3.6 % (ref 0–0.5)
LYMPHOCYTES # BLD AUTO: 1.12 10*3/MM3 (ref 0.7–3.1)
LYMPHOCYTES NFR BLD AUTO: 6 % (ref 19.6–45.3)
MAGNESIUM SERPL-MCNC: 2.9 MG/DL (ref 1.6–2.4)
MCH RBC QN AUTO: 31.7 PG (ref 26.6–33)
MCHC RBC AUTO-ENTMCNC: 32.7 G/DL (ref 31.5–35.7)
MCV RBC AUTO: 96.9 FL (ref 79–97)
MONOCYTES # BLD AUTO: 1.37 10*3/MM3 (ref 0.1–0.9)
MONOCYTES NFR BLD AUTO: 7.4 % (ref 5–12)
NEUTROPHILS NFR BLD AUTO: 15.2 10*3/MM3 (ref 1.7–7)
NEUTROPHILS NFR BLD AUTO: 81.8 % (ref 42.7–76)
NRBC BLD AUTO-RTO: 0.1 /100 WBC (ref 0–0.2)
PHOSPHATE SERPL-MCNC: 5 MG/DL (ref 2.5–4.5)
PLATELET # BLD AUTO: 259 10*3/MM3 (ref 140–450)
PMV BLD AUTO: 12.3 FL (ref 6–12)
POTASSIUM SERPL-SCNC: 5 MMOL/L (ref 3.5–5.2)
PROCALCITONIN SERPL-MCNC: 1.59 NG/ML (ref 0–0.25)
PROT SERPL-MCNC: 7 G/DL (ref 6–8.5)
RBC # BLD AUTO: 2.62 10*6/MM3 (ref 4.14–5.8)
SODIUM SERPL-SCNC: 132 MMOL/L (ref 136–145)
WBC # BLD AUTO: 18.58 10*3/MM3 (ref 3.4–10.8)

## 2021-11-04 PROCEDURE — 25010000002 EPOETIN ALFA-EPBX 10000 UNIT/ML SOLUTION: Performed by: INTERNAL MEDICINE

## 2021-11-04 PROCEDURE — 63710000001 INSULIN REGULAR HUMAN PER 5 UNITS: Performed by: INTERNAL MEDICINE

## 2021-11-04 PROCEDURE — 63710000001 INSULIN DETEMIR PER 5 UNITS: Performed by: INTERNAL MEDICINE

## 2021-11-04 PROCEDURE — 82962 GLUCOSE BLOOD TEST: CPT

## 2021-11-04 PROCEDURE — 83735 ASSAY OF MAGNESIUM: CPT | Performed by: INTERNAL MEDICINE

## 2021-11-04 PROCEDURE — 87186 SC STD MICRODIL/AGAR DIL: CPT | Performed by: INTERNAL MEDICINE

## 2021-11-04 PROCEDURE — 25010000002 PHENYLEPHRINE 10 MG/ML SOLUTION 5 ML VIAL: Performed by: NURSE PRACTITIONER

## 2021-11-04 PROCEDURE — 87205 SMEAR GRAM STAIN: CPT | Performed by: INTERNAL MEDICINE

## 2021-11-04 PROCEDURE — 25010000002 CYCLOPHOSPHAMIDE PER 100 MG: Performed by: INTERNAL MEDICINE

## 2021-11-04 PROCEDURE — 25010000002 HEPARIN (PORCINE) PER 1000 UNITS: Performed by: INTERNAL MEDICINE

## 2021-11-04 PROCEDURE — 94799 UNLISTED PULMONARY SVC/PX: CPT

## 2021-11-04 PROCEDURE — 84100 ASSAY OF PHOSPHORUS: CPT | Performed by: INTERNAL MEDICINE

## 2021-11-04 PROCEDURE — 87077 CULTURE AEROBIC IDENTIFY: CPT | Performed by: INTERNAL MEDICINE

## 2021-11-04 PROCEDURE — 85025 COMPLETE CBC W/AUTO DIFF WBC: CPT | Performed by: INTERNAL MEDICINE

## 2021-11-04 PROCEDURE — 25010000002 ALBUMIN HUMAN 25% PER 50 ML: Performed by: INTERNAL MEDICINE

## 2021-11-04 PROCEDURE — P9047 ALBUMIN (HUMAN), 25%, 50ML: HCPCS | Performed by: INTERNAL MEDICINE

## 2021-11-04 PROCEDURE — 80053 COMPREHEN METABOLIC PANEL: CPT | Performed by: INTERNAL MEDICINE

## 2021-11-04 PROCEDURE — 87070 CULTURE OTHR SPECIMN AEROBIC: CPT | Performed by: INTERNAL MEDICINE

## 2021-11-04 PROCEDURE — 63710000001 INSULIN REGULAR HUMAN PER 5 UNITS: Performed by: SURGERY

## 2021-11-04 PROCEDURE — 25010000002 MEROPENEM PER 100 MG: Performed by: INTERNAL MEDICINE

## 2021-11-04 PROCEDURE — 63710000001 PREDNISONE PER 1 MG: Performed by: INTERNAL MEDICINE

## 2021-11-04 PROCEDURE — 99291 CRITICAL CARE FIRST HOUR: CPT | Performed by: INTERNAL MEDICINE

## 2021-11-04 PROCEDURE — P9047 ALBUMIN (HUMAN), 25%, 50ML: HCPCS

## 2021-11-04 PROCEDURE — 84145 PROCALCITONIN (PCT): CPT | Performed by: INTERNAL MEDICINE

## 2021-11-04 PROCEDURE — 25010000002 ALBUMIN HUMAN 25% PER 50 ML

## 2021-11-04 RX ORDER — GABAPENTIN 100 MG/1
100 CAPSULE ORAL EVERY 8 HOURS SCHEDULED
Status: DISCONTINUED | OUTPATIENT
Start: 2021-11-04 | End: 2021-11-12 | Stop reason: HOSPADM

## 2021-11-04 RX ORDER — DEXTROSE MONOHYDRATE 25 G/50ML
25 INJECTION, SOLUTION INTRAVENOUS
Status: DISCONTINUED | OUTPATIENT
Start: 2021-11-04 | End: 2021-11-08

## 2021-11-04 RX ORDER — ALBUMIN (HUMAN) 12.5 G/50ML
12.5 SOLUTION INTRAVENOUS AS NEEDED
Status: ACTIVE | OUTPATIENT
Start: 2021-11-04 | End: 2021-11-04

## 2021-11-04 RX ORDER — GUAIFENESIN AND CODEINE PHOSPHATE 100; 10 MG/5ML; MG/5ML
10 SOLUTION ORAL 3 TIMES DAILY
Status: DISCONTINUED | OUTPATIENT
Start: 2021-11-04 | End: 2021-11-08

## 2021-11-04 RX ORDER — CLONAZEPAM 0.5 MG/1
0.5 TABLET ORAL 2 TIMES DAILY PRN
Status: DISCONTINUED | OUTPATIENT
Start: 2021-11-04 | End: 2021-11-12 | Stop reason: HOSPADM

## 2021-11-04 RX ORDER — IPRATROPIUM BROMIDE AND ALBUTEROL SULFATE 2.5; .5 MG/3ML; MG/3ML
3 SOLUTION RESPIRATORY (INHALATION) 4 TIMES DAILY PRN
Status: DISCONTINUED | OUTPATIENT
Start: 2021-11-04 | End: 2021-11-05

## 2021-11-04 RX ORDER — CHOLECALCIFEROL (VITAMIN D3) 125 MCG
5 CAPSULE ORAL NIGHTLY
Status: DISCONTINUED | OUTPATIENT
Start: 2021-11-04 | End: 2021-11-12 | Stop reason: HOSPADM

## 2021-11-04 RX ORDER — QUETIAPINE FUMARATE 25 MG/1
25 TABLET, FILM COATED ORAL NIGHTLY
Status: DISCONTINUED | OUTPATIENT
Start: 2021-11-04 | End: 2021-11-09

## 2021-11-04 RX ORDER — QUETIAPINE FUMARATE 25 MG/1
25 TABLET, FILM COATED ORAL NIGHTLY
Status: DISCONTINUED | OUTPATIENT
Start: 2021-11-04 | End: 2021-11-04

## 2021-11-04 RX ORDER — NICOTINE POLACRILEX 4 MG
15 LOZENGE BUCCAL
Status: DISCONTINUED | OUTPATIENT
Start: 2021-11-04 | End: 2021-11-08

## 2021-11-04 RX ORDER — ALBUMIN (HUMAN) 12.5 G/50ML
SOLUTION INTRAVENOUS
Status: COMPLETED
Start: 2021-11-04 | End: 2021-11-04

## 2021-11-04 RX ORDER — MIDODRINE HYDROCHLORIDE 10 MG/1
10 TABLET ORAL EVERY 8 HOURS SCHEDULED
Status: DISCONTINUED | OUTPATIENT
Start: 2021-11-04 | End: 2021-11-12

## 2021-11-04 RX ORDER — MIDODRINE HYDROCHLORIDE 10 MG/1
10 TABLET ORAL EVERY 8 HOURS SCHEDULED
Status: DISCONTINUED | OUTPATIENT
Start: 2021-11-04 | End: 2021-11-04

## 2021-11-04 RX ADMIN — PHENYLEPHRINE HYDROCHLORIDE 1 MCG/KG/MIN: 10 INJECTION, SOLUTION INTRAVENOUS at 07:27

## 2021-11-04 RX ADMIN — GABAPENTIN 100 MG: 100 CAPSULE ORAL at 13:44

## 2021-11-04 RX ADMIN — GUAIFENESIN AND CODEINE PHOSPHATE 5 ML: 100; 10 SOLUTION ORAL at 16:44

## 2021-11-04 RX ADMIN — Medication 2000 UNITS: at 08:03

## 2021-11-04 RX ADMIN — INSULIN HUMAN 2 UNITS: 100 INJECTION, SOLUTION PARENTERAL at 00:41

## 2021-11-04 RX ADMIN — ATOVAQUONE 1500 MG: 750 SUSPENSION ORAL at 12:51

## 2021-11-04 RX ADMIN — Medication 1 PACKET: at 16:44

## 2021-11-04 RX ADMIN — MINERAL SUPPLEMENT IRON 300 MG / 5 ML STRENGTH LIQUID 100 PER BOX UNFLAVORED 300 MG: at 08:03

## 2021-11-04 RX ADMIN — PHENYLEPHRINE HYDROCHLORIDE 1.5 MCG/KG/MIN: 10 INJECTION, SOLUTION INTRAVENOUS at 14:32

## 2021-11-04 RX ADMIN — HEPARIN SODIUM 5000 UNITS: 5000 INJECTION, SOLUTION INTRAVENOUS; SUBCUTANEOUS at 20:08

## 2021-11-04 RX ADMIN — MEROPENEM 500 MG: 500 INJECTION, POWDER, FOR SOLUTION INTRAVENOUS at 17:24

## 2021-11-04 RX ADMIN — Medication 5 MG: at 20:08

## 2021-11-04 RX ADMIN — ALBUMIN HUMAN 12.5 G: 0.25 SOLUTION INTRAVENOUS at 09:09

## 2021-11-04 RX ADMIN — GUAIFENESIN AND CODEINE PHOSPHATE 10 ML: 10; 100 LIQUID ORAL at 08:03

## 2021-11-04 RX ADMIN — IPRATROPIUM BROMIDE AND ALBUTEROL SULFATE 3 ML: 2.5; .5 SOLUTION RESPIRATORY (INHALATION) at 07:14

## 2021-11-04 RX ADMIN — INSULIN DETEMIR 8 UNITS: 100 INJECTION, SOLUTION SUBCUTANEOUS at 20:08

## 2021-11-04 RX ADMIN — HEPARIN SODIUM 5000 UNITS: 5000 INJECTION, SOLUTION INTRAVENOUS; SUBCUTANEOUS at 08:03

## 2021-11-04 RX ADMIN — Medication 1 PACKET: at 20:26

## 2021-11-04 RX ADMIN — CHLORHEXIDINE GLUCONATE 15 ML: 1.2 SOLUTION ORAL at 08:03

## 2021-11-04 RX ADMIN — INSULIN DETEMIR 5 UNITS: 100 INJECTION, SOLUTION SUBCUTANEOUS at 08:02

## 2021-11-04 RX ADMIN — GABAPENTIN 100 MG: 100 CAPSULE ORAL at 22:25

## 2021-11-04 RX ADMIN — HEPARIN SODIUM 1600 UNITS: 1000 INJECTION INTRAVENOUS; SUBCUTANEOUS at 08:18

## 2021-11-04 RX ADMIN — ALBUMIN HUMAN 25 G: 0.25 SOLUTION INTRAVENOUS at 08:17

## 2021-11-04 RX ADMIN — Medication 60 MG: at 20:08

## 2021-11-04 RX ADMIN — Medication 1 CAPSULE: at 08:03

## 2021-11-04 RX ADMIN — Medication 100 MG: at 17:20

## 2021-11-04 RX ADMIN — GUAIFENESIN AND CODEINE PHOSPHATE 10 ML: 100; 10 SOLUTION ORAL at 23:25

## 2021-11-04 RX ADMIN — EPOETIN ALFA-EPBX 10000 UNITS: 10000 INJECTION, SOLUTION INTRAVENOUS; SUBCUTANEOUS at 08:04

## 2021-11-04 RX ADMIN — INSULIN HUMAN 3 UNITS: 100 INJECTION, SOLUTION PARENTERAL at 05:45

## 2021-11-04 RX ADMIN — PREDNISONE 40 MG: 20 TABLET ORAL at 13:44

## 2021-11-04 RX ADMIN — SERTRALINE HYDROCHLORIDE 50 MG: 50 TABLET ORAL at 08:03

## 2021-11-04 RX ADMIN — CHLORHEXIDINE GLUCONATE 15 ML: 1.2 SOLUTION ORAL at 20:06

## 2021-11-04 RX ADMIN — INSULIN HUMAN 4 UNITS: 100 INJECTION, SOLUTION PARENTERAL at 17:37

## 2021-11-04 RX ADMIN — PHENYLEPHRINE HYDROCHLORIDE 1 MCG/KG/MIN: 10 INJECTION, SOLUTION INTRAVENOUS at 01:16

## 2021-11-04 RX ADMIN — MIDODRINE HYDROCHLORIDE 10 MG: 10 TABLET ORAL at 13:44

## 2021-11-04 RX ADMIN — MIDODRINE HYDROCHLORIDE 10 MG: 10 TABLET ORAL at 22:25

## 2021-11-04 RX ADMIN — Medication 1 PACKET: at 08:03

## 2021-11-04 RX ADMIN — Medication 1 TABLET: at 08:03

## 2021-11-04 RX ADMIN — Medication 60 MG: at 08:05

## 2021-11-04 RX ADMIN — INSULIN HUMAN 2 UNITS: 100 INJECTION, SOLUTION PARENTERAL at 23:25

## 2021-11-04 RX ADMIN — FAMOTIDINE 20 MG: 20 TABLET, FILM COATED ORAL at 08:03

## 2021-11-04 NOTE — SIGNIFICANT NOTE
11/04/21 1347   SLP Deferred Reason   SLP Deferred Reason Routine  (Patient not appropriate for FEES or PMV trials this date d/t copius secretions and lethargy. Will reattempt as appropriate.)

## 2021-11-04 NOTE — PLAN OF CARE
Goal Outcome Evaluation:  Plan of Care Reviewed With: patient, spouse        Progress: improving   Alert. Had H/D today leaving extremely tired and sleeping most of the day. Sputum sent to lab this morning. For blood cultures in AM. Started of ABX again. Was on isabela until this evening. Tolerating tube feedings well. Continue  to monitor for changes

## 2021-11-04 NOTE — PROGRESS NOTES
"   LOS: 31 days    Patient Care Team:  Andree Langston MD as PCP - General (Internal Medicine)    Reason For Visit:  F/U PIOTR  Subjective     Seen on HD tolerating well. No sign of renal recovery       Review of Systems:    Pulm: No soa   CV:  No CP. GI: NO N/V/D.      Objective     atovaquone, 1,500 mg, Per PEG Tube, Daily With Lunch  b complex-vitamin c-folic acid, 1 tablet, Per PEG Tube, Daily  chlorhexidine, 15 mL, Mouth/Throat, Q12H  cholecalciferol, 2,000 Units, Per PEG Tube, Daily  custom medication builder, 100 mg, Nasogastric, Daily  dextromethorphan polistirex ER, 60 mg, Per PEG Tube, Q12H  epoetin tatum/tatum-epbx, 10,000 Units, Subcutaneous, Once per day on Tue Thu Sat  famotidine, 20 mg, Per G Tube, Daily  ferrous sulfate, 300 mg, Per PEG Tube, Daily  gabapentin, 100 mg, Per PEG Tube, Q8H  guaiFENesin-codeine, 10 mL, Per G Tube, TID  heparin (porcine), 5,000 Units, Subcutaneous, Q12H  insulin detemir, 8 Units, Subcutaneous, Q12H  insulin regular, 0-9 Units, Subcutaneous, Q6H  lactobacillus acidophilus, 1 capsule, Per PEG Tube, Daily  melatonin, 5 mg, Per G Tube, Nightly  midodrine, 10 mg, Per PEG Tube, Q8H  Nutrisource fiber, 1 packet, Per G Tube, TID  predniSONE, 40 mg, Per G Tube, Daily With Breakfast  QUEtiapine, 25 mg, Per PEG Tube, Nightly  sertraline, 50 mg, Per PEG Tube, Daily      phenylephrine, 0.5-3 mcg/kg/min, Last Rate: 1.3 mcg/kg/min (11/04/21 0952)          Vital Signs:  Blood pressure 114/63, pulse 73, temperature 99 °F (37.2 °C), temperature source Axillary, resp. rate 18, height 172.7 cm (67.99\"), weight 81 kg (178 lb 9.2 oz), SpO2 94 %.    Flowsheet Rows      First Filed Value   Admission Height 172.7 cm (68\") Documented at 10/04/2021 2132   Admission Weight 102 kg (224 lb 13.9 oz) Documented at 10/04/2021 2100          11/03 0701 - 11/04 0700  In: 1476 [I.V.:155]  Out: -     Physical Exam:    General Appearance: NAD, alert and cooperative, Ox3. + TRACH. + THD CATH.  Eyes: PER, " conjunctivae and sclerae normal, no icterus  Lungs: respirations regular and unlabored, no crepitus. FEW RHONCHI  Heart/CV: regular rhythm & normal rate, no murmur, no gallop, no rub and TR edema  Abdomen: not distended, soft, non-tender, no masses,  bowel sounds present. PEG  Skin: No rash, Warm and dry    Radiology:            Labs:  Results from last 7 days   Lab Units 11/04/21 0351 11/03/21 0552 11/02/21 0356   WBC 10*3/mm3 18.58* 10.02 8.93   HEMOGLOBIN g/dL 8.3* 7.8* 7.2*   HEMATOCRIT % 25.4* 22.7* 20.8*   PLATELETS 10*3/mm3 259 191 198     Results from last 7 days   Lab Units 11/04/21 0351 11/03/21 0552 11/02/21 0356 11/01/21  0331   SODIUM mmol/L 132* 131* 132* 132*   POTASSIUM mmol/L 5.0 3.9 4.2 4.3   CHLORIDE mmol/L 89* 92* 89* 92*   CO2 mmol/L 25.0 25.0 21.0* 23.0   BUN mg/dL 93* 67* 145* 106*   CREATININE mg/dL 6.66* 5.01* 7.85* 6.35*   CALCIUM mg/dL 8.8 8.8 8.8 8.6   PHOSPHORUS mg/dL 5.0* 3.5 4.7*  4.7* 4.5   MAGNESIUM mg/dL 2.9* 2.5* 2.9*  2.9*  --    ALBUMIN g/dL 4.20 4.20 4.00 4.10     Results from last 7 days   Lab Units 11/04/21  0351   GLUCOSE mg/dL 164*       Results from last 7 days   Lab Units 11/04/21  0351   ALK PHOS U/L 88   BILIRUBIN mg/dL 0.7   ALT (SGPT) U/L 26   AST (SGOT) U/L 23     Results from last 7 days   Lab Units 10/31/21  0921   PH, ARTERIAL pH units 7.468*   PO2 ART mm Hg 61.5*   PCO2, ARTERIAL mm Hg 37.3   HCO3 ART mmol/L 27.0*             Estimated Creatinine Clearance: 12.2 mL/min (A) (by C-G formula based on SCr of 6.66 mg/dL (H)).      Assessment       Acute respiratory failure with hypoxia    Anti-GBM nephritis with pulmonary hemorrhage (HCC)    Acute renal failure (ARF) (HCC)    Acute blood loss anemia    Sepsis due to pneumonia (HCC);Klebsiella aerogenes    Idiopathic hypotension    Hyperglycemia    History of tobacco abuse            Impression: ANURIC PIOTR. GOODPASTURES.  ANEMIA. HYPONATREMIA.            Recommendations: HD per TTS stella.  On cytoxan and  prednisone.   No sign of renal recovery. Will d/c cytoxa after total 8-12 weeks of therapy     Torsten Drake MD  11/04/21  13:04 EDT

## 2021-11-04 NOTE — PLAN OF CARE
Goal Outcome Evaluation:      Pt very drowsy but follows commands. Night time dose of Seroquel, 50mg, caused hypotension (SBP in 70s) and drowsiness. APRN notified, Dave gtt started, and Seroquel dose cut in half for future doses. Weaning gtt down per pt tolerance. Sats >90 on trach collar. NSR, SBP 70s-130s. Tf at goal, pt tolerating. Hypoactive BS. Aneuric. VSS, nothing further to report at this time, will continue to monitor.

## 2021-11-04 NOTE — PROGRESS NOTES
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   PSxH: He  has a past surgical history that includes Cholecystectomy and tracheostomy and peg tube insertion (N/A, 10/15/2021).         Reported/Observed/Food/Nutrition Related History:     11/4) Pt continues to tolerate EN. SLP following. Receiving HD this AM.   Per I&O- last bowel movement documented (10/31-11/1).     11/3) EN adjusted yesterday (11/2), pt tolerating, with 98% of EN goal volume delivered over the past 24 hrs. SLP bedside eval (11/2) rec NPO, alternate methods for nutrition/hydration and meds via alternate route.     11/2) Pt continues to tolerate EN. Will adjust EN order today. Receiving HD this AM. SLP following. Waiting for pt to be able to safely swallow cytoxan pill for pt to be able to be transferred to LTACH. Pt with orders to the floor.   Per I&O- last bowel movement documented (10/31-11/1)    11/1) Pt continue to tolerate EN. Will adjust EN order today. Pt has been on trach collar >72 hrs. Planning to consult SLP.   Per I&O over the past 24 hrs:  1 bowel movement    10/28) No noted s/s of EN intolerance. EN  "rate per current order this morning. HD today.     10/26) HD today.       Anthropometrics     Height: 172.7 cm (67.99\")  Last filed wt: Weight: 81 kg (178 lb 9.2 oz) (11/03/21 0400)  Weight Method: Bed scale    BMI: BMI (Calculated): 27.2  Obese Class I: 30-34.9kg/m2    Ideal Body Weight (IBW) (kg): 70.87  145% IBW  ABW: 172lb/78kg    Date Weight (kg) Weight (lbs) Weight Method   10/12/2021 102 kg 224 lb 13.9 oz -   10/4/2021 102 kg 224 lb 13.9 oz Bed scale       Labs reviewed     Results from last 7 days   Lab Units 11/04/21  0351 11/03/21  0552 11/02/21  0356   GLUCOSE mg/dL 164* 115* 120*   BUN mg/dL 93* 67* 145*   CREATININE mg/dL 6.66* 5.01* 7.85*   SODIUM mmol/L 132* 131* 132*   CHLORIDE mmol/L 89* 92* 89*   POTASSIUM mmol/L 5.0 3.9 4.2   PHOSPHORUS mg/dL 5.0* 3.5 4.7*  4.7*   MAGNESIUM mg/dL 2.9* 2.5* 2.9*  2.9*   ALT (SGPT) U/L 26 27 25     Results from last 7 days   Lab Units 11/04/21  0351 11/03/21  0552 11/02/21  0356   ALBUMIN g/dL 4.20 4.20 4.00   PREALBUMIN mg/dL  --   --  28.2   CRP mg/dL  --   --  2.55*   CHOLESTEROL mg/dL  --   --  147   TRIGLYCERIDES mg/dL  --   --  315*       Results from last 7 days   Lab Units 11/04/21  0535 11/03/21  2340 11/03/21  1751 11/03/21  1737 11/03/21  1240 11/03/21  0540   GLUCOSE mg/dL 200* 152* 210* 221* 151* 117     No results found for: HGBA1C    Medications reviewed   Pertinent: cytoxan, nephrovite, vitamin D3, pepcid, ferrous sulfate, neurontin, insulin, risaquad, melatonin, steroid, seroquel  GTT: phenylephrine at 1.5 mcg/kg/min  PRN: klonopin, oxycodone    Needs Assessment 11/2   Height used: 68 in/173 cm  Weight used: 224 lb/102 kg  IBW: 154 lb/70 kg    Estimated calorie needs: ~1900 kcal/day  20-30 kcal/kg IBW= 5443-0217 kcal    Estimated protein needs: ~90 g pro/day  0.8-1.0-1.2 g/kg actual weight= -122 g pro         Current Nutrition Prescription     PO: NPO Diet    EN: NovaSource Renal at 50 ml/hr (goal volume= 1000 ml/day) with 1 packet FiberCel " 3x/day and free water at 10 ml/hr  Route: PEG  Provides at goal volume: 2027 kcal, 90 g pro, 15 g fiber, 24 meq K, 819 mg phos, 717 ml water from EN, 917 ml water total      EN delivery:  1 Day:  1047 ml, 105% +3 FiberCel  2121 kcal, 112%  95 g pro, 106%  15 g fiber  25 meq K  857 mg phos  751 ml water from EN  1025 ml water total      Nutrition Diagnosis     10/26  Problem Less than optimal enteral nutrition composition or modality   Etiology Clinical status, needs assessment    Signs/Symptoms EN providing >100% est energy and protein needs, delivery >100% daily goal volume    Status: resolved    10-5-21, 10-22  Problem Inadequate energy intake   Etiology Per Clinical Status   Signs/Symptoms 84% goal volume EN       Nutrition Intervention      Follow treatment progress, Care plan reviewed   -Continue with current EN order      Goal:   General: Nutrition support treatment  PO: n/a  EN/PN: Maintain EN       Monitoring/Evaluation:   Per protocol, I&O, Pertinent labs, EN delivery/tolerance, Weight, Symptoms      Jena Pang, MS RD/LD CNSC  Time Spent: 45 minutes

## 2021-11-04 NOTE — SIGNIFICANT NOTE
Patient was started on Seroquel yesterday for agitation. Tonight, while asleep, his BP has dropped down to 70's/30's. For now we will start neosynepherine drip for iatrogenic hypotension. Goal will be to turn off once Seroquel dose is out of his system. I also decreased his Seroquel dose from 50mg to 25 mg.

## 2021-11-04 NOTE — PROGRESS NOTES
Infectious Disease Progress Note  Keshav Dickens  1955  [id number]    Date of Consult: 10/13/2021  Admission Date: 10/4/2021    Requesting Provider: Dr Forrester  Evaluating Physician: Rodríguez Ruff MD    Chief Complaint: hemoptysis    Reason for Consultation: GNR VAP    History of present illness:   Patient is a 66 y.o.  Yr old male with history of DM2, tobacco abuse.  Initially admitted to [facility] on 9/27/2021 with complaints of hemoptysis for 2 to 3 weeks.  Intubated on 10/1.  CT scan with diffuse groundglass opacities and renal failure.  Due to concern for pulmonary renal syndrome he was treated with pulse dose steroids.  Anti-GBM antibody positive concerning for Goodpasture's syndrome.  Renal biopsy also confirmed the diagnosis.  Transferred to [facility] on 10/4.  Nephrology has been following and he is getting plasmapheresis; also high-dose steroids and Cytoxan.    On 10/10 he developed fever up to 101.  Also had leukocytosis up to 15,000.  He had been on atovaquone for PCP prophylaxis.  Started on vancomycin and Zosyn on 10/12.  Respiratory culture from 10/12 with heavy growth of gram-negative bacilli.  And BAL on 10/13 office gram-negative bacilli.  Recurrent hemoptysis per RN.  Intubated, sedated.  60% FiO2.  Trach and PEG planned. Fever curve improved.     10/14/21: Fevers improved. Less blood from ET suction. 50% FiO2 from vent. Sputum culture with Klebsiella aerogenes.     10/15/2021: Underwent tracheostomy and PEG tube placement earlier today.  Transfer back to ICU, relatively stable.  60% FiO2, sedated.  Less blood from endotracheal secretions per staff.     10/16/21: Worse overnight with hypertension and some low-grade temperatures despite CRRT.  Had an episode of emesis and likely aspiration per RN.  Ventilatory requirement up slightly. Still sedated    10/17/21: Improved overnight.  Fever curve improved.  O2 settings down.  Weaning sedation.  No  further episodes of emesis.  Anuric. No diarrhea.     10/18/21: Patient follow-up improved over the past 48 hours.  Afebrile.  No acute new issues overnight.  More comfortable on ventilator.  More sedated today. fever subglottic secretions    10/19/21: no acute new concerns. Minimally responsive. On trach collar O2. No further hemoptysis or sputum production.    10/20/2021: Slow improvement.  Currently trach collar oxygen.  Still very weak, deconditioned.  Weaning sedation.  Still has blood-tinged respiratory secretions.  No other new concerns noted per ICU team    10/21/21: Low-grade temps but no true fevers. Stable, low oxygenation settings.  Ongoing blood-tinged tracheal secretions, slightly worse today. Transitioning to intermittent hemodialysis. Hemoglobin dropped to 5.6 and required transfusion.  No external signs of bleeding other than out of tracheostomy  per RN. Possible transfer to LTAC on Monday    10/22/21: On ventilator overnight, 30% FiO2 and trach collar trials during the day.  Still significant blood-tinged, frothy sputum per RN.  Afebrile.  Tolerating tube feeds.  Had bowel movement.  More alert today and starting to follow some simple commands    11/1/21: Still in the ICU but more alert and interactive on trach collar oxygen.  Blood pressure stable.  Afebrile.  Completed IV antibiotics as planned on 10/25.  Still on atovaquone.  Difficult placement due to need for Cytoxan per case management.  Significant pulmonary secretions per nursing staff.    11/4/21: WBC spiked today.  Increased pulmonary secretions new culture sent with gram-negative rods.  No change in color of secretions per RN.  Has had some episodes of hypotension.  Tracheostomy came dislodged yesterday and required bronchoscopy for placement.     Review of Systems  Follow some basic commands.  And mouths some words but otherwise history is very limited.      No Known Allergies    Antibiotics:  Anti-Infectives (From admission, onward)     Ordered     Dose/Rate Route Frequency Start Stop    10/28/21 1214  atovaquone (MEPRON) 750 MG/5ML suspension        Ordering Provider: Deb Ovalle APRN    1,500 mg Per G Tube Daily With Lunch 10/28/21 0000 11/27/21 2359    10/21/21 1002  meropenem (MERREM) 1 g/100 mL 0.9% NS (mbp)        Ordering Provider: Rodríguez Ruff MD    1 g  over 3 Hours Intravenous Every 24 Hours 10/21/21 1200 10/25/21 1520    10/20/21 0805  atovaquone (MEPRON) suspension 1,500 mg        Ordering Provider: Genet Bui MD    1,500 mg Per PEG Tube Daily With Lunch 10/20/21 1200 11/26/21 2359    10/18/21 1220  vancomycin (VANCOCIN) in iso-osmotic dextrose IVPB 1 g (premix) 200 mL        Ordering Provider: Rafal Salcido, PharmD    1,000 mg  over 60 Minutes Intravenous Once 10/18/21 1315 10/18/21 1536    10/17/21 0825  vancomycin (VANCOCIN) in iso-osmotic dextrose IVPB 1 g (premix) 200 mL        Ordering Provider: Autumn Lozano, PharmD    10 mg/kg × 102 kg  over 60 Minutes Intravenous Once 10/17/21 1100 10/17/21 1138    10/16/21 1121  vancomycin 2000 mg/500 mL 0.9% NS IVPB ([facility])        Ordering Provider: Autumn Lozano, PharmD    20 mg/kg × 102 kg  over 120 Minutes Intravenous Once 10/16/21 1300 10/16/21 1657    10/13/21 1755  meropenem (MERREM) 1 g/100 mL 0.9% NS (mbp)        Ordering Provider: Rodríguez Ruff MD    1 g  over 30 Minutes Intravenous Once 10/13/21 1845 10/13/21 1858    10/12/21 0954  vancomycin 2000 mg/500 mL 0.9% NS IVPB ([facility])        Ordering Provider: Sravan Forrester MD    20 mg/kg × 102 kg Intravenous Once 10/12/21 1045 10/12/21 1130    10/12/21 0954  piperacillin-tazobactam (ZOSYN) 4.5 g in iso-osmotic dextrose 100 mL IVPB (premix)        Ordering Provider: Sravan Forrester MD    4.5 g  over 30 Minutes Intravenous Once 10/12/21 1045 10/12/21 1125          Other Medications:  Current Facility-Administered Medications   Medication Dose Route Frequency  Provider Last Rate Last Admin   • acetaminophen (TYLENOL) tablet 650 mg  650 mg Per PEG Tube Q6H PRN Genet Bui MD   650 mg at 10/24/21 0004   • albumin human 25 % IV SOLN 12.5 g  12.5 g Intravenous PRN Dagoberto Dawkins MD   12.5 g at 11/04/21 0909   • atovaquone (MEPRON) suspension 1,500 mg  1,500 mg Per PEG Tube Daily With Lunch Genet Bui MD   1,500 mg at 11/04/21 1251   • b complex-vitamin c-folic acid (NEPHRO-MELE) tablet 1 tablet  1 tablet Per PEG Tube Daily Genet Bui MD   1 tablet at 11/04/21 0803   • sennosides-docusate (PERICOLACE) 8.6-50 MG per tablet 2 tablet  2 tablet Per PEG Tube BID PRN Genet Bui MD        And   • polyethylene glycol (MIRALAX) packet 17 g  17 g Per PEG Tube Daily PRN Genet Bui MD        And   • bisacodyl (DULCOLAX) suppository 10 mg  10 mg Rectal Daily PRN Genet Bui MD       • chlorhexidine (PERIDEX) 0.12 % solution 15 mL  15 mL Mouth/Throat Q12H Abdon Roberts MD   15 mL at 11/04/21 0803   • cholecalciferol (VITAMIN D3) tablet 2,000 Units  2,000 Units Per PEG Tube Daily Sravan Forrester MD   2,000 Units at 11/04/21 0803   • clonazePAM (KlonoPIN) tablet 0.5 mg  0.5 mg Per PEG Tube BID PRN Sravan Forrester MD       • cyclophosphamide (CYTOXAN) 100 mg compound  100 mg Nasogastric Daily Torsten Drake MD   100 mg at 11/03/21 1719   • dextromethorphan polistirex ER (DELSYM) 30 MG/5ML oral suspension 60 mg  60 mg Per PEG Tube Q12H Sravan Forrester MD   60 mg at 11/04/21 0805   • dextrose (D50W) (25 g/50 mL) IV injection 25 g  25 g Intravenous Q15 Min PRN Sravan Forrester MD       • dextrose (D50W) 25 g/ 50mL Intravenous Solution 25 g  25 g Intravenous Q15 Min PRN Abdon Roberts MD   25 g at 10/15/21 0518   • dextrose (GLUTOSE) oral gel 15 g  15 g Oral Q15 Min PRN Sravan Forrester MD       • epoetin tatum-epbx (RETACRIT) injection 10,000 Units   10,000 Units Subcutaneous Once per day on Tue Thu Sat Torsten Drake MD   10,000 Units at 11/04/21 0804   • famotidine (PEPCID) tablet 20 mg  20 mg Per G Tube Daily Genet Bui MD   20 mg at 11/04/21 0803   • ferrous sulfate 300 (60 Fe) MG/5ML syrup 300 mg  300 mg Per PEG Tube Daily Genet Bui MD   300 mg at 11/04/21 0803   • gabapentin (NEURONTIN) capsule 100 mg  100 mg Per PEG Tube Q8H Sravan Forrester MD   100 mg at 11/04/21 1344   • glucagon (human recombinant) (GLUCAGEN DIAGNOSTIC) injection 1 mg  1 mg Subcutaneous Q15 Min PRN Abdon Roberts MD       • glucagon (human recombinant) (GLUCAGEN DIAGNOSTIC) injection 1 mg  1 mg Subcutaneous Q15 Min PRN Sravan Forrester MD       • guaiFENesin-codeine (GUAIFENESIN AC) 100-10 MG/5ML liquid 10 mL  10 mL Per G Tube TID Sravan Forrester MD   5 mL at 11/04/21 1644   • heparin (porcine) 5000 UNIT/ML injection 5,000 Units  5,000 Units Subcutaneous Q12H Nacho Ba DO   5,000 Units at 11/04/21 0803   • heparin (porcine) injection 1,600 Units  1,600 Units Intracatheter PRN Torsten Drake MD   1,600 Units at 11/04/21 0818   • insulin detemir (LEVEMIR) injection 8 Units  8 Units Subcutaneous Q12H Sravan Forrester MD       • insulin regular (humuLIN R,novoLIN R) injection 0-9 Units  0-9 Units Subcutaneous Q6H Sravan Forrester MD       • ipratropium-albuterol (DUO-NEB) nebulizer solution 3 mL  3 mL Nebulization Q6H PRN Nacho Ba DO       • ipratropium-albuterol (DUO-NEB) nebulizer solution 3 mL  3 mL Nebulization 4x Daily PRN Sravan Forrester MD       • labetalol (NORMODYNE,TRANDATE) injection 20 mg  20 mg Intravenous Q2H PRN Abdon Roberts MD   20 mg at 10/17/21 1721   • lactobacillus acidophilus (RISAQUAD) capsule 1 capsule  1 capsule Per PEG Tube Daily Rodríguez Ruff MD   1 capsule at 11/04/21 0803   • loperamide (IMODIUM) capsule 2 mg   "2 mg Per G Tube 4x Daily PRN Genet Bui MD   2 mg at 10/24/21 0154   • melatonin tablet 5 mg  5 mg Per G Tube Nightly Sravan Forrester MD       • midodrine (PROAMATINE) tablet 10 mg  10 mg Per PEG Tube Q8H Sravan Forrester MD   10 mg at 11/04/21 1344   • Nutrisource fiber pack 1 packet  1 packet Per G Tube TID Genet Bui MD   1 packet at 11/04/21 1644   • ondansetron (ZOFRAN) injection 4 mg  4 mg Intravenous Q6H PRN Sravan Forrester MD   4 mg at 10/23/21 0201   • oxyCODONE (ROXICODONE) 5 MG/5ML solution 5 mg  5 mg Per G Tube Q6H PRN Genet Bui MD   5 mg at 11/03/21 0110   • phenylephrine (GIOVANI-SYNEPHRINE) 50 mg in sodium chloride 0.9 % 250 mL infusion  0.5-3 mcg/kg/min Intravenous Titrated Darlene Solomon APRN 7.29 mL/hr at 11/04/21 1658 0.3 mcg/kg/min at 11/04/21 1658   • predniSONE (DELTASONE) tablet 40 mg  40 mg Per G Tube Daily With Breakfast Dagoberto Dawkins MD   40 mg at 11/04/21 1344   • QUEtiapine (SEROquel) tablet 25 mg  25 mg Per PEG Tube Nightly Sravan Forrester MD       • sertraline (ZOLOFT) tablet 50 mg  50 mg Per PEG Tube Daily Sravan Forrester MD   50 mg at 11/04/21 0803       Physical Exam:   Vital Signs   /51   Pulse 73   Temp 99 °F (37.2 °C) (Axillary)   Resp 18   Ht 172.7 cm (67.99\")   Wt 81 kg (178 lb 9.2 oz)   SpO2 97%   BMI 27.16 kg/m²     GENERAL: Chronically ill-appearing.  Very weak, but overall improved  HEENT: Normocephalic, atraumatic.    Neck:   Tracheostomy in place.  yellow mucoid secretions noted.  HEART: RRR; No murmur.  LUNGS: Coarse at right base.  Trach collar oxygen.   ABDOMEN: Soft, nondistended. No rebound or guarding. PEG tube in place  EXT:  No edema.  : Without Cedeno catheter.  MSK: no major joint swelling noted.    SKIN: no rash  NEURO: Follows basic commands, but very weak  Dialysis catheter in right chest    Laboratory Data    Results from last 7 days   Lab " Units 11/04/21 0351 11/03/21  0552 11/02/21  0356   WBC 10*3/mm3 18.58* 10.02 8.93   HEMOGLOBIN g/dL 8.3* 7.8* 7.2*   HEMATOCRIT % 25.4* 22.7* 20.8*   PLATELETS 10*3/mm3 259 191 198     Results from last 7 days   Lab Units 11/04/21  0351   SODIUM mmol/L 132*   POTASSIUM mmol/L 5.0   CHLORIDE mmol/L 89*   CO2 mmol/L 25.0   BUN mg/dL 93*   CREATININE mg/dL 6.66*   GLUCOSE mg/dL 164*   CALCIUM mg/dL 8.8     Estimated Creatinine Clearance: 12.2 mL/min (A) (by C-G formula based on SCr of 6.66 mg/dL (H)).  Results from last 7 days   Lab Units 11/04/21 0351   ALK PHOS U/L 88   BILIRUBIN mg/dL 0.7   ALT (SGPT) U/L 26   AST (SGOT) U/L 23         Results from last 7 days   Lab Units 11/02/21  0356   CRP mg/dL 2.55*       Microbiology:  Covid screening negative on admission  10/12 respiratory culture with heavy growth gram-negative bacilli  MRSA nares screening negative  10/12 Catheter tip culture with skin mal  10/13 BAL culture with Klebsiella aerogenes    10/16 blood cultures negative   10/16 respiratory culture negative      11/4 respiratory culture with gram-negative rods on gram stain    Radiology:  XR Chest 1 View    Result Date: 11/3/2021  Mildly improved perihilar disease resembling mild interstitial edema. No new chest pathology is seen.  D:  11/03/2021 E:  11/03/2021     This report was finalized on 11/3/2021 10:14 PM by Dr. Yoni Barnes MD.      XR Chest 1 View    Result Date: 11/3/2021  1. Tracheostomy tube remains in midline position. No evidence of pneumothorax or pneumomediastinum. 2. Mildly improved perihilar interstitial disease.   D: 11/03/2021 E: 11/03/2021  This report was finalized on 11/3/2021 10:05 PM by Dr. Yoni Barnes MD.       I personally reviewed the above CXR: Faint new right lower lobe opacity      Impression:   1. New leukocytosis, possible sepsis: Concerning for development of another VAP or other hospital associated infection  2. Increased thick respiratory secretions  3. Recent Klebsiella  aerogenes ventilator associated pneumonia: From respiratory culture x2.   Repeat cultures negative.  O2 settings improved.  Completed antibiotics on 10/25 is planned.  4. Possible aspiration overnight 10/15:   5. Recurrent hemoptysis: Improved  6. Acute blood loss anemia: Due to above.   7. Severe bilateral groundglass opacities  8. Acute hypoxic respiratory failure: s/p trach on 10/15. improved  9. 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 On Cytoxan and steroids   10. Renal failure, anuric. On dialysis   11. Immunocompromise host: Cytoxan and high-dose steroids  12. DM2  13. Tobacco abuse    PLAN:   - Repeat respiratory culture sent today, Gram pain with gram-negative rods  - repeat blood cultures in AM  - Follow CBC, CMP    - Restart meropenem, renally dosed      - Continue atovaquone for PCP prophylaxis, until on less than 15 mg of prednisone daily.  - Slowly weaning prednisone per nephrology    Complex MDM. Immunocompromised host.  Discussed with RN. Placement complicated by need for Cytoxan per case management.  I will follow.    Rodríguez Ruff MD  11/4/2021

## 2021-11-04 NOTE — PROGRESS NOTES
INTENSIVIST / PULMONARY FOLLOW UP NOTE     Hospital:  LOS: 31 days   Mr. Keshav Dickens, 66 y.o. male is followed for:     Acute respiratory failure with hypoxia    Anti-GBM nephritis with pulmonary hemorrhage (HCC)    Acute renal failure (ARF) (HCC)    Acute blood loss anemia    Sepsis due to pneumonia (HCC);Klebsiella aerogenes    Idiopathic hypotension    Hyperglycemia    History of tobacco abuse          SUBJECTIVE   Had some hypotension overnight    The patient's relevant past medical, surgical, family, and social history were reviewed    Allergies and medications were reviewed    ROS:  Per subjective, all other systems were reviewed and were negative        OBJECTIVE     Vital Sign Min/Max for last 24 hours:  Temp  Min: 98.1 °F (36.7 °C)  Max: 98.5 °F (36.9 °C)   BP  Min: 70/37  Max: 134/65   Pulse  Min: 63  Max: 86   Resp  Min: 12  Max: 22   SpO2  Min: 92 %  Max: 99 %   No data recorded     Physical Exam:  General Appearance:  no acute distress  Eyes:  No scleral icterus or pallor, pupils normal  Ears, Nose, Mouth, Throat:  tracheostomy  Neck:  Trachea midline, thyroid normal  Respiratory: clear to auscultation bilaterally, normal effort  Cardiovascular:  Regular rate and rhythm, no murmurs, no peripheral edema  Gastrointestinal:  Soft, nontender, no hepatosplenomegaly, PEG  Skin:  Normal temperature, no rash  Psychiatric:  no agitation  Neuro:  No new focal neurologic deficits observed      Telemetry:              Hemodynamics:   CVP:     PAP:     PAOP:     CO:     CI:     SVI:     SVR:       SpO2: 95 % SpO2  Min: 92 %  Max: 99 %   Device:      Flow Rate:   No data recorded     Mechanical Ventilator Settings:                                Intake/Ouptut 24 hrs (7:00AM - 6:59 AM)  Intake & Output (last 3 days)       11/01 0701  11/02 0700 11/02 0701  11/03 0700 11/03 0701  11/04 0700 11/04 0701  11/05 0700    I.V. (mL/kg)   155 (1.9)     Other 212 198 274     NG/ 982.5 1047     Total Intake(mL/kg) 1030  (10.1) 1180.5 (14.6) 1476 (18.2)     Other  3220      Total Output  3220      Net +1030 -2039.5 +1476                   Lines, Drains & Airways     Active LDAs     Name Placement date Placement time Site Days    Peripheral IV 10/09/21 2200 Anterior; Left Forearm 10/09/21  2200  Forearm  2    Peripheral IV 10/12/21 1110 Anterior; Right Forearm 10/12/21  1110  Forearm  less than 1    NG/OG Tube Nasoduodenal 10 Fr Right nostril 10/06/21  1000  Right nostril  6    Rectal Tube With balloon 10/11/21  1200  --  1    ETT  10/04/21  1200   8                Hematology:  Results from last 7 days   Lab Units 11/04/21 0351 11/03/21  0552 11/02/21 0356 11/01/21  0331 10/31/21  0419 10/30/21  0416 10/29/21  0415   WBC 10*3/mm3 18.58* 10.02 8.93 9.00 8.04 12.51* 12.23*   HEMOGLOBIN g/dL 8.3* 7.8* 7.2* 7.8* 7.1* 8.6* 8.7*   HEMATOCRIT % 25.4* 22.7* 20.8* 23.0* 21.3* 25.3* 25.4*   PLATELETS 10*3/mm3 259 191 198 212 202 228 227     Electrolytes, Magnesium and Phosphorus:  Results from last 7 days   Lab Units 11/04/21 0351 11/03/21  0552 11/02/21  0356 11/01/21  0331 10/31/21  0422 10/30/21  0416 10/29/21  0415   SODIUM mmol/L 132* 131* 132* 132* 136 135* 138   CHLORIDE mmol/L 89* 92* 89* 92* 96* 92* 96*   POTASSIUM mmol/L 5.0 3.9 4.2 4.3 4.2 4.3 4.0   CO2 mmol/L 25.0 25.0 21.0* 23.0 27.0 21.0* 26.0   MAGNESIUM mg/dL 2.9* 2.5* 2.9*  2.9*  --   --   --   --    PHOSPHORUS mg/dL 5.0* 3.5 4.7*  4.7* 4.5 4.5  --  4.3     Renal:  Results from last 7 days   Lab Units 11/04/21 0351 11/03/21 0552 11/02/21 0356 11/01/21  0331 10/31/21  0422 10/30/21  0416 10/29/21  0415   CREATININE mg/dL 6.66* 5.01* 7.85* 6.35* 4.48* 6.76* 4.57*   BUN mg/dL 93* 67* 145* 106* 69* 111* 69*     Estimated Creatinine Clearance: 12.2 mL/min (A) (by C-G formula based on SCr of 6.66 mg/dL (H)).  Hepatic:  Results from last 7 days   Lab Units 11/04/21 0351 11/03/21 0552 11/02/21 0356   ALK PHOS U/L 88 88 78   BILIRUBIN mg/dL 0.7 0.9 0.6   ALT (SGPT) U/L 26 27  25   AST (SGOT) U/L 23 29 23     Arterial Blood Gases:  Results from last 7 days   Lab Units 10/31/21  0921 10/30/21  0815 10/29/21  0557   PH, ARTERIAL pH units 7.468* 7.458* 7.488*   PCO2, ARTERIAL mm Hg 37.3 34.1* 36.2   PO2 ART mm Hg 61.5* 71.3* 71.7*   FIO2 % 40 35 35             Lab Results   Component Value Date    LACTATE 1.9 10/04/2021       Relevant imaging studies and labs from 11/04/21 were reviewed and interpreted by me    Medications (drips):  phenylephrine, Last Rate: 1.3 mcg/kg/min (11/04/21 0952)        atovaquone, 1,500 mg, Per PEG Tube, Daily With Lunch  b complex-vitamin c-folic acid, 1 tablet, Per PEG Tube, Daily  chlorhexidine, 15 mL, Mouth/Throat, Q12H  cholecalciferol, 2,000 Units, Per PEG Tube, Daily  custom medication builder, 100 mg, Nasogastric, Daily  dextromethorphan polistirex ER, 60 mg, Per PEG Tube, Q12H  epoetin tatum/tatum-epbx, 10,000 Units, Subcutaneous, Once per day on Tue Thu Sat  famotidine, 20 mg, Per G Tube, Daily  ferrous sulfate, 300 mg, Per PEG Tube, Daily  gabapentin, 100 mg, Per PEG Tube, Q8H  guaiFENesin-codeine, 10 mL, Per G Tube, TID  heparin (porcine), 5,000 Units, Subcutaneous, Q12H  insulin detemir, 5 Units, Subcutaneous, Q12H  insulin regular, 0-7 Units, Subcutaneous, Q6H  ipratropium-albuterol, 3 mL, Nebulization, 4x Daily - RT  lactobacillus acidophilus, 1 capsule, Per PEG Tube, Daily  melatonin, 5 mg, Per G Tube, Nightly  midodrine, 10 mg, Per PEG Tube, Q8H  Nutrisource fiber, 1 packet, Per G Tube, TID  predniSONE, 40 mg, Per G Tube, Daily With Breakfast  QUEtiapine, 25 mg, Per PEG Tube, Nightly  sertraline, 50 mg, Per PEG Tube, Daily        Assessment/Plan   IMPRESSION / PLAN     Inpatient Problem List:  66 y.o.male:  Active Hospital Problems    Diagnosis    • **Acute respiratory failure with hypoxia    • Idiopathic hypotension    • Hyperglycemia    • History of tobacco abuse    • Sepsis due to pneumonia (HCC);Klebsiella aerogenes    • Acute blood loss anemia     • Anti-GBM nephritis with pulmonary hemorrhage (HCC)    • Acute renal failure (ARF) (HCC)         Impression & Plan:  66 y.o.male with relevant PMH of 45 py smoking quit 1 month PTA, HTN, T2DM admitted initially to [facility] 9/27/21 with 2-3 week h/o hemoptysis.  He required intubation on 10/1/21.  CTA at OSH showed diffuse GGO and 8 mm RML nodule.  Patient was also noted to be in Renal failure with concerns for Pulmonary-Renal syndrome.  He was treated with 3 days of pulse IV solumedrol. Ultimately anti-GBM Ab was positive concerning for Goodpastures.  PAIGE / ANCA / RF / Mycoplasma IgM were negative.  Patient had temporary HD catheter placed at OSH for PIOTR and HD was started.    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    He was transferred to [facility] 10/4/2021 for higher level of care.  Daily plasma exchange was initiated on 10/5/21 for total of 14 sessions, and he has continued on moderate dose steroids after receiving pulse.  Patient has also been initiated on Cytoxan.  Has required multiple transfusions.      Eventually had Trach / PEG on 10/15/21.    Patient coughed out trach on 11/3.  RT / APRNs had some difficulty getting it to seat all the way in.  Bronchoscope used by myself to direct trach back in to place.  This was also easier with roll under shoulders and head in full extension.    Anxiety  Depression  Continue klonopin and zoloft    Acute Hypoxemic Respiratory Failure  Diffuse Alveolar Hemorrhage  Goodpasture's Syndrome  Prolonged Mechanical Ventilation  Scheduled nebs. Steroids / cytoxan / plasma exchange per nephrology.  PJP prophylaxis.  Patient is heavily immunosuppressed and will be for some time.  Appreciate ID assistance with abx.   Has been on trach collar for 6 days.   Speech following for swallowing and PMV.  Changed to 6-0 cuffless fenestrated trach 11/2.  Brisk  cough reflex.  Continue Delsym, Codeine, and low dose gabapentin.  Once coughing / secretions improved can start capping trials.  Leave PMV on during day as tolerated.  Make nebs prn.    Plans in place to continue cytoxan for 3 months.  Slowly taper pred.  Stop PJP prophylaxis when on < 15 mg Pred.  Pred taper per Nephrology.    Klebsiella Aerogenes Pneumonia  Completed abx on 10/25.  Re-check culture, still with purulent secretions and now has leukocytosis.  Low threshold for abx.  ID following.    Hypotension  · Wean of isabela as able.  Start po midodrine.    PIOTR  As above, Dialysis per nephrology.  D/cd prostat until uremia improves.    Anemia  Has required multiple transfusions.  Tranfuse for hgb < 7.  No signs of active bleeding.      T2DM A1c  Steroid Induced Hyperglycemia  Titrate SQ insulin    DVT / PUD Prophylaxis  SCDs / Heparin / Pepcid    Nutrition  Tube Feeds    Dispo  Trach / PEG 10/15.  Unfortunately cytoxan has to be specially compounded to go in G-tube.  Once able to swallow with ST and take pill form this will expand options for rehab.    PT/OT    Plan of care and goals reviewed with multidisciplinary team at daily rounds    Critical Condition / High Risk secondary to hypotension on pressors, severe life threatening good pastures syndrome requiring prolonged mechanical ventilation, dialysis, plasma exchange, etc.  High risk for worsening / relapse.         Sravan Forrester MD  Intensive Care Medicine  11/04/21 10:43 EDT

## 2021-11-05 ENCOUNTER — APPOINTMENT (OUTPATIENT)
Dept: GENERAL RADIOLOGY | Facility: HOSPITAL | Age: 66
End: 2021-11-05

## 2021-11-05 ENCOUNTER — ANCILLARY PROCEDURE (OUTPATIENT)
Dept: SPEECH THERAPY | Facility: HOSPITAL | Age: 66
End: 2021-11-05

## 2021-11-05 LAB
ALBUMIN SERPL-MCNC: 4.1 G/DL (ref 3.5–5.2)
ALBUMIN/GLOB SERPL: 1.7 G/DL
ALP SERPL-CCNC: 77 U/L (ref 39–117)
ALT SERPL W P-5'-P-CCNC: 20 U/L (ref 1–41)
ANION GAP SERPL CALCULATED.3IONS-SCNC: 15 MMOL/L (ref 5–15)
AST SERPL-CCNC: 18 U/L (ref 1–40)
BASOPHILS # BLD AUTO: 0.05 10*3/MM3 (ref 0–0.2)
BASOPHILS NFR BLD AUTO: 0.6 % (ref 0–1.5)
BILIRUB SERPL-MCNC: 0.8 MG/DL (ref 0–1.2)
BUN SERPL-MCNC: 48 MG/DL (ref 8–23)
BUN/CREAT SERPL: 12.9 (ref 7–25)
CALCIUM SPEC-SCNC: 8.7 MG/DL (ref 8.6–10.5)
CHLORIDE SERPL-SCNC: 95 MMOL/L (ref 98–107)
CO2 SERPL-SCNC: 25 MMOL/L (ref 22–29)
CREAT SERPL-MCNC: 3.72 MG/DL (ref 0.76–1.27)
DEPRECATED RDW RBC AUTO: 62.5 FL (ref 37–54)
EOSINOPHIL # BLD AUTO: 0.11 10*3/MM3 (ref 0–0.4)
EOSINOPHIL NFR BLD AUTO: 1.2 % (ref 0.3–6.2)
ERYTHROCYTE [DISTWIDTH] IN BLOOD BY AUTOMATED COUNT: 19.5 % (ref 12.3–15.4)
GFR SERPL CREATININE-BSD FRML MDRD: 16 ML/MIN/1.73
GLOBULIN UR ELPH-MCNC: 2.4 GM/DL
GLUCOSE BLDC GLUCOMTR-MCNC: 138 MG/DL (ref 70–130)
GLUCOSE BLDC GLUCOMTR-MCNC: 143 MG/DL (ref 70–130)
GLUCOSE BLDC GLUCOMTR-MCNC: 161 MG/DL (ref 70–130)
GLUCOSE BLDC GLUCOMTR-MCNC: 212 MG/DL (ref 70–130)
GLUCOSE SERPL-MCNC: 103 MG/DL (ref 65–99)
HCT VFR BLD AUTO: 22.9 % (ref 37.5–51)
HGB BLD-MCNC: 7.2 G/DL (ref 13–17.7)
IMM GRANULOCYTES # BLD AUTO: 0.26 10*3/MM3 (ref 0–0.05)
IMM GRANULOCYTES NFR BLD AUTO: 2.9 % (ref 0–0.5)
LYMPHOCYTES # BLD AUTO: 0.86 10*3/MM3 (ref 0.7–3.1)
LYMPHOCYTES NFR BLD AUTO: 9.6 % (ref 19.6–45.3)
MAGNESIUM SERPL-MCNC: 2.4 MG/DL (ref 1.6–2.4)
MCH RBC QN AUTO: 31.6 PG (ref 26.6–33)
MCHC RBC AUTO-ENTMCNC: 31.4 G/DL (ref 31.5–35.7)
MCV RBC AUTO: 100.4 FL (ref 79–97)
MONOCYTES # BLD AUTO: 0.69 10*3/MM3 (ref 0.1–0.9)
MONOCYTES NFR BLD AUTO: 7.7 % (ref 5–12)
NEUTROPHILS NFR BLD AUTO: 7 10*3/MM3 (ref 1.7–7)
NEUTROPHILS NFR BLD AUTO: 78 % (ref 42.7–76)
NRBC BLD AUTO-RTO: 0.4 /100 WBC (ref 0–0.2)
PHOSPHATE SERPL-MCNC: 3.2 MG/DL (ref 2.5–4.5)
PLATELET # BLD AUTO: 175 10*3/MM3 (ref 140–450)
PMV BLD AUTO: 11.9 FL (ref 6–12)
POTASSIUM SERPL-SCNC: 4 MMOL/L (ref 3.5–5.2)
PROCALCITONIN SERPL-MCNC: 2.54 NG/ML (ref 0–0.25)
PROT SERPL-MCNC: 6.5 G/DL (ref 6–8.5)
RBC # BLD AUTO: 2.28 10*6/MM3 (ref 4.14–5.8)
SODIUM SERPL-SCNC: 135 MMOL/L (ref 136–145)
WBC # BLD AUTO: 8.97 10*3/MM3 (ref 3.4–10.8)

## 2021-11-05 PROCEDURE — 25010000002 CYCLOPHOSPHAMIDE PER 100 MG: Performed by: INTERNAL MEDICINE

## 2021-11-05 PROCEDURE — 71045 X-RAY EXAM CHEST 1 VIEW: CPT

## 2021-11-05 PROCEDURE — 87040 BLOOD CULTURE FOR BACTERIA: CPT | Performed by: INTERNAL MEDICINE

## 2021-11-05 PROCEDURE — 25010000002 MEROPENEM PER 100 MG: Performed by: INTERNAL MEDICINE

## 2021-11-05 PROCEDURE — 97116 GAIT TRAINING THERAPY: CPT

## 2021-11-05 PROCEDURE — 84145 PROCALCITONIN (PCT): CPT | Performed by: INTERNAL MEDICINE

## 2021-11-05 PROCEDURE — 99233 SBSQ HOSP IP/OBS HIGH 50: CPT | Performed by: INTERNAL MEDICINE

## 2021-11-05 PROCEDURE — 92507 TX SP LANG VOICE COMM INDIV: CPT

## 2021-11-05 PROCEDURE — 85025 COMPLETE CBC W/AUTO DIFF WBC: CPT | Performed by: INTERNAL MEDICINE

## 2021-11-05 PROCEDURE — 25010000002 HEPARIN (PORCINE) PER 1000 UNITS: Performed by: INTERNAL MEDICINE

## 2021-11-05 PROCEDURE — 92612 ENDOSCOPY SWALLOW (FEES) VID: CPT

## 2021-11-05 PROCEDURE — 63710000001 INSULIN REGULAR HUMAN PER 5 UNITS: Performed by: INTERNAL MEDICINE

## 2021-11-05 PROCEDURE — 80053 COMPREHEN METABOLIC PANEL: CPT | Performed by: INTERNAL MEDICINE

## 2021-11-05 PROCEDURE — 94799 UNLISTED PULMONARY SVC/PX: CPT

## 2021-11-05 PROCEDURE — 97530 THERAPEUTIC ACTIVITIES: CPT

## 2021-11-05 PROCEDURE — 84100 ASSAY OF PHOSPHORUS: CPT | Performed by: INTERNAL MEDICINE

## 2021-11-05 PROCEDURE — 83735 ASSAY OF MAGNESIUM: CPT | Performed by: INTERNAL MEDICINE

## 2021-11-05 PROCEDURE — 63710000001 PREDNISONE PER 1 MG: Performed by: INTERNAL MEDICINE

## 2021-11-05 PROCEDURE — 63710000001 INSULIN DETEMIR PER 5 UNITS: Performed by: INTERNAL MEDICINE

## 2021-11-05 PROCEDURE — 82962 GLUCOSE BLOOD TEST: CPT

## 2021-11-05 RX ORDER — SACCHAROMYCES BOULARDII 250 MG
500 CAPSULE ORAL 2 TIMES DAILY
Status: DISCONTINUED | OUTPATIENT
Start: 2021-11-05 | End: 2021-11-11

## 2021-11-05 RX ORDER — GLYCERIN/MALTODEXTRIN
1 LIQUID (ML) ORAL 3 TIMES DAILY
Status: DISCONTINUED | OUTPATIENT
Start: 2021-11-05 | End: 2021-11-05

## 2021-11-05 RX ORDER — CYCLOPHOSPHAMIDE 25 MG/1
100 CAPSULE ORAL DAILY
Status: DISCONTINUED | OUTPATIENT
Start: 2021-11-05 | End: 2021-11-05

## 2021-11-05 RX ORDER — IPRATROPIUM BROMIDE AND ALBUTEROL SULFATE 2.5; .5 MG/3ML; MG/3ML
3 SOLUTION RESPIRATORY (INHALATION)
Status: DISCONTINUED | OUTPATIENT
Start: 2021-11-05 | End: 2021-11-06

## 2021-11-05 RX ADMIN — Medication 500 MG: at 14:04

## 2021-11-05 RX ADMIN — IPRATROPIUM BROMIDE AND ALBUTEROL SULFATE 3 ML: 2.5; .5 SOLUTION RESPIRATORY (INHALATION) at 21:08

## 2021-11-05 RX ADMIN — Medication 1 PACKET: at 18:00

## 2021-11-05 RX ADMIN — INSULIN HUMAN 2 UNITS: 100 INJECTION, SOLUTION PARENTERAL at 23:28

## 2021-11-05 RX ADMIN — Medication 60 MG: at 20:16

## 2021-11-05 RX ADMIN — MEROPENEM 500 MG: 500 INJECTION, POWDER, FOR SOLUTION INTRAVENOUS at 14:03

## 2021-11-05 RX ADMIN — MINERAL SUPPLEMENT IRON 300 MG / 5 ML STRENGTH LIQUID 100 PER BOX UNFLAVORED 300 MG: at 08:12

## 2021-11-05 RX ADMIN — FAMOTIDINE 20 MG: 20 TABLET, FILM COATED ORAL at 08:12

## 2021-11-05 RX ADMIN — GUAIFENESIN AND CODEINE PHOSPHATE 10 ML: 100; 10 SOLUTION ORAL at 18:00

## 2021-11-05 RX ADMIN — IPRATROPIUM BROMIDE AND ALBUTEROL SULFATE 3 ML: 2.5; .5 SOLUTION RESPIRATORY (INHALATION) at 15:51

## 2021-11-05 RX ADMIN — Medication 500 MG: at 20:16

## 2021-11-05 RX ADMIN — HEPARIN SODIUM 5000 UNITS: 5000 INJECTION, SOLUTION INTRAVENOUS; SUBCUTANEOUS at 20:16

## 2021-11-05 RX ADMIN — Medication 60 MG: at 08:12

## 2021-11-05 RX ADMIN — Medication 1 CAPSULE: at 08:12

## 2021-11-05 RX ADMIN — MIDODRINE HYDROCHLORIDE 10 MG: 10 TABLET ORAL at 23:01

## 2021-11-05 RX ADMIN — Medication 1 PACKET: at 20:15

## 2021-11-05 RX ADMIN — PREDNISONE 40 MG: 20 TABLET ORAL at 14:04

## 2021-11-05 RX ADMIN — INSULIN DETEMIR 8 UNITS: 100 INJECTION, SOLUTION SUBCUTANEOUS at 20:16

## 2021-11-05 RX ADMIN — GABAPENTIN 100 MG: 100 CAPSULE ORAL at 14:04

## 2021-11-05 RX ADMIN — ATOVAQUONE 1500 MG: 750 SUSPENSION ORAL at 14:03

## 2021-11-05 RX ADMIN — Medication 2000 UNITS: at 08:12

## 2021-11-05 RX ADMIN — IPRATROPIUM BROMIDE AND ALBUTEROL SULFATE 3 ML: 2.5; .5 SOLUTION RESPIRATORY (INHALATION) at 13:19

## 2021-11-05 RX ADMIN — Medication 1 TABLET: at 08:12

## 2021-11-05 RX ADMIN — GUAIFENESIN AND CODEINE PHOSPHATE 10 ML: 100; 10 SOLUTION ORAL at 20:18

## 2021-11-05 RX ADMIN — MIDODRINE HYDROCHLORIDE 10 MG: 10 TABLET ORAL at 14:04

## 2021-11-05 RX ADMIN — CHLORHEXIDINE GLUCONATE 15 ML: 1.2 SOLUTION ORAL at 08:12

## 2021-11-05 RX ADMIN — INSULIN DETEMIR 8 UNITS: 100 INJECTION, SOLUTION SUBCUTANEOUS at 07:59

## 2021-11-05 RX ADMIN — INSULIN HUMAN 4 UNITS: 100 INJECTION, SOLUTION PARENTERAL at 18:05

## 2021-11-05 RX ADMIN — HEPARIN SODIUM 5000 UNITS: 5000 INJECTION, SOLUTION INTRAVENOUS; SUBCUTANEOUS at 08:12

## 2021-11-05 RX ADMIN — Medication 1 PACKET: at 08:14

## 2021-11-05 RX ADMIN — GUAIFENESIN AND CODEINE PHOSPHATE 10 ML: 100; 10 SOLUTION ORAL at 08:12

## 2021-11-05 RX ADMIN — CHLORHEXIDINE GLUCONATE 15 ML: 1.2 SOLUTION ORAL at 20:16

## 2021-11-05 RX ADMIN — MIDODRINE HYDROCHLORIDE 10 MG: 10 TABLET ORAL at 05:39

## 2021-11-05 RX ADMIN — GABAPENTIN 100 MG: 100 CAPSULE ORAL at 23:01

## 2021-11-05 RX ADMIN — Medication 100 MG: at 18:00

## 2021-11-05 RX ADMIN — Medication 5 MG: at 20:16

## 2021-11-05 RX ADMIN — GABAPENTIN 100 MG: 100 CAPSULE ORAL at 05:39

## 2021-11-05 RX ADMIN — SERTRALINE HYDROCHLORIDE 50 MG: 50 TABLET ORAL at 08:12

## 2021-11-05 NOTE — PROGRESS NOTES
Multidisciplinary Rounds    Time: 20min  Patient Name: Keshav Dickens  Date of Encounter: 11/05/21 09:04 EDT  MRN: [id number]  Admission date: 10/4/2021      Reason for visit: MDR. RD to continue to follow per protocol.     Additional information obtained during MDR: Pt continues to tolerate EN, with 116% of EN goal volume delivered over the past 24 hrs. Planning for SLP to work with pt today.    Per I&O over the past 24 hrs:  1 bowel movement    Current diet: NPO Diet    EN: NovaSource Renal at 50 ml/hr with 1 packet fiber 3x/day and free water at 10 ml/hr via PEG    Intervention:  Follow treatment plan  Care plan reviewed    Follow up:   Per protocol      Jena Pang MS RD/MICHE[facility]  09:04 EDT

## 2021-11-05 NOTE — PROGRESS NOTES
"   LOS: 32 days    Patient Care Team:  Andree Langston MD as PCP - General (Internal Medicine)    Reason For Visit:  F/U PIOTR  Subjective     More awake and alert. Doing well otherwise. On trac collar.     Review of Systems:    Pulm: No soa   CV:  No CP. GI: NO N/V/D.      Objective     atovaquone, 1,500 mg, Per PEG Tube, Daily With Lunch  b complex-vitamin c-folic acid, 1 tablet, Per PEG Tube, Daily  chlorhexidine, 15 mL, Mouth/Throat, Q12H  cholecalciferol, 2,000 Units, Per PEG Tube, Daily  custom medication builder, 100 mg, Nasogastric, Daily  dextromethorphan polistirex ER, 60 mg, Per PEG Tube, Q12H  epoetin tatum/tatum-epbx, 10,000 Units, Subcutaneous, Once per day on Tue Thu Sat  famotidine, 20 mg, Per G Tube, Daily  ferrous sulfate, 300 mg, Per PEG Tube, Daily  gabapentin, 100 mg, Per PEG Tube, Q8H  guaiFENesin-codeine, 10 mL, Per G Tube, TID  heparin (porcine), 5,000 Units, Subcutaneous, Q12H  insulin detemir, 8 Units, Subcutaneous, Q12H  insulin regular, 0-9 Units, Subcutaneous, Q6H  ipratropium-albuterol, 3 mL, Nebulization, 4x Daily - RT  lactobacillus acidophilus, 1 capsule, Per PEG Tube, Daily  melatonin, 5 mg, Per G Tube, Nightly  meropenem, 500 mg, Intravenous, Q24H  midodrine, 10 mg, Per PEG Tube, Q8H  Nutrisource fiber, 1 packet, Per G Tube, TID  predniSONE, 40 mg, Per G Tube, Daily With Breakfast  QUEtiapine, 25 mg, Per PEG Tube, Nightly  saccharomyces boulardii, 500 mg, Oral, BID  sertraline, 50 mg, Per PEG Tube, Daily      phenylephrine, 0.5-3 mcg/kg/min, Last Rate: Stopped (11/04/21 1757)          Vital Signs:  Blood pressure 103/51, pulse 86, temperature 98.5 °F (36.9 °C), temperature source Oral, resp. rate 20, height 172.7 cm (67.99\"), weight 81 kg (178 lb 9.2 oz), SpO2 91 %.    Flowsheet Rows      First Filed Value   Admission Height 172.7 cm (68\") Documented at 10/04/2021 2132   Admission Weight 102 kg (224 lb 13.9 oz) Documented at 10/04/2021 2100          11/04 0701 - 11/05 0700  In: 1793 " [I.V.:310]  Out: 2280     Physical Exam:    General Appearance: NAD, alert and cooperative, Ox3. + TRACH. + THD CATH.  Eyes: PER, conjunctivae and sclerae normal, no icterus  Lungs: respirations regular and unlabored, no crepitus. FEW RHONCHI  Heart/CV: regular rhythm & normal rate, no murmur, no gallop, no rub and TR edema  Abdomen: not distended, soft, non-tender, no masses,  bowel sounds present. PEG  Skin: No rash, Warm and dry    Radiology:            Labs:  Results from last 7 days   Lab Units 11/05/21 0328 11/04/21 0351 11/03/21 0552   WBC 10*3/mm3 8.97 18.58* 10.02   HEMOGLOBIN g/dL 7.2* 8.3* 7.8*   HEMATOCRIT % 22.9* 25.4* 22.7*   PLATELETS 10*3/mm3 175 259 191     Results from last 7 days   Lab Units 11/05/21 0328 11/04/21 0351 11/03/21 0552 11/02/21  0356   SODIUM mmol/L 135* 132* 131* 132*   POTASSIUM mmol/L 4.0 5.0 3.9 4.2   CHLORIDE mmol/L 95* 89* 92* 89*   CO2 mmol/L 25.0 25.0 25.0 21.0*   BUN mg/dL 48* 93* 67* 145*   CREATININE mg/dL 3.72* 6.66* 5.01* 7.85*   CALCIUM mg/dL 8.7 8.8 8.8 8.8   PHOSPHORUS mg/dL 3.2 5.0* 3.5 4.7*  4.7*   MAGNESIUM mg/dL 2.4 2.9* 2.5* 2.9*  2.9*   ALBUMIN g/dL 4.10 4.20 4.20 4.00     Results from last 7 days   Lab Units 11/05/21  0328   GLUCOSE mg/dL 103*       Results from last 7 days   Lab Units 11/05/21  0328   ALK PHOS U/L 77   BILIRUBIN mg/dL 0.8   ALT (SGPT) U/L 20   AST (SGOT) U/L 18     Results from last 7 days   Lab Units 10/31/21  0921   PH, ARTERIAL pH units 7.468*   PO2 ART mm Hg 61.5*   PCO2, ARTERIAL mm Hg 37.3   HCO3 ART mmol/L 27.0*             Estimated Creatinine Clearance: 21.8 mL/min (A) (by C-G formula based on SCr of 3.72 mg/dL (H)).      Assessment       Acute respiratory failure with hypoxia    Anti-GBM nephritis with pulmonary hemorrhage (HCC)    Acute renal failure (ARF) (HCC)    Acute blood loss anemia    Sepsis due to pneumonia (HCC);Klebsiella aerogenes    Idiopathic hypotension    Hyperglycemia    History of tobacco  abuse            Impression: PIOTR: Anuric PIOTR. Etiology RPGN. 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 Unclear baseline cr. Cr was elevated in 2-3 range on presentation at OSH. Progressive worsening in last few days. Started on HD 10/4/21. S/p renal biopsy on 9/30 which showed AntiGBM disease.      UA large blood 3+ proteinuria at OSH.  Renal US increase echogenicity. No hydro at OSH.     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 With pulmonary and renal involvement. 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 Treated with 2 weeks of PLEX, Prednisone and cytoxan     Severe azotemia: Slow correction with HD.      Met acidosis: Correction with HD      Hyperphosphatemia: Resolved.      Hypoxic respiratory failure: 2/2 diffuse alveolar hemorrhage and possible pulmonary vasculitis. Now on trach                     Recommendations: HD per TTS stella.  On cytoxan and prednisone.   No sign of renal recovery. Will d/c cytoxa after total 8-12 weeks of therapy     Torsten Drake MD  11/05/21  12:38 EDT

## 2021-11-05 NOTE — DISCHARGE INSTR - DIET
FEES Reason for Referral 11/05/21  Patient was referred for a FEES to assess the efficiency of his/her swallow function, rule out aspiration and make recommendations regarding safe dietary consistencies, effective compensatory strategies, and safe eating environment.         Recommendations/Treatment  SLP Swallowing Diagnosis: severe, pharyngeal dysphagia (11/05/21 0945)  Functional Impact: risk of aspiration/pneumonia (11/05/21 0945)  Rehab Potential/Prognosis, Swallowing: good, to achieve stated therapy goals (11/05/21 0945)  Swallow Criteria for Skilled Therapeutic Interventions Met: demonstrates skilled criteria (11/05/21 0945)  Therapy Frequency (Swallow): 5 days per week (11/05/21 0945)  Predicted Duration Therapy Intervention (Days): until discharge (11/05/21 0945)  SLP Diet Recommendation: NPO, long term alternate methods of nutrition/hydration (11/05/21 0945)  SLP Rec. for Method of Medication Administration: meds whole, with pudding or applesauce (Critical oral meds only--using breath hold, swallow, cough (cue patient)) (11/05/21 0945)  Monitor for Signs of Aspiration: yes, notify SLP if any concerns (11/05/21 0945)  Anticipated Discharge Disposition (SLP): unknown, anticipate therapy at next level of care (11/05/21 0945)    Instrumental Set-up  Risks/Benefits Reviewed: risks/benefits explained, patient, family, agreed to eval (11/05/21 0945)  Tracheostomy: Tested with speaking valve on (11/05/21 0945)  Nasal Entry: right: (11/05/21 0945)  Anatomic Considerations: no anatomic structural deviation (11/05/21 0945)  Utensils Used: Spoon (11/05/21 0945)  Consistencies Trialed: ice chips, honey-thick liquids, pudding/puree (11/05/21 0945)    Oral Preparation/ Oral Phase  Oral Phase: WFL, solids not tested, other (see comments) (tested pureed and HT liquids via teaspoon only) (11/05/21 0945)    Pharyngeal Phase  Initiation of Pharyngeal Swallow: bolus in pyriform sinuses (11/05/21 0945)  Pharyngeal Phase:  impaired pharyngeal phase of swallowing (11/05/21 0945)  Aspiration During the Swallow: pudding/puree, honey-thick liquids, secondary to delayed swallow initiation or mistiming, secondary to reduced laryngeal elevation, secondary to reduced vestibular closure, secondary to reduced laryngeal (VF) closure (11/05/21 0945)  Response to Aspiration: no response, silent aspiration (11/05/21 0945)  Rosenbek's Scale: honey:, pudding/puree:, 8-->Level 8 (11/05/21 0945)  Residue: honey-thick liquids, pudding/puree, valleculae, pyriform sinuses, secondary to reduced base of tongue retraction, secondary to reduced laryngeal elevation, secondary to reduced hyolaryngeal excursion, secondary to reduced posterior pharyngeal wall stripping (11/05/21 0945)  Response to Residue: cleared residue, with cued swallow (11/05/21 0945)  Attempted Compensatory Maneuvers: bolus presentation style, other (see comments), combination of maneuvers (see comments) (breath hold, swallow, cough) (11/05/21 0945)  Response to Attempted Compensatory Maneuvers: prevented aspiration (11/05/21 0945)  FEES Summary: Patient displayed severe pharyngeal dysphagia with delay to the pyriforms and aspiration of pudding and HT liquids via teaspoon during the swallow. Aspiration was silent. Patient cleared with cued cough. Breath hold, swallow, cough compensations prevented aspiration with pureed consistency. mild pyriform residue cleared with cued subsequent swallow. Recommend continued NPO with continued alternate means of nutrition. Patient may have critical oral meds with pureed consistency using a breath hold during swallow and followed by cued cough. OK for 1-2 ice chips per hour for oral moisture per MD discretion. (11/05/21 0945)       FEES Reason for Referral 11/11/21  Patient was referred for a FEES to assess the efficiency of his/her swallow function, rule out aspiration and make recommendations regarding safe dietary consistencies, effective compensatory  strategies, and safe eating environment.         Recommendations/Treatment  SLP Swallowing Diagnosis: severe, pharyngeal dysphagia (11/11/21 1330)  Functional Impact: risk of aspiration/pneumonia (11/11/21 1330)  Rehab Potential/Prognosis, Swallowing: good, to achieve stated therapy goals (11/11/21 1330)  Swallow Criteria for Skilled Therapeutic Interventions Met: demonstrates skilled criteria (11/11/21 1330)  Therapy Frequency (Swallow): 5 days per week (11/11/21 1330)  Predicted Duration Therapy Intervention (Days): until discharge (11/11/21 1330)  SLP Diet Recommendation: long term alternate methods of nutrition/hydration, other (see comments) (continue alternate means of nutrition with pudding snack 3x per day) (11/11/21 1330)  Recommended Precautions and Strategies: general aspiration precautions (11/11/21 1330)  SLP Rec. for Method of Medication Administration: meds via alternate route (ALthough patient appears safe for critical PO meds with pudding thick consistency, cognition is impacting and he likely will not swallow pill capsules consistently. See recs for critical PO meds above.) (11/11/21 1330)  Monitor for Signs of Aspiration: yes, notify SLP if any concerns (11/11/21 1330)  Anticipated Discharge Disposition (SLP): unknown, anticipate therapy at next level of care (11/11/21 1330)    Instrumental Set-up  Risks/Benefits Reviewed: risks/benefits explained, patient, agreed to eval (11/11/21 1330)  Tracheostomy: other (see comments) (trach capped) (11/11/21 1330)  Nasal Entry: right: (11/11/21 1330)  Anatomic Considerations: no anatomic structural deviation (11/11/21 1330)  Utensils Used: Spoon, Cup, Straw (11/11/21 1330)  Consistencies Trialed: ice chips, thin liquids, nectar-thick liquids, honey-thick liquids, pudding/puree (11/11/21 1330)    Oral Preparation/ Oral Phase  Oral Phase: WFL, solids not tested, other (see comments) (cognition impacting patients ability to accept critical oral medications)  (11/11/21 1330)  Oral Phase, Comment: Pt.expectorated pill capsule (11/11/21 1330)    Pharyngeal Phase  Initiation of Pharyngeal Swallow: bolus in pyriform sinuses (11/11/21 1330)  Pharyngeal Phase: impaired pharyngeal phase of swallowing (11/11/21 1330)  Penetration During the Swallow: secondary to reduced laryngeal elevation, secondary to reduced vestibular closure, nectar-thick liquids (11/11/21 1330)  Penetration After the Swallow: nectar-thick liquids, honey-thick liquids, secondary to residue, in pyriform sinuses, in valleculae (11/11/21 1330)  Aspiration After the Swallow: honey-thick liquids, nectar-thick liquids, secondary to residue, in laryngeal vestibule (11/11/21 1330)  Response to Aspiration: no response, silent aspiration, response with cue only, cleared subglottic material (11/11/21 1330)  Rosenbek's Scale: nectar:, honey:, 8-->Level 8 (11/11/21 1330)  Residue: nectar-thick liquids, honey-thick liquids, pudding/puree, diffuse within pharynx, secondary to reduced base of tongue retraction, secondary to reduced laryngeal elevation, secondary to reduced hyolaryngeal excursion (11/11/21 1330)  Response to Residue: cleared residue, with cued swallow, with spontaneous subsequent swallow (11/11/21 1330)  FEES Summary: Patient presents with severe pharyngeal dysphagia with delay to the pyriforms and deep penetration of NT liquids during the swallow and aspiration of nectar thick and honey thick liquids after the swallow resulting from residue mixing with secretions and spilling over the lateral channels and posterior commissure. Penetration/aspiration was all silent. Patient tolerated pudding trials without penetration/aspiration. Attempted pill capsules with pureed and honey thick liquids, however, patient repeatedly expectorated capsules. Patient appears safe to take critical meds whole with pudding consistency, however, he may not accept oral medications as cognition is impacting at this time. Recommend  pudding thick snack 3x per day. (11/11/21 0227)

## 2021-11-05 NOTE — PLAN OF CARE
Goal Outcome Evaluation:  SLP evaluation and treatment completed. Will continue to address communication and dysphagia in tx. FEES completed this date. Recommend NPO with continued alternate means of nutrition at this time. Suggest use of breath hold, swallow, cough with critical oral meds only. Meds may be given whole with pureed consistency. Patient may have 1-2 ice chips per hour,  after oral care , with supervision, at MD discretion. Patient requires cues for compensations.  Please see note for further details and recommendations.

## 2021-11-05 NOTE — THERAPY TREATMENT NOTE
Patient Name: Keshav Dickens  : 1955    MRN: [id number]                              Today's Date: 2021       Admit Date: 10/4/2021    Visit Dx:     ICD-10-CM ICD-9-CM   1. Acute respiratory failure with hypoxia  J96.01 518.81   2. Examination for normal comparison for clinical research  Z00.6 V70.7   3. Diffuse pulmonary alveolar hemorrhage  R04.89 786.30   4. 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.0 446.21   5. Voice impairment  R49.9 784.40   6. Dysphagia, unspecified type  R13.10 787.20     Patient Active Problem List   Diagnosis   • Anti-GBM nephritis with pulmonary hemorrhage (HCC)   • Acute respiratory failure with hypoxia   • Acute renal failure (ARF) (HCC)   • Acute blood loss anemia   • Sepsis due to pneumonia (HCC);Klebsiella aerogenes   • Idiopathic hypotension   • Hyperglycemia   • History of tobacco abuse     Past Medical History:   Diagnosis Date   • Goodpasture's syndrome  10/4/2021   • Hypertension      Past Surgical History:   Procedure Laterality Date   • CHOLECYSTECTOMY     • TRACHEOSTOMY AND PEG TUBE INSERTION N/A 10/15/2021    Procedure: TRACHEOSTOMY AND PERCUTANEOUS ENDOSCOPIC GASTROSTOMY TUBE INSERTION;  Surgeon: Abdon Roberts MD;  Location: [facility];  Service: General;  Laterality: N/A;      General Information     Row Name 21 1306          Physical Therapy Time and Intention    Document Type therapy note (daily note)  -TANK     Mode of Treatment physical therapy  -TANK     Row Name 21 1306          General Information    Patient Profile Reviewed yes  -TANK     Prior Level of Function independent:; gait; transfer; bed mobility; ADL's  -TANK     Existing Precautions/Restrictions cardiac; fall; oxygen therapy device and L/min; sternal  -TANK     Barriers to Rehab medically complex  -TANK     Row Name 21 1306          Living Environment    Lives With spouse  -TANK     Row Name 21 1306          Home Main Entrance    Number of Stairs, Main Entrance two   -TANK     Row Name 11/05/21 1306          Cognition    Orientation Status (Cognition) oriented to; person  -TANK     Row Name 11/05/21 1306          Safety Issues, Functional Mobility    Safety Issues Affecting Function (Mobility) safety precautions follow-through/compliance; safety precaution awareness  -TANK     Impairments Affecting Function (Mobility) balance; endurance/activity tolerance; shortness of breath; strength; postural/trunk control  -TANK     Cognitive Impairments, Mobility Safety/Performance attention; safety precaution awareness; awareness, need for assistance; insight into deficits/self-awareness; safety precaution follow-through; judgment  -TANK           User Key  (r) = Recorded By, (t) = Taken By, (c) = Cosigned By    Initials Name Provider Type    TANK Karrie Cortez PT Physical Therapist               Mobility     Row Name 11/05/21 1308          Bed Mobility    Bed Mobility rolling left; rolling right; scooting/bridging; supine-sit  -TANK     Rolling Left [place] (Bed Mobility) moderate assist (50% patient effort); 2 person assist  -TANK     Rolling Right [place] (Bed Mobility) moderate assist (50% patient effort); 2 person assist  -TANK     Scooting/Bridging [place] (Bed Mobility) moderate assist (50% patient effort); 2 person assist  -TANK     Supine-Sit [place] (Bed Mobility) moderate assist (50% patient effort); 2 person assist  -TANK     Assistive Device (Bed Mobility) draw sheet; head of bed elevated  -TANK     Comment (Bed Mobility) patient able to help more with getting legs to the edge of the bed and assist with trunk, patient has difficulty with instructions often spacing out needing redirection  -TANK     Row Name 11/05/21 1308          Transfers    Comment (Transfers) patient transfers from bed to chair then stood for ambulation  -TANK     Row Name 11/05/21 1308          Bed-Chair Transfer    Bed-Chair [place] (Transfers) moderate assist (50% patient effort); 2 person assist  -TANK      Row Name 11/05/21 1308          Sit-Stand Transfer    Sit-Stand [place] (Transfers) moderate assist (50% patient effort); 2 person assist  -     Row Name 11/05/21 1308          Gait/Stairs (Locomotion)    [place] Level (Gait) maximum assist (25% patient effort); 2 person assist; 1 person to manage equipment  -TANK     Assistive Device (Gait) other (see comments)  ARJO walker  -TANK     Distance in Feet (Gait) 10  -TANK     Deviations/Abnormal Patterns (Gait) stride length decreased; weight shifting decreased  -TANK     Bilateral Gait Deviations forward flexed posture  -TANK     Left Sided Gait Deviations weight shift ability decreased  -TANK     Right Sided Gait Deviations weight shift ability decreased  -TANK     Comment (Gait/Stairs) with use of ARJO walker patient was able to stand and take 10 steps he is unable to advance right LE without assist but could scoot left LE. patient needs assist to keep feet apart and cues for erect posture  -TANK           User Key  (r) = Recorded By, (t) = Taken By, (c) = Cosigned By    Initials Name Provider Type    Karrie Pelayo PT Physical Therapist               Obj/Interventions     Row Name 11/05/21 1312          Motor Skills    Therapeutic Exercise hip; knee; ankle  -     Row Name 11/05/21 1312          Shoulder (Therapeutic Exercise)    Shoulder AAROM (Therapeutic Exercise) bilateral; flexion; extension; aBduction; aDduction; 10 repetitions; supine  -     Row Name 11/05/21 1312          Hip (Therapeutic Exercise)    Hip AAROM (Therapeutic Exercise) bilateral; flexion; extension; 10 repetitions; supine  -     Row Name 11/05/21 1312          Knee (Therapeutic Exercise)    Knee AAROM (Therapeutic Exercise) bilateral; flexion; extension; 10 repetitions; supine  -     Row Name 11/05/21 1312          Ankle (Therapeutic Exercise)    Ankle AAROM (Therapeutic Exercise) bilateral; dorsiflexion; plantarflexion; supine; 10 repetitions  -     Row Name 11/05/21 1312           Balance    Balance Assessment sitting static balance; sitting dynamic balance; standing static balance; standing dynamic balance  -TANK     Static Sitting Balance mild impairment; supported; sitting, edge of bed  -TANK     Dynamic Sitting Balance moderate impairment; supported; sitting, edge of bed  -TANK     Static Standing Balance severe impairment; supported  -TANK     Dynamic Standing Balance severe impairment; supported  -TANK     Balance Interventions standing; dynamic; weight shifting activity  -TANK           User Key  (r) = Recorded By, (t) = Taken By, (c) = Cosigned By    Initials Name Provider Type    Karrie Pelayo, PT Physical Therapist               Goals/Plan    No documentation.                Clinical Impression     Row Name 11/05/21 0794          Pain Scale: Numbers Pre/Post-Treatment    Pretreatment Pain Rating 0/10 - no pain  -TANK     Posttreatment Pain Rating 0/10 - no pain  -TANK     Pain Intervention(s) Repositioned; Ambulation/increased activity; Splinting  -     Row Name 11/05/21 7856          Plan of Care Review    Plan of Care Reviewed With patient; spouse  -TANK     Progress improving  -TANK     Outcome Summary patient was able to take 10 steps with ARJO walker and max assist he is unable to advance right LE but could scoot left LE patient required mod assist for bed mobility he was able to assist more with all activity however patient would need a lot of redirection for short attention span often just staring straight ahead. patient requires min assist for sitting balance we will continue to progress activity as tolerated  -     Row Name 11/05/21 6574          Therapy Assessment/Plan (PT)    Patient/Family Therapy Goals Statement (PT) walk  -TANK     Rehab Potential (PT) fair, will monitor progress closely  -TANK     Criteria for Skilled Interventions Met (PT) yes; skilled treatment is necessary  -     Row Name 11/05/21 0846          Vital Signs    Pre Systolic BP Rehab 103  -TANK     Pre  Treatment Diastolic BP 54  -TANK     Post Systolic BP Rehab 114  -TANK     Post Treatment Diastolic BP 68  -TANK     Pretreatment Heart Rate (beats/min) 85  -TANK     Posttreatment Heart Rate (beats/min) 90  -TANK     Pre SpO2 (%) 92  -TANK     O2 Delivery Pre Treatment trach collar  -TANK     Post SpO2 (%) 93  -TANK     O2 Delivery Post Treatment trach collar  -TANK     Pre Patient Position Supine  -TANK     Intra Patient Position Standing  -TANK     Post Patient Position Sitting  -TANK     Row Name 11/05/21 1314          Positioning and Restraints    Pre-Treatment Position in bed  -TANK     Post Treatment Position chair  -TANK     In Chair notified nsg; reclined; sitting; call light within reach; encouraged to call for assist; exit alarm on; with family/caregiver; with nsg; waffle cushion; on mechanical lift sling  -TANK           User Key  (r) = Recorded By, (t) = Taken By, (c) = Cosigned By    Initials Name Provider Type    Karrie Pelayo PT Physical Therapist               Outcome Measures     Row Name 11/05/21 1320          How much help from another person do you currently need...    Turning from your back to your side while in flat bed without using bedrails? 2  -TANK     Moving from lying on back to sitting on the side of a flat bed without bedrails? 2  -TANK     Moving to and from a bed to a chair (including a wheelchair)? 2  -TANK     Standing up from a chair using your arms (e.g., wheelchair, bedside chair)? 2  -TANK     Climbing 3-5 steps with a railing? 1  -TANK     To walk in hospital room? 2  -TANK     AM-PAC 6 Clicks Score (PT) 11  -TANK           User Key  (r) = Recorded By, (t) = Taken By, (c) = Cosigned By    Initials Name Provider Type    Karrie Pelayo PT Physical Therapist                             Physical Therapy Education                 Title: PT OT SLP Therapies (In Progress)     Topic: Physical Therapy (In Progress)     Point: Mobility training (In Progress)     Learning Progress Summary           Patient  Acceptance, E, NR by TANK at 11/5/2021 1000    Acceptance, E, NR by KG at 11/3/2021 1432    Acceptance, E, NR by AE at 11/2/2021 1440    Acceptance, E, NR by AE at 11/1/2021 0910    Acceptance, E, NR by TANK at 10/31/2021 1145    Acceptance, E, NR by JB1 at 10/31/2021 0243    Acceptance, E, NR by JB1 at 10/30/2021 0334    Acceptance, E, VU by TANK at 10/29/2021 0815    Comment: wife instructed in ROM exercises to do with patient throughout the day she is able to verbalize understanding.    Acceptance, E, NR by AE at 10/28/2021 1425    Acceptance, E, NR by KG at 10/27/2021 0927    Acceptance, E, NR by TANK at 10/26/2021 1300    Acceptance, E, NR by KG at 10/25/2021 0828    Acceptance, E, NR by AE at 10/22/2021 0825    Acceptance, E, NR by KG at 10/20/2021 0943    Acceptance, E, NR by KG at 10/18/2021 1017    Acceptance, E, NR by KG at 10/17/2021 1138   Family Acceptance, E, NR by KG at 10/27/2021 0927    Acceptance, E, NR by KG at 10/25/2021 0828    Acceptance, E, NR by KG at 10/20/2021 0943    Acceptance, E, NR by KG at 10/17/2021 1138   Significant Other Acceptance, E, VU by TANK at 10/29/2021 0815    Comment: wife instructed in ROM exercises to do with patient throughout the day she is able to verbalize understanding.                   Point: Home exercise program (In Progress)     Learning Progress Summary           Patient Acceptance, E, NR by TANK at 11/5/2021 1000    Acceptance, E, NR by KG at 11/3/2021 1432    Acceptance, E, NR by AE at 11/2/2021 1440    Acceptance, E, NR by AE at 11/1/2021 0910    Acceptance, E, NR by TANK at 10/31/2021 1145    Acceptance, E, VU by TANK at 10/29/2021 0815    Comment: wife instructed in ROM exercises to do with patient throughout the day she is able to verbalize understanding.    Acceptance, E, NR by AE at 10/28/2021 1425    Acceptance, E, NR by KG at 10/27/2021 0927    Acceptance, E, NR by TANK at 10/26/2021 1300    Acceptance, E, NR by KG at 10/25/2021 0828    Acceptance, E, NR by AE at  10/22/2021 0825    Acceptance, E, NR by KG at 10/20/2021 0943    Acceptance, E, NR by KG at 10/18/2021 1017    Acceptance, E, NR by KG at 10/17/2021 1138   Family Acceptance, E, NR by KG at 10/27/2021 0927    Acceptance, E, NR by KG at 10/25/2021 0828    Acceptance, E, NR by KG at 10/20/2021 0943    Acceptance, E, NR by KG at 10/17/2021 1138   Significant Other Acceptance, E, VU by TANK at 10/29/2021 0815    Comment: wife instructed in ROM exercises to do with patient throughout the day she is able to verbalize understanding.                   Point: Body mechanics (In Progress)     Learning Progress Summary           Patient Acceptance, E, NR by TANK at 11/5/2021 1000    Acceptance, E, NR by KG at 11/3/2021 1432    Acceptance, E, NR by AE at 11/2/2021 1440    Acceptance, E, NR by AE at 11/1/2021 0910    Acceptance, E, NR by TANK at 10/31/2021 1145    Acceptance, E, NR by JB1 at 10/31/2021 0243    Acceptance, E, NR by JB1 at 10/30/2021 0334    Acceptance, E, VU by TANK at 10/29/2021 0815    Comment: wife instructed in ROM exercises to do with patient throughout the day she is able to verbalize understanding.    Acceptance, E, NR by AE at 10/28/2021 1425    Acceptance, E, NR by KG at 10/27/2021 0927    Acceptance, E, NR by TANK at 10/26/2021 1300    Acceptance, E, NR by KG at 10/25/2021 0828    Acceptance, E, NR by AE at 10/22/2021 0825    Acceptance, E, NR by KG at 10/20/2021 0943    Acceptance, E, NR by KG at 10/18/2021 1017    Acceptance, E, NR by KG at 10/17/2021 1138   Family Acceptance, E, NR by KG at 10/27/2021 0927    Acceptance, E, NR by KG at 10/25/2021 0828    Acceptance, E, NR by KG at 10/20/2021 0943    Acceptance, E, NR by KG at 10/17/2021 1138   Significant Other Acceptance, E, VU by TANK at 10/29/2021 0815    Comment: wife instructed in ROM exercises to do with patient throughout the day she is able to verbalize understanding.                   Point: Precautions (In Progress)     Learning Progress Summary            Patient Acceptance, E, NR by TANK at 11/5/2021 1000    Acceptance, E, NR by KG at 11/3/2021 1432    Acceptance, E, NR by AE at 11/2/2021 1440    Acceptance, E, NR by AE at 11/1/2021 0910    Acceptance, E, NR by TANK at 10/31/2021 1145    Acceptance, E, NR by JB1 at 10/30/2021 0334    Acceptance, E, VU by TANK at 10/29/2021 0815    Comment: wife instructed in ROM exercises to do with patient throughout the day she is able to verbalize understanding.    Acceptance, E, NR by AE at 10/28/2021 1425    Acceptance, E, NR by KG at 10/27/2021 0927    Acceptance, E, NR by TANK at 10/26/2021 1300    Acceptance, E, NR by KG at 10/25/2021 0828    Acceptance, E, NR by AE at 10/22/2021 0825    Acceptance, E, NR by KG at 10/20/2021 0943    Acceptance, E, NR by KG at 10/18/2021 1017    Acceptance, E, NR by KG at 10/17/2021 1138   Family Acceptance, E, NR by KG at 10/27/2021 0927    Acceptance, E, NR by KG at 10/25/2021 0828    Acceptance, E, NR by KG at 10/20/2021 0943    Acceptance, E, NR by KG at 10/17/2021 1138   Significant Other Acceptance, E, VU by TANK at 10/29/2021 0815    Comment: wife instructed in ROM exercises to do with patient throughout the day she is able to verbalize understanding.                               User Key     Initials Effective Dates Name Provider Type Discipline    TANK 06/16/21 -  Karrie Cortez, PT Physical Therapist PT    JB1 06/16/21 -  Ian Washington RN Registered Nurse Nurse    KG 05/22/20 -  Joanna Clark, PT Physical Therapist PT    AE 09/21/21 -  Sukumar Ray PT Physical Therapist PT              PT Recommendation and Plan  Planned Therapy Interventions (PT): balance training, bed mobility training, gait training, home exercise program, strengthening, transfer training  Plan of Care Reviewed With: patient, spouse  Progress: improving  Outcome Summary: patient was able to take 10 steps with ARJO walker and max assist he is unable to advance right LE but could scoot left LE  patient required mod assist for bed mobility he was able to assist more with all activity however patient would need a lot of redirection for short attention span often just staring straight ahead. patient requires min assist for sitting balance we will continue to progress activity as tolerated     Time Calculation:    PT Charges     Row Name 11/05/21 1321             Time Calculation    Start Time 1000  -TANK      PT Received On 11/05/21  -TANK      PT Goal Re-Cert Due Date 11/06/21  -TANK              Time Calculation- PT    Total Timed Code Minutes- PT 42 minute(s)  -TANK              Timed Charges    16912 - Gait Training Minutes  30  -TANK      55535 - PT Therapeutic Activity Minutes 12  -TANK              Total Minutes    Timed Charges Total Minutes 42  -TANK       Total Minutes 42  -TANK            User Key  (r) = Recorded By, (t) = Taken By, (c) = Cosigned By    Initials Name Provider Type    Karrie Pelayo, LILLIE Physical Therapist              Therapy Charges for Today     Code Description Service Date Service Provider Modifiers Qty    04328094869 HC GAIT TRAINING EA 15 MIN 11/5/2021 Karrie Cortez, PT GP 2    33217212580 HC PT THERAPEUTIC ACT EA 15 MIN 11/5/2021 Karrie Cortez, PT GP 1    26321727084 HC PT THER SUPP EA 15 MIN 11/5/2021 Karrie Cortez, PT GP 3          PT G-Codes  Outcome Measure Options: AM-PAC 6 Clicks Basic Mobility (PT)  AM-PAC 6 Clicks Score (PT): 11    Karrie Cortez PT  11/5/2021

## 2021-11-05 NOTE — PLAN OF CARE
Goal Outcome Evaluation:  Plan of Care Reviewed With: patient, spouse        Progress: improving   Pt on transfer. Remains on trach collar 50% from 30% this morning. Secretions more thick and more tan in color today. No fever noted. Remains anuric for H/D in AM. [place] [place]. Worked with PT today. ,see note. Remains on tube feeding per peg tube. Wife in today. Updated. Fees today. Wife present with fees and PT. Continue to monitor for changes.

## 2021-11-05 NOTE — PROGRESS NOTES
INTENSIVIST / PULMONARY FOLLOW UP NOTE     Hospital:  LOS: 32 days   Mr. Keshav Dickens, 66 y.o. male is followed for:     Acute respiratory failure with hypoxia    Anti-GBM nephritis with pulmonary hemorrhage (HCC)    Acute renal failure (ARF) (HCC)    Acute blood loss anemia    Sepsis due to pneumonia (HCC);Klebsiella aerogenes    Idiopathic hypotension    Hyperglycemia    History of tobacco abuse          SUBJECTIVE   Growing GNR in sputum    The patient's relevant past medical, surgical, family, and social history were reviewed    Allergies and medications were reviewed    ROS:  Per subjective, all other systems were reviewed and were negative        OBJECTIVE     Vital Sign Min/Max for last 24 hours:  Temp  Min: 97.9 °F (36.6 °C)  Max: 99.2 °F (37.3 °C)   BP  Min: 75/41  Max: 147/70   Pulse  Min: 61  Max: 86   Resp  Min: 15  Max: 20   SpO2  Min: 90 %  Max: 98 %   No data recorded     Physical Exam:  General Appearance:  no acute distress  Eyes:  No scleral icterus or pallor, pupils normal  Ears, Nose, Mouth, Throat:  tracheostomy  Neck:  Trachea midline, thyroid normal  Respiratory: clear to auscultation bilaterally, normal effort  Cardiovascular:  Regular rate and rhythm, no murmurs, no peripheral edema  Gastrointestinal:  Soft, nontender, no hepatosplenomegaly, PEG  Skin:  Normal temperature, no rash  Psychiatric:  no agitation  Neuro:  No new focal neurologic deficits observed      Telemetry:              Hemodynamics:   CVP:     PAP:     PAOP:     CO:     CI:     SVI:     SVR:       SpO2: 94 % SpO2  Min: 90 %  Max: 98 %   Device:      Flow Rate:   No data recorded     Mechanical Ventilator Settings:                                Intake/Ouptut 24 hrs (7:00AM - 6:59 AM)  Intake & Output (last 3 days)       11/02 0701 11/03 0700 11/03 0701  11/04 0700 11/04 0701 11/05 0700 11/05 0701  11/06 0700    I.V. (mL/kg)  155 (1.9) 310 (3.8)     Other 198 274 322     NG/.5 1047 1161     Total Intake(mL/kg) 1180.5  (14.6) 1476 (18.2) 1793 (22.1)     Other 3220  2280     Total Output 3220  2280     Net -2039.5 +1476 -487             Stool Unmeasured Occurrence   1 x           Lines, Drains & Airways     Active LDAs     Name Placement date Placement time Site Days    Peripheral IV 10/09/21 2200 Anterior; Left Forearm 10/09/21  2200  Forearm  2    Peripheral IV 10/12/21 1110 Anterior; Right Forearm 10/12/21  1110  Forearm  less than 1    NG/OG Tube Nasoduodenal 10 Fr Right nostril 10/06/21  1000  Right nostril  6    Rectal Tube With balloon 10/11/21  1200  --  1    ETT  10/04/21  1200   8                Hematology:  Results from last 7 days   Lab Units 11/05/21 0328 11/04/21 0351 11/03/21 0552 11/02/21  0356 11/01/21  0331 10/31/21  0419 10/30/21  0416   WBC 10*3/mm3 8.97 18.58* 10.02 8.93 9.00 8.04 12.51*   HEMOGLOBIN g/dL 7.2* 8.3* 7.8* 7.2* 7.8* 7.1* 8.6*   HEMATOCRIT % 22.9* 25.4* 22.7* 20.8* 23.0* 21.3* 25.3*   PLATELETS 10*3/mm3 175 259 191 198 212 202 228     Electrolytes, Magnesium and Phosphorus:  Results from last 7 days   Lab Units 11/05/21 0328 11/04/21 0351 11/03/21 0552 11/02/21  0356 11/01/21  0331 10/31/21  0422 10/30/21  0416   SODIUM mmol/L 135* 132* 131* 132* 132* 136 135*   CHLORIDE mmol/L 95* 89* 92* 89* 92* 96* 92*   POTASSIUM mmol/L 4.0 5.0 3.9 4.2 4.3 4.2 4.3   CO2 mmol/L 25.0 25.0 25.0 21.0* 23.0 27.0 21.0*   MAGNESIUM mg/dL 2.4 2.9* 2.5* 2.9*  2.9*  --   --   --    PHOSPHORUS mg/dL 3.2 5.0* 3.5 4.7*  4.7* 4.5 4.5  --      Renal:  Results from last 7 days   Lab Units 11/05/21  0328 11/04/21  0351 11/03/21  0552 11/02/21  0356 11/01/21  0331 10/31/21  0422 10/30/21  0416   CREATININE mg/dL 3.72* 6.66* 5.01* 7.85* 6.35* 4.48* 6.76*   BUN mg/dL 48* 93* 67* 145* 106* 69* 111*     Estimated Creatinine Clearance: 21.8 mL/min (A) (by C-G formula based on SCr of 3.72 mg/dL (H)).  Hepatic:  Results from last 7 days   Lab Units 11/05/21 0328 11/04/21 0351 11/03/21 0552 11/02/21 0356   ALK PHOS U/L 77 88  88 78   BILIRUBIN mg/dL 0.8 0.7 0.9 0.6   ALT (SGPT) U/L 20 26 27 25   AST (SGOT) U/L 18 23 29 23     Arterial Blood Gases:  Results from last 7 days   Lab Units 10/31/21  0921 10/30/21  0815   PH, ARTERIAL pH units 7.468* 7.458*   PCO2, ARTERIAL mm Hg 37.3 34.1*   PO2 ART mm Hg 61.5* 71.3*   FIO2 % 40 35             Lab Results   Component Value Date    LACTATE 1.9 10/04/2021       Relevant imaging studies and labs from 11/05/21 were reviewed and interpreted by me    Medications (drips):  phenylephrine, Last Rate: Stopped (11/04/21 1757)        atovaquone, 1,500 mg, Per PEG Tube, Daily With Lunch  b complex-vitamin c-folic acid, 1 tablet, Per PEG Tube, Daily  chlorhexidine, 15 mL, Mouth/Throat, Q12H  cholecalciferol, 2,000 Units, Per PEG Tube, Daily  custom medication builder, 100 mg, Nasogastric, Daily  dextromethorphan polistirex ER, 60 mg, Per PEG Tube, Q12H  epoetin tatum/tatum-epbx, 10,000 Units, Subcutaneous, Once per day on Tue Thu Sat  famotidine, 20 mg, Per G Tube, Daily  ferrous sulfate, 300 mg, Per PEG Tube, Daily  gabapentin, 100 mg, Per PEG Tube, Q8H  guaiFENesin-codeine, 10 mL, Per G Tube, TID  heparin (porcine), 5,000 Units, Subcutaneous, Q12H  insulin detemir, 8 Units, Subcutaneous, Q12H  insulin regular, 0-9 Units, Subcutaneous, Q6H  ipratropium-albuterol, 3 mL, Nebulization, 4x Daily - RT  lactobacillus acidophilus, 1 capsule, Per PEG Tube, Daily  melatonin, 5 mg, Per G Tube, Nightly  meropenem, 500 mg, Intravenous, Q24H  midodrine, 10 mg, Per PEG Tube, Q8H  Nutrisource fiber, 1 packet, Per G Tube, TID  predniSONE, 40 mg, Per G Tube, Daily With Breakfast  QUEtiapine, 25 mg, Per PEG Tube, Nightly  saccharomyces boulardii, 500 mg, Oral, BID  sertraline, 50 mg, Per PEG Tube, Daily        Assessment/Plan   IMPRESSION / PLAN     Inpatient Problem List:  66 y.o.male:  Active Hospital Problems    Diagnosis    • **Acute respiratory failure with hypoxia    • Idiopathic hypotension    • Hyperglycemia    •  History of tobacco abuse    • Sepsis due to pneumonia (HCC);Klebsiella aerogenes    • Acute blood loss anemia    • Anti-GBM nephritis with pulmonary hemorrhage (HCC)    • Acute renal failure (ARF) (HCC)         Impression & Plan:  66 y.o.male with relevant PMH of 45 py smoking quit 1 month PTA, HTN, T2DM admitted initially to [facility] 9/27/21 with 2-3 week h/o hemoptysis.  He required intubation on 10/1/21.  CTA at OSH showed diffuse GGO and 8 mm RML nodule.  Patient was also noted to be in Renal failure with concerns for Pulmonary-Renal syndrome.  He was treated with 3 days of pulse IV solumedrol. Ultimately anti-GBM Ab was positive concerning for Goodpastures.  PAIGE / ANCA / RF / Mycoplasma IgM were negative.  Patient had temporary HD catheter placed at OSH for PIOTR and HD was started.    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    He was transferred to [facility] 10/4/2021 for higher level of care.  Daily plasma exchange was initiated on 10/5/21 for total of 14 sessions, and he has continued on moderate dose steroids after receiving pulse.  Patient has also been initiated on Cytoxan.  Has required multiple transfusions.      Eventually had Trach / PEG on 10/15/21.    Trach downsized to 6-0 cuffless fenestrated shiley on 11/2.    Anxiety  Depression  Continue klonopin and zoloft    Acute Hypoxemic Respiratory Failure  Diffuse Alveolar Hemorrhage  Goodpasture's Syndrome  Prolonged Mechanical Ventilation  Scheduled nebs. Steroids / cytoxan / plasma exchange per nephrology.  PJP prophylaxis.  Patient is heavily immunosuppressed and will be for some time.  Appreciate ID assistance with abx.   Off vent now ~1 week.   Speech following for swallowing and PMV.  Changed to 6-0 cuffless fenestrated trach 11/2.  Brisk cough reflex.  Continue Delsym, Codeine, and low dose gabapentin.  Once coughing /  secretions improved can start capping trials.  Leave PMV on during day as tolerated.      Plans in place to continue cytoxan for 3 months.  Slowly taper pred.  Stop PJP prophylaxis when on < 15 mg Pred.  Pred taper per Nephrology.    Klebsiella Aerogenes Pneumonia  Completed abx on 10/25.  Sputum more purulent, and has GNR in sputum.  Back on Stephanie - appreciate ID.  Re-start scheduled nebs and add metaneb.    Hypotension  · Wean of isabela as able.  Start po midodrine.    PIOTR  As above, Dialysis per nephrology.  D/cd prostat until uremia improves.    Anemia  Has required multiple transfusions.  Tranfuse for hgb < 7.  No signs of active bleeding.      T2DM A1c  Steroid Induced Hyperglycemia  Titrate SQ insulin    DVT / PUD Prophylaxis  SCDs / Heparin / Pepcid    Nutrition  Tube Feeds    Dispo  Trach / PEG 10/15.  Unfortunately cytoxan has to be specially compounded to go in G-tube.  Once able to swallow with ST and take pill form this will expand options for rehab.    PT/OT    Plan of care and goals reviewed with multidisciplinary team at daily rounds    High Risk secondary to hypotension on pressors, severe life threatening good pastures syndrome requiring prolonged mechanical ventilation, dialysis, plasma exchange, etc.  High risk for worsening / relapse.    To tele         Sravan Forrester MD  Intensive Care Medicine  11/05/21 11:00 EDT

## 2021-11-05 NOTE — PROGRESS NOTES
Infectious Disease Progress Note  Keshav Dickens  1955  [id number]    Date of Consult: 10/13/2021  Admission Date: 10/4/2021    Requesting Provider: Dr Forrester  Evaluating Physician: Rodríguez Ruff MD    Chief Complaint: hemoptysis    Reason for Consultation: GNR VAP    History of present illness:   Patient is a 66 y.o.  Yr old male with history of DM2, tobacco abuse.  Initially admitted to [facility] on 9/27/2021 with complaints of hemoptysis for 2 to 3 weeks.  Intubated on 10/1.  CT scan with diffuse groundglass opacities and renal failure.  Due to concern for pulmonary renal syndrome he was treated with pulse dose steroids.  Anti-GBM antibody positive concerning for Goodpasture's syndrome.  Renal biopsy also confirmed the diagnosis.  Transferred to [facility] on 10/4.  Nephrology has been following and he is getting plasmapheresis; also high-dose steroids and Cytoxan.    On 10/10 he developed fever up to 101.  Also had leukocytosis up to 15,000.  He had been on atovaquone for PCP prophylaxis.  Started on vancomycin and Zosyn on 10/12.  Respiratory culture from 10/12 with heavy growth of gram-negative bacilli.  And BAL on 10/13 office gram-negative bacilli.  Recurrent hemoptysis per RN.  Intubated, sedated.  60% FiO2.  Trach and PEG planned. Fever curve improved.     10/14/21: Fevers improved. Less blood from ET suction. 50% FiO2 from vent. Sputum culture with Klebsiella aerogenes.     10/15/2021: Underwent tracheostomy and PEG tube placement earlier today.  Transfer back to ICU, relatively stable.  60% FiO2, sedated.  Less blood from endotracheal secretions per staff.     10/16/21: Worse overnight with hypertension and some low-grade temperatures despite CRRT.  Had an episode of emesis and likely aspiration per RN.  Ventilatory requirement up slightly. Still sedated    10/17/21: Improved overnight.  Fever curve improved.  O2 settings down.  Weaning sedation.  No  further episodes of emesis.  Anuric. No diarrhea.     10/18/21: Patient follow-up improved over the past 48 hours.  Afebrile.  No acute new issues overnight.  More comfortable on ventilator.  More sedated today. fever subglottic secretions    10/19/21: no acute new concerns. Minimally responsive. On trach collar O2. No further hemoptysis or sputum production.    10/20/2021: Slow improvement.  Currently trach collar oxygen.  Still very weak, deconditioned.  Weaning sedation.  Still has blood-tinged respiratory secretions.  No other new concerns noted per ICU team    10/21/21: Low-grade temps but no true fevers. Stable, low oxygenation settings.  Ongoing blood-tinged tracheal secretions, slightly worse today. Transitioning to intermittent hemodialysis. Hemoglobin dropped to 5.6 and required transfusion.  No external signs of bleeding other than out of tracheostomy  per RN. Possible transfer to LTAC on Monday    10/22/21: On ventilator overnight, 30% FiO2 and trach collar trials during the day.  Still significant blood-tinged, frothy sputum per RN.  Afebrile.  Tolerating tube feeds.  Had bowel movement.  More alert today and starting to follow some simple commands    11/1/21: Still in the ICU but more alert and interactive on trach collar oxygen.  Blood pressure stable.  Afebrile.  Completed IV antibiotics as planned on 10/25.  Still on atovaquone.  Difficult placement due to need for Cytoxan per case management.  Significant pulmonary secretions per nursing staff.    11/4/21: WBC spiked today.  Increased pulmonary secretions new culture sent with gram-negative rods.  No change in color of secretions per RN.  Has had some episodes of hypotension.  Tracheostomy came dislodged yesterday and required bronchoscopy for placement.    11/5/2021: Afebrile today.  Ongoing increased pulmonary secretions, growing gram-negative robyn in culture.  Per RN secretions are more brown in color today. Patient more fatigued and less  interactive.  No other acute new concerns per RN.  Transfers orders in place to telemetry      Review of Systems  See above.  Otherwise limited due to deconditioning, mental status     No Known Allergies    Antibiotics:  Anti-Infectives (From admission, onward)    Ordered     Dose/Rate Route Frequency Start Stop    11/04/21 1713  meropenem (MERREM) 500mg/100 mL 0.9% NS IVPB (mbp)        Ordering Provider: Rodríguez Ruff MD    500 mg  over 3 Hours Intravenous Every 24 Hours 11/04/21 1712 11/11/21 1359    10/28/21 1214  atovaquone (MEPRON) 750 MG/5ML suspension        Ordering Provider: Deb Ovalle APRN    1,500 mg Per G Tube Daily With Lunch 10/28/21 0000 11/27/21 2359    10/21/21 1002  meropenem (MERREM) 1 g/100 mL 0.9% NS (mbp)        Ordering Provider: Rodríguez Ruff MD    1 g  over 3 Hours Intravenous Every 24 Hours 10/21/21 1200 10/25/21 1520    10/20/21 0805  atovaquone (MEPRON) suspension 1,500 mg        Ordering Provider: Genet Bui MD    1,500 mg Per PEG Tube Daily With Lunch 10/20/21 1200 11/26/21 2359    10/18/21 1220  vancomycin (VANCOCIN) in iso-osmotic dextrose IVPB 1 g (premix) 200 mL        Ordering Provider: Rafal Salcido, PharmD    1,000 mg  over 60 Minutes Intravenous Once 10/18/21 1315 10/18/21 1536    10/17/21 0825  vancomycin (VANCOCIN) in iso-osmotic dextrose IVPB 1 g (premix) 200 mL        Ordering Provider: Autumn Lozano, PharmD    10 mg/kg × 102 kg  over 60 Minutes Intravenous Once 10/17/21 1100 10/17/21 1138    10/16/21 1121  vancomycin 2000 mg/500 mL 0.9% NS IVPB ([facility])        Ordering Provider: Autumn Lozano, PharmD    20 mg/kg × 102 kg  over 120 Minutes Intravenous Once 10/16/21 1300 10/16/21 1657    10/13/21 1755  meropenem (MERREM) 1 g/100 mL 0.9% NS (mbp)        Ordering Provider: Rodríguez Ruff MD    1 g  over 30 Minutes Intravenous Once 10/13/21 1845 10/13/21 1858    10/12/21 0954  vancomycin 2000 mg/500 mL 0.9% NS IVPB ([facility])         Ordering Provider: Sravan Forrester MD    20 mg/kg × 102 kg Intravenous Once 10/12/21 1045 10/12/21 1130    10/12/21 0954  piperacillin-tazobactam (ZOSYN) 4.5 g in iso-osmotic dextrose 100 mL IVPB (premix)        Ordering Provider: Sravan Forrester MD    4.5 g  over 30 Minutes Intravenous Once 10/12/21 1045 10/12/21 1125          Other Medications:  Current Facility-Administered Medications   Medication Dose Route Frequency Provider Last Rate Last Admin   • acetaminophen (TYLENOL) tablet 650 mg  650 mg Per PEG Tube Q6H PRN Genet Bui MD   650 mg at 10/24/21 0004   • atovaquone (MEPRON) suspension 1,500 mg  1,500 mg Per PEG Tube Daily With Lunch Genet Bui MD   1,500 mg at 11/05/21 1403   • b complex-vitamin c-folic acid (NEPHRO-MELE) tablet 1 tablet  1 tablet Per PEG Tube Daily Genet Bui MD   1 tablet at 11/05/21 0812   • sennosides-docusate (PERICOLACE) 8.6-50 MG per tablet 2 tablet  2 tablet Per PEG Tube BID PRN Genet Bui MD        And   • polyethylene glycol (MIRALAX) packet 17 g  17 g Per PEG Tube Daily PRN Genet Bui MD        And   • bisacodyl (DULCOLAX) suppository 10 mg  10 mg Rectal Daily PRN Genet Bui MD       • chlorhexidine (PERIDEX) 0.12 % solution 15 mL  15 mL Mouth/Throat Q12H Abdon Roberts MD   15 mL at 11/05/21 0812   • cholecalciferol (VITAMIN D3) tablet 2,000 Units  2,000 Units Per PEG Tube Daily Sravan Forrester MD   2,000 Units at 11/05/21 0812   • clonazePAM (KlonoPIN) tablet 0.5 mg  0.5 mg Per PEG Tube BID PRN Sravan Forrester MD       • cyclophosphamide (CYTOXAN) 100 mg compound  100 mg Nasogastric Daily Sravan Forrester MD       • dextromethorphan polistirex ER (DELSYM) 30 MG/5ML oral suspension 60 mg  60 mg Per PEG Tube Q12H Sravan Forrester MD   60 mg at 11/05/21 0812   • dextrose (D50W) (25 g/50 mL) IV injection 25 g  25 g Intravenous  Q15 Min PRN Sravan Forrester MD       • dextrose (D50W) 25 g/ 50mL Intravenous Solution 25 g  25 g Intravenous Q15 Min PRN Abdon Roberts MD   25 g at 10/15/21 0518   • dextrose (GLUTOSE) oral gel 15 g  15 g Oral Q15 Min PRN Sravan Forrester MD       • epoetin tatum-epbx (RETACRIT) injection 10,000 Units  10,000 Units Subcutaneous Once per day on Tue Thu Sat Torsten Drake MD   10,000 Units at 11/04/21 0804   • famotidine (PEPCID) tablet 20 mg  20 mg Per G Tube Daily Genet Bui MD   20 mg at 11/05/21 0812   • ferrous sulfate 300 (60 Fe) MG/5ML syrup 300 mg  300 mg Per PEG Tube Daily Genet Bui MD   300 mg at 11/05/21 0812   • gabapentin (NEURONTIN) capsule 100 mg  100 mg Per PEG Tube Q8H Sravan Forrester MD   100 mg at 11/05/21 1404   • glucagon (human recombinant) (GLUCAGEN DIAGNOSTIC) injection 1 mg  1 mg Subcutaneous Q15 Min PRN Abdon Roberts MD       • glucagon (human recombinant) (GLUCAGEN DIAGNOSTIC) injection 1 mg  1 mg Subcutaneous Q15 Min PRN Sravan Forrester MD       • guaiFENesin-codeine (GUAIFENESIN AC) 100-10 MG/5ML liquid 10 mL  10 mL Per G Tube TID Sravan Forrester MD   10 mL at 11/05/21 0812   • heparin (porcine) 5000 UNIT/ML injection 5,000 Units  5,000 Units Subcutaneous Q12H Nacho Ba DO   5,000 Units at 11/05/21 0812   • heparin (porcine) injection 1,600 Units  1,600 Units Intracatheter PRN Torsten Drake MD   1,600 Units at 11/04/21 0818   • insulin detemir (LEVEMIR) injection 8 Units  8 Units Subcutaneous Q12H Sravan Forrester MD   8 Units at 11/05/21 0759   • insulin regular (humuLIN R,novoLIN R) injection 0-9 Units  0-9 Units Subcutaneous Q6H Sravan Forrester MD   2 Units at 11/04/21 2325   • ipratropium-albuterol (DUO-NEB) nebulizer solution 3 mL  3 mL Nebulization 4x Daily - RT Sravan Forrester MD   3 mL at 11/05/21 1319   • labetalol  "(NORMODYNE,TRANDATE) injection 20 mg  20 mg Intravenous Q2H PRN Abdon Roberts MD   20 mg at 10/17/21 1721   • lactobacillus acidophilus (RISAQUAD) capsule 1 capsule  1 capsule Per PEG Tube Daily Rodríguez Ruff MD   1 capsule at 11/05/21 0812   • loperamide (IMODIUM) capsule 2 mg  2 mg Per G Tube 4x Daily PRN Genet Bui MD   2 mg at 10/24/21 0154   • melatonin tablet 5 mg  5 mg Per G Tube Nightly Sravan Forrester MD   5 mg at 11/04/21 2008   • meropenem (MERREM) 500mg/100 mL 0.9% NS IVPB (mbp)  500 mg Intravenous Q24H Rodríguez Ruff MD   500 mg at 11/05/21 1403   • midodrine (PROAMATINE) tablet 10 mg  10 mg Per PEG Tube Q8H Sravan Forrester MD   10 mg at 11/05/21 1404   • Nutrisource fiber pack 1 packet  1 packet Per G Tube TID Genet Bui MD   1 packet at 11/05/21 0814   • ondansetron (ZOFRAN) injection 4 mg  4 mg Intravenous Q6H PRN Sravan Forrester MD   4 mg at 10/23/21 0201   • oxyCODONE (ROXICODONE) 5 MG/5ML solution 5 mg  5 mg Per G Tube Q6H PRN Genet Bui MD   5 mg at 11/03/21 0110   • phenylephrine (GIOVANI-SYNEPHRINE) 50 mg in sodium chloride 0.9 % 250 mL infusion  0.5-3 mcg/kg/min Intravenous Titrated Darlene Solomon APRN   Stopped at 11/04/21 1757   • predniSONE (DELTASONE) tablet 40 mg  40 mg Per G Tube Daily With Breakfast Dagoberto Dawkins MD   40 mg at 11/05/21 1404   • QUEtiapine (SEROquel) tablet 25 mg  25 mg Per PEG Tube Nightly Sravan Forrester MD       • saccharomyces boulardii (FLORASTOR) capsule 500 mg  500 mg Oral BID Sravan Forrester MD   500 mg at 11/05/21 1404   • sertraline (ZOLOFT) tablet 50 mg  50 mg Per PEG Tube Daily Sravan Forrester MD   50 mg at 11/05/21 0812       Physical Exam:   Vital Signs   BP 93/47   Pulse 81   Temp 98.1 °F (36.7 °C) (Axillary)   Resp 18   Ht 172.7 cm (67.99\")   Wt 81 kg (178 lb 9.2 oz)   SpO2 96%   BMI 27.16 kg/m²     GENERAL: " Chronically ill-appearing.  Very weak, but overall improved  HEENT: Normocephalic, atraumatic.    Neck:   Tracheostomy in place.  mucoid secretions noted.  HEART: RRR; No murmur.  LUNGS: Clear bilaterally from anterior position. trach collar oxygen.   ABDOMEN: Soft, nondistended. No rebound or guarding. PEG tube in place  EXT:  No edema.  : Without Cedeno catheter.  MSK: no major joint swelling noted.    SKIN: no rash  NEURO: Follows basic commands, but very weak  Dialysis catheter in right chest    Laboratory Data    Results from last 7 days   Lab Units 11/05/21  0328 11/04/21  0351 11/03/21  0552   WBC 10*3/mm3 8.97 18.58* 10.02   HEMOGLOBIN g/dL 7.2* 8.3* 7.8*   HEMATOCRIT % 22.9* 25.4* 22.7*   PLATELETS 10*3/mm3 175 259 191     Results from last 7 days   Lab Units 11/05/21  0328   SODIUM mmol/L 135*   POTASSIUM mmol/L 4.0   CHLORIDE mmol/L 95*   CO2 mmol/L 25.0   BUN mg/dL 48*   CREATININE mg/dL 3.72*   GLUCOSE mg/dL 103*   CALCIUM mg/dL 8.7     Estimated Creatinine Clearance: 21.8 mL/min (A) (by C-G formula based on SCr of 3.72 mg/dL (H)).  Results from last 7 days   Lab Units 11/05/21  0328   ALK PHOS U/L 77   BILIRUBIN mg/dL 0.8   ALT (SGPT) U/L 20   AST (SGOT) U/L 18         Results from last 7 days   Lab Units 11/02/21  0356   CRP mg/dL 2.55*       Microbiology:  Covid screening negative on admission  10/12 respiratory culture with heavy growth gram-negative bacilli  MRSA nares screening negative  10/12 Catheter tip culture with skin mal  10/13 BAL culture with Klebsiella aerogenes    10/16 blood cultures negative   10/16 respiratory culture negative      11/4 respiratory culture with gram-negative rods on gram stain  11/5 blood culture pending    Radiology:  XR Chest 1 View    Result Date: 11/5/2021  Mild perihilar interstitial disease, perhaps mild edema. No significant interval change. No evidence of pneumothorax.   D:  11/05/2021 E:  11/05/2021       I personally reviewed the above CXR: ongoing faint  right lower lobe opacity      Impression:   1. leukocytosis, sepsis: new on 11/4, likely due to GNR VAP. Improved today  2. GNR VAP: Could be Klebsiella again or other new pathogen.  Culture pending.  Improved today after restarting meropenem   3. Recent Klebsiella aerogenes ventilator associated pneumonia: From respiratory culture x2.   Repeat cultures negative.  O2 settings improved.  Completed antibiotics on 10/25 is planned.  4. Possible aspiration overnight 10/15:   5. Recurrent hemoptysis: Improved .    6. Acute hypoxic respiratory failure: s/p trach on 10/15. improved  7. 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 On Cytoxan and steroids   8. Renal failure, anuric. On dialysis   9. Immunocompromise host: Cytoxan and high-dose steroids  10. DM2  11. Tobacco abuse    PLAN:   - Repeat respiratory culture pending with gram-negative rods. Will follow. Blood cx also pending  - Follow CBC, CMP    - Continue meropenem, renally dosed. Restarted 11/4.  Will tailor therapy as able based on pending culture results      - Continue atovaquone for PCP prophylaxis, until on less than 15 mg of prednisone daily.  - Slowly weaning prednisone per nephrology    Complex MDM. Immunocompromised host.  Discussed with RN. Placement complicated by need for Cytoxan per case management.   I will follow cultures peripherally over the weekend and reevaluate in person on Monday.  Please call if any new concerns develop in the meantime.    Rodríguez Ruff MD  11/5/2021

## 2021-11-05 NOTE — CASE MANAGEMENT/SOCIAL WORK
Continued Stay Note   Michelle     Patient Name: Keshav Dickens  MRN: [id number]  Today's Date: 11/5/2021    Admit Date: 10/4/2021     Discharge Plan     Row Name 11/05/21 1116       Plan    Plan ongoing    Patient/Family in Agreement with Plan yes    Plan Comments I saw Mr Dickens in room   He is still on trach collar.  SLP to continue to work with him as appropriate.  If at some point he can swallow his Cytoxan this will give us more options for rehab.  Wife aware and is understanding and agreeable.    Final Discharge Disposition Code 30 - still a patient               Discharge Codes    No documentation.                     Patricia Disla RN

## 2021-11-05 NOTE — MBS/VFSS/FEES
Acute Care - Speech Language Pathology Treatment Note  [facility]     Patient Name: Keshav Dickens  : 1955  MRN: [id number]  Today's Date: 2021               Admit Date: 10/4/2021     Visit Dx:    ICD-10-CM ICD-9-CM   1. Acute respiratory failure with hypoxia  J96.01 518.81   2. Examination for normal comparison for clinical research  Z00.6 V70.7   3. Diffuse pulmonary alveolar hemorrhage  R04.89 786.30   4. 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.0 446.21   5. Voice impairment  R49.9 784.40   6. Dysphagia, unspecified type  R13.10 787.20     Patient Active Problem List   Diagnosis   • Anti-GBM nephritis with pulmonary hemorrhage (HCC)   • Acute respiratory failure with hypoxia   • Acute renal failure (ARF) (HCC)   • Acute blood loss anemia   • Sepsis due to pneumonia (HCC);Klebsiella aerogenes   • Idiopathic hypotension   • Hyperglycemia   • History of tobacco abuse     Past Medical History:   Diagnosis Date   • Goodpasture's syndrome  10/4/2021   • Hypertension      Past Surgical History:   Procedure Laterality Date   • CHOLECYSTECTOMY     • TRACHEOSTOMY AND PEG TUBE INSERTION N/A 10/15/2021    Procedure: TRACHEOSTOMY AND PERCUTANEOUS ENDOSCOPIC GASTROSTOMY TUBE INSERTION;  Surgeon: Abdon Roberts MD;  Location: [facility];  Service: General;  Laterality: N/A;       SLP Recommendation and Plan  SLP Diagnosis: Voice/communication impairment 2' trach. PMV left at bedside. Pt to wear PMV w/ SLP supervision only at this time. (21)        Swallow Criteria for Skilled Therapeutic Interventions Met: demonstrates skilled criteria (21)  SLC Criteria for Skilled Therapy Interventions Met: yes (21)  Anticipated Discharge Disposition (SLP): unknown, anticipate therapy at next level of care (21)     Therapy Frequency (Swallow): 5 days per week (21)  Predicted Duration Therapy Intervention (Days): until discharge (21)  Daily  Summary of Progress (SLP): progress toward functional goals as expected (11/05/21 0945)  Plan for Continued Treatment (SLP): Continue to follow to address dysphagia and communication in tx (11/05/21 0945)              SLP EVALUATION (last 72 hours)     SLP SLC Evaluation     Row Name 11/05/21 0945 11/03/21 5023                Communication Assessment/Intervention    Document Type evaluation; therapy note (daily note)  -CH re-evaluation; therapy note (daily note)  -RD                General Information    Patient's Goals for Discharge patient did not state  -CH --       Family Goals for Discharge patient able to return to previous activities/roles  -CH --                SLP Clinical Impressions    Daily Summary of Progress (SLP) -- progress toward functional goals as expected  -RD       Barriers to Overall Progress (SLP) -- cognition  -RD       SLP Diagnosis Voice/communication impairment 2' trach. PMV left at bedside. Pt to wear PMV w/ SLP supervision only at this time.  -CH --       Rehab Potential/Prognosis good  -CH --       [facility] Criteria for Skilled Therapy Interventions Met yes  -CH --       Functional Impact difficulty communicating wants, needs; difficulty communicating in an emergency; difficulty in expressing complex messages; functional impact in social situations  -CH --       Plan for Continued Treatment (SLP) -- Saw for PMV treatment. Pt now downsized to shiley 6 cuffless/fenestrated trach. Pt able to voice around his trach, but vocal quality & loudness greatly improved w/ PMV placement.  Pt also able to cough secretions to oral cavity w/ PMV in place. No significant changes in vital signs w/ PMV in place. Required cues for suctioning of secretions in oral cavity. Pt w/ impaired cognition & is aware that he would not remember to call for help to remove PMV before sleeping, so spouse notified to call RN before she leaves. Educated on precautions r/t PMV. Okay to wear PMV w/ RN/RT/SLP supervision. Continue  to address.  -RD                Recommendations    Therapy Frequency (SLP SLC) 5 days per week  -CH 5 days per week  -RD                Speaking Valve Treatment Objectives    Tolerate Speaking Valve Placement Selection tolerate speaking valve placement, SLP goal 1  -CH --       Audible Speech Selection audible speech, SLP goal 1  -CH --             User Key  (r) = Recorded By, (t) = Taken By, (c) = Cosigned By    Initials Name Effective Dates    Brittany Cotton, MS CCC-SLP 06/16/21 -     CH Samira Carlson MS CCC-SLP 06/16/21 -                    EDUCATION  The patient has been educated in the following areas:     Speaking Valve Home Exercise Program (HEP) Dysphagia (Swallowing Impairment) Oral Care/Hydration NPO rationale.           SLP GOALS     Row Name 11/05/21 0945 11/03/21 1515 11/02/21 1300       Oral Nutrition/Hydration Goal 1 (SLP)    Oral Nutrition/Hydration Goal 1, SLP LTG: Pt. will safely consume a PO diet without aspiration or distress  -CH -- --    Time Frame (Oral Nutrition/Hydration Goal 1, SLP) by discharge  -CH -- --       Oral Nutrition/Hydration Goal 2 (SLP)    Oral Nutrition/Hydration Goal 2, SLP STG: Patient will tolerate pureed and ice/thin liquid trials without s/s of aspiration to determine readiness for repeat instrumental evaluation of the swallow  -CH -- --    Time Frame (Oral Nutrition/Hydration Goal 2, SLP) by discharge  -CH -- --       Lingual Strengthening Goal 1 (SLP)    Activity (Lingual Strengthening Goal 1, SLP) increase tongue back strength  -CH -- --    Increase Tongue Back Strength swallow trials; lingual resistance exercises  - -- --    [place]/Accuracy (Lingual Strengthening Goal 1, SLP) with minimal cues (75-90% accuracy)  - -- --    Time Frame (Lingual Strengthening Goal 1, SLP) by discharge  - -- --       Pharyngeal Strengthening Exercise Goal 1 (SLP)    Activity (Pharyngeal Strengthening Goal 1, SLP) increase timing; increase superior movement of the  hyolaryngeal complex; increase anterior movement of the hyolaryngeal complex; increase closure at entrance to airway/closure of airway at glottis; increase squeeze/positive pressure generation  -CH -- --    Increase Timing prepping - 3 second prep or suck swallow or 3-step swallow  -CH -- --    Increase Superior Movement of the Hyolaryngeal Complex falsetto  -CH -- --    Increase Anterior Movement of the Hyolaryngeal Complex chin tuck against resistance (CTAR)  -CH -- --    Increase Closure at Entrance to Airway/Closure of Airway at Glottis super-supraglottic swallow; breath hold exercises  -CH -- --    Increase Squeeze/Positive Pressure Generation hard effortful swallow  -CH -- --    [place]/Accuracy (Pharyngeal Strengthening Goal 1, SLP) with minimal cues (75-90% accuracy)  -CH -- --    Time Frame (Pharyngeal Strengthening Goal 1, SLP) by discharge  -CH -- --       Tolerate Speaking Valve Placement Goal 1 (SLP)    Tolerate Speaking Valve Placement Goal 1 (SLP) speaking valve >30 min; 80%; with minimal cues (75-90%)  -CH speaking valve >30 min; 80%; with minimal cues (75-90%)  -RD speaking valve >30 min; 80%; with minimal cues (75-90%)  -CJ    Time Frame (Tolerate Speaking Valve Placement Goal 1, SLP) short term goal (STG)  -CH short term goal (STG)  -RD short term goal (STG)  -CJ    Barriers (Tolerate Speaking Valve Placement 1, SLP) -- -- excessive coughing  -CJ    Progress (Tolerate Speaking Valve Placement Goal 1, SLP) 70%; with minimal cues (75-90%)  -CH 60%; with minimal cues (75-90%)  -RD 20%; with moderate cues (50-74%); with maximum cues (25-49%)  -CJ    Progress/Outcomes (Tolerate Speaking Valve Placement Goal 1, SLP) good progress toward goal  -CH good progress toward goal  -RD continuing progress toward goal  -CJ    Comment (Tolerate Speaking Valve Placement Goal 1, SLP) Patient tolerated x 25 min with O2 sats dropping to 88. Quickly rebounded to 93. Tolerated pmv during FEES with O2 sat drop to 86  during swallowing (x1) and quickly rebounded to 93.  -CH able to tolerate 15-20 min before requesting removal to sleep. RN called to ask pt about removal.  -RD ~5 minutes  -CJ       Audible Speech with Speaking Valve Goal 1 (SLP)    Audible Speech Goal 1 (SLP) 3 feet; 80%; with minimal cues (75-90%)  -CH 3 feet; 80%; with minimal cues (75-90%)  -RD 3 feet; 80%; with minimal cues (75-90%)  -CJ    Time Frame (Audible Speech Goal 1, SLP) short term goal (STG)  -CH short term goal (STG)  -RD short term goal (STG)  -CJ    Barriers (Audible Speech Goal 1, SLP) -- -- excessive coughing  -CJ    Progress (Audible Speech Goal 1, SLP) 100%; independently (over 90% accuracy)  -CH 60%; with minimal cues (75-90%)  -RD 30%; with maximum cues (25-49%)  -CJ    Progress/Outcomes (Audible Speech Goal 1, SLP) goal met  -CH good progress toward goal  -RD continuing progress toward goal  -CJ    Comment (Audible Speech Goal 1, SLP) 6-8 ft  -CH 3 ft  -RD ~1 foot  -CJ       Additional Goal 1 (SLP)    Additional Goal 1, SLP LTG: Pt will improve communication w/ use of PMV to functionally communicate wants/needs w/ 90% accuracy w/ min cues  -CH LTG: Pt will improve communication w/ use of PMV to functionally communicate wants/needs w/ 90% accuracy w/ min cues  -RD LTG: Pt will improve communication w/ use of PMV to functionally communicate wants/needs w/ 90% accuracy w/ min cues  -CJ    Time Frame (Additional Goal 1, SLP) by discharge  -CH by discharge  -RD by discharge  -CJ    Progress/Outcomes (Additional Goal 1, SLP) continuing progress toward goal  -CH continuing progress toward goal  -RD continuing progress toward goal  -CJ          User Key  (r) = Recorded By, (t) = Taken By, (c) = Cosigned By    Initials Name Provider Type    RD Brittany Beckford, MS CCC-SLP Speech and Language Pathologist    Noreen Rod, MS CCC-SLP Speech and Language Pathologist    Samira Mahmood, MS CCC-SLP Speech and Language Pathologist                         Time Calculation:      Time Calculation- SLP     Row Name 21 1123             Time Calculation- SLP    SLP Start Time 0945  -CH      SLP Received On 21  -CH              Untimed Charges    SLP Eval/Re-eval  ST Fiberoptic Endo Eval Swallow - 88115  -CH      46156-EJ Fiberoptic Endo Eval Swallow Minutes 85  -CH      75699-FI Treatment/ST Modification Prosth Aug Alter  40  -CH              Total Minutes    Untimed Charges Total Minutes 125  -CH       Total Minutes 125  -CH            User Key  (r) = Recorded By, (t) = Taken By, (c) = Cosigned By    Initials Name Provider Type     Samira Carlson, MS CCC-SLP Speech and Language Pathologist                Therapy Charges for Today     Code Description Service Date Service Provider Modifiers Qty    53466127594  ST TREATMENT SPEECH 3 2021 Samira Carlson MS CCC-SLP GN 1    [id number]  ST FIBEROPTIC ENDO EVAL SWALL 6 2021 Samira Carlson MS CCC-SLP GN 1                     Samira Carlson MS CCC-SLP  2021 and [facility] - Speech Language Pathology   Swallow Initial Evaluation [facility]   Fiberoptic Endoscopic Evaluation of Swallowing (FEES)       Patient Name: Keshav Dickens  : 1955  MRN: [id number]  Today's Date: 2021               Admit Date: 10/4/2021    Visit Dx:     ICD-10-CM ICD-9-CM   1. Acute respiratory failure with hypoxia  J96.01 518.81   2. Examination for normal comparison for clinical research  Z00.6 V70.7   3. Diffuse pulmonary alveolar hemorrhage  R04.89 786.30   4. 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.0 446.21   5. Voice impairment  R49.9 784.40   6. Dysphagia, unspecified type  R13.10 787.20     Patient Active Problem List   Diagnosis   • Anti-GBM nephritis with pulmonary hemorrhage (HCC)   • Acute respiratory failure with hypoxia   • Acute renal failure (ARF) (HCC)   • Acute blood loss anemia   • Sepsis due to pneumonia (HCC);Klebsiella aerogenes   • Idiopathic  hypotension   • Hyperglycemia   • History of tobacco abuse     Past Medical History:   Diagnosis Date   • Goodpasture's syndrome  10/4/2021   • Hypertension      Past Surgical History:   Procedure Laterality Date   • CHOLECYSTECTOMY     • TRACHEOSTOMY AND PEG TUBE INSERTION N/A 10/15/2021    Procedure: TRACHEOSTOMY AND PERCUTANEOUS ENDOSCOPIC GASTROSTOMY TUBE INSERTION;  Surgeon: Abdon Roberts MD;  [facility];  Service: General;  Laterality: N/A;       SLP Recommendation and Plan  SLP Swallowing Diagnosis: severe, pharyngeal dysphagia (11/05/21 0945)  SLP Diet Recommendation: NPO, long term alternate methods of nutrition/hydration (11/05/21 0945)     SLP Rec. for Method of Medication Administration: meds whole, with pudding or applesauce (Critical oral meds only--using breath hold, swallow, cough (cue patient)) (11/05/21 0945)     Monitor for Signs of Aspiration: yes, notify SLP if any concerns (11/05/21 0945)     Swallow Criteria for Skilled Therapeutic Interventions Met: demonstrates skilled criteria (11/05/21 0945)  Anticipated Discharge Disposition (SLP): unknown, anticipate therapy at next level of care (11/05/21 0945)  Rehab Potential/Prognosis, Swallowing: good, to achieve stated therapy goals (11/05/21 0945)  Therapy Frequency (Swallow): 5 days per week (11/05/21 0945)  Predicted Duration Therapy Intervention (Days): until discharge (11/05/21 0945)     Daily Summary of Progress (SLP): progress toward functional goals as expected (11/05/21 0945)    Plan for Continued Treatment (SLP): Continue to follow to address dysphagia and communication in tx (11/05/21 0945)                     SWALLOW EVALUATION (last 72 hours)     SLP Adult Swallow Evaluation     Row Name 11/05/21 0945 11/03/21 1515 11/02/21 1300             Rehab Evaluation    Document Type -- -- re-evaluation  -CJ      Subjective Information no complaints  -CH no complaints  -RD no complaints  -CJ      Patient Observations alert;  cooperative; agree to therapy  -CH alert; cooperative; agree to therapy  -RD alert; cooperative; agree to therapy  -CJ      Patient/Family/Caregiver Comments/Observations spouse present  -CH spouse present  -RD No family present  -CJ      Care Plan Review evaluation/treatment results reviewed; care plan/treatment goals reviewed; risks/benefits reviewed; current/potential barriers reviewed; patient/other agree to care plan  -CH evaluation/treatment results reviewed; care plan/treatment goals reviewed; risks/benefits reviewed; current/potential barriers reviewed; patient/other agree to care plan  -RD evaluation/treatment results reviewed; care plan/treatment goals reviewed; patient/other agree to care plan  -CJ      Care Plan Review, Other Participant(s) spouse  -CH spouse  -RD --      Patient Effort good  -CH good  -RD good  -      Comment FEES completed in collaboration with   - -- --      Symptoms Noted During/After Treatment none  - -- --              General Information    Patient Profile Reviewed yes  - yes  -RD yes  -CJ      Pertinent History Of Current Problem see prior evaluation  - Adm w/ acute respiratory failure. Intubated 10/4-10/15/21 s/p shiley 8 cuffed trach 10/15. Now on TCTs. Good [place]'s syndrome, AKF, sepsis. Downsized yesterday to cuffless/fenestrated shiley size 6 trach. Pt accidently pulled out trach this AM, so saw in PM when trach replaced.  -RD see initial eval; pt still on trach collar plan for downsize to size 6 per MD LOZANO      Current Method of Nutrition NPO; gastrostomy feedings  - NPO; gastrostomy feedings  -RD NPO; gastrostomy feedings  -CJ      Precautions/Limitations, Vision WFL; for purposes of eval  -CH WFL; for purposes of eval  -RD WFL; for purposes of eval  -CJ      Precautions/Limitations, Hearing WFL; for purposes of eval  -CH WFL; for purposes of eval  -RD WFL; for purposes of eval  -CJ      Prior Level of Function-Communication unknown  - unknown  -RD  unknown  -CJ      Prior Level of Function-Swallowing unknown  -CH unknown  -RD unknown  -CJ      Plans/Goals Discussed with patient; spouse/S.O.; agreed upon  -CH patient; spouse/S.O.; agreed upon  -RD patient; agreed upon  -CJ      Barriers to Rehab medically complex; cognitive status  -CH medically complex; cognitive status  -RD medically complex  -CJ      Patient's Goals for Discharge return to PO diet  -CH return to PO diet  -RD patient did not state  -CJ      Family Goals for Discharge patient able to return to PO diet  -CH patient able to eat/drink without coughing/choking; patient able to return to PO diet  -RD --              Pain    Additional Documentation Pain Scale: FACES Pre/Post-Treatment (Group)  -CH Pain Scale: Numbers Pre/Post-Treatment (Group)  -RD Pain Scale: FACES Pre/Post-Treatment (Group)  -CJ              Pain Scale: Numbers Pre/Post-Treatment    Pretreatment Pain Rating -- 0/10 - no pain  -RD --      Posttreatment Pain Rating -- 0/10 - no pain  -RD --              Pain Scale: FACES Pre/Post-Treatment    Pain: FACES Scale, Pretreatment 0-->no hurt  -CH -- 0-->no hurt  -CJ      Posttreatment Pain Rating 0-->no hurt  -CH -- 0-->no hurt  -CJ              Oral Motor Structure and Function    Dentition Assessment natural, present and adequate  -CH natural, present and adequate  -RD natural, present and adequate  -CJ      Secretion Management other (see comments)  coughing up secretions  -CH problems swallowing secretions  -RD requires suctioning to control secretions  -CJ      Mucosal Quality moist, healthy  -CH moist, healthy  -RD moist, healthy; sticky  -CJ      Volitional Swallow delayed  -CH delayed  -RD delayed  -CJ      Volitional Cough WFL  -CH WFL  -RD WFL  -CJ              Oral Musculature and Cranial Nerve Assessment    Oral Motor General Assessment -- WFL  -RD WFL  -CJ              General Eating/Swallowing Observations    Respiratory Support Currently in Use tracheostomy; trach collar;  other (see comments)  pmv in place  -CH tracheostomy; trach collar  -RD tracheostomy; trach collar  -CJ      Eating/Swallowing Skills fed by SLP  -CH fed by SLP  -RD fed by SLP  -CJ      Positioning During Eating upright 90 degree; upright in chair  -CH upright 90 degree; upright in chair  -RD upright in bed  -CJ      Utensils Used -- spoon  -RD spoon  -CJ      Consistencies Trialed -- ice chips; thin liquids; pureed  -RD ice chips; thin liquids  -      Pre SpO2 (%) 93  -CH -- 98  -CJ      Post SpO2 (%) 92  -CH -- 97  -CJ              Respiratory    Respiratory Status reduction in SpO2; during swallowing/eating  fluctuated between 86 and 93  -CH -- --      Date of Intubation 10/4 to 10/15, trach 10/15  - -- --              Clinical Swallow Eval    Oral Prep Phase --  for consistencies trialed  - -- --      Pharyngeal Phase -- suspected pharyngeal impairment  -RD suspected pharyngeal impairment  -CJ      Esophageal Phase -- unremarkable  -RD --      Clinical Swallow Evaluation Summary -- -- CSE completed this pm. Mr. Dickens is positioned upright and centered in bed to accept po presentations of ice chips and thin liquids via spoon only. PMV in place for evaluation, tolerating w/o significant distress. Pt evidenced w/ cough response after thin liquids via spoon. Pt w/ continuous excessive coughing w/ minimal secretions from trach. Further po presentations deferred 2/2 continuous coughing. PMV removed as well. D/w MD who reports plan to downsize to shiley size 6 today. MD requests deferral today and f/u for FEES readiness tomorrow after downsize. Will continue NPO at this time w/ meds via non-oral method.  -CJ              Pharyngeal Phase Concerns    Pharyngeal Phase Concerns -- wet vocal quality; throat clear  -RD cough  -CJ      Wet Vocal Quality -- thin; pudding  -RD --      Cough -- -- thin  -CJ      Throat Clear -- thin  -RD --      Pharyngeal Phase Concerns, Comment -- PMV in place. Overt s/s of aspiration  c/b inconsistent wet vocal quality w/ thins & pureed trials. Delayed throat clearing after thins, but also throat clearing/coughing at times on secretions. Pt able to cough secretions to oral cavity w/ PMV on today. Required cues to suction oral cavity. No excessive coughing today, appears appropriate for FEES tomorrow to see if safe for any PO.  -RD severe excessive coughing  -              Fiberoptic Endoscopic Evaluation of Swallowing (FEES)    Risks/Benefits Reviewed risks/benefits explained; patient; family; agreed to eval  -CH -- --      Tracheostomy Tested with speaking valve on  -CH -- --      Nasal Entry right:  -CH -- --      Scope serial number/identification [id number]  -CH -- --              Anatomy and Physiology    Anatomic Considerations no anatomic structural deviation  -CH -- --      Base of Tongue symmetrical  -CH -- --      Epiglottis WFL  -CH -- --      Laryngeal Function Breathing symmetrical  -CH -- --      Laryngeal Function Phonation symmetrical  -CH -- --      Laryngeal Function to Breath Hold TVF contact; sustained closure  -CH -- --      Secretion Rating Scale (Gilbert et al. 1996) 1- secretions present around the laryngeal vestibule  -CH -- --      Secretion Description thin; clear  -CH -- --      Spontaneous Swallow frequency reduced  -CH -- --      Sensory reduced sensation  -CH -- --      Utensils Used Spoon  -CH -- --      Consistencies Trialed ice chips; honey-thick liquids; pudding/puree  -CH -- --              FEES Interpretation    Oral Phase WFL; solids not tested; other (see comments)  tested pureed and HT liquids via teaspoon only  -CH -- --              Initiation of Pharyngeal Swallow    Initiation of Pharyngeal Swallow bolus in pyriform sinuses  -CH -- --      Pharyngeal Phase impaired pharyngeal phase of swallowing  -CH -- --      Aspiration During the Swallow pudding/puree; honey-thick liquids; secondary to delayed swallow initiation or mistiming; secondary to reduced laryngeal  elevation; secondary to reduced vestibular closure; secondary to reduced laryngeal (VF) closure  -CH -- --      Response to Aspiration no response, silent aspiration  -CH -- --      Rosenbek's Scale honey:; pudding/puree:; 8-->Level 8  -CH -- --      Residue honey-thick liquids; pudding/puree; valleculae; pyriform sinuses; secondary to reduced base of tongue retraction; secondary to reduced laryngeal elevation; secondary to reduced hyolaryngeal excursion; secondary to reduced posterior pharyngeal wall stripping  -CH -- --      Response to Residue cleared residue; with cued swallow  -CH -- --      Attempted Compensatory Maneuvers bolus presentation style; other (see comments); combination of maneuvers (see comments)  breath hold, swallow, cough  -CH -- --      Response to Attempted Compensatory Maneuvers prevented aspiration  -CH -- --      FEES Summary Patient displayed severe pharyngeal dysphagia with delay to the pyriforms and aspiration of pudding and HT liquids via teaspoon during the swallow. Aspiration was silent. Patient cleared with cued cough. Breath hold, swallow, cough compensations prevented aspiration with pureed consistency. mild pyriform residue cleared with cued subsequent swallow. Recommend continued NPO with continued alternate means of nutrition. Patient may have critical oral meds with pureed consistency using a breath hold during swallow and followed by cued cough. OK for 1-2 ice chips per hour for oral moisture per MD discretion.  -CH -- --              Clinical Impression    Daily Summary of Progress (SLP) progress toward functional goals as expected  -CH -- --      Barriers to Overall Progress (SLP) cognition  -CH -- --      SLP Swallowing Diagnosis severe; pharyngeal dysphagia  -CH suspected pharyngeal dysphagia  -RD suspected pharyngeal dysphagia  -CJ      Functional Impact risk of aspiration/pneumonia  -CH risk of aspiration/pneumonia  -RD risk of aspiration/pneumonia  -CJ      Rehab  Potential/Prognosis, Swallowing good, to achieve stated therapy goals  -CH good, to achieve stated therapy goals  -RD good, to achieve stated therapy goals  -CJ      Swallow Criteria for Skilled Therapeutic Interventions Met demonstrates skilled criteria  - demonstrates skilled criteria  -RD demonstrates skilled criteria  -      Plan for Continued Treatment (SLP) Continue to follow to address dysphagia and communication in tx  - -- CSE completed, continue NPO and f/u for re-eval and continued PMV tx  -              Recommendations    Therapy Frequency (Swallow) 5 days per week  - PRN  -RD --      Predicted Duration Therapy Intervention (Days) until discharge  - until discharge  -RD until discharge  -      SLP Diet Recommendation NPO; long term alternate methods of nutrition/hydration  - NPO; long term alternate methods of nutrition/hydration  -RD NPO; long term alternate methods of nutrition/hydration  -      Recommended Diagnostics -- reassess via FEES  - reassess via clinical swallow evaluation; reassess via FEES; other (see comments)  FEES pending progress  -      Recommended Precautions and Strategies -- general aspiration precautions  -RD general aspiration precautions  -      Oral Care Recommendations Oral Care BID/PRN; Suction toothbrush  - Oral Care BID/PRN; Suction toothbrush  -RD Oral Care BID/PRN; Suction toothbrush  -      SLP Rec. for Method of Medication Administration meds whole; with pudding or applesauce  Critical oral meds only--using breath hold, swallow, cough (cue patient)  - meds via alternate route  -RD meds via alternate route  -      Monitor for Signs of Aspiration yes; notify SLP if any concerns  - yes; notify SLP if any concerns  -RD yes; notify SLP if any concerns  -CJ      Anticipated Discharge Disposition (SLP) unknown; anticipate therapy at next level of care  - unknown; anticipate therapy at next level of care  -RD long term acute care facility;  anticipate therapy at next level of care  -            User Key  (r) = Recorded By, (t) = Taken By, (c) = Cosigned By    Initials Name Effective Dates    RD Brittany Beckford BETHANY, MS CCC-SLP 06/16/21 -     Noreen Rod, MS CCC-SLP 06/16/21 -     Samira Mahmood, MS CCC-SLP 06/16/21 -                 EDUCATION  The patient has been educated in the following areas:   Home Exercise Program (HEP) Dysphagia (Swallowing Impairment) Oral Care/Hydration NPO rationale.        SLP GOALS     Row Name 11/05/21 0945 11/03/21 1515 11/02/21 1300       Oral Nutrition/Hydration Goal 1 (SLP)    Oral Nutrition/Hydration Goal 1, SLP LTG: Pt. will safely consume a PO diet without aspiration or distress  -CH -- --    Time Frame (Oral Nutrition/Hydration Goal 1, SLP) by discharge  -CH -- --       Oral Nutrition/Hydration Goal 2 (SLP)    Oral Nutrition/Hydration Goal 2, SLP STG: Patient will tolerate pureed and ice/thin liquid trials without s/s of aspiration to determine readiness for repeat instrumental evaluation of the swallow  -CH -- --    Time Frame (Oral Nutrition/Hydration Goal 2, SLP) by discharge  -CH -- --       Lingual Strengthening Goal 1 (SLP)    Activity (Lingual Strengthening Goal 1, SLP) increase tongue back strength  -CH -- --    Increase Tongue Back Strength swallow trials; lingual resistance exercises  -CH -- --    Cross/Accuracy (Lingual Strengthening Goal 1, SLP) with minimal cues (75-90% accuracy)  -CH -- --    Time Frame (Lingual Strengthening Goal 1, SLP) by discharge  -CH -- --       Pharyngeal Strengthening Exercise Goal 1 (SLP)    Activity (Pharyngeal Strengthening Goal 1, SLP) increase timing; increase superior movement of the hyolaryngeal complex; increase anterior movement of the hyolaryngeal complex; increase closure at entrance to airway/closure of airway at glottis; increase squeeze/positive pressure generation  -CH -- --    Increase Timing prepping - 3 second prep or suck swallow or  3-step swallow  -CH -- --    Increase Superior Movement of the Hyolaryngeal Complex falsetto  -CH -- --    Increase Anterior Movement of the Hyolaryngeal Complex chin tuck against resistance (CTAR)  -CH -- --    Increase Closure at Entrance to Airway/Closure of Airway at Glottis super-supraglottic swallow; breath hold exercises  -CH -- --    Increase Squeeze/Positive Pressure Generation hard effortful swallow  -CH -- --    [place]/Accuracy (Pharyngeal Strengthening Goal 1, SLP) with minimal cues (75-90% accuracy)  -CH -- --    Time Frame (Pharyngeal Strengthening Goal 1, SLP) by discharge  -CH -- --       Tolerate Speaking Valve Placement Goal 1 (SLP)    Tolerate Speaking Valve Placement Goal 1 (SLP) speaking valve >30 min; 80%; with minimal cues (75-90%)  -CH speaking valve >30 min; 80%; with minimal cues (75-90%)  -RD speaking valve >30 min; 80%; with minimal cues (75-90%)  -CJ    Time Frame (Tolerate Speaking Valve Placement Goal 1, SLP) short term goal (STG)  -CH short term goal (STG)  -RD short term goal (STG)  -CJ    Barriers (Tolerate Speaking Valve Placement 1, SLP) -- -- excessive coughing  -CJ    Progress (Tolerate Speaking Valve Placement Goal 1, SLP) 70%; with minimal cues (75-90%)  -CH 60%; with minimal cues (75-90%)  -RD 20%; with moderate cues (50-74%); with maximum cues (25-49%)  -CJ    Progress/Outcomes (Tolerate Speaking Valve Placement Goal 1, SLP) good progress toward goal  -CH good progress toward goal  -RD continuing progress toward goal  -CJ    Comment (Tolerate Speaking Valve Placement Goal 1, SLP) Patient tolerated x 25 min with O2 sats dropping to 88. Quickly rebounded to 93. Tolerated pmv during FEES with O2 sat drop to 86 during swallowing (x1) and quickly rebounded to 93.  -CH able to tolerate 15-20 min before requesting removal to sleep. RN called to ask pt about removal.  -RD ~5 minutes  -CJ       Audible Speech with Speaking Valve Goal 1 (SLP)    Audible Speech Goal 1 (SLP) 3  feet; 80%; with minimal cues (75-90%)  -CH 3 feet; 80%; with minimal cues (75-90%)  -RD 3 feet; 80%; with minimal cues (75-90%)  -CJ    Time Frame (Audible Speech Goal 1, SLP) short term goal (STG)  -CH short term goal (STG)  -RD short term goal (STG)  -CJ    Barriers (Audible Speech Goal 1, SLP) -- -- excessive coughing  -CJ    Progress (Audible Speech Goal 1, SLP) 100%; independently (over 90% accuracy)  -CH 60%; with minimal cues (75-90%)  -RD 30%; with maximum cues (25-49%)  -CJ    Progress/Outcomes (Audible Speech Goal 1, SLP) goal met  -CH good progress toward goal  -RD continuing progress toward goal  -CJ    Comment (Audible Speech Goal 1, SLP) 6-8 ft  -CH 3 ft  -RD ~1 foot  -CJ       Additional Goal 1 (SLP)    Additional Goal 1, SLP LTG: Pt will improve communication w/ use of PMV to functionally communicate wants/needs w/ 90% accuracy w/ min cues  -CH LTG: Pt will improve communication w/ use of PMV to functionally communicate wants/needs w/ 90% accuracy w/ min cues  -RD LTG: Pt will improve communication w/ use of PMV to functionally communicate wants/needs w/ 90% accuracy w/ min cues  -CJ    Time Frame (Additional Goal 1, SLP) by discharge  -CH by discharge  -RD by discharge  -CJ    Progress/Outcomes (Additional Goal 1, SLP) continuing progress toward goal  -CH continuing progress toward goal  -RD continuing progress toward goal  -CJ          User Key  (r) = Recorded By, (t) = Taken By, (c) = Cosigned By    Initials Name Provider Type    RD Brittany Beckford, MS CCC-SLP Speech and Language Pathologist    CJ Noreen Koenig, MS CCC-SLP Speech and Language Pathologist    Samira Mahmood MS CCC-SLP Speech and Language Pathologist                   Time Calculation:    Time Calculation- SLP     Row Name 11/05/21 1123             Time Calculation- SLP    SLP Start Time 0945  -CH      SLP Received On 11/05/21  -              Untimed Charges    SLP Eval/Re-eval  ST Fiberoptic Endo Eval Swallow - [id number]   -      [id number] Fiberoptic Endo Eval Swallow Minutes 85  -CH      [id number]-FU Treatment/ST Modification Prosth Aug Alter  40  -CH              Total Minutes    Untimed Charges Total Minutes 125  -CH       Total Minutes 125  -CH            User Key  (r) = Recorded By, (t) = Taken By, (c) = Cosigned By    Initials Name Provider Type    Samira Mahmood, MS CCC-SLP Speech and Language Pathologist                Therapy Charges for Today     Code Description Service Date Service Provider Modifiers Qty    [id number]  ST TREATMENT SPEECH 3 11/5/2021 Samira Carlson MS CCC-SLP GN 1    [id number] [facility] FIBEROPTIC ENDO EVAL SWALL 6 11/5/2021 Samira Carlson MS CCC-SLP GN 1               Samira Carlson MS CCC-SLP  11/5/2021

## 2021-11-06 ENCOUNTER — APPOINTMENT (OUTPATIENT)
Dept: NEPHROLOGY | Facility: HOSPITAL | Age: 66
End: 2021-11-06

## 2021-11-06 ENCOUNTER — APPOINTMENT (OUTPATIENT)
Dept: GENERAL RADIOLOGY | Facility: HOSPITAL | Age: 66
End: 2021-11-06

## 2021-11-06 LAB
ALBUMIN SERPL-MCNC: 4.2 G/DL (ref 3.5–5.2)
ALBUMIN/GLOB SERPL: 1.6 G/DL
ALP SERPL-CCNC: 89 U/L (ref 39–117)
ALT SERPL W P-5'-P-CCNC: 22 U/L (ref 1–41)
ANION GAP SERPL CALCULATED.3IONS-SCNC: 16 MMOL/L (ref 5–15)
AST SERPL-CCNC: 23 U/L (ref 1–40)
BACTERIA SPEC RESP CULT: ABNORMAL
BACTERIA SPEC RESP CULT: ABNORMAL
BASOPHILS # BLD AUTO: 0.03 10*3/MM3 (ref 0–0.2)
BASOPHILS NFR BLD AUTO: 0.3 % (ref 0–1.5)
BILIRUB SERPL-MCNC: 0.8 MG/DL (ref 0–1.2)
BUN SERPL-MCNC: 79 MG/DL (ref 8–23)
BUN/CREAT SERPL: 13.4 (ref 7–25)
CALCIUM SPEC-SCNC: 8.9 MG/DL (ref 8.6–10.5)
CHLORIDE SERPL-SCNC: 93 MMOL/L (ref 98–107)
CO2 SERPL-SCNC: 24 MMOL/L (ref 22–29)
CREAT SERPL-MCNC: 5.9 MG/DL (ref 0.76–1.27)
DEPRECATED RDW RBC AUTO: 62 FL (ref 37–54)
EOSINOPHIL # BLD AUTO: 0.16 10*3/MM3 (ref 0–0.4)
EOSINOPHIL NFR BLD AUTO: 1.5 % (ref 0.3–6.2)
ERYTHROCYTE [DISTWIDTH] IN BLOOD BY AUTOMATED COUNT: 19.5 % (ref 12.3–15.4)
GFR SERPL CREATININE-BSD FRML MDRD: 10 ML/MIN/1.73
GFR SERPL CREATININE-BSD FRML MDRD: ABNORMAL ML/MIN/{1.73_M2}
GLOBULIN UR ELPH-MCNC: 2.7 GM/DL
GLUCOSE BLDC GLUCOMTR-MCNC: 116 MG/DL (ref 70–130)
GLUCOSE BLDC GLUCOMTR-MCNC: 184 MG/DL (ref 70–130)
GLUCOSE BLDC GLUCOMTR-MCNC: 199 MG/DL (ref 70–130)
GLUCOSE BLDC GLUCOMTR-MCNC: 214 MG/DL (ref 70–130)
GLUCOSE SERPL-MCNC: 98 MG/DL (ref 65–99)
GRAM STN SPEC: ABNORMAL
HBA1C MFR BLD: 5 % (ref 4.8–5.6)
HCT VFR BLD AUTO: 23.4 % (ref 37.5–51)
HGB BLD-MCNC: 7.4 G/DL (ref 13–17.7)
IMM GRANULOCYTES # BLD AUTO: 0.27 10*3/MM3 (ref 0–0.05)
IMM GRANULOCYTES NFR BLD AUTO: 2.6 % (ref 0–0.5)
LYMPHOCYTES # BLD AUTO: 0.7 10*3/MM3 (ref 0.7–3.1)
LYMPHOCYTES NFR BLD AUTO: 6.7 % (ref 19.6–45.3)
MAGNESIUM SERPL-MCNC: 2.7 MG/DL (ref 1.6–2.4)
MCH RBC QN AUTO: 30.3 PG (ref 26.6–33)
MCHC RBC AUTO-ENTMCNC: 31.6 G/DL (ref 31.5–35.7)
MCV RBC AUTO: 95.9 FL (ref 79–97)
MONOCYTES # BLD AUTO: 0.68 10*3/MM3 (ref 0.1–0.9)
MONOCYTES NFR BLD AUTO: 6.5 % (ref 5–12)
NEUTROPHILS NFR BLD AUTO: 8.6 10*3/MM3 (ref 1.7–7)
NEUTROPHILS NFR BLD AUTO: 82.4 % (ref 42.7–76)
NRBC BLD AUTO-RTO: 0 /100 WBC (ref 0–0.2)
PHOSPHATE SERPL-MCNC: 3.4 MG/DL (ref 2.5–4.5)
PLATELET # BLD AUTO: 202 10*3/MM3 (ref 140–450)
PMV BLD AUTO: 11.4 FL (ref 6–12)
POTASSIUM SERPL-SCNC: 4.8 MMOL/L (ref 3.5–5.2)
PROCALCITONIN SERPL-MCNC: 2.49 NG/ML (ref 0–0.25)
PROT SERPL-MCNC: 6.9 G/DL (ref 6–8.5)
RBC # BLD AUTO: 2.44 10*6/MM3 (ref 4.14–5.8)
SODIUM SERPL-SCNC: 133 MMOL/L (ref 136–145)
WBC # BLD AUTO: 10.44 10*3/MM3 (ref 3.4–10.8)

## 2021-11-06 PROCEDURE — 94799 UNLISTED PULMONARY SVC/PX: CPT

## 2021-11-06 PROCEDURE — 94668 MNPJ CHEST WALL SBSQ: CPT

## 2021-11-06 PROCEDURE — 25010000002 HEPARIN (PORCINE) PER 1000 UNITS: Performed by: INTERNAL MEDICINE

## 2021-11-06 PROCEDURE — 94667 MNPJ CHEST WALL 1ST: CPT

## 2021-11-06 PROCEDURE — 85025 COMPLETE CBC W/AUTO DIFF WBC: CPT | Performed by: INTERNAL MEDICINE

## 2021-11-06 PROCEDURE — 82962 GLUCOSE BLOOD TEST: CPT

## 2021-11-06 PROCEDURE — 71045 X-RAY EXAM CHEST 1 VIEW: CPT

## 2021-11-06 PROCEDURE — 63710000001 INSULIN DETEMIR PER 5 UNITS: Performed by: INTERNAL MEDICINE

## 2021-11-06 PROCEDURE — 99232 SBSQ HOSP IP/OBS MODERATE 35: CPT | Performed by: NURSE PRACTITIONER

## 2021-11-06 PROCEDURE — 80053 COMPREHEN METABOLIC PANEL: CPT | Performed by: INTERNAL MEDICINE

## 2021-11-06 PROCEDURE — 0B21XFZ CHANGE TRACHEOSTOMY DEVICE IN TRACHEA, EXTERNAL APPROACH: ICD-10-PCS | Performed by: INTERNAL MEDICINE

## 2021-11-06 PROCEDURE — 0BD18ZX EXTRACTION OF TRACHEA, VIA NATURAL OR ARTIFICIAL OPENING ENDOSCOPIC, DIAGNOSTIC: ICD-10-PCS | Performed by: INTERNAL MEDICINE

## 2021-11-06 PROCEDURE — 84100 ASSAY OF PHOSPHORUS: CPT | Performed by: INTERNAL MEDICINE

## 2021-11-06 PROCEDURE — 84145 PROCALCITONIN (PCT): CPT | Performed by: INTERNAL MEDICINE

## 2021-11-06 PROCEDURE — 25010000002 CYCLOPHOSPHAMIDE PER 100 MG

## 2021-11-06 PROCEDURE — 63710000001 INSULIN REGULAR HUMAN PER 5 UNITS: Performed by: INTERNAL MEDICINE

## 2021-11-06 PROCEDURE — 83735 ASSAY OF MAGNESIUM: CPT | Performed by: INTERNAL MEDICINE

## 2021-11-06 PROCEDURE — 25010000002 MEROPENEM PER 100 MG: Performed by: INTERNAL MEDICINE

## 2021-11-06 PROCEDURE — 83036 HEMOGLOBIN GLYCOSYLATED A1C: CPT | Performed by: NURSE PRACTITIONER

## 2021-11-06 PROCEDURE — 63710000001 PREDNISONE PER 1 MG: Performed by: INTERNAL MEDICINE

## 2021-11-06 PROCEDURE — 0BJ08ZZ INSPECTION OF TRACHEOBRONCHIAL TREE, VIA NATURAL OR ARTIFICIAL OPENING ENDOSCOPIC: ICD-10-PCS | Performed by: INTERNAL MEDICINE

## 2021-11-06 PROCEDURE — 31622 DX BRONCHOSCOPE/WASH: CPT | Performed by: INTERNAL MEDICINE

## 2021-11-06 RX ORDER — ALBUMIN (HUMAN) 12.5 G/50ML
12.5 SOLUTION INTRAVENOUS AS NEEDED
Status: ACTIVE | OUTPATIENT
Start: 2021-11-06 | End: 2021-11-06

## 2021-11-06 RX ORDER — ACETYLCYSTEINE 200 MG/ML
4 SOLUTION ORAL; RESPIRATORY (INHALATION)
Status: DISCONTINUED | OUTPATIENT
Start: 2021-11-06 | End: 2021-11-08

## 2021-11-06 RX ORDER — CYCLOPHOSPHAMIDE 25 MG/1
100 CAPSULE ORAL DAILY
Status: DISCONTINUED | OUTPATIENT
Start: 2021-11-06 | End: 2021-11-06 | Stop reason: ALTCHOICE

## 2021-11-06 RX ORDER — IPRATROPIUM BROMIDE AND ALBUTEROL SULFATE 2.5; .5 MG/3ML; MG/3ML
3 SOLUTION RESPIRATORY (INHALATION)
Status: DISCONTINUED | OUTPATIENT
Start: 2021-11-06 | End: 2021-11-12 | Stop reason: HOSPADM

## 2021-11-06 RX ORDER — ALBUMIN (HUMAN) 12.5 G/50ML
SOLUTION INTRAVENOUS
Status: DISCONTINUED
Start: 2021-11-06 | End: 2021-11-12 | Stop reason: HOSPADM

## 2021-11-06 RX ADMIN — MIDODRINE HYDROCHLORIDE 10 MG: 10 TABLET ORAL at 14:57

## 2021-11-06 RX ADMIN — Medication 1 PACKET: at 08:04

## 2021-11-06 RX ADMIN — ACETYLCYSTEINE 4 ML: 200 SOLUTION ORAL; RESPIRATORY (INHALATION) at 22:58

## 2021-11-06 RX ADMIN — GUAIFENESIN AND CODEINE PHOSPHATE 10 ML: 100; 10 SOLUTION ORAL at 15:03

## 2021-11-06 RX ADMIN — CHLORHEXIDINE GLUCONATE 15 ML: 1.2 SOLUTION ORAL at 21:24

## 2021-11-06 RX ADMIN — ACETYLCYSTEINE 4 ML: 200 SOLUTION ORAL; RESPIRATORY (INHALATION) at 15:22

## 2021-11-06 RX ADMIN — Medication 1 TABLET: at 08:03

## 2021-11-06 RX ADMIN — HEPARIN SODIUM 5000 UNITS: 5000 INJECTION, SOLUTION INTRAVENOUS; SUBCUTANEOUS at 21:25

## 2021-11-06 RX ADMIN — IPRATROPIUM BROMIDE AND ALBUTEROL SULFATE 3 ML: 2.5; .5 SOLUTION RESPIRATORY (INHALATION) at 15:22

## 2021-11-06 RX ADMIN — FAMOTIDINE 20 MG: 20 TABLET, FILM COATED ORAL at 08:03

## 2021-11-06 RX ADMIN — ACETYLCYSTEINE 4 ML: 200 SOLUTION ORAL; RESPIRATORY (INHALATION) at 19:25

## 2021-11-06 RX ADMIN — Medication 1 CAPSULE: at 08:04

## 2021-11-06 RX ADMIN — INSULIN HUMAN 2 UNITS: 100 INJECTION, SOLUTION PARENTERAL at 23:51

## 2021-11-06 RX ADMIN — Medication 500 MG: at 21:25

## 2021-11-06 RX ADMIN — MINERAL SUPPLEMENT IRON 300 MG / 5 ML STRENGTH LIQUID 100 PER BOX UNFLAVORED 300 MG: at 08:03

## 2021-11-06 RX ADMIN — INSULIN HUMAN 4 UNITS: 100 INJECTION, SOLUTION PARENTERAL at 18:00

## 2021-11-06 RX ADMIN — GABAPENTIN 100 MG: 100 CAPSULE ORAL at 21:25

## 2021-11-06 RX ADMIN — IPRATROPIUM BROMIDE AND ALBUTEROL SULFATE 3 ML: 2.5; .5 SOLUTION RESPIRATORY (INHALATION) at 19:25

## 2021-11-06 RX ADMIN — PREDNISONE 40 MG: 20 TABLET ORAL at 14:43

## 2021-11-06 RX ADMIN — INSULIN DETEMIR 8 UNITS: 100 INJECTION, SOLUTION SUBCUTANEOUS at 08:03

## 2021-11-06 RX ADMIN — GABAPENTIN 100 MG: 100 CAPSULE ORAL at 06:21

## 2021-11-06 RX ADMIN — INSULIN DETEMIR 8 UNITS: 100 INJECTION, SOLUTION SUBCUTANEOUS at 21:24

## 2021-11-06 RX ADMIN — Medication 2000 UNITS: at 08:04

## 2021-11-06 RX ADMIN — MIDODRINE HYDROCHLORIDE 10 MG: 10 TABLET ORAL at 21:25

## 2021-11-06 RX ADMIN — MEROPENEM 500 MG: 500 INJECTION, POWDER, FOR SOLUTION INTRAVENOUS at 14:43

## 2021-11-06 RX ADMIN — IPRATROPIUM BROMIDE AND ALBUTEROL SULFATE 3 ML: 2.5; .5 SOLUTION RESPIRATORY (INHALATION) at 07:04

## 2021-11-06 RX ADMIN — SERTRALINE HYDROCHLORIDE 50 MG: 50 TABLET ORAL at 08:04

## 2021-11-06 RX ADMIN — Medication 60 MG: at 08:03

## 2021-11-06 RX ADMIN — GABAPENTIN 100 MG: 100 CAPSULE ORAL at 14:43

## 2021-11-06 RX ADMIN — GUAIFENESIN AND CODEINE PHOSPHATE 10 ML: 100; 10 SOLUTION ORAL at 22:05

## 2021-11-06 RX ADMIN — ATOVAQUONE 1500 MG: 750 SUSPENSION ORAL at 14:44

## 2021-11-06 RX ADMIN — CHLORHEXIDINE GLUCONATE 15 ML: 1.2 SOLUTION ORAL at 08:03

## 2021-11-06 RX ADMIN — MIDODRINE HYDROCHLORIDE 10 MG: 10 TABLET ORAL at 06:21

## 2021-11-06 RX ADMIN — HEPARIN SODIUM 5000 UNITS: 5000 INJECTION, SOLUTION INTRAVENOUS; SUBCUTANEOUS at 08:04

## 2021-11-06 RX ADMIN — GUAIFENESIN AND CODEINE PHOSPHATE 10 ML: 100; 10 SOLUTION ORAL at 08:03

## 2021-11-06 RX ADMIN — Medication 100 MG: at 17:08

## 2021-11-06 RX ADMIN — Medication 500 MG: at 08:04

## 2021-11-06 RX ADMIN — Medication 1 PACKET: at 14:46

## 2021-11-06 RX ADMIN — IPRATROPIUM BROMIDE AND ALBUTEROL SULFATE 3 ML: 2.5; .5 SOLUTION RESPIRATORY (INHALATION) at 22:58

## 2021-11-06 RX ADMIN — Medication 60 MG: at 21:28

## 2021-11-06 NOTE — PROGRESS NOTES
"Intensive Care Follow-up     Hospital:  LOS: 33 days   Mr. Keshav Dickens, 66 y.o. male is followed for:   Acute respiratory failure with hypoxia (HCC)     History of present illness (as of H&P on 10/4/21):     Mr. Dickens is a 66-year-old male with past medical history current tobacco abuse (1 pack/day x 45 years; reportedly quit 1 month ago when these symptoms began) hypertension, and type 2 diabetes who presents to [facility] on 10/4/21 as a transfer from [facility] with respiratory failure requiring mechanical ventilation and possible pulmonary/renal disease.     According to documentation as patient is currently intubated and sedated and no family is present, patient initially presented to outside hospital on 9/27/21 as a transfer from [facility] with 2 to 3-week history of hemoptysis. Prior to presentation at the outside hospitals CT scan of chest/chest x-ray were performed by PCP and showed \"bilateral pneumonia and a potential nodule in his lungs \". He was treated with antibiotics (possibly doxy) and steroids. Unfortunately his symptoms did not improve and on day of presentation to outside hospital he had worsening shortness of breath and fatigue. He presented to his local ED and was then transferred to [facility] for higher level of care. OSH Covid test negative.      There pulmonary and nephrology were consulted. He was empirically started on Zithromax. CTA showed no pulmonary thromboembolism with moderately severe, fairly diffuse groundglass alveolar infiltrates and an 8 mm RML pulmonary nodule. CT sinuses with small mucous retention cyst right maxillary sinus otherwise unremarkable. With concern for vasculitis as well as concominant renal failure and hemoptysis concern was raised for pulmonary renal disease for which ANCA studies and anti-GBM studies were obtained. [facility] was consulted and he was initiated on pulse dose steroids " "which he received x3 days at which point he was transitioned to Solu-Medrol 40 mg twice daily.     9/27 with elevated procalcitonin rickettsial disease profile was checked and patient was started on doxy and Zosyn; Zithromax DC'd. Patient underwent bronchoscopy with lavage and biopsy on 10/1; presumptively intubated for procedure. Fresh blood was found in all the lung segments on bilateral sides. Left side did show a bit more fresh blood. Although performing physician lavaged and was able to \"some extent\" clear the blood, it was not completely eliminated. A biopsy was performed at the bifurcation of apical and anterior segment of right upper lobe. At the conclusion of the procedure, in the setting of diffuse alveolar hemorrhage and acute renal disease, concern for pulmonary/renal disease was brought up.     Nephrology was consulted and saw patient throughout hospitalization. PAIGE, ANCA, rheumatoid, mycoplasma IgM, and cold aggultinin titers were all negative. Unfortunately anti-GBM studies were found to be positive. He did require multiple blood transfusions throughout outside hospitalization.  Other significant imaging included renal ultrasound which showed mild increased renal cortical echogenicity. No hydronephrosis.     With CT imaging, bronchoscopy results, and positive anti-GBM, it was determined patient needed higher level of care for possible plasmapheresis. He was transferred to [facility] on 10/4/21. He has been admitted to the ICU for evaluation and management of his critical illness.     On arrival patient remains intubated and sedated.  He is on a propofol drip.  Not requiring vasopressor support.  Staff are suctioning some old bloody secretions from his endotracheal tube.  He is unable to contribute to his history.      (End of copied text).    Subjective   Interval History:  CXR this AM negative for PTX with interval improvement of right lung perihilar disease but increased generalized " "atelectasis of left mid and lower lung. Anticipating HD today. SLP evaluated yesterday and patient is now able to take \"critical oral meds\" whole with pureed consistency.     Patient resting in bed comfortably this AM, arouses to voice and nods appropriately. He is afebrile, hemodynamically stable, and oxygenating appropriately on 50% FiO2 trach collar. He denies current pain, shortness of breath, or N/V/D. He is tolerating tube feeding.      The patient's past medical, surgical and social history were reviewed and updated in Epic as appropriate.     Objective     Infusions:  phenylephrine, 0.5-3 mcg/kg/min, Last Rate: Stopped (11/04/21 1757)      Medications:  atovaquone, 1,500 mg, Per PEG Tube, Daily With Lunch  b complex-vitamin c-folic acid, 1 tablet, Per PEG Tube, Daily  chlorhexidine, 15 mL, Mouth/Throat, Q12H  cholecalciferol, 2,000 Units, Per PEG Tube, Daily  custom medication builder, 100 mg, Nasogastric, Daily  dextromethorphan polistirex ER, 60 mg, Per PEG Tube, Q12H  epoetin tatum/tatum-epbx, 10,000 Units, Subcutaneous, Once per day on Tue Thu Sat  famotidine, 20 mg, Per G Tube, Daily  ferrous sulfate, 300 mg, Per PEG Tube, Daily  gabapentin, 100 mg, Per PEG Tube, Q8H  guaiFENesin-codeine, 10 mL, Per G Tube, TID  heparin (porcine), 5,000 Units, Subcutaneous, Q12H  insulin detemir, 8 Units, Subcutaneous, Q12H  insulin regular, 0-9 Units, Subcutaneous, Q6H  ipratropium-albuterol, 3 mL, Nebulization, 4x Daily - RT  lactobacillus acidophilus, 1 capsule, Per PEG Tube, Daily  melatonin, 5 mg, Per G Tube, Nightly  meropenem, 500 mg, Intravenous, Q24H  midodrine, 10 mg, Per PEG Tube, Q8H  Nutrisource fiber, 1 packet, Per G Tube, TID  predniSONE, 40 mg, Per G Tube, Daily With Breakfast  QUEtiapine, 25 mg, Per PEG Tube, Nightly  saccharomyces boulardii, 500 mg, Oral, BID  sertraline, 50 mg, Per PEG Tube, Daily      I reviewed the patient's medications.    Vital Sign Min/Max for last 24 hours  Temp  Min: 98.1 °F " (36.7 °C)  Max: 99.3 °F (37.4 °C)   BP  Min: 90/55  Max: 124/61   Pulse  Min: 72  Max: 93   Resp  Min: 18  Max: 24   SpO2  Min: 90 %  Max: 99 %   Flow (L/min)  Min: 10  Max: 10       Input/Output for last 24 hour shift  11/05 0701 - 11/06 0700  In: 1705   Out: -       Physical Exam:  GENERAL: Male patient resting in bed comfortably in no acute distress.   HEENT: Normocephalic and atraumatic. Trachea midline. PER. EOM WNL.   LUNGS: Chest rise of normal depth and symmetric. Right-sided lung sounds CTA; left-sided lung sounds diminished. No wheezes, rhonchi, or rales. Tracheostomy present.    HEART: S1, S2 detected. Regular rate and rhythm. No rub, murmur, or gallop.   ABDOMEN: Soft, round, nondistended, and nontender. Bowel sounds normoactive. PEG site C/D/I.    EXTREMITIES: No clubbing, edema, or cyanosis. Peripheral pulses present. Skin warm and dry. Left foot in AFO boot.    NEURO/PSYCH: Drowsy but arouses to voice. Follows commands. Moves extremities. No focal deficits.      Results from last 7 days   Lab Units 11/06/21  0401 11/05/21 0328 11/04/21  0351   WBC 10*3/mm3 10.44 8.97 18.58*   HEMOGLOBIN g/dL 7.4* 7.2* 8.3*   PLATELETS 10*3/mm3 202 175 259     Results from last 7 days   Lab Units 11/06/21  0401 11/05/21 0328 11/04/21  0351   SODIUM mmol/L 133* 135* 132*   POTASSIUM mmol/L 4.8 4.0 5.0   CO2 mmol/L 24.0 25.0 25.0   BUN mg/dL 79* 48* 93*   CREATININE mg/dL 5.90* 3.72* 6.66*   MAGNESIUM mg/dL 2.7* 2.4 2.9*   PHOSPHORUS mg/dL 3.4 3.2 5.0*   GLUCOSE mg/dL 98 103* 164*     Estimated Creatinine Clearance: 13.7 mL/min (A) (by C-G formula based on SCr of 5.9 mg/dL (H)).    Results from last 7 days   Lab Units 10/31/21  0921   PH, ARTERIAL pH units 7.468*   PCO2, ARTERIAL mm Hg 37.3   PO2 ART mm Hg 61.5*     I reviewed the patient's new clinical results.  I reviewed the patient's new imaging results/reports including actual images and agree with reports.       Imaging Results (Last 24 Hours)     Procedure  Component Value Units Date/Time    XR Chest 1 View [[id number]] Resulted: 11/06/21 0622     Updated: 11/06/21 0623    XR Chest 1 View [[id number]] Collected: 11/05/21 0845     Updated: 11/05/21 2158    Narrative:      EXAMINATION: XR CHEST 1 VW-     INDICATION: Followup; J96.01-Acute respiratory failure with hypoxia;  Z00.6-Encounter for examination for normal comparison and control in  clinical research program; R04.89-Hemorrhage from other sites in  respiratory passages; M31.0-Hypersensitivity angiitis; R49.9-Unspecified  voice and resonance disorder; R13.10-Dysphagia, unspecified.     COMPARISON: 11/03/2021.     FINDINGS: Tracheostomy tube is noted. Right IJ catheter tip lies in the  distal SVC. Heart is normal in size. There is persistent perihilar  interstitial disease, probably unchanged allowing for lower lung  volumes. No pneumothorax is seen. There is trace left pleural effusion.       Impression:      Mild perihilar interstitial disease, perhaps mild edema. No  significant interval change. No evidence of pneumothorax.      D:  11/05/2021  E:  11/05/2021     This report was finalized on 11/5/2021 9:55 PM by Dr. Yoni Barnes MD.       SLP FEES - Fiberoptic Endo Eval Swallow [[id number]] Resulted: 11/05/21 1311     Updated: 11/05/21 1311    Narrative:      This procedure was auto-finalized with no dictation required.        Assessment/Plan   Impression        Acute respiratory failure with hypoxia    Anti-GBM nephritis with pulmonary hemorrhage (HCC)    Acute renal failure (ARF) (HCC)    Acute blood loss anemia    Sepsis due to pneumonia (HCC);Klebsiella aerogenes    Idiopathic hypotension    Hyperglycemia    History of tobacco abuse       Plan        66-year-old male with PMH significant for 45-pack-year history of tobacco use (quit 1 month PTA), HTN, and T2DM who was initially admitted to [facility] on 9/27/21 with a 2-3-week history of hemoptysis.  He required intubation on 10/1. CTA at  OSH showed diffuse GGO's as well as incidental finding of 8 mm RML nodule.  He was additionally noted to be in progressive renal failure with concerns for pulmonary-renal syndrome treated with 3 days of pulse dose IV Solu-Medrol.  Ultimately anti-GBM Ab was positive concerning for Goodpasture's. PAIGE / ANCA / RF / mycoplasma IgM were all negative. 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 Ultimately he required temporary HD catheter placement with initiation of HD.     He was transferred to [facility] for higher level of care on 10/4/21 for respiratory failure requiring mechanical ventilation and renal failure requiring HD with concern for anti-GBM disease. Daily plasmapheresis was initiated on 10/5/21 for a total of 14 sessions, and he was continued on moderate-dose steroids after completing pulse. He was additionally started on Cytoxan. Eventually underwent tracheostomy and PEG placement on 10/15. Tracheostomy was downsized to 6-0 cuffless fenestrated shiley on 11/2.     1. Acute Hypoxemic Respiratory Failure / Prolonged Mechanical Ventilation   · Off vent since 10/28; currently on TC   · Continue Delsym, Mucinex, nebs   · Aggressive pulm toilet   · PMV during day as tolerated     2. Diffuse Alveolar Hemorrhage / Goodpasture's Syndrome  · Steroids / cytoxan / plasma exchange per Nephrology   · Continue PJP PPX until less than 15 mg pred daily     2. Klebsiella Aerogenes PNA / GNR VAP   · Completed course of prolonged ABX on 10/25  · Sputum now more purulent, culture from 11/4 with GNR  · Continue Meropenem per ID     3. Renal Failure / RPGN / Azotemia  · HD on TTS schedule per Nephrology   · Continue Retacrit, ferrous sulfate     4. Hypotension   · Off pressors  · Continue PO Midodrine     5. Anemia   · Has required multiple transfusions this hospitalization  · No current S/S of active bleeding  · Continue to monitor H&H;  transfuse for Hgb <7    6. Steroid-Induced Hyperglycemia   · Continue Levemir, correction SSI    7. Anxiety / Depression   · Continue Zoloft, Klonopin     8. Debility / Dysphagia   · Continue PT/OT services   · FEES per SLP yesterday with patient now able to take critical oral meds whole with pureed consistency   · Monitor for S/S of aspiration     Nutrition: Tube Feeding  GI Prophylaxis: Pepcid  DVT Prophylaxis: SQ Heparin     Dispo: The need to compound Cytoxan and give via PEG tube has hindered rehab placement. Since SLP has now recommended he can take critical oral meds whole with pureed consistency, I changed Cytoxan order to capsule. I have been informed by pharmacy, however that we do not currently have this in stock, and patient will have to continue with compounded medication until at least Monday. In the meantime he will be transferred to telemetry.    Plan of care and goals reviewed with multidisciplinary/antibiotic stewardship team during rounds.   I discussed the patient's findings and my recommendations with patient, nursing staff and primary care team     Nevaeh Jean Baptiste, DNP, APRN, AGACNP-BC  Pulmonary and Critical Care Medicine

## 2021-11-06 NOTE — PROGRESS NOTES
"   LOS: 33 days    Patient Care Team:  Andree Langston MD as PCP - General (Internal Medicine)    Reason For Visit:  F/U PIOTR  Subjective     Seen on HD. O2 sat dropped in 78-80s. Despite 15 Liter o2. RRT called. Concern for disloged trach. Dialysis stopped after 1 hr    Review of Systems:    Pulm: No soa   CV:  No CP. GI: NO N/V/D.      Objective     acetylcysteine, 4 mL, Nebulization, Q4H While Awake - RT  albumin human, , ,   albumin human, , ,   atovaquone, 1,500 mg, Per PEG Tube, Daily With Lunch  b complex-vitamin c-folic acid, 1 tablet, Per PEG Tube, Daily  chlorhexidine, 15 mL, Mouth/Throat, Q12H  cholecalciferol, 2,000 Units, Per PEG Tube, Daily  custom medication builder, 100 mg, Oral, Daily  dextromethorphan polistirex ER, 60 mg, Per PEG Tube, Q12H  epoetin tatum/tatum-epbx, 10,000 Units, Subcutaneous, Once per day on Tue Thu Sat  famotidine, 20 mg, Per G Tube, Daily  ferrous sulfate, 300 mg, Per PEG Tube, Daily  gabapentin, 100 mg, Per PEG Tube, Q8H  guaiFENesin-codeine, 10 mL, Per G Tube, TID  heparin (porcine), 5,000 Units, Subcutaneous, Q12H  insulin detemir, 8 Units, Subcutaneous, Q12H  insulin regular, 0-9 Units, Subcutaneous, Q6H  ipratropium-albuterol, 3 mL, Nebulization, Q4H While Awake - RT  lactobacillus acidophilus, 1 capsule, Per PEG Tube, Daily  melatonin, 5 mg, Per G Tube, Nightly  meropenem, 500 mg, Intravenous, Q24H  midodrine, 10 mg, Per PEG Tube, Q8H  Nutrisource fiber, 1 packet, Per G Tube, TID  predniSONE, 40 mg, Per G Tube, Daily With Breakfast  QUEtiapine, 25 mg, Per PEG Tube, Nightly  saccharomyces boulardii, 500 mg, Oral, BID  sertraline, 50 mg, Per PEG Tube, Daily             Vital Signs:  Blood pressure 92/60, pulse 97, temperature 97 °F (36.1 °C), temperature source Temporal, resp. rate 18, height 172.7 cm (67.99\"), weight 81 kg (178 lb 9.2 oz), SpO2 92 %.    Flowsheet Rows      First Filed Value   Admission Height 172.7 cm (68\") Documented at 10/04/2021 2132   Admission Weight " 102 kg (224 lb 13.9 oz) Documented at 10/04/2021 2100          11/05 0701 - 11/06 0700  In: 1705   Out: -     Physical Exam:    General Appearance: NAD, alert and cooperative, Ox3. + TRACH. + THD CATH.  Eyes: PER, conjunctivae and sclerae normal, no icterus  Lungs: respirations regular and unlabored, no crepitus. FEW RHONCHI  Heart/CV: regular rhythm & normal rate, no murmur, no gallop, no rub and TR edema  Abdomen: not distended, soft, non-tender, no masses,  bowel sounds present. PEG  Skin: No rash, Warm and dry    Radiology:            Labs:  Results from last 7 days   Lab Units 11/06/21 0401 11/05/21 0328 11/04/21  0351   WBC 10*3/mm3 10.44 8.97 18.58*   HEMOGLOBIN g/dL 7.4* 7.2* 8.3*   HEMATOCRIT % 23.4* 22.9* 25.4*   PLATELETS 10*3/mm3 202 175 259     Results from last 7 days   Lab Units 11/06/21 0401 11/05/21 0328 11/04/21 0351 11/03/21  0552   SODIUM mmol/L 133* 135* 132* 131*   POTASSIUM mmol/L 4.8 4.0 5.0 3.9   CHLORIDE mmol/L 93* 95* 89* 92*   CO2 mmol/L 24.0 25.0 25.0 25.0   BUN mg/dL 79* 48* 93* 67*   CREATININE mg/dL 5.90* 3.72* 6.66* 5.01*   CALCIUM mg/dL 8.9 8.7 8.8 8.8   PHOSPHORUS mg/dL 3.4 3.2 5.0* 3.5   MAGNESIUM mg/dL 2.7* 2.4 2.9* 2.5*   ALBUMIN g/dL 4.20 4.10 4.20 4.20     Results from last 7 days   Lab Units 11/06/21  0401   GLUCOSE mg/dL 98       Results from last 7 days   Lab Units 11/06/21  0401   ALK PHOS U/L 89   BILIRUBIN mg/dL 0.8   ALT (SGPT) U/L 22   AST (SGOT) U/L 23     Results from last 7 days   Lab Units 10/31/21  0921   PH, ARTERIAL pH units 7.468*   PO2 ART mm Hg 61.5*   PCO2, ARTERIAL mm Hg 37.3   HCO3 ART mmol/L 27.0*             Estimated Creatinine Clearance: 13.7 mL/min (A) (by C-G formula based on SCr of 5.9 mg/dL (H)).      Assessment       Acute respiratory failure with hypoxia    Anti-GBM nephritis with pulmonary hemorrhage (HCC)    Acute renal failure (ARF) (HCC)    Acute blood loss anemia    Sepsis due to pneumonia (HCC);Klebsiella aerogenes    Idiopathic  hypotension    Hyperglycemia    History of tobacco abuse            Impression: PIOTR: Anuric PIOTR. Etiology RPGN. 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 Unclear baseline cr. Cr was elevated in 2-3 range on presentation at OSH. Progressive worsening in last few days. Started on HD 10/4/21. S/p renal biopsy on 9/30 which showed AntiGBM disease.      UA large blood 3+ proteinuria at OSH.  Renal US increase echogenicity. No hydro at OSH.     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 With pulmonary and renal involvement. 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 Treated with 2 weeks of PLEX, Prednisone and cytoxan     Severe azotemia: Slow correction with HD.      Met acidosis: Correction with HD      Hyperphosphatemia: Resolved.      Hypoxic respiratory failure: 2/2 diffuse alveolar hemorrhage and possible pulmonary vasculitis. Now on trach                     Recommendations:   Hold off on HD today. Need repositioning of trach  HD per TTS stella.  On cytoxan and prednisone.   No sign of renal recovery. Will d/c cytoxa after total 8-12 weeks of therapy     Torsten Drake MD  11/06/21  13:13 EDT

## 2021-11-06 NOTE — PROCEDURES
Bronchoscopy    Date/Time: 11/6/2021 2:26 PM  Performed by: Davey Byers MD  Authorized by: Davey Byers MD   Consent: The procedure was performed in an emergent situation.  Patient identity confirmed: anonymous protocol, patient vented/unresponsive  Local anesthesia used: no    Anesthesia:  Local anesthesia used: no    Sedation:  Patient sedated: no    Patient tolerance: patient tolerated the procedure well with no immediate complications  Comments: Responded to rapid response as patient's trach dislodged.  Respiratory please put the trach back in but there were unable to pass the catheter through.  Patient was on trach collar by the time I arrived and saturating 90%.  He did not look in any distress.  We decided to perform bedside bronchoscopy to evaluate the positioning of the trach.  Disposable regular bronchoscope was passed through the existing trach trach was found to be in false lumen.  Tracheostomy tube was removed and bronchoscope was passed through the tracheostomy tube into the airway and zachary visualized.  Subsequently site 6 cuffless Shiley trach passed over the bronchoscope without any difficulty.  Position was confirmed and trach ties put in place.  Patient did have a coughing episode after the tracheostomy was situated and he had some bloody secretion came out which were suctioned.  Subsequent bronchoscopy did not reveal any ongoing bleeding in the airway.  Will continue to monitor closely.

## 2021-11-06 NOTE — PLAN OF CARE
Goal Outcome Evaluation:  Plan of Care Reviewed With: daughter        Progress: no change   Transferred to floor and was transferred back from dialysis due to  trach dislodged. Daughter called and updated and she came in. Remains on 50% trach collar. Bloody tan secretions. Less active today. Remains NSR VSS. Continues to be anuric. Temp max 99.4 axill.no BM today.continue to monitor for changes.

## 2021-11-06 NOTE — PLAN OF CARE
Goal Outcome Evaluation:                   Neuro: Pt oriented to self. Drowsy, rested well overnight. No new neurological deficits noted this shift.    Resp: Pt remains on trach collar, FiO2 50%, SpO2 93+. Lungs coarse rhonchi.  Copious thick tan secretions noted from trach. Trach care performed per protocol.    Cardio: Pt remains in SR, rate 60s-90s. SBP 110s-120s. Good heart tones.     GI: TF at goal, tolerating well. Active bowel sounds. No BM this shift.    : Anuric. Crt. 5.9 this AM.    Pain: Patient denies complaints of pain at this time.

## 2021-11-07 ENCOUNTER — APPOINTMENT (OUTPATIENT)
Dept: GENERAL RADIOLOGY | Facility: HOSPITAL | Age: 66
End: 2021-11-07

## 2021-11-07 ENCOUNTER — APPOINTMENT (OUTPATIENT)
Dept: NEPHROLOGY | Facility: HOSPITAL | Age: 66
End: 2021-11-07

## 2021-11-07 LAB
ABO GROUP BLD: NORMAL
ANION GAP SERPL CALCULATED.3IONS-SCNC: 14 MMOL/L (ref 5–15)
BLD GP AB SCN SERPL QL: NEGATIVE
BUN SERPL-MCNC: 84 MG/DL (ref 8–23)
BUN/CREAT SERPL: 14.7 (ref 7–25)
CALCIUM SPEC-SCNC: 8.8 MG/DL (ref 8.6–10.5)
CHLORIDE SERPL-SCNC: 95 MMOL/L (ref 98–107)
CO2 SERPL-SCNC: 26 MMOL/L (ref 22–29)
CREAT SERPL-MCNC: 5.72 MG/DL (ref 0.76–1.27)
DEPRECATED RDW RBC AUTO: 64.7 FL (ref 37–54)
ERYTHROCYTE [DISTWIDTH] IN BLOOD BY AUTOMATED COUNT: 20.1 % (ref 12.3–15.4)
GFR SERPL CREATININE-BSD FRML MDRD: 10 ML/MIN/1.73
GFR SERPL CREATININE-BSD FRML MDRD: ABNORMAL ML/MIN/{1.73_M2}
GLUCOSE BLDC GLUCOMTR-MCNC: 106 MG/DL (ref 70–130)
GLUCOSE BLDC GLUCOMTR-MCNC: 139 MG/DL (ref 70–130)
GLUCOSE BLDC GLUCOMTR-MCNC: 167 MG/DL (ref 70–130)
GLUCOSE SERPL-MCNC: 138 MG/DL (ref 65–99)
HCT VFR BLD AUTO: 19.4 % (ref 37.5–51)
HGB BLD-MCNC: 6.2 G/DL (ref 13–17.7)
MCH RBC QN AUTO: 31.8 PG (ref 26.6–33)
MCHC RBC AUTO-ENTMCNC: 32 G/DL (ref 31.5–35.7)
MCV RBC AUTO: 99.5 FL (ref 79–97)
PLATELET # BLD AUTO: 174 10*3/MM3 (ref 140–450)
PMV BLD AUTO: 11.7 FL (ref 6–12)
POTASSIUM SERPL-SCNC: 5.5 MMOL/L (ref 3.5–5.2)
RBC # BLD AUTO: 1.95 10*6/MM3 (ref 4.14–5.8)
RH BLD: POSITIVE
SODIUM SERPL-SCNC: 135 MMOL/L (ref 136–145)
T&S EXPIRATION DATE: NORMAL
WBC # BLD AUTO: 9.96 10*3/MM3 (ref 3.4–10.8)

## 2021-11-07 PROCEDURE — 86922 COMPATIBILITY TEST ANTIGLOB: CPT

## 2021-11-07 PROCEDURE — 86900 BLOOD TYPING SEROLOGIC ABO: CPT

## 2021-11-07 PROCEDURE — 63710000001 INSULIN DETEMIR PER 5 UNITS: Performed by: INTERNAL MEDICINE

## 2021-11-07 PROCEDURE — 71045 X-RAY EXAM CHEST 1 VIEW: CPT

## 2021-11-07 PROCEDURE — 25010000002 MEROPENEM PER 100 MG: Performed by: INTERNAL MEDICINE

## 2021-11-07 PROCEDURE — 94760 N-INVAS EAR/PLS OXIMETRY 1: CPT

## 2021-11-07 PROCEDURE — 86900 BLOOD TYPING SEROLOGIC ABO: CPT | Performed by: NURSE PRACTITIONER

## 2021-11-07 PROCEDURE — P9016 RBC LEUKOCYTES REDUCED: HCPCS

## 2021-11-07 PROCEDURE — 25010000002 CYCLOPHOSPHAMIDE PER 25 MG

## 2021-11-07 PROCEDURE — 85027 COMPLETE CBC AUTOMATED: CPT | Performed by: SURGERY

## 2021-11-07 PROCEDURE — 94668 MNPJ CHEST WALL SBSQ: CPT

## 2021-11-07 PROCEDURE — 86920 COMPATIBILITY TEST SPIN: CPT

## 2021-11-07 PROCEDURE — 82962 GLUCOSE BLOOD TEST: CPT

## 2021-11-07 PROCEDURE — 63710000001 INSULIN REGULAR HUMAN PER 5 UNITS: Performed by: INTERNAL MEDICINE

## 2021-11-07 PROCEDURE — 63710000001 PREDNISONE PER 1 MG: Performed by: INTERNAL MEDICINE

## 2021-11-07 PROCEDURE — 86850 RBC ANTIBODY SCREEN: CPT | Performed by: NURSE PRACTITIONER

## 2021-11-07 PROCEDURE — 80048 BASIC METABOLIC PNL TOTAL CA: CPT | Performed by: NURSE PRACTITIONER

## 2021-11-07 PROCEDURE — 99233 SBSQ HOSP IP/OBS HIGH 50: CPT | Performed by: INTERNAL MEDICINE

## 2021-11-07 PROCEDURE — 25010000002 HEPARIN (PORCINE) PER 1000 UNITS: Performed by: INTERNAL MEDICINE

## 2021-11-07 PROCEDURE — 94799 UNLISTED PULMONARY SVC/PX: CPT

## 2021-11-07 PROCEDURE — 86901 BLOOD TYPING SEROLOGIC RH(D): CPT | Performed by: NURSE PRACTITIONER

## 2021-11-07 RX ORDER — ALBUMIN (HUMAN) 12.5 G/50ML
12.5 SOLUTION INTRAVENOUS AS NEEDED
Status: CANCELLED | OUTPATIENT
Start: 2021-11-07 | End: 2021-11-08

## 2021-11-07 RX ORDER — CYCLOPHOSPHAMIDE 25 MG/1
100 CAPSULE ORAL DAILY
Status: DISCONTINUED | OUTPATIENT
Start: 2021-11-07 | End: 2021-11-11

## 2021-11-07 RX ADMIN — ACETYLCYSTEINE 4 ML: 200 SOLUTION ORAL; RESPIRATORY (INHALATION) at 15:40

## 2021-11-07 RX ADMIN — ATOVAQUONE 1500 MG: 750 SUSPENSION ORAL at 12:27

## 2021-11-07 RX ADMIN — MEROPENEM 500 MG: 500 INJECTION, POWDER, FOR SOLUTION INTRAVENOUS at 14:58

## 2021-11-07 RX ADMIN — INSULIN HUMAN 2 UNITS: 100 INJECTION, SOLUTION PARENTERAL at 18:13

## 2021-11-07 RX ADMIN — Medication 500 MG: at 20:29

## 2021-11-07 RX ADMIN — Medication 1 TABLET: at 09:00

## 2021-11-07 RX ADMIN — MIDODRINE HYDROCHLORIDE 10 MG: 10 TABLET ORAL at 14:58

## 2021-11-07 RX ADMIN — IPRATROPIUM BROMIDE AND ALBUTEROL SULFATE 3 ML: 2.5; .5 SOLUTION RESPIRATORY (INHALATION) at 15:40

## 2021-11-07 RX ADMIN — IPRATROPIUM BROMIDE AND ALBUTEROL SULFATE 3 ML: 2.5; .5 SOLUTION RESPIRATORY (INHALATION) at 11:03

## 2021-11-07 RX ADMIN — HEPARIN SODIUM 5000 UNITS: 5000 INJECTION, SOLUTION INTRAVENOUS; SUBCUTANEOUS at 20:29

## 2021-11-07 RX ADMIN — ACETYLCYSTEINE 4 ML: 200 SOLUTION ORAL; RESPIRATORY (INHALATION) at 23:20

## 2021-11-07 RX ADMIN — INSULIN DETEMIR 8 UNITS: 100 INJECTION, SOLUTION SUBCUTANEOUS at 20:29

## 2021-11-07 RX ADMIN — IPRATROPIUM BROMIDE AND ALBUTEROL SULFATE 3 ML: 2.5; .5 SOLUTION RESPIRATORY (INHALATION) at 23:20

## 2021-11-07 RX ADMIN — MINERAL SUPPLEMENT IRON 300 MG / 5 ML STRENGTH LIQUID 100 PER BOX UNFLAVORED 300 MG: at 08:32

## 2021-11-07 RX ADMIN — GABAPENTIN 100 MG: 100 CAPSULE ORAL at 22:07

## 2021-11-07 RX ADMIN — HEPARIN SODIUM 5000 UNITS: 5000 INJECTION, SOLUTION INTRAVENOUS; SUBCUTANEOUS at 08:32

## 2021-11-07 RX ADMIN — GABAPENTIN 100 MG: 100 CAPSULE ORAL at 05:20

## 2021-11-07 RX ADMIN — INSULIN DETEMIR 8 UNITS: 100 INJECTION, SOLUTION SUBCUTANEOUS at 08:32

## 2021-11-07 RX ADMIN — CYCLOPHOSPHAMIDE 100 MG: 25 CAPSULE ORAL at 17:36

## 2021-11-07 RX ADMIN — ACETYLCYSTEINE 4 ML: 200 SOLUTION ORAL; RESPIRATORY (INHALATION) at 19:04

## 2021-11-07 RX ADMIN — GUAIFENESIN AND CODEINE PHOSPHATE 10 ML: 100; 10 SOLUTION ORAL at 20:29

## 2021-11-07 RX ADMIN — Medication 1 PACKET: at 20:43

## 2021-11-07 RX ADMIN — Medication 60 MG: at 08:32

## 2021-11-07 RX ADMIN — Medication 1 PACKET: at 09:00

## 2021-11-07 RX ADMIN — CHLORHEXIDINE GLUCONATE 15 ML: 1.2 SOLUTION ORAL at 20:29

## 2021-11-07 RX ADMIN — IPRATROPIUM BROMIDE AND ALBUTEROL SULFATE 3 ML: 2.5; .5 SOLUTION RESPIRATORY (INHALATION) at 19:04

## 2021-11-07 RX ADMIN — SERTRALINE HYDROCHLORIDE 50 MG: 50 TABLET ORAL at 08:32

## 2021-11-07 RX ADMIN — ACETYLCYSTEINE 4 ML: 200 SOLUTION ORAL; RESPIRATORY (INHALATION) at 07:18

## 2021-11-07 RX ADMIN — Medication 1 CAPSULE: at 08:32

## 2021-11-07 RX ADMIN — FAMOTIDINE 20 MG: 20 TABLET, FILM COATED ORAL at 08:32

## 2021-11-07 RX ADMIN — Medication 1 PACKET: at 18:13

## 2021-11-07 RX ADMIN — Medication 60 MG: at 20:29

## 2021-11-07 RX ADMIN — ACETYLCYSTEINE 4 ML: 200 SOLUTION ORAL; RESPIRATORY (INHALATION) at 11:03

## 2021-11-07 RX ADMIN — CHLORHEXIDINE GLUCONATE 15 ML: 1.2 SOLUTION ORAL at 08:32

## 2021-11-07 RX ADMIN — Medication 5 MG: at 20:29

## 2021-11-07 RX ADMIN — PREDNISONE 40 MG: 20 TABLET ORAL at 14:58

## 2021-11-07 RX ADMIN — Medication 500 MG: at 08:32

## 2021-11-07 RX ADMIN — Medication 2000 UNITS: at 08:32

## 2021-11-07 RX ADMIN — GABAPENTIN 100 MG: 100 CAPSULE ORAL at 14:58

## 2021-11-07 RX ADMIN — IPRATROPIUM BROMIDE AND ALBUTEROL SULFATE 3 ML: 2.5; .5 SOLUTION RESPIRATORY (INHALATION) at 07:18

## 2021-11-07 NOTE — PLAN OF CARE
Goal Outcome Evaluation:  Plan of Care Reviewed With: daughter, patient        Progress: improving   Received 1 unit of prbc today. Trach capped and on nasal cannula. Doing well although a lot of secretions around trach with coughing. To keep capped while awake and place back to trach collar for nighttime sleeping. Daughter came today and gave a haircut. Lifted up to chair. Tube feeding

## 2021-11-07 NOTE — PROGRESS NOTES
"   LOS: 34 days    Patient Care Team:  Andree Langston MD as PCP - General (Internal Medicine)    Reason For Visit:  F/U POITR  Subjective     Worsening labs today. Plan for HD as couldn't get full treatment yesterday BMP K 5.5. Will need PRBC today     Review of Systems:    Pulm: No soa   CV:  No CP. GI: NO N/V/D.      Objective     acetylcysteine, 4 mL, Nebulization, Q4H While Awake - RT  albumin human, , ,   albumin human, , ,   atovaquone, 1,500 mg, Per PEG Tube, Daily With Lunch  b complex-vitamin c-folic acid, 1 tablet, Per PEG Tube, Daily  chlorhexidine, 15 mL, Mouth/Throat, Q12H  cholecalciferol, 2,000 Units, Per PEG Tube, Daily  Cyclophosphamide, 100 mg, Oral, Daily  dextromethorphan polistirex ER, 60 mg, Per PEG Tube, Q12H  epoetin tatum/tatum-epbx, 10,000 Units, Subcutaneous, Once per day on Tue Thu Sat  famotidine, 20 mg, Per G Tube, Daily  ferrous sulfate, 300 mg, Per PEG Tube, Daily  gabapentin, 100 mg, Per PEG Tube, Q8H  guaiFENesin-codeine, 10 mL, Per G Tube, TID  heparin (porcine), 5,000 Units, Subcutaneous, Q12H  insulin detemir, 8 Units, Subcutaneous, Q12H  insulin regular, 0-9 Units, Subcutaneous, Q6H  ipratropium-albuterol, 3 mL, Nebulization, Q4H While Awake - RT  lactobacillus acidophilus, 1 capsule, Per PEG Tube, Daily  melatonin, 5 mg, Per G Tube, Nightly  meropenem, 500 mg, Intravenous, Q24H  midodrine, 10 mg, Per PEG Tube, Q8H  Nutrisource fiber, 1 packet, Per G Tube, TID  predniSONE, 40 mg, Per G Tube, Daily With Breakfast  QUEtiapine, 25 mg, Per PEG Tube, Nightly  saccharomyces boulardii, 500 mg, Oral, BID  sertraline, 50 mg, Per PEG Tube, Daily             Vital Signs:  Blood pressure 91/59, pulse 79, temperature 97.7 °F (36.5 °C), resp. rate 20, height 172.7 cm (67.99\"), weight 81 kg (178 lb 9.2 oz), SpO2 98 %.    Flowsheet Rows      First Filed Value   Admission Height 172.7 cm (68\") Documented at 10/04/2021 2132   Admission Weight 102 kg (224 lb 13.9 oz) Documented at 10/04/2021 2100 "          11/06 0701 - 11/07 0700  In: 1379   Out: 590     Physical Exam:    General Appearance: NAD, alert and cooperative, Ox3. + TRACH. + THD CATH.  Eyes: PER, conjunctivae and sclerae normal, no icterus  Lungs: respirations regular and unlabored, no crepitus. FEW RHONCHI  Heart/CV: regular rhythm & normal rate, no murmur, no gallop, no rub and TR edema  Abdomen: not distended, soft, non-tender, no masses,  bowel sounds present. PEG  Skin: No rash, Warm and dry    Radiology:            Labs:  Results from last 7 days   Lab Units 11/07/21 0357 11/06/21 0401 11/05/21 0328   WBC 10*3/mm3 9.96 10.44 8.97   HEMOGLOBIN g/dL 6.2* 7.4* 7.2*   HEMATOCRIT % 19.4* 23.4* 22.9*   PLATELETS 10*3/mm3 174 202 175     Results from last 7 days   Lab Units 11/07/21 0357 11/06/21 0401 11/05/21 0328 11/04/21 0351 11/03/21  0552 11/03/21  0552   SODIUM mmol/L 135* 133* 135* 132*   < > 131*   POTASSIUM mmol/L 5.5* 4.8 4.0 5.0   < > 3.9   CHLORIDE mmol/L 95* 93* 95* 89*   < > 92*   CO2 mmol/L 26.0 24.0 25.0 25.0   < > 25.0   BUN mg/dL 84* 79* 48* 93*   < > 67*   CREATININE mg/dL 5.72* 5.90* 3.72* 6.66*   < > 5.01*   CALCIUM mg/dL 8.8 8.9 8.7 8.8   < > 8.8   PHOSPHORUS mg/dL  --  3.4 3.2 5.0*  --  3.5   MAGNESIUM mg/dL  --  2.7* 2.4 2.9*  --  2.5*   ALBUMIN g/dL  --  4.20 4.10 4.20  --  4.20    < > = values in this interval not displayed.     Results from last 7 days   Lab Units 11/07/21 0357   GLUCOSE mg/dL 138*       Results from last 7 days   Lab Units 11/06/21  0401   ALK PHOS U/L 89   BILIRUBIN mg/dL 0.8   ALT (SGPT) U/L 22   AST (SGOT) U/L 23                 Estimated Creatinine Clearance: 14.2 mL/min (A) (by C-G formula based on SCr of 5.72 mg/dL (H)).      Assessment       Acute respiratory failure with hypoxia    Anti-GBM nephritis with pulmonary hemorrhage (HCC)    Acute renal failure (ARF) (HCC)    Acute blood loss anemia    Sepsis due to pneumonia (HCC);Klebsiella aerogenes    Idiopathic hypotension    Hyperglycemia     History of tobacco abuse            Impression: PIOTR: Anuric PIOTR. Etiology RPGN. 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 Unclear baseline cr. Cr was elevated in 2-3 range on presentation at OSH. Progressive worsening in last few days. Started on HD 10/4/21. S/p renal biopsy on 9/30 which showed AntiGBM disease.      UA large blood 3+ proteinuria at OSH.  Renal US increase echogenicity. No hydro at OSH.     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 With pulmonary and renal involvement. 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 Treated with 2 weeks of PLEX, Prednisone and cytoxan     Severe azotemia: Slow correction with HD.      Met acidosis: Correction with HD      Hyperphosphatemia: Resolved.      Hypoxic respiratory failure: 2/2 diffuse alveolar hemorrhage and possible pulmonary vasculitis. Now on trach                     Recommendations:   HD today for hyperkalemia and optimization of volume status  HD per TTS stella.  On cytoxan and prednisone.   No sign of renal recovery. Will d/c cytoxa after total 8-12 weeks of therapy   Transfuse at hb<7    Torsten Drake MD  11/07/21  12:45 EST

## 2021-11-07 NOTE — PLAN OF CARE
HD in progress.   Problem: Device-Related Complication Risk (Hemodialysis)  Goal: Safe, Effective Therapy Delivery  Outcome: Ongoing, Progressing  Intervention: Optimize Device Care and Function  Recent Flowsheet Documentation  Taken 11/7/2021 1055 by Maggy Becerra RN  Medication Review/Management: medications reviewed     Problem: Hemodynamic Instability (Hemodialysis)  Goal: Vital Signs Remain in Desired Range  Outcome: Ongoing, Progressing     Problem: Infection (Hemodialysis)  Goal: Absence of Infection Signs and Symptoms  Outcome: Ongoing, Progressing

## 2021-11-07 NOTE — PROGRESS NOTES
INTENSIVIST / PULMONARY FOLLOW UP NOTE     Hospital:  LOS: 34 days   Mr. Keshav Dickens, 66 y.o. male is followed for:     Acute respiratory failure with hypoxia    Anti-GBM nephritis with pulmonary hemorrhage (HCC)    Acute renal failure (ARF) (HCC)    Acute blood loss anemia    Sepsis due to pneumonia (HCC);Klebsiella aerogenes    Idiopathic hypotension    Hyperglycemia    History of tobacco abuse          SUBJECTIVE   Dislodged trach again last yesterday while in dialysis    The patient's relevant past medical, surgical, family, and social history were reviewed    Allergies and medications were reviewed    ROS:  Per subjective, all other systems were reviewed and were negative        OBJECTIVE     Vital Sign Min/Max for last 24 hours:  Temp  Min: 97 °F (36.1 °C)  Max: 99.4 °F (37.4 °C)   BP  Min: 92/60  Max: 136/63   Pulse  Min: 72  Max: 117   Resp  Min: 18  Max: 22   SpO2  Min: 66 %  Max: 100 %   No data recorded     Physical Exam:  General Appearance:  no acute distress  Eyes:  No scleral icterus or pallor, pupils normal  Ears, Nose, Mouth, Throat:  tracheostomy  Neck:  Trachea midline, thyroid normal  Respiratory: clear to auscultation bilaterally, normal effort  Cardiovascular:  Regular rate and rhythm, no murmurs, no peripheral edema  Gastrointestinal:  Soft, nontender, no hepatosplenomegaly, PEG  Skin:  Normal temperature, no rash  Psychiatric:  no agitation  Neuro:  No new focal neurologic deficits observed      Telemetry:              Hemodynamics:   CVP:     PAP:     PAOP:     CO:     CI:     SVI:     SVR:       SpO2: 97 % SpO2  Min: 66 %  Max: 100 %   Device:      Flow Rate:   No data recorded     Mechanical Ventilator Settings:                                Intake/Ouptut 24 hrs (7:00AM - 6:59 AM)  Intake & Output (last 3 days)       11/04 0701  11/05 0700 11/05 0701  11/06 0700 11/06 0701 11/07 0700 11/07 0701 11/08 0700    I.V. (mL/kg) 310 (3.8)       Other 322 323 272     NG/GT 1161 1382 1107      Total Intake(mL/kg) 1793 (22.1) 1705 (21) 1379 (17)     Other 2280  590     Total Output 2280  590     Net -487 +1705 +789             Stool Unmeasured Occurrence 1 x             Lines, Drains & Airways     Active LDAs     Name Placement date Placement time Site Days    Peripheral IV 10/09/21 2200 Anterior; Left Forearm 10/09/21  2200  Forearm  2    Peripheral IV 10/12/21 1110 Anterior; Right Forearm 10/12/21  1110  Forearm  less than 1    NG/OG Tube Nasoduodenal 10 Fr Right nostril 10/06/21  1000  Right nostril  6    Rectal Tube With balloon 10/11/21  1200  --  1    ETT  10/04/21  1200   8                Hematology:  Results from last 7 days   Lab Units 11/07/21 0357 11/06/21 0401 11/05/21  0328 11/04/21  0351 11/03/21  0552 11/02/21  0356 11/01/21  0331   WBC 10*3/mm3 9.96 10.44 8.97 18.58* 10.02 8.93 9.00   HEMOGLOBIN g/dL 6.2* 7.4* 7.2* 8.3* 7.8* 7.2* 7.8*   HEMATOCRIT % 19.4* 23.4* 22.9* 25.4* 22.7* 20.8* 23.0*   PLATELETS 10*3/mm3 174 202 175 259 191 198 212     Electrolytes, Magnesium and Phosphorus:  Results from last 7 days   Lab Units 11/07/21  0357 11/06/21  0401 11/05/21  0328 11/04/21  0351 11/03/21  0552 11/02/21  0356 11/01/21  0331   SODIUM mmol/L 135* 133* 135* 132* 131* 132* 132*   CHLORIDE mmol/L 95* 93* 95* 89* 92* 89* 92*   POTASSIUM mmol/L 5.5* 4.8 4.0 5.0 3.9 4.2 4.3   CO2 mmol/L 26.0 24.0 25.0 25.0 25.0 21.0* 23.0   MAGNESIUM mg/dL  --  2.7* 2.4 2.9* 2.5* 2.9*  2.9*  --    PHOSPHORUS mg/dL  --  3.4 3.2 5.0* 3.5 4.7*  4.7* 4.5     Renal:  Results from last 7 days   Lab Units 11/07/21 0357 11/06/21 0401 11/05/21 0328 11/04/21 0351 11/03/21  0552 11/02/21 0356 11/01/21  0331   CREATININE mg/dL 5.72* 5.90* 3.72* 6.66* 5.01* 7.85* 6.35*   BUN mg/dL 84* 79* 48* 93* 67* 145* 106*     Estimated Creatinine Clearance: 14.2 mL/min (A) (by C-G formula based on SCr of 5.72 mg/dL (H)).  Hepatic:  Results from last 7 days   Lab Units 11/06/21 0401 11/05/21 0328 11/04/21 0351 11/03/21  0552  11/02/21  0356   ALK PHOS U/L 89 77 88 88 78   BILIRUBIN mg/dL 0.8 0.8 0.7 0.9 0.6   ALT (SGPT) U/L 22 20 26 27 25   AST (SGOT) U/L 23 18 23 29 23     Arterial Blood Gases:        Results from last 7 days   Lab Units 11/06/21  0401   HEMOGLOBIN A1C % 5.00       Lab Results   Component Value Date    LACTATE 1.9 10/04/2021       Relevant imaging studies and labs from 11/07/21 were reviewed and interpreted by me    Medications (drips):       acetylcysteine, 4 mL, Nebulization, Q4H While Awake - RT  albumin human, , ,   albumin human, , ,   atovaquone, 1,500 mg, Per PEG Tube, Daily With Lunch  b complex-vitamin c-folic acid, 1 tablet, Per PEG Tube, Daily  chlorhexidine, 15 mL, Mouth/Throat, Q12H  cholecalciferol, 2,000 Units, Per PEG Tube, Daily  Cyclophosphamide, 100 mg, Oral, Daily  dextromethorphan polistirex ER, 60 mg, Per PEG Tube, Q12H  epoetin tatum/tatum-epbx, 10,000 Units, Subcutaneous, Once per day on Tue Thu Sat  famotidine, 20 mg, Per G Tube, Daily  ferrous sulfate, 300 mg, Per PEG Tube, Daily  gabapentin, 100 mg, Per PEG Tube, Q8H  guaiFENesin-codeine, 10 mL, Per G Tube, TID  heparin (porcine), 5,000 Units, Subcutaneous, Q12H  insulin detemir, 8 Units, Subcutaneous, Q12H  insulin regular, 0-9 Units, Subcutaneous, Q6H  ipratropium-albuterol, 3 mL, Nebulization, Q4H While Awake - RT  lactobacillus acidophilus, 1 capsule, Per PEG Tube, Daily  melatonin, 5 mg, Per G Tube, Nightly  meropenem, 500 mg, Intravenous, Q24H  midodrine, 10 mg, Per PEG Tube, Q8H  Nutrisource fiber, 1 packet, Per G Tube, TID  predniSONE, 40 mg, Per G Tube, Daily With Breakfast  QUEtiapine, 25 mg, Per PEG Tube, Nightly  saccharomyces boulardii, 500 mg, Oral, BID  sertraline, 50 mg, Per PEG Tube, Daily        Assessment/Plan   IMPRESSION / PLAN     Inpatient Problem List:  66 y.o.male:  Active Hospital Problems    Diagnosis    • **Acute respiratory failure with hypoxia    • Idiopathic hypotension    • Hyperglycemia    • History of tobacco  abuse    • Sepsis due to pneumonia (HCC);Klebsiella aerogenes    • Acute blood loss anemia    • Anti-GBM nephritis with pulmonary hemorrhage (HCC)    • Acute renal failure (ARF) (HCC)         Impression & Plan:  66 y.o.male with relevant PMH of 45 py smoking quit 1 month PTA, HTN, T2DM admitted initially to [facility] 9/27/21 with 2-3 week h/o hemoptysis.  He required intubation on 10/1/21.  CTA at OSH showed diffuse GGO and 8 mm RML nodule.  Patient was also noted to be in Renal failure with concerns for Pulmonary-Renal syndrome.  He was treated with 3 days of pulse IV solumedrol. Ultimately anti-GBM Ab was positive concerning for Goodpastures.  PAIGE / ANCA / RF / Mycoplasma IgM were negative.  Patient had temporary HD catheter placed at OSH for PIOTR and HD was started.    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    He was transferred to [facility] 10/4/2021 for higher level of care.  Daily plasma exchange was initiated on 10/5/21 for total of 14 sessions, and he has continued on moderate dose steroids after receiving pulse.  Patient has also been initiated on Cytoxan.  Has required multiple transfusions.      Eventually had Trach / PEG on 10/15/21.    Trach downsized to 6-0 cuffless fenestrated shiley on 11/2.    Anxiety  Depression  Continue klonopin and zoloft    Acute Hypoxemic Respiratory Failure  Diffuse Alveolar Hemorrhage  Goodpasture's Syndrome  Prolonged Mechanical Ventilation  Scheduled nebs. Steroids / cytoxan / plasma exchange per nephrology.  PJP prophylaxis.  Patient is heavily immunosuppressed and will be for some time.  Appreciate ID assistance with abx.   Off vent now > 1 week.   Speech following for swallowing and PMV.  Changed to 6-0 cuffless fenestrated trach 11/2.  Brisk cough reflex.  Continue Delsym, Codeine, and low dose gabapentin.  Start trying to cap trach during day  while awake.  May be able to de cannulate soon.  Trach has dislodged twice and has been difficult to replace.    Plans in place to continue cytoxan for 3 months.  Slowly taper pred.  Stop PJP prophylaxis when on < 15 mg Pred.  Pred taper per Nephrology.    Klebsiella Aerogenes Pneumonia   Completed abx on 10/25.  Sputum more purulent - regrowing 4+ Klebsiella in sputum.  Back on Stephanie - appreciate ID.  Re-started scheduled nebs and 2 days of mucomyst.  Chest physiotherapy for LLL atelectasis --> better today.    Hypotension  · Taper po midodrine as able.    PIOTR  As above, Dialysis per nephrology.  D/cd prostat until uremia improves.    Anemia  Has required multiple transfusions.  Tranfuse for hgb < 7.  No signs of active bleeding.  Give 1 unit PRBC today.      T2DM A1c  Steroid Induced Hyperglycemia  Titrate SQ insulin    DVT / PUD Prophylaxis  SCDs / Heparin / Pepcid    Nutrition  Tube Feeds    Dispo  Trach / PEG 10/15.  Unfortunately cytoxan has to be specially compounded to go in G-tube.  Once able to swallow with ST and take pill form this will expand options for rehab.    PT/OT    Plan of care and goals reviewed with multidisciplinary team at daily rounds    High Risk secondary to hypotension on  mindodrine, severe life threatening good pastures syndrome requiring prolonged mechanical ventilation, dialysis, plasma exchange, etc.  High risk for worsening / relapse.    Keep in ICU for now - has dislodged tracheostomy twice and it was difficult to replace (required bronchoscopy).  I am hoping he will be able to de cannulate in the next 48 hours or so.         Sravan Forrester MD  Intensive Care Medicine  11/07/21 10:28 EST

## 2021-11-08 ENCOUNTER — APPOINTMENT (OUTPATIENT)
Dept: GENERAL RADIOLOGY | Facility: HOSPITAL | Age: 66
End: 2021-11-08

## 2021-11-08 LAB
ALBUMIN SERPL-MCNC: 4.1 G/DL (ref 3.5–5.2)
ALBUMIN/GLOB SERPL: 1.6 G/DL
ALP SERPL-CCNC: 84 U/L (ref 39–117)
ALT SERPL W P-5'-P-CCNC: 23 U/L (ref 1–41)
ANION GAP SERPL CALCULATED.3IONS-SCNC: 18 MMOL/L (ref 5–15)
ARTERIAL PATENCY WRIST A: ABNORMAL
AST SERPL-CCNC: 32 U/L (ref 1–40)
ATMOSPHERIC PRESS: ABNORMAL MM[HG]
BASE EXCESS BLDA CALC-SCNC: 5.2 MMOL/L (ref 0–2)
BASOPHILS # BLD AUTO: 0.06 10*3/MM3 (ref 0–0.2)
BASOPHILS NFR BLD AUTO: 0.6 % (ref 0–1.5)
BDY SITE: ABNORMAL
BH BB BLOOD EXPIRATION DATE: NORMAL
BH BB BLOOD TYPE BARCODE: 6200
BH BB DISPENSE STATUS: NORMAL
BH BB PRODUCT CODE: NORMAL
BH BB UNIT NUMBER: NORMAL
BILIRUB SERPL-MCNC: 0.9 MG/DL (ref 0–1.2)
BODY TEMPERATURE: 37 C
BUN SERPL-MCNC: 75 MG/DL (ref 8–23)
BUN/CREAT SERPL: 14.9 (ref 7–25)
CALCIUM SPEC-SCNC: 8.7 MG/DL (ref 8.6–10.5)
CHLORIDE SERPL-SCNC: 92 MMOL/L (ref 98–107)
CO2 BLDA-SCNC: 31.2 MMOL/L (ref 22–33)
CO2 SERPL-SCNC: 24 MMOL/L (ref 22–29)
COHGB MFR BLD: 2.2 % (ref 0–2)
CREAT SERPL-MCNC: 5.04 MG/DL (ref 0.76–1.27)
CROSSMATCH INTERPRETATION: NORMAL
CRP SERPL-MCNC: 6.36 MG/DL (ref 0–0.5)
DEPRECATED RDW RBC AUTO: 64.1 FL (ref 37–54)
EOSINOPHIL # BLD AUTO: 0.29 10*3/MM3 (ref 0–0.4)
EOSINOPHIL NFR BLD AUTO: 2.9 % (ref 0.3–6.2)
EPAP: 0
ERYTHROCYTE [DISTWIDTH] IN BLOOD BY AUTOMATED COUNT: 19 % (ref 12.3–15.4)
GFR SERPL CREATININE-BSD FRML MDRD: 11 ML/MIN/1.73
GFR SERPL CREATININE-BSD FRML MDRD: ABNORMAL ML/MIN/{1.73_M2}
GLOBULIN UR ELPH-MCNC: 2.5 GM/DL
GLUCOSE BLDC GLUCOMTR-MCNC: 138 MG/DL (ref 70–130)
GLUCOSE BLDC GLUCOMTR-MCNC: 146 MG/DL (ref 70–130)
GLUCOSE BLDC GLUCOMTR-MCNC: 146 MG/DL (ref 70–130)
GLUCOSE BLDC GLUCOMTR-MCNC: 155 MG/DL (ref 70–130)
GLUCOSE BLDC GLUCOMTR-MCNC: 169 MG/DL (ref 70–130)
GLUCOSE SERPL-MCNC: 158 MG/DL (ref 65–99)
HCO3 BLDA-SCNC: 29.9 MMOL/L (ref 20–26)
HCT VFR BLD AUTO: 23.7 % (ref 37.5–51)
HCT VFR BLD CALC: 24.2 % (ref 38–51)
HGB BLD-MCNC: 7.6 G/DL (ref 13–17.7)
HGB BLDA-MCNC: 7.9 G/DL (ref 13.5–17.5)
IMM GRANULOCYTES # BLD AUTO: 0.15 10*3/MM3 (ref 0–0.05)
IMM GRANULOCYTES NFR BLD AUTO: 1.5 % (ref 0–0.5)
INHALED O2 CONCENTRATION: 40 %
IPAP: 0
LYMPHOCYTES # BLD AUTO: 0.41 10*3/MM3 (ref 0.7–3.1)
LYMPHOCYTES NFR BLD AUTO: 4.1 % (ref 19.6–45.3)
MAGNESIUM SERPL-MCNC: 2.7 MG/DL (ref 1.6–2.4)
MCH RBC QN AUTO: 31.8 PG (ref 26.6–33)
MCHC RBC AUTO-ENTMCNC: 32.1 G/DL (ref 31.5–35.7)
MCV RBC AUTO: 99.2 FL (ref 79–97)
METHGB BLD QL: 1.2 % (ref 0–1.5)
MODALITY: ABNORMAL
MONOCYTES # BLD AUTO: 0.69 10*3/MM3 (ref 0.1–0.9)
MONOCYTES NFR BLD AUTO: 6.9 % (ref 5–12)
NEUTROPHILS NFR BLD AUTO: 8.38 10*3/MM3 (ref 1.7–7)
NEUTROPHILS NFR BLD AUTO: 84 % (ref 42.7–76)
NOTE: ABNORMAL
NRBC BLD AUTO-RTO: 0 /100 WBC (ref 0–0.2)
OXYHGB MFR BLDV: 89.8 % (ref 94–99)
PAW @ PEAK INSP FLOW SETTING VENT: 0 CMH2O
PCO2 BLDA: 43.7 MM HG (ref 35–45)
PCO2 TEMP ADJ BLD: 43.7 MM HG (ref 35–48)
PH BLDA: 7.44 PH UNITS (ref 7.35–7.45)
PH, TEMP CORRECTED: 7.44 PH UNITS
PHOSPHATE SERPL-MCNC: 3.5 MG/DL (ref 2.5–4.5)
PLATELET # BLD AUTO: 166 10*3/MM3 (ref 140–450)
PMV BLD AUTO: 11.7 FL (ref 6–12)
PO2 BLDA: 59.6 MM HG (ref 83–108)
PO2 TEMP ADJ BLD: 59.6 MM HG (ref 83–108)
POTASSIUM SERPL-SCNC: 4.9 MMOL/L (ref 3.5–5.2)
PREALB SERPL-MCNC: 25.9 MG/DL (ref 20–40)
PROCALCITONIN SERPL-MCNC: 2.26 NG/ML (ref 0–0.25)
PROT SERPL-MCNC: 6.6 G/DL (ref 6–8.5)
RBC # BLD AUTO: 2.39 10*6/MM3 (ref 4.14–5.8)
SODIUM SERPL-SCNC: 134 MMOL/L (ref 136–145)
TOTAL RATE: 0 BREATHS/MINUTE
UNIT  ABO: NORMAL
UNIT  RH: NORMAL
WBC # BLD AUTO: 9.98 10*3/MM3 (ref 3.4–10.8)

## 2021-11-08 PROCEDURE — 97530 THERAPEUTIC ACTIVITIES: CPT

## 2021-11-08 PROCEDURE — 25010000002 HEPARIN (PORCINE) PER 1000 UNITS: Performed by: INTERNAL MEDICINE

## 2021-11-08 PROCEDURE — 82375 ASSAY CARBOXYHB QUANT: CPT

## 2021-11-08 PROCEDURE — 80053 COMPREHEN METABOLIC PANEL: CPT | Performed by: INTERNAL MEDICINE

## 2021-11-08 PROCEDURE — 25010000002 CEFEPIME PER 500 MG: Performed by: INTERNAL MEDICINE

## 2021-11-08 PROCEDURE — 83735 ASSAY OF MAGNESIUM: CPT | Performed by: INTERNAL MEDICINE

## 2021-11-08 PROCEDURE — 82962 GLUCOSE BLOOD TEST: CPT

## 2021-11-08 PROCEDURE — 63710000001 INSULIN DETEMIR PER 5 UNITS: Performed by: INTERNAL MEDICINE

## 2021-11-08 PROCEDURE — 86140 C-REACTIVE PROTEIN: CPT | Performed by: INTERNAL MEDICINE

## 2021-11-08 PROCEDURE — 63710000001 INSULIN REGULAR HUMAN PER 5 UNITS: Performed by: INTERNAL MEDICINE

## 2021-11-08 PROCEDURE — 92507 TX SP LANG VOICE COMM INDIV: CPT

## 2021-11-08 PROCEDURE — 99232 SBSQ HOSP IP/OBS MODERATE 35: CPT | Performed by: INTERNAL MEDICINE

## 2021-11-08 PROCEDURE — 94799 UNLISTED PULMONARY SVC/PX: CPT

## 2021-11-08 PROCEDURE — 25010000002 CYCLOPHOSPHAMIDE PER 25 MG: Performed by: INTERNAL MEDICINE

## 2021-11-08 PROCEDURE — 84100 ASSAY OF PHOSPHORUS: CPT | Performed by: INTERNAL MEDICINE

## 2021-11-08 PROCEDURE — 63710000001 PREDNISONE PER 1 MG: Performed by: INTERNAL MEDICINE

## 2021-11-08 PROCEDURE — 71045 X-RAY EXAM CHEST 1 VIEW: CPT

## 2021-11-08 PROCEDURE — 82805 BLOOD GASES W/O2 SATURATION: CPT

## 2021-11-08 PROCEDURE — 84145 PROCALCITONIN (PCT): CPT | Performed by: INTERNAL MEDICINE

## 2021-11-08 PROCEDURE — 97164 PT RE-EVAL EST PLAN CARE: CPT

## 2021-11-08 PROCEDURE — 36600 WITHDRAWAL OF ARTERIAL BLOOD: CPT

## 2021-11-08 PROCEDURE — 92526 ORAL FUNCTION THERAPY: CPT

## 2021-11-08 PROCEDURE — 83050 HGB METHEMOGLOBIN QUAN: CPT

## 2021-11-08 PROCEDURE — 84134 ASSAY OF PREALBUMIN: CPT | Performed by: INTERNAL MEDICINE

## 2021-11-08 PROCEDURE — 85025 COMPLETE CBC W/AUTO DIFF WBC: CPT | Performed by: INTERNAL MEDICINE

## 2021-11-08 RX ADMIN — LOPERAMIDE HYDROCHLORIDE 2 MG: 2 CAPSULE ORAL at 13:35

## 2021-11-08 RX ADMIN — MIDODRINE HYDROCHLORIDE 10 MG: 10 TABLET ORAL at 05:31

## 2021-11-08 RX ADMIN — GABAPENTIN 100 MG: 100 CAPSULE ORAL at 13:35

## 2021-11-08 RX ADMIN — Medication 1 TABLET: at 13:34

## 2021-11-08 RX ADMIN — IPRATROPIUM BROMIDE AND ALBUTEROL SULFATE 3 ML: 2.5; .5 SOLUTION RESPIRATORY (INHALATION) at 15:41

## 2021-11-08 RX ADMIN — SERTRALINE HYDROCHLORIDE 50 MG: 50 TABLET ORAL at 13:38

## 2021-11-08 RX ADMIN — INSULIN DETEMIR 8 UNITS: 100 INJECTION, SOLUTION SUBCUTANEOUS at 13:37

## 2021-11-08 RX ADMIN — IPRATROPIUM BROMIDE AND ALBUTEROL SULFATE 3 ML: 2.5; .5 SOLUTION RESPIRATORY (INHALATION) at 11:24

## 2021-11-08 RX ADMIN — HEPARIN SODIUM 5000 UNITS: 5000 INJECTION, SOLUTION INTRAVENOUS; SUBCUTANEOUS at 20:54

## 2021-11-08 RX ADMIN — IPRATROPIUM BROMIDE AND ALBUTEROL SULFATE 3 ML: 2.5; .5 SOLUTION RESPIRATORY (INHALATION) at 22:53

## 2021-11-08 RX ADMIN — CYCLOPHOSPHAMIDE 100 MG: 25 CAPSULE ORAL at 14:50

## 2021-11-08 RX ADMIN — HEPARIN SODIUM 5000 UNITS: 5000 INJECTION, SOLUTION INTRAVENOUS; SUBCUTANEOUS at 13:36

## 2021-11-08 RX ADMIN — MIDODRINE HYDROCHLORIDE 10 MG: 10 TABLET ORAL at 21:01

## 2021-11-08 RX ADMIN — MINERAL SUPPLEMENT IRON 300 MG / 5 ML STRENGTH LIQUID 100 PER BOX UNFLAVORED 300 MG: at 13:35

## 2021-11-08 RX ADMIN — Medication 2000 UNITS: at 13:34

## 2021-11-08 RX ADMIN — PREDNISONE 40 MG: 20 TABLET ORAL at 14:50

## 2021-11-08 RX ADMIN — Medication 60 MG: at 13:36

## 2021-11-08 RX ADMIN — ATOVAQUONE 1500 MG: 750 SUSPENSION ORAL at 13:32

## 2021-11-08 RX ADMIN — CEFEPIME HYDROCHLORIDE 2 G: 2 INJECTION, POWDER, FOR SOLUTION INTRAVENOUS at 13:33

## 2021-11-08 RX ADMIN — INSULIN DETEMIR 8 UNITS: 100 INJECTION, SOLUTION SUBCUTANEOUS at 20:55

## 2021-11-08 RX ADMIN — INSULIN HUMAN 2 UNITS: 100 INJECTION, SOLUTION PARENTERAL at 05:30

## 2021-11-08 RX ADMIN — IPRATROPIUM BROMIDE AND ALBUTEROL SULFATE 3 ML: 2.5; .5 SOLUTION RESPIRATORY (INHALATION) at 19:29

## 2021-11-08 RX ADMIN — ACETYLCYSTEINE 4 ML: 200 SOLUTION ORAL; RESPIRATORY (INHALATION) at 06:53

## 2021-11-08 RX ADMIN — FAMOTIDINE 20 MG: 20 TABLET, FILM COATED ORAL at 13:35

## 2021-11-08 RX ADMIN — Medication 5 MG: at 20:54

## 2021-11-08 RX ADMIN — IPRATROPIUM BROMIDE AND ALBUTEROL SULFATE 3 ML: 2.5; .5 SOLUTION RESPIRATORY (INHALATION) at 06:53

## 2021-11-08 RX ADMIN — Medication 500 MG: at 20:54

## 2021-11-08 RX ADMIN — INSULIN HUMAN 2 UNITS: 100 INJECTION, SOLUTION PARENTERAL at 18:27

## 2021-11-08 RX ADMIN — GABAPENTIN 100 MG: 100 CAPSULE ORAL at 05:31

## 2021-11-08 RX ADMIN — GABAPENTIN 100 MG: 100 CAPSULE ORAL at 21:01

## 2021-11-08 RX ADMIN — CHLORHEXIDINE GLUCONATE 15 ML: 1.2 SOLUTION ORAL at 20:54

## 2021-11-08 RX ADMIN — CHLORHEXIDINE GLUCONATE 15 ML: 1.2 SOLUTION ORAL at 13:34

## 2021-11-08 NOTE — DISCHARGE PLACEMENT REQUEST
"Keshav Reeder (66 y.o. Male)   Murphy Saldana, RN Case Manager  [phone]            Date of Birth Social Security Number Address Home Phone MRN    1955  [id number] [facility] [address] [phone] [id number]    Druze Marital Status             Mandaen        Admission Date Admission Type Admitting Provider Attending Provider Department, Room/Bed    10/4/21 Urgent Sravan Forrester MD Villaran, Yuri, MD [facility] 2HSIC, S256/1    Discharge Date Discharge Disposition Discharge Destination                         Attending Provider: Rc Mercer MD    Allergies: No Known Allergies    Isolation: None   Infection: None   Code Status: CPR   Advance Care Planning Activity    Ht: 172.7 cm (67.99\")   Wt: 81 kg (178 lb 9.2 oz)    Admission Cmt: None   Principal Problem: Respiratory Failure Type 1 [J96.01]                 Active Insurance as of 10/4/2021     Primary Coverage     Payor Plan Insurance Group Employer/Plan Group    MEDICARE MEDICARE A & B      Payor Plan Address Payor Plan Phone Number Payor Plan Fax Number Effective Dates    [address] [phone]  4/1/2018 - None Entered    [facility], located within [facility] [address]       Subscriber Name Subscriber Birth Date Member ID       KESHAV REEDER 1955 [id number]                 Emergency Contacts      (Rel.) Home Phone Work Phone Mobile Phone    Tamika Reeder (Spouse) [phone] -- [phone]    Geeta Reeder (Daughter) -- -- [phone]               History & Physical      Ricardo Forrester MD at 10/04/21 2023            CRITICAL CARE ADMISSION NOTE    Chief Complaint     Acute respiratory failure with hypoxia (HCC)    History of Present Illness     Mr. Reeder is a 66-year-old male with past medical history current tobacco abuse (1 pack/day x 45 years; reportedly quit 1 month ago when these symptoms began) hypertension, type 2 diabetes who presents to [facility] on/4/2021 as a " "transfer from [facility] respiratory failure requiring mechanical ventilation and possible pulmonary/renal disease.    According to documentation as patient is currently intubated and sedated and no family is present, patient initially presented to outside hospital on 9/27/2021 as a transfer from [facility] with 2 to 3-week history of hemoptysis. Prior to presentation at the outside hospitals CT scan of chest/chest x-ray were performed by PCP and showed \"bilateral pneumonia and a potential nodule in his lungs \". He was treated with antibiotics (possibly doxy) and steroids. Unfortunately his symptoms did not improve and on day of presentation to outside hospitals he had worsening shortness of breath, fatigue. Outside hospital Covid test negative. He presented to his local ED and was then transferred to [facility] for higher level of care.     There pulmonary and nephrology were consulted. He was empirically started on Zithromax. CTA showed no pulmonary thromboembolism. Moderately severe , fairly diffuse groundglass alveolar infiltrates. 8 mm right middle lobe pulmonary nodule.CT sinuses with small mucous retention cyst right maxillary sinus otherwise unremarkable. With concern for vasculitis as well as concominant renal failure and hemoptysis concern was raised for pulmonary renal disease for which ANCA studies and anti-GBM studies were obtained. [facility] was consulted and he was initiated on pulse dose steroids which he received x3 days at which point he was transitioned to Solu-Medrol 40 mg twice daily.    9/27 with elevated procalcitonin rickettsial disease profile was checked and patient was started on doxy and Zosyn; Zithromax DC'd. Patient underwent bronchoscopy with lavage and biopsy on 10/1; presumptively intubated for procedure. Fresh blood was found in all the lung segments on bilateral sides. Left side did show a bit more fresh blood. Although " "performing physician lavaged and was able to \"some extent \"clear the blood, it was not completely eliminated. A biopsy was performed at the bifurcation of apical and anterior segment of right upper lobe. At the conclusion of the procedure, in the setting of diffuse alveolar hemorrhage and acute renal disease, concern for pulmonary/renal disease was brought up.    Nephrology was consulted and saw patient throughout hospitalization. PAIGE, ANCA, rheumatoid, mycoplasma IgM, cold aggultinin titers were all negative. Unfortunately anti-GBM studies were found to be positive. He did require multiple blood transfusions throughout outside hospitalization.  Other significant imaging included renal ultrasound which showed mild increased renal cortical echogenicity. No hydronephrosis.    With CT imaging, bronchoscopy results, and positive anti-GBM, who determined patient needed higher level of care for possible plasma pheresis. He was transferred to [facility] on 10/4/2021. He has been admitted to the ICU for evaluation and management of his critical illness.    On arrival patient remains intubated and sedated.  He is on a propofol drip.  Not requiring vasopressor support.  Staff are suctioning some old bloody secretions from his endotracheal tube.  He is unable to contribute to his history.       Problem List, Surgical History, Family, Social History, and ROS     Patient Active Problem List   Diagnosis   • Anti-GBM nephritis with pulmonary hemorrhage (HCC)   • Acute respiratory failure with hypoxia   • Diffuse pulmonary alveolar hemorrhage   • Acute renal failure (ARF) (HCC)     Past Surgical History:   Procedure Laterality Date   • CHOLECYSTECTOMY         No Known Allergies  No current facility-administered medications on file prior to encounter.     No current outpatient medications on file prior to encounter.     MEDICATION LIST AND ALLERGIES REVIEWED.    No family history on file.  Social History     Tobacco " Use   • Smoking status: Not on file   Substance Use Topics   • Alcohol use: Not on file   • Drug use: Not on file     Social History     Social History Narrative   • Not on file     FAMILY AND SOCIAL HISTORY REVIEWED.    Review of Systems  AS ABOVE OR ALL OTHER SYSTEMS REVIEWED AND ARE NEGATIVE.    Physical Exam and Clinical Information   /58   Pulse 68   SpO2 91%   Physical Exam:   GENERAL: Intubated, sedated, no distress   HEENT: Sclera nonicteric.  Endotracheal tube and orogastric tube in good position   LUNGS: Rhonchi bilaterally, no wheezes   HEART: Regular tachycardia without appreciable murmur   GI: Soft, quiet   EXTREMITIES: Trace edema.  No clubbing or cyanosis.  No rash   NEURO/PSYCH: Sedated and unresponsive    Results from last 7 days   Lab Units 10/04/21  2037   WBC 10*3/mm3 14.49*   HEMOGLOBIN g/dL 7.4*   PLATELETS 10*3/mm3 144     Results from last 7 days   Lab Units 10/04/21  2037   MAGNESIUM mg/dL 2.7*   PHOSPHORUS mg/dL 12.5*     CrCl cannot be calculated (No successful lab value found.).          Lab Results   Component Value Date    LACTATE 1.9 10/04/2021        IMAGES: Chest imaging pending here.  From the outside institution revealed bilateral pulmonary infiltrates    I reviewed the patient's results and images.     Assesment     Active Hospital Problems    Diagnosis    • **Acute respiratory failure with hypoxia    • Anti-GBM nephritis with pulmonary hemorrhage (HCC)    • Diffuse pulmonary alveolar hemorrhage    • Acute renal failure (ARF) (HCC)      Plan/Recommendations     Admit to the intensive care unit  Repeat labs  Follow-up cultures  Continue mechanical ventilatory support  He has already received 3 doses of pulse steroids, will transition to maintenance steroids  Nephrology consultation for  plasmapheresis  Nonpharmacologic DVT and ulcer prophylaxis  Consider prophylactic Bactrim    Critical Care time spent in direct patient care: 40 minutes (excluding procedure time, if  applicable) including high complexity decision making to assess, manipulate, and support vital organ system failure in this individual who has impairment of one or more vital organ systems such that there is a high probability of imminent or life threatening deterioration in the patient’s condition.    MARYCHUY Forrester MD  Pulmonary and Critical Care Medicine     CC: Provider, No Known      Electronically signed by Ricardo Forrester MD at 10/04/21 2134       Vital Signs (last day)     Date/Time Temp Temp src Pulse Resp BP Patient Position SpO2    11/08/21 0653 -- -- 78 18 -- -- 96    11/08/21 0400 98.4 (36.9) Axillary 76 18 100/54 Lying 97    11/08/21 0332 -- -- 78 18 -- -- 94    11/08/21 0300 -- -- 82 -- 107/66 -- 93    11/08/21 0200 98.4 (36.9) Axillary 75 18 124/62 Lying 97    11/08/21 0100 -- -- 78 -- 101/52 -- 97    11/08/21 0000 98.3 (36.8) Axillary 80 20 121/60 Lying 96    11/07/21 2320 -- -- 69 20 -- -- 99    11/07/21 2300 -- -- 70 -- 131/64 -- 99    11/07/21 2200 98.5 (36.9) Axillary 75 24 108/56 Lying 96    11/07/21 2100 -- -- 84 -- 140/67 -- 95    11/07/21 2000 98.7 (37.1) Axillary 95 20 163/71 Lying 100    11/07/21 1904 -- -- 86 18 -- -- 98    11/07/21 1800 -- -- 83 20 125/66 -- 96    11/07/21 1700 -- -- 84 -- 121/58 -- 94    11/07/21 1600 98.2 (36.8) Axillary 84 18 112/53 -- 93    11/07/21 1540 -- -- 80 18 -- -- 97    11/07/21 1430 -- -- 79 -- 109/61 -- 97    11/07/21 1400 -- -- 80 18 113/59 -- 99    11/07/21 1330 -- -- 80 -- 127/62 -- 97    11/07/21 1300 -- -- 81 -- 110/61 -- 91    11/07/21 1248 -- -- 74 -- 101/60 -- 97    11/07/21 1245 -- -- 77 -- 85/54 -- 97    11/07/21 1232 -- -- 79 -- 91/59 -- 98    11/07/21 1230 97.7 (36.5) Oral 76 20 84/53 -- 98    11/07/21 1215 -- -- 81 -- 103/58 -- 94    11/07/21 1200 -- -- 85 -- 108/62 -- 97    11/07/21 1145 -- -- 77 -- 104/59 -- 97    11/07/21 1130 -- -- 80 -- 113/60 -- 96    11/07/21 1124 -- -- 82 -- -- -- 95    11/07/21 1115 -- -- 74 -- 114/64 --  98    11/07/21 1103 -- -- 74 20 -- -- 97    11/07/21 1100 -- -- 70 -- 126/64 -- 100    11/07/21 1055 97.8 (36.6) Axillary 74 20 122/60 Lying 99    11/07/21 1030 -- -- 72 -- 122/60 -- 97    11/07/21 1000 -- -- 75 20 125/59 -- 97    11/07/21 0950 98.1 (36.7) Axillary -- 20 124/60 -- --    11/07/21 0945 -- -- 75 -- 126/61 -- 97    11/07/21 0935 98.1 (36.7) Axillary 76 20 126/61 -- 97    11/07/21 0915 98.2 (36.8) Axillary 80 20 124/60 -- 96    11/07/21 0900 -- -- 74 -- 124/60 -- 96    11/07/21 0800 98.2 (36.8) Axillary 76 22 122/57 Lying 97    11/07/21 0718 -- -- 72 18 -- -- 98    11/07/21 0600 98.5 (36.9) Axillary 73 18 112/57 -- 99    11/07/21 0508 -- -- 75 -- -- -- 99    11/07/21 0500 -- -- 75 -- 127/61 -- 99    11/07/21 0400 -- -- 86 22 125/61 -- 93    11/07/21 0300 -- -- 78 -- 136/63 -- 99    11/07/21 0200 -- -- 78 18 125/61 -- 99    11/07/21 0100 -- -- 77 -- 105/56 -- 99    11/07/21 0000 -- -- 83 18 119/58 -- 98            Current Facility-Administered Medications   Medication Dose Route Frequency Provider Last Rate Last Admin   • acetaminophen (TYLENOL) tablet 650 mg  650 mg Per PEG Tube Q6H PRN Genet Bui MD   650 mg at 10/24/21 0004   • albumin human 25 % IV SOLN  - ADS Override Pull            • albumin human 25 % IV SOLN  - ADS Override Pull            • atovaquone (MEPRON) suspension 1,500 mg  1,500 mg Per PEG Tube Daily With Lunch Genet Bui MD   1,500 mg at 11/07/21 1227   • b complex-vitamin c-folic acid (NEPHRO-MELE) tablet 1 tablet  1 tablet Per PEG Tube Daily Genet Bui MD   1 tablet at 11/07/21 0900   • sennosides-docusate (PERICOLACE) 8.6-50 MG per tablet 2 tablet  2 tablet Per PEG Tube BID PRN Genet Bui MD        And   • polyethylene glycol (MIRALAX) packet 17 g  17 g Per PEG Tube Daily PRN Genet Bui MD        And   • bisacodyl (DULCOLAX) suppository 10 mg  10 mg Rectal Daily PRN Genet Bui MD       • cefepime (MAXIPIME) 2  g/100 mL 0.9% NS (mbp)  2 g Intravenous Q24H Rodríguez Ruff MD       • chlorhexidine (PERIDEX) 0.12 % solution 15 mL  15 mL Mouth/Throat Q12H Abdon Roberts MD   15 mL at 11/07/21 2029   • cholecalciferol (VITAMIN D3) tablet 2,000 Units  2,000 Units Per PEG Tube Daily Sravan Forrester MD   2,000 Units at 11/07/21 0832   • clonazePAM (KlonoPIN) tablet 0.5 mg  0.5 mg Per PEG Tube BID PRN Sravan Forrester MD       • Cyclophosphamide (CYTOXAN) chemo capsule 100 mg  100 mg Oral Daily Darlene Marks PharmD   100 mg at 11/07/21 1736   • dextromethorphan polistirex ER (DELSYM) 30 MG/5ML oral suspension 60 mg  60 mg Per PEG Tube Q12H Sravan Forrester MD   60 mg at 11/07/21 2029   • dextrose (D50W) (25 g/50 mL) IV injection 25 g  25 g Intravenous Q15 Min PRN Sravan Forrester MD       • dextrose (GLUTOSE) oral gel 15 g  15 g Oral Q15 Min PRN Sravan Forrester MD       • epoetin tatum-epbx (RETACRIT) injection 10,000 Units  10,000 Units Subcutaneous Once per day on Tue Thu Sat Torsten Drake MD   10,000 Units at 11/04/21 0804   • famotidine (PEPCID) tablet 20 mg  20 mg Per G Tube Daily Genet Bui MD   20 mg at 11/07/21 0832   • ferrous sulfate 300 (60 Fe) MG/5ML syrup 300 mg  300 mg Per PEG Tube Daily Genet Bui MD   300 mg at 11/07/21 0832   • gabapentin (NEURONTIN) capsule 100 mg  100 mg Per PEG Tube Q8H Sravan Forrester MD   100 mg at 11/08/21 0531   • glucagon (human recombinant) (GLUCAGEN DIAGNOSTIC) injection 1 mg  1 mg Subcutaneous Q15 Min PRN Sravan Forrester MD       • heparin (porcine) 5000 UNIT/ML injection 5,000 Units  5,000 Units Subcutaneous Q12H Nacho Ba DO   5,000 Units at 11/07/21 2029   • heparin (porcine) injection 1,600 Units  1,600 Units Intracatheter Torsten Newsome MD   1,600 Units at 11/04/21 0818   • insulin detemir (LEVEMIR) injection 8 Units  8 Units Subcutaneous  Q12H Sravan Forrester MD   8 Units at 11/07/21 2029   • insulin regular (humuLIN R,novoLIN R) injection 0-9 Units  0-9 Units Subcutaneous Q6H Sravan Forrester MD   2 Units at 11/08/21 0530   • ipratropium-albuterol (DUO-NEB) nebulizer solution 3 mL  3 mL Nebulization Q4H While Awake - RT Sravan Forrester MD   3 mL at 11/08/21 0653   • labetalol (NORMODYNE,TRANDATE) injection 20 mg  20 mg Intravenous Q2H PRN Abdon Roberts MD   20 mg at 10/17/21 1721   • loperamide (IMODIUM) capsule 2 mg  2 mg Per G Tube 4x Daily PRN Genet Bui MD   2 mg at 10/24/21 0154   • melatonin tablet 5 mg  5 mg Per G Tube Nightly Sravan Forrester MD   5 mg at 11/07/21 2029   • midodrine (PROAMATINE) tablet 10 mg  10 mg Per PEG Tube Q8H Sravan Forrester MD   10 mg at 11/08/21 0531   • ondansetron (ZOFRAN) injection 4 mg  4 mg Intravenous Q6H PRN Sravan Forrester MD   4 mg at 10/23/21 0201   • oxyCODONE (ROXICODONE) 5 MG/5ML solution 5 mg  5 mg Per G Tube Q6H PRN Genet Bui MD   5 mg at 11/03/21 0110   • predniSONE (DELTASONE) tablet 40 mg  40 mg Per G Tube Daily With Breakfast Dagoberto Dawkins MD   40 mg at 11/07/21 1458   • QUEtiapine (SEROquel) tablet 25 mg  25 mg Per PEG Tube Nightly Sravan Forrester MD       • saccharomyces boulardii (FLORASTOR) capsule 500 mg  500 mg Oral BID Sravan Forrester MD   500 mg at 11/07/21 2029   • sertraline (ZOLOFT) tablet 50 mg  50 mg Per PEG Tube Daily Sravan Forrester MD   50 mg at 11/07/21 0832        Physician Progress Notes (last 24 hours)      Torsten Drake MD at 11/07/21 6685             LOS: 34 days    Patient Care Team:  Andree Langston MD as PCP - General (Internal Medicine)    Reason For Visit:  F/U PIOTR  Subjective     Worsening labs today. Plan for HD as couldn't get full treatment yesterday BMP K 5.5. Will need PRBC today     Review of Systems:  "   Pulm: No soa   CV:  No CP. GI: NO N/V/D.      Objective     acetylcysteine, 4 mL, Nebulization, Q4H While Awake - RT  albumin human, , ,   albumin human, , ,   atovaquone, 1,500 mg, Per PEG Tube, Daily With Lunch  b complex-vitamin c-folic acid, 1 tablet, Per PEG Tube, Daily  chlorhexidine, 15 mL, Mouth/Throat, Q12H  cholecalciferol, 2,000 Units, Per PEG Tube, Daily  Cyclophosphamide, 100 mg, Oral, Daily  dextromethorphan polistirex ER, 60 mg, Per PEG Tube, Q12H  epoetin tatum/tatum-epbx, 10,000 Units, Subcutaneous, Once per day on Tue Thu Sat  famotidine, 20 mg, Per G Tube, Daily  ferrous sulfate, 300 mg, Per PEG Tube, Daily  gabapentin, 100 mg, Per PEG Tube, Q8H  guaiFENesin-codeine, 10 mL, Per G Tube, TID  heparin (porcine), 5,000 Units, Subcutaneous, Q12H  insulin detemir, 8 Units, Subcutaneous, Q12H  insulin regular, 0-9 Units, Subcutaneous, Q6H  ipratropium-albuterol, 3 mL, Nebulization, Q4H While Awake - RT  lactobacillus acidophilus, 1 capsule, Per PEG Tube, Daily  melatonin, 5 mg, Per G Tube, Nightly  meropenem, 500 mg, Intravenous, Q24H  midodrine, 10 mg, Per PEG Tube, Q8H  Nutrisource fiber, 1 packet, Per G Tube, TID  predniSONE, 40 mg, Per G Tube, Daily With Breakfast  QUEtiapine, 25 mg, Per PEG Tube, Nightly  saccharomyces boulardii, 500 mg, Oral, BID  sertraline, 50 mg, Per PEG Tube, Daily             Vital Signs:  Blood pressure 91/59, pulse 79, temperature 97.7 °F (36.5 °C), resp. rate 20, height 172.7 cm (67.99\"), weight 81 kg (178 lb 9.2 oz), SpO2 98 %.    Flowsheet Rows      First Filed Value   Admission Height 172.7 cm (68\") Documented at 10/04/2021 2132   Admission Weight 102 kg (224 lb 13.9 oz) Documented at 10/04/2021 2100          11/06 0701 - 11/07 0700  In: 1379   Out: 590     Physical Exam:    General Appearance: NAD, alert and cooperative, Ox3. + TRACH. + THD CATH.  Eyes: PER, conjunctivae and sclerae normal, no icterus  Lungs: respirations regular and unlabored, no crepitus. FEW " RHONCHI  Heart/CV: regular rhythm & normal rate, no murmur, no gallop, no rub and TR edema  Abdomen: not distended, soft, non-tender, no masses,  bowel sounds present. PEG  Skin: No rash, Warm and dry    Radiology:            Labs:  Results from last 7 days   Lab Units 11/07/21 0357 11/06/21 0401 11/05/21 0328   WBC 10*3/mm3 9.96 10.44 8.97   HEMOGLOBIN g/dL 6.2* 7.4* 7.2*   HEMATOCRIT % 19.4* 23.4* 22.9*   PLATELETS 10*3/mm3 174 202 175     Results from last 7 days   Lab Units 11/07/21 0357 11/06/21 0401 11/05/21 0328 11/04/21 0351 11/03/21  0552 11/03/21  0552   SODIUM mmol/L 135* 133* 135* 132*   < > 131*   POTASSIUM mmol/L 5.5* 4.8 4.0 5.0   < > 3.9   CHLORIDE mmol/L 95* 93* 95* 89*   < > 92*   CO2 mmol/L 26.0 24.0 25.0 25.0   < > 25.0   BUN mg/dL 84* 79* 48* 93*   < > 67*   CREATININE mg/dL 5.72* 5.90* 3.72* 6.66*   < > 5.01*   CALCIUM mg/dL 8.8 8.9 8.7 8.8   < > 8.8   PHOSPHORUS mg/dL  --  3.4 3.2 5.0*  --  3.5   MAGNESIUM mg/dL  --  2.7* 2.4 2.9*  --  2.5*   ALBUMIN g/dL  --  4.20 4.10 4.20  --  4.20    < > = values in this interval not displayed.     Results from last 7 days   Lab Units 11/07/21 0357   GLUCOSE mg/dL 138*       Results from last 7 days   Lab Units 11/06/21 0401   ALK PHOS U/L 89   BILIRUBIN mg/dL 0.8   ALT (SGPT) U/L 22   AST (SGOT) U/L 23                 Estimated Creatinine Clearance: 14.2 mL/min (A) (by C-G formula based on SCr of 5.72 mg/dL (H)).      Assessment       Acute respiratory failure with hypoxia    Anti-GBM nephritis with pulmonary hemorrhage (HCC)    Acute renal failure (ARF) (HCC)    Acute blood loss anemia    Sepsis due to pneumonia (HCC);Klebsiella aerogenes    Idiopathic hypotension    Hyperglycemia    History of tobacco abuse            Impression: PIOTR: Anuric PIOTR. Etiology RPGN. 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 Unclear baseline cr. Cr was elevated in 2-3 range on presentation at OSH. Progressive worsening in last few days. Started on HD 10/4/21. S/p renal biopsy on   which showed AntiGBM disease.      UA large blood 3+ proteinuria at OSH.  Renal US increase echogenicity. No hydro at OSH.     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 With pulmonary and renal involvement. 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 Treated with 2 weeks of PLEX, Prednisone and cytoxan     Severe azotemia: Slow correction with HD.      Met acidosis: Correction with HD      Hyperphosphatemia: Resolved.      Hypoxic respiratory failure: 2/2 diffuse alveolar hemorrhage and possible pulmonary vasculitis. Now on trach                     Recommendations:   HD today for hyperkalemia and optimization of volume status  HD per TTS stella.  On cytoxan and prednisone.   No sign of renal recovery. Will d/c cytoxa after total 8-12 weeks of therapy   Transfuse at hb<7    Torsten Drake MD  21  12:45 EST          Electronically signed by Torsten Drake MD at 21 1246          Physical Therapy Notes (most recent note)      Karrie Cortez, PT at 21 1000  Version 1 of 1         Patient Name: Keshav Dickens  : 1955    MRN: [id number]                              Today's Date: 2021       Admit Date: 10/4/2021    Visit Dx:     ICD-10-CM ICD-9-CM   1. Acute respiratory failure with hypoxia  J96.01 518.81   2. Examination for normal comparison for clinical research  Z00.6 V70.7   3. Diffuse pulmonary alveolar hemorrhage  R04.89 786.30   4. 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.0 446.21   5. Voice impairment  R49.9 784.40   6. Dysphagia, unspecified type  R13.10 787.20     Patient Active Problem List   Diagnosis   • Anti-GBM nephritis with pulmonary hemorrhage (HCC)   • Acute respiratory failure with hypoxia   • Acute renal failure (ARF) (HCC)   • Acute blood loss anemia   • Sepsis due to pneumonia (HCC);Klebsiella aerogenes   • Idiopathic hypotension   • Hyperglycemia   • History of tobacco abuse     Past Medical History:   Diagnosis Date   • Goodpasture's syndrome  10/4/2021    • Hypertension      Past Surgical History:   Procedure Laterality Date   • CHOLECYSTECTOMY     • TRACHEOSTOMY AND PEG TUBE INSERTION N/A 10/15/2021    Procedure: TRACHEOSTOMY AND PERCUTANEOUS ENDOSCOPIC GASTROSTOMY TUBE INSERTION;  Surgeon: Abdon Roberts MD;  Location: [facility];  Service: General;  Laterality: N/A;      General Information     Row Name 11/05/21 1306          Physical Therapy Time and Intention    Document Type therapy note (daily note)  -TANK     Mode of Treatment physical therapy  -TANK     Row Name 11/05/21 1306          General Information    Patient Profile Reviewed yes  -TANK     Prior Level of Function independent:; gait; transfer; bed mobility; ADL's  -TANK     Existing Precautions/Restrictions cardiac; fall; oxygen therapy device and L/min; sternal  -TANK     Barriers to Rehab medically complex  -TANK     Row Name 11/05/21 1306          Living Environment    Lives With spouse  -TANK     Row Name 11/05/21 1306          Home Main Entrance    Number of Stairs, Main Entrance two  -TANK     Row Name 11/05/21 1306          Cognition    Orientation Status (Cognition) oriented to; person  -TANK     Row Name 11/05/21 1306          Safety Issues, Functional Mobility    Safety Issues Affecting Function (Mobility) safety precautions follow-through/compliance; safety precaution awareness  -TANK     Impairments Affecting Function (Mobility) balance; endurance/activity tolerance; shortness of breath; strength; postural/trunk control  -TANK     Cognitive Impairments, Mobility Safety/Performance attention; safety precaution awareness; awareness, need for assistance; insight into deficits/self-awareness; safety precaution follow-through; judgment  -TANK           User Key  (r) = Recorded By, (t) = Taken By, (c) = Cosigned By    Initials Name Provider Type    TANK Karrie Cortez PT Physical Therapist               Mobility     Row Name 11/05/21 1308          Bed Mobility    Bed Mobility rolling left; rolling right;  scooting/bridging; supine-sit  -TANK     Rolling Left [place] (Bed Mobility) moderate assist (50% patient effort); 2 person assist  -TANK     Rolling Right [place] (Bed Mobility) moderate assist (50% patient effort); 2 person assist  -TANK     Scooting/Bridging [place] (Bed Mobility) moderate assist (50% patient effort); 2 person assist  -TANK     Supine-Sit [place] (Bed Mobility) moderate assist (50% patient effort); 2 person assist  -TANK     Assistive Device (Bed Mobility) draw sheet; head of bed elevated  -TANK     Comment (Bed Mobility) patient able to help more with getting legs to the edge of the bed and assist with trunk, patient has difficulty with instructions often spacing out needing redirection  -TANK     Row Name 11/05/21 1308          Transfers    Comment (Transfers) patient transfers from bed to chair then stood for ambulation  -TANK     Row Name 11/05/21 1308          Bed-Chair Transfer    Bed-Chair [place] (Transfers) moderate assist (50% patient effort); 2 person assist  -TANK     Row Name 11/05/21 1308          Sit-Stand Transfer    Sit-Stand [place] (Transfers) moderate assist (50% patient effort); 2 person assist  -TANK     Row Name 11/05/21 1308          Gait/Stairs (Locomotion)    [place] Level (Gait) maximum assist (25% patient effort); 2 person assist; 1 person to manage equipment  -TANK     Assistive Device (Gait) other (see comments)  BRIAN walker  -TANK     Distance in Feet (Gait) 10  -TANK     Deviations/Abnormal Patterns (Gait) stride length decreased; weight shifting decreased  -TANK     Bilateral Gait Deviations forward flexed posture  -TANK     Left Sided Gait Deviations weight shift ability decreased  -TANK     Right Sided Gait Deviations weight shift ability decreased  -TANK     Comment (Gait/Stairs) with use of ARJO walker patient was able to stand and take 10 steps he is unable to advance right LE without assist but could scoot left LE. patient needs assist to keep feet apart  and cues for erect posture  -TANK           User Key  (r) = Recorded By, (t) = Taken By, (c) = Cosigned By    Initials Name Provider Type    Karrie Pelayo PT Physical Therapist               Obj/Interventions     Row Name 11/05/21 1312          Motor Skills    Therapeutic Exercise hip; knee; ankle  -TANK     Row Name 11/05/21 1312          Shoulder (Therapeutic Exercise)    Shoulder AAROM (Therapeutic Exercise) bilateral; flexion; extension; aBduction; aDduction; 10 repetitions; supine  -TANK     Row Name 11/05/21 1312          Hip (Therapeutic Exercise)    Hip AAROM (Therapeutic Exercise) bilateral; flexion; extension; 10 repetitions; supine  -TANK     Row Name 11/05/21 1312          Knee (Therapeutic Exercise)    Knee AAROM (Therapeutic Exercise) bilateral; flexion; extension; 10 repetitions; supine  -TANK     [facility] Name 11/05/21 1312          Ankle (Therapeutic Exercise)    Ankle AAROM (Therapeutic Exercise) bilateral; dorsiflexion; plantarflexion; supine; 10 repetitions  [facility] Name 11/05/21 1312          Balance    Balance Assessment sitting static balance; sitting dynamic balance; standing static balance; standing dynamic balance  -     Static Sitting Balance mild impairment; supported; sitting, edge of bed  -TANK     Dynamic Sitting Balance moderate impairment; supported; sitting, edge of bed  -TANK     Static Standing Balance severe impairment; supported  -TANK     Dynamic Standing Balance severe impairment; supported  -TANK     Balance Interventions standing; dynamic; weight shifting activity  -           User Key  (r) = Recorded By, (t) = Taken By, (c) = Cosigned By    Initials Name Provider Type    Karrie Pelayo PT Physical Therapist               Goals/Plan    No documentation.                Clinical Impression     Row Name 11/05/21 1314          Pain Scale: Numbers Pre/Post-Treatment    Pretreatment Pain Rating 0/10 - no pain  -TANK     Posttreatment Pain Rating 0/10 - no pain  -TANK     Pain  Intervention(s) Repositioned; Ambulation/increased activity; Splinting  -TANK     Row Name 11/05/21 1314          Plan of Care Review    Plan of Care Reviewed With patient; spouse  -TANK     Progress improving  -TANK     Outcome Summary patient was able to take 10 steps with ARJO walker and max assist he is unable to advance right LE but could scoot left LE patient required mod assist for bed mobility he was able to assist more with all activity however patient would need a lot of redirection for short attention span often just staring straight ahead. patient requires min assist for sitting balance we will continue to progress activity as tolerated  -TANK     Row Name 11/05/21 1314          Therapy Assessment/Plan (PT)    Patient/Family Therapy Goals Statement (PT) walk  -TANK     Rehab Potential (PT) fair, will monitor progress closely  -TANK     Criteria for Skilled Interventions Met (PT) yes; skilled treatment is necessary  -TANK     Row Name 11/05/21 1314          Vital Signs    Pre Systolic BP Rehab 103  -TANK     Pre Treatment Diastolic BP 54  -TANK     Post Systolic BP Rehab 114  -TANK     Post Treatment Diastolic BP 68  -TANK     Pretreatment Heart Rate (beats/min) 85  -TANK     Posttreatment Heart Rate (beats/min) 90  -TANK     Pre SpO2 (%) 92  -TANK     O2 Delivery Pre Treatment trach collar  -TANK     Post SpO2 (%) 93  -TANK     O2 Delivery Post Treatment trach collar  -TANK     Pre Patient Position Supine  -TANK     Intra Patient Position Standing  -TANK     Post Patient Position Sitting  -TANK     Row Name 11/05/21 1314          Positioning and Restraints    Pre-Treatment Position in bed  -TANK     Post Treatment Position chair  -TANK     In Chair notified nsg; reclined; sitting; call light within reach; encouraged to call for assist; exit alarm on; with family/caregiver; with nsg; waffle cushion; on mechanical lift sling  -TANK           User Key  (r) = Recorded By, (t) = Taken By, (c) = Cosigned By    Initials Name Provider Type    TANK Cortez  Karrie ESCALANTE, PT Physical Therapist               Outcome Measures     Row Name 11/05/21 1320          How much help from another person do you currently need...    Turning from your back to your side while in flat bed without using bedrails? 2  -TANK     Moving from lying on back to sitting on the side of a flat bed without bedrails? 2  -TANK     Moving to and from a bed to a chair (including a wheelchair)? 2  -TANK     Standing up from a chair using your arms (e.g., wheelchair, bedside chair)? 2  -TANK     Climbing 3-5 steps with a railing? 1  -TANK     To walk in hospital room? 2  -TANK     AM-PAC 6 Clicks Score (PT) 11  -TANK           User Key  (r) = Recorded By, (t) = Taken By, (c) = Cosigned By    Initials Name Provider Type    Karrie Pelayo PT Physical Therapist                             Physical Therapy Education                 Title: PT OT SLP Therapies (In Progress)     Topic: Physical Therapy (In Progress)     Point: Mobility training (In Progress)     Learning Progress Summary           Patient Acceptance, E, NR by TANK at 11/5/2021 1000    Acceptance, E, NR by KG at 11/3/2021 1432    Acceptance, E, NR by AE at 11/2/2021 1440    Acceptance, E, NR by AE at 11/1/2021 0910    Acceptance, E, NR by TANK at 10/31/2021 1145    Acceptance, E, NR by JB1 at 10/31/2021 0243    Acceptance, E, NR by JB1 at 10/30/2021 0334    Acceptance, E, VU by TANK at 10/29/2021 0815    Comment: wife instructed in ROM exercises to do with patient throughout the day she is able to verbalize understanding.    Acceptance, E, NR by AE at 10/28/2021 1425    Acceptance, E, NR by KG at 10/27/2021 0927    Acceptance, E, NR by TANK at 10/26/2021 1300    Acceptance, E, NR by KG at 10/25/2021 0828    Acceptance, E, NR by AE at 10/22/2021 0825    Acceptance, E, NR by KG at 10/20/2021 0943    Acceptance, E, NR by KG at 10/18/2021 1017    Acceptance, E, NR by KG at 10/17/2021 1138   Family Acceptance, E, NR by KG at 10/27/2021 0927    Acceptance, E, NR by KG  at 10/25/2021 0828    Acceptance, E, NR by KG at 10/20/2021 0943    Acceptance, E, NR by KG at 10/17/2021 1138   Significant Other Acceptance, E, VU by TANK at 10/29/2021 0815    Comment: wife instructed in ROM exercises to do with patient throughout the day she is able to verbalize understanding.                   Point: Home exercise program (In Progress)     Learning Progress Summary           Patient Acceptance, E, NR by TANK at 11/5/2021 1000    Acceptance, E, NR by KG at 11/3/2021 1432    Acceptance, E, NR by AE at 11/2/2021 1440    Acceptance, E, NR by AE at 11/1/2021 0910    Acceptance, E, NR by TANK at 10/31/2021 1145    Acceptance, E, VU by TANK at 10/29/2021 0815    Comment: wife instructed in ROM exercises to do with patient throughout the day she is able to verbalize understanding.    Acceptance, E, NR by AE at 10/28/2021 1425    Acceptance, E, NR by KG at 10/27/2021 0927    Acceptance, E, NR by TANK at 10/26/2021 1300    Acceptance, E, NR by KG at 10/25/2021 0828    Acceptance, E, NR by AE at 10/22/2021 0825    Acceptance, E, NR by KG at 10/20/2021 0943    Acceptance, E, NR by KG at 10/18/2021 1017    Acceptance, E, NR by KG at 10/17/2021 1138   Family Acceptance, E, NR by KG at 10/27/2021 0927    Acceptance, E, NR by KG at 10/25/2021 0828    Acceptance, E, NR by KG at 10/20/2021 0943    Acceptance, E, NR by KG at 10/17/2021 1138   Significant Other Acceptance, E, VU by TANK at 10/29/2021 0815    Comment: wife instructed in ROM exercises to do with patient throughout the day she is able to verbalize understanding.                   Point: Body mechanics (In Progress)     Learning Progress Summary           Patient Acceptance, E, NR by TANK at 11/5/2021 1000    Acceptance, E, NR by KG at 11/3/2021 1432    Acceptance, E, NR by AE at 11/2/2021 1440    Acceptance, E, NR by AE at 11/1/2021 0910    Acceptance, E, NR by TANK at 10/31/2021 1145    Acceptance, E, NR by JB1 at 10/31/2021 0243    Acceptance, E, NR by JB1 at  10/30/2021 0334    Acceptance, E, VU by TANK at 10/29/2021 0815    Comment: wife instructed in ROM exercises to do with patient throughout the day she is able to verbalize understanding.    Acceptance, E, NR by AE at 10/28/2021 1425    Acceptance, E, NR by KG at 10/27/2021 0927    Acceptance, E, NR by TANK at 10/26/2021 1300    Acceptance, E, NR by KG at 10/25/2021 0828    Acceptance, E, NR by AE at 10/22/2021 0825    Acceptance, E, NR by KG at 10/20/2021 0943    Acceptance, E, NR by KG at 10/18/2021 1017    Acceptance, E, NR by KG at 10/17/2021 1138   Family Acceptance, E, NR by KG at 10/27/2021 0927    Acceptance, E, NR by KG at 10/25/2021 0828    Acceptance, E, NR by KG at 10/20/2021 0943    Acceptance, E, NR by KG at 10/17/2021 1138   Significant Other Acceptance, E, VU by TANK at 10/29/2021 0815    Comment: wife instructed in ROM exercises to do with patient throughout the day she is able to verbalize understanding.                   Point: Precautions (In Progress)     Learning Progress Summary           Patient Acceptance, E, NR by TANK at 11/5/2021 1000    Acceptance, E, NR by KG at 11/3/2021 1432    Acceptance, E, NR by AE at 11/2/2021 1440    Acceptance, E, NR by AE at 11/1/2021 0910    Acceptance, E, NR by TANK at 10/31/2021 1145    Acceptance, E, NR by JB1 at 10/30/2021 0334    Acceptance, E, VU by TANK at 10/29/2021 0815    Comment: wife instructed in ROM exercises to do with patient throughout the day she is able to verbalize understanding.    Acceptance, E, NR by AE at 10/28/2021 1425    Acceptance, E, NR by KG at 10/27/2021 0927    Acceptance, E, NR by TANK at 10/26/2021 1300    Acceptance, E, NR by KG at 10/25/2021 0828    Acceptance, E, NR by AE at 10/22/2021 0825    Acceptance, E, NR by KG at 10/20/2021 0943    Acceptance, E, NR by KG at 10/18/2021 1017    Acceptance, E, NR by KG at 10/17/2021 1138   Family Acceptance, E, NR by KG at 10/27/2021 0927    Acceptance, E, NR by KG at 10/25/2021 0828    Acceptance,  E, NR by KG at 10/20/2021 0943    Acceptance, E, NR by KG at 10/17/2021 1138   Significant Other Acceptance, E, VU by TANK at 10/29/2021 0815    Comment: wife instructed in ROM exercises to do with patient throughout the day she is able to verbalize understanding.                               User Key     Initials Effective Dates Name Provider Type Discipline    TANK 06/16/21 -  Karrie Cortez, PT Physical Therapist PT    JB1 06/16/21 -  Ian Washington RN Registered Nurse Nurse    KG 05/22/20 -  Joanna Clark, PT Physical Therapist PT    AE 09/21/21 -  Sukumar Ray, PT Physical Therapist PT              PT Recommendation and Plan  Planned Therapy Interventions (PT): balance training, bed mobility training, gait training, home exercise program, strengthening, transfer training  Plan of Care Reviewed With: patient, spouse  Progress: improving  Outcome Summary: patient was able to take 10 steps with ARJO walker and max assist he is unable to advance right LE but could scoot left LE patient required mod assist for bed mobility he was able to assist more with all activity however patient would need a lot of redirection for short attention span often just staring straight ahead. patient requires min assist for sitting balance we will continue to progress activity as tolerated     Time Calculation:    PT Charges     Row Name 11/05/21 1321             Time Calculation    Start Time 1000  -TANK      PT Received On 11/05/21  -TANK      PT Goal Re-Cert Due Date 11/06/21  -TANK              Time Calculation- PT    Total Timed Code Minutes- PT 42 minute(s)  -TANK              Timed Charges    70154 - Gait Training Minutes  30  -TANK      36184 - PT Therapeutic Activity Minutes 12  -TANK              Total Minutes    Timed Charges Total Minutes 42  -TANK       Total Minutes 42  -TANK            User Key  (r) = Recorded By, (t) = Taken By, (c) = Cosigned By    Initials Name Provider Type    Karrie Pelayo, PT Physical  Therapist              Therapy Charges for Today     Code Description Service Date Service Provider Modifiers Qty    27575148424 HC GAIT TRAINING EA 15 MIN 11/5/2021 Karrie Cortez, PT GP 2    52566810614 HC PT THERAPEUTIC ACT EA 15 MIN 11/5/2021 Karrie Cortez, PT GP 1    [id number]  PT THER SUPP EA 15 MIN 11/5/2021 Karrie Cortez, PT GP 3          PT G-Codes  Outcome Measure Options: AM-PAC 6 Clicks Basic Mobility (PT)  AM-PAC 6 Clicks Score (PT): 11    Karrie Cortez PT  11/5/2021      Electronically signed by Karrie Cortez, PT at 11/05/21 1323       Occupational Therapy Notes (most recent note)    No notes exist for this encounter.

## 2021-11-08 NOTE — CASE MANAGEMENT/SOCIAL WORK
Continued Stay Note  [facility]     Patient Name: Keshav Dickens  MRN: [id number]  Today's Date: 11/8/2021    Admit Date: 10/4/2021     Discharge Plan     Row Name 11/08/21 1152       Plan    Plan [facility]    Patient/Family in Agreement with Plan yes    Plan Comments [facility] on [facility]an. [facility] has no beds available at this time but will continue to follow. CM will continue to follow.    Final Discharge Disposition Code 63 - [facility]    Row Name 11/08/21 1053       Plan    Plan LT[facility]    Patient/Family in Agreement with Plan yes    Plan Comments Spoke with patient at bedside. He is trach collar at 30% O2. Patient is able to swallow his cytoxan in pill form when crushed in pudding. CM will continue to follow.    Final Discharge Disposition Code 63 - [facility]               Discharge Codes    No documentation.                     Parmjit Saldana RN

## 2021-11-08 NOTE — THERAPY RE-EVALUATION
Patient Name: Keshav Dickens  : 1955    MRN: [id number]                              Today's Date: 2021       Admit Date: 10/4/2021    Visit Dx:     ICD-10-CM ICD-9-CM   1. Acute respiratory failure with hypoxia  J96.01 518.81   2. Examination for normal comparison for clinical research  Z00.6 V70.7   3. Diffuse pulmonary alveolar hemorrhage  R04.89 786.30   4. 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.0 446.21   5. Voice impairment  R49.9 784.40   6. Dysphagia, unspecified type  R13.10 787.20     Patient Active Problem List   Diagnosis   • Anti-GBM nephritis with pulmonary hemorrhage (HCC)   • Respiratory Failure Type 1   • Acute renal failure (ARF) (HCC)   • Acute blood loss anemia   • Sepsis due to pneumonia (HCC);Klebsiella aerogenes   • Idiopathic hypotension   • Hyperglycemia   • History of tobacco abuse     Past Medical History:   Diagnosis Date   • Goodpasture's syndrome  10/4/2021   • Hypertension      Past Surgical History:   Procedure Laterality Date   • CHOLECYSTECTOMY     • TRACHEOSTOMY AND PEG TUBE INSERTION N/A 10/15/2021    Procedure: TRACHEOSTOMY AND PERCUTANEOUS ENDOSCOPIC GASTROSTOMY TUBE INSERTION;  Surgeon: Abdon Roberts MD;  Location: [facility];  Service: General;  Laterality: N/A;      General Information     Row Name 21 1150          Physical Therapy Time and Intention    Document Type re-evaluation  -KG     Mode of Treatment physical therapy  -KG     Row Name 21 1150          General Information    Patient Profile Reviewed yes  -KG     Prior Level of Function --  please see IE  -KG     Existing Precautions/Restrictions cardiac; fall; oxygen therapy device and L/min; other (see comments)  trach; PEG  -KG     Barriers to Rehab medically complex  -KG     Row Name 21 1150          Living Environment    Lives With --  please see IE  -KG     Row Name 21 1150          Home Main Entrance    Number of Stairs, Main Entrance --  please see IE  -KG      Row Name 11/08/21 1150          Cognition    Orientation Status (Cognition) oriented to; person; place  -KG     Row Name 11/08/21 1150          Safety Issues, Functional Mobility    Safety Issues Affecting Function (Mobility) ability to follow commands; awareness of need for assistance; insight into deficits/self-awareness; safety precaution awareness; safety precautions follow-through/compliance  -KG     Impairments Affecting Function (Mobility) balance; cognition; coordination; endurance/activity tolerance; postural/trunk control; shortness of breath; strength  -KG     Cognitive Impairments, Mobility Safety/Performance attention; awareness, need for assistance; insight into deficits/self-awareness; safety precaution awareness; safety precaution follow-through  -KG           User Key  (r) = Recorded By, (t) = Taken By, (c) = Cosigned By    Initials Name Provider Type    KG Joanna Clark, PT Physical Therapist               Mobility     Row Name 11/08/21 1151          Bed Mobility    Comment (Bed Mobility) UIC  -KG     Row Name 11/08/21 1151          Transfers    Comment (Transfers) STS x3 throughout treatment session. VC's for upright posture with trunk and joey knee extension. SPT from chair to BSC and back to chair. VC's for sequencing and technique. Pt with increased difficulty advancing B LEs.  -KG     Row Name 11/08/21 1151          Bed-Chair Transfer    Bed-Chair [place] (Transfers) maximum assist (25% patient effort); 2 person assist; verbal cues  -KG     Assistive Device (Bed-Chair Transfers) other (see comments)  B UE support  -KG     Row Name 11/08/21 1151          Sit-Stand Transfer    Sit-Stand [place] (Transfers) moderate assist (50% patient effort); 2 person assist; verbal cues  -KG     Assistive Device (Sit-Stand Transfers) other (see comments)  B UE support  -KG     Row Name 11/08/21 1151          Gait/Stairs (Locomotion)    [place] Level (Gait) unable to assess  -KG      Comment (Gait/Stairs) Ambulation deferred. Not appropriate to assess.  -KG           User Key  (r) = Recorded By, (t) = Taken By, (c) = Cosigned By    Initials Name Provider Type    Joanna Boo PT Physical Therapist               Obj/Interventions     Row Name 11/08/21 1153          Balance    Balance Assessment sitting static balance; standing static balance; standing dynamic balance  -KG     Static Sitting Balance mild impairment; supported; sitting in chair  -KG     Static Standing Balance moderate impairment; supported; standing  -KG     Dynamic Standing Balance severe impairment; supported; standing  -KG           User Key  (r) = Recorded By, (t) = Taken By, (c) = Cosigned By    Initials Name Provider Type    Joanna Boo PT Physical Therapist               Goals/Plan     Row Name 11/08/21 1155          Bed Mobility Goal 1 (PT)    Activity/Assistive Device (Bed Mobility Goal 1, PT) sit to supine; supine to sit  -KG     [place] Level/Cues Needed (Bed Mobility Goal 1, PT) moderate assist (50-74% patient effort)  -KG     Time Frame (Bed Mobility Goal 1, PT) 2 weeks  -KG     Progress/Outcomes (Bed Mobility Goal 1, PT) goal ongoing; goal revised this date  -KG     Row Name 11/08/21 1155          Transfer Goal 1 (PT)    Activity/Assistive Device (Transfer Goal 1, PT) sit-to-stand/stand-to-sit; bed-to-chair/chair-to-bed; walker, rolling  -KG     [place] Level/Cues Needed (Transfer Goal 1, PT) minimum assist (75% or more patient effort)  -KG     Time Frame (Transfer Goal 1, PT) 2 weeks  -KG     Progress/Outcome (Transfer Goal 1, PT) goal ongoing; goal revised this date  -KG     Row Name 11/08/21 1155          Gait Training Goal 1 (PT)    Activity/Assistive Device (Gait Training Goal 1, PT) gait (walking locomotion); assistive device use; walker, rolling  -KG     [place] Level (Gait Training Goal 1, PT) maximum assist (25-49% patient effort)  -KG     Distance (Gait Training Goal  1, PT) 10 feet  -KG     Time Frame (Gait Training Goal 1, PT) 2 weeks  -KG     Progress/Outcome (Gait Training Goal 1, PT) goal ongoing; goal revised this date  -KG           User Key  (r) = Recorded By, (t) = Taken By, (c) = Cosigned By    Initials Name Provider Type    KG Joanna Clark, PT Physical Therapist               Clinical Impression     Row Name 11/08/21 1153          Pain    Additional Documentation Pain Scale: Numbers Pre/Post-Treatment (Group)  -KG     Row Name 11/08/21 1153          Pain Scale: Numbers Pre/Post-Treatment    Pretreatment Pain Rating 0/10 - no pain  -KG     Posttreatment Pain Rating 0/10 - no pain  -KG     Row Name 11/08/21 1153          Plan of Care Review    Plan of Care Reviewed With patient  -KG     Outcome Summary PT re-evaluation completed for pt presenting with generalized weakness, SOA, and decreased functional mobility. Pt performed STS transfers with modA x2 and SPT with maxA x2 and B UE support. Pt's goals have been revised and pt would continue to benefit from PT skilled care. Recommend D/C to SNF.  -KG     Row Name 11/08/21 1153          Therapy Assessment/Plan (PT)    Rehab Potential (PT) good, to achieve stated therapy goals  -KG     Criteria for Skilled Interventions Met (PT) yes; skilled treatment is necessary  -KG     Row Name 11/08/21 1153          Vital Signs    Pre Systolic BP Rehab 135  -KG     Pre Treatment Diastolic BP 75  -KG     Post Systolic BP Rehab 134  -KG     Post Treatment Diastolic BP 68  -KG     Pretreatment Heart Rate (beats/min) 86  -KG     Posttreatment Heart Rate (beats/min) 98  -KG     Pre SpO2 (%) 98  -KG     O2 Delivery Pre Treatment trach collar  -KG     Post SpO2 (%) 96  -KG     O2 Delivery Post Treatment trach collar  -KG     Pre Patient Position Sitting  -KG     Intra Patient Position Standing  -KG     Post Patient Position Sitting  -KG     Row Name 11/08/21 1153          Positioning and Restraints    Pre-Treatment Position sitting in  chair/recliner  -KG     Post Treatment Position chair  -KG     In Chair reclined; call light within reach; encouraged to call for assist; with family/caregiver; with nsg; RUE elevated; LUE elevated; waffle cushion; on mechanical lift sling; legs elevated  -KG           User Key  (r) = Recorded By, (t) = Taken By, (c) = Cosigned By    Initials Name Provider Type    Joanna Boo PT Physical Therapist               Outcome Measures     Row Name 11/08/21 1156          How much help from another person do you currently need...    Turning from your back to your side while in flat bed without using bedrails? 2  -KG     Moving from lying on back to sitting on the side of a flat bed without bedrails? 2  -KG     Moving to and from a bed to a chair (including a wheelchair)? 2  -KG     Standing up from a chair using your arms (e.g., wheelchair, bedside chair)? 2  -KG     Climbing 3-5 steps with a railing? 1  -KG     To walk in hospital room? 1  -KG     AM-PAC 6 Clicks Score (PT) 10  -KG     Row Name 11/08/21 1156          Functional Assessment    Outcome Measure Options AM-PAC 6 Clicks Basic Mobility (PT)  -KG           User Key  (r) = Recorded By, (t) = Taken By, (c) = Cosigned By    Initials Name Provider Type    Joanna Boo PT Physical Therapist                             Physical Therapy Education                 Title: PT OT SLP Therapies (In Progress)     Topic: Physical Therapy (In Progress)     Point: Mobility training (In Progress)     Learning Progress Summary           Patient Acceptance, E, NR by KG at 11/8/2021 0906    Acceptance, E, NR by TANK at 11/5/2021 1000    Acceptance, E, NR by KG at 11/3/2021 1432    Acceptance, E, NR by AE at 11/2/2021 1440    Acceptance, E, NR by AE at 11/1/2021 0910    Acceptance, E, NR by TANK at 10/31/2021 1145    Acceptance, E, NR by JB1 at 10/31/2021 0243    Acceptance, E, NR by JB1 at 10/30/2021 0334    Acceptance, E, VU by TANK at 10/29/2021 0815    Comment:  wife instructed in ROM exercises to do with patient throughout the day she is able to verbalize understanding.    Acceptance, E, NR by AE at 10/28/2021 1425    Acceptance, E, NR by KG at 10/27/2021 0927    Acceptance, E, NR by TANK at 10/26/2021 1300    Acceptance, E, NR by KG at 10/25/2021 0828    Acceptance, E, NR by AE at 10/22/2021 0825    Acceptance, E, NR by KG at 10/20/2021 0943    Acceptance, E, NR by KG at 10/18/2021 1017    Acceptance, E, NR by KG at 10/17/2021 1138   Family Acceptance, E, NR by KG at 10/27/2021 0927    Acceptance, E, NR by KG at 10/25/2021 0828    Acceptance, E, NR by KG at 10/20/2021 0943    Acceptance, E, NR by KG at 10/17/2021 1138   Significant Other Acceptance, E, NR by KG at 11/8/2021 0906    Acceptance, E, VU by TANK at 10/29/2021 0815    Comment: wife instructed in ROM exercises to do with patient throughout the day she is able to verbalize understanding.                   Point: Home exercise program (In Progress)     Learning Progress Summary           Patient Acceptance, E, NR by KG at 11/8/2021 0906    Acceptance, E, NR by TANK at 11/5/2021 1000    Acceptance, E, NR by KG at 11/3/2021 1432    Acceptance, E, NR by AE at 11/2/2021 1440    Acceptance, E, NR by AE at 11/1/2021 0910    Acceptance, E, NR by TANK at 10/31/2021 1145    Acceptance, E, VU by TANK at 10/29/2021 0815    Comment: wife instructed in ROM exercises to do with patient throughout the day she is able to verbalize understanding.    Acceptance, E, NR by AE at 10/28/2021 1425    Acceptance, E, NR by KG at 10/27/2021 0927    Acceptance, E, NR by TANK at 10/26/2021 1300    Acceptance, E, NR by KG at 10/25/2021 0828    Acceptance, E, NR by AE at 10/22/2021 0825    Acceptance, E, NR by KG at 10/20/2021 0943    Acceptance, E, NR by KG at 10/18/2021 1017    Acceptance, E, NR by KG at 10/17/2021 1138   Family Acceptance, E, NR by KG at 10/27/2021 0927    Acceptance, E, NR by KG at 10/25/2021 0828    Acceptance, E, NR by KG at  10/20/2021 0943    Acceptance, E, NR by KG at 10/17/2021 1138   Significant Other Acceptance, E, NR by KG at 11/8/2021 0906    Acceptance, E, VU by TANK at 10/29/2021 0815    Comment: wife instructed in ROM exercises to do with patient throughout the day she is able to verbalize understanding.                   Point: Body mechanics (In Progress)     Learning Progress Summary           Patient Acceptance, E, NR by KG at 11/8/2021 0906    Acceptance, E, NR by TANK at 11/5/2021 1000    Acceptance, E, NR by KG at 11/3/2021 1432    Acceptance, E, NR by AE at 11/2/2021 1440    Acceptance, E, NR by AE at 11/1/2021 0910    Acceptance, E, NR by TANK at 10/31/2021 1145    Acceptance, E, NR by JB1 at 10/31/2021 0243    Acceptance, E, NR by JB1 at 10/30/2021 0334    Acceptance, E, VU by TANK at 10/29/2021 0815    Comment: wife instructed in ROM exercises to do with patient throughout the day she is able to verbalize understanding.    Acceptance, E, NR by AE at 10/28/2021 1425    Acceptance, E, NR by KG at 10/27/2021 0927    Acceptance, E, NR by TANK at 10/26/2021 1300    Acceptance, E, NR by KG at 10/25/2021 0828    Acceptance, E, NR by AE at 10/22/2021 0825    Acceptance, E, NR by KG at 10/20/2021 0943    Acceptance, E, NR by KG at 10/18/2021 1017    Acceptance, E, NR by KG at 10/17/2021 1138   Family Acceptance, E, NR by KG at 10/27/2021 0927    Acceptance, E, NR by KG at 10/25/2021 0828    Acceptance, E, NR by KG at 10/20/2021 0943    Acceptance, E, NR by KG at 10/17/2021 1138   Significant Other Acceptance, E, NR by KG at 11/8/2021 0906    Acceptance, E, VU by TANK at 10/29/2021 0815    Comment: wife instructed in ROM exercises to do with patient throughout the day she is able to verbalize understanding.                   Point: Precautions (In Progress)     Learning Progress Summary           Patient Acceptance, E, NR by KG at 11/8/2021 0906    Acceptance, E, NR by TANK at 11/5/2021 1000    Acceptance, E, NR by KG at 11/3/2021 2282     Acceptance, E, NR by AE at 11/2/2021 1440    Acceptance, E, NR by AE at 11/1/2021 0910    Acceptance, E, NR by TANK at 10/31/2021 1145    Acceptance, E, NR by JB1 at 10/30/2021 0334    Acceptance, E, VU by TANK at 10/29/2021 0815    Comment: wife instructed in ROM exercises to do with patient throughout the day she is able to verbalize understanding.    Acceptance, E, NR by AE at 10/28/2021 1425    Acceptance, E, NR by KG at 10/27/2021 0927    Acceptance, E, NR by TANK at 10/26/2021 1300    Acceptance, E, NR by KG at 10/25/2021 0828    Acceptance, E, NR by AE at 10/22/2021 0825    Acceptance, E, NR by KG at 10/20/2021 0943    Acceptance, E, NR by KG at 10/18/2021 1017    Acceptance, E, NR by KG at 10/17/2021 1138   Family Acceptance, E, NR by KG at 10/27/2021 0927    Acceptance, E, NR by KG at 10/25/2021 0828    Acceptance, E, NR by KG at 10/20/2021 0943    Acceptance, E, NR by KG at 10/17/2021 1138   Significant Other Acceptance, E, NR by KG at 11/8/2021 0906    Acceptance, E, VU by TANK at 10/29/2021 0815    Comment: wife instructed in ROM exercises to do with patient throughout the day she is able to verbalize understanding.                               User Key     Initials Effective Dates Name Provider Type Discipline     06/16/21 -  Karrie Cortez, PT Physical Therapist PT    [facility] 06/16/21 -  Ian Washington RN Registered Nurse Nurse    KG 05/22/20 -  Joanna Clark, PT Physical Therapist PT    AE 09/21/21 -  Sukumar Ray PT Physical Therapist PT              PT Recommendation and Plan  Planned Therapy Interventions (PT): balance training, bed mobility training, strengthening, transfer training  Plan of Care Reviewed With: patient  Progress: improving  Outcome Summary: PT re-evaluation completed for pt presenting with generalized weakness, SOA, and decreased functional mobility. Pt performed STS transfers with modA x2 and SPT with maxA x2 and B UE support. Pt's goals have been revised and pt  would continue to benefit from PT skilled care. Recommend D/C to SNF.     Time Calculation:    PT Charges     Row Name 11/08/21 0906             Time Calculation    Start Time 0906  -KG      PT Received On 11/08/21  -KG      PT Goal Re-Cert Due Date 11/18/21  -KG              Time Calculation- PT    Total Timed Code Minutes- PT 27 minute(s)  -KG              Timed Charges    64504 - PT Therapeutic Activity Minutes 27  -KG              Untimed Charges    PT Eval/Re-eval Minutes 23  -KG              Total Minutes    Timed Charges Total Minutes 27  -KG      Untimed Charges Total Minutes 23  -KG       Total Minutes 50  -KG            User Key  (r) = Recorded By, (t) = Taken By, (c) = Cosigned By    Initials Name Provider Type    KG Joanna Clark, PT Physical Therapist              Therapy Charges for Today     Code Description Service Date Service Provider Modifiers Qty    45746989044 HC PT THERAPEUTIC ACT EA 15 MIN 11/8/2021 Joanna Clark, PT GP 2    75975369574 HC PT RE-EVAL ESTABLISHED PLAN 2 11/8/2021 Joanna Clark, PT GP 1    52727392757 HC PT THER SUPP EA 15 MIN 11/8/2021 Joanna Clark, PT GP 1          PT G-Codes  Outcome Measure Options: AM-PAC 6 Clicks Basic Mobility (PT)  AM-PAC 6 Clicks Score (PT): 10    Kalpana Clark PT  11/8/2021

## 2021-11-08 NOTE — PROGRESS NOTES
INTENSIVIST   PROGRESS NOTE     Hospital:  LOS: 35 days      S     Keshav 66 y.o. male is followed for: No chief complaint on file.       Respiratory Failure Type 1    Anti-GBM nephritis with pulmonary hemorrhage (HCC)    Pneumonia Klebsiella aerogenes    As an Intensivist, we provide an integrated approach to the ICU patient and family, medical management of comorbid conditions, including but not limited to electrolytes, glycemic control, organ dysfunction, lead interdisciplinary rounds and coordinate the care with all other services, including those from other specialists.     Interval History:  Episode of vomiting this morning (after a coughing spell).    Still thick respiratory secretions.    The patient has a COVID HM Topic on their chart, and they are fully vaccinated.     Temp  Min: 98.3 °F (36.8 °C)  Max: 98.7 °F (37.1 °C)       History     Last Reviewed by Rc Mercer MD on 11/8/2021 at  5:33 PM    Sections Reviewed    Medical, Family, Surgical, Tobacco, Alcohol, Drug Use, Sexual Activity,   Social Documentation      Problem list reviewed by Rc Mercer MD on 11/8/2021 at  5:33 PM  Medicines reviewed by Rc Mercer MD on 11/8/2021 at  5:33 PM  Allergies reviewed by Rc Mercer MD on 11/8/2021 at  5:33 PM       The patient's relevant past medical, surgical and social history were reviewed and updated in Epic as appropriate.        O     Vitals:  Temp: 98.4 °F (36.9 °C) (11/08/21 0400) Temp  Min: 98.3 °F (36.8 °C)  Max: 98.7 °F (37.1 °C)   Temp core:      BP: 100/54 (11/08/21 0400) BP  Min: 100/54  Max: 163/71   Pulse: 82 (11/08/21 1541) Pulse  Min: 69  Max: 95   Resp: 18 (11/08/21 1541) Resp  Min: 16  Max: 24   SpO2: 93 % (11/08/21 1541) SpO2  Min: 93 %  Max: 100 %   Device: humidified, nasal cannula (11/08/21 1541)    Flow Rate: 2 (11/08/21 1541) Flow (L/min)  Min: 2  Max: 10     Intake/Ouptut 24 hrs (7:00AM - 6:59 AM)  Intake & Output (last 3 days)       11/05 0701  11/06 0700 11/06  0701 11/07 0700 11/07 0701 11/08 0700 11/08 0701 11/09 0700    I.V. (mL/kg)        Blood   365.8     Other 323 272 343     NG/GT 1382 1107 1990     IV Piggyback    100    Total Intake(mL/kg) 1705 (21) 1379 (17) 2698.8 (33.3) 100 (1.2)    Other  590 1240     Total Output  590 1240     Net +1705 +789 +1458.8 +100                  Wt Readings from Last 3 Encounters:   11/03/21 81 kg (178 lb 9.2 oz)       Medications (drips):       Physical Examination  Telemetry:  Rhythm: normal sinus rhythm (11/08/21 0600)     QTc Interval (Sec): 0.38 (10/25/21 2000)   Constitutional:  No acute distress.   Cardiovascular: RRR.   Normal heart sounds.  No murmurs, gallop or rub.   Respiratory: Normal breath sounds  Rhonchi.   Abdominal:  Soft with no tenderness.  No distension.   No HSM.   Extremities: Warm.  Dry.  No cyanosis.  Trace Edema   Neurological:   Awake.  Best Eye Response: 4-->(E4) spontaneous (11/08/21 0600)  Best Motor Response: 6-->(M6) obeys commands (11/08/21 0600)  Best Verbal Response: 4-->(V4) confused (11/08/21 0600)  Karen Coma Scale Score: 14 (11/08/21 0600)     Results Reviewed:  Laboratory  Microbiology  Radiology  Pathology    Hematology:  Results from last 7 days   Lab Units 11/08/21  0256 11/07/21  0357 11/06/21  0401 11/06/21  0401   WBC 10*3/mm3 9.98 9.96   < > 10.44   HEMOGLOBIN g/dL 7.6* 6.2*   < > 7.4*   MCV fL 99.2* 99.5*   < > 95.9   PLATELETS 10*3/mm3 166 174   < > 202   NEUTROS ABS 10*3/mm3 8.38*  --   --  8.60*   LYMPHS ABS 10*3/mm3 0.41*  --   --  0.70   EOS ABS 10*3/mm3 0.29  --   --  0.16    < > = values in this interval not displayed.     Chemistry:  Estimated Creatinine Clearance: 16.1 mL/min (A) (by C-G formula based on SCr of 5.04 mg/dL (H)).  Results from last 7 days   Lab Units 11/08/21  0256 11/07/21  0357   SODIUM mmol/L 134* 135*   POTASSIUM mmol/L 4.9 5.5*   CHLORIDE mmol/L 92* 95*   CO2 mmol/L 24.0 26.0   BUN mg/dL 75* 84*   CREATININE mg/dL 5.04* 5.72*   GLUCOSE mg/dL 158* 138*      Results from last 7 days   Lab Units 11/08/21  0256 11/07/21  0357 11/06/21  0401 11/06/21  0401   CALCIUM mg/dL 8.7 8.8   < > 8.9   MAGNESIUM mg/dL 2.7*  --   --  2.7*   PHOSPHORUS mg/dL 3.5  --   --  3.4    < > = values in this interval not displayed.     Results from last 7 days   Lab Units 11/08/21  0256 11/06/21  0401   BILIRUBIN mg/dL 0.9 0.8   AST (SGOT) U/L 32 23   ALT (SGPT) U/L 23 22   ALK PHOS U/L 84 89     Biomarkers:  Results from last 7 days   Lab Units 11/08/21  0256 11/06/21  0401 11/05/21  0328 11/04/21 0351 11/02/21 0356   CRP mg/dL 6.36*  --   --   --  2.55*   PROCALCITONIN ng/mL 2.26* 2.49* 2.54*   < >  --     < > = values in this interval not displayed.     Interleukin:  No results found for: INTERLEUN6  COVID-19  Lab Results   Component Value Date    COVID19 Not Detected 10/04/2021     Arterial Blood Gases:  Results from last 7 days   Lab Units 11/08/21  0338   PH, ARTERIAL pH units 7.443   PCO2, ARTERIAL mm Hg 43.7   PO2 ART mm Hg 59.6*   FIO2 % 40       Images:  XR Chest 1 View    Result Date: 11/8/2021  Stable chest as above.  D:  11/08/2021 E:  11/08/2021      XR Chest 1 View    Result Date: 11/8/2021  1. Interval development of moderate perihilar edema. 2. Effective interval resolution of previously noted left basilar atelectasis.  DICTATED:   11/07/2021 EDITED/lfs:   11/07/2021    This report was finalized on 11/8/2021 12:16 AM by Dr. Yoni Barnes MD.        Echo:  Results for orders placed during the hospital encounter of 10/04/21    Adult Transthoracic Echo Complete W/ Cont if Necessary Per Protocol    Interpretation Summary  · Left ventricular ejection fraction appears to be 56 - 60%. Left ventricular systolic function is normal.  · Left ventricular wall thickness is consistent with mild to moderate concentric hypertrophy.  · The inferior vena cava is dilated.    Results: Reviewed.  I reviewed the patient's new laboratory and imaging results.  I independently reviewed the patient's  new images.    Medications: Reviewed.    Assessment/Plan   A / P     Keshav is a 66 y.o. male admitted on 10/4/2021 with Respiratory failure (HCC) [J96.90]: admitted initially to [facility] 9/27/21 with 2-3 week h/o hemoptysis.  He required intubation on 10/1/21.  CTA at OSH showed diffuse GGO and 8 mm RML nodule.  Patient was also noted to be in Renal failure with concerns for Pulmonary-Renal syndrome.  He was treated with 3 days of pulse IV solumedrol. Ultimately anti-GBM Ab was positive concerning for [place]past[place]. He had temporary HD catheter placed at OSH for PIOTR and HD was started.     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     He was transferred to [facility] 10/4/2021 for higher level of care.  Daily plasma exchange was initiated on 10/5/21 for total of 14 sessions, and he has continued on moderate dose steroids after receiving pulse.  Patient has also been initiated on Cytoxan.       1. Respiratory Failure  1. DAH  2. Trach 10/15/21  3. Trach downsized to 6-0 cuffless fenestrated shiley on 11/2.  4. Klebsiella pneumonia  1. Cefepime: Abs per ID  5. Tobacco use 45 pack-year, quit 1 month PTA  2. Anti GBM nephritis  1. On iHD  3. HTN  4. Nutrition Support: Enteral Nutrition   1. PEG 10/15/21    Lab Results   Lab Value Date/Time    CRP 6.36 (H) 11/08/2021 0256    CRP 2.55 (H) 11/02/2021 0356    CRP 2.48 (H) 10/26/2021 0340    PREALBUMIN 28.2 11/02/2021 0356    PREALBUMIN 34.1 10/26/2021 0340    PREALBUMIN 20.4 10/19/2021 0703    TRIG 315 (H) 11/02/2021 0356    TRIG 291 (H) 10/26/2021 0340    TRIG 151 (H) 10/19/2021 0703     5. T2DM    Results from last 7 days   Lab Units 11/08/21  1156 11/08/21  0524 11/08/21  0017 11/07/21  1633 11/07/21  1115 11/07/21  0524   GLUCOSE mg/dL 138* 169* 146* 167* 106 139*     Lab Results   Lab Value Date/Time    HGBA1C 5.00 11/06/2021 0401       Nutrition  Support: Diet, Tube Feeding Tube Feeding Formula: Novasource Renal (Electrolyte Restricted)   Modulars: ,    Diet: NPO Diet   Advance Directives: Code Status and Medical Interventions:   Ordered at: 10/04/21 2023     Code Status (Patient has no pulse and is not breathing):    CPR (Attempt to Resuscitate)     Medical Interventions (Patient has pulse or is breathing):    Full        Plan:    1. PT and OT  2. ST  3. Antibiotics per ID  4. HD tomorrow  5. Disposition: Keep in ICU.   1. To Floor soon  2. Awaiting bed a LTAC  3. Nutrition biomarkers today    Plan of care and goals reviewed during interdisciplinary rounds.  I discussed the patient's findings and my recommendations with patient    Level of Risk is High due to:  illness with threat to life or bodily function.     Time: 25 minutes, in direct patient care, with the patient and/or on the warren coordinating care with other health care providers.     I have spent > 50% percent of this time, counseling and discussing management.     [x]  Primary Attending  []  Consultant

## 2021-11-08 NOTE — PLAN OF CARE
Goal Outcome Evaluation:  Plan of Care Reviewed With: patient, spouse  Progress: improving   SLP treatment completed. Will continue to address dysphagia & voice impairment during treatment as appropriate. Please see note for further details and recommendations.

## 2021-11-08 NOTE — THERAPY TREATMENT NOTE
Acute Care - Speech Language Pathology   Swallow [facility]   + PMV/communication treatment     Patient Name: Keshav Dickens  : 1955  MRN: [id number]  Today's Date: 2021               Admit Date: 10/4/2021    Visit Dx:     ICD-10-CM ICD-9-CM   1. Acute respiratory failure with hypoxia  J96.01 518.81   2. Examination for normal comparison for clinical research  Z00.6 V70.7   3. Diffuse pulmonary alveolar hemorrhage  R04.89 786.30   4. 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.0 446.21   5. Voice impairment  R49.9 784.40   6. Oropharyngeal dysphagia  R13.12 787.22     Patient Active Problem List   Diagnosis   • Anti-GBM nephritis with pulmonary hemorrhage (HCC)   • Respiratory Failure Type 1   • Acute renal failure (ARF) (HCC)   • Acute blood loss anemia   • Sepsis due to pneumonia (HCC);Klebsiella aerogenes   • Idiopathic hypotension   • Hyperglycemia   • History of tobacco abuse     Past Medical History:   Diagnosis Date   • Goodpasture's syndrome  10/4/2021   • Hypertension      Past Surgical History:   Procedure Laterality Date   • CHOLECYSTECTOMY     • TRACHEOSTOMY AND PEG TUBE INSERTION N/A 10/15/2021    Procedure: TRACHEOSTOMY AND PERCUTANEOUS ENDOSCOPIC GASTROSTOMY TUBE INSERTION;  Surgeon: Abdon Roberts MD;  [facility]: [facility];  Service: General;  Laterality: N/A;       SLP Recommendation and Plan  SLP Swallowing Diagnosis: severe, pharyngeal dysphagia (21 1050)  SLP Diet Recommendation: NPO, long term alternate methods of nutrition/hydration (21 1050)  Recommended Precautions and Strategies: general aspiration precautions (21 1050)  SLP Rec. for Method of Medication Administration: meds whole, with pudding or applesauce, meds via alternate route (critical oral meds only- using breath hold, swallow, cued cough per bite) (21 1050)     Monitor for Signs of Aspiration: yes, notify SLP if any concerns (21 105)     Swallow Criteria for  Skilled Therapeutic Interventions Met: demonstrates skilled criteria (11/08/21 1050)  Anticipated Discharge Disposition (SLP): unknown, anticipate therapy at next level of care (11/08/21 1050)  Rehab Potential/Prognosis, Swallowing: good, to achieve stated therapy goals (11/08/21 1050)  Therapy Frequency (Swallow): 5 days per week (11/08/21 1050)  Predicted Duration Therapy Intervention (Days): until discharge (11/08/21 1050)     Daily Summary of Progress (SLP): progress toward functional goals as expected (11/08/21 1050)    Plan for Continued Treatment (SLP): Saw for dysphagia & PMV treatment. Pt has been working on capping trials, but recently lost cap, so PMV used during treatment. Pt tolerating PMV w/o complications or change in vital signs. Trials of thins via ice w/ coughing observed. No s/s of aspiration w/ pudding + breath hold + cued cough. Pt may continue to use this compensation for pertinent oral meds, otherwise continue PEG use. Reviewed & completed dysphagia exercises. Continue to address. (11/08/21 1050)              Plan of Care Reviewed With: patient, spouse  Progress: improving      SWALLOW EVALUATION (last 72 hours)     SLP Adult Swallow Evaluation     Row Name 11/08/21 1050                   Rehab Evaluation    Document Type therapy note (daily note)  -RD        Subjective Information no complaints  -RD        Patient Observations alert; cooperative; agree to therapy  -RD        Patient/Family/Caregiver Comments/Observations spouse present  -RD        Care Plan Review evaluation/treatment results reviewed; care plan/treatment goals reviewed; risks/benefits reviewed; current/potential barriers reviewed; patient/other agree to care plan  -RD        Care Plan Review, Other Participant(s) spouse  -RD        Patient Effort good  -RD                  Pain    Additional Documentation Pain Scale: Numbers Pre/Post-Treatment (Group)  -RD                  Pain Scale: Numbers Pre/Post-Treatment     Pretreatment Pain Rating 0/10 - no pain  -RD        Posttreatment Pain Rating 0/10 - no pain  -RD                  Clinical Impression    Daily Summary of Progress (SLP) progress toward functional goals as expected  -RD        SLP Swallowing Diagnosis severe; pharyngeal dysphagia  -RD        Functional Impact risk of aspiration/pneumonia  -RD        Rehab Potential/Prognosis, Swallowing good, to achieve stated therapy goals  -RD        Swallow Criteria for Skilled Therapeutic Interventions Met demonstrates skilled criteria  -RD        Plan for Continued Treatment (SLP) Saw for dysphagia & PMV treatment. Pt has been working on capping trials, but recently lost cap, so PMV used during treatment. Pt tolerating PMV w/o complications or change in vital signs. Trials of thins via ice w/ coughing observed. No s/s of aspiration w/ pudding + breath hold + cued cough. Pt may continue to use this compensation for pertinent oral meds, otherwise continue PEG use. Reviewed & completed dysphagia exercises. Continue to address.  -RD                  Recommendations    Therapy Frequency (Swallow) 5 days per week  -RD        Predicted Duration Therapy Intervention (Days) until discharge  -RD        SLP Diet Recommendation NPO; long term alternate methods of nutrition/hydration  -RD        Recommended Precautions and Strategies general aspiration precautions  -RD        Oral Care Recommendations Oral Care BID/PRN; Suction toothbrush  -RD        SLP Rec. for Method of Medication Administration meds whole; with pudding or applesauce; meds via alternate route  critical oral meds only- using breath hold, swallow, cued cough per bite  -RD        Monitor for Signs of Aspiration yes; notify SLP if any concerns  -RD        Anticipated Discharge Disposition (SLP) unknown; anticipate therapy at next level of care  -RD              User Key  (r) = Recorded By, (t) = Taken By, (c) = Cosigned By    Initials Name Effective Dates    UNIQUE Beckford  Brittany SIMS MS CCC-SLP 06/16/21 -                 EDUCATION  The patient has been educated in the following areas:   Communication Impairment Speaking Valve Dysphagia (Swallowing Impairment) Oral Care/Hydration NPO rationale Modified Diet Instruction.        SLP GOALS     Row Name 11/08/21 1050             Oral Nutrition/Hydration Goal 1 (SLP)    Oral Nutrition/Hydration Goal 1, SLP LTG: Pt. will safely consume a PO diet without aspiration or distress  -RD      Time Frame (Oral Nutrition/Hydration Goal 1, SLP) by discharge  -RD      Progress/Outcomes (Oral Nutrition/Hydration Goal 1, SLP) continuing progress toward goal  -RD              Oral Nutrition/Hydration Goal 2 (SLP)    Oral Nutrition/Hydration Goal 2, SLP Patient will tolerate pureed and ice/thin liquid trials without s/s of aspiration to determine readiness for repeat instrumental evaluation of the swallow  -RD      Time Frame (Oral Nutrition/Hydration Goal 2, SLP) short term goal (STG)  -RD      Barriers (Oral Nutrition/Hydration Goal 2, SLP) coughing w/ suctioning after multiple ice chip trials. No s/s w/ pudding + compensations for meds  -RD      Progress/Outcomes (Oral Nutrition/Hydration Goal 2, SLP) continuing progress toward goal  -RD              Lingual Strengthening Goal 1 (SLP)    Activity (Lingual Strengthening Goal 1, SLP) increase tongue back strength  -RD      Increase Tongue Back Strength swallow trials; lingual resistance exercises  -RD      Plain Dealing/Accuracy (Lingual Strengthening Goal 1, SLP) with minimal cues (75-90% accuracy)  -RD      Time Frame (Lingual Strengthening Goal 1, SLP) short term goal (STG)  -RD      Progress/Outcomes (Lingual Strengthening Goal 1, SLP) continuing progress toward goal  -RD              Pharyngeal Strengthening Exercise Goal 1 (SLP)    Activity (Pharyngeal Strengthening Goal 1, SLP) increase timing; increase superior movement of the hyolaryngeal complex; increase anterior movement of the hyolaryngeal  complex; increase closure at entrance to airway/closure of airway at glottis; increase squeeze/positive pressure generation  -RD      Increase Timing prepping - 3 second prep or suck swallow or 3-step swallow  -RD      Increase Superior Movement of the Hyolaryngeal Complex falsetto  -RD      Increase Anterior Movement of the Hyolaryngeal Complex chin tuck against resistance (CTAR)  -RD      Increase Closure at Entrance to Airway/Closure of Airway at Glottis super-supraglottic swallow; breath hold exercises  -RD      Increase Squeeze/Positive Pressure Generation hard effortful swallow  -RD      [place]/Accuracy (Pharyngeal Strengthening Goal 1, SLP) with minimal cues (75-90% accuracy)  -RD      Time Frame (Pharyngeal Strengthening Goal 1, SLP) short term goal (STG)  -RD      Barriers (Pharyngeal Strengthening Goal 1, SLP) x3-5 each  -RD      Progress/Outcomes (Pharyngeal Strengthening Goal 1, SLP) continuing progress toward goal  -RD              Tolerate Speaking Valve Placement Goal 1 (SLP)    Tolerate Speaking Valve Placement Goal 1 (SLP) speaking valve >30 min; 80%; with minimal cues (75-90%)  -RD      Time Frame (Tolerate Speaking Valve Placement Goal 1, SLP) short term goal (STG)  -RD      Progress (Tolerate Speaking Valve Placement Goal 1, SLP) 80%; with minimal cues (75-90%)  -RD      Progress/Outcomes (Tolerate Speaking Valve Placement Goal 1, SLP) good progress toward goal  -RD              Additional Goal 1 (SLP)    Additional Goal 1, SLP LTG: Pt will improve communication w/ use of PMV to functionally communicate wants/needs w/ 90% accuracy w/ min cues  -RD      Time Frame (Additional Goal 1, SLP) by discharge  -RD      Progress/Outcomes (Additional Goal 1, SLP) good progress toward goal  -RD            User Key  (r) = Recorded By, (t) = Taken By, (c) = Cosigned By    Initials Name Provider Type    Brittany Cotton MS CCC-SLP Speech and Language Pathologist                   Time Calculation:     Time Calculation- SLP     Row Name 11/08/21 1337             Time Calculation- SLP    SLP Start Time 1050  -RD      SLP Received On 11/08/21  -RD              Untimed Charges    91953-TE Treatment/ST Modification Prosth Aug Alter  25 -RD      21102-[facility] Treatment Swallow Minutes 30  -RD              Total Minutes    Untimed Charges Total Minutes 55  -RD       Total Minutes 55  -RD            User Key  (r) = Recorded By, (t) = Taken By, (c) = Cosigned By    Initials Name Provider Type    RD Brittany Beckford MS CCC-SLP Speech and Language Pathologist                Therapy Charges for Today     Code Description Service Date Service Provider Modifiers Qty    90163714382 HC ST TREATMENT SWALLOW 2 11/8/2021 Brittany Beckford MS CCC-SLP GN 1    15730456404 HC ST TREATMENT SPEECH 2 11/8/2021 Brittany Beckford MS CCC-SLP GN 1            Patient was not wearing a face mask and did exhibit coughing during this therapy encounter.  Procedure performed was aerosolizing, involved close contact (within 6 feet for at least 15 minutes or longer), and did not involve contact with infectious secretions or specimens.  Therapist used appropriate personal protective equipment including gloves, standard procedure mask and eye protection.  Appropriate PPE was worn during the entire therapy session.  Hand hygiene was completed before and after therapy session.     MS LINA Ty  11/8/2021

## 2021-11-08 NOTE — PROGRESS NOTES
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   PSxH: He  has a past surgical history that includes Cholecystectomy and tracheostomy and peg tube insertion (N/A, 10/15/2021).         Reported/Observed/Food/Nutrition Related History:     11/8) Pt vomiting this AM during rounds. Prior to this, pt had been tolerating EN. RN planning on holding EN x1 hour and then restarting. Per SLP FEES eval (11/5)- meds whole with pudding or applesauce. RN reports that pt had a bowel movement this AM.     11/4) Pt continues to tolerate EN. SLP following. Receiving HD this AM.   Per I&O- last bowel movement documented (10/31-11/1).     11/3) EN adjusted yesterday (11/2), pt tolerating, with 98% of EN goal volume delivered over the past 24 hrs. SLP bedside eval (11/2) rec NPO, alternate methods for nutrition/hydration and meds via alternate route.     11/2) Pt continues to tolerate EN. Will adjust EN order today. Receiving HD this AM. SLP following. Waiting for pt to be able to safely swallow cytoxan pill for pt to be able to be transferred to LTACH. Pt with  "orders to the floor.   Per I&O- last bowel movement documented (10/31-11/1)    11/1) Pt continue to tolerate EN. Will adjust EN order today. Pt has been on trach collar >72 hrs. Planning to consult SLP.   Per I&O over the past 24 hrs:  1 bowel movement    10/28) No noted s/s of EN intolerance. EN rate per current order this morning. HD today.     10/26) HD today.       Anthropometrics     Height: 172.7 cm (67.99\")  Last filed wt: Weight: 81 kg (178 lb 9.2 oz) (11/03/21 0400)  Weight Method: Bed scale    BMI: BMI (Calculated): 27.2  Obese Class I: 30-34.9kg/m2    Ideal Body Weight (IBW) (kg): 70.87  145% IBW  ABW: 172lb/78kg    11/8) Based on bed scale weights, pt has potentially lost 46 lb/21% since admission.     Date Weight (kg) Weight (lbs) Weight Method   11/3/2021 81 kg 178 lb 9.2 oz Bed scale   10/12/2021 102 kg 224 lb 13.9 oz -   10/4/2021 102 kg 224 lb 13.9 oz Bed scale     Labs reviewed     Results from last 7 days   Lab Units 11/08/21  0256 11/07/21 0357 11/06/21 0401 11/05/21 0328 11/05/21 0328   GLUCOSE mg/dL 158* 138* 98   < > 103*   BUN mg/dL 75* 84* 79*   < > 48*   CREATININE mg/dL 5.04* 5.72* 5.90*   < > 3.72*   SODIUM mmol/L 134* 135* 133*   < > 135*   CHLORIDE mmol/L 92* 95* 93*   < > 95*   POTASSIUM mmol/L 4.9 5.5* 4.8   < > 4.0   PHOSPHORUS mg/dL 3.5  --  3.4  --  3.2   MAGNESIUM mg/dL 2.7*  --  2.7*  --  2.4   ALT (SGPT) U/L 23  --  22  --  20    < > = values in this interval not displayed.     Results from last 7 days   Lab Units 11/08/21  0256 11/06/21 0401 11/05/21 0328 11/03/21 0552 11/02/21  0356   ALBUMIN g/dL 4.10 4.20 4.10   < > 4.00   PREALBUMIN mg/dL  --   --   --   --  28.2   CRP mg/dL 6.36*  --   --   --  2.55*   CHOLESTEROL mg/dL  --   --   --   --  147   TRIGLYCERIDES mg/dL  --   --   --   --  315*    < > = values in this interval not displayed.       Results from last 7 days   Lab Units 11/08/21  0524 11/08/21  0017 11/07/21  1633 11/07/21  1115 11/07/21  0524 " 11/06/21  2347   GLUCOSE mg/dL 169* 146* 167* 106 139* 184*     Lab Results   Lab Value Date/Time    HGBA1C 5.00 11/06/2021 0401       Medications reviewed   Pertinent: nephrovite, vitamin D3, cytoxan, pepcid, ferrous sulfate, neurontin, insulin, risaquad, melatonin, antibiotic, steroid, florastor      Needs Assessment 11/8   Height used: 68 in/173 cm  Weight used: 178 lb/81 kg    Estimated calorie needs: ~2000 kcal/day  20-30 kcal/kg actual weight= 3595-0485 kcal    Estimated protein needs: ~90 g pro/day  0.8-1.0-1.2 g/kg actual weight= 64-81-97 g pro         Current Nutrition Prescription     PO: NPO Diet    EN: NovaSource Renal at 50 ml/hr (goal volume= 1000 ml/day) with 1 packet FiberCel 3x/day and free water at 10 ml/hr  Route: PEG  Provides at goal volume: 2027 kcal, 90 g pro, 15 g fiber, 24 meq K, 819 mg phos, 717 ml water from EN, 917 ml water total      EN delivery:  3 Days:  1353 ml, 135% -- ? accuracy of EN delivery charting  +3 FiberCel      Nutrition Diagnosis     10/26  Problem Less than optimal enteral nutrition composition or modality   Etiology Clinical status, needs assessment    Signs/Symptoms EN providing >100% est energy and protein needs, delivery >100% daily goal volume    Status: resolved    10-5-21, 10-22  Problem Inadequate energy intake   Etiology Per Clinical Status   Signs/Symptoms 84% goal volume EN       Nutrition Intervention      Follow treatment progress, Care plan reviewed   -Rec restarting current EN order as pt tolerates  -Intensivist is managing the nutrition support      Goal:   General: Nutrition support treatment  PO: n/a  EN/PN: Maintain EN, Tolerate EN at goal       Monitoring/Evaluation:   Per protocol, I&O, Pertinent labs, EN delivery/tolerance, Weight, GI status, Symptoms      Jena Pang, MS RD/LD CNSC  Time Spent: 45 minutes

## 2021-11-08 NOTE — PLAN OF CARE
Goal Outcome Evaluation:  Plan of Care Reviewed With: patient        Progress: improving  Outcome Summary: PT re-evaluation completed for pt presenting with generalized weakness, SOA, and decreased functional mobility. Pt performed STS transfers with modA x2 and SPT with maxA x2 and B UE support. Pt's goals have been revised and pt would continue to benefit from PT skilled care. Recommend D/C to SNF.

## 2021-11-08 NOTE — PROGRESS NOTES
Infectious Disease Progress Note  Keshav Dickens  1955  [id number]    Date of Consult: 10/13/2021  Admission Date: 10/4/2021    Requesting Provider: Dr Forrester  Evaluating Physician: Rodríguez Ruff MD    Chief Complaint: hemoptysis    Reason for Consultation: GNR VAP    History of present illness:   Patient is a 66 y.o.  Yr old male with history of DM2, tobacco abuse.  Initially admitted to [facility] on 9/27/2021 with complaints of hemoptysis for 2 to 3 weeks.  Intubated on 10/1.  CT scan with diffuse groundglass opacities and renal failure.  Due to concern for pulmonary renal syndrome he was treated with pulse dose steroids.  Anti-GBM antibody positive concerning for Goodpasture's syndrome.  Renal biopsy also confirmed the diagnosis.  Transferred to [facility] on 10/4.  Nephrology has been following and he is getting plasmapheresis; also high-dose steroids and Cytoxan.    On 10/10 he developed fever up to 101.  Also had leukocytosis up to 15,000.  He had been on atovaquone for PCP prophylaxis.  Started on vancomycin and Zosyn on 10/12.  Respiratory culture from 10/12 with heavy growth of gram-negative bacilli.  And BAL on 10/13 office gram-negative bacilli.  Recurrent hemoptysis per RN.  Intubated, sedated.  60% FiO2.  Trach and PEG planned. Fever curve improved.     10/14/21: Fevers improved. Less blood from ET suction. 50% FiO2 from vent. Sputum culture with Klebsiella aerogenes.     10/15/2021: Underwent tracheostomy and PEG tube placement earlier today.  Transfer back to ICU, relatively stable.  60% FiO2, sedated.  Less blood from endotracheal secretions per staff.     10/16/21: Worse overnight with hypertension and some low-grade temperatures despite CRRT.  Had an episode of emesis and likely aspiration per RN.  Ventilatory requirement up slightly. Still sedated    10/17/21: Improved overnight.  Fever curve improved.  O2 settings down.  Weaning sedation.  No  further episodes of emesis.  Anuric. No diarrhea.     10/18/21: Patient follow-up improved over the past 48 hours.  Afebrile.  No acute new issues overnight.  More comfortable on ventilator.  More sedated today. fever subglottic secretions    10/19/21: no acute new concerns. Minimally responsive. On trach collar O2. No further hemoptysis or sputum production.    10/20/2021: Slow improvement.  Currently trach collar oxygen.  Still very weak, deconditioned.  Weaning sedation.  Still has blood-tinged respiratory secretions.  No other new concerns noted per ICU team    10/21/21: Low-grade temps but no true fevers. Stable, low oxygenation settings.  Ongoing blood-tinged tracheal secretions, slightly worse today. Transitioning to intermittent hemodialysis. Hemoglobin dropped to 5.6 and required transfusion.  No external signs of bleeding other than out of tracheostomy  per RN. Possible transfer to LTAC on Monday    10/22/21: On ventilator overnight, 30% FiO2 and trach collar trials during the day.  Still significant blood-tinged, frothy sputum per RN.  Afebrile.  Tolerating tube feeds.  Had bowel movement.  More alert today and starting to follow some simple commands    11/1/21: Still in the ICU but more alert and interactive on trach collar oxygen.  Blood pressure stable.  Afebrile.  Completed IV antibiotics as planned on 10/25.  Still on atovaquone.  Difficult placement due to need for Cytoxan per case management.  Significant pulmonary secretions per nursing staff.    11/4/21: WBC spiked today.  Increased pulmonary secretions new culture sent with gram-negative rods.  No change in color of secretions per RN.  Has had some episodes of hypotension.  Tracheostomy came dislodged yesterday and required bronchoscopy for placement.    11/5/2021: Afebrile today.  Ongoing increased pulmonary secretions, growing gram-negative robyn in culture.  Per RN secretions are more brown in color today. Patient more fatigued and less  interactive.  No other acute new concerns per RN.  Transfers orders in place to telemetry    11/8/21: No acute events over the weekend.  Still with thick and dark secretions with red streaks per RN.  Afebrile.  Tolerating meropenem.  O2 requirements improved.  Current trach is currently capped and on 4 L nasal cannula.  Patient has difficulty coughing up secretions.  He is generally weak.  Asking to go outside.  Discharge planning in process       ROS:  No fevers or chills. No n/v/d. No rash. No new ADR to Abx.      No Known Allergies    Antibiotics:  Anti-Infectives (From admission, onward)    Ordered     Dose/Rate Route Frequency Start Stop    11/08/21 0832  cefepime (MAXIPIME) 2 g/100 mL 0.9% NS (mbp)        Ordering Provider: Rodríguez Ruff MD    2 g  over 4 Hours Intravenous Every 24 Hours 11/08/21 1400 11/13/21 1359    10/28/21 1214  atovaquone (MEPRON) 750 MG/5ML suspension        Ordering Provider: Deb Ovalle APRN    1,500 mg Per G Tube Daily With Lunch 10/28/21 0000 11/27/21 2359    10/21/21 1002  meropenem (MERREM) 1 g/100 mL 0.9% NS (mbp)        Ordering Provider: Rodríguez Ruff MD    1 g  over 3 Hours Intravenous Every 24 Hours 10/21/21 1200 10/25/21 1520    10/20/21 0805  atovaquone (MEPRON) suspension 1,500 mg        Ordering Provider: Genet Bui MD    1,500 mg Per PEG Tube Daily With Lunch 10/20/21 1200 11/26/21 2359    10/18/21 1220  vancomycin (VANCOCIN) in iso-osmotic dextrose IVPB 1 g (premix) 200 mL        Ordering Provider: Rafal Salcido, PharmD    1,000 mg  over 60 Minutes Intravenous Once 10/18/21 1315 10/18/21 1536    10/17/21 0825  vancomycin (VANCOCIN) in iso-osmotic dextrose IVPB 1 g (premix) 200 mL        Ordering Provider: Autumn Lozano, PharmD    10 mg/kg × 102 kg  over 60 Minutes Intravenous Once 10/17/21 1100 10/17/21 1138    10/16/21 1121  vancomycin 2000 mg/500 mL 0.9% NS IVPB ([facility])        Ordering Provider: Autumn Lozano, PharmD     20 mg/kg × 102 kg  over 120 Minutes Intravenous Once 10/16/21 1300 10/16/21 1657    10/13/21 1755  meropenem (MERREM) 1 g/100 mL 0.9% NS (mbp)        Ordering Provider: Rodríguez Ruff MD    1 g  over 30 Minutes Intravenous Once 10/13/21 1845 10/13/21 1858    10/12/21 0954  vancomycin 2000 mg/500 mL 0.9% NS IVPB ([facility])        Ordering Provider: Sravan Forrester MD    20 mg/kg × 102 kg Intravenous Once 10/12/21 1045 10/12/21 1130    10/12/21 0954  piperacillin-tazobactam (ZOSYN) 4.5 g in iso-osmotic dextrose 100 mL IVPB (premix)        Ordering Provider: Sravan Forrester MD    4.5 g  over 30 Minutes Intravenous Once 10/12/21 1045 10/12/21 1125          Other Medications:  Current Facility-Administered Medications   Medication Dose Route Frequency Provider Last Rate Last Admin   • acetaminophen (TYLENOL) tablet 650 mg  650 mg Per PEG Tube Q6H PRN Genet Bui MD   650 mg at 10/24/21 0004   • albumin human 25 % IV SOLN  - ADS Override Pull            • albumin human 25 % IV SOLN  - ADS Override Pull            • atovaquone (MEPRON) suspension 1,500 mg  1,500 mg Per PEG Tube Daily With Lunch Genet Bui MD   1,500 mg at 11/07/21 1227   • b complex-vitamin c-folic acid (NEPHRO-MELE) tablet 1 tablet  1 tablet Per PEG Tube Daily Genet Bui MD   1 tablet at 11/07/21 0900   • sennosides-docusate (PERICOLACE) 8.6-50 MG per tablet 2 tablet  2 tablet Per PEG Tube BID PRN Genet Bui MD        And   • polyethylene glycol (MIRALAX) packet 17 g  17 g Per PEG Tube Daily PRN Genet Bui MD        And   • bisacodyl (DULCOLAX) suppository 10 mg  10 mg Rectal Daily PRN Genet Bui MD       • cefepime (MAXIPIME) 2 g/100 mL 0.9% NS (mbp)  2 g Intravenous Q24H Rodríguez Ruff MD       • chlorhexidine (PERIDEX) 0.12 % solution 15 mL  15 mL Mouth/Throat Q12H Abdon Roberts MD   15 mL at 11/07/21 2029   • cholecalciferol (VITAMIN D3) tablet  2,000 Units  2,000 Units Per PEG Tube Daily Sravan Forrester MD   2,000 Units at 11/07/21 0832   • clonazePAM (KlonoPIN) tablet 0.5 mg  0.5 mg Per PEG Tube BID PRN Sravan Forrester MD       • Cyclophosphamide (CYTOXAN) chemo capsule 100 mg  100 mg Oral Daily Torsten Drake MD   100 mg at 11/07/21 1736   • dextromethorphan polistirex ER (DELSYM) 30 MG/5ML oral suspension 60 mg  60 mg Per PEG Tube Q12H Sravan Forrester MD   60 mg at 11/07/21 2029   • dextrose (D50W) (25 g/50 mL) IV injection 25 g  25 g Intravenous Q15 Min PRN Sravan Forrester MD       • dextrose (GLUTOSE) oral gel 15 g  15 g Oral Q15 Min PRN Sravan Forrester MD       • epoetin tatum-epbx (RETACRIT) injection 10,000 Units  10,000 Units Subcutaneous Once per day on Tue Thu Sat Torsten Drake MD   10,000 Units at 11/04/21 0804   • famotidine (PEPCID) tablet 20 mg  20 mg Per G Tube Daily Genet Bui MD   20 mg at 11/07/21 0832   • ferrous sulfate 300 (60 Fe) MG/5ML syrup 300 mg  300 mg Per PEG Tube Daily Genet Bui MD   300 mg at 11/07/21 0832   • gabapentin (NEURONTIN) capsule 100 mg  100 mg Per PEG Tube Q8H Sravan Forrester MD   100 mg at 11/08/21 0531   • glucagon (human recombinant) (GLUCAGEN DIAGNOSTIC) injection 1 mg  1 mg Subcutaneous Q15 Min PRN Sravan Forrester MD       • heparin (porcine) 5000 UNIT/ML injection 5,000 Units  5,000 Units Subcutaneous Q12H Nacho Ba DO   5,000 Units at 11/07/21 2029   • heparin (porcine) injection 1,600 Units  1,600 Units Intracatheter PRN Torsten Drake MD   1,600 Units at 11/04/21 0818   • insulin detemir (LEVEMIR) injection 8 Units  8 Units Subcutaneous Q12H Sravan Forrester MD   8 Units at 11/07/21 2029   • insulin regular (humuLIN R,novoLIN R) injection 0-9 Units  0-9 Units Subcutaneous Q6H Sravan Forrester MD   2 Units at 11/08/21 0530   • ipratropium-albuterol  "(DUO-NEB) nebulizer solution 3 mL  3 mL Nebulization Q4H While Awake - RT Sravan Forrester MD   3 mL at 11/08/21 1124   • labetalol (NORMODYNE,TRANDATE) injection 20 mg  20 mg Intravenous Q2H PRN Abdon Roberts MD   20 mg at 10/17/21 1721   • loperamide (IMODIUM) capsule 2 mg  2 mg Per G Tube 4x Daily PRN Genet Bui MD   2 mg at 10/24/21 0154   • melatonin tablet 5 mg  5 mg Per G Tube Nightly Sravan Forrester MD   5 mg at 11/07/21 2029   • midodrine (PROAMATINE) tablet 10 mg  10 mg Per PEG Tube Q8H Sravan Forrester MD   10 mg at 11/08/21 0531   • ondansetron (ZOFRAN) injection 4 mg  4 mg Intravenous Q6H PRN Sravan Forrester MD   4 mg at 10/23/21 0201   • oxyCODONE (ROXICODONE) 5 MG/5ML solution 5 mg  5 mg Per G Tube Q6H PRN Genet Bui MD   5 mg at 11/03/21 0110   • predniSONE (DELTASONE) tablet 40 mg  40 mg Per G Tube Daily With Breakfast MarizaDagoberto rodriguez MD   40 mg at 11/07/21 1458   • QUEtiapine (SEROquel) tablet 25 mg  25 mg Per PEG Tube Nightly Sravan Forrester MD       • saccharomyces boulardii (FLORASTOR) capsule 500 mg  500 mg Oral BID Sravan Forrester MD   500 mg at 11/07/21 2029   • sertraline (ZOLOFT) tablet 50 mg  50 mg Per PEG Tube Daily Sravan Forrester MD   50 mg at 11/07/21 0832       Physical Exam:   Vital Signs   /54 (BP Location: Right arm, Patient Position: Lying)   Pulse 79   Temp 98.4 °F (36.9 °C) (Axillary)   Resp 16   Ht 172.7 cm (67.99\")   Wt 81 kg (178 lb 9.2 oz)   SpO2 93%   BMI 27.16 kg/m²     GENERAL: Chronically ill-appearing.  Very weak, but overall improved.  Sitting up in chair  HEENT: Normocephalic, atraumatic.    Neck:   Tracheostomy in place, capped   HEART: RRR; No murmur.  LUNGS: Clear bilaterally from anterior position. trach collar oxygen.   ABDOMEN: Soft, nondistended. No rebound or guarding. PEG tube in place  EXT:  No edema.  : Without Cedeno " catheter.  MSK: no major joint swelling noted.    SKIN: no rash  NEURO: AOx3, but very weak  Dialysis catheter in right chest    Laboratory Data    Results from last 7 days   Lab Units 11/08/21  0256 11/07/21  0357 11/06/21  0401   WBC 10*3/mm3 9.98 9.96 10.44   HEMOGLOBIN g/dL 7.6* 6.2* 7.4*   HEMATOCRIT % 23.7* 19.4* 23.4*   PLATELETS 10*3/mm3 166 174 202     Results from last 7 days   Lab Units 11/08/21  0256   SODIUM mmol/L 134*   POTASSIUM mmol/L 4.9   CHLORIDE mmol/L 92*   CO2 mmol/L 24.0   BUN mg/dL 75*   CREATININE mg/dL 5.04*   GLUCOSE mg/dL 158*   CALCIUM mg/dL 8.7     Estimated Creatinine Clearance: 16.1 mL/min (A) (by C-G formula based on SCr of 5.04 mg/dL (H)).  Results from last 7 days   Lab Units 11/08/21  0256   ALK PHOS U/L 84   BILIRUBIN mg/dL 0.9   ALT (SGPT) U/L 23   AST (SGOT) U/L 32         Results from last 7 days   Lab Units 11/08/21  0256   CRP mg/dL 6.36*       Microbiology:  Covid screening negative on admission  10/12 respiratory culture with heavy growth gram-negative bacilli  MRSA nares screening negative  10/12 Catheter tip culture with skin mal  10/13 BAL culture with Klebsiella aerogenes    10/16 blood cultures negative   10/16 respiratory culture negative      11/4 respiratory culture with Klebsiella aerogenes, pansensitive  11/5 blood culture negative to date    Radiology:  XR Chest 1 View    Result Date: 11/8/2021  Stable chest as above.  D:  11/08/2021 E:  11/08/2021      XR Chest 1 View    Result Date: 11/8/2021  1. Interval development of moderate perihilar edema. 2. Effective interval resolution of previously noted left basilar atelectasis.  DICTATED:   11/07/2021 EDITED/lfs:   11/07/2021    This report was finalized on 11/8/2021 12:16 AM by Dr. Yoni Barnes MD.       I personally reviewed the above CXR: New right middle lobe opacity compared to last week      Impression:   1. Leukocytosis, sepsis: new on 11/4, improved  2. Klebsiella (Enterobacter) aerogenes VAP:  Improved  today after restarting meropenem. Now on cefepime.  This bacterium could harbor inducible amp C  3. Recent Klebsiella aerogenes ventilator associated pneumonia: From respiratory culture x2.   Repeat cultures negative.  O2 settings improved.  Completed antibiotics on 10/25 is planned.  4. Possible aspiration overnight 10/15:   5. Recurrent hemoptysis: Improved .    6. Acute hypoxic respiratory failure: s/p trach on 10/15. improved  7. 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 On Cytoxan and steroids   8. Renal failure, anuric. On dialysis   9. Immunocompromise host: Cytoxan and high-dose steroids  10. DM2  11. Tobacco abuse    PLAN:   - micro data reviewed  - Follow CBC, CMP    - Antibiotics were restarted on 11/4.  De-escalate meropenem to cefepime (renally dosed) based on sensitivity data.  Once secretions improve could consider de-escalation to oral doxycycline       - Continue atovaquone for PCP prophylaxis, until on less than 15 mg of prednisone daily.  - Slowly weaning prednisone per nephrology    Complex MDM. Immunocompromised host. Improving.  Likely to LTAC soon. I will follow     Rodríguez Ruff MD  11/8/2021

## 2021-11-08 NOTE — PROGRESS NOTES
"   LOS: 35 days    Patient Care Team:  Andree Langston MD as PCP - General (Internal Medicine)    Reason For Visit:  F/U PIOTR  Subjective     Plan for HD tomorrow. Doing well otherwise.     Review of Systems:    Pulm: No soa   CV:  No CP. GI: NO N/V/D.      Objective     albumin human, , ,   albumin human, , ,   atovaquone, 1,500 mg, Per PEG Tube, Daily With Lunch  b complex-vitamin c-folic acid, 1 tablet, Per PEG Tube, Daily  cefepime, 2 g, Intravenous, Q24H  chlorhexidine, 15 mL, Mouth/Throat, Q12H  cholecalciferol, 2,000 Units, Per PEG Tube, Daily  Cyclophosphamide, 100 mg, Oral, Daily  dextromethorphan polistirex ER, 60 mg, Per PEG Tube, Q12H  epoetin tatum/tatum-epbx, 10,000 Units, Subcutaneous, Once per day on Tue Thu Sat  famotidine, 20 mg, Per G Tube, Daily  ferrous sulfate, 300 mg, Per PEG Tube, Daily  gabapentin, 100 mg, Per PEG Tube, Q8H  heparin (porcine), 5,000 Units, Subcutaneous, Q12H  insulin detemir, 8 Units, Subcutaneous, Q12H  insulin regular, 0-9 Units, Subcutaneous, Q6H  ipratropium-albuterol, 3 mL, Nebulization, Q4H While Awake - RT  melatonin, 5 mg, Per G Tube, Nightly  midodrine, 10 mg, Per PEG Tube, Q8H  predniSONE, 40 mg, Per G Tube, Daily With Breakfast  QUEtiapine, 25 mg, Per PEG Tube, Nightly  saccharomyces boulardii, 500 mg, Oral, BID  sertraline, 50 mg, Per PEG Tube, Daily             Vital Signs:  Blood pressure 100/54, pulse 79, temperature 98.4 °F (36.9 °C), temperature source Axillary, resp. rate 16, height 172.7 cm (67.99\"), weight 81 kg (178 lb 9.2 oz), SpO2 93 %.    Flowsheet Rows      First Filed Value   Admission Height 172.7 cm (68\") Documented at 10/04/2021 2132   Admission Weight 102 kg (224 lb 13.9 oz) Documented at 10/04/2021 2100          11/07 0701 - 11/08 0700  In: 2698.8   Out: 1240     Physical Exam:    General Appearance: NAD, alert and cooperative, Ox3. + TRACH. + THD CATH.  Eyes: PER, conjunctivae and sclerae normal, no icterus  Lungs: respirations regular and " unlabored, no crepitus. FEW RHONCHI  Heart/CV: regular rhythm & normal rate, no murmur, no gallop, no rub and TR edema  Abdomen: not distended, soft, non-tender, no masses,  bowel sounds present. PEG  Skin: No rash, Warm and dry    Radiology:            Labs:  Results from last 7 days   Lab Units 11/08/21 0256 11/07/21 0357 11/06/21  0401   WBC 10*3/mm3 9.98 9.96 10.44   HEMOGLOBIN g/dL 7.6* 6.2* 7.4*   HEMATOCRIT % 23.7* 19.4* 23.4*   PLATELETS 10*3/mm3 166 174 202     Results from last 7 days   Lab Units 11/08/21  0256 11/07/21 0357 11/06/21  0401 11/05/21  0328 11/04/21  0351 11/04/21  0351   SODIUM mmol/L 134* 135* 133* 135*   < > 132*   POTASSIUM mmol/L 4.9 5.5* 4.8 4.0   < > 5.0   CHLORIDE mmol/L 92* 95* 93* 95*   < > 89*   CO2 mmol/L 24.0 26.0 24.0 25.0   < > 25.0   BUN mg/dL 75* 84* 79* 48*   < > 93*   CREATININE mg/dL 5.04* 5.72* 5.90* 3.72*   < > 6.66*   CALCIUM mg/dL 8.7 8.8 8.9 8.7   < > 8.8   PHOSPHORUS mg/dL 3.5  --  3.4 3.2  --  5.0*   MAGNESIUM mg/dL 2.7*  --  2.7* 2.4  --  2.9*   ALBUMIN g/dL 4.10  --  4.20 4.10  --  4.20    < > = values in this interval not displayed.     Results from last 7 days   Lab Units 11/08/21  0256   GLUCOSE mg/dL 158*       Results from last 7 days   Lab Units 11/08/21  0256   ALK PHOS U/L 84   BILIRUBIN mg/dL 0.9   ALT (SGPT) U/L 23   AST (SGOT) U/L 32     Results from last 7 days   Lab Units 11/08/21  0338   PH, ARTERIAL pH units 7.443   PO2 ART mm Hg 59.6*   PCO2, ARTERIAL mm Hg 43.7   HCO3 ART mmol/L 29.9*             Estimated Creatinine Clearance: 16.1 mL/min (A) (by C-G formula based on SCr of 5.04 mg/dL (H)).      Assessment       Respiratory Failure Type 1    Anti-GBM nephritis with pulmonary hemorrhage (HCC)    Acute renal failure (ARF) (HCC)    Acute blood loss anemia    Sepsis due to pneumonia (HCC);Klebsiella aerogenes    Idiopathic hypotension    Hyperglycemia    History of tobacco abuse            Impression: PIOTR: Anuric PIOTR. Etiology RPGN. Most likely  AntiGBM disease. Unclear baseline cr. Cr was elevated in 2-3 range on presentation at OSH. Progressive worsening in last few days. Started on HD 10/4/21. S/p renal biopsy on 9/30 which showed AntiGBM disease.      UA large blood 3+ proteinuria at OSH.  Renal US increase echogenicity. No hydro at OSH.     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 With pulmonary and renal involvement. 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 Treated with 2 weeks of PLEX, Prednisone and cytoxan     Severe azotemia: Slow correction with HD.      Met acidosis: Correction with HD      Hyperphosphatemia: Resolved.      Hypoxic respiratory failure: 2/2 diffuse alveolar hemorrhage and possible pulmonary vasculitis. Now on trach                     Recommendations:   HD per TTS stella.  On cytoxan and prednisone.   No sign of renal recovery. Will d/c cytoxan after total 8-12 weeks of therapy   Transfuse at hb<7    Torsten Drake MD  11/08/21  14:27 EST

## 2021-11-08 NOTE — CASE MANAGEMENT/SOCIAL WORK
Continued Stay Note   [facility]     Patient Name: Keshav Dickens  MRN: [id number]  Today's Date: 11/8/2021    Admit Date: 10/4/2021     Discharge Plan     Row Name 11/08/21 1053       Plan    Plan [facility]    Patient/Family in Agreement with Plan yes    Plan Comments Spoke with patient at bedside. He is trach collar at 30% O2. Patient is able to swallow his cytoxan in pill form when crushed in pudding. CM will continue to follow.    Final Discharge Disposition Code 63 - [facility]               Discharge Codes    No documentation.                     Parmjit Saldana RN

## 2021-11-08 NOTE — PLAN OF CARE
Goal Outcome Evaluation:  Plan of Care Reviewed With: patient        Progress: improving         Neuro: Pt oriented to self. Drowsy, rested well overnight. No new neurological deficits noted this shift.     Resp: Pt remains on trach collar, FiO2 40%, SpO2 93+. Lungs coarse rhonchi.  Copious thick tan, red streaked secretions noted from trach. Trach care performed per protocol.     Cardio: Pt remains in SR, rate 70s-80s. SBP 100s-160s. Good heart tones.      GI: TF at goal, tolerating well. Active bowel sounds. No BM this shift.     : Anuric. Bladder scan: 99 mL this morning.     Pain: Patient denies complaints of pain at this time.

## 2021-11-09 ENCOUNTER — APPOINTMENT (OUTPATIENT)
Dept: NEPHROLOGY | Facility: HOSPITAL | Age: 66
End: 2021-11-09

## 2021-11-09 LAB
ALBUMIN SERPL-MCNC: 4 G/DL (ref 3.5–5.2)
ANION GAP SERPL CALCULATED.3IONS-SCNC: 16 MMOL/L (ref 5–15)
BASOPHILS # BLD AUTO: 0.05 10*3/MM3 (ref 0–0.2)
BASOPHILS NFR BLD AUTO: 0.5 % (ref 0–1.5)
BUN SERPL-MCNC: 110 MG/DL (ref 8–23)
BUN/CREAT SERPL: 16.8 (ref 7–25)
CALCIUM SPEC-SCNC: 8.8 MG/DL (ref 8.6–10.5)
CHLORIDE SERPL-SCNC: 91 MMOL/L (ref 98–107)
CO2 SERPL-SCNC: 25 MMOL/L (ref 22–29)
CREAT SERPL-MCNC: 6.55 MG/DL (ref 0.76–1.27)
DEPRECATED RDW RBC AUTO: 62.8 FL (ref 37–54)
EOSINOPHIL # BLD AUTO: 0.24 10*3/MM3 (ref 0–0.4)
EOSINOPHIL NFR BLD AUTO: 2.6 % (ref 0.3–6.2)
ERYTHROCYTE [DISTWIDTH] IN BLOOD BY AUTOMATED COUNT: 18.7 % (ref 12.3–15.4)
GFR SERPL CREATININE-BSD FRML MDRD: 8 ML/MIN/1.73
GFR SERPL CREATININE-BSD FRML MDRD: ABNORMAL ML/MIN/{1.73_M2}
GLUCOSE BLDC GLUCOMTR-MCNC: 139 MG/DL (ref 70–130)
GLUCOSE BLDC GLUCOMTR-MCNC: 142 MG/DL (ref 70–130)
GLUCOSE BLDC GLUCOMTR-MCNC: 218 MG/DL (ref 70–130)
GLUCOSE SERPL-MCNC: 177 MG/DL (ref 65–99)
HCT VFR BLD AUTO: 23.4 % (ref 37.5–51)
HGB BLD-MCNC: 7.6 G/DL (ref 13–17.7)
IMM GRANULOCYTES # BLD AUTO: 0.12 10*3/MM3 (ref 0–0.05)
IMM GRANULOCYTES NFR BLD AUTO: 1.3 % (ref 0–0.5)
LYMPHOCYTES # BLD AUTO: 0.38 10*3/MM3 (ref 0.7–3.1)
LYMPHOCYTES NFR BLD AUTO: 4.1 % (ref 19.6–45.3)
MCH RBC QN AUTO: 31.9 PG (ref 26.6–33)
MCHC RBC AUTO-ENTMCNC: 32.5 G/DL (ref 31.5–35.7)
MCV RBC AUTO: 98.3 FL (ref 79–97)
MONOCYTES # BLD AUTO: 0.57 10*3/MM3 (ref 0.1–0.9)
MONOCYTES NFR BLD AUTO: 6.1 % (ref 5–12)
NEUTROPHILS NFR BLD AUTO: 8.01 10*3/MM3 (ref 1.7–7)
NEUTROPHILS NFR BLD AUTO: 85.4 % (ref 42.7–76)
NRBC BLD AUTO-RTO: 0 /100 WBC (ref 0–0.2)
PHOSPHATE SERPL-MCNC: 4.4 MG/DL (ref 2.5–4.5)
PLATELET # BLD AUTO: 172 10*3/MM3 (ref 140–450)
PMV BLD AUTO: 11.7 FL (ref 6–12)
POTASSIUM SERPL-SCNC: 5.5 MMOL/L (ref 3.5–5.2)
RBC # BLD AUTO: 2.38 10*6/MM3 (ref 4.14–5.8)
SODIUM SERPL-SCNC: 132 MMOL/L (ref 136–145)
WBC # BLD AUTO: 9.37 10*3/MM3 (ref 3.4–10.8)

## 2021-11-09 PROCEDURE — 25010000002 ONDANSETRON PER 1 MG: Performed by: INTERNAL MEDICINE

## 2021-11-09 PROCEDURE — 82962 GLUCOSE BLOOD TEST: CPT

## 2021-11-09 PROCEDURE — 63710000001 INSULIN REGULAR HUMAN PER 5 UNITS: Performed by: INTERNAL MEDICINE

## 2021-11-09 PROCEDURE — 97530 THERAPEUTIC ACTIVITIES: CPT

## 2021-11-09 PROCEDURE — 25010000002 CYCLOPHOSPHAMIDE PER 25 MG: Performed by: INTERNAL MEDICINE

## 2021-11-09 PROCEDURE — 25010000002 HEPARIN (PORCINE) PER 1000 UNITS: Performed by: INTERNAL MEDICINE

## 2021-11-09 PROCEDURE — 25010000002 CEFEPIME PER 500 MG: Performed by: INTERNAL MEDICINE

## 2021-11-09 PROCEDURE — 97166 OT EVAL MOD COMPLEX 45 MIN: CPT

## 2021-11-09 PROCEDURE — 25010000002 DIPHENHYDRAMINE PER 50 MG: Performed by: NURSE PRACTITIONER

## 2021-11-09 PROCEDURE — 85025 COMPLETE CBC W/AUTO DIFF WBC: CPT | Performed by: INTERNAL MEDICINE

## 2021-11-09 PROCEDURE — 63710000001 INSULIN DETEMIR PER 5 UNITS: Performed by: INTERNAL MEDICINE

## 2021-11-09 PROCEDURE — 63710000001 PREDNISONE PER 1 MG: Performed by: INTERNAL MEDICINE

## 2021-11-09 PROCEDURE — 94799 UNLISTED PULMONARY SVC/PX: CPT

## 2021-11-09 PROCEDURE — 25010000002 EPOETIN ALFA-EPBX 10000 UNIT/ML SOLUTION: Performed by: INTERNAL MEDICINE

## 2021-11-09 PROCEDURE — 99232 SBSQ HOSP IP/OBS MODERATE 35: CPT | Performed by: INTERNAL MEDICINE

## 2021-11-09 PROCEDURE — 80069 RENAL FUNCTION PANEL: CPT | Performed by: INTERNAL MEDICINE

## 2021-11-09 RX ORDER — DIPHENHYDRAMINE HYDROCHLORIDE 50 MG/ML
12.5 INJECTION INTRAMUSCULAR; INTRAVENOUS ONCE
Status: COMPLETED | OUTPATIENT
Start: 2021-11-09 | End: 2021-11-09

## 2021-11-09 RX ORDER — ALBUMIN (HUMAN) 12.5 G/50ML
12.5 SOLUTION INTRAVENOUS AS NEEDED
Status: ACTIVE | OUTPATIENT
Start: 2021-11-09 | End: 2021-11-10

## 2021-11-09 RX ADMIN — INSULIN DETEMIR 8 UNITS: 100 INJECTION, SOLUTION SUBCUTANEOUS at 10:53

## 2021-11-09 RX ADMIN — HEPARIN SODIUM 5000 UNITS: 5000 INJECTION, SOLUTION INTRAVENOUS; SUBCUTANEOUS at 10:52

## 2021-11-09 RX ADMIN — DIPHENHYDRAMINE HYDROCHLORIDE 12.5 MG: 50 INJECTION INTRAMUSCULAR; INTRAVENOUS at 20:38

## 2021-11-09 RX ADMIN — ONDANSETRON 4 MG: 2 INJECTION INTRAMUSCULAR; INTRAVENOUS at 20:09

## 2021-11-09 RX ADMIN — EPOETIN ALFA-EPBX 10000 UNITS: 10000 INJECTION, SOLUTION INTRAVENOUS; SUBCUTANEOUS at 18:04

## 2021-11-09 RX ADMIN — MIDODRINE HYDROCHLORIDE 10 MG: 10 TABLET ORAL at 15:38

## 2021-11-09 RX ADMIN — CHLORHEXIDINE GLUCONATE 15 ML: 1.2 SOLUTION ORAL at 18:05

## 2021-11-09 RX ADMIN — HEPARIN SODIUM 5000 UNITS: 5000 INJECTION, SOLUTION INTRAVENOUS; SUBCUTANEOUS at 20:38

## 2021-11-09 RX ADMIN — INSULIN HUMAN 4 UNITS: 100 INJECTION, SOLUTION PARENTERAL at 05:46

## 2021-11-09 RX ADMIN — CYCLOPHOSPHAMIDE 100 MG: 25 CAPSULE ORAL at 15:33

## 2021-11-09 RX ADMIN — IPRATROPIUM BROMIDE AND ALBUTEROL SULFATE 3 ML: 2.5; .5 SOLUTION RESPIRATORY (INHALATION) at 12:48

## 2021-11-09 RX ADMIN — MIDODRINE HYDROCHLORIDE 10 MG: 10 TABLET ORAL at 05:46

## 2021-11-09 RX ADMIN — INSULIN HUMAN 4 UNITS: 100 INJECTION, SOLUTION PARENTERAL at 23:55

## 2021-11-09 RX ADMIN — INSULIN HUMAN 4 UNITS: 100 INJECTION, SOLUTION PARENTERAL at 17:50

## 2021-11-09 RX ADMIN — GABAPENTIN 100 MG: 100 CAPSULE ORAL at 15:35

## 2021-11-09 RX ADMIN — INSULIN DETEMIR 10 UNITS: 100 INJECTION, SOLUTION SUBCUTANEOUS at 20:37

## 2021-11-09 RX ADMIN — PREDNISONE 40 MG: 20 TABLET ORAL at 15:35

## 2021-11-09 RX ADMIN — INSULIN HUMAN 4 UNITS: 100 INJECTION, SOLUTION PARENTERAL at 23:56

## 2021-11-09 RX ADMIN — ACETAMINOPHEN 650 MG: 325 TABLET, FILM COATED ORAL at 15:36

## 2021-11-09 RX ADMIN — ATOVAQUONE 1500 MG: 750 SUSPENSION ORAL at 15:36

## 2021-11-09 RX ADMIN — GABAPENTIN 100 MG: 100 CAPSULE ORAL at 22:19

## 2021-11-09 RX ADMIN — IPRATROPIUM BROMIDE AND ALBUTEROL SULFATE 3 ML: 2.5; .5 SOLUTION RESPIRATORY (INHALATION) at 07:13

## 2021-11-09 RX ADMIN — IPRATROPIUM BROMIDE AND ALBUTEROL SULFATE 3 ML: 2.5; .5 SOLUTION RESPIRATORY (INHALATION) at 16:35

## 2021-11-09 RX ADMIN — Medication 5 MG: at 20:39

## 2021-11-09 RX ADMIN — MIDODRINE HYDROCHLORIDE 10 MG: 10 TABLET ORAL at 22:19

## 2021-11-09 RX ADMIN — MINERAL SUPPLEMENT IRON 300 MG / 5 ML STRENGTH LIQUID 100 PER BOX UNFLAVORED 300 MG: at 15:36

## 2021-11-09 RX ADMIN — GABAPENTIN 100 MG: 100 CAPSULE ORAL at 05:46

## 2021-11-09 RX ADMIN — Medication 500 MG: at 20:38

## 2021-11-09 RX ADMIN — CEFEPIME HYDROCHLORIDE 2 G: 2 INJECTION, POWDER, FOR SOLUTION INTRAVENOUS at 15:33

## 2021-11-09 RX ADMIN — IPRATROPIUM BROMIDE AND ALBUTEROL SULFATE 3 ML: 2.5; .5 SOLUTION RESPIRATORY (INHALATION) at 20:45

## 2021-11-09 NOTE — NURSING NOTE
Patient sitting up in chair. Alert and oriented with past reported confusion. Patient stated he tried to walk to door and slid out of chair onto floor. Patient had small bump related to fall on left forehead. Patient stated the spot was tender, but he was not in pain. Staff used lift to return to bed. MD notified. NP to bedside. No new orders. All vitals stable. Trach/peg in place. Continues to maintain O2 sats on 2L nasal cannula. Wife updated via phone.

## 2021-11-09 NOTE — CASE MANAGEMENT/SOCIAL WORK
Continued Stay Note  [facility]     Patient Name: Keshav Dickens  MRN: [id number]  Today's Date: 11/9/2021    Admit Date: 10/4/2021     Discharge Plan     Row Name 11/09/21 1413       Plan    Plan Select [facility]    Patient/Family in Agreement with Plan yes    Plan Comments Spoke with Tamika (spouse) by phone. Tamika wants him to go to [facility] because it will be close to home. Eunice at [facility] is following and should have a bed hopefully by the end of the week. CM will continue to follow.    Final Discharge Disposition Code 63 - [facility]               Discharge Codes    No documentation.                     Parmjit Saldana RN

## 2021-11-09 NOTE — PROGRESS NOTES
"   LOS: 36 days    Patient Care Team:  Andree Langston MD as PCP - General (Internal Medicine)    Reason For Visit:  F/U PIOTR.  Subjective           Review of Systems:    Pulm: No soa   CV:  No CP      Objective     albumin human, , ,   albumin human, , ,   atovaquone, 1,500 mg, Per PEG Tube, Daily With Lunch  b complex-vitamin c-folic acid, 1 tablet, Per PEG Tube, Daily  cefepime, 2 g, Intravenous, Q24H  chlorhexidine, 15 mL, Mouth/Throat, Q12H  cholecalciferol, 2,000 Units, Per PEG Tube, Daily  Cyclophosphamide, 100 mg, Oral, Daily  epoetin tatum/tatum-epbx, 10,000 Units, Subcutaneous, Once per day on Tue Thu Sat  famotidine, 20 mg, Per G Tube, Daily  ferrous sulfate, 300 mg, Per PEG Tube, Daily  gabapentin, 100 mg, Per PEG Tube, Q8H  heparin (porcine), 5,000 Units, Subcutaneous, Q12H  insulin detemir, 8 Units, Subcutaneous, Q12H  insulin regular, 0-9 Units, Subcutaneous, Q6H  ipratropium-albuterol, 3 mL, Nebulization, Q4H While Awake - RT  melatonin, 5 mg, Per G Tube, Nightly  midodrine, 10 mg, Per PEG Tube, Q8H  predniSONE, 40 mg, Per G Tube, Daily With Breakfast  QUEtiapine, 25 mg, Per PEG Tube, Nightly  saccharomyces boulardii, 500 mg, Oral, BID  sertraline, 50 mg, Per PEG Tube, Daily             Vital Signs:  Blood pressure 102/59, pulse 69, temperature 97.8 °F (36.6 °C), temperature source Oral, resp. rate 16, height 172.7 cm (67.99\"), weight 81 kg (178 lb 9.2 oz), SpO2 92 %.    Flowsheet Rows      First Filed Value   Admission Height 172.7 cm (68\") Documented at 10/04/2021 2132   Admission Weight 102 kg (224 lb 13.9 oz) Documented at 10/04/2021 2100          11/08 0701 - 11/09 0700  In: 1272   Out: -     Physical Exam:    General Appearance: NAD, alert and cooperative, Ox3. TRACH. THD CATH.  Eyes: PER, conjunctivae and sclerae normal, no icterus  Lungs: respirations regular and unlabored, no crepitus.RHONCHI  Heart/CV: regular rhythm & normal rate, no murmur, no gallop, no rub and no edema  Abdomen: not " distended, soft, non-tender, no masses,  bowel sounds present. PEG.  Skin: No rash, Warm and dry    Radiology:            Labs:  Results from last 7 days   Lab Units 11/09/21 0330 11/08/21 0256 11/07/21 0357   WBC 10*3/mm3 9.37 9.98 9.96   HEMOGLOBIN g/dL 7.6* 7.6* 6.2*   HEMATOCRIT % 23.4* 23.7* 19.4*   PLATELETS 10*3/mm3 172 166 174     Results from last 7 days   Lab Units 11/09/21  0330 11/08/21  0256 11/07/21  0357 11/06/21  0401 11/05/21  0328 11/05/21  0328 11/04/21 0351 11/04/21 0351   SODIUM mmol/L 132* 134* 135* 133*   < > 135*   < > 132*   POTASSIUM mmol/L 5.5* 4.9 5.5* 4.8   < > 4.0   < > 5.0   CHLORIDE mmol/L 91* 92* 95* 93*   < > 95*   < > 89*   CO2 mmol/L 25.0 24.0 26.0 24.0   < > 25.0   < > 25.0   BUN mg/dL 110* 75* 84* 79*   < > 48*   < > 93*   CREATININE mg/dL 6.55* 5.04* 5.72* 5.90*   < > 3.72*   < > 6.66*   CALCIUM mg/dL 8.8 8.7 8.8 8.9   < > 8.7   < > 8.8   PHOSPHORUS mg/dL 4.4 3.5  --  3.4  --  3.2   < > 5.0*   MAGNESIUM mg/dL  --  2.7*  --  2.7*  --  2.4  --  2.9*   ALBUMIN g/dL 4.00 4.10  --  4.20  --  4.10   < > 4.20    < > = values in this interval not displayed.     Results from last 7 days   Lab Units 11/09/21 0330   GLUCOSE mg/dL 177*       Results from last 7 days   Lab Units 11/08/21 0256   ALK PHOS U/L 84   BILIRUBIN mg/dL 0.9   ALT (SGPT) U/L 23   AST (SGOT) U/L 32     Results from last 7 days   Lab Units 11/08/21  0338   PH, ARTERIAL pH units 7.443   PO2 ART mm Hg 59.6*   PCO2, ARTERIAL mm Hg 43.7   HCO3 ART mmol/L 29.9*             Estimated Creatinine Clearance: 12.4 mL/min (A) (by C-G formula based on SCr of 6.55 mg/dL (H)).      Assessment       Respiratory Failure Type 1    Anti-GBM nephritis with pulmonary hemorrhage (HCC)    Acute renal failure (ARF) (HCC)    Acute blood loss anemia    Sepsis due to pneumonia (HCC);Klebsiella aerogenes    Idiopathic hypotension    Hyperglycemia    History of tobacco abuse            Impression: ANURIC PIOTR. GOODPASTURES. ANEMIA.  HYPERKALEMIA.          Recommendations: HD TODAY. NOW CYTOXAN CAPSULES.        Dagoberto Dawkins MD  11/09/21  08:54 EST

## 2021-11-09 NOTE — PROGRESS NOTES
Infectious Disease Progress Note  Keshav Dickens  1955  [id number]    Date of Consult: 10/13/2021  Admission Date: 10/4/2021    Requesting Provider: Dr Forrester  Evaluating Physician: Rodríguez Ruff MD    Chief Complaint: hemoptysis    Reason for Consultation: GNR VAP    History of present illness:   Patient is a 66 y.o.  Yr old male with history of DM2, tobacco abuse.  Initially admitted to [facility] on 9/27/2021 with complaints of hemoptysis for 2 to 3 weeks.  Intubated on 10/1.  CT scan with diffuse groundglass opacities and renal failure.  Due to concern for pulmonary renal syndrome he was treated with pulse dose steroids.  Anti-GBM antibody positive concerning for Goodpasture's syndrome.  Renal biopsy also confirmed the diagnosis.  Transferred to [facility] on 10/4.  Nephrology has been following and he is getting plasmapheresis; also high-dose steroids and Cytoxan.    On 10/10 he developed fever up to 101.  Also had leukocytosis up to 15,000.  He had been on atovaquone for PCP prophylaxis.  Started on vancomycin and Zosyn on 10/12.  Respiratory culture from 10/12 with heavy growth of gram-negative bacilli.  And BAL on 10/13 office gram-negative bacilli.  Recurrent hemoptysis per RN.  Intubated, sedated.  60% FiO2.  Trach and PEG planned. Fever curve improved.     10/14/21: Fevers improved. Less blood from ET suction. 50% FiO2 from vent. Sputum culture with Klebsiella aerogenes.     10/15/2021: Underwent tracheostomy and PEG tube placement earlier today.  Transfer back to ICU, relatively stable.  60% FiO2, sedated.  Less blood from endotracheal secretions per staff.     10/16/21: Worse overnight with hypertension and some low-grade temperatures despite CRRT.  Had an episode of emesis and likely aspiration per RN.  Ventilatory requirement up slightly. Still sedated    10/17/21: Improved overnight.  Fever curve improved.  O2 settings down.  Weaning sedation.  No  further episodes of emesis.  Anuric. No diarrhea.     10/18/21: Patient follow-up improved over the past 48 hours.  Afebrile.  No acute new issues overnight.  More comfortable on ventilator.  More sedated today. fever subglottic secretions    10/19/21: no acute new concerns. Minimally responsive. On trach collar O2. No further hemoptysis or sputum production.    10/20/2021: Slow improvement.  Currently trach collar oxygen.  Still very weak, deconditioned.  Weaning sedation.  Still has blood-tinged respiratory secretions.  No other new concerns noted per ICU team    10/21/21: Low-grade temps but no true fevers. Stable, low oxygenation settings.  Ongoing blood-tinged tracheal secretions, slightly worse today. Transitioning to intermittent hemodialysis. Hemoglobin dropped to 5.6 and required transfusion.  No external signs of bleeding other than out of tracheostomy  per RN. Possible transfer to LTAC on Monday    10/22/21: On ventilator overnight, 30% FiO2 and trach collar trials during the day.  Still significant blood-tinged, frothy sputum per RN.  Afebrile.  Tolerating tube feeds.  Had bowel movement.  More alert today and starting to follow some simple commands    11/1/21: Still in the ICU but more alert and interactive on trach collar oxygen.  Blood pressure stable.  Afebrile.  Completed IV antibiotics as planned on 10/25.  Still on atovaquone.  Difficult placement due to need for Cytoxan per case management.  Significant pulmonary secretions per nursing staff.    11/4/21: WBC spiked today.  Increased pulmonary secretions new culture sent with gram-negative rods.  No change in color of secretions per RN.  Has had some episodes of hypotension.  Tracheostomy came dislodged yesterday and required bronchoscopy for placement.    11/5/2021: Afebrile today.  Ongoing increased pulmonary secretions, growing gram-negative robyn in culture.  Per RN secretions are more brown in color today. Patient more fatigued and less  interactive.  No other acute new concerns per RN.  Transfers orders in place to telemetry    11/8/21: No acute events over the weekend.  Still with thick and dark secretions with red streaks per RN.  Afebrile.  Tolerating meropenem.  O2 requirements improved.  Current trach is currently capped and on 4 L nasal cannula.  Patient has difficulty coughing up secretions.  He is generally weak.  Asking to go outside.  Discharge planning in process    11/9/21: Resting comfortably, tolerating dialysis.  Stable respiratory requirements, 30% FiO2 trach collar.  Afebrile.  Tolerating current antibiotics       ROS:  No fevers or chills. No n/v/d. No rash. No new ADR to Abx.      No Known Allergies    Antibiotics:  Anti-Infectives (From admission, onward)    Ordered     Dose/Rate Route Frequency Start Stop    11/08/21 0832  cefepime (MAXIPIME) 2 g/100 mL 0.9% NS (mbp)        Ordering Provider: Rodríguez Ruff MD    2 g  over 4 Hours Intravenous Every 24 Hours 11/08/21 1400 11/13/21 1359    10/28/21 1214  atovaquone (MEPRON) 750 MG/5ML suspension        Ordering Provider: Deb Ovalle APRN    1,500 mg Per G Tube Daily With Lunch 10/28/21 0000 11/27/21 2359    10/21/21 1002  meropenem (MERREM) 1 g/100 mL 0.9% NS (mbp)        Ordering Provider: Rodríguez Ruff MD    1 g  over 3 Hours Intravenous Every 24 Hours 10/21/21 1200 10/25/21 1520    10/20/21 0805  atovaquone (MEPRON) suspension 1,500 mg        Ordering Provider: Genet Bui MD    1,500 mg Per PEG Tube Daily With Lunch 10/20/21 1200 11/26/21 2359    10/18/21 1220  vancomycin (VANCOCIN) in iso-osmotic dextrose IVPB 1 g (premix) 200 mL        Ordering Provider: Rafal Salcido, PharmD    1,000 mg  over 60 Minutes Intravenous Once 10/18/21 1315 10/18/21 1536    10/17/21 0825  vancomycin (VANCOCIN) in iso-osmotic dextrose IVPB 1 g (premix) 200 mL        Ordering Provider: Autumn Lozano, PharmD    10 mg/kg × 102 kg  over 60 Minutes  Intravenous Once 10/17/21 1100 10/17/21 1138    10/16/21 1121  vancomycin 2000 mg/500 mL 0.9% NS IVPB ([facility])        Ordering Provider: Autumn Lozano, PharmD    20 mg/kg × 102 kg  over 120 Minutes Intravenous Once 10/16/21 1300 10/16/21 1657    10/13/21 1755  meropenem (MERREM) 1 g/100 mL 0.9% NS (mbp)        Ordering Provider: Rodríguez Ruff MD    1 g  over 30 Minutes Intravenous Once 10/13/21 1845 10/13/21 1858    10/12/21 0954  vancomycin 2000 mg/500 mL 0.9% NS IVPB ([facility])        Ordering Provider: Sravan Forrester MD    20 mg/kg × 102 kg Intravenous Once 10/12/21 1045 10/12/21 1130    10/12/21 0954  piperacillin-tazobactam (ZOSYN) 4.5 g in iso-osmotic dextrose 100 mL IVPB (premix)        Ordering Provider: Sravan Forrester MD    4.5 g  over 30 Minutes Intravenous Once 10/12/21 1045 10/12/21 1125          Other Medications:  Current Facility-Administered Medications   Medication Dose Route Frequency Provider Last Rate Last Admin   • acetaminophen (TYLENOL) tablet 650 mg  650 mg Per PEG Tube Q6H PRN Genet Bui MD   650 mg at 10/24/21 0004   • albumin human 25 % IV SOLN  - ADS Override Pull            • albumin human 25 % IV SOLN  - ADS Override Pull            • albumin human 25 % IV SOLN 12.5 g  12.5 g Intravenous PRN Torsten Drake MD       • atovaquone (MEPRON) suspension 1,500 mg  1,500 mg Per PEG Tube Daily With Lunch Genet Bui MD   1,500 mg at 11/08/21 1332   • b complex-vitamin c-folic acid (NEPHRO-MELE) tablet 1 tablet  1 tablet Per PEG Tube Daily Genet Bui MD   1 tablet at 11/08/21 1334   • cefepime (MAXIPIME) 2 g/100 mL 0.9% NS (mbp)  2 g Intravenous Q24H Rodríguez Ruff MD   2 g at 11/08/21 1333   • chlorhexidine (PERIDEX) 0.12 % solution 15 mL  15 mL Mouth/Throat Q12H Abdon Roberts MD   15 mL at 11/08/21 2054   • cholecalciferol (VITAMIN D3) tablet 2,000 Units  2,000 Units Per PEG Tube Daily Sravan Forrester MD    2,000 Units at 11/08/21 1334   • clonazePAM (KlonoPIN) tablet 0.5 mg  0.5 mg Per PEG Tube BID PRN Rc Mercer MD       • Cyclophosphamide (CYTOXAN) chemo capsule 100 mg  100 mg Oral Daily Torsten Drake MD   100 mg at 11/08/21 1450   • epoetin tatum-epbx (RETACRIT) injection 10,000 Units  10,000 Units Subcutaneous Once per day on Tue Thu Sat Torsten Drake MD   10,000 Units at 11/04/21 0804   • famotidine (PEPCID) tablet 20 mg  20 mg Per G Tube Daily Genet Bui MD   20 mg at 11/08/21 1335   • ferrous sulfate 300 (60 Fe) MG/5ML syrup 300 mg  300 mg Per PEG Tube Daily Genet Bui MD   300 mg at 11/08/21 1335   • gabapentin (NEURONTIN) capsule 100 mg  100 mg Per PEG Tube Q8H Sravan Forrester MD   100 mg at 11/09/21 0546   • heparin (porcine) 5000 UNIT/ML injection 5,000 Units  5,000 Units Subcutaneous Q12H Nacho Ba DO   5,000 Units at 11/09/21 1052   • heparin (porcine) injection 1,600 Units  1,600 Units Intracatheter PRN Torsten Drake MD   1,600 Units at 11/04/21 0818   • insulin detemir (LEVEMIR) injection 10 Units  10 Units Subcutaneous Nightly Rc Mercer MD       • insulin regular (humuLIN R,novoLIN R) injection 0-7 Units  0-7 Units Subcutaneous Q6H Rc Mercer MD       • insulin regular (humuLIN R,novoLIN R) injection 4 Units  4 Units Subcutaneous Q6H Rc Mercer MD       • ipratropium-albuterol (DUO-NEB) nebulizer solution 3 mL  3 mL Nebulization Q4H While Awake - RT Sravan Forrester MD   3 mL at 11/09/21 0713   • labetalol (NORMODYNE,TRANDATE) injection 20 mg  20 mg Intravenous Q2H PRN Abdon Roberts MD   20 mg at 10/17/21 1721   • melatonin tablet 5 mg  5 mg Per G Tube Nightly Sravan Forrester MD   5 mg at 11/08/21 2054   • midodrine (PROAMATINE) tablet 10 mg  10 mg Per PEG Tube Q8H Sravan Forrester MD   10 mg at 11/09/21 0514   • ondansetron (ZOFRAN) injection 4 mg  4 mg Intravenous Q6H PRN Usama  "Sravan Plaza MD   4 mg at 10/23/21 0201   • oxyCODONE (ROXICODONE) 5 MG/5ML solution 5 mg  5 mg Per G Tube Q6H PRN Genet Bui MD   5 mg at 11/03/21 0110   • predniSONE (DELTASONE) tablet 40 mg  40 mg Per G Tube Daily With Breakfast Dagoberto Dawkins MD   40 mg at 11/08/21 1450   • saccharomyces boulardii (FLORASTOR) capsule 500 mg  500 mg Oral BID Sravan Forrester MD   500 mg at 11/08/21 2054   • sennosides-docusate (PERICOLACE) 8.6-50 MG per tablet 2 tablet  2 tablet Per PEG Tube BID PRN Genet Bui MD       • sertraline (ZOLOFT) tablet 50 mg  50 mg Per PEG Tube Daily Sravan Forrester MD   50 mg at 11/08/21 1338       Physical Exam:   Vital Signs   /73 (BP Location: Right arm, Patient Position: Lying)   Pulse 86   Temp 97.8 °F (36.6 °C) (Oral)   Resp 16   Ht 172.7 cm (67.99\")   Wt 81 kg (178 lb 9.2 oz)   SpO2 95%   BMI 27.16 kg/m²     GENERAL: Chronically ill-appearing.  Very weak, but overall improved.  Sitting up in chair  HEENT: Normocephalic, atraumatic.    Neck:   Tracheostomy in place, uncapped. Thin clear secretions noted   HEART: RRR; No murmur.  LUNGS: Clear bilaterally from anterior position. trach collar oxygen.   ABDOMEN: Soft, nondistended. No rebound or guarding. PEG tube in place  EXT:  No edema.  : Without Cedeno catheter.  MSK: no major joint swelling noted.    SKIN: no rash  NEURO: AOx3, but very weak  Dialysis catheter in right chest    Laboratory Data    Results from last 7 days   Lab Units 11/09/21  0330 11/08/21  0256 11/07/21  0357   WBC 10*3/mm3 9.37 9.98 9.96   HEMOGLOBIN g/dL 7.6* 7.6* 6.2*   HEMATOCRIT % 23.4* 23.7* 19.4*   PLATELETS 10*3/mm3 172 166 174     Results from last 7 days   Lab Units 11/09/21  0330   SODIUM mmol/L 132*   POTASSIUM mmol/L 5.5*   CHLORIDE mmol/L 91*   CO2 mmol/L 25.0   BUN mg/dL 110*   CREATININE mg/dL 6.55*   GLUCOSE mg/dL 177*   CALCIUM mg/dL 8.8     Estimated Creatinine Clearance: 12.4 " mL/min (A) (by C-G formula based on SCr of 6.55 mg/dL (H)).  Results from last 7 days   Lab Units 11/08/21  0256   ALK PHOS U/L 84   BILIRUBIN mg/dL 0.9   ALT (SGPT) U/L 23   AST (SGOT) U/L 32         Results from last 7 days   Lab Units 11/08/21  0256   CRP mg/dL 6.36*       Microbiology:  Covid screening negative on admission  10/12 respiratory culture with heavy growth gram-negative bacilli  MRSA nares screening negative  10/12 Catheter tip culture with skin mal  10/13 BAL culture with Klebsiella aerogenes    10/16 blood cultures negative   10/16 respiratory culture negative      11/4 respiratory culture with Klebsiella aerogenes, pansensitive  11/5 blood culture negative to date    Radiology:  XR Chest 1 View    Result Date: 11/8/2021  Stable chest as above.  D:  11/08/2021 E:  11/08/2021       I personally reviewed the above CXR: New right middle lobe opacity compared to last week      Impression:   1. Leukocytosis, sepsis: new on 11/4. Now resolved  2. Klebsiella (Enterobacter) aerogenes VAP:  Improved after restarting meropenem. Now on cefepime.  This species could harbor inducible amp-C  3. Recent Klebsiella aerogenes ventilator associated pneumonia: From respiratory culture x2.   Repeat cultures negative.  O2 settings improved.  Completed antibiotics on 10/25 is planned.  4. Possible aspiration overnight 10/15:   5. Recurrent hemoptysis: Improved .    6. Acute hypoxic respiratory failure: s/p trach on 10/15. improved  7. 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 On Cytoxan and steroids   8. Renal failure, anuric. On dialysis   9. Immunocompromise host: Cytoxan and high-dose steroids  10. DM2  11. Tobacco abuse    PLAN:   - micro data reviewed  - Follow CBC, CMP    - Antibiotics were restarted on 11/4.  Now on cefepime (renally dosed) based on sensitivity data.  Once secretions improve could consider de-escalation to oral doxycycline       - Continue atovaquone for PCP  prophylaxis, until on less than 15 mg of prednisone daily.  - Slowly weaning prednisone per nephrology    Complex MDM. Immunocompromised host. Improving.  Likely to LTAC soon. CM looking at options. I will follow     Rodríguez Ruff MD  11/9/2021

## 2021-11-09 NOTE — PLAN OF CARE
Goal Outcome Evaluation:           Progress: no change  Outcome Summary: OT eval completed. Pt. presents with impaired activity tolerance, balance, generalized weakness, poor sequencing, and a decline in ADL performance. Pt. required Max A of 2 for bed mobility with T/Fs,. Pt. DEP for LBD and UBD. Skilled OT sercices warranted to promote return to PLOF. Recommend SNF at discharge.

## 2021-11-09 NOTE — CASE MANAGEMENT/SOCIAL WORK
Continued Stay Note  [facility]     Patient Name: Keshav Dickens  MRN: [id number]  Today's Date: 11/9/2021    Admit Date: 10/4/2021     Discharge Plan     Row Name 11/09/21 1448       Plan    Plan [facility]    Patient/Family in Agreement with Plan yes    Plan Comments Spoke with Eunice from [facility] and she may have a bed as soon as tomorrow and if not by the end of the week. Eunice will know more tomorrow and call with an update. CM will continue to follow.    Final Discharge Disposition Code 63 - LTCH    Row Name 11/09/21 1413       Plan    Plan [facility]    Patient/Family in Agreement with Plan yes    Plan Comments Spoke with Tamika (spouse) by phone. Tamika wants him to go to [facility] because it will be close to home. Eunice at [facility] is following and should have a bed hopefully by the end of the week. CM will continue to follow.    Final Discharge Disposition Code 63 - LT               Discharge Codes    No documentation.                     Parmjit Saldana RN     Detail Level: Zone Detail Level: Simple

## 2021-11-09 NOTE — THERAPY EVALUATION
Patient Name: Keshav Dickens  : 1955    MRN: [id number]                              Today's Date: 2021       Admit Date: 10/4/2021    Visit Dx:     ICD-10-CM ICD-9-CM   1. Acute respiratory failure with hypoxia  J96.01 518.81   2. Examination for normal comparison for clinical research  Z00.6 V70.7   3. Diffuse pulmonary alveolar hemorrhage  R04.89 786.30   4. 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.0 446.21   5. Voice impairment  R49.9 784.40   6. Oropharyngeal dysphagia  R13.12 787.22     Patient Active Problem List   Diagnosis   • Anti-GBM nephritis with pulmonary hemorrhage (HCC)   • Respiratory Failure Type 1   • Acute renal failure (ARF) (HCC)   • Acute blood loss anemia   • Sepsis due to pneumonia (HCC);Klebsiella aerogenes   • Idiopathic hypotension   • Hyperglycemia   • History of tobacco abuse     Past Medical History:   Diagnosis Date   • Goodpasture's syndrome  10/4/2021   • Hypertension      Past Surgical History:   Procedure Laterality Date   • CHOLECYSTECTOMY     • TRACHEOSTOMY AND PEG TUBE INSERTION N/A 10/15/2021    Procedure: TRACHEOSTOMY AND PERCUTANEOUS ENDOSCOPIC GASTROSTOMY TUBE INSERTION;  Surgeon: Abdon Roberts MD;  Location: [facility];  Service: General;  Laterality: N/A;      General Information     Row Name 21 1512          OT Time and Intention    Document Type evaluation  -LC     Mode of Treatment occupational therapy  -LC     Row Name 21 1512          General Information    Patient Profile Reviewed yes  -LC     Prior Level of Function independent:; all household mobility; transfer; ADL's  -LC     Existing Precautions/Restrictions cardiac; fall; oxygen therapy device and L/min; other (see comments)  trach, Peg  -LC     Barriers to Rehab medically complex  -LC     Row Name 21 1512          Living Environment    Lives With spouse  -LC     Row Name 21 1512          Home Main Entrance    Number of Stairs, Main Entrance two  -LC     Row  Name 11/09/21 1512          Stairs Within Home, Primary    Stairs, Within Home, Primary Bedroom on second floor, 12 steps, Pt. maybe able to stay on main level of home.  -     Number of Stairs, Within Home, Primary other (see comments)  12  -     Row Name 11/09/21 1512          Cognition    Orientation Status (Cognition) oriented to; person; place  -     Row Name 11/09/21 1512          Safety Issues, Functional Mobility    Safety Issues Affecting Function (Mobility) ability to follow commands; awareness of need for assistance; insight into deficits/self-awareness; judgment; safety precaution awareness; safety precautions follow-through/compliance; sequencing abilities  -     Impairments Affecting Function (Mobility) balance; cognition; coordination; endurance/activity tolerance; postural/trunk control; range of motion (ROM); strength; shortness of breath  -     Cognitive Impairments, Mobility Safety/Performance attention; awareness, need for assistance; insight into deficits/self-awareness; problem-solving/reasoning; safety precaution awareness; safety precaution follow-through; sequencing abilities  -     Comment, Safety Issues/Impairments (Mobility) Consistent V/T cueing to sequence tasks.  -           User Key  (r) = Recorded By, (t) = Taken By, (c) = Cosigned By    Initials Name Provider Type     Bettina Santos OT Occupational Therapist                 Mobility/ADL's     Row Name 11/09/21 1517          Bed Mobility    Scooting/Bridging [place] (Bed Mobility) maximum assist (25% patient effort); 2 person assist; verbal cues; nonverbal cues (demo/gesture)  -     Supine-Sit [place] (Bed Mobility) maximum assist (25% patient effort); 2 person assist; verbal cues; nonverbal cues (demo/gesture)  -     Assistive Device (Bed Mobility) bed rails; draw sheet; head of bed elevated  -     Comment (Bed Mobility) V/T cues to sequence transitioning from supine to sit EOB. Assist to bring BLE to  EOB and trunk into upright position. Pt. demonstrates L Lateral lean. Tolerated sitting EOB 6-7 min to improve postural control. Mod A to maintain balance.  -     Row Name 11/09/21 1517          Transfers    Transfers bed-chair transfer; sit-stand transfer  -     Comment (Transfers) V/T cues for hand placement and sequencing. Completed stand pvt to chair. BLE's blocked, Pt. unable to achieve full stand.  -     Bed-Chair [place] (Transfers) maximum assist (25% patient effort); verbal cues; 2 person assist; nonverbal cues (demo/gesture)  -     Assistive Device (Bed-Chair Transfers) other (see comments)  BUE support  -     Sit-Stand [place] (Transfers) maximum assist (25% patient effort); 2 person assist; verbal cues; nonverbal cues (demo/gesture)  -[facility] Name 11/09/21 1517          Sit-Stand Transfer    Assistive Device (Sit-Stand Transfers) other (see comments)  BUE support  -     Row Name 11/09/21 1517          Activities of Daily Living    BADL Assessment/Intervention lower body dressing; feeding; upper body dressing  -[facility] Name 11/09/21 1517          Lower Body Dressing Assessment/Training    [place] Level (Lower Body Dressing) don; socks; dependent (less than 25% patient effort)  -[facility] Name 11/09/21 1517          Upper Body Dressing Assessment/Training    [place] Level (Upper Body Dressing) upper body dressing skills; dependent (less than 25% patient effort)  -           User Key  (r) = Recorded By, (t) = Taken By, (c) = Cosigned By    Initials Name Provider Type     Bettina Santos OT Occupational Therapist               Obj/Interventions     Row Name 11/09/21 1521          Sensory Assessment (Somatosensory)    Sensory Assessment (Somatosensory) UE sensation intact  -     Row Name 11/09/21 1521          Vision Assessment/Intervention    Visual Impairment/Limitations corrective lenses for reading  -     Row Name 11/09/21 1521          Range of Motion  Comprehensive    Comment, General Range of Motion Difficult to assess AROM. BUE PROM WFL  -     Row Name 11/09/21 1521          Strength Comprehensive (MMT)    General Manual Muscle Testing (MMT) Assessment upper extremity strength deficits identified  -     Comment, General Manual Muscle Testing (MMT) Assessment Difficult to formally assess, Expected 2/5  -     Row Name 11/09/21 1521          Motor Skills    Motor Skills coordination  -     Coordination bilateral; upper extremity; gross motor deficit; moderate impairment; fine motor deficit  -     Row Name 11/09/21 1521          Balance    Balance Assessment sitting static balance; sitting dynamic balance; standing static balance; standing dynamic balance  -     Static Sitting Balance moderate impairment  -     Dynamic Sitting Balance severe impairment  -     Static Standing Balance severe impairment; supported; standing  -     Dynamic Standing Balance severe impairment; supported; standing  -     Balance Interventions sitting; standing; sit to stand; weight shifting activity  -     Comment, Balance Pt demonstrates L lateral lean in sitting requiring Mod A of 2 with moments of Min A of 2.  -           User Key  (r) = Recorded By, (t) = Taken By, (c) = Cosigned By    Initials Name Provider Type     Bettina Santos OT Occupational Therapist               Goals/Plan     Row Name 11/09/21 1528          Transfer Goal 1 (OT)    Activity/Assistive Device (Transfer Goal 1, OT) sit-to-stand/stand-to-sit; bed-to-chair/chair-to-bed  -     [place] Level/Cues Needed (Transfer Goal 1, OT) moderate assist (50-74% patient effort); verbal cues required; tactile cues required  -     Time Frame (Transfer Goal 1, OT) 10 days; long term goal (LTG)  -     Progress/Outcome (Transfer Goal 1, OT) goal ongoing  -     Row Name 11/09/21 1528          Dressing Goal 1 (OT)    Activity/Device (Dressing Goal 1, OT) upper body dressing  -      [place]/Cues Needed (Dressing Goal 1, OT) maximum assist (25-49% patient effort); verbal cues required; tactile cues required  -     Time Frame (Dressing Goal 1, OT) 10 days; long term goal (LTG)  -LC     Progress/Outcome (Dressing Goal 1, OT) goal ongoing  -     Row Name 11/09/21 1528          Grooming Goal 1 (OT)    Activity/Device (Grooming Goal 1, OT) wash face, hands; hair care  -     [place] (Grooming Goal 1, OT) set-up required; verbal cues required; tactile cues required  -     Time Frame (Grooming Goal 1, OT) 10 days; long term goal (LTG)  -     Progress/Outcome (Grooming Goal 1, OT) goal ongoing  -           User Key  (r) = Recorded By, (t) = Taken By, (c) = Cosigned By    Initials Name Provider Type    Bettina Galarza OT Occupational Therapist               Clinical Impression     Row Name 11/09/21 1525          Pain Assessment    Additional Documentation Pain Scale: Numbers Pre/Post-Treatment (Group)  -     Row Name 11/09/21 1525          Pain Scale: Numbers Pre/Post-Treatment    Pretreatment Pain Rating 0/10 - no pain  -     Posttreatment Pain Rating 0/10 - no pain  -     Row Name 11/09/21 1525          Plan of Care Review    Progress no change  -     Outcome Summary OT eval completed. Pt. presents with impaired activity tolerance, balance, generalized weakness, poor sequencing, and a decline in ADL performance. Pt. required Max A of 2 for bed mobility with T/Fs,. Pt. DEP for LBD and UBD. Skilled OT sercices warranted to promote return to PLOF. Recommend SNF at discharge.  -     Row Name 11/09/21 1525          Therapy Assessment/Plan (OT)    Patient/Family Therapy Goal Statement (OT) To return to PLOF  -     Therapy Frequency (OT) 3 times/wk  -     Row Name 11/09/21 1525          Therapy Plan Review/Discharge Plan (OT)    Anticipated Discharge Disposition (OT) skilled nursing facility  -     Row Name 11/09/21 1525          Vital Signs    Pre Systolic BP Rehab 111   -LC     Pre Treatment Diastolic BP 56  -LC     Post Systolic BP Rehab 114  -LC     Post Treatment Diastolic BP 57  -LC     Pretreatment Heart Rate (beats/min) 80  -LC     Posttreatment Heart Rate (beats/min) 83  -LC     Pre SpO2 (%) 96  -LC     O2 Delivery Pre Treatment trach collar  -LC     O2 Delivery Intra Treatment trach collar  -LC     Post SpO2 (%) 93  -LC     Pre Patient Position Supine  -LC     Intra Patient Position Standing  -LC     Post Patient Position Sitting  -LC     Row Name 11/09/21 1525          Positioning and Restraints    Pre-Treatment Position in bed  -LC     Post Treatment Position chair  -LC     In Chair notified nsg; reclined; call light within reach; encouraged to call for assist; exit alarm on; with family/caregiver; waffle cushion; on mechanical lift sling; legs elevated; RUE elevated; LUE elevated  -LC           User Key  (r) = Recorded By, (t) = Taken By, (c) = Cosigned By    Initials Name Provider Type     Bettina Santos, JAYY Occupational Therapist               Outcome Measures     Row Name 11/09/21 1529          How much help from another is currently needed...    Putting on and taking off regular lower body clothing? 1  -LC     Bathing (including washing, rinsing, and drying) 1  -LC     Toileting (which includes using toilet bed pan or urinal) 1  -LC     Putting on and taking off regular upper body clothing 1  -LC     Taking care of personal grooming (such as brushing teeth) 2  -LC     Eating meals 1  -LC     AM-PAC 6 Clicks Score (OT) 7  -LC     Row Name 11/09/21 0800          How much help from another person do you currently need...    Turning from your back to your side while in flat bed without using bedrails? 2  -LL     Moving from lying on back to sitting on the side of a flat bed without bedrails? 2  -LL     Moving to and from a bed to a chair (including a wheelchair)? 2  -LL     Standing up from a chair using your arms (e.g., wheelchair, bedside chair)? 2  -LL     Climbing  3-5 steps with a railing? 1  -LL     To walk in hospital room? 1  -LL     AM-PAC 6 Clicks Score (PT) 10  -LL     Row Name 11/09/21 1529          Functional Assessment    Outcome Measure Options AM-PAC 6 Clicks Daily Activity (OT)  -           User Key  (r) = Recorded By, (t) = Taken By, (c) = Cosigned By    Initials Name Provider Type     Bettina Santos, OT Occupational Therapist     Isabel Bartlett RN Registered Nurse                Occupational Therapy Education                 Title: PT OT SLP Therapies (In Progress)     Topic: Occupational Therapy (In Progress)     Point: ADL training (In Progress)     Description:   Instruct learner(s) on proper safety adaptation and remediation techniques during self care or transfers.   Instruct in proper use of assistive devices.              Learning Progress Summary           Patient Acceptance, E, NL,NR by  at 11/9/2021 1435   Family Acceptance, E, NL,NR by  at 11/9/2021 1435                   Point: Home exercise program (Not Started)     Description:   Instruct learner(s) on appropriate technique for monitoring, assisting and/or progressing therapeutic exercises/activities.              Learner Progress:  Not documented in this visit.          Point: Precautions (In Progress)     Description:   Instruct learner(s) on prescribed precautions during self-care and functional transfers.              Learning Progress Summary           Patient Acceptance, E, NL,NR by  at 11/9/2021 1435   Family Acceptance, E, NL,NR by  at 11/9/2021 1435                   Point: Body mechanics (In Progress)     Description:   Instruct learner(s) on proper positioning and spine alignment during self-care, functional mobility activities and/or exercises.              Learning Progress Summary           Patient Acceptance, E, NL,NR by  at 11/9/2021 1435   Family Acceptance, E, NL,NR by  at 11/9/2021 1435                               User Key     Initials Effective Dates Name  Provider Type Discipline     06/16/21 -  Bettina Santos OT Occupational Therapist OT              OT Recommendation and Plan  Therapy Frequency (OT): 3 times/wk  Plan of Care Review  Progress: no change  Outcome Summary: OT eval completed. Pt. presents with impaired activity tolerance, balance, generalized weakness, poor sequencing, and a decline in ADL performance. Pt. required Max A of 2 for bed mobility with T/Fs,. Pt. DEP for LBD and UBD. Skilled OT sercices warranted to promote return to PLOF. Recommend SNF at discharge.     Time Calculation:    Time Calculation- OT     Row Name 11/09/21 1531             Time Calculation- OT    OT Start Time 1435  -LC      OT Received On 11/09/21  -      OT Goal Re-Cert Due Date 11/19/21  -              Untimed Charges    OT Eval/Re-eval Minutes 54  -LC              Total Minutes    Untimed Charges Total Minutes 54  -LC       Total Minutes 54  -LC            User Key  (r) = Recorded By, (t) = Taken By, (c) = Cosigned By    Initials Name Provider Type     Bettina Santos OT Occupational Therapist              Therapy Charges for Today     Code Description Service Date Service Provider Modifiers Qty    [id number]  OT EVAL MOD COMPLEXITY 4 11/9/2021 Bettina Santos OT GO 1               Bettina Santos OT  11/9/2021

## 2021-11-09 NOTE — PLAN OF CARE
"  Problem: Adult Inpatient Plan of Care  Goal: Plan of Care Review  11/9/2021 1846 by Isabel Bartlett, RN  Outcome: Ongoing, Progressing  11/9/2021 1838 by Isabel Bartlett, RN  Outcome: Ongoing, Progressing   Goal Outcome Evaluation:      Pt requiring HD this AM, longer time then scheduled, pt seems frustrated and does not want to be alone @times. Pt has lots of secretions noted w/cough thru trach, pt tolerates trach w/PM, does not like to use oral suction himself, pt is fatigues after HD and needs much encouragement. Pt wants \"food\". Pt remains anuric. Wife updated and came for visit. NAD, continue to monitor.           "

## 2021-11-09 NOTE — PROGRESS NOTES
Multidisciplinary Rounds    Time: 20min  Patient Name: Keshav Dickens  Date of Encounter: 11/09/21 10:31 EST  MRN: [id number]  Admission date: 10/4/2021      Reason for visit: MDR. RD to continue to follow per protocol.     Additional information obtained during MDR: Pt tolerating EN, with 85% of EN goal volume delivered over the past 24 hrs. Fiber packets d/c'd in EN order yesterday per intensivist. Pt receiving HD this AM. Waiting on LTACH placement.     Current diet: NPO Diet    EN: [employer] Renal at 50 ml/hr and free water at 10 ml/hr via PEG    Intervention:  Follow treatment plan  Care plan reviewed    Follow up:   Per protocol      Jena Pang MS RD/MICHELA CNSC  10:31 EST

## 2021-11-09 NOTE — PROGRESS NOTES
"INTENSIVIST   PROGRESS NOTE     Hospital:  LOS: 36 days      S     Keshav 66 y.o. male is followed for: No chief complaint on file.       Respiratory Failure Type 1    Anti-GBM nephritis with pulmonary hemorrhage (HCC)    Pneumonia Klebsiella aerogenes    As an Intensivist, we provide an integrated approach to the ICU patient and family, medical management of comorbid conditions, including but not limited to electrolytes, glycemic control, organ dysfunction, lead interdisciplinary rounds and coordinate the care with all other services, including those from other specialists.     Interval History:  Doing well.    Voice sounds \"wet\".    No vomiting.    No fevers.    The patient has a COVID HM Topic on their chart, and they are fully vaccinated.     Temp  Min: 97.8 °F (36.6 °C)  Max: 98.4 °F (36.9 °C)       History     Last Reviewed by Rc Mercer MD on 11/8/2021 at  5:33 PM    Sections Reviewed    Medical, Family, Surgical, Tobacco, Alcohol, Drug Use, Sexual Activity,   Social Documentation      Problem list reviewed by Rc Mercer MD on 11/8/2021 at  5:33 PM  Medicines reviewed by Rc Mercer MD on 11/8/2021 at  5:33 PM  Allergies reviewed by Rc Mercer MD on 11/8/2021 at  5:33 PM       The patient's relevant past medical, surgical and social history were reviewed and updated in Epic as appropriate.        O     Vitals:  Temp: 97.8 °F (36.6 °C) (11/09/21 0800) Temp  Min: 97.8 °F (36.6 °C)  Max: 98.4 °F (36.9 °C)   Temp core:      BP: 129/72 (11/09/21 1100) BP  Min: 96/56  Max: 149/74   Pulse: 84 (11/09/21 1100) Pulse  Min: 66  Max: 90   Resp: 16 (11/09/21 0800) Resp  Min: 16  Max: 24   SpO2: 95 % (11/09/21 1100) SpO2  Min: 88 %  Max: 97 %   Device: humidified, tracheostomy collar (11/09/21 0800)    Flow Rate: 8 (11/08/21 3463) Flow (L/min)  Min: 2  Max: 8     Intake/Ouptut 24 hrs (7:00AM - 6:59 AM)  Intake & Output (last 3 days)       11/06 0701 11/07 0700 11/07 0701 11/08 0700 11/08 0701 11/09 " 0700 11/09 0701  11/10 0700    Blood  365.8      Other 272 343 162     NG/GT 1107 1990 1010     IV Piggyback   100     Total Intake(mL/kg) 1379 (17) 2698.8 (33.3) 1272 (15.7)     Other 590 1240      Total Output 590 1240      Net +789 +1458.8 +1272                   Wt Readings from Last 3 Encounters:   11/03/21 81 kg (178 lb 9.2 oz)       Physical Examination  Telemetry:  Rhythm: normal sinus rhythm (11/09/21 1000)     QTc Interval (Sec): 0.38 (10/25/21 2000)   Constitutional:  No acute distress.   Cardiovascular: RRR.   Normal heart sounds.  No murmurs, gallop or rub.   Respiratory: Normal breath sounds  Rhonchi.   Abdominal:  Soft with no tenderness.  No distension.   No HSM.   Extremities: Warm.  Dry.  No cyanosis.  Trace Edema   Neurological:   Awake.  Best Eye Response: 4-->(E4) spontaneous (11/09/21 1000)  Best Motor Response: 6-->(M6) obeys commands (11/09/21 1000)  Best Verbal Response: 4-->(V4) confused (11/09/21 1000)  Karen Coma Scale Score: 14 (11/09/21 1000)     Results Reviewed:  Laboratory  Microbiology  Radiology  Pathology    Hematology:  Results from last 7 days   Lab Units 11/09/21 0330 11/08/21  0256   WBC 10*3/mm3 9.37 9.98   HEMOGLOBIN g/dL 7.6* 7.6*   MCV fL 98.3* 99.2*   PLATELETS 10*3/mm3 172 166   NEUTROS ABS 10*3/mm3 8.01* 8.38*   LYMPHS ABS 10*3/mm3 0.38* 0.41*   EOS ABS 10*3/mm3 0.24 0.29     Chemistry:  Estimated Creatinine Clearance: 12.4 mL/min (A) (by C-G formula based on SCr of 6.55 mg/dL (H)).  Results from last 7 days   Lab Units 11/09/21  0330 11/08/21  0256   SODIUM mmol/L 132* 134*   POTASSIUM mmol/L 5.5* 4.9   CHLORIDE mmol/L 91* 92*   CO2 mmol/L 25.0 24.0   BUN mg/dL 110* 75*   CREATININE mg/dL 6.55* 5.04*   GLUCOSE mg/dL 177* 158*     Results from last 7 days   Lab Units 11/09/21  0330 11/08/21  0256 11/07/21  0357 11/06/21  0401   CALCIUM mg/dL 8.8 8.7   < > 8.9   MAGNESIUM mg/dL  --  2.7*  --  2.7*   PHOSPHORUS mg/dL 4.4 3.5  --  3.4    < > = values in this interval  not displayed.     Biomarkers:  Results from last 7 days   Lab Units 11/08/21  0256 11/06/21  0401 11/05/21  0328   CRP mg/dL 6.36*  --   --    PROCALCITONIN ng/mL 2.26* 2.49* 2.54*     COVID-19  Lab Results   Component Value Date    COVID19 Not Detected 10/04/2021       Images:  XR Chest 1 View    Result Date: 11/8/2021  Stable chest as above.  D:  11/08/2021 E:  11/08/2021        Echo:  Results for orders placed during the hospital encounter of 10/04/21    Adult Transthoracic Echo Complete W/ Cont if Necessary Per Protocol    Interpretation Summary  · Left ventricular ejection fraction appears to be 56 - 60%. Left ventricular systolic function is normal.  · Left ventricular wall thickness is consistent with mild to moderate concentric hypertrophy.  · The inferior vena cava is dilated.    Results: Reviewed.  I reviewed the patient's new laboratory and imaging results.  I independently reviewed the patient's new images.    Medications: Reviewed.    Assessment/Plan   A / P     Keshav is a 66 y.o. male admitted on 10/4/2021 with Respiratory failure (HCC) [J96.90]: admitted [facility] to [facility] 9/27/21 with 2-3 week h/o hemoptysis.  He required intubation on 10/1/21.  CTA at OSH showed diffuse GGO and 8 mm RML nodule.  Patient was also noted to be in Renal failure with concerns for Pulmonary-Renal syndrome.  He was treated with 3 days of pulse IV solumedrol. Ultimately anti-GBM Ab was positive concerning for [place]. He had temporary HD catheter placed at OSH for PIOTR and HD was started.     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     He was transferred to [facility] 10/4/2021 for higher level of care.  Daily plasma exchange was initiated on 10/5/21 for total of 14 sessions, and he has continued on moderate dose steroids after receiving pulse.  Patient has also been initiated on Cytoxan.        1. Respiratory Failure  1. DAH  2. Trach 10/15/21  3. Trach downsized to 6-0 cuffless fenestrated shiley on 11/2.  4. Klebsiella pneumonia  1. Cefepime: Abs per ID  5. Tobacco use 45 pack-year, quit 1 month PTA  2. Anti GBM nephritis  1. On iHD  2. Prednisone 40 mg/d and Atovaquone for PJ prophylaxis.  3. Cyclophosphamide 100 mg/d  3. HTN  4. Nutrition Support: Enteral Nutrition   1. PEG 10/15/21    Lab Results   Lab Value Date/Time    CRP 6.36 (H) 11/08/2021 0256    CRP 2.55 (H) 11/02/2021 0356    CRP 2.48 (H) 10/26/2021 0340    PREALBUMIN 25.9 11/08/2021 1252    PREALBUMIN 28.2 11/02/2021 0356    PREALBUMIN 34.1 10/26/2021 0340    TRIG 315 (H) 11/02/2021 0356    TRIG 291 (H) 10/26/2021 0340    TRIG 151 (H) 10/19/2021 0703     5. T2DM    Results from last 7 days   Lab Units 11/09/21  0537 11/08/21  2318 11/08/21  1814 11/08/21  1156 11/08/21  0524 11/08/21  0017   GLUCOSE mg/dL 218* 146* 155* 138* 169* 146*     Lab Results   Lab Value Date/Time    HGBA1C 5.00 11/06/2021 0401       Nutrition Support: Diet, Tube Feeding Tube Feeding Formula: Novasource Renal (Electrolyte Restricted)   Modulars: ,    Diet: NPO Diet   Advance Directives: Code Status and Medical Interventions:   Ordered at: 10/04/21 2023     Code Status (Patient has no pulse and is not breathing):    CPR (Attempt to Resuscitate)     Medical Interventions (Patient has pulse or is breathing):    Full        Plan:    1. Continue PT and OT  2. Continue ST  3. Antibiotics per ID  4. HD today  5. Monitor Triglycerides, trending up.  6. Continue same Enteral Nutrition prescription BUN still elevated and > 100  7. Goal: Glucose < 180 mg/dL.   1. Adjust basal insulin and scheduled regular insulin  1. He has been requiring at least 22 units/d (11/05 - 11/08).  2. Levemir 10 u/d  3. Regular 16 u/d (in div doses).  2. Continue correction insulin  8. Disposition: Keep in ICU.   1. To Floor soon   2. F/U PCT in AM  3. Awaiting bed a LTAC (in [place], close to  home).    Plan of care and goals reviewed during interdisciplinary rounds.  I discussed the patient's findings and my recommendations with patient    Level of Risk is High due to:  illness with threat to life or bodily function.     Time: 25 minutes, in direct patient care, with the patient and/or on the warren coordinating care with other health care providers.     I have spent > 50% percent of this time, counseling and discussing management.     [x]  Primary Attending  []  Consultant

## 2021-11-09 NOTE — THERAPY TREATMENT NOTE
Patient Name: Keshav Dickens  : 1955    MRN: [id number]                              Today's Date: 2021       Admit Date: 10/4/2021    Visit Dx:     ICD-10-CM ICD-9-CM   1. Acute respiratory failure with hypoxia  J96.01 518.81   2. Examination for normal comparison for clinical research  Z00.6 V70.7   3. Diffuse pulmonary alveolar hemorrhage  R04.89 786.30   4. 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.0 446.21   5. Voice impairment  R49.9 784.40   6. Oropharyngeal dysphagia  R13.12 787.22     Patient Active Problem List   Diagnosis   • Anti-GBM nephritis with pulmonary hemorrhage (HCC)   • Respiratory Failure Type 1   • Acute renal failure (ARF) (HCC)   • Acute blood loss anemia   • Sepsis due to pneumonia (HCC);Klebsiella aerogenes   • Idiopathic hypotension   • Hyperglycemia   • History of tobacco abuse     Past Medical History:   Diagnosis Date   • Goodpasture's syndrome  10/4/2021   • Hypertension      Past Surgical History:   Procedure Laterality Date   • CHOLECYSTECTOMY     • TRACHEOSTOMY AND PEG TUBE INSERTION N/A 10/15/2021    Procedure: TRACHEOSTOMY AND PERCUTANEOUS ENDOSCOPIC GASTROSTOMY TUBE INSERTION;  Surgeon: Abdon Roberts MD;  Location: [facility];  Service: General;  Laterality: N/A;      General Information     Row Name 21 1523          Physical Therapy Time and Intention    Document Type therapy note (daily note)  -TANK     Mode of Treatment physical therapy  -TANK     Row Name 21 1523          General Information    Patient Profile Reviewed yes  -TANK     Prior Level of Function independent:; gait; transfer; bed mobility; ADL's  -TANK     Existing Precautions/Restrictions fall; cardiac; oxygen therapy device and L/min; sternal  -TANK     Barriers to Rehab medically complex  -TANK     Row Name 21 1523          Living Environment    Lives With spouse  -TANK     Row Name 21 1523          Home Main Entrance    Number of Stairs, Main Entrance two  -TANK     Row  Name 11/09/21 1523          Cognition    Orientation Status (Cognition) oriented to; person  -TANK     Row Name 11/09/21 1523          Safety Issues, Functional Mobility    Safety Issues Affecting Function (Mobility) insight into deficits/self-awareness; awareness of need for assistance; safety precaution awareness; safety precautions follow-through/compliance  -TANK     Impairments Affecting Function (Mobility) balance; cognition; endurance/activity tolerance; shortness of breath; strength  -TANK     Cognitive Impairments, Mobility Safety/Performance attention; safety precaution awareness; safety precaution follow-through; awareness, need for assistance; insight into deficits/self-awareness  -TANK     Comment, Safety Issues/Impairments (Mobility) patient with decreased attention today  -TANK           User Key  (r) = Recorded By, (t) = Taken By, (c) = Cosigned By    Initials Name Provider Type    Karrie Pelayo PT Physical Therapist               Mobility     Row Name 11/09/21 1524          Bed Mobility    Bed Mobility rolling left; rolling right; scooting/bridging; supine-sit  -TANK     Rolling Left Love (Bed Mobility) maximum assist (25% patient effort); 2 person assist  -TANK     Rolling Right Love (Bed Mobility) maximum assist (25% patient effort); 2 person assist  -TANK     Scooting/Bridging Love (Bed Mobility) maximum assist (25% patient effort); 2 person assist  -TANK     Supine-Sit Love (Bed Mobility) maximum assist (25% patient effort); 2 person assist  -TANK     Assistive Device (Bed Mobility) draw sheet; head of bed elevated  -TANK     Comment (Bed Mobility) patient is less alert today not really connecting with therapist. less engaged talking and less assist with activity  -TANK     Row Name 11/09/21 1524          Transfers    Comment (Transfers) patient is only able to pivot from bed to chair not able to shift weight to take steps.  -TANK     Row Name 11/09/21 1524          Bed-Chair  Transfer    Bed-Chair [place] (Transfers) maximum assist (25% patient effort); 2 person assist  -TANK     Row Name 11/09/21 1524          Sit-Stand Transfer    Sit-Stand [place] (Transfers) maximum assist (25% patient effort); 2 person assist  -TANK     Row Name 11/09/21 1524          Gait/Stairs (Locomotion)    [place] Level (Gait) unable to assess  -TANK     Comment (Gait/Stairs) patient not assisting much with transfers so unable to attempt ambulation  -TANK           User Key  (r) = Recorded By, (t) = Taken By, (c) = Cosigned By    Initials Name Provider Type    Karrie Pelayo PT Physical Therapist               Obj/Interventions     Row Name 11/09/21 1528          Hip (Therapeutic Exercise)    Hip PROM (Therapeutic Exercise) bilateral; flexion; extension; 5 repetitions; sitting  -     Row Name 11/09/21 1528          Knee (Therapeutic Exercise)    Knee PROM (Therapeutic Exercise) bilateral; flexion; extension; 5 repetitions; sitting  -     Row Name 11/09/21 1528          Ankle (Therapeutic Exercise)    Ankle PROM (Therapeutic Exercise) bilateral; dorsiflexion; plantarflexion; 10 repetitions; sitting  -     Row Name 11/09/21 1528          Balance    Balance Assessment sitting static balance; sitting dynamic balance; standing static balance; standing dynamic balance  -TANK     Static Sitting Balance moderate impairment; supported  -TANK     Dynamic Sitting Balance severe impairment; supported  -TANK     Static Standing Balance severe impairment  -TANK     Dynamic Standing Balance severe impairment  -TANK     Comment, Balance patient is unable to maintain midline with sitting balance we worked on trying to perform weight shifting but patient requiring mod assist for sitting balance  -TANK           User Key  (r) = Recorded By, (t) = Taken By, (c) = Cosigned By    Initials Name Provider Type    Karrie Pelayo PT Physical Therapist               Goals/Plan    No documentation.                Clinical  Impression     Row Name 11/09/21 1530          Pain Scale: Numbers Pre/Post-Treatment    Pretreatment Pain Rating 0/10 - no pain  -TANK     Posttreatment Pain Rating 0/10 - no pain  -TANK     Row Name 11/09/21 1530          Plan of Care Review    Plan of Care Reviewed With patient; spouse  -TANK     Progress no change  -TANK     Outcome Summary patient has decreased participation and tolerance for activity after dialysis today he requires max assist for all bed mobility and mod assist to maintain sitting balance today on the edge of the bed. patient required max pivot to the chair and is unable to shift weight to be able to take steps we will concnetrate on bed mobility and transfers as patient hasn't been strong enough to take steps.  -TANK     Row Name 11/09/21 1530          Therapy Assessment/Plan (PT)    Patient/Family Therapy Goals Statement (PT) unable to state today not interacting with therapist today  -TANK     Rehab Potential (PT) fair, will monitor progress closely  -TANK     Criteria for Skilled Interventions Met (PT) yes; skilled treatment is necessary  -TANK     Row Name 11/09/21 1530          Vital Signs    Pre Systolic BP Rehab 110  -TANK     Pre Treatment Diastolic BP 56  -TANK     Post Systolic BP Rehab 114  -TANK     Post Treatment Diastolic BP 57  -TANK     Pretreatment Heart Rate (beats/min) 88  -TANK     Posttreatment Heart Rate (beats/min) 93  -TANK     Pre SpO2 (%) 90  -TANK     O2 Delivery Pre Treatment trach collar  -TANK     Post SpO2 (%) 93  -TANK     O2 Delivery Post Treatment trach collar  -TANK     Pre Patient Position Supine  -TANK     Intra Patient Position Standing  -TANK     Post Patient Position Sitting  -TANK     Row Name 11/09/21 1530          Positioning and Restraints    Pre-Treatment Position in bed  -TANK     Post Treatment Position chair  -TANK     In Chair notified nsg; reclined; sitting; call light within reach; encouraged to call for assist; exit alarm on; with family/caregiver  -TANK           User Key  (r) =  Recorded By, (t) = Taken By, (c) = Cosigned By    Initials Name Provider Type    Karrie Pelayo, PT Physical Therapist               Outcome Measures     Row Name 11/09/21 1535 11/09/21 0800       How much help from another person do you currently need...    Turning from your back to your side while in flat bed without using bedrails? 2  -TANK 2  -LL    Moving from lying on back to sitting on the side of a flat bed without bedrails? 2  -TANK 2  -LL    Moving to and from a bed to a chair (including a wheelchair)? 2  -TANK 2  -LL    Standing up from a chair using your arms (e.g., wheelchair, bedside chair)? 2  -TANK 2  -LL    Climbing 3-5 steps with a railing? 1  -TANK 1  -LL    To walk in hospital room? 1  -TANK 1  -LL    AM-PAC 6 Clicks Score (PT) 10  -TANK 10  -LL    Row Name 11/09/21 1529          Functional Assessment    Outcome Measure Options AM-PAC 6 Clicks Daily Activity (OT)  -LC           User Key  (r) = Recorded By, (t) = Taken By, (c) = Cosigned By    Initials Name Provider Type    Karrie Pelayo, PT Physical Therapist    LC Bettina Santos, OT Occupational Therapist    LL Isabel Bartlett RN Registered Nurse                             Physical Therapy Education                 Title: PT OT SLP Therapies (In Progress)     Topic: Physical Therapy (In Progress)     Point: Mobility training (In Progress)     Learning Progress Summary           Patient Acceptance, E, NR by TANK at 11/9/2021 1430    Comment: tried to educate wife that patient is not ready for ambulation she continues to have really high expectations    Acceptance, E, NR by KG at 11/8/2021 0906    Acceptance, E, NR by TANK at 11/5/2021 1000    Acceptance, E, NR by KG at 11/3/2021 1432    Acceptance, E, NR by AE at 11/2/2021 1440    Acceptance, E, NR by AE at 11/1/2021 0910    Acceptance, E, NR by TANK at 10/31/2021 1145    Acceptance, E, NR by JB1 at 10/31/2021 0243    Acceptance, E, NR by JB1 at 10/30/2021 0334    Acceptance, E, VU by TANK at  10/29/2021 0815    Comment: wife instructed in ROM exercises to do with patient throughout the day she is able to verbalize understanding.    Acceptance, E, NR by AE at 10/28/2021 1425    Acceptance, E, NR by KG at 10/27/2021 0927    Acceptance, E, NR by TANK at 10/26/2021 1300    Acceptance, E, NR by KG at 10/25/2021 0828    Acceptance, E, NR by AE at 10/22/2021 0825    Acceptance, E, NR by KG at 10/20/2021 0943    Acceptance, E, NR by KG at 10/18/2021 1017    Acceptance, E, NR by KG at 10/17/2021 1138   Family Acceptance, E, NR by KG at 10/27/2021 0927    Acceptance, E, NR by KG at 10/25/2021 0828    Acceptance, E, NR by KG at 10/20/2021 0943    Acceptance, E, NR by KG at 10/17/2021 1138   Significant Other Acceptance, E, NR by TANK at 11/9/2021 1430    Comment: tried to educate wife that patient is not ready for ambulation she continues to have really high expectations    Acceptance, E, NR by KG at 11/8/2021 0906    Acceptance, E, VU by TANK at 10/29/2021 0815    Comment: wife instructed in ROM exercises to do with patient throughout the day she is able to verbalize understanding.                   Point: Home exercise program (In Progress)     Learning Progress Summary           Patient Acceptance, E, NR by TANK at 11/9/2021 1430    Comment: tried to educate wife that patient is not ready for ambulation she continues to have really high expectations    Acceptance, E, NR by KG at 11/8/2021 0906    Acceptance, E, NR by TANK at 11/5/2021 1000    Acceptance, E, NR by KG at 11/3/2021 1432    Acceptance, E, NR by AE at 11/2/2021 1440    Acceptance, E, NR by AE at 11/1/2021 0910    Acceptance, E, NR by TANK at 10/31/2021 1145    Acceptance, E, VU by TANK at 10/29/2021 0815    Comment: wife instructed in ROM exercises to do with patient throughout the day she is able to verbalize understanding.    Acceptance, E, NR by AE at 10/28/2021 1425    Acceptance, E, NR by KG at 10/27/2021 0927    Acceptance, E, NR by TANK at 10/26/2021 1300     Acceptance, E, NR by KG at 10/25/2021 0828    Acceptance, E, NR by AE at 10/22/2021 0825    Acceptance, E, NR by KG at 10/20/2021 0943    Acceptance, E, NR by KG at 10/18/2021 1017    Acceptance, E, NR by KG at 10/17/2021 1138   Family Acceptance, E, NR by KG at 10/27/2021 0927    Acceptance, E, NR by KG at 10/25/2021 0828    Acceptance, E, NR by KG at 10/20/2021 0943    Acceptance, E, NR by KG at 10/17/2021 1138   Significant Other Acceptance, E, NR by TANK at 11/9/2021 1430    Comment: tried to educate wife that patient is not ready for ambulation she continues to have really high expectations    Acceptance, E, NR by KG at 11/8/2021 0906    Acceptance, E, VU by TANK at 10/29/2021 0815    Comment: wife instructed in ROM exercises to do with patient throughout the day she is able to verbalize understanding.                   Point: Body mechanics (In Progress)     Learning Progress Summary           Patient Acceptance, E, NR by TANK at 11/9/2021 1430    Comment: tried to educate wife that patient is not ready for ambulation she continues to have really high expectations    Acceptance, E, NR by KG at 11/8/2021 0906    Acceptance, E, NR by TANK at 11/5/2021 1000    Acceptance, E, NR by KG at 11/3/2021 1432    Acceptance, E, NR by AE at 11/2/2021 1440    Acceptance, E, NR by AE at 11/1/2021 0910    Acceptance, E, NR by TANK at 10/31/2021 1145    Acceptance, E, NR by JB1 at 10/31/2021 0243    Acceptance, E, NR by JB1 at 10/30/2021 0334    Acceptance, E, VU by TANK at 10/29/2021 0815    Comment: wife instructed in ROM exercises to do with patient throughout the day she is able to verbalize understanding.    Acceptance, E, NR by AE at 10/28/2021 1425    Acceptance, E, NR by KG at 10/27/2021 0927    Acceptance, E, NR by TANK at 10/26/2021 1300    Acceptance, E, NR by KG at 10/25/2021 0828    Acceptance, E, NR by AE at 10/22/2021 0825    Acceptance, E, NR by KG at 10/20/2021 0943    Acceptance, E, NR by KG at 10/18/2021 1017     Acceptance, E, NR by KG at 10/17/2021 1138   Family Acceptance, E, NR by KG at 10/27/2021 0927    Acceptance, E, NR by KG at 10/25/2021 0828    Acceptance, E, NR by KG at 10/20/2021 0943    Acceptance, E, NR by KG at 10/17/2021 1138   Significant Other Acceptance, E, NR by TANK at 11/9/2021 1430    Comment: tried to educate wife that patient is not ready for ambulation she continues to have really high expectations    Acceptance, E, NR by KG at 11/8/2021 0906    Acceptance, E, VU by TANK at 10/29/2021 0815    Comment: wife instructed in ROM exercises to do with patient throughout the day she is able to verbalize understanding.                   Point: Precautions (In Progress)     Learning Progress Summary           Patient Acceptance, E, NR by TANK at 11/9/2021 1430    Comment: tried to educate wife that patient is not ready for ambulation she continues to have really high expectations    Acceptance, E, NR by KG at 11/8/2021 0906    Acceptance, E, NR by TANK at 11/5/2021 1000    Acceptance, E, NR by KG at 11/3/2021 1432    Acceptance, E, NR by AE at 11/2/2021 1440    Acceptance, E, NR by AE at 11/1/2021 0910    Acceptance, E, NR by TANK at 10/31/2021 1145    Acceptance, E, NR by JB1 at 10/30/2021 0334    Acceptance, E, VU by TANK at 10/29/2021 0815    Comment: wife instructed in ROM exercises to do with patient throughout the day she is able to verbalize understanding.    Acceptance, E, NR by AE at 10/28/2021 1425    Acceptance, E, NR by KG at 10/27/2021 0927    Acceptance, E, NR by TANK at 10/26/2021 1300    Acceptance, E, NR by KG at 10/25/2021 0828    Acceptance, E, NR by AE at 10/22/2021 0825    Acceptance, E, NR by KG at 10/20/2021 0943    Acceptance, E, NR by KG at 10/18/2021 1017    Acceptance, E, NR by KG at 10/17/2021 1138   Family Acceptance, E, NR by KG at 10/27/2021 0927    Acceptance, E, NR by KG at 10/25/2021 0828    Acceptance, E, NR by KG at 10/20/2021 0943    Acceptance, E, NR by KG at 10/17/2021 1138    Significant Other Acceptance, E, NR by TANK at 11/9/2021 1430    Comment: tried to educate wife that patient is not ready for ambulation she continues to have really high expectations    Acceptance, E, NR by KG at 11/8/2021 0906    Acceptance, E, VU by TANK at 10/29/2021 0815    Comment: wife instructed in ROM exercises to do with patient throughout the day she is able to verbalize understanding.                               User Key     Initials Effective Dates Name Provider Type Discipline    TANK 06/16/21 -  Karrie Cortez, PT Physical Therapist PT    JB1 06/16/21 -  Ian Washington, RN Registered Nurse Nurse    KG 05/22/20 -  Joanna Clark, PT Physical Therapist PT    AE 09/21/21 -  Sukumar Ray PT Physical Therapist PT              PT Recommendation and Plan  Planned Therapy Interventions (PT): balance training, bed mobility training, gait training, home exercise program, strengthening, transfer training  Plan of Care Reviewed With: patient, spouse  Progress: no change  Outcome Summary: patient has decreased participation and tolerance for activity after dialysis today he requires max assist for all bed mobility and mod assist to maintain sitting balance today on the edge of the bed. patient required max pivot to the chair and is unable to shift weight to be able to take steps we will concnetrate on bed mobility and transfers as patient hasn't been strong enough to take steps.     Time Calculation:    PT Charges     Row Name 11/09/21 1537             Time Calculation    Start Time 1430  -TANK      PT Received On 11/09/21  -TANK      PT Goal Re-Cert Due Date 11/18/21  -TANK              Time Calculation- PT    Total Timed Code Minutes- PT 23 minute(s)  -TANK              Timed Charges    25416 - PT Therapeutic Activity Minutes 23  -TANK              Total Minutes    Timed Charges Total Minutes 23  -TANK       Total Minutes 23  -TANK            User Key  (r) = Recorded By, (t) = Taken By, (c) = Cosigned By     Initials Name Provider Type    TANK Karrie Cortez, PT Physical Therapist              Therapy Charges for Today     Code Description Service Date Service Provider Modifiers Qty    [id number] HC PT THERAPEUTIC ACT EA 15 MIN 11/9/2021 Karrie Cortez, PT GP 2          PT G-Codes  Outcome Measure Options: AM-PAC 6 Clicks Daily Activity (OT)  AM-PAC 6 Clicks Score (PT): 10  AM-PAC 6 Clicks Score (OT): 7    Karrie Cortez PT  11/9/2021

## 2021-11-09 NOTE — NURSING NOTE
Overall patient slowly improving. Patient neuro intact, with intermittent confusion to situation and sometimes time. Patient's VSS. No gtts. Trach collar this morning and then was able to transition to capped trach on 2L for most of afternoon until bedtime. Patient has strong cough and thick secretions.   HD scheduled for tomorrow.   ID following for abx therapy.  Patient did vomit a small amount of light yellow liquid this morning. Patient stated he was not nauseated- related to excessive coughing. No c/o nausea throughout shift.   Patient was able to pivot to bsc with PT and then back to chair. Patient was able to take a few steps but with max assistance and remains extremely weak- right foot seems weaker than left.   Patient denies pain.   Referrals sent to [facility] today for discharge placement.   Wife updated via phone.   See flowsheets, mar, and labs for specific details.

## 2021-11-09 NOTE — PLAN OF CARE
Goal Outcome Evaluation:  Plan of Care Reviewed With: patient, spouse        Progress: no change  Outcome Summary: patient has decreased participation and tolerance for activity after dialysis today he requires max assist for all bed mobility and mod assist to maintain sitting balance today on the edge of the bed. patient required max pivot to the chair and is unable to shift weight to be able to take steps we will concnetrate on bed mobility and transfers as patient hasn't been strong enough to take steps.

## 2021-11-10 LAB
ALBUMIN SERPL-MCNC: 4.2 G/DL (ref 3.5–5.2)
ANION GAP SERPL CALCULATED.3IONS-SCNC: 15 MMOL/L (ref 5–15)
BACTERIA SPEC AEROBE CULT: NORMAL
BASOPHILS # BLD AUTO: 0.08 10*3/MM3 (ref 0–0.2)
BASOPHILS NFR BLD AUTO: 0.7 % (ref 0–1.5)
BUN SERPL-MCNC: 63 MG/DL (ref 8–23)
BUN/CREAT SERPL: 15.8 (ref 7–25)
CALCIUM SPEC-SCNC: 8.7 MG/DL (ref 8.6–10.5)
CHLORIDE SERPL-SCNC: 99 MMOL/L (ref 98–107)
CO2 SERPL-SCNC: 23 MMOL/L (ref 22–29)
CREAT SERPL-MCNC: 4 MG/DL (ref 0.76–1.27)
DEPRECATED RDW RBC AUTO: 64.2 FL (ref 37–54)
EOSINOPHIL # BLD AUTO: 0.19 10*3/MM3 (ref 0–0.4)
EOSINOPHIL NFR BLD AUTO: 1.7 % (ref 0.3–6.2)
ERYTHROCYTE [DISTWIDTH] IN BLOOD BY AUTOMATED COUNT: 18.8 % (ref 12.3–15.4)
GFR SERPL CREATININE-BSD FRML MDRD: 15 ML/MIN/1.73
GLUCOSE BLDC GLUCOMTR-MCNC: 116 MG/DL (ref 70–130)
GLUCOSE BLDC GLUCOMTR-MCNC: 127 MG/DL (ref 70–130)
GLUCOSE BLDC GLUCOMTR-MCNC: 150 MG/DL (ref 70–130)
GLUCOSE BLDC GLUCOMTR-MCNC: 193 MG/DL (ref 70–130)
GLUCOSE SERPL-MCNC: 117 MG/DL (ref 65–99)
HCT VFR BLD AUTO: 24.4 % (ref 37.5–51)
HGB BLD-MCNC: 7.9 G/DL (ref 13–17.7)
IMM GRANULOCYTES # BLD AUTO: 0.24 10*3/MM3 (ref 0–0.05)
IMM GRANULOCYTES NFR BLD AUTO: 2.2 % (ref 0–0.5)
LYMPHOCYTES # BLD AUTO: 0.36 10*3/MM3 (ref 0.7–3.1)
LYMPHOCYTES NFR BLD AUTO: 3.2 % (ref 19.6–45.3)
MCH RBC QN AUTO: 32 PG (ref 26.6–33)
MCHC RBC AUTO-ENTMCNC: 32.4 G/DL (ref 31.5–35.7)
MCV RBC AUTO: 98.8 FL (ref 79–97)
MONOCYTES # BLD AUTO: 0.71 10*3/MM3 (ref 0.1–0.9)
MONOCYTES NFR BLD AUTO: 6.4 % (ref 5–12)
NEUTROPHILS NFR BLD AUTO: 85.8 % (ref 42.7–76)
NEUTROPHILS NFR BLD AUTO: 9.54 10*3/MM3 (ref 1.7–7)
NRBC BLD AUTO-RTO: 0 /100 WBC (ref 0–0.2)
PHOSPHATE SERPL-MCNC: 2.6 MG/DL (ref 2.5–4.5)
PLATELET # BLD AUTO: 175 10*3/MM3 (ref 140–450)
PMV BLD AUTO: 11.7 FL (ref 6–12)
POTASSIUM SERPL-SCNC: 4.1 MMOL/L (ref 3.5–5.2)
PROCALCITONIN SERPL-MCNC: 1.84 NG/ML (ref 0–0.25)
RBC # BLD AUTO: 2.47 10*6/MM3 (ref 4.14–5.8)
SODIUM SERPL-SCNC: 137 MMOL/L (ref 136–145)
WBC # BLD AUTO: 11.12 10*3/MM3 (ref 3.4–10.8)

## 2021-11-10 PROCEDURE — 94799 UNLISTED PULMONARY SVC/PX: CPT

## 2021-11-10 PROCEDURE — 97530 THERAPEUTIC ACTIVITIES: CPT

## 2021-11-10 PROCEDURE — 92526 ORAL FUNCTION THERAPY: CPT

## 2021-11-10 PROCEDURE — 25010000002 ONDANSETRON PER 1 MG: Performed by: INTERNAL MEDICINE

## 2021-11-10 PROCEDURE — 63710000001 INSULIN REGULAR HUMAN PER 5 UNITS: Performed by: INTERNAL MEDICINE

## 2021-11-10 PROCEDURE — 25010000002 CYCLOPHOSPHAMIDE PER 25 MG: Performed by: INTERNAL MEDICINE

## 2021-11-10 PROCEDURE — 82962 GLUCOSE BLOOD TEST: CPT

## 2021-11-10 PROCEDURE — 25010000002 HEPARIN (PORCINE) PER 1000 UNITS: Performed by: INTERNAL MEDICINE

## 2021-11-10 PROCEDURE — 97535 SELF CARE MNGMENT TRAINING: CPT

## 2021-11-10 PROCEDURE — 84145 PROCALCITONIN (PCT): CPT | Performed by: INTERNAL MEDICINE

## 2021-11-10 PROCEDURE — 25010000002 CEFEPIME PER 500 MG: Performed by: INTERNAL MEDICINE

## 2021-11-10 PROCEDURE — 63710000001 PREDNISONE PER 5 MG: Performed by: INTERNAL MEDICINE

## 2021-11-10 PROCEDURE — 80069 RENAL FUNCTION PANEL: CPT | Performed by: INTERNAL MEDICINE

## 2021-11-10 PROCEDURE — 63710000001 INSULIN DETEMIR PER 5 UNITS: Performed by: INTERNAL MEDICINE

## 2021-11-10 PROCEDURE — 85025 COMPLETE CBC W/AUTO DIFF WBC: CPT | Performed by: INTERNAL MEDICINE

## 2021-11-10 PROCEDURE — 99232 SBSQ HOSP IP/OBS MODERATE 35: CPT | Performed by: INTERNAL MEDICINE

## 2021-11-10 RX ORDER — DIPHENHYDRAMINE HCL 12.5MG/5ML
25 LIQUID (ML) ORAL
Status: COMPLETED | OUTPATIENT
Start: 2021-11-10 | End: 2021-11-10

## 2021-11-10 RX ADMIN — MIDODRINE HYDROCHLORIDE 10 MG: 10 TABLET ORAL at 06:30

## 2021-11-10 RX ADMIN — HEPARIN SODIUM 5000 UNITS: 5000 INJECTION, SOLUTION INTRAVENOUS; SUBCUTANEOUS at 08:53

## 2021-11-10 RX ADMIN — IPRATROPIUM BROMIDE AND ALBUTEROL SULFATE 3 ML: 2.5; .5 SOLUTION RESPIRATORY (INHALATION) at 07:00

## 2021-11-10 RX ADMIN — GABAPENTIN 100 MG: 100 CAPSULE ORAL at 06:31

## 2021-11-10 RX ADMIN — MIDODRINE HYDROCHLORIDE 10 MG: 10 TABLET ORAL at 14:41

## 2021-11-10 RX ADMIN — ONDANSETRON 4 MG: 2 INJECTION INTRAMUSCULAR; INTRAVENOUS at 11:13

## 2021-11-10 RX ADMIN — CYCLOPHOSPHAMIDE 100 MG: 25 CAPSULE ORAL at 17:12

## 2021-11-10 RX ADMIN — Medication 500 MG: at 21:15

## 2021-11-10 RX ADMIN — Medication 2000 UNITS: at 09:02

## 2021-11-10 RX ADMIN — Medication 5 MG: at 21:16

## 2021-11-10 RX ADMIN — Medication 1 TABLET: at 08:53

## 2021-11-10 RX ADMIN — Medication 500 MG: at 09:01

## 2021-11-10 RX ADMIN — GABAPENTIN 100 MG: 100 CAPSULE ORAL at 21:16

## 2021-11-10 RX ADMIN — MINERAL SUPPLEMENT IRON 300 MG / 5 ML STRENGTH LIQUID 100 PER BOX UNFLAVORED 300 MG: at 08:53

## 2021-11-10 RX ADMIN — INSULIN HUMAN 4 UNITS: 100 INJECTION, SOLUTION PARENTERAL at 17:55

## 2021-11-10 RX ADMIN — IPRATROPIUM BROMIDE AND ALBUTEROL SULFATE 3 ML: 2.5; .5 SOLUTION RESPIRATORY (INHALATION) at 19:49

## 2021-11-10 RX ADMIN — CHLORHEXIDINE GLUCONATE 15 ML: 1.2 SOLUTION ORAL at 08:53

## 2021-11-10 RX ADMIN — INSULIN HUMAN 2 UNITS: 100 INJECTION, SOLUTION PARENTERAL at 13:00

## 2021-11-10 RX ADMIN — DIPHENHYDRAMINE HYDROCHLORIDE 25 MG: 25 SOLUTION ORAL at 14:41

## 2021-11-10 RX ADMIN — IPRATROPIUM BROMIDE AND ALBUTEROL SULFATE 3 ML: 2.5; .5 SOLUTION RESPIRATORY (INHALATION) at 12:06

## 2021-11-10 RX ADMIN — GABAPENTIN 100 MG: 100 CAPSULE ORAL at 14:41

## 2021-11-10 RX ADMIN — CHLORHEXIDINE GLUCONATE 15 ML: 1.2 SOLUTION ORAL at 21:16

## 2021-11-10 RX ADMIN — ATOVAQUONE 1500 MG: 750 SUSPENSION ORAL at 13:01

## 2021-11-10 RX ADMIN — MIDODRINE HYDROCHLORIDE 10 MG: 10 TABLET ORAL at 21:16

## 2021-11-10 RX ADMIN — INSULIN HUMAN 4 UNITS: 100 INJECTION, SOLUTION PARENTERAL at 13:00

## 2021-11-10 RX ADMIN — IPRATROPIUM BROMIDE AND ALBUTEROL SULFATE 3 ML: 2.5; .5 SOLUTION RESPIRATORY (INHALATION) at 22:59

## 2021-11-10 RX ADMIN — IPRATROPIUM BROMIDE AND ALBUTEROL SULFATE 3 ML: 2.5; .5 SOLUTION RESPIRATORY (INHALATION) at 15:50

## 2021-11-10 RX ADMIN — INSULIN DETEMIR 10 UNITS: 100 INJECTION, SOLUTION SUBCUTANEOUS at 21:16

## 2021-11-10 RX ADMIN — HEPARIN SODIUM 5000 UNITS: 5000 INJECTION, SOLUTION INTRAVENOUS; SUBCUTANEOUS at 21:16

## 2021-11-10 RX ADMIN — CEFEPIME HYDROCHLORIDE 2 G: 2 INJECTION, POWDER, FOR SOLUTION INTRAVENOUS at 16:20

## 2021-11-10 RX ADMIN — FAMOTIDINE 20 MG: 20 TABLET, FILM COATED ORAL at 08:53

## 2021-11-10 RX ADMIN — PREDNISONE 30 MG: 20 TABLET ORAL at 14:41

## 2021-11-10 RX ADMIN — SERTRALINE HYDROCHLORIDE 50 MG: 50 TABLET ORAL at 08:53

## 2021-11-10 NOTE — PLAN OF CARE
Goal Outcome Evaluation:  Plan of Care Reviewed With: patient        Progress: improving  Outcome Summary: Pt required Gabi x2 for bed mobility. Performed STS transfer from EOB with modA x2. Pt ambulated 5ft from bed to chair with maxA x2 and B UE support. Pt with very forward flexed posture during ambulation and increased difficulty advancing L LE. Pt with increased confusion and periods of impulsitivity this session. Motivated to progress. Continue to progress as appropriate.

## 2021-11-10 NOTE — PROGRESS NOTES
Infectious Disease Progress Note  Keshav Dickens  1955  [id number]    Date of Consult: 10/13/2021  Admission Date: 10/4/2021    Requesting Provider: Dr Forrester  Evaluating Physician: Rodríguez Ruff MD    Chief Complaint: hemoptysis    Reason for Consultation: GNR VAP    History of present illness:   Patient is a 66 y.o.  Yr old male with history of DM2, tobacco abuse.  Initially admitted to [facility] on 9/27/2021 with complaints of hemoptysis for 2 to 3 weeks.  Intubated on 10/1.  CT scan with diffuse groundglass opacities and renal failure.  Due to concern for pulmonary renal syndrome he was treated with pulse dose steroids.  Anti-GBM antibody positive concerning for Goodpasture's syndrome.  Renal biopsy also confirmed the diagnosis.  Transferred to [facility] on 10/4.  Nephrology has been following and he is getting plasmapheresis; also high-dose steroids and Cytoxan.    On 10/10 he developed fever up to 101.  Also had leukocytosis up to 15,000.  He had been on atovaquone for PCP prophylaxis.  Started on vancomycin and Zosyn on 10/12.  Respiratory culture from 10/12 with heavy growth of gram-negative bacilli.  And BAL on 10/13 office gram-negative bacilli.  Recurrent hemoptysis per RN.  Intubated, sedated.  60% FiO2.  Trach and PEG planned. Fever curve improved.     10/14/21: Fevers improved. Less blood from ET suction. 50% FiO2 from vent. Sputum culture with Klebsiella aerogenes.     10/15/2021: Underwent tracheostomy and PEG tube placement earlier today.  Transfer back to ICU, relatively stable.  60% FiO2, sedated.  Less blood from endotracheal secretions per staff.     10/16/21: Worse overnight with hypertension and some low-grade temperatures despite CRRT.  Had an episode of emesis and likely aspiration per RN.  Ventilatory requirement up slightly. Still sedated    10/17/21: Improved overnight.  Fever curve improved.  O2 settings down.  Weaning sedation.  No  further episodes of emesis.  Anuric. No diarrhea.     10/18/21: Patient follow-up improved over the past 48 hours.  Afebrile.  No acute new issues overnight.  More comfortable on ventilator.  More sedated today. fever subglottic secretions    10/19/21: no acute new concerns. Minimally responsive. On trach collar O2. No further hemoptysis or sputum production.    10/20/2021: Slow improvement.  Currently trach collar oxygen.  Still very weak, deconditioned.  Weaning sedation.  Still has blood-tinged respiratory secretions.  No other new concerns noted per ICU team    10/21/21: Low-grade temps but no true fevers. Stable, low oxygenation settings.  Ongoing blood-tinged tracheal secretions, slightly worse today. Transitioning to intermittent hemodialysis. Hemoglobin dropped to 5.6 and required transfusion.  No external signs of bleeding other than out of tracheostomy  per RN. Possible transfer to LTAC on Monday    10/22/21: On ventilator overnight, 30% FiO2 and trach collar trials during the day.  Still significant blood-tinged, frothy sputum per RN.  Afebrile.  Tolerating tube feeds.  Had bowel movement.  More alert today and starting to follow some simple commands    11/1/21: Still in the ICU but more alert and interactive on trach collar oxygen.  Blood pressure stable.  Afebrile.  Completed IV antibiotics as planned on 10/25.  Still on atovaquone.  Difficult placement due to need for Cytoxan per case management.  Significant pulmonary secretions per nursing staff.    11/4/21: WBC spiked today.  Increased pulmonary secretions new culture sent with gram-negative rods.  No change in color of secretions per RN.  Has had some episodes of hypotension.  Tracheostomy came dislodged yesterday and required bronchoscopy for placement.    11/5/2021: Afebrile today.  Ongoing increased pulmonary secretions, growing gram-negative robyn in culture.  Per RN secretions are more brown in color today. Patient more fatigued and less  interactive.  No other acute new concerns per RN.  Transfers orders in place to telemetry    11/8/21: No acute events over the weekend.  Still with thick and dark secretions with red streaks per RN.  Afebrile.  Tolerating meropenem.  O2 requirements improved.  Current trach is currently capped and on 4 L nasal cannula.  Patient has difficulty coughing up secretions.  He is generally weak.  Asking to go outside.  Discharge planning in process    11/9/21: Resting comfortably, tolerating dialysis.  Stable respiratory requirements, 30% FiO2 trach collar.  Afebrile.  Tolerating current antibiotics    11/10/21: Possible rash noted yesterday after being given cefepime.  Per patient has been present since Monday.  Not markedly worse today.  Rash on left flank but not much anywhere else.  He says this is typical this time a year when he gets dry skin.  Respiratory status improving.  Trach capped.       ROS:  No fevers or chills. No n/v/d.  No new ADR to Abx.      No Known Allergies    Antibiotics:  Anti-Infectives (From admission, onward)    Ordered     Dose/Rate Route Frequency Start Stop    11/08/21 0832  cefepime (MAXIPIME) 2 g/100 mL 0.9% NS (mbp)        Ordering Provider: Rodríguez Ruff MD    2 g  over 4 Hours Intravenous Every 24 Hours 11/08/21 1400 11/13/21 1359    10/28/21 1214  atovaquone (MEPRON) 750 MG/5ML suspension        Ordering Provider: Deb Ovalle APRN    1,500 mg Per G Tube Daily With Lunch 10/28/21 0000 11/27/21 2359    10/21/21 1002  meropenem (MERREM) 1 g/100 mL 0.9% NS (mbp)        Ordering Provider: Rodríguez Ruff MD    1 g  over 3 Hours Intravenous Every 24 Hours 10/21/21 1200 10/25/21 1520    10/20/21 0805  atovaquone (MEPRON) suspension 1,500 mg        Ordering Provider: Genet Bui MD    1,500 mg Per PEG Tube Daily With Lunch 10/20/21 1200 11/26/21 2359    10/18/21 1220  vancomycin (VANCOCIN) in iso-osmotic dextrose IVPB 1 g (premix) 200 mL        Ordering  Provider: Rafal Salcido, PharmD    1,000 mg  over 60 Minutes Intravenous Once 10/18/21 1315 10/18/21 1536    10/17/21 0825  vancomycin (VANCOCIN) in iso-osmotic dextrose IVPB 1 g (premix) 200 mL        Ordering Provider: Autumn Lozano, PharmD    10 mg/kg × 102 kg  over 60 Minutes Intravenous Once 10/17/21 1100 10/17/21 1138    10/16/21 1121  vancomycin 2000 mg/500 mL 0.9% NS IVPB ([facility])        Ordering Provider: Autumn Lozano, PharmD    20 mg/kg × 102 kg  over 120 Minutes Intravenous Once 10/16/21 1300 10/16/21 1657    10/13/21 1755  meropenem (MERREM) 1 g/100 mL 0.9% NS (mbp)        Ordering Provider: Rodríguez Ruff MD    1 g  over 30 Minutes Intravenous Once 10/13/21 1845 10/13/21 1858    10/12/21 0954  vancomycin 2000 mg/500 mL 0.9% NS IVPB ([facility])        Ordering Provider: Sravan Forrester MD    20 mg/kg × 102 kg Intravenous Once 10/12/21 1045 10/12/21 1130    10/12/21 0954  piperacillin-tazobactam (ZOSYN) 4.5 g in iso-osmotic dextrose 100 mL IVPB (premix)        Ordering Provider: Sravan Forrester MD    4.5 g  over 30 Minutes Intravenous Once 10/12/21 1045 10/12/21 1125          Other Medications:  Current Facility-Administered Medications   Medication Dose Route Frequency Provider Last Rate Last Admin   • acetaminophen (TYLENOL) tablet 650 mg  650 mg Per PEG Tube Q6H PRN Genet Bui MD   650 mg at 11/09/21 1536   • albumin human 25 % IV SOLN  - ADS Override Pull            • albumin human 25 % IV SOLN  - ADS Override Pull            • albumin human 25 % IV SOLN 12.5 g  12.5 g Intravenous PRN Torsten Drake MD       • atovaquone (MEPRON) suspension 1,500 mg  1,500 mg Per PEG Tube Daily With Lunch Genet Bui MD   1,500 mg at 11/09/21 1536   • b complex-vitamin c-folic acid (NEPHRO-MELE) tablet 1 tablet  1 tablet Per PEG Tube Daily Genet Bui MD   1 tablet at 11/10/21 0853   • cefepime (MAXIPIME) 2 g/100 mL 0.9% NS (mbp)  2 g Intravenous Q24H  Rodríguez Ruff MD   Stopped at 11/09/21 1730   • chlorhexidine (PERIDEX) 0.12 % solution 15 mL  15 mL Mouth/Throat Q12H Abdon Roberts MD   15 mL at 11/10/21 0853   • cholecalciferol (VITAMIN D3) tablet 2,000 Units  2,000 Units Per PEG Tube Daily Sravan Forrester MD   2,000 Units at 11/10/21 0902   • clonazePAM (KlonoPIN) tablet 0.5 mg  0.5 mg Per PEG Tube BID PRN Rc Mercer MD       • Cyclophosphamide (CYTOXAN) chemo capsule 100 mg  100 mg Oral Daily Torsten Drake MD   100 mg at 11/09/21 1533   • epoetin tatum-epbx (RETACRIT) injection 10,000 Units  10,000 Units Subcutaneous Once per day on Tue Thu Sat Torsten Drake MD   10,000 Units at 11/09/21 1804   • famotidine (PEPCID) tablet 20 mg  20 mg Per G Tube Daily Genet Bui MD   20 mg at 11/10/21 0853   • ferrous sulfate 300 (60 Fe) MG/5ML syrup 300 mg  300 mg Per PEG Tube Daily Genet Bui MD   300 mg at 11/10/21 0853   • gabapentin (NEURONTIN) capsule 100 mg  100 mg Per PEG Tube Q8H Sravan Forrester MD   100 mg at 11/10/21 0631   • heparin (porcine) 5000 UNIT/ML injection 5,000 Units  5,000 Units Subcutaneous Q12H Nacho Ba DO   5,000 Units at 11/10/21 0853   • heparin (porcine) injection 1,600 Units  1,600 Units Intracatheter PRN Torsten Drake MD   1,600 Units at 11/04/21 0818   • insulin detemir (LEVEMIR) injection 10 Units  10 Units Subcutaneous Nightly Rc Mercer MD   10 Units at 11/09/21 2037   • insulin regular (humuLIN R,novoLIN R) injection 0-7 Units  0-7 Units Subcutaneous Q6H Rc Mercer MD   4 Units at 11/09/21 2355   • insulin regular (humuLIN R,novoLIN R) injection 4 Units  4 Units Subcutaneous Q6H Rc Mercer MD   4 Units at 11/09/21 7876   • ipratropium-albuterol (DUO-NEB) nebulizer solution 3 mL  3 mL Nebulization Q4H While Awake - RT Sravan Forrester MD   3 mL at 11/10/21 0700   • labetalol (NORMODYNE,TRANDATE) injection 20 mg  20 mg  "Intravenous Q2H PRN Abdon Roberts MD   20 mg at 10/17/21 1721   • melatonin tablet 5 mg  5 mg Per G Tube Nightly Sravan Forrester MD   5 mg at 11/09/21 2039   • midodrine (PROAMATINE) tablet 10 mg  10 mg Per PEG Tube Q8H Sravan Forrester MD   10 mg at 11/10/21 0630   • ondansetron (ZOFRAN) injection 4 mg  4 mg Intravenous Q6H PRN Sravan Forrester MD   4 mg at 11/10/21 1113   • oxyCODONE (ROXICODONE) 5 MG/5ML solution 5 mg  5 mg Per G Tube Q6H PRN Genet Bui MD   5 mg at 11/03/21 0110   • predniSONE (DELTASONE) tablet 30 mg  30 mg Per G Tube Daily With Breakfast Dagoberto Dawkins MD       • saccharomyces boulardii (FLORASTOR) capsule 500 mg  500 mg Oral BID Sravan Forrester MD   500 mg at 11/10/21 0901   • sennosides-docusate (PERICOLACE) 8.6-50 MG per tablet 2 tablet  2 tablet Per PEG Tube BID PRN Genet Bui MD       • sertraline (ZOLOFT) tablet 50 mg  50 mg Per PEG Tube Daily Sravan Forrester MD   50 mg at 11/10/21 0853       Physical Exam:   Vital Signs   /64   Pulse 76   Temp 98.6 °F (37 °C)   Resp 20   Ht 172.7 cm (67.99\")   Wt 81 kg (178 lb 9.2 oz)   SpO2 93%   BMI 27.16 kg/m²     GENERAL: Chronically ill-appearing.  Very weak, but overall improved.  Sitting up in chair  HEENT: Normocephalic, atraumatic.    Neck:   Tracheostomy in place, capped.  No secretions noted   HEART: RRR; No murmur.  LUNGS: Clear bilaterally. on nasal cannula oxygen.  Nonproductive cough  ABDOMEN: Soft, nondistended. No rebound or guarding. PEG tube in place  EXT:  No edema.  : Without Cedeno catheter.  MSK: no major joint swelling noted.    SKIN: Macular rash on left flank noted  NEURO: AOx3, weak in all extremities but improving  Dialysis catheter in right chest    Laboratory Data    Results from last 7 days   Lab Units 11/10/21  0317 11/09/21  0330 11/08/21  0256   WBC 10*3/mm3 11.12* 9.37 9.98   HEMOGLOBIN g/dL 7.9* 7.6* " 7.6*   HEMATOCRIT % 24.4* 23.4* 23.7*   PLATELETS 10*3/mm3 175 172 166     Results from last 7 days   Lab Units 11/10/21  0317   SODIUM mmol/L 137   POTASSIUM mmol/L 4.1   CHLORIDE mmol/L 99   CO2 mmol/L 23.0   BUN mg/dL 63*   CREATININE mg/dL 4.00*   GLUCOSE mg/dL 117*   CALCIUM mg/dL 8.7     Estimated Creatinine Clearance: 20.3 mL/min (A) (by C-G formula based on SCr of 4 mg/dL (H)).  Results from last 7 days   Lab Units 11/08/21  0256   ALK PHOS U/L 84   BILIRUBIN mg/dL 0.9   ALT (SGPT) U/L 23   AST (SGOT) U/L 32         Results from last 7 days   Lab Units 11/08/21  0256   CRP mg/dL 6.36*       Microbiology:  Covid screening negative on admission  10/12 respiratory culture with heavy growth gram-negative bacilli  MRSA nares screening negative  10/12 Catheter tip culture with skin mal  10/13 BAL culture with Klebsiella aerogenes    10/16 blood cultures negative   10/16 respiratory culture negative      11/4 respiratory culture with Klebsiella aerogenes, pansensitive  11/5 blood culture negative to date    Radiology:  No radiology results for the last day   I personally reviewed the above CXR: New right middle lobe opacity compared to last week      Impression:   1. Leukocytosis, sepsis: Up slightly today but clinically stable.  Will follow  2. Klebsiella (Enterobacter) aerogenes VAP:  Improved after restarting meropenem. Now on cefepime.  This species could harbor inducible amp-C  3. Macular rash on left flank: New in the past 24 to 48 hours.  Not clearly a drug rash but could related to cefepime.  Stable overnight per RN.  Will follow  4. Recent Klebsiella aerogenes ventilator associated pneumonia: From respiratory culture x2.   Repeat cultures negative.  O2 settings improved.  Completed antibiotics on 10/25 is planned.  5. Possible aspiration overnight 10/15:   6. Acute hypoxic respiratory failure: s/p trach on 10/15. improved  7. 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 On  Cytoxan and steroids   8. Renal failure, anuric. On dialysis   9. Immunocompromise host: Cytoxan and high-dose steroids  10. DM2  11. Tobacco abuse    PLAN:   - micro data reviewed  - Follow CBC, CMP    - Antibiotics were restarted on 11/4.  Now on cefepime (renally dosed) based on sensitivity data.  Noted possible drug rash which started over the past 24 to 48 hours.  Stable overnight per RN.  Continue cefepime today as planned.  Benadryl if needed.  If rash gets worse will transition to oral doxycycline.  Final duration to be determined based on clinical improvement.  I would prefer a longer course this time due to relapse, ongoing immunosuppression.       - Continue atovaquone for PCP prophylaxis, until on less than 15 mg of prednisone daily.  - Slowly weaning prednisone per nephrology     Complex MDM. Immunocompromised host. Improving.  Likely to LTAC soon. CM looking at options. I will follow.  Discussed with RN and patient's wife at bedside.     Rodríguez Ruff MD  11/10/2021

## 2021-11-10 NOTE — PROGRESS NOTES
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   PSxH: He  has a past surgical history that includes Cholecystectomy and tracheostomy and peg tube insertion (N/A, 10/15/2021).         Reported/Observed/Food/Nutrition Related History:     11/10) Pt tolerating EN. Free water adjusted to 30 ml q 2 hrs yesterday per intensivist.   Per I&O over the past 24 hrs:  1 bowel movement    11/9) Pt tolerating EN, with 85% of EN goal volume delivered over the past 24 hrs. Fiber packets d/c'd in EN order yesterday per intensivist.     11/8) Pt vomiting this AM during rounds. Prior to this, pt had been tolerating EN. RN planning on holding EN x1 hour and then restarting. Per SLP FEES eval (11/5)- meds whole with pudding or applesauce. RN reports that pt had a bowel movement this AM.     11/4) Pt continues to tolerate EN. SLP following. Receiving HD this AM.   Per I&O- last bowel movement documented (10/31-11/1).     11/3) EN adjusted yesterday (11/2), pt tolerating, with 98% of EN goal volume delivered over the past 24 hrs. SLP bedside eval (11/2) rec  "NPO, alternate methods for nutrition/hydration and meds via alternate route.     11/2) Pt continues to tolerate EN. Will adjust EN order today. Receiving HD this AM. SLP following. Waiting for pt to be able to safely swallow cytoxan pill for pt to be able to be transferred to LTACH. Pt with orders to the floor.   Per I&O- last bowel movement documented (10/31-11/1)    11/1) Pt continue to tolerate EN. Will adjust EN order today. Pt has been on trach collar >72 hrs. Planning to consult SLP.   Per I&O over the past 24 hrs:  1 bowel movement    10/28) No noted s/s of EN intolerance. EN rate per current order this morning. HD today.     10/26) HD today.       Anthropometrics     Height: 172.7 cm (67.99\")  Last filed wt: Weight: 81 kg (178 lb 9.2 oz) (11/03/21 0400)  Weight Method: Bed scale    BMI: BMI (Calculated): 27.2  Obese Class I: 30-34.9kg/m2    Ideal Body Weight (IBW) (kg): 70.87  145% IBW  ABW: 172lb/78kg    11/8) Based on bed scale weights, pt has potentially lost 46 lb/21% since admission.     Date Weight (kg) Weight (lbs) Weight Method   11/3/2021 81 kg 178 lb 9.2 oz Bed scale   10/12/2021 102 kg 224 lb 13.9 oz -   10/4/2021 102 kg 224 lb 13.9 oz Bed scale     Labs reviewed     Results from last 7 days   Lab Units 11/10/21  0317 11/09/21  0330 11/08/21  0256 11/07/21  0357 11/06/21  0401 11/05/21  0328 11/05/21  0328   GLUCOSE mg/dL 117* 177* 158*   < > 98   < > 103*   BUN mg/dL 63* 110* 75*   < > 79*   < > 48*   CREATININE mg/dL 4.00* 6.55* 5.04*   < > 5.90*   < > 3.72*   SODIUM mmol/L 137 132* 134*   < > 133*   < > 135*   CHLORIDE mmol/L 99 91* 92*   < > 93*   < > 95*   POTASSIUM mmol/L 4.1 5.5* 4.9   < > 4.8   < > 4.0   PHOSPHORUS mg/dL 2.6 4.4 3.5  --  3.4   < > 3.2   MAGNESIUM mg/dL  --   --  2.7*  --  2.7*  --  2.4   ALT (SGPT) U/L  --   --  23  --  22  --  20    < > = values in this interval not displayed.     Results from last 7 days   Lab Units 11/10/21  0317 11/09/21  0330 11/08/21  1252 " 11/08/21  0256   ALBUMIN g/dL 4.20 4.00  --  4.10   PREALBUMIN mg/dL  --   --  25.9  --    CRP mg/dL  --   --   --  6.36*       Results from last 7 days   Lab Units 11/10/21  0520 11/09/21  1712 11/09/21  1213 11/09/21  0537 11/08/21  2318 11/08/21  1814   GLUCOSE mg/dL 127 142* 139* 218* 146* 155*     Lab Results   Lab Value Date/Time    HGBA1C 5.00 11/06/2021 0401       Medications reviewed   Scheduled: nephrovite, antibiotic, vitamin D3, cytoxan, pepcid, ferrous sulfate, neurontin, insulin, melatonin, steroid, florastor  PRN: tylenol, zofran      Needs Assessment 11/8   Height used: 68 in/173 cm  Weight used: 178 lb/81 kg    Estimated calorie needs: ~2000 kcal/day  20-30 kcal/kg actual weight= 6389-8852 kcal    Estimated protein needs: ~90 g pro/day  0.8-1.0-1.2 g/kg actual weight= 64-81-97 g pro         Current Nutrition Prescription     PO: NPO Diet    EN: [employer] Renal at 50 ml/hr (goal volume= 1000 ml/day) and free water at 30 ml q 2 hrs  Route: PEG  Provides at goal volume: 2000 kcal, 90 g pro, 0 g fiber, 24 meq K, 819 mg phos, 717 ml water from EN, 917 ml water total      EN delivery:  1 Day:  1091 ml, 109%  2182 kcal  99 g pro  0 g fiber  26 meq K  893 mg phos  782 ml water from EN  995 ml water total      Nutrition Diagnosis     10/26  Problem Less than optimal enteral nutrition composition or modality   Etiology Clinical status, needs assessment    Signs/Symptoms EN providing >100% est energy and protein needs, delivery >100% daily goal volume    Status: resolved    10-5-21, 10-22  Problem Inadequate energy intake   Etiology Per Clinical Status   Signs/Symptoms 84% goal volume EN       Nutrition Intervention      Follow treatment progress, Care plan reviewed   -Rec continue with current EN order  -Intensivist is managing the nutrition support      Goal:   General: Nutrition support treatment  PO: n/a  EN/PN: Maintain EN       Monitoring/Evaluation:   Per protocol, I&O, Pertinent labs, EN  delivery/tolerance, Weight, GI status, Symptoms      Jena Pang, MS RD/LD CNSC  Time Spent: 45 minutes

## 2021-11-10 NOTE — CASE MANAGEMENT/SOCIAL WORK
Continued Stay Note   Michelel     Patient Name: Keshav Dickens  MRN: [id number]  Today's Date: 11/10/2021    Admit Date: 10/4/2021     Discharge Plan     Row Name 11/10/21 1123       Plan    Plan Select    Patient/Family in Agreement with Plan yes    Plan Comments I spoke with Mr. Dickens in room.  I spoke with wife and updated her on discharge plan.  Spoke with Eunice at [facility] and they do have a bed for Mr. Dickens but they are checking on rehab facilities that will give Cytoxan after his discharge from [facility].  [facility] ambulance scheduled for Friday November 12th at 12:15 to transport Mr. Dickens to [facility].    Final Discharge Disposition Code 63 - [facility]               Discharge Codes    No documentation.               Expected Discharge Date and Time     Expected Discharge Date Expected Discharge Time    Nov 12, 2021             Patricia Disla RN

## 2021-11-10 NOTE — THERAPY TREATMENT NOTE
Patient Name: Keshav Dickens  : 1955    MRN: [id number]                              Today's Date: 11/10/2021       Admit Date: 10/4/2021    Visit Dx:     ICD-10-CM ICD-9-CM   1. Acute respiratory failure with hypoxia  J96.01 518.81   2. Examination for normal comparison for clinical research  Z00.6 V70.7   3. Diffuse pulmonary alveolar hemorrhage  R04.89 786.30   4. 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.0 446.21   5. Voice impairment  R49.9 784.40   6. Oropharyngeal dysphagia  R13.12 787.22     Patient Active Problem List   Diagnosis   • Anti-GBM nephritis with pulmonary hemorrhage (HCC)   • Respiratory Failure Type 1   • Acute renal failure (ARF) (HCC)   • Acute blood loss anemia   • Sepsis due to pneumonia (HCC);Klebsiella aerogenes   • Idiopathic hypotension   • Hyperglycemia   • History of tobacco abuse     Past Medical History:   Diagnosis Date   • Goodpasture's syndrome  10/4/2021   • Hypertension      Past Surgical History:   Procedure Laterality Date   • CHOLECYSTECTOMY     • TRACHEOSTOMY AND PEG TUBE INSERTION N/A 10/15/2021    Procedure: TRACHEOSTOMY AND PERCUTANEOUS ENDOSCOPIC GASTROSTOMY TUBE INSERTION;  Surgeon: Abdon Roberts MD;  Location: [facility];  Service: General;  Laterality: N/A;      General Information     Row Name 11/10/21 133          OT Time and Intention    Document Type therapy note (daily note)  -CS     Mode of Treatment occupational therapy  -CS     Row Name 11/10/21 133          General Information    Existing Precautions/Restrictions cardiac; fall; oxygen therapy device and L/min; other (see comments)  trach, PEG, impulsive  -CS     Barriers to Rehab medically complex; cognitive status  -CS     Row Name 11/10/21 1331          Cognition    Orientation Status (Cognition) oriented to; person; place; disoriented to; time  -CS     Row Name 11/10/21 1331          Safety Issues, Functional Mobility    Impairments Affecting Function (Mobility) balance;  cognition; coordination; endurance/activity tolerance; postural/trunk control; shortness of breath; strength  -CS     Cognitive Impairments, Mobility Safety/Performance attention; insight into deficits/self-awareness  -CS           User Key  (r) = Recorded By, (t) = Taken By, (c) = Cosigned By    Initials Name Provider Type    Diana Ricks OT Occupational Therapist                 Mobility/ADL's     Row Name 11/10/21 1332          Transfers    Transfers sit-stand transfer  -CS     Comment (Transfers) STSx3 reps initially requiring Max Ax2 though progressed to Mod Ax2 w/ cueing for sequencing, BUE support w/ noted B knee buckling  -CS     Sit-Stand Saline (Transfers) maximum assist (25% patient effort); 2 person assist; verbal cues  -CS     Row Name 11/10/21 1332          Activities of Daily Living    BADL Assessment/Intervention grooming  -     Row Name 11/10/21 1332          Grooming Assessment/Training    Saline Level (Grooming) oral care regimen; moderate assist (50% patient effort)  -CS     Assistive Devices (Grooming) suction brush  -CS     Position (Grooming) supported sitting  -CS           User Key  (r) = Recorded By, (t) = Taken By, (c) = Cosigned By    Initials Name Provider Type    Diana Ricks OT Occupational Therapist               Obj/Interventions     Row Name 11/10/21 1333          Shoulder (Therapeutic Exercise)    Shoulder (Therapeutic Exercise) AROM (active range of motion)  -     Shoulder AROM (Therapeutic Exercise) bilateral; flexion; aBduction; aDduction; 5 repetitions  -     Row Name 11/10/21 1333          Elbow/Forearm (Therapeutic Exercise)    Elbow/Forearm Strengthening (Therapeutic Exercise) bilateral; flexion; extension; 5 repetitions  -     Row Name 11/10/21 1333          Balance    Balance Assessment sitting static balance; sitting dynamic balance; standing static balance; standing dynamic balance  -CS     Static Sitting Balance mild impairment; sitting  in chair  -CS     Dynamic Sitting Balance moderate impairment; sitting in chair  -CS     Static Standing Balance moderate impairment; supported  -CS     Dynamic Standing Balance severe impairment; supported  -CS           User Key  (r) = Recorded By, (t) = Taken By, (c) = Cosigned By    Initials Name Provider Type    CS Diana Roque, JAYY Occupational Therapist               Goals/Plan    No documentation.                Clinical Impression     Row Name 11/10/21 [facility]          Pain Scale: Numbers Pre/Post-Treatment    Pretreatment Pain Rating 0/10 - no pain  -CS     Posttreatment Pain Rating 0/10 - no pain  -CS     Row Name 11/10/21 1333          Plan of Care Review    Plan of Care Reviewed With patient; spouse  -CS     Progress improving  -     Outcome Summary Pt demo improved activity tolerance and independence w/ t/fs and grooming ADLs, however conts to be limited d/t impulsivity and significant generalized weakness. Pt w/ excellent effort this session, educated on HEP and encouraged to perform outside treatment session. Recommend cont skilled IPOT POC. Recommend pt DC to IP rehab vs LTACH, pending medical needs.  -     Row Name 11/10/21 1337          Therapy Assessment/Plan (OT)    Therapy Frequency (OT) daily  -     Row Name 11/10/21 1333          Therapy Plan Review/Discharge Plan (OT)    Anticipated Discharge Disposition (OT) inpatient rehabilitation facility  -     Row Name 11/10/21 1333          Vital Signs    Pre Systolic BP Rehab --  VSS  -CS     Pre Patient Position Sitting  -CS     Intra Patient Position Standing  -CS     Post Patient Position Sitting  -CS     Row Name 11/10/21 1333          Positioning and Restraints    Pre-Treatment Position sitting in chair/recliner  -CS     Post Treatment Position chair  -CS     In Chair notified nsg; reclined; call light within reach; encouraged to call for assist; exit alarm on; with family/caregiver; waffle cushion; on mechanical lift sling; legs elevated;  heels elevated; RUE elevated  -CS           User Key  (r) = Recorded By, (t) = Taken By, (c) = Cosigned By    Initials Name Provider Type    CS Diana Roque OT Occupational Therapist               Outcome Measures     Row Name 11/10/21 1335          How much help from another is currently needed...    Putting on and taking off regular lower body clothing? 1  -CS     Bathing (including washing, rinsing, and drying) 1  -CS     Toileting (which includes using toilet bed pan or urinal) 1  -CS     Putting on and taking off regular upper body clothing 2  -CS     Taking care of personal grooming (such as brushing teeth) 2  -CS     Eating meals 2  -CS     AM-PAC 6 Clicks Score (OT) 9  -CS     Row Name 11/10/21 1125 11/10/21 0800       How much help from another person do you currently need...    Turning from your back to your side while in flat bed without using bedrails? 3  -KG 2  -BF    Moving from lying on back to sitting on the side of a flat bed without bedrails? 3  -KG 2  -BF    Moving to and from a bed to a chair (including a wheelchair)? 2  -KG 2  -BF    Standing up from a chair using your arms (e.g., wheelchair, bedside chair)? 2  -KG 2  -BF    Climbing 3-5 steps with a railing? 1  -KG 1  -BF    To walk in hospital room? 2  -KG 1  -BF    AM-PAC 6 Clicks Score (PT) 13  -KG 10  -BF    Row Name 11/10/21 1335 11/10/21 1125       Functional Assessment    Outcome Measure Options AM-PAC 6 Clicks Daily Activity (OT)  -CS AM-PAC 6 Clicks Basic Mobility (PT)  -KG          User Key  (r) = Recorded By, (t) = Taken By, (c) = Cosigned By    Initials Name Provider Type    Diana Ricks, JAYY Occupational Therapist    KG Joanna Clark, PT Physical Therapist    BF Amparo Silverman RN Registered Nurse                Occupational Therapy Education                 Title: PT OT SLP Therapies (In Progress)     Topic: Occupational Therapy (In Progress)     Point: ADL training (In Progress)     Description:   Instruct  learner(s) on proper safety adaptation and remediation techniques during self care or transfers.   Instruct in proper use of assistive devices.              Learning Progress Summary           Patient Acceptance, E, NR by  at 11/10/2021 1336    Acceptance, E, NL,NR by  at 11/9/2021 1435   Family Acceptance, E, NR by  at 11/10/2021 1336    Acceptance, E, NL,NR by  at 11/9/2021 1435                   Point: Home exercise program (In Progress)     Description:   Instruct learner(s) on appropriate technique for monitoring, assisting and/or progressing therapeutic exercises/activities.              Learning Progress Summary           Patient Acceptance, E, NR by  at 11/10/2021 1336   Family Acceptance, E, NR by  at 11/10/2021 1336                   Point: Precautions (In Progress)     Description:   Instruct learner(s) on prescribed precautions during self-care and functional transfers.              Learning Progress Summary           Patient Acceptance, E, NR by  at 11/10/2021 1336    Acceptance, E, NL,NR by  at 11/9/2021 1435   Family Acceptance, E, NR by  at 11/10/2021 1336    Acceptance, E, NL,NR by  at 11/9/2021 1435                   Point: Body mechanics (In Progress)     Description:   Instruct learner(s) on proper positioning and spine alignment during self-care, functional mobility activities and/or exercises.              Learning Progress Summary           Patient Acceptance, E, NR by  at 11/10/2021 1336    Acceptance, E, NL,NR by  at 11/9/2021 1435   Family Acceptance, E, NR by  at 11/10/2021 1336    Acceptance, E, NL,NR by  at 11/9/2021 1435                               User Key     Initials Effective Dates Name Provider Type Discipline     09/02/21 -  Diana Roque, OT Occupational Therapist OT     06/16/21 -  Bettina Santos, JAYY Occupational Therapist OT              OT Recommendation and Plan  Therapy Frequency (OT): daily  Plan of Care Review  Plan of Care Reviewed With:  patient, spouse  Progress: improving  Outcome Summary: Pt demo improved activity tolerance and independence w/ t/fs and grooming ADLs, however conts to be limited d/t impulsivity and significant generalized weakness. Pt w/ excellent effort this session, educated on HEP and encouraged to perform outside treatment session. Recommend cont skilled IPOT POC. Recommend pt DC to IP rehab vs LTACH, pending medical needs.     Time Calculation:    Time Calculation- OT     Row Name 11/10/21 1337             Time Calculation- OT    OT Start Time 1137  -CS      OT Received On 11/10/21  -CS      OT Goal Re-Cert Due Date 11/19/21  -CS              Timed Charges    57694 - OT Therapeutic Exercise Minutes 5  -CS      38214 - OT Therapeutic Activity Minutes 11  -CS      18618 - OT Self Care/Mgmt Minutes 10  -CS              Total Minutes    Timed Charges Total Minutes 26  -CS       Total Minutes 26  -CS            User Key  (r) = Recorded By, (t) = Taken By, (c) = Cosigned By    Initials Name Provider Type    CS Diana Roque OT Occupational Therapist              Therapy Charges for Today     Code Description Service Date Service Provider Modifiers Qty    82514568769 HC OT SELF CARE/MGMT/TRAIN EA 15 MIN 11/10/2021 Diana Roque OT GO 1    31557113798 HC OT THERAPEUTIC ACT EA 15 MIN 11/10/2021 Diana Roque OT GO 1    78048310909 HC OT THER SUPP EA 15 MIN 11/10/2021 Diana Roque OT GO 2               Diana Roque OT  11/10/2021

## 2021-11-10 NOTE — PROGRESS NOTES
INTENSIVIST   PROGRESS NOTE     Hospital:  LOS: 37 days      S     Keshav 66 y.o. male is followed for: No chief complaint on file.       Respiratory Failure Type 1    Anti-GBM nephritis with pulmonary hemorrhage (HCC)    Pneumonia Klebsiella aerogenes    As an Intensivist, we provide an integrated approach to the ICU patient and family, medical management of comorbid conditions, including but not limited to electrolytes, glycemic control, organ dysfunction, lead interdisciplinary rounds and coordinate the care with all other services, including those from other specialists.     Interval History:  Doing better.    No vomiting.    He had a rash yesterday while Cefepime was being infused.    The patient has a COVID HM Topic on their chart, and they are fully vaccinated.     Temp  Min: 98 °F (36.7 °C)  Max: 98.9 °F (37.2 °C)       History     Last Reviewed by Rc Mercer MD on 11/8/2021 at  5:33 PM    Sections Reviewed    Medical, Family, Surgical, Tobacco, Alcohol, Drug Use, Sexual Activity,   Social Documentation      Problem list reviewed by Rc Mercer MD on 11/8/2021 at  5:33 PM  Medicines reviewed by Rc Mercer MD on 11/8/2021 at  5:33 PM  Allergies reviewed by Rc Mercer MD on 11/8/2021 at  5:33 PM       The patient's relevant past medical, surgical and social history were reviewed and updated in Epic as appropriate.        O     Vitals:  Temp: 98.2 °F (36.8 °C) (11/10/21 1200) Temp  Min: 98 °F (36.7 °C)  Max: 98.9 °F (37.2 °C)   Temp core:      BP: 137/66 (11/10/21 1200) BP  Min: 88/54  Max: 149/83   Pulse: 71 (11/10/21 1206) Pulse  Min: 71  Max: 98   Resp: 18 (11/10/21 1206) Resp  Min: 16  Max: 20   SpO2: 96 % (11/10/21 1206) SpO2  Min: 88 %  Max: 100 %   Device: humidified, nasal cannula (11/10/21 1206)    Flow Rate: 1.5 (11/10/21 1206) Flow (L/min)  Min: 1.5  Max: 6     Intake/Ouptut 24 hrs (7:00AM - 6:59 AM)  Intake & Output (last 3 days)       11/07 0701 11/08 0700 11/08 0701 11/09 0700  11/09 0701  11/10 0700 11/10 0701 11/11 0700    I.V. (mL/kg)   200 (2.5) 24 (0.3)    Blood 365.8       Other 343 162 213 51    NG/GT 1990 1010 1091 205    IV Piggyback  100      Total Intake(mL/kg) 2698.8 (33.3) 1272 (15.7) 1504 (18.6) 280 (3.5)    Urine (mL/kg/hr)   0 (0)     Other 1240  2500     Stool   0     Total Output 1240  2500     Net +1458.8 +1272 -996 +280            Urine Unmeasured Occurrence   0 x     Stool Unmeasured Occurrence   1 x           Wt Readings from Last 3 Encounters:   11/03/21 81 kg (178 lb 9.2 oz)       Physical Examination  Telemetry:  Rhythm: normal sinus rhythm (11/10/21 1200)     QTc Interval (Sec): 0.38 (10/25/21 2000)   Constitutional:  No acute distress.   Cardiovascular: RRR.   Normal heart sounds.  No murmurs, gallop or rub.   Respiratory: Normal breath sounds  Rhonchi.   Abdominal:  Soft with no tenderness.  No distension.   No HSM.   Extremities: Warm.  Dry.  No cyanosis.  Trace Edema   Neurological:   Awake.  Best Eye Response: 4-->(E4) spontaneous (11/10/21 1200)  Best Motor Response: 6-->(M6) obeys commands (11/10/21 1200)  Best Verbal Response: 4-->(V4) confused (11/10/21 1200)  Andrews Coma Scale Score: 14 (11/10/21 1200)     Results Reviewed:  Laboratory  Microbiology  Radiology  Pathology    Hematology:  Results from last 7 days   Lab Units 11/10/21  0317 11/09/21  0330   WBC 10*3/mm3 11.12* 9.37   HEMOGLOBIN g/dL 7.9* 7.6*   MCV fL 98.8* 98.3*   PLATELETS 10*3/mm3 175 172   NEUTROS ABS 10*3/mm3 9.54* 8.01*   LYMPHS ABS 10*3/mm3 0.36* 0.38*   EOS ABS 10*3/mm3 0.19 0.24     Chemistry:  Estimated Creatinine Clearance: 20.3 mL/min (A) (by C-G formula based on SCr of 4 mg/dL (H)).  Results from last 7 days   Lab Units 11/10/21  0317 11/09/21  0330   SODIUM mmol/L 137 132*   POTASSIUM mmol/L 4.1 5.5*   CHLORIDE mmol/L 99 91*   CO2 mmol/L 23.0 25.0   BUN mg/dL 63* 110*   CREATININE mg/dL 4.00* 6.55*   GLUCOSE mg/dL 117* 177*     Results from last 7 days   Lab Units  11/10/21  0317 11/09/21  0330 11/08/21  0256 11/08/21  0256 11/07/21  0357 11/06/21  0401   CALCIUM mg/dL 8.7 8.8   < > 8.7   < > 8.9   MAGNESIUM mg/dL  --   --   --  2.7*  --  2.7*   PHOSPHORUS mg/dL 2.6 4.4   < > 3.5  --  3.4    < > = values in this interval not displayed.     Biomarkers:  Results from last 7 days   Lab Units 11/10/21  0317 11/08/21  0256 11/06/21  0401   CRP mg/dL  --  6.36*  --    PROCALCITONIN ng/mL 1.84* 2.26* 2.49*     COVID-19  Lab Results   Component Value Date    COVID19 Not Detected 10/04/2021       Images:  No radiology results for the last day    Echo:  Results for orders placed during the hospital encounter of 10/04/21    Adult Transthoracic Echo Complete W/ Cont if Necessary Per Protocol    Interpretation Summary  · Left ventricular ejection fraction appears to be 56 - 60%. Left ventricular systolic function is normal.  · Left ventricular wall thickness is consistent with mild to moderate concentric hypertrophy.  · The inferior vena cava is dilated.    Results: Reviewed.  I reviewed the patient's new laboratory and imaging results.  I independently reviewed the patient's new images.    Medications: Reviewed.    Assessment/Plan   A / P     Keshav is a 66 y.o. male admitted on 10/4/2021 with Respiratory failure (HCC) [J96.90]: admitted initially to [facility] 9/27/21 with 2-3 week h/o hemoptysis.  He required intubation on 10/1/21.  CTA at OSH showed diffuse GGO and 8 mm RML nodule.  Patient was also noted to be in Renal failure with concerns for Pulmonary-Renal syndrome.  He was treated with 3 days of pulse IV solumedrol. Ultimately anti-GBM Ab was positive concerning for Goodpastures. He had temporary HD catheter placed at OSH for PIOTR and HD was started.     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     He was transferred to [facility] 10/4/2021 for higher level of  care.  Daily plasma exchange was initiated on 10/5/21 for total of 14 sessions, and he has continued on moderate dose steroids after receiving pulse.  Patient has also been initiated on Cytoxan.       1. Respiratory Failure  1. DAH  2. Trach 10/15/21  3. Trach downsized to 6-0 cuffless fenestrated Shiley on 11/2.  4. Klebsiella pneumonia  1. Cefepime: Abs per ID  5. Tobacco use 45 pack-year, quit 1 month PTA  2. Anti GBM nephritis  1. On iHD  2. Prednisone 40 mg/d and Atovaquone for PJ prophylaxis.  3. Cyclophosphamide 100 mg/d  3. HTN  4. Nutrition Support: Enteral Nutrition   1. PEG 10/15/21    Lab Results   Lab Value Date/Time    CRP 6.36 (H) 11/08/2021 0256    CRP 2.55 (H) 11/02/2021 0356    CRP 2.48 (H) 10/26/2021 0340    PREALBUMIN 25.9 11/08/2021 1252    PREALBUMIN 28.2 11/02/2021 0356    PREALBUMIN 34.1 10/26/2021 0340    TRIG 315 (H) 11/02/2021 0356    TRIG 291 (H) 10/26/2021 0340    TRIG 151 (H) 10/19/2021 0703     5. T2DM    Results from last 7 days   Lab Units 11/10/21  1210 11/10/21  0520 11/09/21  1712 11/09/21  1213 11/09/21  0537 11/08/21  2318   GLUCOSE mg/dL 150* 127 142* 139* 218* 146*     Lab Results   Lab Value Date/Time    HGBA1C 5.00 11/06/2021 0401       Nutrition Support: Diet, Tube Feeding Tube Feeding Formula: Novasource Renal (Electrolyte Restricted)   Modulars: ,    Diet: NPO Diet   Advance Directives: Code Status and Medical Interventions:   Ordered at: 10/04/21 2023     Code Status (Patient has no pulse and is not breathing):    CPR (Attempt to Resuscitate)     Medical Interventions (Patient has pulse or is breathing):    Full        Plan:    1. Hb stable at 7.9  2. Continue PT and OT  3. Continue ST  4. Antibiotics per ID - no changes in Cefepime for now. Monitor: rash.  5. BUN better after dialysis yesterday.  6. Goal: Glucose < 180 mg/dL.   1. Continue current doses of insulin basal, scheduled regular and correction insulin.  7. Disposition: Keep in ICU.   1. Awaiting bed a LTAC (in  [place], close to home), probably as soon as 1/12/21    Plan of care and goals reviewed during interdisciplinary rounds.  I discussed the patient's findings and my recommendations with patient    Level of Risk is High due to:  illness with threat to life or bodily function.     Time: 25 minutes, in direct patient care, with the patient and/or on the warren coordinating care with other health care providers.     I have spent > 50% percent of this time, counseling and discussing management.     [x]  Primary Attending  []  Consultant

## 2021-11-10 NOTE — PLAN OF CARE
Goal Outcome Evaluation:  Plan of Care Reviewed With: patient        Progress: no change   SLP treatment completed. Will continue to address dysphagia in tx and consider repeat FEES soon if pt appropriate to participate. Please see note for further details and recommendations.

## 2021-11-10 NOTE — THERAPY TREATMENT NOTE
Acute Care - Speech Language Pathology   Swallow Treatment Note [facility]     Patient Name: Keshav Dickens  : 1955  MRN: [id number]  Today's Date: 11/10/2021               Admit Date: 10/4/2021    Visit Dx:     ICD-10-CM ICD-9-CM   1. Acute respiratory failure with hypoxia  J96.01 518.81   2. Examination for normal comparison for clinical research  Z00.6 V70.7   3. Diffuse pulmonary alveolar hemorrhage  R04.89 786.30   4. 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.0 446.21   5. Voice impairment  R49.9 784.40   6. Oropharyngeal dysphagia  R13.12 787.22     Patient Active Problem List   Diagnosis   • Anti-GBM nephritis with pulmonary hemorrhage (HCC)   • Respiratory Failure Type 1   • Acute renal failure (ARF) (HCC)   • Acute blood loss anemia   • Sepsis due to pneumonia (HCC);Klebsiella aerogenes   • Idiopathic hypotension   • Hyperglycemia   • History of tobacco abuse     Past Medical History:   Diagnosis Date   • Goodpasture's syndrome  10/4/2021   • Hypertension      Past Surgical History:   Procedure Laterality Date   • CHOLECYSTECTOMY     • TRACHEOSTOMY AND PEG TUBE INSERTION N/A 10/15/2021    Procedure: TRACHEOSTOMY AND PERCUTANEOUS ENDOSCOPIC GASTROSTOMY TUBE INSERTION;  Surgeon: Abdon Roberts MD;  Location: [facility];  Service: General;  Laterality: N/A;       SLP Recommendation and Plan      Daily Summary of Progress (SLP): progress toward functional goals is gradual (11/10/21 1650)    Plan for Continued Treatment (SLP): Pt assessed while RN attempting to administer critical PO medication using recommended compensatory strategies. Despite max cues to avoid masticating capsules & use breath hold + swallow w/ pudding + cough compensations, pt u/a to consistently follow commands. Accepted ice chips/thin H2O w/o s/sxs aspiration (aspiration was silent per last FEES). Will f/u and potentially attempt repeat FEES tomorrow if pt alert & cognitively appropriate to participate. Rec: cont  NPO, alternative means of nutrition. Pt would benefit from cont'd dysphagia tx. (11/10/21 1650)       Plan of Care Reviewed With: patient  Progress: no change      SWALLOW EVALUATION (last 72 hours)     SLP Adult Swallow Evaluation     Row Name 11/10/21 1650 11/08/21 1050                Rehab Evaluation    Document Type therapy note (daily note)  -AC therapy note (daily note)  -RD       Subjective Information no complaints  -AC no complaints  -RD       Patient Observations lethargic  -AC alert; cooperative; agree to therapy  -RD       Patient/Family/Caregiver Comments/Observations Pt lethargic, but woke up & opened eyes to verbal stimuli & sternal rub. No family present.  -AC spouse present  -RD       Care Plan Review evaluation/treatment results reviewed; care plan/treatment goals reviewed; risks/benefits reviewed; current/potential barriers reviewed  -AC evaluation/treatment results reviewed; care plan/treatment goals reviewed; risks/benefits reviewed; current/potential barriers reviewed; patient/other agree to care plan  -RD       Care Plan Review, Other Participant(s) -- spouse  -RD       Patient Effort poor  -AC good  -RD                Pain    Additional Documentation -- Pain Scale: Numbers Pre/Post-Treatment (Group)  -RD                Pain Scale: Numbers Pre/Post-Treatment    Pretreatment Pain Rating -- 0/10 - no pain  -RD       Posttreatment Pain Rating -- 0/10 - no pain  -RD                Pain Scale: FACES Pre/Post-Treatment    Pain: FACES Scale, Pretreatment 0-->no hurt  -AC --       Posttreatment Pain Rating 0-->no hurt  -AC --                Clinical Impression    Daily Summary of Progress (SLP) progress toward functional goals is gradual  -AC progress toward functional goals as expected  -RD       Barriers to Overall Progress (SLP) Cognitive status, lethargy  -AC --       SLP Swallowing Diagnosis -- severe; pharyngeal dysphagia  -RD       Functional Impact -- risk of aspiration/pneumonia  -RD        Rehab Potential/Prognosis, Swallowing -- good, to achieve stated therapy goals  -RD       Swallow Criteria for Skilled Therapeutic Interventions Met -- demonstrates skilled criteria  -RD       Plan for Continued Treatment (SLP) Pt assessed while RN attempting to administer critical PO medication using recommended compensatory strategies. Despite max cues to avoid masticating capsules & use breath hold + swallow w/ pudding + cough compensations, pt u/a to consistently follow commands. Accepted ice chips/thin H2O w/o s/sxs aspiration (aspiration was silent per last FEES). Will f/u and potentially attempt repeat FEES tomorrow if pt alert & cognitively appropriate to participate. Rec: cont NPO, alternative means of nutrition. Pt would benefit from cont'd dysphagia tx.  -AC Saw for dysphagia & PMV treatment. Pt has been working on capping trials, but recently lost cap, so PMV used during treatment. Pt tolerating PMV w/o complications or change in vital signs. Trials of thins via ice w/ coughing observed. No s/s of aspiration w/ pudding + breath hold + cued cough. Pt may continue to use this compensation for pertinent oral meds, otherwise continue PEG use. Reviewed & completed dysphagia exercises. Continue to address.  -RD                Recommendations    Therapy Frequency (Swallow) -- 5 days per week  -RD       Predicted Duration Therapy Intervention (Days) -- until discharge  -RD       SLP Diet Recommendation -- NPO; long term alternate methods of nutrition/hydration  -RD       Recommended Precautions and Strategies -- general aspiration precautions  -RD       Oral Care Recommendations -- Oral Care BID/PRN; Suction toothbrush  -RD       SLP Rec. for Method of Medication Administration -- meds whole; with pudding or applesauce; meds via alternate route  critical oral meds only- using breath hold, swallow, cued cough per bite  -RD       Monitor for Signs of Aspiration -- yes; notify SLP if any concerns  -RD        Anticipated Discharge Disposition (SLP) -- unknown; anticipate therapy at next level of care  -RD             User Key  (r) = Recorded By, (t) = Taken By, (c) = Cosigned By    Initials Name Effective Dates    AC Gulshan Lona S, MS CCC-SLP 06/16/21 -     RD Shakeel Brittany E, MS CCC-SLP 06/16/21 -                 EDUCATION  The patient has been educated in the following areas:   Dysphagia (Swallowing Impairment) Oral Care/Hydration.        SLP GOALS     Row Name 11/10/21 1650 11/08/21 1050          Oral Nutrition/Hydration Goal 1 (SLP)    Oral Nutrition/Hydration Goal 1, SLP LTG: Pt. will safely consume a PO diet without aspiration or distress  -AC LTG: Pt. will safely consume a PO diet without aspiration or distress  -RD     Time Frame (Oral Nutrition/Hydration Goal 1, SLP) by discharge  -AC by discharge  -RD     Progress/Outcomes (Oral Nutrition/Hydration Goal 1, SLP) continuing progress toward goal  -AC continuing progress toward goal  -RD            Oral Nutrition/Hydration Goal 2 (SLP)    Oral Nutrition/Hydration Goal 2, SLP Patient will tolerate pureed and ice/thin liquid trials without s/s of aspiration to determine readiness for repeat instrumental evaluation of the swallow  -AC Patient will tolerate pureed and ice/thin liquid trials without s/s of aspiration to determine readiness for repeat instrumental evaluation of the swallow  -RD     Time Frame (Oral Nutrition/Hydration Goal 2, SLP) short term goal (STG)  -AC short term goal (STG)  -RD     Barriers (Oral Nutrition/Hydration Goal 2, SLP) No overt clinical s/sxs aspiration noted w/ ice chips, thin via tsp, or w/ pudding trials RN administered w/ medication.  -AC coughing w/ suctioning after multiple ice chip trials. No s/s w/ pudding + compensations for meds  -RD     Progress/Outcomes (Oral Nutrition/Hydration Goal 2, SLP) continuing progress toward goal  -AC continuing progress toward goal  -RD            Lingual Strengthening Goal 1 (SLP)    Activity  (Lingual Strengthening Goal 1, SLP) -- increase tongue back strength  -RD     Increase Tongue Back Strength swallow trials; lingual resistance exercises  -AC swallow trials; lingual resistance exercises  -RD     Garrard/Accuracy (Lingual Strengthening Goal 1, SLP) with minimal cues (75-90% accuracy)  -AC with minimal cues (75-90% accuracy)  -RD     Time Frame (Lingual Strengthening Goal 1, SLP) short term goal (STG)  -AC short term goal (STG)  -RD     Progress/Outcomes (Lingual Strengthening Goal 1, SLP) goal ongoing  -AC continuing progress toward goal  -RD            Pharyngeal Strengthening Exercise Goal 1 (SLP)    Activity (Pharyngeal Strengthening Goal 1, SLP) -- increase timing; increase superior movement of the hyolaryngeal complex; increase anterior movement of the hyolaryngeal complex; increase closure at entrance to airway/closure of airway at glottis; increase squeeze/positive pressure generation  -RD     Increase Timing prepping - 3 second prep or suck swallow or 3-step swallow  -AC prepping - 3 second prep or suck swallow or 3-step swallow  -RD     Increase Superior Movement of the Hyolaryngeal Complex falsetto  -AC falsetto  -RD     Increase Anterior Movement of the Hyolaryngeal Complex chin tuck against resistance (CTAR)  -AC chin tuck against resistance (CTAR)  -RD     Increase Closure at Entrance to Airway/Closure of Airway at Glottis super-supraglottic swallow; breath hold exercises  -AC super-supraglottic swallow; breath hold exercises  -RD     Increase Squeeze/Positive Pressure Generation hard effortful swallow  -AC hard effortful swallow  -RD     Garrard/Accuracy (Pharyngeal Strengthening Goal 1, SLP) with minimal cues (75-90% accuracy)  -AC with minimal cues (75-90% accuracy)  -RD     Time Frame (Pharyngeal Strengthening Goal 1, SLP) short term goal (STG)  -AC short term goal (STG)  -RD     Barriers (Pharyngeal Strengthening Goal 1, SLP) Pt completed effortful swallow x1, then  declined to complete further reps.  -AC x3-5 each  -RD     Progress/Outcomes (Pharyngeal Strengthening Goal 1, SLP) progress slower than expected  -AC continuing progress toward goal  -RD            Tolerate Speaking Valve Placement Goal 1 (SLP)    Tolerate Speaking Valve Placement Goal 1 (SLP) -- speaking valve >30 min; 80%; with minimal cues (75-90%)  -RD     Time Frame (Tolerate Speaking Valve Placement Goal 1, SLP) -- short term goal (STG)  -RD     Progress (Tolerate Speaking Valve Placement Goal 1, SLP) -- 80%; with minimal cues (75-90%)  -RD     Progress/Outcomes (Tolerate Speaking Valve Placement Goal 1, SLP) -- good progress toward goal  -RD            Additional Goal 1 (SLP)    Additional Goal 1, SLP -- LTG: Pt will improve communication w/ use of PMV to functionally communicate wants/needs w/ 90% accuracy w/ min cues  -RD     Time Frame (Additional Goal 1, SLP) -- by discharge  -RD     Progress/Outcomes (Additional Goal 1, SLP) -- good progress toward goal  -RD           User Key  (r) = Recorded By, (t) = Taken By, (c) = Cosigned By    Initials Name Provider Type     Lona Gaffney MS CCC-SLP Speech and Language Pathologist    RD Brittany Beckford MS CCC-SLP Speech and Language Pathologist                   Time Calculation:    Time Calculation- SLP     Row Name 11/10/21 1755             Time Calculation- SLP    SLP Start Time 1650  -      SLP Received On 11/10/21  -              Untimed Charges    21667-GV Treatment Swallow Minutes 65  -AC              Total Minutes    Untimed Charges Total Minutes 65  -AC       Total Minutes 65  -AC            User Key  (r) = Recorded By, (t) = Taken By, (c) = Cosigned By    Initials Name Provider Type     Lona Gaffney MS CCC-SLP Speech and Language Pathologist                Therapy Charges for Today     Code Description Service Date Service Provider Modifiers Qty    [id number]  ST TREATMENT SWALLOW 4 11/10/2021 Lona Gaffney MS CCC-SLP GN 1         Patient was not wearing a face mask and did exhibit coughing during this therapy encounter.  Procedure performed was aerosolizing, involved close contact (within 6 feet for at least 15 minutes or longer), and did not involve contact with infectious secretions or specimens.  Therapist used appropriate personal protective equipment including gloves, standard procedure mask and eye protection.  Appropriate PPE was worn during the entire therapy session.  Hand hygiene was completed before and after therapy session.          Lona Gaffney MS CCC-SLP  11/10/2021

## 2021-11-10 NOTE — PLAN OF CARE
Goal Outcome Evaluation:               Pt disoriented to situation. Stayed on trach collar for most of shift, coarse/rhochi, trach care performed per protocol, copious amounts of thick/tan secretions. VSS, Midodrine given. Pt tolerating tube feeding, multiple loose BM throughout shift, anuric. Palpation of abdomen revealed R side is much more firm than L side, some tenderness reported. Rash on flanks noticed after administration of Cefepime per dayshift RN, pt stated he felt itchy, benadryl given.

## 2021-11-10 NOTE — THERAPY TREATMENT NOTE
Patient Name: Keshav Dickens  : 1955    MRN: [id number]                              Today's Date: 11/10/2021       Admit Date: 10/4/2021    Visit Dx:     ICD-10-CM ICD-9-CM   1. Acute respiratory failure with hypoxia  J96.01 518.81   2. Examination for normal comparison for clinical research  Z00.6 V70.7   3. Diffuse pulmonary alveolar hemorrhage  R04.89 786.30   4. 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.0 446.21   5. Voice impairment  R49.9 784.40   6. Oropharyngeal dysphagia  R13.12 787.22     Patient Active Problem List   Diagnosis   • Anti-GBM nephritis with pulmonary hemorrhage (HCC)   • Respiratory Failure Type 1   • Acute renal failure (ARF) (HCC)   • Acute blood loss anemia   • Sepsis due to pneumonia (HCC);Klebsiella aerogenes   • Idiopathic hypotension   • Hyperglycemia   • History of tobacco abuse     Past Medical History:   Diagnosis Date   • Goodpasture's syndrome  10/4/2021   • Hypertension      Past Surgical History:   Procedure Laterality Date   • CHOLECYSTECTOMY     • TRACHEOSTOMY AND PEG TUBE INSERTION N/A 10/15/2021    Procedure: TRACHEOSTOMY AND PERCUTANEOUS ENDOSCOPIC GASTROSTOMY TUBE INSERTION;  Surgeon: Abdon Roberts MD;  Location: [facility];  Service: General;  Laterality: N/A;      General Information     Row Name 11/10/21 111          Physical Therapy Time and Intention    Document Type therapy note (daily note)  -KG     Mode of Treatment physical therapy  -KG     Row Name 11/10/21 111          General Information    Existing Precautions/Restrictions cardiac; fall; oxygen therapy device and L/min; other (see comments)  trach; PEG; confusion; impulsive  -KG     Barriers to Rehab medically complex; cognitive status  -KG     Row Name 11/10/21 1117          Cognition    Orientation Status (Cognition) oriented to; person  -KG     Row Name 11/10/21 1117          Safety Issues, Functional Mobility    Safety Issues Affecting Function (Mobility) ability to follow  commands; awareness of need for assistance; impulsivity; insight into deficits/self-awareness; safety precaution awareness; safety precautions follow-through/compliance  -KG     Impairments Affecting Function (Mobility) balance; cognition; coordination; endurance/activity tolerance; postural/trunk control; shortness of breath; strength  -KG     Cognitive Impairments, Mobility Safety/Performance attention; awareness, need for assistance; impulsivity; insight into deficits/self-awareness; safety precaution awareness; safety precaution follow-through  -KG           User Key  (r) = Recorded By, (t) = Taken By, (c) = Cosigned By    Initials Name Provider Type    KG Joanna Clark N, PT Physical Therapist               Mobility     Row Name 11/10/21 1118          Bed Mobility    Bed Mobility supine-sit  -KG     Supine-Sit [place] (Bed Mobility) minimum assist (75% patient effort); 2 person assist; verbal cues  -KG     Assistive Device (Bed Mobility) draw sheet; head of bed elevated  -KG     Comment (Bed Mobility) VC's for sequencing and technique. Pt required assistance at trunk and BLEs. Increased time and effort to scoot hips forward to EOB.  -KG     Row Name 11/10/21 1118          Transfers    Comment (Transfers) STS from EOB with PT/tech in front on either side to block joey knees and provide B UE support. Pt required physical assistance for proper foot placement for weight bearing.  -KG     Row Name 11/10/21 1118          Sit-Stand Transfer    Sit-Stand [place] (Transfers) moderate assist (50% patient effort); 2 person assist; verbal cues  -KG     Assistive Device (Sit-Stand Transfers) other (see comments)  B UE support  -KG     Row Name 11/10/21 1118          Gait/Stairs (Locomotion)    [place] Level (Gait) maximum assist (25% patient effort); 2 person assist; verbal cues  -KG     Assistive Device (Gait) other (see comments)  B UE support  -KG     Distance in Feet (Gait) 5  -KG      Deviations/Abnormal Patterns (Gait) left sided deviations; base of support, narrow; kayla decreased; festinating/shuffling; stride length decreased  -KG     Bilateral Gait Deviations forward flexed posture; heel strike decreased  -KG     Left Sided Gait Deviations weight shift ability decreased  -KG     Comment (Gait/Stairs) Pt able to take steps from bed to chair demonstrating step to gait pattern with very forward flexed posture and short, shuffled steps. Pt with increased difficulty advancing L LE; tendency to keep L heel off ground with increased L knee flexion. Pt required physical assistance to advance L LE. Max cueing for upright posture. Pt limited by significant weakness, but motivated to progress.  -KG           User Key  (r) = Recorded By, (t) = Taken By, (c) = Cosigned By    Initials Name Provider Type    Joanna Boo PT Physical Therapist               Obj/Interventions     Row Name 11/10/21 1122          Balance    Balance Assessment sitting static balance; standing static balance; standing dynamic balance  -KG     Static Sitting Balance mild impairment; supported; sitting, edge of bed  -KG     Static Standing Balance moderate impairment; supported; standing  -KG     Dynamic Standing Balance severe impairment; supported; standing  -KG           User Key  (r) = Recorded By, (t) = Taken By, (c) = Cosigned By    Initials Name Provider Type    Joanna Boo PT Physical Therapist               Goals/Plan    No documentation.                Clinical Impression     Row Name 11/10/21 1122          Pain    Additional Documentation Pain Scale: Numbers Pre/Post-Treatment (Group)  -KG     Row Name 11/10/21 1122          Pain Scale: Numbers Pre/Post-Treatment    Pretreatment Pain Rating 0/10 - no pain  -KG     Posttreatment Pain Rating 0/10 - no pain  -KG     Row Name 11/10/21 1122          Plan of Care Review    Plan of Care Reviewed With patient  -KG     Progress improving  -KG      Outcome Summary Pt required Gabi x2 for bed mobility. Performed STS transfer from EOB with modA x2. Pt ambulated 5ft from bed to chair with maxA x2 and B UE support. Pt with very forward flexed posture during ambulation and increased difficulty advancing L LE. Pt with increased confusion and periods of impulsitivity this session. Motivated to progress. Continue to progress as appropriate.  -KG     Row Name 11/10/21 1122          Vital Signs    Pre Systolic BP Rehab 147  -KG     Pre Treatment Diastolic BP 74  -KG     Post Systolic BP Rehab 136  -KG     Post Treatment Diastolic BP 74  -KG     Pretreatment Heart Rate (beats/min) 82  -KG     Posttreatment Heart Rate (beats/min) 78  -KG     Pre SpO2 (%) 95  -KG     O2 Delivery Pre Treatment supplemental O2  -KG     Post SpO2 (%) 92  -KG     O2 Delivery Post Treatment supplemental O2  -KG     Pre Patient Position Supine  -KG     Intra Patient Position Standing  -KG     Post Patient Position Sitting  -KG     Row Name 11/10/21 1122          Positioning and Restraints    Pre-Treatment Position in bed  -KG     Post Treatment Position chair  -KG     In Chair notified nsg; reclined; call light within reach; encouraged to call for assist; exit alarm on; with family/caregiver; RUE elevated; LUE elevated; waffle cushion; on mechanical lift sling; legs elevated  -KG           User Key  (r) = Recorded By, (t) = Taken By, (c) = Cosigned By    Initials Name Provider Type    Joanna Boo, PT Physical Therapist               Outcome Measures     Row Name 11/10/21 1125 11/10/21 0800       How much help from another person do you currently need...    Turning from your back to your side while in flat bed without using bedrails? 3  -KG 2  -BF    Moving from lying on back to sitting on the side of a flat bed without bedrails? 3  -KG 2  -BF    Moving to and from a bed to a chair (including a wheelchair)? 2  -KG 2  -BF    Standing up from a chair using your arms (e.g., wheelchair,  bedside chair)? 2  -KG 2  -BF    Climbing 3-5 steps with a railing? 1  -KG 1  -BF    To walk in hospital room? 2  -KG 1  -BF    AM-PAC 6 Clicks Score (PT) 13  -KG 10  -BF    Row Name 11/10/21 1335 11/10/21 1125       Functional Assessment    Outcome Measure Options AM-PAC 6 Clicks Daily Activity (OT)  -CS AM-PAC 6 Clicks Basic Mobility (PT)  -KG          User Key  (r) = Recorded By, (t) = Taken By, (c) = Cosigned By    Initials Name Provider Type    CS Diana Roque, OT Occupational Therapist    KG Joanna Clark, PT Physical Therapist    BF Amparo Silverman RN Registered Nurse                             Physical Therapy Education                 Title: PT OT SLP Therapies (In Progress)     Topic: Physical Therapy (In Progress)     Point: Mobility training (In Progress)     Learning Progress Summary           Patient Acceptance, E, NR by KG at 11/10/2021 0839    Acceptance, E, NR by TANK at 11/9/2021 1430    Comment: tried to educate wife that patient is not ready for ambulation she continues to have really high expectations    Acceptance, E, NR by KG at 11/8/2021 0906    Acceptance, E, NR by TANK at 11/5/2021 1000    Acceptance, E, NR by KG at 11/3/2021 1432    Acceptance, E, NR by AE at 11/2/2021 1440    Acceptance, E, NR by AE at 11/1/2021 0910    Acceptance, E, NR by TANK at 10/31/2021 1145    Acceptance, E, NR by JB1 at 10/31/2021 0243    Acceptance, E, NR by JB1 at 10/30/2021 0334    Acceptance, E, VU by TANK at 10/29/2021 0815    Comment: wife instructed in ROM exercises to do with patient throughout the day she is able to verbalize understanding.    Acceptance, E, NR by AE at 10/28/2021 1425    Acceptance, E, NR by KG at 10/27/2021 0927    Acceptance, E, NR by TANK at 10/26/2021 1300    Acceptance, E, NR by KG at 10/25/2021 0828    Acceptance, E, NR by AE at 10/22/2021 0825    Acceptance, E, NR by KG at 10/20/2021 0943    Acceptance, E, NR by KG at 10/18/2021 1017    Acceptance, E, NR by KG at 10/17/2021 1138    Family Acceptance, E, NR by KG at 10/27/2021 0927    Acceptance, E, NR by KG at 10/25/2021 0828    Acceptance, E, NR by KG at 10/20/2021 0943    Acceptance, E, NR by KG at 10/17/2021 1138   Significant Other Acceptance, E, NR by TANK at 11/9/2021 1430    Comment: tried to educate wife that patient is not ready for ambulation she continues to have really high expectations    Acceptance, E, NR by KG at 11/8/2021 0906    Acceptance, E, VU by TANK at 10/29/2021 0815    Comment: wife instructed in ROM exercises to do with patient throughout the day she is able to verbalize understanding.                   Point: Home exercise program (In Progress)     Learning Progress Summary           Patient Acceptance, E, NR by KG at 11/10/2021 0839    Acceptance, E, NR by TANK at 11/9/2021 1430    Comment: tried to educate wife that patient is not ready for ambulation she continues to have really high expectations    Acceptance, E, NR by KG at 11/8/2021 0906    Acceptance, E, NR by TANK at 11/5/2021 1000    Acceptance, E, NR by KG at 11/3/2021 1432    Acceptance, E, NR by AE at 11/2/2021 1440    Acceptance, E, NR by AE at 11/1/2021 0910    Acceptance, E, NR by TANK at 10/31/2021 1145    Acceptance, E, VU by TANK at 10/29/2021 0815    Comment: wife instructed in ROM exercises to do with patient throughout the day she is able to verbalize understanding.    Acceptance, E, NR by AE at 10/28/2021 1425    Acceptance, E, NR by KG at 10/27/2021 0927    Acceptance, E, NR by TANK at 10/26/2021 1300    Acceptance, E, NR by KG at 10/25/2021 0828    Acceptance, E, NR by AE at 10/22/2021 0825    Acceptance, E, NR by KG at 10/20/2021 0943    Acceptance, E, NR by KG at 10/18/2021 1017    Acceptance, E, NR by KG at 10/17/2021 1138   Family Acceptance, E, NR by KG at 10/27/2021 0927    Acceptance, E, NR by KG at 10/25/2021 0828    Acceptance, E, NR by KG at 10/20/2021 0943    Acceptance, E, NR by KG at 10/17/2021 1138   Significant Other Acceptance, E, NR by TANK  at 11/9/2021 1430    Comment: tried to educate wife that patient is not ready for ambulation she continues to have really high expectations    Acceptance, E, NR by KG at 11/8/2021 0906    Acceptance, E, VU by TANK at 10/29/2021 0815    Comment: wife instructed in ROM exercises to do with patient throughout the day she is able to verbalize understanding.                   Point: Body mechanics (In Progress)     Learning Progress Summary           Patient Acceptance, E, NR by KG at 11/10/2021 0839    Acceptance, E, NR by TANK at 11/9/2021 1430    Comment: tried to educate wife that patient is not ready for ambulation she continues to have really high expectations    Acceptance, E, NR by KG at 11/8/2021 0906    Acceptance, E, NR by TANK at 11/5/2021 1000    Acceptance, E, NR by KG at 11/3/2021 1432    Acceptance, E, NR by AE at 11/2/2021 1440    Acceptance, E, NR by AE at 11/1/2021 0910    Acceptance, E, NR by TANK at 10/31/2021 1145    Acceptance, E, NR by JB1 at 10/31/2021 0243    Acceptance, E, NR by JB1 at 10/30/2021 0334    Acceptance, E, VU by TANK at 10/29/2021 0815    Comment: wife instructed in ROM exercises to do with patient throughout the day she is able to verbalize understanding.    Acceptance, E, NR by AE at 10/28/2021 1425    Acceptance, E, NR by KG at 10/27/2021 0927    Acceptance, E, NR by TANK at 10/26/2021 1300    Acceptance, E, NR by KG at 10/25/2021 0828    Acceptance, E, NR by AE at 10/22/2021 0825    Acceptance, E, NR by KG at 10/20/2021 0943    Acceptance, E, NR by KG at 10/18/2021 1017    Acceptance, E, NR by KG at 10/17/2021 1138   Family Acceptance, E, NR by KG at 10/27/2021 0927    Acceptance, E, NR by KG at 10/25/2021 0828    Acceptance, E, NR by KG at 10/20/2021 0943    Acceptance, E, NR by KG at 10/17/2021 1138   Significant Other Acceptance, E, NR by TANK at 11/9/2021 1430    Comment: tried to educate wife that patient is not ready for ambulation she continues to have really high expectations     Acceptance, E, NR by KG at 11/8/2021 0906    Acceptance, E, VU by TANK at 10/29/2021 0815    Comment: wife instructed in ROM exercises to do with patient throughout the day she is able to verbalize understanding.                   Point: Precautions (In Progress)     Learning Progress Summary           Patient Acceptance, E, NR by KG at 11/10/2021 0839    Acceptance, E, NR by TANK at 11/9/2021 1430    Comment: tried to educate wife that patient is not ready for ambulation she continues to have really high expectations    Acceptance, E, NR by KG at 11/8/2021 0906    Acceptance, E, NR by TANK at 11/5/2021 1000    Acceptance, E, NR by KG at 11/3/2021 1432    Acceptance, E, NR by AE at 11/2/2021 1440    Acceptance, E, NR by AE at 11/1/2021 0910    Acceptance, E, NR by TANK at 10/31/2021 1145    Acceptance, E, NR by JB1 at 10/30/2021 0334    Acceptance, E, VU by TANK at 10/29/2021 0815    Comment: wife instructed in ROM exercises to do with patient throughout the day she is able to verbalize understanding.    Acceptance, E, NR by AE at 10/28/2021 1425    Acceptance, E, NR by KG at 10/27/2021 0927    Acceptance, E, NR by TANK at 10/26/2021 1300    Acceptance, E, NR by KG at 10/25/2021 0828    Acceptance, E, NR by AE at 10/22/2021 0825    Acceptance, E, NR by KG at 10/20/2021 0943    Acceptance, E, NR by KG at 10/18/2021 1017    Acceptance, E, NR by KG at 10/17/2021 1138   Family Acceptance, E, NR by KG at 10/27/2021 0927    Acceptance, E, NR by KG at 10/25/2021 0828    Acceptance, E, NR by KG at 10/20/2021 0943    Acceptance, E, NR by KG at 10/17/2021 1138   Significant Other Acceptance, E, NR by TANK at 11/9/2021 1430    Comment: tried to educate wife that patient is not ready for ambulation she continues to have really high expectations    Acceptance, E, NR by KG at 11/8/2021 0906    Acceptance, E, VU by TANK at 10/29/2021 0815    Comment: wife instructed in ROM exercises to do with patient throughout the day she is able to verbalize  understanding.                               User Key     Initials Effective Dates Name Provider Type Discipline    TANK 06/16/21 -  Karrie Cortez, PT Physical Therapist PT    JB1 06/16/21 -  Ian Washington RN Registered Nurse Nurse    KG 05/22/20 -  Joanna Clark, PT Physical Therapist PT    AE 09/21/21 -  Sukumar Ray, PT Physical Therapist PT              PT Recommendation and Plan  Planned Therapy Interventions (PT): balance training, bed mobility training, strengthening, transfer training  Plan of Care Reviewed With: patient  Progress: improving  Outcome Summary: Pt required Gabi x2 for bed mobility. Performed STS transfer from EOB with modA x2. Pt ambulated 5ft from bed to chair with maxA x2 and B UE support. Pt with very forward flexed posture during ambulation and increased difficulty advancing L LE. Pt with increased confusion and periods of impulsitivity this session. Motivated to progress. Continue to progress as appropriate.     Time Calculation:    PT Charges     Row Name 11/10/21 0839             Time Calculation    Start Time 0839  -KG      PT Received On 11/10/21  -KG      PT Goal Re-Cert Due Date 11/18/21  -KG              Time Calculation- PT    Total Timed Code Minutes- PT 24 minute(s)  -KG              Timed Charges    32691 - PT Therapeutic Activity Minutes 24  -KG              Total Minutes    Timed Charges Total Minutes 24  -KG       Total Minutes 24  -KG            User Key  (r) = Recorded By, (t) = Taken By, (c) = Cosigned By    Initials Name Provider Type    KG Joanna Clark, PT Physical Therapist              Therapy Charges for Today     Code Description Service Date Service Provider Modifiers Qty    [id number] HC PT THERAPEUTIC ACT EA 15 MIN 11/10/2021 Joanna Clark, PT GP 2          PT G-Codes  Outcome Measure Options: AM-PAC 6 Clicks Daily Activity (OT)  AM-PAC 6 Clicks Score (PT): 13  AM-PAC 6 Clicks Score (OT): 9    Kalpana Clark  PT  11/10/2021

## 2021-11-10 NOTE — PLAN OF CARE
Goal Outcome Evaluation:  Plan of Care Reviewed With: patient, spouse        Progress: improving  Outcome Summary: Pt demo improved activity tolerance and independence w/ t/fs and grooming ADLs, however conts to be limited d/t impulsivity and significant generalized weakness. Pt w/ excellent effort this session, educated on HEP and encouraged to perform outside treatment session. Recommend cont skilled IPOT POC. Recommend pt DC to IP rehab vs LTACH, pending medical needs.

## 2021-11-10 NOTE — PROGRESS NOTES
"   LOS: 37 days    Patient Care Team:  Andree Langston MD as PCP - General (Internal Medicine)    Reason For Visit:  F/U PIOTR  Subjective           Review of Systems:    Pulm: No soa   CV:  No CP      Objective     albumin human, , ,   albumin human, , ,   atovaquone, 1,500 mg, Per PEG Tube, Daily With Lunch  b complex-vitamin c-folic acid, 1 tablet, Per PEG Tube, Daily  cefepime, 2 g, Intravenous, Q24H  chlorhexidine, 15 mL, Mouth/Throat, Q12H  cholecalciferol, 2,000 Units, Per PEG Tube, Daily  Cyclophosphamide, 100 mg, Oral, Daily  epoetin tatum/tatum-epbx, 10,000 Units, Subcutaneous, Once per day on Tue Thu Sat  famotidine, 20 mg, Per G Tube, Daily  ferrous sulfate, 300 mg, Per PEG Tube, Daily  gabapentin, 100 mg, Per PEG Tube, Q8H  heparin (porcine), 5,000 Units, Subcutaneous, Q12H  insulin detemir, 10 Units, Subcutaneous, Nightly  insulin regular, 0-7 Units, Subcutaneous, Q6H  insulin regular, 4 Units, Subcutaneous, Q6H  ipratropium-albuterol, 3 mL, Nebulization, Q4H While Awake - RT  melatonin, 5 mg, Per G Tube, Nightly  midodrine, 10 mg, Per PEG Tube, Q8H  predniSONE, 30 mg, Per G Tube, Daily With Breakfast  saccharomyces boulardii, 500 mg, Oral, BID  sertraline, 50 mg, Per PEG Tube, Daily             Vital Signs:  Blood pressure 145/74, pulse 85, temperature 98.9 °F (37.2 °C), temperature source Axillary, resp. rate 18, height 172.7 cm (67.99\"), weight 81 kg (178 lb 9.2 oz), SpO2 93 %.    Flowsheet Rows      First Filed Value   Admission Height 172.7 cm (68\") Documented at 10/04/2021 2132   Admission Weight 102 kg (224 lb 13.9 oz) Documented at 10/04/2021 2100          11/09 0701 - 11/10 0700  In: 1504 [I.V.:200]  Out: 2500     Physical Exam:    General Appearance: NAD, alert and cooperative, A LITTLE CONFUSED. THD CATH. TRACH.  Eyes: PER, conjunctivae and sclerae normal, no icterus  Lungs: respirations regular and unlabored, no crepitus, clear to auscultation  Heart/CV: regular rhythm & normal rate, no " murmur, no gallop, no rub and no edema  Abdomen: not distended, soft, non-tender, no masses,  bowel sounds present. PEG.   Skin: No rash, Warm and dry    Radiology:            Labs:  Results from last 7 days   Lab Units 11/10/21  0317 11/09/21  0330 11/08/21  0256   WBC 10*3/mm3 11.12* 9.37 9.98   HEMOGLOBIN g/dL 7.9* 7.6* 7.6*   HEMATOCRIT % 24.4* 23.4* 23.7*   PLATELETS 10*3/mm3 175 172 166     Results from last 7 days   Lab Units 11/10/21  0317 11/09/21  0330 11/08/21  0256 11/07/21  0357 11/06/21  0401 11/06/21  0401 11/05/21  0328 11/05/21  0328 11/04/21  0351 11/04/21  0351   SODIUM mmol/L 137 132* 134* 135*   < > 133*   < > 135*   < > 132*   POTASSIUM mmol/L 4.1 5.5* 4.9 5.5*   < > 4.8   < > 4.0   < > 5.0   CHLORIDE mmol/L 99 91* 92* 95*   < > 93*   < > 95*   < > 89*   CO2 mmol/L 23.0 25.0 24.0 26.0   < > 24.0   < > 25.0   < > 25.0   BUN mg/dL 63* 110* 75* 84*   < > 79*   < > 48*   < > 93*   CREATININE mg/dL 4.00* 6.55* 5.04* 5.72*   < > 5.90*   < > 3.72*   < > 6.66*   CALCIUM mg/dL 8.7 8.8 8.7 8.8   < > 8.9   < > 8.7   < > 8.8   PHOSPHORUS mg/dL 2.6 4.4 3.5  --   --  3.4   < > 3.2   < > 5.0*   MAGNESIUM mg/dL  --   --  2.7*  --   --  2.7*  --  2.4  --  2.9*   ALBUMIN g/dL 4.20 4.00 4.10  --   --  4.20   < > 4.10   < > 4.20    < > = values in this interval not displayed.     Results from last 7 days   Lab Units 11/10/21  0317   GLUCOSE mg/dL 117*       Results from last 7 days   Lab Units 11/08/21  0256   ALK PHOS U/L 84   BILIRUBIN mg/dL 0.9   ALT (SGPT) U/L 23   AST (SGOT) U/L 32     Results from last 7 days   Lab Units 11/08/21  0338   PH, ARTERIAL pH units 7.443   PO2 ART mm Hg 59.6*   PCO2, ARTERIAL mm Hg 43.7   HCO3 ART mmol/L 29.9*             Estimated Creatinine Clearance: 20.3 mL/min (A) (by C-G formula based on SCr of 4 mg/dL (H)).      Assessment       Respiratory Failure Type 1    Anti-GBM nephritis with pulmonary hemorrhage (HCC)    Acute renal failure (ARF) (HCC)    Acute blood loss anemia     Sepsis due to pneumonia (HCC);Klebsiella aerogenes    Idiopathic hypotension    Hyperglycemia    History of tobacco abuse            Impression: ANURIC PIOTR. GOODPASTURES. ANEMIA.            Recommendations: HD 11/11/21. CYTOXAN ORAL UNTIL THE END OF DEC 2021. DECREASE PREDNISONE DOSE TODAY. ? TO [facility].      Dagoberto Dawkins MD  11/10/21  09:19 EST

## 2021-11-11 ENCOUNTER — APPOINTMENT (OUTPATIENT)
Dept: NEPHROLOGY | Facility: HOSPITAL | Age: 66
End: 2021-11-11

## 2021-11-11 ENCOUNTER — ANCILLARY PROCEDURE (OUTPATIENT)
Dept: SPEECH THERAPY | Facility: HOSPITAL | Age: 66
End: 2021-11-11

## 2021-11-11 LAB
ANION GAP SERPL CALCULATED.3IONS-SCNC: 17 MMOL/L (ref 5–15)
BH BB BLOOD EXPIRATION DATE: NORMAL
BH BB BLOOD TYPE BARCODE: 6200
BH BB DISPENSE STATUS: NORMAL
BH BB PRODUCT CODE: NORMAL
BH BB UNIT NUMBER: NORMAL
BUN SERPL-MCNC: 100 MG/DL (ref 8–23)
BUN/CREAT SERPL: 17.2 (ref 7–25)
CALCIUM SPEC-SCNC: 8.9 MG/DL (ref 8.6–10.5)
CHLORIDE SERPL-SCNC: 95 MMOL/L (ref 98–107)
CO2 SERPL-SCNC: 24 MMOL/L (ref 22–29)
CREAT SERPL-MCNC: 5.83 MG/DL (ref 0.76–1.27)
CROSSMATCH INTERPRETATION: NORMAL
GFR SERPL CREATININE-BSD FRML MDRD: 10 ML/MIN/1.73
GFR SERPL CREATININE-BSD FRML MDRD: ABNORMAL ML/MIN/{1.73_M2}
GLUCOSE BLDC GLUCOMTR-MCNC: 102 MG/DL (ref 70–130)
GLUCOSE BLDC GLUCOMTR-MCNC: 130 MG/DL (ref 70–130)
GLUCOSE BLDC GLUCOMTR-MCNC: 161 MG/DL (ref 70–130)
GLUCOSE BLDC GLUCOMTR-MCNC: 176 MG/DL (ref 70–130)
GLUCOSE SERPL-MCNC: 114 MG/DL (ref 65–99)
POTASSIUM SERPL-SCNC: 4.1 MMOL/L (ref 3.5–5.2)
SODIUM SERPL-SCNC: 136 MMOL/L (ref 136–145)
UNIT  ABO: NORMAL
UNIT  RH: NORMAL

## 2021-11-11 PROCEDURE — 25010000002 ALBUMIN HUMAN 25% PER 50 ML

## 2021-11-11 PROCEDURE — 92612 ENDOSCOPY SWALLOW (FEES) VID: CPT

## 2021-11-11 PROCEDURE — 25010000002 HEPARIN (PORCINE) PER 1000 UNITS: Performed by: INTERNAL MEDICINE

## 2021-11-11 PROCEDURE — 25010000002 ONDANSETRON PER 1 MG: Performed by: INTERNAL MEDICINE

## 2021-11-11 PROCEDURE — 82962 GLUCOSE BLOOD TEST: CPT

## 2021-11-11 PROCEDURE — 99232 SBSQ HOSP IP/OBS MODERATE 35: CPT | Performed by: INTERNAL MEDICINE

## 2021-11-11 PROCEDURE — 92507 TX SP LANG VOICE COMM INDIV: CPT

## 2021-11-11 PROCEDURE — 63710000001 INSULIN DETEMIR PER 5 UNITS: Performed by: INTERNAL MEDICINE

## 2021-11-11 PROCEDURE — 80048 BASIC METABOLIC PNL TOTAL CA: CPT | Performed by: INTERNAL MEDICINE

## 2021-11-11 PROCEDURE — 25010000002 EPOETIN ALFA-EPBX 10000 UNIT/ML SOLUTION: Performed by: INTERNAL MEDICINE

## 2021-11-11 PROCEDURE — 63710000001 PREDNISONE PER 5 MG: Performed by: INTERNAL MEDICINE

## 2021-11-11 PROCEDURE — 63710000001 INSULIN REGULAR HUMAN PER 5 UNITS: Performed by: INTERNAL MEDICINE

## 2021-11-11 PROCEDURE — 63710000001 PREDNISONE PER 1 MG: Performed by: INTERNAL MEDICINE

## 2021-11-11 PROCEDURE — P9047 ALBUMIN (HUMAN), 25%, 50ML: HCPCS

## 2021-11-11 PROCEDURE — 94799 UNLISTED PULMONARY SVC/PX: CPT

## 2021-11-11 PROCEDURE — 25010000002 CYCLOPHOSPHAMIDE PER 100 MG: Performed by: INTERNAL MEDICINE

## 2021-11-11 RX ORDER — DOXYCYCLINE 100 MG/1
100 CAPSULE ORAL EVERY 12 HOURS SCHEDULED
Status: DISCONTINUED | OUTPATIENT
Start: 2021-11-11 | End: 2021-11-12 | Stop reason: HOSPADM

## 2021-11-11 RX ORDER — SACCHAROMYCES BOULARDII 250 MG
500 CAPSULE ORAL 2 TIMES DAILY
Status: DISCONTINUED | OUTPATIENT
Start: 2021-11-11 | End: 2021-11-12 | Stop reason: HOSPADM

## 2021-11-11 RX ORDER — ALBUMIN (HUMAN) 12.5 G/50ML
SOLUTION INTRAVENOUS
Status: COMPLETED
Start: 2021-11-11 | End: 2021-11-11

## 2021-11-11 RX ORDER — ALBUMIN (HUMAN) 12.5 G/50ML
12.5 SOLUTION INTRAVENOUS AS NEEDED
Status: ACTIVE | OUTPATIENT
Start: 2021-11-11 | End: 2021-11-11

## 2021-11-11 RX ADMIN — HEPARIN SODIUM 5000 UNITS: 5000 INJECTION, SOLUTION INTRAVENOUS; SUBCUTANEOUS at 08:49

## 2021-11-11 RX ADMIN — CHLORHEXIDINE GLUCONATE 15 ML: 1.2 SOLUTION ORAL at 08:49

## 2021-11-11 RX ADMIN — Medication 100 MG: at 18:13

## 2021-11-11 RX ADMIN — SERTRALINE HYDROCHLORIDE 50 MG: 50 TABLET ORAL at 14:24

## 2021-11-11 RX ADMIN — GABAPENTIN 100 MG: 100 CAPSULE ORAL at 14:26

## 2021-11-11 RX ADMIN — HEPARIN SODIUM 5000 UNITS: 5000 INJECTION, SOLUTION INTRAVENOUS; SUBCUTANEOUS at 21:23

## 2021-11-11 RX ADMIN — DOXYCYCLINE 100 MG: 100 CAPSULE ORAL at 21:26

## 2021-11-11 RX ADMIN — FAMOTIDINE 20 MG: 20 TABLET, FILM COATED ORAL at 14:23

## 2021-11-11 RX ADMIN — GABAPENTIN 100 MG: 100 CAPSULE ORAL at 06:14

## 2021-11-11 RX ADMIN — Medication 1 TABLET: at 14:24

## 2021-11-11 RX ADMIN — ALBUMIN HUMAN 25 G: 0.25 SOLUTION INTRAVENOUS at 10:30

## 2021-11-11 RX ADMIN — EPOETIN ALFA-EPBX 10000 UNITS: 10000 INJECTION, SOLUTION INTRAVENOUS; SUBCUTANEOUS at 10:53

## 2021-11-11 RX ADMIN — IPRATROPIUM BROMIDE AND ALBUTEROL SULFATE 3 ML: 2.5; .5 SOLUTION RESPIRATORY (INHALATION) at 15:19

## 2021-11-11 RX ADMIN — DOXYCYCLINE 100 MG: 100 CAPSULE ORAL at 14:26

## 2021-11-11 RX ADMIN — GABAPENTIN 100 MG: 100 CAPSULE ORAL at 21:24

## 2021-11-11 RX ADMIN — Medication 500 MG: at 21:24

## 2021-11-11 RX ADMIN — IPRATROPIUM BROMIDE AND ALBUTEROL SULFATE 3 ML: 2.5; .5 SOLUTION RESPIRATORY (INHALATION) at 07:00

## 2021-11-11 RX ADMIN — IPRATROPIUM BROMIDE AND ALBUTEROL SULFATE 3 ML: 2.5; .5 SOLUTION RESPIRATORY (INHALATION) at 20:01

## 2021-11-11 RX ADMIN — IPRATROPIUM BROMIDE AND ALBUTEROL SULFATE 3 ML: 2.5; .5 SOLUTION RESPIRATORY (INHALATION) at 22:52

## 2021-11-11 RX ADMIN — INSULIN HUMAN 4 UNITS: 100 INJECTION, SOLUTION PARENTERAL at 00:09

## 2021-11-11 RX ADMIN — ATOVAQUONE 1500 MG: 750 SUSPENSION ORAL at 14:26

## 2021-11-11 RX ADMIN — IPRATROPIUM BROMIDE AND ALBUTEROL SULFATE 3 ML: 2.5; .5 SOLUTION RESPIRATORY (INHALATION) at 12:21

## 2021-11-11 RX ADMIN — MIDODRINE HYDROCHLORIDE 10 MG: 10 TABLET ORAL at 21:24

## 2021-11-11 RX ADMIN — CHLORHEXIDINE GLUCONATE 15 ML: 1.2 SOLUTION ORAL at 21:24

## 2021-11-11 RX ADMIN — ONDANSETRON 4 MG: 2 INJECTION INTRAMUSCULAR; INTRAVENOUS at 15:35

## 2021-11-11 RX ADMIN — ALBUMIN HUMAN 25 G: 0.25 SOLUTION INTRAVENOUS at 10:52

## 2021-11-11 RX ADMIN — INSULIN HUMAN 4 UNITS: 100 INJECTION, SOLUTION PARENTERAL at 18:10

## 2021-11-11 RX ADMIN — PREDNISONE 30 MG: 20 TABLET ORAL at 14:25

## 2021-11-11 RX ADMIN — INSULIN DETEMIR 10 UNITS: 100 INJECTION, SOLUTION SUBCUTANEOUS at 21:23

## 2021-11-11 RX ADMIN — INSULIN HUMAN 2 UNITS: 100 INJECTION, SOLUTION PARENTERAL at 00:08

## 2021-11-11 RX ADMIN — MIDODRINE HYDROCHLORIDE 10 MG: 10 TABLET ORAL at 14:26

## 2021-11-11 RX ADMIN — MINERAL SUPPLEMENT IRON 300 MG / 5 ML STRENGTH LIQUID 100 PER BOX UNFLAVORED 300 MG: at 14:26

## 2021-11-11 RX ADMIN — Medication 2000 UNITS: at 14:24

## 2021-11-11 RX ADMIN — HEPARIN SODIUM 1600 UNITS: 1000 INJECTION INTRAVENOUS; SUBCUTANEOUS at 12:36

## 2021-11-11 RX ADMIN — INSULIN HUMAN 2 UNITS: 100 INJECTION, SOLUTION PARENTERAL at 18:10

## 2021-11-11 RX ADMIN — Medication 5 MG: at 21:24

## 2021-11-11 NOTE — PLAN OF CARE
Goal Outcome Evaluation:    SLP re-evaluation and treatment completed. Will continue to address dysphagia, will sign off for PMV trials. Please see note for further details and recommendations.

## 2021-11-11 NOTE — PROGRESS NOTES
Multidisciplinary Rounds    Time: 20min  Patient Name: Keshav Dickens  Date of Encounter: 11/11/21 09:30 EST  MRN: [id number]  Admission date: 10/4/2021      Reason for visit: MDR. RD to continue to follow per protocol.     Additional information obtained during MDR: Pt with nausea and emesis x1 yesterday (11/10). No nausea this AM. Receiving HD this AM. SLP to re-assess today.     Current diet: NPO Diet    EN: [employer] Renal at 50 ml/hr and free water at 30 ml q  2hrs via PEG      Intervention:  Follow treatment plan  Care plan reviewed    Follow up:   Per protocol      Jena Pang, MS RD/LD [facility]  09:30 EST

## 2021-11-11 NOTE — PLAN OF CARE
Goal Outcome Evaluation:            Pt disoriented to time/situation, MAEE, stood at bedside a couple times throughout the night. Sats >90% on trach collar throughout shift. Inner trach cannula/ties changed per protocol. NSR, VSS, tolerated midodrine. PEG in place. Tolerating TF, hyperactive BS, no BM. Rash on back/flanks, lotion applied and seemed to help with itching.

## 2021-11-11 NOTE — PROGRESS NOTES
INTENSIVIST   PROGRESS NOTE     Hospital:  LOS: 38 days      S     Keshav 66 y.o. male is followed for: No chief complaint on file.       Respiratory Failure Type 1    Anti-GBM nephritis with pulmonary hemorrhage (HCC)    Pneumonia Klebsiella aerogenes    As an Intensivist, we provide an integrated approach to the ICU patient and family, medical management of comorbid conditions, including but not limited to electrolytes, glycemic control, organ dysfunction, lead interdisciplinary rounds and coordinate the care with all other services, including those from other specialists.     Interval History:  Uneventful night.    Less amount of respiratory secretions.    Trach open only during the night. Trach with cap during the day. On low flow nasal cannula at 1 lpm.    The patient has a COVID HM Topic on their chart, and they are fully vaccinated.     Temp  Min: 97.9 °F (36.6 °C)  Max: 98.9 °F (37.2 °C)       History     Last Reviewed by Rc Mercer MD on 11/8/2021 at  5:33 PM    Sections Reviewed    Medical, Family, Surgical, Tobacco, Alcohol, Drug Use, Sexual Activity,   Social Documentation      Problem list reviewed by Rc Mercer MD on 11/8/2021 at  5:33 PM  Medicines reviewed by Rc Mercer MD on 11/8/2021 at  5:33 PM  Allergies reviewed by Rc Mercer MD on 11/8/2021 at  5:33 PM       The patient's relevant past medical, surgical and social history were reviewed and updated in Epic as appropriate.        O     Vitals:  Temp: 98.2 °F (36.8 °C) (11/11/21 1245) Temp  Min: 97.9 °F (36.6 °C)  Max: 98.9 °F (37.2 °C)   Temp core:      BP: 125/59 (11/11/21 1245) BP  Min: 88/62  Max: 144/65   Pulse: 76 (11/11/21 1245) Pulse  Min: 63  Max: 77   Resp: 18 (11/11/21 1245) Resp  Min: 14  Max: 20   SpO2: 99 % (11/11/21 1245) SpO2  Min: 79 %  Max: 100 %   Device: nasal cannula, humidified (11/11/21 1245)    Flow Rate: 1 (11/11/21 1245) Flow (L/min)  Min: 1  Max: 6     Intake/Ouptut 24 hrs (7:00AM - 6:59 AM)  Intake &  Output (last 3 days)       11/08 0701 11/09 0700 11/09 0701  11/10 0700 11/10 0701 11/11 0700 11/11 0701 11/12 0700    I.V. (mL/kg)  200 (2.5) 24 (0.3)     Blood        Other 162 213 91     NG/GT 1010 1091 539     IV Piggyback 100       Total Intake(mL/kg) 1272 (15.7) 1504 (18.6) 654 (8.1)     Urine (mL/kg/hr)  0 (0)      Other  2500  1770    Stool  0      Total Output  2500  1770    Net +1272 -996 +654 -1770            Urine Unmeasured Occurrence  0 x      Stool Unmeasured Occurrence  1 x 1 x           Wt Readings from Last 3 Encounters:   11/03/21 81 kg (178 lb 9.2 oz)       Physical Examination  Telemetry:  Rhythm: normal sinus rhythm (11/11/21 1245)     QTc Interval (Sec): 0.38 (10/25/21 2000)   Constitutional:  No acute distress.   Cardiovascular: RRR.   Normal heart sounds.  No murmurs, gallop or rub.   Respiratory: Normal breath sounds  No adventitious sounds.   Abdominal:  Soft with no tenderness.  No distension.   No HSM.   Extremities: Warm.  Dry.  No cyanosis.  Trace Edema   Neurological:   Awake.  Best Eye Response: 4-->(E4) spontaneous (11/11/21 1000)  Best Motor Response: 6-->(M6) obeys commands (11/11/21 1000)  Best Verbal Response: 4-->(V4) confused (11/11/21 1000)  [place] Coma Scale Score: 14 (11/11/21 1000)     Results Reviewed:  Laboratory  Microbiology  Radiology  Pathology    Hematology:  Results from last 7 days   Lab Units 11/10/21  0317 11/09/21  0330   WBC 10*3/mm3 11.12* 9.37   HEMOGLOBIN g/dL 7.9* 7.6*   MCV fL 98.8* 98.3*   PLATELETS 10*3/mm3 175 172   NEUTROS ABS 10*3/mm3 9.54* 8.01*   LYMPHS ABS 10*3/mm3 0.36* 0.38*   EOS ABS 10*3/mm3 0.19 0.24     Chemistry:  Estimated Creatinine Clearance: 13.9 mL/min (A) (by C-G formula based on SCr of 5.83 mg/dL (H)).  Results from last 7 days   Lab Units 11/11/21  0353 11/10/21  0317   SODIUM mmol/L 136 137   POTASSIUM mmol/L 4.1 4.1   CHLORIDE mmol/L 95* 99   CO2 mmol/L 24.0 23.0   BUN mg/dL 100* 63*   CREATININE mg/dL 5.83* 4.00*   GLUCOSE  mg/dL 114* 117*     Results from last 7 days   Lab Units 11/11/21  0353 11/10/21  0317 11/09/21  0330 11/09/21  0330 11/08/21  0256 11/08/21  0256 11/07/21  0357 11/06/21  0401   CALCIUM mg/dL 8.9 8.7   < > 8.8   < > 8.7   < > 8.9   MAGNESIUM mg/dL  --   --   --   --   --  2.7*  --  2.7*   PHOSPHORUS mg/dL  --  2.6  --  4.4   < > 3.5  --  3.4    < > = values in this interval not displayed.     Biomarkers:  Results from last 7 days   Lab Units 11/10/21  0317 11/08/21 0256 11/06/21  0401   CRP mg/dL  --  6.36*  --    PROCALCITONIN ng/mL 1.84* 2.26* 2.49*     COVID-19  Lab Results   Component Value Date    COVID19 Not Detected 10/04/2021       Images:  No radiology results for the last day    Echo:  Results for orders placed during the hospital encounter of 10/04/21    Adult Transthoracic Echo Complete W/ Cont if Necessary Per Protocol    Interpretation Summary  · Left ventricular ejection fraction appears to be 56 - 60%. Left ventricular systolic function is normal.  · Left ventricular wall thickness is consistent with mild to moderate concentric hypertrophy.  · The inferior vena cava is dilated.    Results: Reviewed.  I reviewed the patient's new laboratory and imaging results.  I independently reviewed the patient's new images.    Medications: Reviewed.    Assessment/Plan   A / P     Keshav is a 66 y.o. male admitted on 10/4/2021 with Respiratory failure (HCC) [J96.90]: admitted initially to [facility] 9/27/21 with 2-3 week h/o hemoptysis.  He required intubation on 10/1/21.  CTA at OSH showed diffuse GGO and 8 mm RML nodule.  Patient was also noted to be in Renal failure with concerns for Pulmonary-Renal syndrome.  He was treated with 3 days of pulse IV solumedrol. Ultimately anti-GBM Ab was positive concerning for [place]. He had temporary HD catheter placed at OSH for PIOTR and HD was started.     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     He was transferred to [facility] 10/4/2021 for higher level of care.  Daily plasma exchange was initiated on 10/5/21 for total of 14 sessions, and he has continued on moderate dose steroids after receiving pulse.  Patient has also been initiated on Cytoxan.       1. Respiratory Failure  1. DAH  2. Trach 10/15/21  3. Trach downsized to 6-0 cuffless fenestrated Shiley on 11/2.  4. Klebsiella pneumonia  1. Doxy: Abs per ID  5. Tobacco use 45 pack-year, quit 1 month PTA  2. Anti GBM nephritis  1. On iHD  2. Prednisone 40 mg/d and Atovaquone for PJ prophylaxis.  3. Cyclophosphamide 100 mg/d  3. HTN  4. Nutrition Support: Enteral Nutrition   1. PEG 10/15/21    Lab Results   Lab Value Date/Time    CRP 6.36 (H) 11/08/2021 0256    CRP 2.55 (H) 11/02/2021 0356    CRP 2.48 (H) 10/26/2021 0340    PREALBUMIN 25.9 11/08/2021 1252    PREALBUMIN 28.2 11/02/2021 0356    PREALBUMIN 34.1 10/26/2021 0340    TRIG 315 (H) 11/02/2021 0356    TRIG 291 (H) 10/26/2021 0340    TRIG 151 (H) 10/19/2021 0703     5. T2DM    Results from last 7 days   Lab Units 11/11/21  1150 11/11/21  0507 11/10/21  2315 11/10/21  1726 11/10/21  1210 11/10/21  0520   GLUCOSE mg/dL 130 102 193* 116 150* 127     Lab Results   Lab Value Date/Time    HGBA1C 5.00 11/06/2021 0401       Nutrition Support: Diet, Tube Feeding Tube Feeding Formula: Novasource Renal (Electrolyte Restricted)   Modulars: ,    Diet: NPO Diet   Advance Directives: Code Status and Medical Interventions:   Ordered at: 10/04/21 2023     Code Status (Patient has no pulse and is not breathing):    CPR (Attempt to Resuscitate)     Medical Interventions (Patient has pulse or is breathing):    Full        Plan:    1. Continue PT, OT and ST  2. Antibiotics per ID: Cefepime changed to Doxy  3. BP better, observe off midodrine.  4. HD today as per Nephrologist.  5. Goal: Glucose < 180 mg/dL.   1. No changes in current doses of insulin  6. Disposition:  Keep in ICU.   1. Awaiting bed a LTAC (in [place], close to home), probably as soon tomorrow (11/12/21)    Plan of care and goals reviewed during interdisciplinary rounds.  I discussed the patient's findings and my recommendations with patient    Level of Risk is High due to:  illness with threat to life or bodily function.     Time: 25 minutes, in direct patient care, with the patient and/or on the warren coordinating care with other health care providers.     I have spent > 50% percent of this time, counseling and discussing management.     [x]  Primary Attending  []  Consultant

## 2021-11-11 NOTE — MBS/VFSS/FEES
Acute Care - Speech Language Pathology Treatment Note  [facility]     Patient Name: Keshav Dickens  : 1955  MRN: [id number]  Today's Date: 2021               Admit Date: 10/4/2021     Visit Dx:    ICD-10-CM ICD-9-CM   1. Acute respiratory failure with hypoxia  J96.01 518.81   2. Examination for normal comparison for clinical research  Z00.6 V70.7   3. Diffuse pulmonary alveolar hemorrhage  R04.89 786.30   4. 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.0 446.21   5. Voice impairment  R49.9 784.40   6. Oropharyngeal dysphagia  R13.12 787.22     Patient Active Problem List   Diagnosis   • Goodpasture Syndrome   • Respiratory Failure Type 1   • Acute renal failure (ARF) (HCC)   • Acute blood loss anemia   • Sepsis due to pneumonia (HCC);Klebsiella aerogenes   • Idiopathic hypotension   • Hyperglycemia   • History of tobacco abuse     Past Medical History:   Diagnosis Date   • Goodpasture's syndrome  10/4/2021   • Hypertension      Past Surgical History:   Procedure Laterality Date   • CHOLECYSTECTOMY     • TRACHEOSTOMY AND PEG TUBE INSERTION N/A 10/15/2021    Procedure: TRACHEOSTOMY AND PERCUTANEOUS ENDOSCOPIC GASTROSTOMY TUBE INSERTION;  Surgeon: Abdon Roberts MD;  Location: [facility];  Service: General;  Laterality: N/A;       SLP Recommendation and Plan           Swallow Criteria for Skilled Therapeutic Interventions Met: demonstrates skilled criteria (21)  Anticipated Discharge Disposition (SLP): unknown, anticipate therapy at next level of care (21)     Therapy Frequency (Swallow): 5 days per week (21)  Predicted Duration Therapy Intervention (Days): until discharge (21)  Daily Summary of Progress (SLP): progress toward functional goals as expected (21)  Plan for Continued Treatment (SLP): Patient tolerating capped trach with adequate voicing, without s/s of difficulty or decreased O2 saturations x > 1hour. Will sign off for PMV  trials at this time. Continue to follow to address dysphagia in tx. (11/11/21 1330)              SLP EVALUATION (last 72 hours)     SLP SLC Evaluation     Row Name 11/11/21 1330                   Communication Assessment/Intervention    Document Type re-evaluation; therapy note (daily note)  -                  SLP Clinical Impressions    Plan for Continued Treatment (SLP) Patient tolerating capped trach with adequate voicing, without s/s of difficulty or decreased O2 saturations x > 1hour. Will sign off for PMV trials at this time. Continue to follow to address dysphagia in tx.  -CH              User Key  (r) = Recorded By, (t) = Taken By, (c) = Cosigned By    Initials Name Effective Dates    CH Samira Carlson, MS CCC-SLP 06/16/21 -                    EDUCATION  The patient has been educated in the following areas:     Speaking Valve.           SLP GOALS     Row Name 11/11/21 1330 11/10/21 1650          Oral Nutrition/Hydration Goal 1 (SLP)    Oral Nutrition/Hydration Goal 1, SLP LTG: Pt. will safely consume a PO diet without aspiration or distress  -CH LTG: Pt. will safely consume a PO diet without aspiration or distress  -AC     Time Frame (Oral Nutrition/Hydration Goal 1, SLP) by discharge  -CH by discharge  -AC     Progress/Outcomes (Oral Nutrition/Hydration Goal 1, SLP) continuing progress toward goal  -CH continuing progress toward goal  -AC            Oral Nutrition/Hydration Goal 2 (SLP)    Oral Nutrition/Hydration Goal 2, SLP Patient will tolerate pureed and ice/thin liquid trials without s/s of aspiration to determine readiness for repeat instrumental evaluation of the swallow  -CH Patient will tolerate pureed and ice/thin liquid trials without s/s of aspiration to determine readiness for repeat instrumental evaluation of the swallow  -AC     Time Frame (Oral Nutrition/Hydration Goal 2, SLP) short term goal (STG)  -CH short term goal (STG)  -AC     Barriers (Oral Nutrition/Hydration Goal 2, SLP) -- No  overt clinical s/sxs aspiration noted w/ ice chips, thin via tsp, or w/ pudding trials RN administered w/ medication.  -AC     Progress/Outcomes (Oral Nutrition/Hydration Goal 2, SLP) -- continuing progress toward goal  -AC            Lingual Strengthening Goal 1 (SLP)    Activity (Lingual Strengthening Goal 1, SLP) increase tongue back strength  -CH --     Increase Tongue Back Strength swallow trials; lingual resistance exercises  - swallow trials; lingual resistance exercises  -AC     [place]/Accuracy (Lingual Strengthening Goal 1, SLP) with minimal cues (75-90% accuracy)  - with minimal cues (75-90% accuracy)  -     Time Frame (Lingual Strengthening Goal 1, SLP) short term goal (STG)  - short term goal (STG)  -AC     Progress/Outcomes (Lingual Strengthening Goal 1, SLP) -- goal ongoing  -AC            Pharyngeal Strengthening Exercise Goal 1 (SLP)    Activity (Pharyngeal Strengthening Goal 1, SLP) increase timing; increase superior movement of the hyolaryngeal complex; increase anterior movement of the hyolaryngeal complex; increase closure at entrance to airway/closure of airway at glottis; increase squeeze/positive pressure generation  - --     Increase Timing prepping - 3 second prep or suck swallow or 3-step swallow  -CH prepping - 3 second prep or suck swallow or 3-step swallow  -AC     Increase Superior Movement of the Hyolaryngeal Complex falsetto  -CH falsetto  -AC     Increase Anterior Movement of the Hyolaryngeal Complex chin tuck against resistance (CTAR)  -CH chin tuck against resistance (CTAR)  -AC     Increase Closure at Entrance to Airway/Closure of Airway at Glottis super-supraglottic swallow; breath hold exercises  - super-supraglottic swallow; breath hold exercises  -AC     Increase Squeeze/Positive Pressure Generation hard effortful swallow  -CH hard effortful swallow  -AC     [place]/Accuracy (Pharyngeal Strengthening Goal 1, SLP) with minimal cues (75-90% accuracy)  -  with minimal cues (75-90% accuracy)  -AC     Time Frame (Pharyngeal Strengthening Goal 1, SLP) short term goal (STG)  -CH short term goal (STG)  -AC     Barriers (Pharyngeal Strengthening Goal 1, SLP) -- Pt completed effortful swallow x1, then declined to complete further reps.  -AC     Progress/Outcomes (Pharyngeal Strengthening Goal 1, SLP) -- progress slower than expected  -AC            Tolerate Speaking Valve Placement Goal 1 (SLP)    Tolerate Speaking Valve Placement Goal 1 (SLP) speaking valve >30 min; 80%; with minimal cues (75-90%)  -CH --     Time Frame (Tolerate Speaking Valve Placement Goal 1, SLP) short term goal (STG)  -CH --     Progress (Tolerate Speaking Valve Placement Goal 1, SLP) 100%; independently (over 90% accuracy)  -CH --     Progress/Outcomes (Tolerate Speaking Valve Placement Goal 1, SLP) goal met  -CH --     Comment (Tolerate Speaking Valve Placement Goal 1, SLP) Patient able to tolerate for over an hour without difficulty or decreased O2 saturations.  -CH --            Audible Speech with Speaking Valve Goal 1 (SLP)    Audible Speech Goal 1 (SLP) 3 feet; 80%; with minimal cues (75-90%)  -CH --     Time Frame (Audible Speech Goal 1, SLP) short term goal (STG)  -CH --     Progress (Audible Speech Goal 1, SLP) 100%; independently (over 90% accuracy)  -CH --     Progress/Outcomes (Audible Speech Goal 1, SLP) goal met  -CH --            Additional Goal 1 (SLP)    Additional Goal 1, SLP LTG: Pt will improve communication w/ use of PMV to functionally communicate wants/needs w/ 90% accuracy w/ min cues  -CH --     Time Frame (Additional Goal 1, SLP) by discharge  -CH --     Progress/Outcomes (Additional Goal 1, SLP) goal met  -CH --           User Key  (r) = Recorded By, (t) = Taken By, (c) = Cosigned By    Initials Name Provider Type    Lona Whiting MS CCC-SLP Speech and Language Pathologist    Samira Mahmood MS CCC-SLP Speech and Language Pathologist                        Time  Calculation:      Time Calculation- SLP     Row Name 21 1513             Time Calculation- SLP    SLP Start Time 1330  -CH      SLP Received On 21  -CH              Untimed Charges    SLP Eval/Re-eval  ST Fiberoptic Endo Eval Swallow - 22945  -CH      91861-XT Fiberoptic Endo Eval Swallow Minutes 120  -CH      [id number]-VM Treatment/ST Modification Prosth Aug Alter  30  -CH              Total Minutes    Untimed Charges Total Minutes 150  -CH       Total Minutes 150  -CH            User Key  (r) = Recorded By, (t) = Taken By, (c) = Cosigned By    Initials Name Provider Type     Samira Carlson, MS CCC-SLP Speech and Language Pathologist                Therapy Charges for Today     Code Description Service Date Service Provider Modifiers Qty    [id number]  ST FIBEROPTIC ENDO EVAL SWALL 8 2021 Samira Carlson MS CCC-SLP GN 1    [id number]  ST TREATMENT SPEECH 2 2021 Samira Carlson MS CCC-SLP GN 1                     Samira Carlson MS CCC-SLP  2021 and Acute Care - Speech Language Pathology   Swallow Re-Evaluation [facility]   Fiberoptic Endoscopic Evaluation of Swallowing (FEES)       Patient Name: Keshav Dickens  : 1955  MRN: [id number]  Today's Date: 2021               Admit Date: 10/4/2021    Visit Dx:     ICD-10-CM ICD-9-CM   1. Acute respiratory failure with hypoxia  J96.01 518.81   2. Examination for normal comparison for clinical research  Z00.6 V70.7   3. Diffuse pulmonary alveolar hemorrhage  R04.89 786.30   4. 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.0 446.21   5. Voice impairment  R49.9 784.40   6. Oropharyngeal dysphagia  R13.12 787.22     Patient Active Problem List   Diagnosis   • Goodpasture Syndrome   • Respiratory Failure Type 1   • Acute renal failure (ARF) (HCC)   • Acute blood loss anemia   • Sepsis due to pneumonia (HCC);Klebsiella aerogenes   • Idiopathic hypotension   • Hyperglycemia   • History of tobacco abuse     Past  Medical History:   Diagnosis Date   • Goodpasture's syndrome  10/4/2021   • Hypertension      Past Surgical History:   Procedure Laterality Date   • CHOLECYSTECTOMY     • TRACHEOSTOMY AND PEG TUBE INSERTION N/A 10/15/2021    Procedure: TRACHEOSTOMY AND PERCUTANEOUS ENDOSCOPIC GASTROSTOMY TUBE INSERTION;  Surgeon: Abdon Roberts MD;  Location: [facility];  Service: General;  Laterality: N/A;       SLP Recommendation and Plan  SLP Swallowing Diagnosis: severe, pharyngeal dysphagia (11/11/21 1330)  SLP Diet Recommendation: long term alternate methods of nutrition/hydration, other (see comments) (continue alternate means of nutrition with pudding snack 3x per day) (11/11/21 1330)  Recommended Precautions and Strategies: general aspiration precautions (11/11/21 1330)  SLP Rec. for Method of Medication Administration: meds via alternate route (ALthough patient appears safe for critical PO meds with pudding thick consistency, cognition is impacting and he likely will not swallow pill capsules consistently. See recs for critical PO meds above.) (11/11/21 1330)     Monitor for Signs of Aspiration: yes, notify SLP if any concerns (11/11/21 1330)     Swallow Criteria for Skilled Therapeutic Interventions Met: demonstrates skilled criteria (11/11/21 1330)  Anticipated Discharge Disposition (SLP): unknown, anticipate therapy at next level of care (11/11/21 1330)  Rehab Potential/Prognosis, Swallowing: good, to achieve stated therapy goals (11/11/21 1330)  Therapy Frequency (Swallow): 5 days per week (11/11/21 1330)  Predicted Duration Therapy Intervention (Days): until discharge (11/11/21 1330)     Daily Summary of Progress (SLP): progress toward functional goals as expected (11/11/21 1330)    Plan for Continued Treatment (SLP): Patient tolerating capped trach with adequate voicing, without s/s of difficulty or decreased O2 saturations x > 1hour. Will sign off for PMV trials at this time. Continue to follow to address  dysphagia in tx. (11/11/21 1330)                     SWALLOW EVALUATION (last 72 hours)     SLP Adult Swallow Evaluation     Row Name 11/11/21 1330 11/10/21 1650                Rehab Evaluation    Document Type -- therapy note (daily note)  -       Subjective Information no complaints  - no complaints  -       Patient Observations alert; poorly cooperative  confused  - lethargic  -       Patient/Family/Caregiver Comments/Observations No family present  - Pt lethargic, but woke up & opened eyes to verbal stimuli & sternal rub. No family present.  -       Care Plan Review care plan/treatment goals reviewed  - evaluation/treatment results reviewed; care plan/treatment goals reviewed; risks/benefits reviewed; current/potential barriers reviewed  -       Patient Effort poor  -CH poor  -       Comment FEES completed in collaboration with   - --       Symptoms Noted During/After Treatment none  - --                General Information    Patient Profile Reviewed yes  - --       Pertinent History Of Current Problem see prior evaluation. Patient unable to consistently follow commands for use of compensations to take critical oral meds. FEES completed to re-assess swallow and pill management  - --       Current Method of Nutrition NPO; gastrostomy feedings  - --       Precautions/Limitations, Vision WFL; for purposes of eval  - --       Precautions/Limitations, Hearing WFL; for purposes of eval  - --       Prior Level of Function-Communication unknown  -CH --       Prior Level of Function-Swallowing unknown  - --       Plans/Goals Discussed with patient; agreed upon  - --       Barriers to Rehab medically complex; cognitive status  - --       Patient's Goals for Discharge return to PO diet  - --                Pain    Additional Documentation Pain Scale: FACES Pre/Post-Treatment (Group)  - --                Pain Scale: FACES Pre/Post-Treatment    Pain: FACES Scale, Pretreatment  0-->no hurt  -CH 0-->no hurt  -AC       Posttreatment Pain Rating 0-->no hurt  -CH 0-->no hurt  -AC                Fiberoptic Endoscopic Evaluation of Swallowing (FEES)    Risks/Benefits Reviewed risks/benefits explained; patient; agreed to eval  -CH --       Tracheostomy other (see comments)  trach capped  -CH --       Nasal Entry right:  -CH --       Scope serial number/identification 338  -CH --                Anatomy and Physiology    Anatomic Considerations no anatomic structural deviation  -CH --       Base of Tongue symmetrical  -CH --       Epiglottis WFL  -CH --       Laryngeal Function Breathing decreased movement right  -CH --       Laryngeal Function Phonation decreased movement right  -CH --       Secretion Rating Scale (Gilbert et al. 1996) 2- secretions initially outside the vestibule but later entered the vestibule  -CH --       Secretion Description thin; clear  -CH --       Spontaneous Swallow frequency reduced  -CH --       Sensory sensed scope  -CH --       Utensils Used Spoon; Cup; Straw  -CH --       Consistencies Trialed ice chips; thin liquids; nectar-thick liquids; honey-thick liquids; pudding/puree  -CH --                FEES Interpretation    Oral Phase WFL; solids not tested; other (see comments)  cognition impacting patients ability to accept critical oral medications  -CH --       Oral Phase, Comment Pt.expectorated pill capsule  -CH --                Initiation of Pharyngeal Swallow    Initiation of Pharyngeal Swallow bolus in pyriform sinuses  -CH --       Pharyngeal Phase impaired pharyngeal phase of swallowing  -CH --       Penetration During the Swallow secondary to reduced laryngeal elevation; secondary to reduced vestibular closure; nectar-thick liquids  -CH --       Penetration After the Swallow nectar-thick liquids; honey-thick liquids; secondary to residue; in pyriform sinuses; in valleculae  -CH --       Aspiration After the Swallow honey-thick liquids; nectar-thick liquids;  secondary to residue; in laryngeal vestibule  -CH --       Response to Penetration deep; no response; response with cue only  -CH --       Response to Aspiration no response, silent aspiration; response with cue only; cleared subglottic material  -CH --       Rosenbek's Scale nectar:; honey:; 8-->Level 8  -CH --       Residue nectar-thick liquids; honey-thick liquids; pudding/puree; diffuse within pharynx; secondary to reduced base of tongue retraction; secondary to reduced laryngeal elevation; secondary to reduced hyolaryngeal excursion  -CH --       Response to Residue cleared residue; with cued swallow; with spontaneous subsequent swallow  -CH --       FEES Summary Patient presents with severe pharyngeal dysphagia with delay to the pyriforms and deep penetration of NT liquids during the swallow and aspiration of nectar thick and honey thick liquids after the swallow resulting from residue mixing with secretions and spilling over the lateral channels and posterior commissure. Penetration/aspiration was all silent. Patient tolerated pudding trials without penetration/aspiration. Attempted pill capsules with pureed and honey thick liquids, however, patient repeatedly expectorated capsules. Patient appears safe to take critical meds whole with pudding consistency, however, he may not accept oral medications as cognition is impacting at this time. Recommend pudding thick snack 3x per day.  -CH --                Clinical Impression    Daily Summary of Progress (SLP) progress toward functional goals as expected  - progress toward functional goals is gradual  -AC       Barriers to Overall Progress (SLP) cognitive status  -CH Cognitive status, lethargy  -AC       SLP Swallowing Diagnosis severe; pharyngeal dysphagia  -CH --       Functional Impact risk of aspiration/pneumonia  -CH --       Rehab Potential/Prognosis, Swallowing good, to achieve stated therapy goals  -CH --       Swallow Criteria for Skilled Therapeutic  Interventions Met demonstrates skilled criteria  -CH --       Plan for Continued Treatment (SLP) -- Pt assessed while RN attempting to administer critical PO medication using recommended compensatory strategies. Despite max cues to avoid masticating capsules & use breath hold + swallow w/ pudding + cough compensations, pt u/a to consistently follow commands. Accepted ice chips/thin H2O w/o s/sxs aspiration (aspiration was silent per last FEES). Will f/u and potentially attempt repeat FEES tomorrow if pt alert & cognitively appropriate to participate. Rec: cont NPO, alternative means of nutrition. Pt would benefit from cont'd dysphagia tx.  -AC                Recommendations    Therapy Frequency (Swallow) 5 days per week  -CH --       Predicted Duration Therapy Intervention (Days) until discharge  -CH --       SLP Diet Recommendation long term alternate methods of nutrition/hydration; other (see comments)  continue alternate means of nutrition with pudding snack 3x per day  - --       Recommended Precautions and Strategies general aspiration precautions  -CH --       Oral Care Recommendations Oral Care BID/PRN; Suction toothbrush  - --       SLP Rec. for Method of Medication Administration meds via alternate route  ALthough patient appears safe for critical PO meds with pudding thick consistency, cognition is impacting and he likely will not swallow pill capsules consistently. See recs for critical PO meds above.  -CH --       Monitor for Signs of Aspiration yes; notify SLP if any concerns  -CH --       Anticipated Discharge Disposition (SLP) unknown; anticipate therapy at next level of care  - --             User Key  (r) = Recorded By, (t) = Taken By, (c) = Cosigned By    Initials Name Effective Dates    AC Lona Gaffney MS CCC-SLP 06/16/21 -      Samira Carlson MS CCC-SLP 06/16/21 -                 EDUCATION  The patient has been educated in the following areas:   Home Exercise Program (HEP)  Dysphagia (Swallowing Impairment) Oral Care/Hydration Modified Diet Instruction.        SLP GOALS     Row Name 11/11/21 1330 11/10/21 1650          Oral Nutrition/Hydration Goal 1 (SLP)    Oral Nutrition/Hydration Goal 1, SLP LTG: Pt. will safely consume a PO diet without aspiration or distress  -CH LTG: Pt. will safely consume a PO diet without aspiration or distress  -AC     Time Frame (Oral Nutrition/Hydration Goal 1, SLP) by discharge  -CH by discharge  -AC     Progress/Outcomes (Oral Nutrition/Hydration Goal 1, SLP) continuing progress toward goal  -CH continuing progress toward goal  -AC            Oral Nutrition/Hydration Goal 2 (SLP)    Oral Nutrition/Hydration Goal 2, SLP Patient will tolerate pureed and ice/thin liquid trials without s/s of aspiration to determine readiness for repeat instrumental evaluation of the swallow  -CH Patient will tolerate pureed and ice/thin liquid trials without s/s of aspiration to determine readiness for repeat instrumental evaluation of the swallow  -AC     Time Frame (Oral Nutrition/Hydration Goal 2, SLP) short term goal (STG)  -CH short term goal (STG)  -AC     Barriers (Oral Nutrition/Hydration Goal 2, SLP) -- No overt clinical s/sxs aspiration noted w/ ice chips, thin via tsp, or w/ pudding trials RN administered w/ medication.  -AC     Progress/Outcomes (Oral Nutrition/Hydration Goal 2, SLP) -- continuing progress toward goal  -AC            Lingual Strengthening Goal 1 (SLP)    Activity (Lingual Strengthening Goal 1, SLP) increase tongue back strength  - --     Increase Tongue Back Strength swallow trials; lingual resistance exercises  - swallow trials; lingual resistance exercises  -AC     [place]/Accuracy (Lingual Strengthening Goal 1, SLP) with minimal cues (75-90% accuracy)  -CH with minimal cues (75-90% accuracy)  -AC     Time Frame (Lingual Strengthening Goal 1, SLP) short term goal (STG)  -CH short term goal (STG)  -AC     Progress/Outcomes (Lingual  Strengthening Goal 1, SLP) -- goal ongoing  -AC            Pharyngeal Strengthening Exercise Goal 1 (SLP)    Activity (Pharyngeal Strengthening Goal 1, SLP) increase timing; increase superior movement of the hyolaryngeal complex; increase anterior movement of the hyolaryngeal complex; increase closure at entrance to airway/closure of airway at glottis; increase squeeze/positive pressure generation  -CH --     Increase Timing prepping - 3 second prep or suck swallow or 3-step swallow  -CH prepping - 3 second prep or suck swallow or 3-step swallow  -AC     Increase Superior Movement of the Hyolaryngeal Complex falsetto  -CH falsetto  -AC     Increase Anterior Movement of the Hyolaryngeal Complex chin tuck against resistance (CTAR)  -CH chin tuck against resistance (CTAR)  -AC     Increase Closure at Entrance to Airway/Closure of Airway at Glottis super-supraglottic swallow; breath hold exercises  -CH super-supraglottic swallow; breath hold exercises  -AC     Increase Squeeze/Positive Pressure Generation hard effortful swallow  -CH hard effortful swallow  -AC     [place]/Accuracy (Pharyngeal Strengthening Goal 1, SLP) with minimal cues (75-90% accuracy)  -CH with minimal cues (75-90% accuracy)  -AC     Time Frame (Pharyngeal Strengthening Goal 1, SLP) short term goal (STG)  - short term goal (STG)  -AC     Barriers (Pharyngeal Strengthening Goal 1, SLP) -- Pt completed effortful swallow x1, then declined to complete further reps.  -AC     Progress/Outcomes (Pharyngeal Strengthening Goal 1, SLP) -- progress slower than expected  -AC            Tolerate Speaking Valve Placement Goal 1 (SLP)    Tolerate Speaking Valve Placement Goal 1 (SLP) speaking valve >30 min; 80%; with minimal cues (75-90%)  -CH --     Time Frame (Tolerate Speaking Valve Placement Goal 1, SLP) short term goal (STG)  - --     Progress (Tolerate Speaking Valve Placement Goal 1, SLP) 100%; independently (over 90% accuracy)  - --      Progress/Outcomes (Tolerate Speaking Valve Placement Goal 1, SLP) goal met  -CH --     Comment (Tolerate Speaking Valve Placement Goal 1, SLP) Patient able to tolerate for over an hour without difficulty or decreased O2 saturations.  -CH --            Audible Speech with Speaking Valve Goal 1 (SLP)    Audible Speech Goal 1 (SLP) 3 feet; 80%; with minimal cues (75-90%)  -CH --     Time Frame (Audible Speech Goal 1, SLP) short term goal (STG)  -CH --     Progress (Audible Speech Goal 1, SLP) 100%; independently (over 90% accuracy)  -CH --     Progress/Outcomes (Audible Speech Goal 1, SLP) goal met  -CH --            Additional Goal 1 (SLP)    Additional Goal 1, SLP LTG: Pt will improve communication w/ use of PMV to functionally communicate wants/needs w/ 90% accuracy w/ min cues  -CH --     Time Frame (Additional Goal 1, SLP) by discharge  -CH --     Progress/Outcomes (Additional Goal 1, SLP) goal met  -CH --           User Key  (r) = Recorded By, (t) = Taken By, (c) = Cosigned By    Initials Name Provider Type    Lona Whiting MS CCC-SLP Speech and Language Pathologist    Samira Mahmood MS CCC-SLP Speech and Language Pathologist                   Time Calculation:    Time Calculation- SLP     Row Name 11/11/21 1513             Time Calculation- SLP    SLP Start Time 1330  -CH      SLP Received On 11/11/21  -CH              Untimed Charges    SLP Eval/Re-eval  ST Fiberoptic Endo Eval Swallow - [id number]  -CH      [id number] Fiberoptic Endo Eval Swallow Minutes 120  -CH      [id number] Treatment/ST Modification Prosth Aug Alter  30  -CH              Total Minutes    Untimed Charges Total Minutes 150  -CH       Total Minutes 150  -CH            User Key  (r) = Recorded By, (t) = Taken By, (c) = Cosigned By    Initials Name Provider Type    Samira Mahmood, MS CCC-SLP Speech and Language Pathologist                Therapy Charges for Today     Code Description Service Date Service Provider Modifiers Qty     [id number] [facility] FIBEROPTIC ENDO EVAL SWALL 8 11/11/2021 Samira Carlson, MS CCC-SLP GN 1    [id number] [facility] TREATMENT SPEECH 2 11/11/2021 Samira Carlson, MS CCC-SLP GN 1               Samira Carlson, MS GREG-SLP  11/11/2021

## 2021-11-11 NOTE — PROGRESS NOTES
Infectious Disease Progress Note  Keshav Dickens  1955  [id number]    Date of Consult: 10/13/2021  Admission Date: 10/4/2021    Requesting Provider: Dr Forrester  Evaluating Physician: Rodríguez Ruff MD    Chief Complaint: hemoptysis    Reason for Consultation: GNR VAP    History of present illness:   Patient is a 66 y.o.  Yr old male with history of DM2, tobacco abuse.  Initially admitted to [facility] on 9/27/2021 with complaints of hemoptysis for 2 to 3 weeks.  Intubated on 10/1.  CT scan with diffuse groundglass opacities and renal failure.  Due to concern for pulmonary renal syndrome he was treated with pulse dose steroids.  Anti-GBM antibody positive concerning for Goodpasture's syndrome.  Renal biopsy also confirmed the diagnosis.  Transferred to [facility] on 10/4.  Nephrology has been following and he is getting plasmapheresis; also high-dose steroids and Cytoxan.    On 10/10 he developed fever up to 101.  Also had leukocytosis up to 15,000.  He had been on atovaquone for PCP prophylaxis.  Started on vancomycin and Zosyn on 10/12.  Respiratory culture from 10/12 with heavy growth of gram-negative bacilli.  And BAL on 10/13 office gram-negative bacilli.  Recurrent hemoptysis per RN.  Intubated, sedated.  60% FiO2.  Trach and PEG planned. Fever curve improved.     10/14/21: Fevers improved. Less blood from ET suction. 50% FiO2 from vent. Sputum culture with Klebsiella aerogenes.     10/15/2021: Underwent tracheostomy and PEG tube placement earlier today.  Transfer back to ICU, relatively stable.  60% FiO2, sedated.  Less blood from endotracheal secretions per staff.     10/16/21: Worse overnight with hypertension and some low-grade temperatures despite CRRT.  Had an episode of emesis and likely aspiration per RN.  Ventilatory requirement up slightly. Still sedated    10/17/21: Improved overnight.  Fever curve improved.  O2 settings down.  Weaning sedation.  No  further episodes of emesis.  Anuric. No diarrhea.     10/18/21: Patient follow-up improved over the past 48 hours.  Afebrile.  No acute new issues overnight.  More comfortable on ventilator.  More sedated today. fever subglottic secretions    10/19/21: no acute new concerns. Minimally responsive. On trach collar O2. No further hemoptysis or sputum production.    10/20/2021: Slow improvement.  Currently trach collar oxygen.  Still very weak, deconditioned.  Weaning sedation.  Still has blood-tinged respiratory secretions.  No other new concerns noted per ICU team    10/21/21: Low-grade temps but no true fevers. Stable, low oxygenation settings.  Ongoing blood-tinged tracheal secretions, slightly worse today. Transitioning to intermittent hemodialysis. Hemoglobin dropped to 5.6 and required transfusion.  No external signs of bleeding other than out of tracheostomy  per RN. Possible transfer to LTAC on Monday    10/22/21: On ventilator overnight, 30% FiO2 and trach collar trials during the day.  Still significant blood-tinged, frothy sputum per RN.  Afebrile.  Tolerating tube feeds.  Had bowel movement.  More alert today and starting to follow some simple commands    11/1/21: Still in the ICU but more alert and interactive on trach collar oxygen.  Blood pressure stable.  Afebrile.  Completed IV antibiotics as planned on 10/25.  Still on atovaquone.  Difficult placement due to need for Cytoxan per case management.  Significant pulmonary secretions per nursing staff.    11/4/21: WBC spiked today.  Increased pulmonary secretions new culture sent with gram-negative rods.  No change in color of secretions per RN.  Has had some episodes of hypotension.  Tracheostomy came dislodged yesterday and required bronchoscopy for placement.    11/5/2021: Afebrile today.  Ongoing increased pulmonary secretions, growing gram-negative robyn in culture.  Per RN secretions are more brown in color today. Patient more fatigued and less  interactive.  No other acute new concerns per RN.  Transfers orders in place to telemetry    11/8/21: No acute events over the weekend.  Still with thick and dark secretions with red streaks per RN.  Afebrile.  Tolerating meropenem.  O2 requirements improved.  Current trach is currently capped and on 4 L nasal cannula.  Patient has difficulty coughing up secretions.  He is generally weak.  Asking to go outside.  Discharge planning in process    11/9/21: Resting comfortably, tolerating dialysis.  Stable respiratory requirements, 30% FiO2 trach collar.  Afebrile.  Tolerating current antibiotics    11/10/21: Possible rash noted yesterday after being given cefepime.  Per patient has been present since Monday.  Not markedly worse today.  Rash on left flank but not much anywhere else.  He says this is typical this time a year when he gets dry skin.  Respiratory status improving.  Trach capped.    11/11/2021:  patient sitting up, alert and interactive.  Tolerating dialysis.  Somewhat confused and repetitive questions today.  Breathing seems to be doing better.  Trach capped and currently on 2 L of oxygen.  Rash is stable.  Patient with no worsening pruritus, pain       ROS:  No fevers or chills. No n/v/d.  No new ADR to Abx.      No Known Allergies    Antibiotics:  Anti-Infectives (From admission, onward)    Ordered     Dose/Rate Route Frequency Start Stop    11/11/21 1154  doxycycline (MONODOX) capsule 100 mg        Ordering Provider: Rodríguez Ruff MD    100 mg Oral Every 12 Hours Scheduled 11/11/21 1245 11/25/21 0859    10/28/21 1214  atovaquone (MEPRON) 750 MG/5ML suspension        Ordering Provider: Deb Ovalle APRN    1,500 mg Per G Tube Daily With Lunch 10/28/21 0000 11/27/21 2359    10/21/21 1002  meropenem (MERREM) 1 g/100 mL 0.9% NS (mbp)        Ordering Provider: Rodríguez Ruff MD    1 g  over 3 Hours Intravenous Every 24 Hours 10/21/21 1200 10/25/21 1520    10/20/21 0805   atovaquone (MEPRON) suspension 1,500 mg        Ordering Provider: Genet Bui MD    1,500 mg Per PEG Tube Daily With Lunch 10/20/21 1200 11/26/21 2359    10/18/21 1220  vancomycin (VANCOCIN) in iso-osmotic dextrose IVPB 1 g (premix) 200 mL        Ordering Provider: Rafal Salcido, PharmD    1,000 mg  over 60 Minutes Intravenous Once 10/18/21 1315 10/18/21 1536    10/17/21 0825  vancomycin (VANCOCIN) in iso-osmotic dextrose IVPB 1 g (premix) 200 mL        Ordering Provider: Autumn Lozano, PharmD    10 mg/kg × 102 kg  over 60 Minutes Intravenous Once 10/17/21 1100 10/17/21 1138    10/16/21 1121  vancomycin 2000 mg/500 mL 0.9% NS IVPB ([facility])        Ordering Provider: Autumn Lozano, PharmD    20 mg/kg × 102 kg  over 120 Minutes Intravenous Once 10/16/21 1300 10/16/21 1657    10/13/21 1755  meropenem (MERREM) 1 g/100 mL 0.9% NS (mbp)        Ordering Provider: Rodríguez Ruff MD    1 g  over 30 Minutes Intravenous Once 10/13/21 1845 10/13/21 1858    10/12/21 0954  vancomycin 2000 mg/500 mL 0.9% NS IVPB ([facility])        Ordering Provider: Sravan Forrester MD    20 mg/kg × 102 kg Intravenous Once 10/12/21 1045 10/12/21 1130    10/12/21 0954  piperacillin-tazobactam (ZOSYN) 4.5 g in iso-osmotic dextrose 100 mL IVPB (premix)        Ordering Provider: Sravan Forrester MD    4.5 g  over 30 Minutes Intravenous Once 10/12/21 1045 10/12/21 1125          Other Medications:  Current Facility-Administered Medications   Medication Dose Route Frequency Provider Last Rate Last Admin   • acetaminophen (TYLENOL) tablet 650 mg  650 mg Per PEG Tube Q6H PRN Genet Bui MD   650 mg at 11/09/21 1536   • albumin human 25 % IV SOLN  - ADS Override Pull            • albumin human 25 % IV SOLN  - ADS Override Pull            • albumin human 25 % IV SOLN 12.5 g  12.5 g Intravenous PRN Dagoberto Dawkins MD       • atovaquone (MEPRON) suspension 1,500 mg  1,500 mg Per PEG Tube Daily With  Lunch Genet Bui MD   1,500 mg at 11/10/21 1301   • b complex-vitamin c-folic acid (NEPHRO-MELE) tablet 1 tablet  1 tablet Per PEG Tube Daily Genet Bui MD   1 tablet at 11/10/21 0853   • chlorhexidine (PERIDEX) 0.12 % solution 15 mL  15 mL Mouth/Throat Q12H Abdon Roberts MD   15 mL at 11/11/21 0849   • cholecalciferol (VITAMIN D3) tablet 2,000 Units  2,000 Units Per PEG Tube Daily Sravan Forrester MD   2,000 Units at 11/10/21 0902   • clonazePAM (KlonoPIN) tablet 0.5 mg  0.5 mg Per PEG Tube BID PRN Rc Mercer MD       • Cyclophosphamide (CYTOXAN) chemo capsule 100 mg  100 mg Oral Daily Torsten Drake MD   100 mg at 11/10/21 1712   • doxycycline (MONODOX) capsule 100 mg  100 mg Oral Q12H Rodríguez Ruff MD       • epoetin tatum-epbx (RETACRIT) injection 10,000 Units  10,000 Units Subcutaneous Once per day on Tue Thu Sat Torsten Drake MD   10,000 Units at 11/11/21 1053   • famotidine (PEPCID) tablet 20 mg  20 mg Per G Tube Daily Genet Bui MD   20 mg at 11/10/21 0853   • ferrous sulfate 300 (60 Fe) MG/5ML syrup 300 mg  300 mg Per PEG Tube Daily Genet Bui MD   300 mg at 11/10/21 0853   • gabapentin (NEURONTIN) capsule 100 mg  100 mg Per PEG Tube Q8H Sravan Forrester MD   100 mg at 11/11/21 0614   • heparin (porcine) 5000 UNIT/ML injection 5,000 Units  5,000 Units Subcutaneous Q12H Nacho Ba DO   5,000 Units at 11/11/21 0849   • heparin (porcine) injection 1,600 Units  1,600 Units Intracatheter PRN Torsten Drake MD   1,600 Units at 11/04/21 0818   • insulin detemir (LEVEMIR) injection 10 Units  10 Units Subcutaneous Nightly Rc Mercer MD   10 Units at 11/10/21 2116   • insulin regular (humuLIN R,novoLIN R) injection 0-7 Units  0-7 Units Subcutaneous Q6H Rc Mercer MD   2 Units at 11/11/21 0008   • insulin regular (humuLIN R,novoLIN R) injection 4 Units  4 Units Subcutaneous Q6H Rc Mercer MD   4  "Units at 11/11/21 0009   • ipratropium-albuterol (DUO-NEB) nebulizer solution 3 mL  3 mL Nebulization Q4H While Awake - RT Sravan Forrester MD   3 mL at 11/11/21 0700   • labetalol (NORMODYNE,TRANDATE) injection 20 mg  20 mg Intravenous Q2H PRN Abdon Roberts MD   20 mg at 10/17/21 1721   • melatonin tablet 5 mg  5 mg Per G Tube Nightly Sravan Forrester MD   5 mg at 11/10/21 2116   • midodrine (PROAMATINE) tablet 10 mg  10 mg Per PEG Tube Q8H Sravan Forrester MD   10 mg at 11/10/21 2116   • ondansetron (ZOFRAN) injection 4 mg  4 mg Intravenous Q6H PRN Sravan Forrester MD   4 mg at 11/10/21 1113   • oxyCODONE (ROXICODONE) 5 MG/5ML solution 5 mg  5 mg Per G Tube Q6H PRN Genet Bui MD   5 mg at 11/03/21 0110   • predniSONE (DELTASONE) tablet 30 mg  30 mg Per G Tube Daily With Breakfast Dagoberto Dawkins MD   30 mg at 11/10/21 1441   • saccharomyces boulardii (FLORASTOR) capsule 500 mg  500 mg Per G Tube BID Rc Mercer MD       • sennosides-docusate (PERICOLACE) 8.6-50 MG per tablet 2 tablet  2 tablet Per PEG Tube BID PRN Genet Bui MD       • sertraline (ZOLOFT) tablet 50 mg  50 mg Per PEG Tube Daily Sravan Forrester MD   50 mg at 11/10/21 0853       Physical Exam:   Vital Signs   /64 (BP Location: Left arm, Patient Position: Lying)   Pulse 71   Temp 98.1 °F (36.7 °C) (Oral)   Resp 18   Ht 172.7 cm (67.99\")   Wt 81 kg (178 lb 9.2 oz)   SpO2 99%   BMI 27.16 kg/m²     GENERAL: Chronically ill-appearing.  Very weak, but overall improved.  Sitting u   HEENT: Normocephalic, atraumatic.    Neck:   Tracheostomy in place, capped.  No secretions noted   HEART: RRR; No murmur.  LUNGS: Clear bilaterally. on nasal cannula oxygen.  No cough  ABDOMEN: Soft, nondistended. No rebound or guarding. PEG tube in place  EXT:  No edema.  : Without Cedeno catheter.  MSK: no major joint swelling noted.    SKIN: Macular rash on " left flank and back  NEURO: AOx3, weak in all extremities but improving  Psych: confused  Dialysis catheter in right chest dressed appropriately     Laboratory Data    Results from last 7 days   Lab Units 11/10/21  0317 11/09/21  0330 11/08/21  0256   WBC 10*3/mm3 11.12* 9.37 9.98   HEMOGLOBIN g/dL 7.9* 7.6* 7.6*   HEMATOCRIT % 24.4* 23.4* 23.7*   PLATELETS 10*3/mm3 175 172 166     Results from last 7 days   Lab Units 11/11/21  0353   SODIUM mmol/L 136   POTASSIUM mmol/L 4.1   CHLORIDE mmol/L 95*   CO2 mmol/L 24.0   BUN mg/dL 100*   CREATININE mg/dL 5.83*   GLUCOSE mg/dL 114*   CALCIUM mg/dL 8.9     Estimated Creatinine Clearance: 13.9 mL/min (A) (by C-G formula based on SCr of 5.83 mg/dL (H)).  Results from last 7 days   Lab Units 11/08/21  0256   ALK PHOS U/L 84   BILIRUBIN mg/dL 0.9   ALT (SGPT) U/L 23   AST (SGOT) U/L 32         Results from last 7 days   Lab Units 11/08/21  0256   CRP mg/dL 6.36*       Microbiology:  Covid screening negative on admission  10/12 respiratory culture with heavy growth gram-negative bacilli  MRSA nares screening negative  10/12 Catheter tip culture with skin mal  10/13 BAL culture with Klebsiella aerogenes    10/16 blood cultures negative   10/16 respiratory culture negative      11/4 respiratory culture with Klebsiella aerogenes, pansensitive  11/5 blood culture negative to date    Radiology:  No radiology results for the last day   I personally reviewed the above CXR: New right middle lobe opacity compared to last week      Impression:   1. Leukocytosis, sepsis: Up slightly today but clinically stable.  Will follow  2. Klebsiella (Enterobacter) aerogenes VAP:  Improved after restarting meropenem. Now on cefepime.  This species could harbor inducible amp-C  3. Macular rash on left flank, possible drug rash from cefepime?  Unclear that this is truly a drug rash since it is not getting significantly worse while on cefepime but did start shortly after starting cefepime.  Will  discontinue  4. Recent Klebsiella aerogenes ventilator associated pneumonia: From respiratory culture x2.   Repeat cultures negative.  O2 settings improved.  Completed antibiotics on 10/25 is planned.  5. Possible aspiration overnight 10/15:   6. Acute hypoxic respiratory failure: s/p trach on 10/15. improved  7. 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 On Cytoxan and steroids   8. Renal failure, anuric. On dialysis   9. Immunocompromise host: Cytoxan and high-dose steroids  10. DM2  11. Tobacco abuse    PLAN:   - Follow CBC, CMP    - Antibiotics were restarted on 11/4.  Respiratory status is certainly improved.  Stop cefepime.  Transition to oral doxycycline for 2 additional weeks.  Recommend prolonged course of oral antibiotics this time due to relapse of infection and ongoing significant immunosuppression         - Continue atovaquone for PCP prophylaxis, until on less than 15 mg of prednisone daily.  - Slowly weaning prednisone per nephrology     Complex MDM. Immunocompromised host. Improving.  Likely to LTAC soon. CM looking at options. I will follow.       Rodríguez Ruff MD  11/11/2021

## 2021-11-11 NOTE — PROGRESS NOTES
"   LOS: 38 days    Patient Care Team:  Andree Langston MD as PCP - General (Internal Medicine)    Reason For Visit:  F/U PIOTR. SEEN ON DIALYSIS.  Subjective           Review of Systems:    Pulm: No soa   CV:  No CP      Objective     albumin human, , ,   albumin human, , ,   atovaquone, 1,500 mg, Per PEG Tube, Daily With Lunch  b complex-vitamin c-folic acid, 1 tablet, Per PEG Tube, Daily  cefepime, 2 g, Intravenous, Q24H  chlorhexidine, 15 mL, Mouth/Throat, Q12H  cholecalciferol, 2,000 Units, Per PEG Tube, Daily  Cyclophosphamide, 100 mg, Oral, Daily  epoetin tatum/tatum-epbx, 10,000 Units, Subcutaneous, Once per day on Tue Thu Sat  famotidine, 20 mg, Per G Tube, Daily  ferrous sulfate, 300 mg, Per PEG Tube, Daily  gabapentin, 100 mg, Per PEG Tube, Q8H  heparin (porcine), 5,000 Units, Subcutaneous, Q12H  insulin detemir, 10 Units, Subcutaneous, Nightly  insulin regular, 0-7 Units, Subcutaneous, Q6H  insulin regular, 4 Units, Subcutaneous, Q6H  ipratropium-albuterol, 3 mL, Nebulization, Q4H While Awake - RT  melatonin, 5 mg, Per G Tube, Nightly  midodrine, 10 mg, Per PEG Tube, Q8H  predniSONE, 30 mg, Per G Tube, Daily With Breakfast  saccharomyces boulardii, 500 mg, Oral, BID  sertraline, 50 mg, Per PEG Tube, Daily             Vital Signs:  Blood pressure 144/65, pulse 68, temperature 98.4 °F (36.9 °C), temperature source Oral, resp. rate 18, height 172.7 cm (67.99\"), weight 81 kg (178 lb 9.2 oz), SpO2 93 %.    Flowsheet Rows      First Filed Value   Admission Height 172.7 cm (68\") Documented at 10/04/2021 2132   Admission Weight 102 kg (224 lb 13.9 oz) Documented at 10/04/2021 2100          11/10 0701 - 11/11 0700  In: 654 [I.V.:24]  Out: -     Physical Exam:    General Appearance: NAD, alert and cooperative, Ox3. + TRACH. + THD CATH  Eyes: PER, conjunctivae and sclerae normal, no icterus  Lungs: respirations regular and unlabored, no crepitus, clear to auscultation  Heart/CV: regular rhythm & normal rate, no " murmur, no gallop, no rub and no edema  Abdomen: not distended, soft, non-tender, no masses,  bowel sounds present. + PEG.  Skin: No rash, Warm and dry    Radiology:            Labs:  Results from last 7 days   Lab Units 11/10/21  0317 11/09/21  0330 11/08/21  0256   WBC 10*3/mm3 11.12* 9.37 9.98   HEMOGLOBIN g/dL 7.9* 7.6* 7.6*   HEMATOCRIT % 24.4* 23.4* 23.7*   PLATELETS 10*3/mm3 175 172 166     Results from last 7 days   Lab Units 11/11/21  0353 11/10/21  0317 11/09/21  0330 11/08/21  0256 11/07/21 0357 11/06/21  0401 11/05/21  0328 11/05/21  0328   SODIUM mmol/L 136 137 132* 134*   < > 133*   < > 135*   POTASSIUM mmol/L 4.1 4.1 5.5* 4.9   < > 4.8   < > 4.0   CHLORIDE mmol/L 95* 99 91* 92*   < > 93*   < > 95*   CO2 mmol/L 24.0 23.0 25.0 24.0   < > 24.0   < > 25.0   BUN mg/dL 100* 63* 110* 75*   < > 79*   < > 48*   CREATININE mg/dL 5.83* 4.00* 6.55* 5.04*   < > 5.90*   < > 3.72*   CALCIUM mg/dL 8.9 8.7 8.8 8.7   < > 8.9   < > 8.7   PHOSPHORUS mg/dL  --  2.6 4.4 3.5  --  3.4   < > 3.2   MAGNESIUM mg/dL  --   --   --  2.7*  --  2.7*  --  2.4   ALBUMIN g/dL  --  4.20 4.00 4.10  --  4.20   < > 4.10    < > = values in this interval not displayed.     Results from last 7 days   Lab Units 11/11/21 0353   GLUCOSE mg/dL 114*       Results from last 7 days   Lab Units 11/08/21 0256   ALK PHOS U/L 84   BILIRUBIN mg/dL 0.9   ALT (SGPT) U/L 23   AST (SGOT) U/L 32     Results from last 7 days   Lab Units 11/08/21  0338   PH, ARTERIAL pH units 7.443   PO2 ART mm Hg 59.6*   PCO2, ARTERIAL mm Hg 43.7   HCO3 ART mmol/L 29.9*             Estimated Creatinine Clearance: 13.9 mL/min (A) (by C-G formula based on SCr of 5.83 mg/dL (H)).      Assessment       Respiratory Failure Type 1    Anti-GBM nephritis with pulmonary hemorrhage (HCC)    Acute renal failure (ARF) (HCC)    Acute blood loss anemia    Sepsis due to pneumonia (HCC);Klebsiella aerogenes    Idiopathic hypotension    Hyperglycemia    History of tobacco  abuse            Impression: ANURIC PIOTR. GOODPASTURES. ANEMIA. SEE YESTERDAYS NOTE.             Recommendations: HD TODAY. ? TO SELECT SOON.      Dagoberto Dawkins MD  11/11/21  08:51 EST

## 2021-11-12 ENCOUNTER — APPOINTMENT (OUTPATIENT)
Dept: GENERAL RADIOLOGY | Facility: HOSPITAL | Age: 66
End: 2021-11-12

## 2021-11-12 VITALS
RESPIRATION RATE: 20 BRPM | WEIGHT: 178.57 LBS | SYSTOLIC BLOOD PRESSURE: 142 MMHG | TEMPERATURE: 97.9 F | DIASTOLIC BLOOD PRESSURE: 80 MMHG | OXYGEN SATURATION: 97 % | BODY MASS INDEX: 27.06 KG/M2 | HEIGHT: 68 IN | HEART RATE: 69 BPM

## 2021-11-12 LAB
ALBUMIN SERPL-MCNC: 4.2 G/DL (ref 3.5–5.2)
ANION GAP SERPL CALCULATED.3IONS-SCNC: 14 MMOL/L (ref 5–15)
BUN SERPL-MCNC: 48 MG/DL (ref 8–23)
BUN/CREAT SERPL: 13.4 (ref 7–25)
CALCIUM SPEC-SCNC: 8.8 MG/DL (ref 8.6–10.5)
CHLORIDE SERPL-SCNC: 96 MMOL/L (ref 98–107)
CO2 SERPL-SCNC: 25 MMOL/L (ref 22–29)
CREAT SERPL-MCNC: 3.57 MG/DL (ref 0.76–1.27)
DEPRECATED RDW RBC AUTO: 65.2 FL (ref 37–54)
ERYTHROCYTE [DISTWIDTH] IN BLOOD BY AUTOMATED COUNT: 19.4 % (ref 12.3–15.4)
GFR SERPL CREATININE-BSD FRML MDRD: 17 ML/MIN/1.73
GLUCOSE BLDC GLUCOMTR-MCNC: 105 MG/DL (ref 70–130)
GLUCOSE SERPL-MCNC: 93 MG/DL (ref 65–99)
HCT VFR BLD AUTO: 22.5 % (ref 37.5–51)
HGB BLD-MCNC: 7.3 G/DL (ref 13–17.7)
MCH RBC QN AUTO: 32.2 PG (ref 26.6–33)
MCHC RBC AUTO-ENTMCNC: 32.4 G/DL (ref 31.5–35.7)
MCV RBC AUTO: 99.1 FL (ref 79–97)
PHOSPHATE SERPL-MCNC: 2.5 MG/DL (ref 2.5–4.5)
PLATELET # BLD AUTO: 165 10*3/MM3 (ref 140–450)
PMV BLD AUTO: 11.3 FL (ref 6–12)
POTASSIUM SERPL-SCNC: 3.7 MMOL/L (ref 3.5–5.2)
PROCALCITONIN SERPL-MCNC: 2.63 NG/ML (ref 0–0.25)
RBC # BLD AUTO: 2.27 10*6/MM3 (ref 4.14–5.8)
SODIUM SERPL-SCNC: 135 MMOL/L (ref 136–145)
WBC # BLD AUTO: 9.37 10*3/MM3 (ref 3.4–10.8)

## 2021-11-12 PROCEDURE — 97530 THERAPEUTIC ACTIVITIES: CPT

## 2021-11-12 PROCEDURE — 80069 RENAL FUNCTION PANEL: CPT | Performed by: INTERNAL MEDICINE

## 2021-11-12 PROCEDURE — 71045 X-RAY EXAM CHEST 1 VIEW: CPT

## 2021-11-12 PROCEDURE — 25010000002 HEPARIN (PORCINE) PER 1000 UNITS: Performed by: INTERNAL MEDICINE

## 2021-11-12 PROCEDURE — 99238 HOSP IP/OBS DSCHRG MGMT 30/<: CPT | Performed by: INTERNAL MEDICINE

## 2021-11-12 PROCEDURE — 63710000001 INSULIN REGULAR HUMAN PER 5 UNITS: Performed by: INTERNAL MEDICINE

## 2021-11-12 PROCEDURE — 84145 PROCALCITONIN (PCT): CPT | Performed by: INTERNAL MEDICINE

## 2021-11-12 PROCEDURE — 25010000002 CYCLOPHOSPHAMIDE PER 25 MG: Performed by: INTERNAL MEDICINE

## 2021-11-12 PROCEDURE — 82962 GLUCOSE BLOOD TEST: CPT

## 2021-11-12 PROCEDURE — 94799 UNLISTED PULMONARY SVC/PX: CPT

## 2021-11-12 PROCEDURE — 85027 COMPLETE CBC AUTOMATED: CPT | Performed by: INTERNAL MEDICINE

## 2021-11-12 PROCEDURE — 99239 HOSP IP/OBS DSCHRG MGMT >30: CPT | Performed by: NURSE PRACTITIONER

## 2021-11-12 RX ORDER — CHOLECALCIFEROL (VITAMIN D3) 125 MCG
5 CAPSULE ORAL NIGHTLY
Start: 2021-11-12

## 2021-11-12 RX ORDER — IPRATROPIUM BROMIDE AND ALBUTEROL SULFATE 2.5; .5 MG/3ML; MG/3ML
3 SOLUTION RESPIRATORY (INHALATION)
Qty: 360 ML
Start: 2021-11-12

## 2021-11-12 RX ORDER — PREDNISONE 10 MG/1
30 TABLET ORAL
Start: 2021-11-12

## 2021-11-12 RX ORDER — MELATONIN
2000 DAILY
Start: 2021-11-13

## 2021-11-12 RX ORDER — HEPARIN SODIUM 5000 [USP'U]/ML
5000 INJECTION, SOLUTION INTRAVENOUS; SUBCUTANEOUS EVERY 12 HOURS SCHEDULED
Start: 2021-11-12 | End: 2021-12-09 | Stop reason: HOSPADM

## 2021-11-12 RX ORDER — CYCLOPHOSPHAMIDE 50 MG/1
100 CAPSULE ORAL DAILY
Start: 2021-11-13

## 2021-11-12 RX ORDER — CYCLOPHOSPHAMIDE 25 MG/1
100 CAPSULE ORAL DAILY
Status: DISCONTINUED | OUTPATIENT
Start: 2021-11-12 | End: 2021-11-12 | Stop reason: HOSPADM

## 2021-11-12 RX ORDER — DOXYCYCLINE 100 MG/1
100 CAPSULE ORAL EVERY 12 HOURS SCHEDULED
Qty: 25 CAPSULE | Refills: 0
Start: 2021-11-12 | End: 2021-11-25

## 2021-11-12 RX ORDER — SACCHAROMYCES BOULARDII 250 MG
500 CAPSULE ORAL 2 TIMES DAILY
Start: 2021-11-12

## 2021-11-12 RX ADMIN — MINERAL SUPPLEMENT IRON 300 MG / 5 ML STRENGTH LIQUID 100 PER BOX UNFLAVORED 300 MG: at 08:32

## 2021-11-12 RX ADMIN — MIDODRINE HYDROCHLORIDE 10 MG: 10 TABLET ORAL at 06:14

## 2021-11-12 RX ADMIN — IPRATROPIUM BROMIDE AND ALBUTEROL SULFATE 3 ML: 2.5; .5 SOLUTION RESPIRATORY (INHALATION) at 07:35

## 2021-11-12 RX ADMIN — CHLORHEXIDINE GLUCONATE 15 ML: 1.2 SOLUTION ORAL at 08:32

## 2021-11-12 RX ADMIN — Medication 2000 UNITS: at 08:31

## 2021-11-12 RX ADMIN — INSULIN HUMAN 2 UNITS: 100 INJECTION, SOLUTION PARENTERAL at 00:27

## 2021-11-12 RX ADMIN — Medication 500 MG: at 08:32

## 2021-11-12 RX ADMIN — DOXYCYCLINE 100 MG: 100 CAPSULE ORAL at 08:31

## 2021-11-12 RX ADMIN — Medication 1 TABLET: at 08:32

## 2021-11-12 RX ADMIN — SERTRALINE HYDROCHLORIDE 50 MG: 50 TABLET ORAL at 08:32

## 2021-11-12 RX ADMIN — ATOVAQUONE 1500 MG: 750 SUSPENSION ORAL at 11:40

## 2021-11-12 RX ADMIN — IPRATROPIUM BROMIDE AND ALBUTEROL SULFATE 3 ML: 2.5; .5 SOLUTION RESPIRATORY (INHALATION) at 12:03

## 2021-11-12 RX ADMIN — INSULIN HUMAN 4 UNITS: 100 INJECTION, SOLUTION PARENTERAL at 00:27

## 2021-11-12 RX ADMIN — INSULIN HUMAN 4 UNITS: 100 INJECTION, SOLUTION PARENTERAL at 11:39

## 2021-11-12 RX ADMIN — FAMOTIDINE 20 MG: 20 TABLET, FILM COATED ORAL at 08:32

## 2021-11-12 RX ADMIN — GABAPENTIN 100 MG: 100 CAPSULE ORAL at 06:14

## 2021-11-12 RX ADMIN — CYCLOPHOSPHAMIDE 100 MG: 25 CAPSULE ORAL at 09:10

## 2021-11-12 RX ADMIN — HEPARIN SODIUM 5000 UNITS: 5000 INJECTION, SOLUTION INTRAVENOUS; SUBCUTANEOUS at 08:32

## 2021-11-12 NOTE — PROGRESS NOTES
"   LOS: 39 days    Patient Care Team:  Andree Langston MD as PCP - General (Internal Medicine)    Reason For Visit:  F/U PIOTR.  66-year-old with a history of kidney biopsy 9/30/2021 result.  Patient has anti-GBM positive, ANCA serologies were negative.  Patient was started on dialysis on 10/4/2021, recently transferred from [facility] to [facility], [place], operated by [facility] needing for plasmapheresis admitting creatinine 10.0  potassium 5.4 phosphorus 15.2 patient has received pulse steroid x3 starting 9/29/2021.  Subjective     No new events no added distress.      Review of Systems:   Generalized weakness, all other systems are negative.      Objective     albumin human, , ,   albumin human, , ,   atovaquone, 1,500 mg, Per PEG Tube, Daily With Lunch  b complex-vitamin c-folic acid, 1 tablet, Per PEG Tube, Daily  chlorhexidine, 15 mL, Mouth/Throat, Q12H  cholecalciferol, 2,000 Units, Per PEG Tube, Daily  Cyclophosphamide, 100 mg, Oral, Daily  doxycycline, 100 mg, Oral, Q12H  epoetin tatum/tatum-epbx, 10,000 Units, Subcutaneous, Once per day on Tue Thu Sat  famotidine, 20 mg, Per G Tube, Daily  ferrous sulfate, 300 mg, Per PEG Tube, Daily  gabapentin, 100 mg, Per PEG Tube, Q8H  heparin (porcine), 5,000 Units, Subcutaneous, Q12H  insulin detemir, 10 Units, Subcutaneous, Nightly  insulin regular, 0-7 Units, Subcutaneous, Q6H  insulin regular, 4 Units, Subcutaneous, Q6H  ipratropium-albuterol, 3 mL, Nebulization, Q4H While Awake - RT  melatonin, 5 mg, Per G Tube, Nightly  midodrine, 10 mg, Per PEG Tube, Q8H  predniSONE, 30 mg, Per G Tube, Daily With Breakfast  saccharomyces boulardii, 500 mg, Per G Tube, BID  sertraline, 50 mg, Per PEG Tube, Daily             Vital Signs:  Blood pressure 132/62, pulse 73, temperature 98.5 °F (36.9 °C), temperature source Oral, resp. rate 20, height 172.7 cm (67.99\"), weight 81 kg (178 lb 9.2 oz), SpO2 96 %.    Flowsheet Rows      First Filed Value   Admission Height 172.7 cm (68\") Documented at 10/04/2021 2132 "   Admission Weight 102 kg (224 lb 13.9 oz) Documented at 10/04/2021 2100          11/11 0701 - 11/12 0700  In: 1784 [I.V.:100]  Out: 1770     Physical Exam:  General Appearance: Alert, oriented, no obvious distress.   male  Eyes: PER, EOMI.  Neck: Supple no JVD.  Trach positive noted.  Tunnel catheter in place.  No sign of infection.  Lungs: Clear auscultation, no rales rhonchi's, equal chest movement, nonlabored.  Heart: No gallop, murmur, rub, RRR.  Abdomen: Soft, nontender, positive bowel sounds, no organomegaly.  Extremities: No edema, no cyanosis.  Neuro: No focal deficit, moving all extremities, alert oriented X 3  : No Cedeno catheter.  Skin: Warm and dry.  Psych: Mood and affect are normal appropriate.      Radiology:      Labs:  Results from last 7 days   Lab Units 11/12/21  0312 11/10/21  0317 11/09/21  0330   WBC 10*3/mm3 9.37 11.12* 9.37   HEMOGLOBIN g/dL 7.3* 7.9* 7.6*   HEMATOCRIT % 22.5* 24.4* 23.4*   PLATELETS 10*3/mm3 165 175 172     Results from last 7 days   Lab Units 11/12/21  0312 11/11/21  0353 11/10/21  0317 11/09/21  0330 11/08/21  0256 11/08/21  0256 11/07/21  0357 11/06/21  0401   SODIUM mmol/L 135* 136 137 132*   < > 134*   < > 133*   POTASSIUM mmol/L 3.7 4.1 4.1 5.5*   < > 4.9   < > 4.8   CHLORIDE mmol/L 96* 95* 99 91*   < > 92*   < > 93*   CO2 mmol/L 25.0 24.0 23.0 25.0   < > 24.0   < > 24.0   BUN mg/dL 48* 100* 63* 110*   < > 75*   < > 79*   CREATININE mg/dL 3.57* 5.83* 4.00* 6.55*   < > 5.04*   < > 5.90*   CALCIUM mg/dL 8.8 8.9 8.7 8.8   < > 8.7   < > 8.9   PHOSPHORUS mg/dL 2.5  --  2.6 4.4  --  3.5  --  3.4   MAGNESIUM mg/dL  --   --   --   --   --  2.7*  --  2.7*   ALBUMIN g/dL 4.20  --  4.20 4.00  --  4.10  --  4.20    < > = values in this interval not displayed.     Results from last 7 days   Lab Units 11/12/21  0312   GLUCOSE mg/dL 93       Results from last 7 days   Lab Units 11/08/21  0256   ALK PHOS U/L 84   BILIRUBIN mg/dL 0.9   ALT (SGPT) U/L 23   AST (SGOT) U/L 32      Results from last 7 days   Lab Units 11/08/21  0338   PH, ARTERIAL pH units 7.443   PO2 ART mm Hg 59.6*   PCO2, ARTERIAL mm Hg 43.7   HCO3 ART mmol/L 29.9*          Assessment       Goodpasture Syndrome    Respiratory Failure Type 1    Acute renal failure (ARF) (HCC)    Acute blood loss anemia    Sepsis due to pneumonia (HCC);Klebsiella aerogenes    Idiopathic hypotension    Hyperglycemia    History of tobacco abuse    1.  Goodspasture: Cytoxan oral until the end of December 2021: Decreasing dose of prednisone.  Patient received plasmapheresis and FFP.  IV Cytoxan per protocol was given, patient received Bactrim, atovaquone, monitoring white count.  2.  Anemia.  3.  Respiratory failure.  Improving  4.  Anuric PIOTR:   Goodspasture has been treated, patient is on Cytoxan.  5.  Hyperkalemia: Resolved with dialysis.    Plan:  Patient is transferring to [facility] in [facility], will follow the patient at that unit, he will require dialysis Tuesday Thursday Saturday till recovery.  Cytoxan oral will be continued till December 2021.  Monitoring of white count will be done closely.     High risk patient with multiple medical problems.  Case discussed with RN in detail.  Okay to transfer to [facility] specialty.    Oren Iverson MD  11/12/21  08:56 EST

## 2021-11-12 NOTE — DISCHARGE SUMMARY
DISCHARGE SUMMARY       Patient name: Keshav Dickens  CSN: [id number]  MRN: [id number]  : 1955    Date of Admission: 10/4/2021  Date of Discharge:  2021    Admitting Physician: Ricardo Forrester MD  Primary Care Provider: Andree Langston MD  Consultations:   Torsten Drake MD  Nephrology  Usha Ruff MD Rheumatology   Delvin Vergara MD  Interventional Radiology   Abdon Roberts MD  Surgery  Rodríguez Ruff MD Infectious Disease      Admission Diagnosis: Goodpasture Syndrome      Discharge Diagnoses:     Goodpasture Syndrome    Respiratory Failure Type 1    Acute renal failure (ARF) (HCC)    Acute blood loss anemia    Sepsis due to pneumonia (HCC);Klebsiella aerogenes    Idiopathic hypotension    Hyperglycemia    History of tobacco abuse    Procedures:  10/12/21: Ultrasound and fluoroscopic guided tunneled dialysis catheter placement  10/13/21: Therapeutic bronchoscopy with bronchial washing  10/15/21: Tracheostomy and PEG tube placement   10/18/21: Arterial line insertion attempt (unsuccessful)     Imaging:  CT Abdomen Pelvis Without Contrast    Result Date: 10/14/2021  Dense and extensive interstitial disease, with developing alveolar opacity, with considerably denser disease than on the outside study of 2021. No effusion or pneumothorax is seen. The findings would be consistent with history of Goodpasture syndrome, and underlying alveolar hemorrhage is a consideration.    ABDOMEN AND PELVIS CT SCAN WITHOUT CONTRAST: There is mild fatty liver change. The liver otherwise appears unremarkable. The gallbladder is surgically absent. The spleen is not enlarged. No significant abnormalities are seen of the pancreas or adrenal glands.  The kidneys appear initially rather normal for the patient's age, however, cortical thickness may actually be mildly increased, as might be seen with ATN/interstitial edema, but no perinephric edema/inflammation is seen. There is no evidence of  hydronephrosis. There is a very small exophytic cyst of the right lower renal pole. No nephrolithiasis is seen. No upper abdominal free air, ascites, or adenopathy is seen. The colon is normally distended but contains only fluid.  Regarding the lower abdomen and pelvis, small bowel loops appear normal. The terminal ileum and cecum appear normal. The appendix contains a trace amount of dense material, and is normal in size with no evidence of inflammation. There are numerous sigmoid and descending colon diverticuli without evidence of diverticulitis. Rectal tube is seen in place, and the colon is diffusely fluid-filled, but nondistended. No colonic or small bowel mucosal inflammation is seen. There is mild subcutaneous edema of the included upper thighs and trace fat stranding in the lower pelvis whether from venous stasis or early changes of anasarca.  IMPRESSION: 1. Fluid-filled colon consistent with diarrhea. No visible mucosal inflammation.  2. Mild fat stranding in the lower pelvis and proximal thighs, perhaps early changes of anasarca.  3. Grossly normal-appearing kidneys except for greater than expected cortical thickness for age. This is nonspecific but resembles ATN.  D:  10/13/2021 E:  10/13/2021  This report was finalized on 10/14/2021 2:39 PM by Dr. Yoni Barnes MD.      CT Chest Without Contrast Diagnostic    Result Date: 10/14/2021  Dense and extensive interstitial disease, with developing alveolar opacity, with considerably denser disease than on the outside study of 09/28/2021. No effusion or pneumothorax is seen. The findings would be consistent with history of Goodpasture syndrome, and underlying alveolar hemorrhage is a consideration.    ABDOMEN AND PELVIS CT SCAN WITHOUT CONTRAST: There is mild fatty liver change. The liver otherwise appears unremarkable. The gallbladder is surgically absent. The spleen is not enlarged. No significant abnormalities are seen of the pancreas or adrenal glands.  The  kidneys appear initially rather normal for the patient's age, however, cortical thickness may actually be mildly increased, as might be seen with ATN/interstitial edema, but no perinephric edema/inflammation is seen. There is no evidence of hydronephrosis. There is a very small exophytic cyst of the right lower renal pole. No nephrolithiasis is seen. No upper abdominal free air, ascites, or adenopathy is seen. The colon is normally distended but contains only fluid.  Regarding the lower abdomen and pelvis, small bowel loops appear normal. The terminal ileum and cecum appear normal. The appendix contains a trace amount of dense material, and is normal in size with no evidence of inflammation. There are numerous sigmoid and descending colon diverticuli without evidence of diverticulitis. Rectal tube is seen in place, and the colon is diffusely fluid-filled, but nondistended. No colonic or small bowel mucosal inflammation is seen. There is mild subcutaneous edema of the included upper thighs and trace fat stranding in the lower pelvis whether from venous stasis or early changes of anasarca.  IMPRESSION: 1. Fluid-filled colon consistent with diarrhea. No visible mucosal inflammation.  2. Mild fat stranding in the lower pelvis and proximal thighs, perhaps early changes of anasarca.  3. Grossly normal-appearing kidneys except for greater than expected cortical thickness for age. This is nonspecific but resembles ATN.  D:  10/13/2021 E:  10/13/2021  This report was finalized on 10/14/2021 2:39 PM by Dr. Yoni Barnes MD.      XR Chest 1 View    Result Date: 11/12/2021  Persistent but mildly improved perihilar edema compared to the prior study.   D:  11/12/2021 E:  11/12/2021      XR Chest 1 View    Result Date: 11/10/2021  Stable chest as above.  D:  11/08/2021 E:  11/08/2021  This report was finalized on 11/10/2021 4:29 PM by Dr. Riya Perez MD.      XR Chest 1 View    Result Date: 11/8/2021  1. Interval development  of moderate perihilar edema. 2. Effective interval resolution of previously noted left basilar atelectasis.  DICTATED:   11/07/2021 EDITED/lfs:   11/07/2021    This report was finalized on 11/8/2021 12:16 AM by Dr. Yoni Barnes MD.      XR Chest 1 View    Result Date: 11/6/2021  1. Increasing generalized atelectasis of the left mid and lower lung compared to yesterday's study. 2. Interval clearing of right lung perihilar disease. 3. No pneumothorax is seen.  DICTATED:   11/06/2021 EDITED/lfs:   11/06/2021    This report was finalized on 11/6/2021 10:36 PM by Dr. Yoni Barnes MD.      XR Chest 1 View    Result Date: 11/5/2021  Mild perihilar interstitial disease, perhaps mild edema. No significant interval change. No evidence of pneumothorax.   D:  11/05/2021 E:  11/05/2021  This report was finalized on 11/5/2021 9:55 PM by Dr. Yoni Barnes MD.      XR Chest 1 View    Result Date: 11/4/2021  Minimal increase in opacifications left mid lung somewhat linear concern for developed atelectasis, although background interstitial edema pattern appears likely without large effusion development.  D:  11/02/2021 E:  11/02/2021  This report was finalized on 11/4/2021 3:02 PM by Dr. Guero Pena.      XR Chest 1 View    Result Date: 11/3/2021  Mildly improved perihilar disease resembling mild interstitial edema. No new chest pathology is seen.  D:  11/03/2021 E:  11/03/2021     This report was finalized on 11/3/2021 10:14 PM by Dr. Yoni Barnes MD.      XR Chest 1 View    Result Date: 11/3/2021  1. Tracheostomy tube remains in midline position. No evidence of pneumothorax or pneumomediastinum. 2. Mildly improved perihilar interstitial disease.   D: 11/03/2021 E: 11/03/2021  This report was finalized on 11/3/2021 10:05 PM by Dr. Yoni Barnes MD.      XR Chest 1 View    Result Date: 10/24/2021  No significant interval change.  This report was finalized on 10/24/2021 9:12 AM by Dagoberto Ordoñez.      XR Chest 1 View    Result Date:  10/21/2021  Support hardware projects unchanged. Persistent bilateral interstitial opacities are present, favoring edema pattern over multifocal pneumonia. There is no enlarging pleural effusion or distinct pneumothorax. Unremarkable heart and mediastinal contours.  This report was finalized on 10/21/2021 9:06 AM by Dagoberto Ordoñez.      XR Chest 1 View    Result Date: 10/20/2021  Improving perihilar edema.   D:  10/20/2021 E:  10/20/2021  This report was finalized on 10/20/2021 11:16 PM by Dr. Yoni Barnes MD.      XR Chest 1 View    Result Date: 10/20/2021  Patchy airspace disease seen centrally within the lung fields. Lines and tubes in satisfactory position. No definite pleural effusion or pneumothorax. The findings are somewhat improving in the interval.  D:  10/19/2021 E:  10/19/2021  This report was finalized on 10/20/2021 9:53 AM by Dr. Riya Perez MD.      XR Chest 1 View    Result Date: 10/18/2021  Bilateral pulmonary opacifications mid lung predominant right greater than left however significant increase in the left lung from prior comparison of worsening airspace disease without large effusion.  D: 10/18/2021 E: 10/18/2021  This report was finalized on 10/18/2021 11:17 AM by Dr. Guero Pena.      XR Chest 1 View    Result Date: 10/18/2021  Diffuse pulmonary interstitial disease right greater than left, appearing a little worse on today's exam. This is probably due, however, to much lower lung volumes and crowding of the lung markings. Little actual interval change is suspected.   DICTATED:   10/17/2021 EDITED/lfs:   10/17/2021  This report was finalized on 10/18/2021 9:41 AM by Dr. Yoni Barnes MD.      XR Chest 1 View    Result Date: 10/16/2021  1. Extensive right lung interstitial disease, stable to slightly improved.  2. Much milder left lung interstitial disease, clearly improved from yesterday's study.  3. No evidence of pneumothorax.  D: 10/15/2021 E: 10/15/2021    This report was finalized on  10/16/2021 11:02 PM by Dr. Yoni Barnes MD.      XR Chest 1 View    Result Date: 10/16/2021  1. Tracheostomy placement with no evidence of pneumothorax or pneumomediastinum.  2. Stable to mildly improved pulmonary interstitial disease.  D:  10/15/2021 E:  10/15/2021     This report was finalized on 10/16/2021 10:56 PM by Dr. Yoni Barnes MD.      XR Chest 1 View    Result Date: 10/16/2021  Mildly improved pulmonary interstitial disease. No evidence of pneumothorax.  DICTATED:   10/16/2021 EDITED/ls :   10/16/2021    This report was finalized on 10/16/2021 10:53 PM by Dr. Yoni Barnes MD.      XR Chest 1 View    Result Date: 10/14/2021  Interval advancement of the endotracheal tube, now terminating approximately 6 cm above the zachary, well beyond the thoracic inlet. Remaining support hardware projects unchanged. Unchanged multifocal bilateral airspace disease. No enlarging effusion or distinct pneumothorax.  This report was finalized on 10/14/2021 11:08 AM by Dagoberto Ordoñez.      XR Chest 1 View    Result Date: 10/14/2021  Support hardware projects unchanged. Aeration appears mildly improved on the right with somewhat less dense confluent consolidation. There is no enlarging effusion or distinct pneumothorax. Unchanged heart and mediastinal contours.  This report was finalized on 10/14/2021 8:58 AM by Dagoberto Ordoñez.      XR Chest 1 View    Result Date: 10/14/2021  Extensive bilateral pulmonary interstitial and airspace disease resembling interstitial edema or ARDS. Appearance is similar to yesterday's study. No evidence of pneumothorax.   D: 10/13/2021 E: 10/13/2021  This report was finalized on 10/14/2021 8:40 AM by Dr. Yoni Barnes MD.      History of Present Illness (as of H&P on 10/4/21):    Mr. Dickens is a 66-year-old male with past medical history current tobacco abuse (1 pack/day x 45 years; reportedly quit 1 month ago when these symptoms began) hypertension, and type 2 diabetes who presents to [facility] on 10/4/21 as a transfer from [facility] with respiratory failure requiring mechanical ventilation and possible pulmonary/renal disease.     According to documentation as patient is currently intubated and sedated and no family is present, patient initially presented to outside hospital on 9/27/21 as a transfer from [facility] with 2 to 3-week history of hemoptysis. Prior to presentation at the outside hospitals CT scan of chest/chest x-ray were performed by PCP and showed \"bilateral pneumonia and a potential nodule in his lungs \". He was treated with antibiotics (possibly doxy) and steroids. Unfortunately his symptoms did not improve and on day of presentation to outside hospital he had worsening shortness of breath and fatigue. He presented to his local ED and was then transferred to [facility] for higher level of care. OSH Covid test negative.      There pulmonary and nephrology were consulted. He was empirically started on Zithromax. CTA showed no pulmonary thromboembolism with moderately severe, fairly diffuse groundglass alveolar infiltrates and an 8 mm RML pulmonary nodule. CT sinuses with small mucous retention cyst right maxillary sinus otherwise unremarkable. With concern for vasculitis as well as concominant renal failure and hemoptysis concern was raised for pulmonary renal disease for which ANCA studies and anti-GBM studies were obtained. [facility] was consulted and he was initiated on pulse dose steroids which he received x3 days at which point he was transitioned to Solu-Medrol 40 mg twice daily.     9/27 with elevated procalcitonin rickettsial disease profile was checked and patient was started on doxy and Zosyn; Zithromax DC'd. Patient underwent bronchoscopy with lavage and biopsy on 10/1; presumptively intubated for procedure. Fresh blood was found in all the lung segments on bilateral sides. Left side did show a bit more " "fresh blood. Although performing physician lavaged and was able to \"some extent\" clear the blood, it was not completely eliminated. A biopsy was performed at the bifurcation of apical and anterior segment of right upper lobe. At the conclusion of the procedure, in the setting of diffuse alveolar hemorrhage and acute renal disease, concern for pulmonary/renal disease was brought up.     Nephrology was consulted and saw patient throughout hospitalization. PAIGE, ANCA, rheumatoid, mycoplasma IgM, and cold aggultinin titers were all negative. Unfortunately anti-GBM studies were found to be positive. He did require multiple blood transfusions throughout outside hospitalization.  Other significant imaging included renal ultrasound which showed mild increased renal cortical echogenicity. No hydronephrosis.     With CT imaging, bronchoscopy results, and positive anti-GBM, it was determined patient needed higher level of care for possible plasmapheresis. He was transferred to [facility] on 10/4/21. He has been admitted to the ICU for evaluation and management of his critical illness.     (End of copied text).    Hospital Course:  Patient was admitted to [facility] ICU 2* issues discussed in the above HPI and continued on mechanical ventilation. He was continued on IV steroids and was initiated on atovaquone for PCP prophylaxis, and a norepinephrine infusion for hypotension. He had dark bloody secretions from his ETT tube coinciding with drop in H&H and received PRBC transfusion. Nephrology was consulted and patient was initiated on CRRT and plasmapheresis.     [facility] renal biopsy resulted on 10/8 and showed mesenteric glomerulonephritis with anti-GBM staining with no evidence of chronicity consistent with anti-GBM disease and patient was initiated on Cytoxan. He developed fevers and leukocytosis with respiratory cultures growing gram-negative bacilli and ID was consulted / he was placed on Cefepime with transition to " renally-dosed Merrem and Micafungin once culture ID confirmed Klebsiella. Over the next several days his ETT secretions became more copious with higher FiO2 requirements and he underwent bronchoscopy with washing on 10/13 with some improvement in oxygenation. Patient continued to have decreased H&H requiring multiple blood transfusions with no obvious signs of bleeding and CT negative for RP bleeding - it was ultimately felt to be medication related 2* Cytoxan. He was placed on PO iron and epogen.     Patient was unable to be liberated from the ventilator and patient underwent tracheostomy and PEG tube placement on 10/15 per Dr. Roberts and was initiated on pressure-support trials. He completed 14 cycles of plasmapheresis on 10/18. CRRT was stopped on 10/20 and intermittent HD was initiated the following day. He completed his prolonged course of antibiotics per ID on 10/25.     He did well with pressure support trials and was transitioned to trach collar on 10/29 / successfully remained off the ventilator. Tracheostomy was downsized to 6-0 cuffless fenestrated Shiley on 11/2. On 11/6 while in dialysis session patient's tracheostomy became dislodged and an RRT was called. Bronchoscopy was performed and tracheostomy was replaced without difficulty. He continued to have a brisk cough reflex that improved with scheduled low-dose gabapentin. Sputum became more copious once again and repeat respiratory culture was obtained on 11/4 which grew GNR and he was placed on Merrem with transition to oral Doxycyline on 11/11 with improved response.     Patient is felt to have reached maximum benefit from this hospitalization and requires transition to long-term facility for continued medical management. Finding placement was complicated initially 2* patient's inability to swallow Cytoxan / it had to be compounded into a formula and placed down PEG tube. Patient was able to successfully pass FEES on 11/11 and swallow PO Cytoxan.  "    Patient will be transferred to [facility] today. Prednisone taper and Cytoxan to be managed per Nephrology. Antibiotics to be continued per ID.      Vitals:  /80   Pulse 69   Temp 97.9 °F (36.6 °C) (Axillary)   Resp 20   Ht 172.7 cm (67.99\")   Wt 81 kg (178 lb 9.2 oz)   SpO2 97%   BMI 27.16 kg/m²     Physical Examination  Telemetry:  Rhythm: normal sinus rhythm (11/11/21 1245)  QTc Interval (Sec): 0.38 (10/25/21 2000)   Constitutional:  No acute distress.   Cardiovascular: RRR.   Normal heart sounds.  No murmurs, gallop or rub.   Respiratory: Normal breath sounds  No adventitious sounds.   Abdominal:  Soft with no tenderness.  No distension.   No HSM.   Extremities: Warm.  Dry.  No cyanosis.  Trace Edema   Neurological:             Awake.  Best Eye Response: 4-->(E4) spontaneous (11/11/21 1000)  Best Motor Response: 6-->(M6) obeys commands (11/11/21 1000)  Best Verbal Response: 4-->(V4) confused (11/11/21 1000)  Karen Coma Scale Score: 14 (11/11/21 1000)     Labs:  Results from last 7 days   Lab Units 11/12/21  0312   WBC 10*3/mm3 9.37   HEMOGLOBIN g/dL 7.3*   HEMATOCRIT % 22.5*   PLATELETS 10*3/mm3 165     Results from last 7 days   Lab Units 11/12/21  0312 11/09/21  0330 11/08/21  0256   SODIUM mmol/L 135*   < > 134*   POTASSIUM mmol/L 3.7   < > 4.9   CHLORIDE mmol/L 96*   < > 92*   CO2 mmol/L 25.0   < > 24.0   BUN mg/dL 48*   < > 75*   CREATININE mg/dL 3.57*   < > 5.04*   CALCIUM mg/dL 8.8   < > 8.7   BILIRUBIN mg/dL  --   --  0.9   ALK PHOS U/L  --   --  84   ALT (SGPT) U/L  --   --  23   AST (SGOT) U/L  --   --  32   GLUCOSE mg/dL 93   < > 158*    < > = values in this interval not displayed.         Phosphorus   Date Value Ref Range Status   11/12/2021 2.5 2.5 - 4.5 mg/dL Final   11/10/2021 2.6 2.5 - 4.5 mg/dL Final           Discharge Medications:     Discharge Medications      New Medications      Instructions Start Date   acetaminophen 325 MG tablet  Commonly known as: " TYLENOL   650 mg, Per G Tube, Every 6 Hours PRN      atovaquone 750 MG/5ML suspension  Commonly known as: MEPRON   1,500 mg, Per G Tube, Daily With Lunch      b complex-vitamin c-folic acid 0.8 MG tablet tablet   1 tablet, Per PEG Tube, Daily      chlorhexidine 0.12 % solution  Commonly known as: PERIDEX   15 mL, Mouth/Throat, Every 12 Hours Scheduled      cholecalciferol 25 MCG (1000 UT) tablet  Commonly known as: VITAMIN D3   2,000 Units, Per G Tube, Daily   Start Date: November 13, 2021     cyclophosphamide 50 MG capsule   100 mg, Oral, Daily   Start Date: November 13, 2021     doxycycline 100 MG capsule  Commonly known as: MONODOX   100 mg, Oral, Every 12 Hours Scheduled      epoetin tatum-epbx 90619 UNIT/ML injection  Commonly known as: RETACRIT   10,000 Units, Subcutaneous, 3 Times Weekly   Start Date: November 15, 2021     famotidine 20 MG tablet  Commonly known as: PEPCID   20 mg, Per G Tube, Daily      ferrous sulfate 300 (60 Fe) MG/5ML syrup   300 mg, Per G Tube, Daily      heparin (porcine) 5000 UNIT/ML injection   5,000 Units, Subcutaneous, Every 12 Hours Scheduled      insulin detemir 100 UNIT/ML injection  Commonly known as: LEVEMIR   10 Units, Subcutaneous, Nightly      insulin regular 100 UNIT/ML injection  Commonly known as: humuLIN R,novoLIN R   0-7 Units, Subcutaneous, Every 6 Hours Scheduled      insulin regular 100 UNIT/ML injection  Commonly known as: humuLIN R,novoLIN R   4 Units, Subcutaneous, Every 6 Hours Scheduled      ipratropium-albuterol 0.5-2.5 mg/3 ml nebulizer  Commonly known as: DUO-NEB   3 mL, Nebulization, Every 4 Hours While Awake - RT      melatonin 5 MG tablet tablet   5 mg, Per G Tube, Nightly      predniSONE 10 MG tablet  Commonly known as: DELTASONE   30 mg, Per G Tube, Daily With Breakfast      saccharomyces boulardii 250 MG capsule  Commonly known as: FLORASTOR   500 mg, Per G Tube, 2 Times Daily      sennosides-docusate 8.6-50 MG per tablet  Commonly known as: PERICOLACE    2 tablets, Per PEG Tube, 2 Times Daily PRN      sertraline 50 MG tablet  Commonly known as: ZOLOFT   50 mg, Per G Tube, Daily   Start Date: November 13, 2021          Discharge Diet:   Diet Instructions     Diet: Dysphagia; Pudding Thick Liquids; Pureed      Discharge Diet: Dysphagia    Fluid Consistency: Pudding Thick Liquids    Pureed Options: Pureed    Diet: Tube Feeding; Continuous; Novasource Renal @ 38 ml/hr and FW @ 10 ml/hr      Discharge Diet: Tube Feeding    Feeding Type: Continuous    Formula & Rate: Novasource Renal @ 38 ml/hr and FW @ 10 ml/hr      FEES Reason for Referral 11/05/21  Patient was referred for a FEES to assess the efficiency of his/her swallow function, rule out aspiration and make recommendations regarding safe dietary consistencies, effective compensatory strategies, and safe eating environment.         Recommendations/Treatment  SLP Swallowing Diagnosis: severe, pharyngeal dysphagia (11/05/21 0945)  Functional Impact: risk of aspiration/pneumonia (11/05/21 0945)  Rehab Potential/Prognosis, Swallowing: good, to achieve stated therapy goals (11/05/21 0945)  Swallow Criteria for Skilled Therapeutic Interventions Met: demonstrates skilled criteria (11/05/21 0945)  Therapy Frequency (Swallow): 5 days per week (11/05/21 0945)  Predicted Duration Therapy Intervention (Days): until discharge (11/05/21 0945)  SLP Diet Recommendation: NPO, long term alternate methods of nutrition/hydration (11/05/21 0945)  SLP Rec. for Method of Medication Administration: meds whole, with pudding or applesauce (Critical oral meds only--using breath hold, swallow, cough (cue patient)) (11/05/21 0945)  Monitor for Signs of Aspiration: yes, notify SLP if any concerns (11/05/21 0945)  Anticipated Discharge Disposition (SLP): unknown, anticipate therapy at next level of care (11/05/21 0945)    Instrumental Set-up  Risks/Benefits Reviewed: risks/benefits explained, patient, family, agreed to eval (11/05/21  0945)  Tracheostomy: Tested with speaking valve on (11/05/21 0945)  Nasal Entry: right: (11/05/21 0945)  Anatomic Considerations: no anatomic structural deviation (11/05/21 0945)  Utensils Used: Spoon (11/05/21 0945)  Consistencies Trialed: ice chips, honey-thick liquids, pudding/puree (11/05/21 0945)    Oral Preparation/ Oral Phase  Oral Phase: WFL, solids not tested, other (see comments) (tested pureed and HT liquids via teaspoon only) (11/05/21 0945)    Pharyngeal Phase  Initiation of Pharyngeal Swallow: bolus in pyriform sinuses (11/05/21 0945)  Pharyngeal Phase: impaired pharyngeal phase of swallowing (11/05/21 0945)  Aspiration During the Swallow: pudding/puree, honey-thick liquids, secondary to delayed swallow initiation or mistiming, secondary to reduced laryngeal elevation, secondary to reduced vestibular closure, secondary to reduced laryngeal (VF) closure (11/05/21 0945)  Response to Aspiration: no response, silent aspiration (11/05/21 0945)  Rosenbek's Scale: honey:, pudding/puree:, 8-->Level 8 (11/05/21 0945)  Residue: honey-thick liquids, pudding/puree, valleculae, pyriform sinuses, secondary to reduced base of tongue retraction, secondary to reduced laryngeal elevation, secondary to reduced hyolaryngeal excursion, secondary to reduced posterior pharyngeal wall stripping (11/05/21 0945)  Response to Residue: cleared residue, with cued swallow (11/05/21 0945)  Attempted Compensatory Maneuvers: bolus presentation style, other (see comments), combination of maneuvers (see comments) (breath hold, swallow, cough) (11/05/21 0945)  Response to Attempted Compensatory Maneuvers: prevented aspiration (11/05/21 0945)  FEES Summary: Patient displayed severe pharyngeal dysphagia with delay to the pyriforms and aspiration of pudding and HT liquids via teaspoon during the swallow. Aspiration was silent. Patient cleared with cued cough. Breath hold, swallow, cough compensations prevented aspiration with pureed  consistency. mild pyriform residue cleared with cued subsequent swallow. Recommend continued NPO with continued alternate means of nutrition. Patient may have critical oral meds with pureed consistency using a breath hold during swallow and followed by cued cough. OK for 1-2 ice chips per hour for oral moisture per MD discretion. (11/05/21 0945)       FEES Reason for Referral 11/11/21  Patient was referred for a FEES to assess the efficiency of his/her swallow function, rule out aspiration and make recommendations regarding safe dietary consistencies, effective compensatory strategies, and safe eating environment.         Recommendations/Treatment  SLP Swallowing Diagnosis: severe, pharyngeal dysphagia (11/11/21 1330)  Functional Impact: risk of aspiration/pneumonia (11/11/21 1330)  Rehab Potential/Prognosis, Swallowing: good, to achieve stated therapy goals (11/11/21 1330)  Swallow Criteria for Skilled Therapeutic Interventions Met: demonstrates skilled criteria (11/11/21 1330)  Therapy Frequency (Swallow): 5 days per week (11/11/21 1330)  Predicted Duration Therapy Intervention (Days): until discharge (11/11/21 1330)  SLP Diet Recommendation: long term alternate methods of nutrition/hydration, other (see comments) (continue alternate means of nutrition with pudding snack 3x per day) (11/11/21 1330)  Recommended Precautions and Strategies: general aspiration precautions (11/11/21 1330)  SLP Rec. for Method of Medication Administration: meds via alternate route (ALthough patient appears safe for critical PO meds with pudding thick consistency, cognition is impacting and he likely will not swallow pill capsules consistently. See recs for critical PO meds above.) (11/11/21 1330)  Monitor for Signs of Aspiration: yes, notify SLP if any concerns (11/11/21 1330)  Anticipated Discharge Disposition (SLP): unknown, anticipate therapy at next level of care (11/11/21 1330)    Instrumental Set-up  Risks/Benefits Reviewed:  risks/benefits explained, patient, agreed to eval (11/11/21 1330)  Tracheostomy: other (see comments) (trach capped) (11/11/21 1330)  Nasal Entry: right: (11/11/21 1330)  Anatomic Considerations: no anatomic structural deviation (11/11/21 1330)  Utensils Used: Spoon, Cup, Straw (11/11/21 1330)  Consistencies Trialed: ice chips, thin liquids, nectar-thick liquids, honey-thick liquids, pudding/puree (11/11/21 1330)    Oral Preparation/ Oral Phase  Oral Phase: WFL, solids not tested, other (see comments) (cognition impacting patients ability to accept critical oral medications) (11/11/21 1330)  Oral Phase, Comment: Pt.expectorated pill capsule (11/11/21 1330)    Pharyngeal Phase  Initiation of Pharyngeal Swallow: bolus in pyriform sinuses (11/11/21 1330)  Pharyngeal Phase: impaired pharyngeal phase of swallowing (11/11/21 1330)  Penetration During the Swallow: secondary to reduced laryngeal elevation, secondary to reduced vestibular closure, nectar-thick liquids (11/11/21 1330)  Penetration After the Swallow: nectar-thick liquids, honey-thick liquids, secondary to residue, in pyriform sinuses, in valleculae (11/11/21 1330)  Aspiration After the Swallow: honey-thick liquids, nectar-thick liquids, secondary to residue, in laryngeal vestibule (11/11/21 1330)  Response to Aspiration: no response, silent aspiration, response with cue only, cleared subglottic material (11/11/21 1330)  Rosenbek's Scale: nectar:, honey:, 8-->Level 8 (11/11/21 1330)  Residue: nectar-thick liquids, honey-thick liquids, pudding/puree, diffuse within pharynx, secondary to reduced base of tongue retraction, secondary to reduced laryngeal elevation, secondary to reduced hyolaryngeal excursion (11/11/21 1330)  Response to Residue: cleared residue, with cued swallow, with spontaneous subsequent swallow (11/11/21 1330)  FEES Summary: Patient presents with severe pharyngeal dysphagia with delay to the pyriforms and deep penetration of NT liquids during  the swallow and aspiration of nectar thick and honey thick liquids after the swallow resulting from residue mixing with secretions and spilling over the lateral channels and posterior commissure. Penetration/aspiration was all silent. Patient tolerated pudding trials without penetration/aspiration. Attempted pill capsules with pureed and honey thick liquids, however, patient repeatedly expectorated capsules. Patient appears safe to take critical meds whole with pudding consistency, however, he may not accept oral medications as cognition is impacting at this time. Recommend pudding thick snack 3x per day. (11/11/21 1330)                Activity at Discharge:    Activity Instructions     Activity as Tolerated      Per PT - Pt performed bed mobility with maxA x2. Transferred from bed to chair with mechanical lift. STS transfers deferred this date. Pt limited by increased lethargy and minimal command following.    Activity as Tolerated          Follow-up Appointments  No future appointments.  Additional Instructions for the Follow-ups that You Need to Schedule     Discharge Follow-up with PCP   As directed       Currently Documented PCP:    Andree Langston MD    PCP Phone Number:    [phone]     Follow Up Details: one week after discharge from rehab             Discharge Instructions:  1. Transfer to [facility] for continued medical management     Current Code Status     Date Active Code Status Order ID Comments User Context       10/4/2021 2023 CPR (Attempt to Resuscitate) [id number]  Darlene Solomon APRN Inpatient     Advance Care Planning Activity     Nevaeh Jean Baptiste DNP, APRN, [facility]  Pulmonary and Critical Care Medicine  11/12/21     Time: Discharge 60 min    CC: Andree Langston MD    *Please note that portions of this note were completed with a voice recognition program. Efforts were made to edit the dictations, but occasionally words are mistranscribed.

## 2021-11-12 NOTE — NURSING NOTE
Shift Summary:         Neuro: Unchanged. Remains disoriented to place/time despite reorientation. Alert and participating in care today. Otherwise appropriate.          Respiratory: Tracheostomy capped at 0830 and transitioned to 1L NC with SpO2 >93%. Secretions minimal with some tan/brown sputum around trach site. Lung sounds mostly clear, occasional scattered rhonchi present in bilateral upper lobes. RR 14-20.          Cardiac: Remains in normal sinus rhythm. SBP 's, scheduled midodrine continued.          GI/: Tube feeds continued at goal rate via PEG Tube. Sutures still present - to be addressed prior to transfer to external facility. Residual 40mL at 1600. C/o nausea without emesis at 1700 after ambulation in [place], zofran given x1. Recurrent nausea at 1840 post Cytoxan administration. Loose/liquid BM x2.   FEES completed today, see SLP note for more details. Dyphagia II Diet ordered. Patient unsuccessful again today swallowing gel caps without chewing capsule/pocketing capsule in mouth. Discussed with [facility] and MD - Cytoxan changed from capsule to compounded form. To attempt swallow with gel caps again in AM to determine if patient will be appropriate for transfer to external facility.  Current plan for transfer at 1215 to [facility] in [place].       HD completed today with 1.5L fluid removal. Remains anuric.          Skin: Prophylactic foam dressings applied to bony prominences.          Pain: No c/o pain. Scheduled gabapentin continued for cough suppression.     Ambulated 40ft in [place] today using gait belt and walker. Required frequent reminders on proper body stance while ambulating.

## 2021-11-12 NOTE — PROGRESS NOTES
Infectious Disease Progress Note  Keshav Dickens  1955  [id number]    Date of Consult: 10/13/2021  Admission Date: 10/4/2021    Requesting Provider: Dr Forrester  Evaluating Physician: Rodríguez Ruff MD    Chief Complaint: hemoptysis    Reason for Consultation: GNR VAP    History of present illness:   Patient is a 66 y.o.  Yr old male with history of DM2, tobacco abuse.  Initially admitted to [facility] on 9/27/2021 with complaints of hemoptysis for 2 to 3 weeks.  Intubated on 10/1.  CT scan with diffuse groundglass opacities and renal failure.  Due to concern for pulmonary renal syndrome he was treated with pulse dose steroids.  Anti-GBM antibody positive concerning for Goodpasture's syndrome.  Renal biopsy also confirmed the diagnosis.  Transferred to [facility] on 10/4.  Nephrology has been following and he is getting plasmapheresis; also high-dose steroids and Cytoxan.    On 10/10 he developed fever up to 101.  Also had leukocytosis up to 15,000.  He had been on atovaquone for PCP prophylaxis.  Started on vancomycin and Zosyn on 10/12.  Respiratory culture from 10/12 with heavy growth of gram-negative bacilli.  And BAL on 10/13 office gram-negative bacilli.  Recurrent hemoptysis per RN.  Intubated, sedated.  60% FiO2.  Trach and PEG planned. Fever curve improved.     10/14/21: Fevers improved. Less blood from ET suction. 50% FiO2 from vent. Sputum culture with Klebsiella aerogenes.     10/15/2021: Underwent tracheostomy and PEG tube placement earlier today.  Transfer back to ICU, relatively stable.  60% FiO2, sedated.  Less blood from endotracheal secretions per staff.     10/16/21: Worse overnight with hypertension and some low-grade temperatures despite CRRT.  Had an episode of emesis and likely aspiration per RN.  Ventilatory requirement up slightly. Still sedated    10/17/21: Improved overnight.  Fever curve improved.  O2 settings down.  Weaning sedation.  No  further episodes of emesis.  Anuric. No diarrhea.     10/18/21: Patient follow-up improved over the past 48 hours.  Afebrile.  No acute new issues overnight.  More comfortable on ventilator.  More sedated today. fever subglottic secretions    10/19/21: no acute new concerns. Minimally responsive. On trach collar O2. No further hemoptysis or sputum production.    10/20/2021: Slow improvement.  Currently trach collar oxygen.  Still very weak, deconditioned.  Weaning sedation.  Still has blood-tinged respiratory secretions.  No other new concerns noted per ICU team    10/21/21: Low-grade temps but no true fevers. Stable, low oxygenation settings.  Ongoing blood-tinged tracheal secretions, slightly worse today. Transitioning to intermittent hemodialysis. Hemoglobin dropped to 5.6 and required transfusion.  No external signs of bleeding other than out of tracheostomy  per RN. Possible transfer to LTAC on Monday    10/22/21: On ventilator overnight, 30% FiO2 and trach collar trials during the day.  Still significant blood-tinged, frothy sputum per RN.  Afebrile.  Tolerating tube feeds.  Had bowel movement.  More alert today and starting to follow some simple commands    11/1/21: Still in the ICU but more alert and interactive on trach collar oxygen.  Blood pressure stable.  Afebrile.  Completed IV antibiotics as planned on 10/25.  Still on atovaquone.  Difficult placement due to need for Cytoxan per case management.  Significant pulmonary secretions per nursing staff.    11/4/21: WBC spiked today.  Increased pulmonary secretions new culture sent with gram-negative rods.  No change in color of secretions per RN.  Has had some episodes of hypotension.  Tracheostomy came dislodged yesterday and required bronchoscopy for placement.    11/5/2021: Afebrile today.  Ongoing increased pulmonary secretions, growing gram-negative robyn in culture.  Per RN secretions are more brown in color today. Patient more fatigued and less  interactive.  No other acute new concerns per RN.  Transfers orders in place to telemetry    11/8/21: No acute events over the weekend.  Still with thick and dark secretions with red streaks per RN.  Afebrile.  Tolerating meropenem.  O2 requirements improved.  Current trach is currently capped and on 4 L nasal cannula.  Patient has difficulty coughing up secretions.  He is generally weak.  Asking to go outside.  Discharge planning in process    11/9/21: Resting comfortably, tolerating dialysis.  Stable respiratory requirements, 30% FiO2 trach collar.  Afebrile.  Tolerating current antibiotics    11/10/21: Possible rash noted yesterday after being given cefepime.  Per patient has been present since Monday.  Not markedly worse today.  Rash on left flank but not much anywhere else.  He says this is typical this time a year when he gets dry skin.  Respiratory status improving.  Trach capped.    11/11/2021:  patient sitting up, alert and interactive.  Tolerating dialysis.  Somewhat confused and repetitive questions today.  Breathing seems to be doing better.  Trach capped and currently on 2 L of oxygen.  Rash is stable.  Patient with no worsening pruritus, pain    11/12/21: Stable overnight.  Afebrile.  On nasal cannula oxygen.  Trach capped.  Less cough.  No worsening rash.  Patient denies pruritus, pain.  Tolerating doxycycline.  Plan discharge to [facility] later today     ROS:  No fevers or chills. No n/v/d.  No new ADR to Abx.      No Known Allergies    Antibiotics:  Anti-Infectives (From admission, onward)    Ordered     Dose/Rate Route Frequency Start Stop    11/11/21 1154  doxycycline (MONODOX) capsule 100 mg        Ordering Provider: Rodríguez Ruff MD    100 mg Oral Every 12 Hours Scheduled 11/11/21 1245 11/25/21 0859    10/28/21 1214  atovaquone (MEPRON) 750 MG/5ML suspension        Ordering Provider: Deb Ovalle APRN    1,500 mg Per G Tube Daily With Lunch 10/28/21 0000 11/27/21 8503     10/21/21 1002  meropenem (MERREM) 1 g/100 mL 0.9% NS (mbp)        Ordering Provider: Rodríguez Ruff MD    1 g  over 3 Hours Intravenous Every 24 Hours 10/21/21 1200 10/25/21 1520    10/20/21 0805  atovaquone (MEPRON) suspension 1,500 mg        Ordering Provider: Genet Bui MD    1,500 mg Per PEG Tube Daily With Lunch 10/20/21 1200 11/26/21 2359    10/18/21 1220  vancomycin (VANCOCIN) in iso-osmotic dextrose IVPB 1 g (premix) 200 mL        Ordering Provider: Rafal Salcido, PharmD    1,000 mg  over 60 Minutes Intravenous Once 10/18/21 1315 10/18/21 1536    10/17/21 0825  vancomycin (VANCOCIN) in iso-osmotic dextrose IVPB 1 g (premix) 200 mL        Ordering Provider: Autumn Lozano, PharmD    10 mg/kg × 102 kg  over 60 Minutes Intravenous Once 10/17/21 1100 10/17/21 1138    10/16/21 1121  vancomycin 2000 mg/500 mL 0.9% NS IVPB ([facility])        Ordering Provider: Autumn Lozano, PharmD    20 mg/kg × 102 kg  over 120 Minutes Intravenous Once 10/16/21 1300 10/16/21 1657    10/13/21 1755  meropenem (MERREM) 1 g/100 mL 0.9% NS (mbp)        Ordering Provider: Rodríguez Ruff MD    1 g  over 30 Minutes Intravenous Once 10/13/21 1845 10/13/21 1858    10/12/21 0954  vancomycin 2000 mg/500 mL 0.9% NS IVPB ([facility])        Ordering Provider: Sravan Forrester MD    20 mg/kg × 102 kg Intravenous Once 10/12/21 1045 10/12/21 1130    10/12/21 0954  piperacillin-tazobactam (ZOSYN) 4.5 g in iso-osmotic dextrose 100 mL IVPB (premix)        Ordering Provider: Sravan Forrester MD    4.5 g  over 30 Minutes Intravenous Once 10/12/21 1045 10/12/21 1125          Other Medications:  Current Facility-Administered Medications   Medication Dose Route Frequency Provider Last Rate Last Admin   • acetaminophen (TYLENOL) tablet 650 mg  650 mg Per PEG Tube Q6H PRN Genet Bui MD   650 mg at 11/09/21 1536   • albumin human 25 % IV SOLN  - ADS Override Pull            • albumin human 25 % IV SOLN  -  ADS Override Pull            • atovaquone (MEPRON) suspension 1,500 mg  1,500 mg Per PEG Tube Daily With Lunch Genet Bui MD   1,500 mg at 11/11/21 1426   • b complex-vitamin c-folic acid (NEPHRO-MELE) tablet 1 tablet  1 tablet Per PEG Tube Daily Genet Bui MD   1 tablet at 11/12/21 0832   • chlorhexidine (PERIDEX) 0.12 % solution 15 mL  15 mL Mouth/Throat Q12H Abdon Roberts MD   15 mL at 11/12/21 0832   • cholecalciferol (VITAMIN D3) tablet 2,000 Units  2,000 Units Per PEG Tube Daily Sravan Forrester MD   2,000 Units at 11/12/21 0831   • clonazePAM (KlonoPIN) tablet 0.5 mg  0.5 mg Per PEG Tube BID PRN Rc Mercer MD       • Cyclophosphamide (CYTOXAN) chemo capsule 100 mg  100 mg Oral Daily Rc Mercer MD   100 mg at 11/12/21 0910   • doxycycline (MONODOX) capsule 100 mg  100 mg Oral Q12H Rodríguez Ruff MD   100 mg at 11/12/21 0831   • epoetin tatum-epbx (RETACRIT) injection 10,000 Units  10,000 Units Subcutaneous Once per day on Tue Thu Sat Torsten Drake MD   10,000 Units at 11/11/21 1053   • famotidine (PEPCID) tablet 20 mg  20 mg Per G Tube Daily Genet Bui MD   20 mg at 11/12/21 0832   • ferrous sulfate 300 (60 Fe) MG/5ML syrup 300 mg  300 mg Per PEG Tube Daily Genet Bui MD   300 mg at 11/12/21 0832   • gabapentin (NEURONTIN) capsule 100 mg  100 mg Per PEG Tube Q8H Sravan Forrester MD   100 mg at 11/12/21 0614   • heparin (porcine) 5000 UNIT/ML injection 5,000 Units  5,000 Units Subcutaneous Q12H Nacho Ba DO   5,000 Units at 11/12/21 0832   • heparin (porcine) injection 1,600 Units  1,600 Units Intracatheter PRN Torsten Drake MD   1,600 Units at 11/11/21 1236   • insulin detemir (LEVEMIR) injection 10 Units  10 Units Subcutaneous Nightly Rc Mercer MD   10 Units at 11/11/21 2123   • insulin regular (humuLIN R,novoLIN R) injection 0-7 Units  0-7 Units Subcutaneous Q6H Rc Mercer MD   2 Units  "at 11/12/21 0027   • insulin regular (humuLIN R,novoLIN R) injection 4 Units  4 Units Subcutaneous Q6H Rc Mercer MD   4 Units at 11/12/21 0027   • ipratropium-albuterol (DUO-NEB) nebulizer solution 3 mL  3 mL Nebulization Q4H While Awake - RT Sravan Forrester MD   3 mL at 11/12/21 0735   • labetalol (NORMODYNE,TRANDATE) injection 20 mg  20 mg Intravenous Q2H PRN Abdon Roberts MD   20 mg at 10/17/21 1721   • melatonin tablet 5 mg  5 mg Per G Tube Nightly Sravan Forrester MD   5 mg at 11/11/21 2124   • ondansetron (ZOFRAN) injection 4 mg  4 mg Intravenous Q6H PRN Sravan Forrester MD   4 mg at 11/11/21 1535   • oxyCODONE (ROXICODONE) 5 MG/5ML solution 5 mg  5 mg Per G Tube Q6H PRN Genet Bui MD   5 mg at 11/03/21 0110   • predniSONE (DELTASONE) tablet 30 mg  30 mg Per G Tube Daily With Breakfast Dagoberto Dawkins MD   30 mg at 11/11/21 1425   • saccharomyces boulardii (FLORASTOR) capsule 500 mg  500 mg Per G Tube BID Rc Mercer MD   500 mg at 11/12/21 0832   • sennosides-docusate (PERICOLACE) 8.6-50 MG per tablet 2 tablet  2 tablet Per PEG Tube BID PRN Genet Bui MD       • sertraline (ZOLOFT) tablet 50 mg  50 mg Per PEG Tube Daily Sravan Forrester MD   50 mg at 11/12/21 0832       Physical Exam:   Vital Signs   /71   Pulse 73   Temp 98.5 °F (36.9 °C) (Oral)   Resp 20   Ht 172.7 cm (67.99\")   Wt 81 kg (178 lb 9.2 oz)   SpO2 93%   BMI 27.16 kg/m²     GENERAL: Chronically ill-appearing.  Very weak, but overall improved.  Sitting up  HEENT: Normocephalic, atraumatic.    Neck:   Tracheostomy in place, capped.  Some dried secretions noted around the base.   HEART: RRR; No murmur.  LUNGS: Clear bilaterally. on nasal cannula oxygen.  No cough  ABDOMEN: Soft, nondistended. No rebound or guarding. PEG tube in place  EXT:  No edema.  : Without Cedeno catheter.  MSK: no major joint swelling noted.    SKIN: Macular rash on " on the lower abdomen bilaterally, stable.  Not present on arms or legs.  NEURO: AOx3, weak in all extremities but improving  Psych: Less confused  Dialysis catheter in right chest dressed appropriately     Laboratory Data    Results from last 7 days   Lab Units 11/12/21  0312 11/10/21  0317 11/09/21  0330   WBC 10*3/mm3 9.37 11.12* 9.37   HEMOGLOBIN g/dL 7.3* 7.9* 7.6*   HEMATOCRIT % 22.5* 24.4* 23.4*   PLATELETS 10*3/mm3 165 175 172     Results from last 7 days   Lab Units 11/12/21  0312   SODIUM mmol/L 135*   POTASSIUM mmol/L 3.7   CHLORIDE mmol/L 96*   CO2 mmol/L 25.0   BUN mg/dL 48*   CREATININE mg/dL 3.57*   GLUCOSE mg/dL 93   CALCIUM mg/dL 8.8     Estimated Creatinine Clearance: 22.7 mL/min (A) (by C-G formula based on SCr of 3.57 mg/dL (H)).  Results from last 7 days   Lab Units 11/08/21  0256   ALK PHOS U/L 84   BILIRUBIN mg/dL 0.9   ALT (SGPT) U/L 23   AST (SGOT) U/L 32         Results from last 7 days   Lab Units 11/08/21  0256   CRP mg/dL 6.36*       Microbiology:  Covid screening negative on admission  10/12 respiratory culture with heavy growth gram-negative bacilli  MRSA nares screening negative  10/12 Catheter tip culture with skin mal  10/13 BAL culture with Klebsiella aerogenes    10/16 blood cultures negative   10/16 respiratory culture negative      11/4 respiratory culture with Klebsiella aerogenes, pansensitive  11/5 blood culture negative to date    Radiology:  XR Chest 1 View    Result Date: 11/12/2021  Persistent but mildly improved perihilar edema compared to the prior study.   D:  11/12/2021 E:  11/12/2021       I personally reviewed the above CXR: New right middle lobe opacity compared to last week      Impression:   1. Leukocytosis, sepsis: resolved today  2. Klebsiella (Enterobacter) aerogenes VAP:  Improved after restarting meropenem. Now on cefepime, but switched to doxycycline after rash developed.  This species could harbor inducible amp-C  3. Macular rash on left flank, possible  drug rash from cefepime?  Unclear that this is truly a drug rash since it is not getting significantly worse while on cefepime but did start shortly after starting cefepime.   switch cefepime to doxycycline on 11/11.  No improvement in rash so far today.  4. Recent Klebsiella aerogenes ventilator associated pneumonia: From respiratory culture x2.   Repeat cultures negative.  O2 settings improved.  Completed antibiotics on 10/25 is planned.  5. Possible aspiration overnight 10/15:   6. Acute hypoxic respiratory failure: s/p trach on 10/15. improved  7. 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 On Cytoxan and steroids   8. Renal failure, anuric. On dialysis   9. Immunocompromise host: Cytoxan and high-dose steroids  10. DM2  11. Tobacco abuse     PLAN:   - Antibiotics were restarted on 11/4.  Respiratory status is certainly improved.  Switched cefepime to doxycycline on November 11 due to possible drug rash.  No improvement in rash so far today.  Plan to continue oral doxycycline 100 mg BID for 2 additional weeks through Nov 25. Prolonged course of oral antibiotics this time due to relapse of infection and ongoing significant immunosuppression         - Continue atovaquone for PCP prophylaxis, until on less than 15 mg of prednisone daily.  - Slowly weaning prednisone per nephrology     Complex MDM. Immunocompromised host. Improving.  Plan discharge to [facility] later today per RN, which is fine from my standpoint.  Dr. Ansari can check on him there if needed.     Rodríguez Ruff MD  11/12/2021

## 2021-11-12 NOTE — PROGRESS NOTES
Multidisciplinary Rounds    Time: 20min  Patient Name: Keshav Dickens  Date of Encounter: 11/12/21 12:08 EST  MRN: [id number]  Admission date: 10/4/2021      Reason for visit: MDR. RD to continue to follow per protocol.     Additional information obtained during MDR: Pt tolerating EN. SLP FEES eval (11/11) rec long term alternate methods of nutrition/hydration, (continue alternate means of nutrition with pudding snack 3x per day. Planning for pt to be d/c'd to [facility] today.     Per I&O over the past 24 hrs:  2 bowel movements      Current diet: Diet Dysphagia; II - Pureed; Pudding Thick; No Straws- 1 pudding snack/tray (total of 3 items/day)    EN: NovaSource Renal at 50 ml/hr and free water at 30 ml q  2hrs via PEG      Intervention:  Follow treatment plan  Care plan reviewed    Follow up:   Per protocol      Jena Pang MS RD/[facility]  09:30 EST

## 2021-11-12 NOTE — PLAN OF CARE
Goal Outcome Evaluation:  Plan of Care Reviewed With: patient        Progress: improving  Outcome Summary: patient was much more alert and able to assist with all activity. patient was able to ambulate with min assist using walker for 40 ft. stable vitals with activity. patient showing significant improvement today toward attaining mobility goals. anticipate patient going to [facility] for continued rehab

## 2021-11-12 NOTE — PROGRESS NOTES
INTENSIVIST   PROGRESS NOTE     Hospital:  LOS: 39 days      S     Keshav 66 y.o. male is followed for: No chief complaint on file.       Respiratory Failure Type 1    Anti-GBM nephritis with pulmonary hemorrhage (HCC)    Pneumonia Klebsiella aerogenes    As an Intensivist, we provide an integrated approach to the ICU patient and family, medical management of comorbid conditions, including but not limited to electrolytes, glycemic control, organ dysfunction, lead interdisciplinary rounds and coordinate the care with all other services, including those from other specialists.     Interval History:  Doing well.  He walked 40 ft with PT this morning.  HD done yesterday.  Voice is better.  Less respiratory secretions.    The patient has a COVID HM Topic on their chart, and they are fully vaccinated.     Temp  Min: 97.9 °F (36.6 °C)  Max: 98.9 °F (37.2 °C)       History     Last Reviewed by Rc Mercer MD on 11/8/2021 at  5:33 PM    Sections Reviewed    Medical, Family, Surgical, Tobacco, Alcohol, Drug Use, Sexual Activity,   Social Documentation      Problem list reviewed by Rc Mercer MD on 11/8/2021 at  5:33 PM  Medicines reviewed by Rc Mercer MD on 11/8/2021 at  5:33 PM  Allergies reviewed by Rc Mercer MD on 11/8/2021 at  5:33 PM       The patient's relevant past medical, surgical and social history were reviewed and updated in Epic as appropriate.        O     Vitals:  Temp: 97.9 °F (36.6 °C) (11/12/21 1200) Temp  Min: 97.9 °F (36.6 °C)  Max: 98.9 °F (37.2 °C)   Temp core:      BP: 142/80 (11/12/21 1200) BP  Min: 83/42  Max: 142/80   Pulse: 69 (11/12/21 1203) Pulse  Min: 63  Max: 89   Resp: 20 (11/12/21 1203) Resp  Min: 14  Max: 20   SpO2: 97 % (11/12/21 1203) SpO2  Min: 86 %  Max: 100 %   Device: nasal cannula (11/12/21 1203)    Flow Rate: 2 (11/12/21 1203) Flow (L/min)  Min: 1  Max: 6     Intake/Ouptut 24 hrs (7:00AM - 6:59 AM)  Intake & Output (last 3 days)       11/09 0701  11/10 0700 11/10  0701  11/11 0700 11/11 0701  11/12 0700 11/12 0701 11/13 0700    I.V. (mL/kg) 200 (2.5) 24 (0.3) 100 (1.2)     Other 213 91 305     NG/GT 7113 149 5282 40    IV Piggyback        Total Intake(mL/kg) 1504 (18.6) 654 (8.1) 1784 (22) 40 (0.5)    Urine (mL/kg/hr) 0 (0)       Other 2500  1770     Stool 0  0     Total Output 2500  1770     Net -996 +654 +14 +40            Urine Unmeasured Occurrence 0 x       Stool Unmeasured Occurrence 1 x 1 x 2 x           Wt Readings from Last 3 Encounters:   11/03/21 81 kg (178 lb 9.2 oz)       Physical Examination  Telemetry:  Rhythm: normal sinus rhythm (11/12/21 1200)     QTc Interval (Sec): 0.38 (10/25/21 2000)   Constitutional:  No acute distress.   Cardiovascular: RRR.   Normal heart sounds.  No murmurs, gallop or rub.   Respiratory: Normal breath sounds  No adventitious sounds.   Abdominal:  Soft with no tenderness.  No distension.   No HSM.   Extremities: Warm.  Dry.  No cyanosis.  Trace Edema   Neurological:   Awake.  Best Eye Response: 4-->(E4) spontaneous (11/12/21 1200)  Best Motor Response: 6-->(M6) obeys commands (11/12/21 1200)  Best Verbal Response: 5-->(V5) oriented (11/12/21 1200)  [place] Coma Scale Score: 15 (11/12/21 1200)     Results Reviewed:  Laboratory  Microbiology  Radiology  Pathology    Hematology:  Results from last 7 days   Lab Units 11/12/21  0312 11/10/21  0317 11/09/21  0330 11/09/21  0330   WBC 10*3/mm3 9.37 11.12*   < > 9.37   HEMOGLOBIN g/dL 7.3* 7.9*   < > 7.6*   MCV fL 99.1* 98.8*   < > 98.3*   PLATELETS 10*3/mm3 165 175   < > 172   NEUTROS ABS 10*3/mm3  --  9.54*  --  8.01*   LYMPHS ABS 10*3/mm3  --  0.36*  --  0.38*   EOS ABS 10*3/mm3  --  0.19  --  0.24    < > = values in this interval not displayed.     Chemistry:  Estimated Creatinine Clearance: 22.7 mL/min (A) (by C-G formula based on SCr of 3.57 mg/dL (H)).  Results from last 7 days   Lab Units 11/12/21  0312 11/11/21  0353   SODIUM mmol/L 135* 136   POTASSIUM mmol/L 3.7 4.1   CHLORIDE  mmol/L 96* 95*   CO2 mmol/L 25.0 24.0   BUN mg/dL 48* 100*   CREATININE mg/dL 3.57* 5.83*   GLUCOSE mg/dL 93 114*     Results from last 7 days   Lab Units 11/12/21 0312 11/11/21  0353 11/10/21  0317 11/10/21  0317 11/09/21  0330 11/08/21  0256 11/07/21  0357 11/06/21  0401   CALCIUM mg/dL 8.8 8.9   < > 8.7   < > 8.7   < > 8.9   MAGNESIUM mg/dL  --   --   --   --   --  2.7*  --  2.7*   PHOSPHORUS mg/dL 2.5  --   --  2.6   < > 3.5  --  3.4    < > = values in this interval not displayed.     Biomarkers:  Results from last 7 days   Lab Units 11/12/21  0312 11/10/21  0317 11/08/21  0256   CRP mg/dL  --   --  6.36*   PROCALCITONIN ng/mL 2.63* 1.84* 2.26*     COVID-19  Lab Results   Component Value Date    COVID19 Not Detected 10/04/2021       Images:  XR Chest 1 View    Result Date: 11/12/2021  Persistent but mildly improved perihilar edema compared to the prior study.   D:  11/12/2021 E:  11/12/2021        Echo:  Results for orders placed during the hospital encounter of 10/04/21    Adult Transthoracic Echo Complete W/ Cont if Necessary Per Protocol    Interpretation Summary  · Left ventricular ejection fraction appears to be 56 - 60%. Left ventricular systolic function is normal.  · Left ventricular wall thickness is consistent with mild to moderate concentric hypertrophy.  · The inferior vena cava is dilated.    Results: Reviewed.  I reviewed the patient's new laboratory and imaging results.  I independently reviewed the patient's new images.    Medications: Reviewed.    Assessment/Plan   A / P     Keshav is a 66 y.o. male admitted on 10/4/2021 with Respiratory failure (HCC) [J96.90]: admitted [facility] to [facility]21 with 2-3 week h/o hemoptysis.  He required intubation on 10/1/21.  CTA at OSH showed diffuse GGO and 8 mm RML nodule.  Patient was also noted to be in Renal failure with concerns for Pulmonary-Renal syndrome.  He was treated with 3 days of pulse IV solumedrol. Ultimately  anti-GBM Ab was positive concerning for Goodpastures. He had temporary HD catheter placed at OSH for PIOTR and HD was started.     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     He was transferred to [facility] 10/4/2021 for higher level of care.  Daily plasma exchange was initiated on 10/5/21 for total of 14 sessions, and he has continued on moderate dose steroids after receiving pulse.  Patient has also been initiated on Cytoxan.       1. Respiratory Failure  1. DAH  2. Trach 10/15/21  3. Trach downsized to 6-0 cuffless fenestrated [place]iley on 11/2.  4. Klebsiella pneumonia  1. Doxy: Abs per ID  5. Tobacco use 45 pack-year, quit 1 month PTA  2. Anti GBM nephritis  1. On iHD  2. Prednisone 40 mg/d and Atovaquone for PJ prophylaxis.  3. Cyclophosphamide 100 mg/d  3. HTN  4. Nutrition Support: Enteral Nutrition   1. PEG 10/15/21    Lab Results   Lab Value Date/Time    CRP 6.36 (H) 11/08/2021 0256    CRP 2.55 (H) 11/02/2021 0356    CRP 2.48 (H) 10/26/2021 0340    PREALBUMIN 25.9 11/08/2021 1252    PREALBUMIN 28.2 11/02/2021 0356    PREALBUMIN 34.1 10/26/2021 0340    TRIG 315 (H) 11/02/2021 0356    TRIG 291 (H) 10/26/2021 0340    TRIG 151 (H) 10/19/2021 0703     5. T2DM    Results from last 7 days   Lab Units 11/12/21  0512 11/11/21  2304 11/11/21  1719 11/11/21  1150 11/11/21  0507 11/10/21  2315   GLUCOSE mg/dL 105 161* 176* 130 102 193*     Lab Results   Lab Value Date/Time    HGBA1C 5.00 11/06/2021 0401       Nutrition Support: Diet, Tube Feeding Tube Feeding Formula: Novasource Renal (Electrolyte Restricted)   Modulars: Patient doesn't have any tube feeding modular orders   Diet: Diet Dysphagia; II - Pureed; Pudding Thick; No Straws   Advance Directives: Code Status and Medical Interventions:   Ordered at: 10/04/21 2023     Code Status (Patient has no pulse and is not breathing):    CPR (Attempt to Resuscitate)     Medical  Interventions (Patient has pulse or is breathing):    Full        Plan:    1. Continue PT, OT and ST  2. iHD T/T/S per  Nephrologist.  3. Goal: Glucose < 180 mg/dL.   1. No changes in current doses of insulin  4. Disposition: Keep in ICU.   1. LTAC today  2. May discontinue trach soon.  3. Cytoxan and Steroids as per Nephrology  4. Abs per [facility]    Plan of care and goals reviewed during interdisciplinary rounds.  I discussed the patient's findings and my recommendations with patient    Level of Risk is High due to:  illness with threat to life or bodily function.     Time: 25 minutes, in direct patient care, with the patient and/or on the warren coordinating care with other health care providers.     I have spent > 50% percent of this time, counseling and discussing management.     [x]  Primary Attending  []  Consultant

## 2021-11-12 NOTE — THERAPY TREATMENT NOTE
Patient Name: Keshav Dickens  : 1955    MRN: [id number]                              Today's Date: 2021       Admit Date: 10/4/2021    Visit Dx:     ICD-10-CM ICD-9-CM   1. Acute respiratory failure with hypoxia  J96.01 518.81   2. Examination for normal comparison for clinical research  Z00.6 V70.7   3. Diffuse pulmonary alveolar hemorrhage  R04.89 786.30   4. 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.0 446.21   5. Voice impairment  R49.9 784.40   6. Oropharyngeal dysphagia  R13.12 787.22     Patient Active Problem List   Diagnosis   • Goodpasture Syndrome   • Respiratory Failure Type 1   • Acute renal failure (ARF) ([facility])   • Acute blood loss anemia   • Sepsis due to pneumonia ([facility]);Klebsiella aerogenes   • Idiopathic hypotension   • Hyperglycemia   • History of tobacco abuse     Past Medical History:   Diagnosis Date   • Goodpasture's syndrome  10/4/2021   • Hypertension      Past Surgical History:   Procedure Laterality Date   • CHOLECYSTECTOMY     • TRACHEOSTOMY AND PEG TUBE INSERTION N/A 10/15/2021    Procedure: TRACHEOSTOMY AND PERCUTANEOUS ENDOSCOPIC GASTROSTOMY TUBE INSERTION;  Surgeon: Abdon Roberts MD;  Location: [facility] OR;  Service: General;  Laterality: N/A;      General Information     Row Name 21 1023          Physical Therapy Time and Intention    Document Type therapy note (daily note)  -     Mode of Treatment physical therapy  -     Row Name 21 1023          General Information    Patient Profile Reviewed yes  -TANK     Prior Level of Function independent:; gait; transfer; ADL's; bed mobility  -     Existing Precautions/Restrictions cardiac; sternal  -     Barriers to Rehab medically complex; cognitive status  -     Row Name 21 1023          Living Environment    Lives With spouse  -     Row Name 21 1023          Home Main Entrance    Number of Stairs, Main Entrance two  -     Row Name 21 1023          Cognition    Orientation  Status (Cognition) oriented to; person; place  -TANK     Row Name 11/12/21 1023          Safety Issues, Functional Mobility    Safety Issues Affecting Function (Mobility) safety precautions follow-through/compliance; insight into deficits/self-awareness; awareness of need for assistance; safety precaution awareness  -TANK     Impairments Affecting Function (Mobility) balance; endurance/activity tolerance; shortness of breath; strength  -TANK     Cognitive Impairments, Mobility Safety/Performance safety precaution awareness; safety precaution follow-through; awareness, need for assistance; insight into deficits/self-awareness  -TANK     Comment, Safety Issues/Impairments (Mobility) patient more alert able to have a conversation  -TANK           User Key  (r) = Recorded By, (t) = Taken By, (c) = Cosigned By    Initials Name Provider Type    Karrie Pelayo PT Physical Therapist               Mobility     Row Name 11/12/21 1025          Bed Mobility    Comment (Bed Mobility) patient is OOB and returns to the chair  -TANK     Row Name 11/12/21 1025          Transfers    Comment (Transfers) much improved transfers today  -TANK     Row Name 11/12/21 1025          Bed-Chair Transfer    Bed-Chair [place] (Transfers) minimum assist (75% patient effort); 2 person assist  -TANK     Assistive Device (Bed-Chair Transfers) walker, front-wheeled  -TAKN     Row Name 11/12/21 1025          Sit-Stand Transfer    Sit-Stand [place] (Transfers) minimum assist (75% patient effort); 2 person assist  -TANK     Assistive Device (Sit-Stand Transfers) walker, front-wheeled  -TANK     Row Name 11/12/21 1025          Gait/Stairs (Locomotion)    [place] Level (Gait) minimum assist (75% patient effort); 2 person assist  -TANK     Assistive Device (Gait) walker, front-wheeled  -TANK     Distance in Feet (Gait) 40  -TANK     Deviations/Abnormal Patterns (Gait) stride length decreased  -TANK     Bilateral Gait Deviations forward flexed posture  -TANK      Comment (Gait/Stairs) patient able to keep his feet  today he is able to advance both LE's without assist today and take steps not just shuffling his feet.  -TANK           User Key  (r) = Recorded By, (t) = Taken By, (c) = Cosigned By    Initials Name Provider Type    Karrie Pelayo PT Physical Therapist               Obj/Interventions     Row Name 11/12/21 1027          Motor Skills    Therapeutic Exercise hip; knee; ankle  -     Row Name 11/12/21 1027          Hip (Therapeutic Exercise)    Hip (Therapeutic Exercise) AROM (active range of motion)  -TANK     Hip AROM (Therapeutic Exercise) bilateral; flexion; extension; 10 repetitions; sitting  -     Row Name 11/12/21 1027          Knee (Therapeutic Exercise)    Knee (Therapeutic Exercise) AROM (active range of motion)  -     Knee AROM (Therapeutic Exercise) bilateral; flexion; extension; 10 repetitions; sitting  -     Row Name 11/12/21 1027          Ankle (Therapeutic Exercise)    Ankle (Therapeutic Exercise) AROM (active range of motion)  -     Ankle AROM (Therapeutic Exercise) bilateral; dorsiflexion; plantarflexion; 10 repetitions; sitting  -     Row Name 11/12/21 1027          Balance    Balance Assessment sitting static balance; sitting dynamic balance; standing static balance; standing dynamic balance  -     Static Sitting Balance WFL; supported  -TANK     Dynamic Sitting Balance mild impairment; supported  -TANK     Static Standing Balance mild impairment; supported  -TANK     Dynamic Standing Balance mild impairment; supported  -TANK     Balance Interventions standing; dynamic; weight shifting activity  -TANK           User Key  (r) = Recorded By, (t) = Taken By, (c) = Cosigned By    Initials Name Provider Type    Karrie Pelayo PT Physical Therapist               Goals/Plan     Row Name 11/12/21 1033          Bed Mobility Goal 1 (PT)    Activity/Assistive Device (Bed Mobility Goal 1, PT) sit to supine; supine to sit  -TANK      [place] Level/Cues Needed (Bed Mobility Goal 1, PT) moderate assist (50-74% patient effort)  -TANK     Time Frame (Bed Mobility Goal 1, PT) 2 weeks  -TANK     Progress/Outcomes (Bed Mobility Goal 1, PT) goal met  -TANK     Row Name 11/12/21 1033          Transfer Goal 1 (PT)    Activity/Assistive Device (Transfer Goal 1, PT) sit-to-stand/stand-to-sit; bed-to-chair/chair-to-bed; walker, rolling  -TANK     [place] Level/Cues Needed (Transfer Goal 1, PT) minimum assist (75% or more patient effort)  -TANK     Time Frame (Transfer Goal 1, PT) 2 weeks  -TANK     Progress/Outcome (Transfer Goal 1, PT) goal met  -TANK     Row Name 11/12/21 1033          Gait Training Goal 1 (PT)    Activity/Assistive Device (Gait Training Goal 1, PT) gait (walking locomotion); assistive device use; walker, rolling  -TANK     [place] Level (Gait Training Goal 1, PT) maximum assist (25-49% patient effort)  -TANK     Distance (Gait Training Goal 1, PT) 10 feet  -TANK     Time Frame (Gait Training Goal 1, PT) 2 weeks  -TANK     Progress/Outcome (Gait Training Goal 1, PT) goal met  -TANK           User Key  (r) = Recorded By, (t) = Taken By, (c) = Cosigned By    Initials Name Provider Type    Karrie Pelayo, PT Physical Therapist               Clinical Impression     Row Name 11/12/21 1029          Pain Scale: Numbers Pre/Post-Treatment    Pretreatment Pain Rating 0/10 - no pain  -TANK     Posttreatment Pain Rating 0/10 - no pain  -TANK     Row Name 11/12/21 1029          Plan of Care Review    Plan of Care Reviewed With patient  -TANK     Progress improving  -TANK     Outcome Summary patient was much more alert and able to assist with all activity. patient was able to ambulate with min assist using walker for 40 ft. stable vitals with activity. patient showing significant improvement today toward attaining mobility goals. [facility]  -TANK     Row Name 11/12/21 1026          Therapy Assessment/Plan (PT)     Patient/Family Therapy Goals Statement (PT) wants to be moved closer to home  -TANK     Rehab Potential (PT) good, to achieve stated therapy goals  -TANK     Criteria for Skilled Interventions Met (PT) yes; skilled treatment is necessary  -TANK     Row Name 11/12/21 1029          Vital Signs    Pre Systolic BP Rehab 126  -TANK     Pre Treatment Diastolic BP 63  -TANK     Post Systolic BP Rehab 134  -TANK     Post Treatment Diastolic BP 63  -TANK     Pretreatment Heart Rate (beats/min) 70  -TANK     Posttreatment Heart Rate (beats/min) 76  -TANK     Pre SpO2 (%) 91  -TANK     O2 Delivery Pre Treatment room air  -TANK     Post SpO2 (%) 98  -TANK     O2 Delivery Post Treatment nasal cannula  -TANK     Pre Patient Position Sitting  -TANK     Intra Patient Position Standing  -TANK     Post Patient Position Sitting  -TANK     Row Name 11/12/21 1029          Positioning and Restraints    Pre-Treatment Position sitting in chair/recliner  -TANK     Post Treatment Position chair  -TANK     In Chair notified nsg; reclined; sitting; call light within reach; with nsg  -TANK           User Key  (r) = Recorded By, (t) = Taken By, (c) = Cosigned By    Initials Name Provider Type    Karrie Pelayo, PT Physical Therapist               Outcome Measures     Row Name 11/12/21 1035 11/12/21 0800       How much help from another person do you currently need...    Turning from your back to your side while in flat bed without using bedrails? 3  -TANK 3  -BF    Moving from lying on back to sitting on the side of a flat bed without bedrails? 3  -TANK 3  -BF    Moving to and from a bed to a chair (including a wheelchair)? 3  -TANK 2  -BF    Standing up from a chair using your arms (e.g., wheelchair, bedside chair)? 3  -TANK 3  -BF    Climbing 3-5 steps with a railing? 2  -TANK 2  -BF    To walk in hospital room? 3  -TANK 2  -BF    AM-PAC 6 Clicks Score (PT) 17  -TANK 15  -BF          User Key  (r) = Recorded By, (t) = Taken By, (c) = Cosigned By    Initials Name Provider Type    TANK  Karrie Cortez, PT Physical Therapist    Amparo Case, RN Registered Nurse                             Physical Therapy Education                 Title: PT OT SLP Therapies (In Progress)     Topic: Physical Therapy (In Progress)     Point: Mobility training (In Progress)     Learning Progress Summary           Patient Acceptance, E, NR by TANK at 11/12/2021 0840   Family Acceptance, E, NR by KG at 10/27/2021 0927   Significant Other Acceptance, E, NR by TANK at 11/9/2021 1430    Comment: tried to educate wife that patient is not ready for ambulation she continues to have really high expectations      Show all documentation for this point (24)                 Point: Home exercise program (In Progress)     Learning Progress Summary           Patient Acceptance, E, NR by TANK at 11/12/2021 0840   Family Acceptance, E, NR by KG at 10/27/2021 0927   Significant Other Acceptance, E, NR by TANK at 11/9/2021 1430    Comment: tried to educate wife that patient is not ready for ambulation she continues to have really high expectations      Show all documentation for this point (22)                 Point: Body mechanics (In Progress)     Learning Progress Summary           Patient Acceptance, E, NR by TANK at 11/12/2021 0840   Family Acceptance, E, NR by KG at 10/27/2021 0927   Significant Other Acceptance, E, NR by TANK at 11/9/2021 1430    Comment: tried to educate wife that patient is not ready for ambulation she continues to have really high expectations      Show all documentation for this point (24)                 Point: Precautions (In Progress)     Learning Progress Summary           Patient Acceptance, E, NR by TANK at 11/12/2021 0840   Family Acceptance, E, NR by KG at 10/27/2021 0927   Significant Other Acceptance, E, NR by TANK at 11/9/2021 1430    Comment: tried to educate wife that patient is not ready for ambulation she continues to have really high expectations      Show all documentation for this point (23)                              User Key     Initials Effective Dates Name Provider Type Discipline    TANK 06/16/21 -  Karrie Cortez PT Physical Therapist PT    KG 05/22/20 -  Joanna Clark PT Physical Therapist PT              PT Recommendation and Plan  Planned Therapy Interventions (PT): balance training, bed mobility training, gait training, home exercise program, strengthening, transfer training (if patient isn't transferred to another facility today we will reestablish new goals tomorrow)  Plan of Care Reviewed With: patient  Progress: improving  Outcome Summary: patient was much more alert and able to assist with all activity. patient was able to ambulate with min assist using walker for 40 ft. stable vitals with activity. patient showing significant improvement today toward attaining mobility goals. [facility]     Time Calculation:    PT Charges     Row Name 11/12/21 1036             Time Calculation    Start Time 0840  -TANK      PT Received On 11/12/21  -TANK      PT Goal Re-Cert Due Date 11/18/21  -TANK              Time Calculation- PT    Total Timed Code Minutes- PT 25 minute(s)  -TANK              Timed Charges    98713 - PT Therapeutic Activity Minutes 25  -TANK              Total Minutes    Timed Charges Total Minutes 25  -TANK       Total Minutes 25  -TANK            User Key  (r) = Recorded By, (t) = Taken By, (c) = Cosigned By    Initials Name Provider Type    Karrie Pelayo, PT Physical Therapist              Therapy Charges for Today     Code Description Service Date Service Provider Modifiers Qty    92441588826 HC PT THERAPEUTIC ACT EA 15 MIN 11/12/2021 Karrie Cortez, PT GP 2    65127182629 HC PT THER SUPP EA 15 MIN 11/12/2021 Karrie Cortez, PT GP 2          PT G-Codes  Outcome Measure Options: AM-PAC 6 Clicks Daily Activity (OT)  AM-PAC 6 Clicks Score (PT): 17  AM-PAC 6 Clicks Score (OT): 9    Karrie Cortez PT  11/12/2021

## 2021-11-12 NOTE — PLAN OF CARE
Goal Outcome Evaluation:               Very little change overnight. Pt d/o to time/place. MAEE. Remained on trach collar while sleeping, trach care performed. VSS, midodrine given. Skin OK.

## 2021-11-12 NOTE — NURSING NOTE
Dr Roberts contacted for confirmation of suture removal on PEG. RN could not visualize blue inner trach sutures, but black faceplate sutures removed prior. 2 stitches removed from PEG tube without issue. PEG CDI.

## 2021-11-12 NOTE — CASE MANAGEMENT/SOCIAL WORK
Case Management Discharge Note      Final Note:  being discharged today.  He will be transported to [facility] at 12:15 per [facility] ambulance.  I spoke with Eunice at [facility] and updated her that he was able to swallow cytoxan with no issues this am.  Eunice has spoke with wife and updated her on plan for [facility] today.  Nurse to call report to [phone].         Selected Continued Care - Admitted Since 10/4/2021     Destination Coordination complete.    Service Provider Selected Services Address Phone Fax Patient Preferred    [facility] [place] [address] [facility] [address] [phone] --                   Transportation Services  Ambulance: [facility]    Final Discharge Disposition Code: 63 - [facility]

## 2021-11-17 LAB — GLUCOSE BLDC GLUCOMTR-MCNC: 260 MG/DL (ref 70–130)

## 2021-12-06 ENCOUNTER — APPOINTMENT (OUTPATIENT)
Dept: GENERAL RADIOLOGY | Facility: HOSPITAL | Age: 66
End: 2021-12-06

## 2021-12-06 ENCOUNTER — HOSPITAL ENCOUNTER (INPATIENT)
Facility: HOSPITAL | Age: 66
LOS: 3 days | Discharge: HOME HEALTH | End: 2021-12-09
Attending: EMERGENCY MEDICINE | Admitting: INTERNAL MEDICINE

## 2021-12-06 DIAGNOSIS — M31.0: ICD-10-CM

## 2021-12-06 DIAGNOSIS — N18.6 ESRD (END STAGE RENAL DISEASE) (HCC): ICD-10-CM

## 2021-12-06 DIAGNOSIS — J96.01 ACUTE RESPIRATORY FAILURE WITH HYPOXIA (HCC): ICD-10-CM

## 2021-12-06 DIAGNOSIS — J18.9 PNEUMONIA OF RIGHT LOWER LOBE DUE TO INFECTIOUS ORGANISM: Primary | ICD-10-CM

## 2021-12-06 DIAGNOSIS — D63.8 ANEMIA OF CHRONIC DISEASE: ICD-10-CM

## 2021-12-06 LAB
ABO GROUP BLD: NORMAL
ALBUMIN SERPL-MCNC: 4.1 G/DL (ref 3.5–5.2)
ALBUMIN/GLOB SERPL: 1.6 G/DL
ALP SERPL-CCNC: 66 U/L (ref 39–117)
ALT SERPL W P-5'-P-CCNC: 35 U/L (ref 1–41)
ANION GAP SERPL CALCULATED.3IONS-SCNC: 14 MMOL/L (ref 5–15)
AST SERPL-CCNC: 45 U/L (ref 1–40)
BASOPHILS # BLD AUTO: 0.06 10*3/MM3 (ref 0–0.2)
BASOPHILS NFR BLD AUTO: 0.8 % (ref 0–1.5)
BILIRUB SERPL-MCNC: 1.1 MG/DL (ref 0–1.2)
BLD GP AB SCN SERPL QL: NEGATIVE
BUN SERPL-MCNC: 41 MG/DL (ref 8–23)
BUN/CREAT SERPL: 8.6 (ref 7–25)
CALCIUM SPEC-SCNC: 8.9 MG/DL (ref 8.6–10.5)
CHLORIDE SERPL-SCNC: 100 MMOL/L (ref 98–107)
CO2 SERPL-SCNC: 27 MMOL/L (ref 22–29)
CREAT SERPL-MCNC: 4.75 MG/DL (ref 0.76–1.27)
D-LACTATE SERPL-SCNC: 1.3 MMOL/L (ref 0.5–2)
DEPRECATED RDW RBC AUTO: 73.9 FL (ref 37–54)
EOSINOPHIL # BLD AUTO: 0.2 10*3/MM3 (ref 0–0.4)
EOSINOPHIL NFR BLD AUTO: 2.6 % (ref 0.3–6.2)
ERYTHROCYTE [DISTWIDTH] IN BLOOD BY AUTOMATED COUNT: 20.6 % (ref 12.3–15.4)
FLUAV RNA RESP QL NAA+PROBE: NOT DETECTED
FLUBV RNA RESP QL NAA+PROBE: NOT DETECTED
GFR SERPL CREATININE-BSD FRML MDRD: 12 ML/MIN/1.73
GFR SERPL CREATININE-BSD FRML MDRD: ABNORMAL ML/MIN/{1.73_M2}
GLOBULIN UR ELPH-MCNC: 2.6 GM/DL
GLUCOSE BLDC GLUCOMTR-MCNC: 194 MG/DL (ref 70–130)
GLUCOSE SERPL-MCNC: 124 MG/DL (ref 65–99)
HCT VFR BLD AUTO: 22 % (ref 37.5–51)
HGB BLD-MCNC: 7.2 G/DL (ref 13–17.7)
HOLD SPECIMEN: NORMAL
IMM GRANULOCYTES # BLD AUTO: 0.17 10*3/MM3 (ref 0–0.05)
IMM GRANULOCYTES NFR BLD AUTO: 2.2 % (ref 0–0.5)
LYMPHOCYTES # BLD AUTO: 0.54 10*3/MM3 (ref 0.7–3.1)
LYMPHOCYTES NFR BLD AUTO: 7 % (ref 19.6–45.3)
MCH RBC QN AUTO: 32.7 PG (ref 26.6–33)
MCHC RBC AUTO-ENTMCNC: 32.7 G/DL (ref 31.5–35.7)
MCV RBC AUTO: 100 FL (ref 79–97)
MONOCYTES # BLD AUTO: 0.69 10*3/MM3 (ref 0.1–0.9)
MONOCYTES NFR BLD AUTO: 9 % (ref 5–12)
NEUTROPHILS NFR BLD AUTO: 6.04 10*3/MM3 (ref 1.7–7)
NEUTROPHILS NFR BLD AUTO: 78.4 % (ref 42.7–76)
NRBC BLD AUTO-RTO: 0 /100 WBC (ref 0–0.2)
PLATELET # BLD AUTO: 189 10*3/MM3 (ref 140–450)
PMV BLD AUTO: 10.4 FL (ref 6–12)
POTASSIUM SERPL-SCNC: 4 MMOL/L (ref 3.5–5.2)
PROT SERPL-MCNC: 6.7 G/DL (ref 6–8.5)
RBC # BLD AUTO: 2.2 10*6/MM3 (ref 4.14–5.8)
RH BLD: POSITIVE
SARS-COV-2 RNA RESP QL NAA+PROBE: NOT DETECTED
SODIUM SERPL-SCNC: 141 MMOL/L (ref 136–145)
T&S EXPIRATION DATE: NORMAL
WBC NRBC COR # BLD: 7.7 10*3/MM3 (ref 3.4–10.8)
WHOLE BLOOD HOLD SPECIMEN: NORMAL
WHOLE BLOOD HOLD SPECIMEN: NORMAL

## 2021-12-06 PROCEDURE — 86850 RBC ANTIBODY SCREEN: CPT | Performed by: INTERNAL MEDICINE

## 2021-12-06 PROCEDURE — 86920 COMPATIBILITY TEST SPIN: CPT

## 2021-12-06 PROCEDURE — 87040 BLOOD CULTURE FOR BACTERIA: CPT | Performed by: EMERGENCY MEDICINE

## 2021-12-06 PROCEDURE — 85025 COMPLETE CBC W/AUTO DIFF WBC: CPT | Performed by: EMERGENCY MEDICINE

## 2021-12-06 PROCEDURE — 86922 COMPATIBILITY TEST ANTIGLOB: CPT

## 2021-12-06 PROCEDURE — 99223 1ST HOSP IP/OBS HIGH 75: CPT | Performed by: INTERNAL MEDICINE

## 2021-12-06 PROCEDURE — 86900 BLOOD TYPING SEROLOGIC ABO: CPT | Performed by: INTERNAL MEDICINE

## 2021-12-06 PROCEDURE — 83605 ASSAY OF LACTIC ACID: CPT | Performed by: EMERGENCY MEDICINE

## 2021-12-06 PROCEDURE — 80053 COMPREHEN METABOLIC PANEL: CPT | Performed by: EMERGENCY MEDICINE

## 2021-12-06 PROCEDURE — 25010000002 AZITHROMYCIN PER 500 MG: Performed by: INTERNAL MEDICINE

## 2021-12-06 PROCEDURE — 87636 SARSCOV2 & INF A&B AMP PRB: CPT | Performed by: EMERGENCY MEDICINE

## 2021-12-06 PROCEDURE — 83516 IMMUNOASSAY NONANTIBODY: CPT | Performed by: EMERGENCY MEDICINE

## 2021-12-06 PROCEDURE — 36415 COLL VENOUS BLD VENIPUNCTURE: CPT

## 2021-12-06 PROCEDURE — 25010000002 HEPARIN (PORCINE) PER 1000 UNITS: Performed by: INTERNAL MEDICINE

## 2021-12-06 PROCEDURE — 25010000002 VANCOMYCIN 10 G RECONSTITUTED SOLUTION: Performed by: INTERNAL MEDICINE

## 2021-12-06 PROCEDURE — 86901 BLOOD TYPING SEROLOGIC RH(D): CPT | Performed by: INTERNAL MEDICINE

## 2021-12-06 PROCEDURE — 82962 GLUCOSE BLOOD TEST: CPT

## 2021-12-06 PROCEDURE — 71045 X-RAY EXAM CHEST 1 VIEW: CPT

## 2021-12-06 PROCEDURE — 99284 EMERGENCY DEPT VISIT MOD MDM: CPT

## 2021-12-06 PROCEDURE — 25010000002 PIPERACILLIN SOD-TAZOBACTAM PER 1 G: Performed by: EMERGENCY MEDICINE

## 2021-12-06 RX ORDER — FOLIC ACID/VIT B COMPLEX AND C 0.8 MG
1 TABLET ORAL DAILY
Status: DISCONTINUED | OUTPATIENT
Start: 2021-12-06 | End: 2021-12-09 | Stop reason: HOSPADM

## 2021-12-06 RX ORDER — DEXTROSE MONOHYDRATE 25 G/50ML
25 INJECTION, SOLUTION INTRAVENOUS
Status: DISCONTINUED | OUTPATIENT
Start: 2021-12-06 | End: 2021-12-09 | Stop reason: HOSPADM

## 2021-12-06 RX ORDER — FAMOTIDINE 20 MG/1
20 TABLET, FILM COATED ORAL DAILY
Status: DISCONTINUED | OUTPATIENT
Start: 2021-12-06 | End: 2021-12-09 | Stop reason: HOSPADM

## 2021-12-06 RX ORDER — FERROUS SULFATE 300 MG/5ML
300 LIQUID (ML) ORAL DAILY
Status: DISCONTINUED | OUTPATIENT
Start: 2021-12-06 | End: 2021-12-06

## 2021-12-06 RX ORDER — ONDANSETRON 2 MG/ML
4 INJECTION INTRAMUSCULAR; INTRAVENOUS ONCE
Status: COMPLETED | OUTPATIENT
Start: 2021-12-06 | End: 2021-12-07

## 2021-12-06 RX ORDER — CHOLECALCIFEROL (VITAMIN D3) 125 MCG
5 CAPSULE ORAL NIGHTLY
Status: DISCONTINUED | OUTPATIENT
Start: 2021-12-06 | End: 2021-12-09 | Stop reason: HOSPADM

## 2021-12-06 RX ORDER — BISACODYL 5 MG/1
5 TABLET, DELAYED RELEASE ORAL DAILY PRN
Status: DISCONTINUED | OUTPATIENT
Start: 2021-12-06 | End: 2021-12-09 | Stop reason: HOSPADM

## 2021-12-06 RX ORDER — AMOXICILLIN 250 MG
2 CAPSULE ORAL 2 TIMES DAILY
Status: DISCONTINUED | OUTPATIENT
Start: 2021-12-06 | End: 2021-12-09 | Stop reason: HOSPADM

## 2021-12-06 RX ORDER — SODIUM CHLORIDE 0.9 % (FLUSH) 0.9 %
10 SYRINGE (ML) INJECTION EVERY 12 HOURS SCHEDULED
Status: DISCONTINUED | OUTPATIENT
Start: 2021-12-06 | End: 2021-12-09 | Stop reason: HOSPADM

## 2021-12-06 RX ORDER — POLYETHYLENE GLYCOL 3350 17 G/17G
17 POWDER, FOR SOLUTION ORAL DAILY PRN
Status: DISCONTINUED | OUTPATIENT
Start: 2021-12-06 | End: 2021-12-09 | Stop reason: HOSPADM

## 2021-12-06 RX ORDER — CHLORHEXIDINE GLUCONATE 0.12 MG/ML
15 RINSE ORAL EVERY 12 HOURS SCHEDULED
Status: DISCONTINUED | OUTPATIENT
Start: 2021-12-06 | End: 2021-12-09 | Stop reason: HOSPADM

## 2021-12-06 RX ORDER — AMOXICILLIN 250 MG
2 CAPSULE ORAL 2 TIMES DAILY PRN
Status: DISCONTINUED | OUTPATIENT
Start: 2021-12-06 | End: 2021-12-09 | Stop reason: HOSPADM

## 2021-12-06 RX ORDER — SACCHAROMYCES BOULARDII 250 MG
500 CAPSULE ORAL 2 TIMES DAILY
Status: DISCONTINUED | OUTPATIENT
Start: 2021-12-06 | End: 2021-12-09 | Stop reason: HOSPADM

## 2021-12-06 RX ORDER — SODIUM CHLORIDE 0.9 % (FLUSH) 0.9 %
10 SYRINGE (ML) INJECTION AS NEEDED
Status: DISCONTINUED | OUTPATIENT
Start: 2021-12-06 | End: 2021-12-09 | Stop reason: HOSPADM

## 2021-12-06 RX ORDER — BISACODYL 10 MG
10 SUPPOSITORY, RECTAL RECTAL DAILY PRN
Status: DISCONTINUED | OUTPATIENT
Start: 2021-12-06 | End: 2021-12-09 | Stop reason: HOSPADM

## 2021-12-06 RX ORDER — ACETAMINOPHEN 325 MG/1
650 TABLET ORAL ONCE
Status: COMPLETED | OUTPATIENT
Start: 2021-12-06 | End: 2021-12-06

## 2021-12-06 RX ORDER — NICOTINE POLACRILEX 4 MG
15 LOZENGE BUCCAL
Status: DISCONTINUED | OUTPATIENT
Start: 2021-12-06 | End: 2021-12-09 | Stop reason: HOSPADM

## 2021-12-06 RX ORDER — CYCLOPHOSPHAMIDE 25 MG/1
100 CAPSULE ORAL DAILY
Status: DISCONTINUED | OUTPATIENT
Start: 2021-12-07 | End: 2021-12-09 | Stop reason: HOSPADM

## 2021-12-06 RX ORDER — FERROUS SULFATE 325(65) MG
325 TABLET ORAL 2 TIMES DAILY WITH MEALS
Status: DISCONTINUED | OUTPATIENT
Start: 2021-12-06 | End: 2021-12-09 | Stop reason: HOSPADM

## 2021-12-06 RX ORDER — LABETALOL HYDROCHLORIDE 5 MG/ML
20 INJECTION, SOLUTION INTRAVENOUS
Status: DISCONTINUED | OUTPATIENT
Start: 2021-12-06 | End: 2021-12-09 | Stop reason: HOSPADM

## 2021-12-06 RX ORDER — HEPARIN SODIUM 5000 [USP'U]/ML
5000 INJECTION, SOLUTION INTRAVENOUS; SUBCUTANEOUS EVERY 12 HOURS SCHEDULED
Status: DISCONTINUED | OUTPATIENT
Start: 2021-12-06 | End: 2021-12-09 | Stop reason: HOSPADM

## 2021-12-06 RX ORDER — ACETAMINOPHEN 325 MG/1
650 TABLET ORAL EVERY 6 HOURS PRN
Status: DISCONTINUED | OUTPATIENT
Start: 2021-12-06 | End: 2021-12-07 | Stop reason: SDUPTHER

## 2021-12-06 RX ADMIN — ACETAMINOPHEN 650 MG: 325 TABLET, FILM COATED ORAL at 15:17

## 2021-12-06 RX ADMIN — CHLORHEXIDINE GLUCONATE 15 ML: 1.2 SOLUTION ORAL at 23:11

## 2021-12-06 RX ADMIN — AZITHROMYCIN DIHYDRATE 500 MG: 500 INJECTION, POWDER, LYOPHILIZED, FOR SOLUTION INTRAVENOUS at 19:45

## 2021-12-06 RX ADMIN — TAZOBACTAM SODIUM AND PIPERACILLIN SODIUM 3.38 G: 375; 3 INJECTION, SOLUTION INTRAVENOUS at 16:10

## 2021-12-06 RX ADMIN — Medication 500 MG: at 20:24

## 2021-12-06 RX ADMIN — HEPARIN SODIUM 5000 UNITS: 5000 INJECTION, SOLUTION INTRAVENOUS; SUBCUTANEOUS at 23:11

## 2021-12-06 RX ADMIN — FAMOTIDINE 20 MG: 20 TABLET ORAL at 20:24

## 2021-12-06 RX ADMIN — Medication 1 TABLET: at 20:24

## 2021-12-06 RX ADMIN — VANCOMYCIN HYDROCHLORIDE 1500 MG: 10 INJECTION, POWDER, LYOPHILIZED, FOR SOLUTION INTRAVENOUS at 19:44

## 2021-12-06 NOTE — CONSULTS
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 Patient denies any hemoptysis of respiratory distress. Patient any chest pain, fever, chills ,rash, N/V.     History  Past Medical History:   Diagnosis Date   • Goodpasture's syndrome  10/4/2021   • Hypertension    ,   Past Surgical History:   Procedure Laterality Date   • CHOLECYSTECTOMY     • TRACHEOSTOMY AND PEG TUBE INSERTION N/A 10/15/2021    Procedure: TRACHEOSTOMY AND PERCUTANEOUS ENDOSCOPIC GASTROSTOMY TUBE INSERTION;  Surgeon: Abdon Roberts MD;  Location: [facility];  Service: General;  Laterality: N/A;   , No family history on file.,   Social History     Socioeconomic History   • Marital status:      E-cigarette/Vaping     E-cigarette/Vaping Substances     E-cigarette/Vaping Devices       , (Not in a hospital admission)  , Scheduled Meds:   , Continuous Infusions:   , PRN Meds:  •  sodium chloride and Allergies:  Patient has no known allergies.    Review of Systems  Pertinent items are noted in HPI    Objective     Vital Signs  Temp:  [98.6 °F (37 °C)] 98.6 °F (37 °C)  Heart Rate:  [82] 82  Resp:  [16] 16  BP: (174)/(90) 174/90    No intake/output data recorded.  No intake/output data recorded.    Physical Exam:  General Appearance:    Alert, cooperative, in no acute distress   Head:    Normocephalic, without obvious abnormality, atraumatic   Eyes:            Conjunctivae and sclerae normal, no   icterus, no pallor, corneas clear, PERRLA           Neck:   Supple, trachea midline, no thyromegaly,  no JVD       Lungs:     Clear to auscultation,respirations regular, even and               unlabored     Heart:    Regular rhythm and normal rate, normal S1 and S2, no       murmur, no gallop, no rub, no click       Abdomen:     Normal bowel sounds,soft, non-tender, non-distended, no guarding, no rebound tenderness       Extremities:   Moves all extremities well, no edema, no cyanosis, no         redness   Pulses:   Pulses palpable and equal bilaterally           Neurologic:   Cranial nerves 2 - 12 grossly intact, no focal deficit         Results Review:   I reviewed the patient's new clinical results.    WBC WBC   Date Value Ref Range Status   12/06/2021 7.70 3.40 - 10.80 10*3/mm3 Final      HGB Hemoglobin   Date Value Ref Range Status   12/06/2021 7.2 (L) 13.0 - 17.7 g/dL Final      HCT Hematocrit   Date Value Ref Range Status   12/06/2021 22.0 (L) 37.5 - 51.0 % Final      Platlets No results found for: LABPLAT   MCV MCV   Date Value Ref Range Status   12/06/2021 100.0 (H) 79.0 - 97.0 fL Final          Sodium Sodium   Date Value Ref Range Status   12/06/2021 141 136 - 145 mmol/L Final      Potassium Potassium   Date Value Ref Range Status   12/06/2021 4.0 3.5 - 5.2 mmol/L Final     Comment:     Slight hemolysis detected by analyzer. Results may be affected.      Chloride Chloride   Date Value Ref Range Status   12/06/2021 100 98 - 107 mmol/L Final      CO2 CO2   Date Value Ref Range Status   12/06/2021 27.0 22.0 - 29.0 mmol/L Final      BUN BUN   Date Value Ref Range Status   12/06/2021 41 (H) 8 - 23 mg/dL Final      Creatinine Creatinine   Date Value Ref Range Status   12/06/2021 4.75 (H) 0.76 - 1.27 mg/dL Final      Calcium Calcium   Date Value Ref Range Status   12/06/2021 8.9 8.6 - 10.5 mg/dL Final      PO4 No results found for: CAPO4   Albumin Albumin   Date Value Ref Range Status   12/06/2021 4.10 3.50 - 5.20 g/dL Final      Magnesium No results found for: MG   Uric Acid No results found for: URICACID                 Assessment/Plan       * No active hospital problems. *    1.  Goodspasture: Cytoxan oral  until the end of December 2021: Decreasing dose of prednisone.  Patient received plasmapheresis and FFP.  IV Cytoxan per protocol was given, patient received Bactrim, atovaquone, monitoring white count.  2.  Anemia.  3- PIOTR - Anuric /oliguric - on HD - Likely ESRD.  4- Peripheral edema        Plan:  - At this time will recommend to continue with cytoxan and prednisone   - refrain from smoking.   - Continue with dialysis.   - Repeat level of anti GBM   - At this time if no pulmonary involvement - don't see any benefit from plasmapheresis or escalating immunosuppression.       I discussed the patients findings and my recommendations with patient and consulting provider    Janak Menendez MD  12/06/21

## 2021-12-06 NOTE — ED PROVIDER NOTES
Subjective   Mr. Dickens presents with his wife to evaluate an elevated antibody to GBM.  He has known history of Goodpasture syndrome.  The elevated level was drawn on the first of this month.  He went to dialysis today and the nephrologist there was concerned about the blood test and called the [facility]  They were told to bring him here and were told that the nephrologist on-call would come see him.  His wife tells me he returned home from [facility] 4 days ago.  Since he has been home she notes increasing cough and shortness of breath when he lays down.  He denies fevers chills or pain anywhere.  His main complaint is that he wants his feeding tube out.  He was admitted here on October 4 through November 12 with pneumonia and sepsis and acute renal failure.  He now undergoes dialysis.  He was able to complete dialysis today.      History provided by:  Spouse, patient and medical records  Shortness of Breath  Severity:  Moderate  Onset quality:  Gradual  Timing:  Constant  Progression:  Worsening  Chronicity:  Recurrent  Context comment:  After just getting home from rehab and with recent admission for pneumonia  Relieved by:  Rest  Worsened by:  Activity  Associated symptoms: cough and headaches    Associated symptoms: no abdominal pain, no chest pain and no fever        Review of Systems   Constitutional: Negative for chills and fever.   HENT: Positive for congestion and rhinorrhea.    Respiratory: Positive for cough and shortness of breath.    Cardiovascular: Positive for leg swelling. Negative for chest pain.   Gastrointestinal: Negative for abdominal pain.   Neurological: Positive for weakness and headaches.   All other systems reviewed and are negative.      Past Medical History:   Diagnosis Date   • Diabetes mellitus (HCC)    • Goodpasture's syndrome  10/4/2021   • Hypertension        No Known Allergies    Past Surgical History:   Procedure Laterality Date   •  CHOLECYSTECTOMY     • HERNIA REPAIR     • TONSILLECTOMY     • TRACHEOSTOMY AND PEG TUBE INSERTION N/A 10/15/2021    Procedure: TRACHEOSTOMY AND PERCUTANEOUS ENDOSCOPIC GASTROSTOMY TUBE INSERTION;  Surgeon: Abdon Roberts MD;  Location: [facility];  Service: General;  Laterality: N/A;       History reviewed. No pertinent family history.    Social History     Socioeconomic History   • Marital status:    Tobacco Use   • Smoking status: Former Smoker     Packs/day: 1.00     Years: 15.00     Pack years: 15.00     Types: Cigarettes     Quit date: 2021     Years since quittin.2   • Smokeless tobacco: Never Used   Vaping Use   • Vaping Use: Never used   Substance and Sexual Activity   • Alcohol use: Never   • Drug use: Never   • Sexual activity: Not Currently           Objective   Physical Exam  Vitals and nursing note reviewed.   Constitutional:       General: He is not in acute distress.     Appearance: Normal appearance.   HENT:      Head: Normocephalic and atraumatic.      Nose: Nose normal. No congestion or rhinorrhea.      Mouth/Throat:      Mouth: Mucous membranes are moist.      Pharynx: No posterior oropharyngeal erythema.   Eyes:      General: No scleral icterus.     Conjunctiva/sclera: Conjunctivae normal.   Neck:      Comments: No JVD  Cardiovascular:      Rate and Rhythm: Normal rate and regular rhythm.      Heart sounds: No murmur heard.  No friction rub.   Pulmonary:      Effort: Pulmonary effort is normal. No respiratory distress.      Breath sounds: Normal breath sounds. No wheezing or rales.   Abdominal:      General: Bowel sounds are normal.      Palpations: Abdomen is soft.      Tenderness: There is no abdominal tenderness. There is no guarding or rebound.   Musculoskeletal:         General: No tenderness.      Cervical back: Normal range of motion and neck supple.      Right lower leg: Edema present.      Left lower leg: Edema present.      Comments: He has bilateral pitting edema  although he and his wife tell me its much better than it has been in the past   Skin:     General: Skin is warm and dry.      Capillary Refill: Capillary refill takes less than 2 seconds.      Coloration: Skin is not pale.   Neurological:      General: No focal deficit present.      Mental Status: He is alert and oriented to person, place, and time.      Coordination: Coordination normal.   Psychiatric:         Mood and Affect: Mood normal.         Behavior: Behavior normal.         Thought Content: Thought content normal.         Procedures           ED Course  ED Course as of 12/06/21 2158   Mon Dec 06, 2021   1151 I discussed with Dr. Menendez.  He will come see Mr. Dickens. [DT]   1403 Dr Menendez has seen Mr. Dickens.  He advises that there is no additional treatment needed today.  He recommends he continue to take his cyclophosphamide and follow-up. [DT]   1515 I reviewed his chest x-ray and there is right lower lobe infiltrate.  Radiology has read it and they notice it to.  They feel it is more likely to be pulmonary edema.  He tells me he had 3 L taken off today on dialysis.  His saturations have been around 91% on room air.  He will require admission.  Will discuss with Dr. Menendez.  It is possible this represents pneumonia so will initiate antibiotics as well. [DT]   1520 I updated  and Mrs. Dickens about plan for admission and findings of new infiltrate.  I have paged the hospitalist. [DT]   1521 Spoke with Dr. Leslie who will admit [DT]      ED Course User Index  [DT] Herve Centeno MD                                                 MDM  Number of Diagnoses or Management Options  Acute respiratory failure with hypoxia (HCC): new and requires workup  Pneumonia of right lower lobe due to infectious organism: new and requires workup     Amount and/or Complexity of Data Reviewed  Clinical lab tests: ordered and reviewed  Tests in the radiology section of CPT®: ordered and reviewed  Decide to obtain previous medical  records or to obtain history from someone other than the patient: yes  Obtain history from someone other than the patient: yes  Review and summarize past medical records: yes  Discuss the patient with other providers: yes  Independent visualization of images, tracings, or specimens: yes    Patient Progress  Patient progress: improved      Final diagnoses:   Pneumonia of right lower lobe due to infectious organism   Acute respiratory failure with hypoxia (HCC)       ED Disposition  ED Disposition     ED Disposition Condition Comment    Decision to Admit  Level of Care: Telemetry [5]   Diagnosis: Pneumonia of right lower lobe due to infectious organism [[id number]]   Admitting Physician: OSKAR ADAMES [[id number]]   Attending Physician: OSKAR ADAMES [[id number]]   Certification: I Certify That Inpatient Hospital Services Are Medically Necessary For Greater Than 2 Midnights            Abdon Roberts MD  [address] [address]  [phone]               Medication List      No changes were made to your prescriptions during this visit.          Herve Centeno MD  12/06/21 6648

## 2021-12-06 NOTE — H&P
[facility] Medicine Services  HISTORY AND PHYSICAL    Patient Name: Keshav Dickens  : 1955  MRN: [id number]  Primary Care Physician: Andree Langston MD  Date of admission: 2021      Subjective   Subjective     Chief Complaint:  Sob/cough     HPI:  Keshav Dickens is a 66yoM with recent admission to [facility] ICU in 2021 and recent diagnosis of Goodpaster syndrome and currently on 3x weekly HD who presents to [facility] ED today due to rising anti-GBM ab and call from OSH due to this abnormality.     Per last stay's documentation, patient had no previous medical history outside of tobacco abuse and T2DM, patient was initially transferred from [facility] with respiratory failure and was intubated on presentation. He had a previous presentation with 2-3 week h/o hemopytsis and imaging concerning previously for bilateral PNA with concern for lung nodules. 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 Patient was placed on steroids and abx. 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 Due to prolonged ventilation patient ultimately underwent trach/PEG placement 10/15 with Dr Roberts. He also completed 14 cycles of plasmapharesis during admission. Patient was transitioned to [facility] for LTACH where he has been since last week.     Patient has complaint of cough/sob and concern from ED for pneumonia with borderline hypoxia. Patient and his wife present at bedside state that they presented to the ED today due worsening sob- mostly at night. Patient just left Thursday of last week from [facility] and symptoms have been  ongoing since that time. Patient also endorses cough but no hypoxia. Wife has also been concerned that patient has had worsening weakness since discharge from [facility]. Patient will be admitted for further treatment of this.       COVID Details:    Symptoms:    [] NONE [] Fever [x]  Cough [x] Shortness of breath [] Change in taste/smell      The patient qualifies to receive the vaccine, but they have not yet received it.      Review of Systems   Gen- No fevers, chills  CV- No chest pain, palpitations  Resp- +cough, +dyspnea  GI- No N/V/D, abd pain      All other systems reviewed and are negative.     Personal History     Past Medical History:   Diagnosis Date   • Goodpasture's syndrome  10/4/2021   • Hypertension        Past Surgical History:   Procedure Laterality Date   • CHOLECYSTECTOMY     • TRACHEOSTOMY AND PEG TUBE INSERTION N/A 10/15/2021    Procedure: TRACHEOSTOMY AND PERCUTANEOUS ENDOSCOPIC GASTROSTOMY TUBE INSERTION;  Surgeon: Abdon Roberts MD;  Location: [facility];  Service: General;  Laterality: N/A;       Family History:family history is not on file. Otherwise pertinent FHx was reviewed and unremarkable.     Social History:    Social History     Social History Narrative   • Not on file       Medications:  Available home medication information reviewed.  (Not in a hospital admission)      No Known Allergies    Objective   Objective     Vital Signs:   Temp:  [98.6 °F (37 °C)] 98.6 °F (37 °C)  Heart Rate:  [79-84] 79  Resp:  [16] 16  BP: (170-187)/(90-94) 170/93       Physical Exam   GEN- no acute distress noted, sitting up in bed eating a burger   HEENT- atraumatic, normocephalic, eomi  NECK- supple, trachea midline, no masses  RESP: ctab, normal effort, slight rhonchi in bases, on RA   CV: no murmurs, s1/s2, rrr  MSK: no edema noted, spontaneous movement of all extremities  NEURO: alert, oriented, no focal deficits  SKIN: no rashes  PSYCH: appropriate mood and affect       Result Review:  I have  personally reviewed the results from the time of this admission to 12/6/2021 16:50 EST and agree with these findings:  [x]  Laboratory  [x]  Microbiology  [x]  Radiology  []  EKG/Telemetry   []  Cardiology/Vascular   []  Pathology  [x]  Old records  []  Other:  Most notable findings include:    Labs/imaging       LAB RESULTS:      Lab 12/06/21  1141   WBC 7.70   HEMOGLOBIN 7.2*   HEMATOCRIT 22.0*   PLATELETS 189   NEUTROS ABS 6.04   IMMATURE GRANS (ABS) 0.17*   LYMPHS ABS 0.54*   MONOS ABS 0.69   EOS ABS 0.20   .0*   LACTATE 1.3         Lab 12/06/21  1141   SODIUM 141   POTASSIUM 4.0   CHLORIDE 100   CO2 27.0   ANION GAP 14.0   BUN 41*   CREATININE 4.75*   GLUCOSE 124*   CALCIUM 8.9         Lab 12/06/21  1141   TOTAL PROTEIN 6.7   ALBUMIN 4.10   GLOBULIN 2.6   ALT (SGPT) 35   AST (SGOT) 45*   BILIRUBIN 1.1   ALK PHOS 66                     UA    Urinalysis 10/4/21 10/4/21    2109 2109   Squamous Epithelial Cells, UA  3-6 (A)   Specific Gravity, UA 1.025    Ketones, UA Negative    Blood, UA Large (3+) (A)    Leukocytes, UA Large (3+) (A)    Nitrite, UA Negative    RBC, UA  21-30 (A)   WBC, UA  Too Numerous to Count (A)   Bacteria, UA  1+ (A)   (A) Abnormal value              Microbiology Results (last 10 days)     Procedure Component Value - Date/Time    COVID PRE-OP / PRE-PROCEDURE SCREENING ORDER (NO ISOLATION) - Swab, Nasopharynx [[id number]]  (Normal) Collected: 12/06/21 1338    Lab Status: Final result Specimen: Swab from Nasopharynx Updated: 12/06/21 1424    Narrative:      The following orders were created for panel order COVID PRE-OP / PRE-PROCEDURE SCREENING ORDER (NO ISOLATION) - Swab, Nasopharynx.  Procedure                               Abnormality         Status                     ---------                               -----------         ------                     COVID-19 and FLU A/B PCR...[[id number]]  Normal              Final result                 Please view results for these tests on the  individual orders.    COVID-19 and FLU A/B PCR - Swab, Nasopharynx [[id number]]  (Normal) Collected: 12/06/21 1338    Lab Status: Final result Specimen: Swab from Nasopharynx Updated: 12/06/21 1424     COVID19 Not Detected     Influenza A PCR Not Detected     Influenza B PCR Not Detected    Narrative:      Fact sheet for providers: https://www.fda.gov/media/387244/download    Fact sheet for patients: https://www.fda.gov/media/924137/download    Test performed by PCR.          XR Chest 1 View    Result Date: 12/6/2021  EXAMINATION: XR CHEST 1 VW-  INDICATION: Shortness of breath  TECHNIQUE: Frontal view of the chest  COMPARISON: 11/12/2021  FINDINGS:  Unchanged right chest dialysis catheter with the tip terminating at the cavoatrial junction. Tracheostomy is no longer visualized. Stable cardiomediastinal silhouette which appears mildly prominent diffuse interstitial prominence remains. Vague perihilar and bibasilar parenchymal opacities appear somewhat worsened from prior exam. No large pleural effusion. No pneumothorax. No acute osseous findings.      Impression:  Mild worsening of perihilar and bibasilar parenchymal opacities on background of diffuse interstitial prominence, favor worsening edema.  This report was finalized on 12/6/2021 3:06 PM by Jeffy Goncalves MD.        Results for orders placed during the hospital encounter of 10/04/21    Adult Transthoracic Echo Complete W/ Cont if Necessary Per Protocol    Interpretation Summary  · Left ventricular ejection fraction appears to be 56 - 60%. Left ventricular systolic function is normal.  · Left ventricular wall thickness is consistent with mild to moderate concentric hypertrophy.  · The inferior vena cava is dilated.      Assessment/Plan   Assessment & Plan     Active Hospital Problems    Diagnosis  POA   • Pneumonia of right lower lobe due to infectious organism [J18.9]  Yes       Keshav Dickens is a 66yoM with recent admission to [facility] ICU in October 2021 and  recent diagnosis of Goodpaster syndrome and currently on 3x weekly HD who presents to [facility] ED today due to rising anti-GBM ab and call from OSH due to this abnormality. Patient has complaint of cough/sob and concern from ED for pneumonia with borderline hypoxia. Patient will be admitted for further treatment of this.     Goodpaster Syndrome on HD M/W/F  --appreciate nephrology assistance here, they have seen (Dr Menendez)  --since discharge patient has been kept on oral cytoxan as well as tapering dose of prednisone managed by nephrology.   --continue to follow labs, nephrology recommending repeat levels of anti-GBM as well     Possible PNA  --continue broad spectrum abx for now- zosyn/vanc -- will also add azithro  --of note patient was treated last admission with Merrem which was transitioned to oral doxy with ID assistance- could consider their involvement pending course/cultures  --covid/flu negative   --consider CT imaging but will hold given on RA at this time     Anemia  --appears to be close to baseline at 7.2 today (was most recently 7.3 on 11/12)    HTN  --no home medications on list     Previous PEG placement with recent dysphagia  --patient now eating/drinking normally   --requesting PEG removal- this was placed by Armando, will place consult for him to see in AM for consideration of this    T2DM  --SSI/basal/bolus, monitor             DVT prophylaxis:  heparin      CODE STATUS:  full  Code Status and Medical Interventions:   Ordered at: 12/06/21 8711     Level Of Support Discussed With:    Patient     Code Status (Patient has no pulse and is not breathing):    CPR (Attempt to Resuscitate)     Medical Interventions (Patient has pulse or is breathing):    Full Support       Admission Status:  I believe this patient meets INPATIENT status due to PNA, goodpaster syndrome, need for subspeciality involvement .  I feel patient’s risk for adverse outcomes and need for care warrant INPATIENT evaluation and I predict  the patient’s care encounter to likely last beyond 2 midnights.    Kenya Leslie MD  12/06/21

## 2021-12-07 ENCOUNTER — APPOINTMENT (OUTPATIENT)
Dept: NEPHROLOGY | Facility: HOSPITAL | Age: 66
End: 2021-12-07

## 2021-12-07 PROBLEM — D63.8 ANEMIA OF CHRONIC DISEASE: Status: ACTIVE | Noted: 2021-12-07

## 2021-12-07 PROBLEM — R91.8 LUNG INFILTRATE: Status: ACTIVE | Noted: 2021-12-07

## 2021-12-07 PROBLEM — R09.02 HYPOXIA: Status: ACTIVE | Noted: 2021-12-07

## 2021-12-07 PROBLEM — N18.6 ESRD (END STAGE RENAL DISEASE) (HCC): Status: ACTIVE | Noted: 2021-10-04

## 2021-12-07 LAB
ALBUMIN SERPL-MCNC: 3.5 G/DL (ref 3.5–5.2)
ALBUMIN/GLOB SERPL: 1.8 G/DL
ALP SERPL-CCNC: 54 U/L (ref 39–117)
ALT SERPL W P-5'-P-CCNC: 26 U/L (ref 1–41)
ANION GAP SERPL CALCULATED.3IONS-SCNC: 10 MMOL/L (ref 5–15)
AST SERPL-CCNC: 22 U/L (ref 1–40)
BASOPHILS # BLD AUTO: 0.04 10*3/MM3 (ref 0–0.2)
BASOPHILS NFR BLD AUTO: 0.7 % (ref 0–1.5)
BILIRUB SERPL-MCNC: 0.7 MG/DL (ref 0–1.2)
BUN SERPL-MCNC: 57 MG/DL (ref 8–23)
BUN/CREAT SERPL: 8.7 (ref 7–25)
CALCIUM SPEC-SCNC: 7.8 MG/DL (ref 8.6–10.5)
CHLORIDE SERPL-SCNC: 103 MMOL/L (ref 98–107)
CO2 SERPL-SCNC: 28 MMOL/L (ref 22–29)
CREAT SERPL-MCNC: 6.54 MG/DL (ref 0.76–1.27)
DEPRECATED RDW RBC AUTO: 74.7 FL (ref 37–54)
EOSINOPHIL # BLD AUTO: 0.19 10*3/MM3 (ref 0–0.4)
EOSINOPHIL NFR BLD AUTO: 3.1 % (ref 0.3–6.2)
ERYTHROCYTE [DISTWIDTH] IN BLOOD BY AUTOMATED COUNT: 20 % (ref 12.3–15.4)
GFR SERPL CREATININE-BSD FRML MDRD: 9 ML/MIN/1.73
GFR SERPL CREATININE-BSD FRML MDRD: ABNORMAL ML/MIN/{1.73_M2}
GLOBULIN UR ELPH-MCNC: 1.9 GM/DL
GLUCOSE BLDC GLUCOMTR-MCNC: 118 MG/DL (ref 70–130)
GLUCOSE BLDC GLUCOMTR-MCNC: 189 MG/DL (ref 70–130)
GLUCOSE BLDC GLUCOMTR-MCNC: 216 MG/DL (ref 70–130)
GLUCOSE BLDC GLUCOMTR-MCNC: 336 MG/DL (ref 70–130)
GLUCOSE SERPL-MCNC: 113 MG/DL (ref 65–99)
HCT VFR BLD AUTO: 20.1 % (ref 37.5–51)
HCT VFR BLD AUTO: 24 % (ref 37.5–51)
HGB BLD-MCNC: 6.3 G/DL (ref 13–17.7)
HGB BLD-MCNC: 7.8 G/DL (ref 13–17.7)
IMM GRANULOCYTES # BLD AUTO: 0.08 10*3/MM3 (ref 0–0.05)
IMM GRANULOCYTES NFR BLD AUTO: 1.3 % (ref 0–0.5)
LYMPHOCYTES # BLD AUTO: 0.37 10*3/MM3 (ref 0.7–3.1)
LYMPHOCYTES NFR BLD AUTO: 6.1 % (ref 19.6–45.3)
MCH RBC QN AUTO: 32.5 PG (ref 26.6–33)
MCHC RBC AUTO-ENTMCNC: 31.3 G/DL (ref 31.5–35.7)
MCV RBC AUTO: 103.6 FL (ref 79–97)
MONOCYTES # BLD AUTO: 0.44 10*3/MM3 (ref 0.1–0.9)
MONOCYTES NFR BLD AUTO: 7.2 % (ref 5–12)
NEUTROPHILS NFR BLD AUTO: 4.96 10*3/MM3 (ref 1.7–7)
NEUTROPHILS NFR BLD AUTO: 81.6 % (ref 42.7–76)
NRBC BLD AUTO-RTO: 0 /100 WBC (ref 0–0.2)
PLATELET # BLD AUTO: 155 10*3/MM3 (ref 140–450)
PMV BLD AUTO: 10.5 FL (ref 6–12)
POTASSIUM SERPL-SCNC: 4.6 MMOL/L (ref 3.5–5.2)
PROCALCITONIN SERPL-MCNC: 0.73 NG/ML (ref 0–0.25)
PROT SERPL-MCNC: 5.4 G/DL (ref 6–8.5)
RBC # BLD AUTO: 1.94 10*6/MM3 (ref 4.14–5.8)
SODIUM SERPL-SCNC: 141 MMOL/L (ref 136–145)
VANCOMYCIN SERPL-MCNC: 15.2 MCG/ML (ref 5–40)
WBC NRBC COR # BLD: 6.08 10*3/MM3 (ref 3.4–10.8)

## 2021-12-07 PROCEDURE — 25010000002 ONDANSETRON PER 1 MG: Performed by: NURSE PRACTITIONER

## 2021-12-07 PROCEDURE — 5A1D70Z PERFORMANCE OF URINARY FILTRATION, INTERMITTENT, LESS THAN 6 HOURS PER DAY: ICD-10-PCS | Performed by: INTERNAL MEDICINE

## 2021-12-07 PROCEDURE — 63710000001 INSULIN LISPRO (HUMAN) PER 5 UNITS: Performed by: INTERNAL MEDICINE

## 2021-12-07 PROCEDURE — 25010000002 HEPARIN (PORCINE) PER 1000 UNITS: Performed by: INTERNAL MEDICINE

## 2021-12-07 PROCEDURE — 80053 COMPREHEN METABOLIC PANEL: CPT | Performed by: INTERNAL MEDICINE

## 2021-12-07 PROCEDURE — 25010000002 PIPERACILLIN SOD-TAZOBACTAM PER 1 G: Performed by: INTERNAL MEDICINE

## 2021-12-07 PROCEDURE — 25010000002 AZITHROMYCIN PER 500 MG: Performed by: INTERNAL MEDICINE

## 2021-12-07 PROCEDURE — 85018 HEMOGLOBIN: CPT | Performed by: INTERNAL MEDICINE

## 2021-12-07 PROCEDURE — 63710000001 PREDNISONE PER 5 MG: Performed by: INTERNAL MEDICINE

## 2021-12-07 PROCEDURE — 63710000001 INSULIN DETEMIR PER 5 UNITS: Performed by: INTERNAL MEDICINE

## 2021-12-07 PROCEDURE — 85025 COMPLETE CBC W/AUTO DIFF WBC: CPT | Performed by: INTERNAL MEDICINE

## 2021-12-07 PROCEDURE — 84145 PROCALCITONIN (PCT): CPT | Performed by: INTERNAL MEDICINE

## 2021-12-07 PROCEDURE — P9016 RBC LEUKOCYTES REDUCED: HCPCS

## 2021-12-07 PROCEDURE — 80202 ASSAY OF VANCOMYCIN: CPT | Performed by: EMERGENCY MEDICINE

## 2021-12-07 PROCEDURE — 25010000002 CYCLOPHOSPHAMIDE PER 25 MG: Performed by: INTERNAL MEDICINE

## 2021-12-07 PROCEDURE — 86900 BLOOD TYPING SEROLOGIC ABO: CPT

## 2021-12-07 PROCEDURE — 82962 GLUCOSE BLOOD TEST: CPT

## 2021-12-07 PROCEDURE — 99233 SBSQ HOSP IP/OBS HIGH 50: CPT | Performed by: INTERNAL MEDICINE

## 2021-12-07 PROCEDURE — 85014 HEMATOCRIT: CPT | Performed by: INTERNAL MEDICINE

## 2021-12-07 PROCEDURE — 63710000001 PREDNISONE PER 1 MG: Performed by: INTERNAL MEDICINE

## 2021-12-07 PROCEDURE — 25010000002 VANCOMYCIN PER 500 MG

## 2021-12-07 RX ORDER — DOXYCYCLINE 100 MG/1
100 CAPSULE ORAL EVERY 12 HOURS SCHEDULED
Status: DISCONTINUED | OUTPATIENT
Start: 2021-12-07 | End: 2021-12-09 | Stop reason: HOSPADM

## 2021-12-07 RX ORDER — ONDANSETRON 2 MG/ML
4 INJECTION INTRAMUSCULAR; INTRAVENOUS EVERY 6 HOURS PRN
Status: DISCONTINUED | OUTPATIENT
Start: 2021-12-07 | End: 2021-12-09 | Stop reason: HOSPADM

## 2021-12-07 RX ORDER — ACETAMINOPHEN 325 MG/1
650 TABLET ORAL EVERY 6 HOURS PRN
Status: DISCONTINUED | OUTPATIENT
Start: 2021-12-07 | End: 2021-12-09 | Stop reason: HOSPADM

## 2021-12-07 RX ORDER — L.ACID,PARA/B.BIFIDUM/S.THERM 8B CELL
1 CAPSULE ORAL DAILY
Status: DISCONTINUED | OUTPATIENT
Start: 2021-12-08 | End: 2021-12-09 | Stop reason: HOSPADM

## 2021-12-07 RX ORDER — ATOVAQUONE 750 MG/5ML
1500 SUSPENSION ORAL DAILY
Status: DISCONTINUED | OUTPATIENT
Start: 2021-12-07 | End: 2021-12-09 | Stop reason: HOSPADM

## 2021-12-07 RX ORDER — VANCOMYCIN HYDROCHLORIDE 1 G/200ML
1000 INJECTION, SOLUTION INTRAVENOUS
Status: DISCONTINUED | OUTPATIENT
Start: 2021-12-08 | End: 2021-12-07

## 2021-12-07 RX ORDER — ALBUMIN (HUMAN) 12.5 G/50ML
12.5 SOLUTION INTRAVENOUS AS NEEDED
Status: ACTIVE | OUTPATIENT
Start: 2021-12-07 | End: 2021-12-08

## 2021-12-07 RX ADMIN — VANCOMYCIN HYDROCHLORIDE 500 MG: 500 INJECTION, POWDER, LYOPHILIZED, FOR SOLUTION INTRAVENOUS at 16:07

## 2021-12-07 RX ADMIN — DOXYCYCLINE 100 MG: 100 CAPSULE ORAL at 22:23

## 2021-12-07 RX ADMIN — SERTRALINE HYDROCHLORIDE 50 MG: 50 TABLET ORAL at 09:23

## 2021-12-07 RX ADMIN — HEPARIN SODIUM 5000 UNITS: 5000 INJECTION, SOLUTION INTRAVENOUS; SUBCUTANEOUS at 09:24

## 2021-12-07 RX ADMIN — CHLORHEXIDINE GLUCONATE 15 ML: 1.2 SOLUTION ORAL at 22:24

## 2021-12-07 RX ADMIN — SODIUM CHLORIDE, PRESERVATIVE FREE 10 ML: 5 INJECTION INTRAVENOUS at 09:25

## 2021-12-07 RX ADMIN — SODIUM CHLORIDE, PRESERVATIVE FREE 10 ML: 5 INJECTION INTRAVENOUS at 22:24

## 2021-12-07 RX ADMIN — CHLORHEXIDINE GLUCONATE 15 ML: 1.2 SOLUTION ORAL at 09:23

## 2021-12-07 RX ADMIN — Medication 500 MG: at 09:23

## 2021-12-07 RX ADMIN — AZITHROMYCIN DIHYDRATE 500 MG: 500 INJECTION, POWDER, LYOPHILIZED, FOR SOLUTION INTRAVENOUS at 10:38

## 2021-12-07 RX ADMIN — FERROUS SULFATE TAB 325 MG (65 MG ELEMENTAL FE) 325 MG: 325 (65 FE) TAB at 09:23

## 2021-12-07 RX ADMIN — TAZOBACTAM SODIUM AND PIPERACILLIN SODIUM 3.38 G: 375; 3 INJECTION, SOLUTION INTRAVENOUS at 04:22

## 2021-12-07 RX ADMIN — HEPARIN SODIUM 5000 UNITS: 5000 INJECTION, SOLUTION INTRAVENOUS; SUBCUTANEOUS at 22:23

## 2021-12-07 RX ADMIN — FAMOTIDINE 20 MG: 20 TABLET ORAL at 09:23

## 2021-12-07 RX ADMIN — INSULIN DETEMIR 10 UNITS: 100 INJECTION, SOLUTION SUBCUTANEOUS at 22:24

## 2021-12-07 RX ADMIN — ACETAMINOPHEN 650 MG: 325 TABLET, FILM COATED ORAL at 07:43

## 2021-12-07 RX ADMIN — INSULIN LISPRO 2 UNITS: 100 INJECTION, SOLUTION INTRAVENOUS; SUBCUTANEOUS at 18:48

## 2021-12-07 RX ADMIN — PREDNISONE 30 MG: 20 TABLET ORAL at 09:23

## 2021-12-07 RX ADMIN — Medication 500 MG: at 22:22

## 2021-12-07 RX ADMIN — TAZOBACTAM SODIUM AND PIPERACILLIN SODIUM 3.38 G: 375; 3 INJECTION, SOLUTION INTRAVENOUS at 16:07

## 2021-12-07 RX ADMIN — CYCLOPHOSPHAMIDE 100 MG: 25 CAPSULE ORAL at 09:23

## 2021-12-07 RX ADMIN — Medication 1 TABLET: at 09:24

## 2021-12-07 RX ADMIN — ONDANSETRON 4 MG: 2 INJECTION INTRAMUSCULAR; INTRAVENOUS at 02:31

## 2021-12-07 RX ADMIN — ATOVAQUONE 1500 MG: 750 SUSPENSION ORAL at 09:25

## 2021-12-07 RX ADMIN — FERROUS SULFATE TAB 325 MG (65 MG ELEMENTAL FE) 325 MG: 325 (65 FE) TAB at 18:48

## 2021-12-07 RX ADMIN — Medication 5 MG: at 22:23

## 2021-12-07 RX ADMIN — SODIUM CHLORIDE, PRESERVATIVE FREE 10 ML: 5 INJECTION INTRAVENOUS at 02:31

## 2021-12-07 NOTE — PROGRESS NOTES
"Pharmacy Consult-Vancomycin Dosing  Keshav Dickens is a  66 y.o. male receiving vancomycin therapy.     Indication: Sepsis  Consulting Provider: Kenya Leslie MD  ID Consult: no     Goal Trough:  15-20 mcg/mL    Current Antimicrobial Therapy  Zosyn 3.375g q12h   Vancomycin day 2  Azithromycin 500mg q24h      Allergies  Allergies as of 12/06/2021   • (No Known Allergies)       Labs    Results from last 7 days   Lab Units 12/07/21  0650 12/06/21  1141   BUN mg/dL 57* 41*   CREATININE mg/dL 6.54* 4.75*       Results from last 7 days   Lab Units 12/07/21  0650 12/06/21  1141   WBC 10*3/mm3 6.08 7.70       Evaluation of Dosing     Last Dose Received in the ED/Outside Facility: no  Is Patient on Dialysis or Renal Replacement: no    Ht - 172.7 cm (68\")  Wt - 95.3 kg (210 lb)    Estimated Creatinine Clearance: 12.1 mL/min (A) (by C-G formula based on SCr of 6.54 mg/dL (H)).    Intake & Output (last 3 days)         12/04 0701  12/05 0700 12/05 0701  12/06 0700 12/06 0701  12/07 0700 12/07 0701  12/08 0700    P.O.   400     Total Intake(mL/kg)   400 (4.2)     Net   +400             Urine Unmeasured Occurrence   1 x     Stool Unmeasured Occurrence   1 x             Microbiology and Radiology  Microbiology Results (last 10 days)       Procedure Component Value - Date/Time    COVID PRE-OP / PRE-PROCEDURE SCREENING ORDER (NO ISOLATION) - Swab, Nasopharynx [[id number]]  (Normal) Collected: 12/06/21 1338    Lab Status: Final result Specimen: Swab from Nasopharynx Updated: 12/06/21 1424    Narrative:      The following orders were created for panel order COVID PRE-OP / PRE-PROCEDURE SCREENING ORDER (NO ISOLATION) - Swab, Nasopharynx.  Procedure                               Abnormality         Status                     ---------                               -----------         ------                     COVID-19 and FLU A/B PCR...[[id number]]  Normal              Final result                 Please view results for these tests on " the individual orders.    COVID-19 and FLU A/B PCR - Swab, Nasopharynx [[id number]]  (Normal) Collected: 12/06/21 1338    Lab Status: Final result Specimen: Swab from Nasopharynx Updated: 12/06/21 1424     COVID19 Not Detected     Influenza A PCR Not Detected     Influenza B PCR Not Detected    Narrative:      Fact sheet for providers: https://www.fda.gov/media/172261/download    Fact sheet for patients: https://www.fda.gov/media/021139/download    Test performed by PCR.            Vancomycin Levels:    Results from last 7 days   Lab Units 12/07/21  0650   VANCOMYCIN RM mcg/mL 15.20                     Assessment/Plan:  Pharmacy to dose vancomycin for sepsis.   Goal trough 15-20 mcg/mL   12/7 Scr - 6.54, increase from 4.75 (HD Monday, Wednesday, Friday)   12/7 WBC - 6.08, decrease from 7.70  24hr tmax - 98.8  12/7 Vancomycin trough 15.2 mcg/mL   Vancomycin level drawn 11 hours following loading dose     Patient received loading dose of 1500mg IV 12/6 @ 1945. Plan to dialyze patient 12/7 and then will give an additional Vancomycin 500mg dose today after dialysis.   Starting 12/8 Initiate Vancomycin maintenance dose 1000mg IV after each dialysis session (Monday, Wednesday, Friday)   Vancomycin level 12/10 @ 0600 prior to dialysis   Monitor dialysis schedule, clinical status, infusion related reactions.   Pharmacy to follow     Thank you   Amairani Hurst, Pharmacy Intern  12/7/2021  08:26 EST

## 2021-12-07 NOTE — CONSULTS
Infectious Disease Consult  Keshav Dickens  1955  [id number]    Date of Consult: 12/7/2021  Admission Date: 12/6/2021    Requesting Provider: Dr Nazario  Evaluating Physician: Rodríguez Ruff MD    Chief Complaint: dyspnea    Reason for Consultation: possible pneumonia    History of present illness:   Patient is a 66 y.o.  Yr old male with history of DM2, tobacco abuse.  Initially admitted to [facility] on 9/27/2021 with complaints of hemoptysis for 2 to 3 weeks.  Intubated on 10/1.  CT scan with diffuse groundglass opacities and renal failure.  Due to concern for pulmonary renal syndrome he was treated with pulse dose steroids.  Anti-GBM antibody positive, diagnosed with Goodpasture's syndrome, confirmed by renal biopsy.  Transferred to [facility] on 10/4. Treated with plasmapheresis; also high-dose steroids and Cytoxan.     On 10/10 he developed fever up to 101.  Also had leukocytosis up to 15,000.  He had been on atovaquone for PCP prophylaxis.  Started on vancomycin and Zosyn on 10/12.  Respiratory culture from 10/12 with heavy growth of Klebsiella aerogenes. Improved with meropenen, then cefepime. Hospital course, complicated by volume overload. Had trach and PEG. Slowly improved and woke up.  Hemoptysis resolved.  Treated with a long course of antibiotics, first meropenem and then cefepime.  Just before discharge he developed a macular drug rash which is possibly related to cefepime but is not getting worse prior to discharge.  Discharged to LTAC.     12/7/21: Presented back to [facility] with worsening shortness of breath and hypoxia.  He had left [facility] about 1 week prior. Worsening dyspnea and exercise in tolerance at home. Pt poor historian. Cough at night but not productive? No reported fevers. No outpatient antibiotics. Had dialysis on 12/6 and was more dyspneic. CXR with infiltrates so was sent to [facility] to screen for pneumonia. Started on vancomycin,  Zosyn and azithromycin.  I was consulted for antibiotic recommendations.  Had dialysis today. Afebrile since arrival.  Currently 2 L nasal cannula. Blood pressure elevated. He says he felt much better shortly after first doses of antibiotics. Slept better last night. Had dialysis this afternoon.     Review of Systems  Constitutional-- No Fever, chills or sweats.  + fatigue.  Heent-- No new vision, hearing or throat complaints.  No epistaxis or oral sores.  Denies odynophagia or dysphagia.  No headache, photophobia or neck stiffness.  CV-- No chest pain, palpitation or syncope  Resp-- + dyspnea and dry cough  GI- No nausea, vomiting, or diarrhea.   -- No dysuria, hematuria, or flank pain. starting to have some urine output  Lymph- no swollen lymph nodes in neck/axilla or groin.   Heme- No active bruising or bleeding  MS-- no swelling or pain in the bones or joints of arms/legs.  No new back pain.  Neuro-- No acute focal weakness or numbness in the arms or legs.  Skin-- No rashes, lesions, ulcers. No skin breakdown      Past Medical History:   Diagnosis Date   • Diabetes mellitus (HCC)    • Goodpasture's syndrome  10/4/2021   • Hypertension        Past Surgical History:   Procedure Laterality Date   • CHOLECYSTECTOMY     • HERNIA REPAIR     • TONSILLECTOMY     • TRACHEOSTOMY AND PEG TUBE INSERTION N/A 10/15/2021    Procedure: TRACHEOSTOMY AND PERCUTANEOUS ENDOSCOPIC GASTROSTOMY TUBE INSERTION;  Surgeon: Abdon Roberts MD;  Location: [facility];  Service: General;  Laterality: N/A;       Social History     Socioeconomic History   • Marital status:    Tobacco Use   • Smoking status: Former Smoker     Packs/day: 1.00     Years: 15.00     Pack years: 15.00     Types: Cigarettes     Quit date: 2021     Years since quittin.2   • Smokeless tobacco: Never Used   Vaping Use   • Vaping Use: Never used   Substance and Sexual Activity   • Alcohol use: Never   • Drug use: Never   • Sexual activity: Not  Currently     Family hx reviewed and non-contributory    No Known Allergies    Antibiotics:  Anti-Infectives (From admission, onward)    Ordered     Dose/Rate Route Frequency Start Stop    12/07/21 0819  vancomycin (VANCOCIN) in iso-osmotic dextrose IVPB 1 g (premix) 200 mL        Ordering Provider: Ray Mcclain RPH    1,000 mg  over 60 Minutes Intravenous Once per day on Mon Wed Fri 12/08/21 1400 12/15/21 1359    12/07/21 1204  vancomycin 500 mg/100 mL 0.9% NS IVPB (mbp)        Ordering Provider: Ray Mcclain RPH    500 mg Intravenous Once 12/07/21 1400 12/07/21 1607    12/07/21 1024  piperacillin-tazobactam (ZOSYN) 3.375 g in iso-osmotic dextrose 50 ml (premix)        Ordering Provider: Tejas Nazario MD    3.375 g  over 4 Hours Intravenous Every 12 Hours 12/07/21 1200 12/13/21 2359    12/07/21 0745  atovaquone (MEPRON) suspension 1,500 mg        Ordering Provider: Tejas Nazario MD    1,500 mg Oral Daily 12/07/21 0900 01/06/22 0859    12/06/21 1815  vancomycin 1500 mg/500 mL 0.9% NS IVPB ([facility])        Ordering Provider: Kenya Leslie MD    20 mg/kg × 79.2 kg (Adjusted)  333.3 mL/hr over 90 Minutes Intravenous Once 12/06/21 1900 12/06/21 2114    12/06/21 1649  AZITHROMYCIN 500 MG/250 ML 0.9% NS IVPB (vial-mate)        Ordering Provider: Kenya Leslie MD    500 mg  over 60 Minutes Intravenous Every 24 Hours Scheduled 12/06/21 1800 12/11/21 0859    12/06/21 1645  Pharmacy to dose vancomycin        Ordering Provider: Kenya Leslie MD     Does not apply Continuous PRN 12/06/21 1645 12/11/21 1644    12/06/21 1517  piperacillin-tazobactam (ZOSYN) 3.375 g in iso-osmotic dextrose 50 ml (premix)        Ordering Provider: Herve Centeno MD    3.375 g Intravenous Once 12/06/21 1519 12/06/21 1647          Other Medications:  Current Facility-Administered Medications   Medication Dose Route Frequency Provider Last Rate Last Admin   • acetaminophen (TYLENOL) tablet 650 mg  650 mg Oral Q6H PRN Dossett,  Tejas FRANCISCO MD   650 mg at 12/07/21 0743   • albumin human 25 % IV SOLN 12.5 g  12.5 g Intravenous PRN Janak Menendez MD       • atovaquone (MEPRON) suspension 1,500 mg  1,500 mg Oral Daily Tejas Nazario MD   1,500 mg at 12/07/21 0925   • AZITHROMYCIN 500 MG/250 ML 0.9% NS IVPB (vial-mate)  500 mg Intravenous Q24H Kenya Leslie MD   500 mg at 12/07/21 1038   • b complex-vitamin c-folic acid (NEPHRO-MELE) tablet 1 tablet  1 tablet Per PEG Tube Daily Kenya Leslie MD   1 tablet at 12/07/21 0924   • sennosides-docusate (PERICOLACE) 8.6-50 MG per tablet 2 tablet  2 tablet Oral BID Kenya Leslie MD        And   • polyethylene glycol (MIRALAX) packet 17 g  17 g Oral Daily PRN Kenya Leslie MD        And   • bisacodyl (DULCOLAX) EC tablet 5 mg  5 mg Oral Daily PRN Kenya Leslie MD        And   • bisacodyl (DULCOLAX) suppository 10 mg  10 mg Rectal Daily PRN Kenya Leslie MD       • chlorhexidine (PERIDEX) 0.12 % solution 15 mL  15 mL Mouth/Throat Q12H Kenya Leslie MD   15 mL at 12/07/21 0923   • Cyclophosphamide (CYTOXAN) chemo capsule 100 mg  100 mg Oral Daily Kenya Leslie MD       • dextrose (D50W) (25 g/50 mL) IV injection 25 g  25 g Intravenous Q15 Min PRN Kenya Leslie MD       • dextrose (GLUTOSE) oral gel 15 g  15 g Oral Q15 Min PRN Kenya Leslie MD       • epoetin tatum-epbx (RETACRIT) injection 10,000 Units  10,000 Units Subcutaneous Once per day on Mon Wed Fri Kenya Leslie MD       • famotidine (PEPCID) tablet 20 mg  20 mg Per G Tube Daily Kenya Leslie MD   20 mg at 12/07/21 0923   • ferrous sulfate tablet 325 mg  325 mg Oral BID With Meals Wendie Dawkins APRN   325 mg at 12/07/21 0923   • glucagon (human recombinant) (GLUCAGEN DIAGNOSTIC) injection 1 mg  1 mg Subcutaneous Q15 Min PRKenya Banda MD       • heparin (porcine) 5000 UNIT/ML injection 5,000 Units  5,000 Units Subcutaneous Q12H Kenya Leslie MD   5,000 Units at 12/07/21 0924   • insulin detemir  "(LEVEMIR) injection 10 Units  10 Units Subcutaneous Nightly Kenya Leslie MD       • insulin lispro (humaLOG) injection 0-7 Units  0-7 Units Subcutaneous TID AC Kenya Leslie MD       • labetalol (NORMODYNE,TRANDATE) injection 20 mg  20 mg Intravenous Q10 Min PRN Kenya Leslie MD       • melatonin tablet 5 mg  5 mg Per G Tube Nightly Kenya Leslie MD       • ondansetron (ZOFRAN) injection 4 mg  4 mg Intravenous Q6H PRN Tejas Nazario MD       • Pharmacy to dose vancomycin   Does not apply Continuous PRN Kenya Leslie MD       • piperacillin-tazobactam (ZOSYN) 3.375 g in iso-osmotic dextrose 50 ml (premix)  3.375 g Intravenous Q12H DosTejas schultz MD   3.375 g at 12/07/21 1607   • predniSONE (DELTASONE) tablet 30 mg  30 mg Per G Tube Daily With Breakfast Kenya Leslie MD   30 mg at 12/07/21 0923   • saccharomyces boulardii (FLORASTOR) capsule 500 mg  500 mg Per G Tube BID Kenya Leslie MD   500 mg at 12/07/21 0923   • sennosides-docusate (PERICOLACE) 8.6-50 MG per tablet 2 tablet  2 tablet Per PEG Tube BID PRN Kenya Leslie MD       • sertraline (ZOLOFT) tablet 50 mg  50 mg Per G Tube Daily Kenya Leslie MD   50 mg at 12/07/21 0923   • sodium chloride 0.9 % flush 10 mL  10 mL Intravenous PRN Kenya Leslie MD       • sodium chloride 0.9 % flush 10 mL  10 mL Intravenous Q12H Kenya Leslie MD   10 mL at 12/07/21 0925   • sodium chloride 0.9 % flush 10 mL  10 mL Intravenous PRN Kenya Leslie MD       • [START ON 12/8/2021] vancomycin (VANCOCIN) in iso-osmotic dextrose IVPB 1 g (premix) 200 mL  1,000 mg Intravenous [facility] Mon Wed Fri [facility]           Physical Exam:   Vital Signs   /99 (BP Location: Right arm, Patient Position: Lying)   Pulse 79   Temp 97.6 °F (36.4 °C) (Temporal)   Resp 18   Ht 172.7 cm (68\")   Wt 95.3 kg (210 lb)   SpO2 98%   BMI 31.93 kg/m²     GENERAL: Awake and alert, chronically ill appearing but non-toxic  HEENT: " Normocephalic, atraumatic.. EOMI. No conjunctival injection. No icterus. Oropharynx clear without evidence of thrush or exudate.   NECK: Supple without nuchal rigidity.   HEART: RRR; No murmur.  LUNGS: diminished at bases but otherwise clear.   ABDOMEN: Soft, nontender, nondistended. Positive bowel sounds. No rebound or guarding.  EXT:  1+ pedal edema.  : Without Cedeno catheter.  MSK:   No major joint swelling    SKIN: Warm and dry without cutaneous eruptions on Inspection/palpation.    NEURO: Oriented to PPT. No focal deficits on motor/sensory exam at arms/legs.  PSYCHIATRIC: Normal insight and judgement. Cooperative with PE    Laboratory Data    Results from last 7 days   Lab Units 12/07/21  0650 12/06/21  1141   WBC 10*3/mm3 6.08 7.70   HEMOGLOBIN g/dL 6.3* 7.2*   HEMATOCRIT % 20.1* 22.0*   PLATELETS 10*3/mm3 155 189     Results from last 7 days   Lab Units 12/07/21  0650   SODIUM mmol/L 141   POTASSIUM mmol/L 4.6   CHLORIDE mmol/L 103   CO2 mmol/L 28.0   BUN mg/dL 57*   CREATININE mg/dL 6.54*   GLUCOSE mg/dL 113*   CALCIUM mg/dL 7.8*     Estimated Creatinine Clearance: 12.1 mL/min (A) (by C-G formula based on SCr of 6.54 mg/dL (H)).  Results from last 7 days   Lab Units 12/07/21  0650   ALK PHOS U/L 54   BILIRUBIN mg/dL 0.7   ALT (SGPT) U/L 26   AST (SGOT) U/L 22               Microbiology:     Blood Culture   Date Value Ref Range Status   12/06/2021 No growth at 24 hours  Preliminary   12/06/2021 No growth at 24 hours  Preliminary      COVID and flu negative      Radiology:  XR Chest 1 View    Result Date: 12/6/2021   Mild worsening of perihilar and bibasilar parenchymal opacities on background of diffuse interstitial prominence, favor worsening edema.  This report was finalized on 12/6/2021 3:06 PM by Jeffy Goncalves MD.       I personally reviewed the above CXR: bilateral interstitial opacities with lower lobe predominance      Impression:   1. Dyspnea, hypoxia: worse prior to admission now improved.  Pulmonary edema alone vs developing pneumonia. No signs of sepsis. Very mildly elevated procal, although this tends to run higher in ESRD pts. Pt does report improvement shortly after getting IV antibiotics. Had not missed HD  2. Goodpasture syndrome: recent diagnosis. tx with steroids, Cytoxan and plasmapheresis.   3. 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 Recent Klebsiella VAP during last admission. Also hx of aspiration  5. Renal failure on HD  6. Anemia  7. DM2    PLAN:   - Follow up pending blood cultures  - Follow CBC    No clear signs of sepsis or pneumonia, but he is significantly immunocompromised and is feeling better after getting antibiotics. Will continue current coverage but begin de-escalating:  - stop vancomycin and azithromycin  - continue zosyn  - start doxycycline  - probiotic    - Continue atovaquone for PCP prophylaxis, until on less than 15 mg of prednisone daily.  - Slowly weaning prednisone per nephrology    Complex MDM. I will follow      Rodríguez Ruff MD  12/7/2021

## 2021-12-07 NOTE — CASE MANAGEMENT/SOCIAL WORK
Discharge Planning Assessment  [facility]     Patient Name: Keshav Dickens  MRN: [id number]  Today's Date: 12/7/2021    Admit Date: 12/6/2021     Discharge Needs Assessment     Row Name 12/07/21 1704       Living Environment    Lives With spouse    Current Living Arrangements home/apartment/condo    Provides Primary Care For no one    Family Caregiver if Needed spouse    Quality of Family Relationships helpful; involved    Able to Return to Prior Arrangements yes       Resource/Environmental Concerns    Resource/Environmental Concerns none       Transition Planning    Patient/Family Anticipates Transition to home; home with help/services    Patient/Family Anticipated Services at Transition none    Transportation Anticipated family or friend will provide       Discharge Needs Assessment    Readmission Within the Last 30 Days no previous admission in last 30 days    Equipment Currently Used at Home walker, rolling; hospital bed; cane, straight    Concerns to be Addressed discharge planning    Anticipated Changes Related to Illness none    Equipment Needed After Discharge none               Discharge Plan     Row Name 12/07/21 1707       Plan    Plan Inital  eval    Patient/Family in Agreement with Plan yes    Plan Comments Spoke with patient's wife over the phone regarding discharge planning - She confirmed patient lives with her in [facility] - Patient was home from a rehab stay at [facility] for approx 4 days before returning to the ED. He is current with [facility] and goes to [facility] in [facility] for HD. He uses a cane, walker, wheelchair, and hospital bed at home. Wife plans to transport at discharge -  will call [facility] and [facility] to discuss discharge plans/resumption of care upon discharge - Ivania [phone]              Continued Care and Services - Admitted Since 12/6/2021    Coordination has not been started for this encounter.     Selected Continued Care - Prior Encounters Includes selections from  prior encounters from 9/7/2021 to 12/7/2021    Discharged on 11/12/2021 Admission date: 10/4/2021 - Discharge disposition: Long Term Care (DC - External)    Destination     Service Provider Selected Services Address Phone Fax Patient Preferred    [facility] - [place] [address], [facility] [address] [phone] --                       Demographic Summary     Row Name 12/07/21 1703       General Information    Admission Type inpatient    Arrived From home    Referral Source admission list    Reason for Consult discharge planning    Preferred Language English       Contact Information    Permission Granted to Share Info With                Functional Status     Row Name 12/07/21 1703       Functional Status    Usual Activity Tolerance moderate    Current Activity Tolerance fair       Functional Status, IADL    Medications assistive equipment and person    Meal Preparation assistive equipment and person    Housekeeping assistive equipment and person    Laundry assistive equipment and person    Shopping assistive equipment and person               Psychosocial    No documentation.                Abuse/Neglect    No documentation.                Legal    No documentation.                Substance Abuse    No documentation.                Patient Forms    No documentation.                   Ivania Morrow RN  Case Management Discharge Note                Selected Continued Care - Admitted Since 12/6/2021     Destination    No services have been selected for the patient.              Durable Medical Equipment    No services have been selected for the patient.              Dialysis/Infusion    No services have been selected for the patient.              Home Medical Care    No services have been selected for the patient.              Therapy    No services have been selected for the patient.              Community Resources    No services  have been selected for the patient.              Community [facility]    No services have been selected for the patient.                Selected Continued Care - Prior Encounters Includes selections from prior encounters from 9/7/2021 to 12/7/2021    Discharged on 11/12/2021 Admission date: 10/4/2021 - Discharge disposition: Long Term Care (DC - External)    Destination     Service Provider Selected Services Address Phone Fax Patient Preferred    [facility] - [facility] Acute [address] [facility], [facility] [address] [phone] --

## 2021-12-07 NOTE — CONSULTS
General Surgery Consultation Note    Date of Service: 12/7/2021  Keshav Dickens  [id number]  1955      Referring Provider: Tejas Nazario MD    Location of Consult: Inpatient     Reason for Consultation: Request for PEG removal       History of Present Illness:  I am seeing, Keshav Dickens, in consultation at request of Tejas Nazario MD regarding request for PEG removal.  He is a 66-year-old gentleman who recently had a complex ICU stay at our hospital in October for respiratory failure and complications of Goodpasture syndrome.  He was ultimately able to be discharged, but represented overnight due to concerns for possible pneumonia.  He reports that he has been doing well over the last several weeks.  His has been decannulated from his tracheostomy and is currently on room air.  He reports that his PEG tube has not been used in the last month, he has been tolerating a regular diet by mouth and taking all medications by mouth.  He requests PEG tube removal..      Problems Addressed this Visit        Pulmonary and Pneumonias    Respiratory Failure Type 1      Other Visit Diagnoses     Pneumonia of right lower lobe due to infectious organism    -  Primary      Diagnoses       Codes Comments    Pneumonia of right lower lobe due to infectious organism    -  Primary ICD-10-CM: J18.9  ICD-9-CM: 486     Acute respiratory failure with hypoxia (HCC)     ICD-10-CM: J96.01  ICD-9-CM: 518.81           PMHx:  Past Medical History:   Diagnosis Date   • Diabetes mellitus (HCC)    • Goodpasture's syndrome  10/4/2021   • Hypertension        Past Surgical History:  Past Surgical History:   • CHOLECYSTECTOMY   • HERNIA REPAIR   • TONSILLECTOMY   • TRACHEOSTOMY AND PEG TUBE INSERTION    Procedure: TRACHEOSTOMY AND PERCUTANEOUS ENDOSCOPIC GASTROSTOMY TUBE INSERTION;  Surgeon: Abdon Roberts MD;  Location: [facility];  Service: General;  Laterality: N/A;       Allergies:  No Known Allergies    Medications:  No current  facility-administered medications on file prior to encounter.     Current Outpatient Medications on File Prior to Encounter   Medication Sig Dispense Refill   • acetaminophen (TYLENOL) 325 MG tablet Administer 2 tablets per G tube Every 6 (Six) Hours As Needed (Premedicate TPE).     • b complex-vitamin c-folic acid (NEPHRO-MELE) 0.8 MG tablet tablet 1 tablet by Per PEG Tube route Daily. 30 tablet    • chlorhexidine (PERIDEX) 0.12 % solution Apply 15 mL to the mouth or throat Every 12 (Twelve) Hours.     • cholecalciferol (VITAMIN D3) 25 MCG (1000 UT) tablet Administer 2 tablets per G tube Daily.     • Cyclophosphamide 50 MG capsule Take 2 capsules by mouth Daily.     • epoetin tatum-epbx (RETACRIT) 34331 UNIT/ML injection Inject 1 mL under the skin into the appropriate area as directed 3 (Three) Times a Week. Indications: Anemia associated with Chronic Kidney Failure 6.6 mL    • famotidine (PEPCID) 20 MG tablet Administer 1 tablet per G tube Daily.     • ferrous sulfate 300 (60 Fe) MG/5ML syrup Administer 5 mL per G tube Daily.     • Heparin Sodium, Porcine, (heparin, porcine,) 5000 UNIT/ML injection Inject 1 mL under the skin into the appropriate area as directed Every 12 (Twelve) Hours. Indications: DVT/PE (active thrombosis)     • insulin detemir (LEVEMIR) 100 UNIT/ML injection Inject 10 Units under the skin into the appropriate area as directed Every Night.  12   • insulin regular (humuLIN R,novoLIN R) 100 UNIT/ML injection Inject 0-7 Units under the skin into the appropriate area as directed Every 6 (Six) Hours.  12   • insulin regular (humuLIN R,novoLIN R) 100 UNIT/ML injection Inject 4 Units under the skin into the appropriate area as directed Every 6 (Six) Hours.  12   • ipratropium-albuterol (DUO-NEB) 0.5-2.5 mg/3 ml nebulizer Take 3 mL by nebulization Every 4 (Four) Hours While Awake. 360 mL    • melatonin 5 MG tablet tablet Administer 1 tablet per G tube Every Night.     • predniSONE (DELTASONE) 10 MG  tablet Administer 3 tablets per G tube Daily With Breakfast.     • saccharomyces boulardii (FLORASTOR) 250 MG capsule Administer 2 capsules per G tube 2 (Two) Times a Day.     • sennosides-docusate (PERICOLACE) 8.6-50 MG per tablet 2 tablets by Per PEG Tube route 2 (Two) Times a Day As Needed for Constipation.     • sertraline (ZOLOFT) 50 MG tablet Administer 1 tablet per G tube Daily.           Current Facility-Administered Medications:   •  acetaminophen (TYLENOL) tablet 650 mg, 650 mg, Oral, Q6H PRN, Tejas Nazario MD, 650 mg at 12/07/21 0743  •  atovaquone (MEPRON) suspension 1,500 mg, 1,500 mg, Oral, Daily, Tejas Nazario MD  •  AZITHROMYCIN 500 MG/250 ML 0.9% NS IVPB (vial-mate), 500 mg, Intravenous, Q24H, Kenya Leslie MD, 500 mg at 12/06/21 1945  •  b complex-vitamin c-folic acid (NEPHRO-MELE) tablet 1 tablet, 1 tablet, Per PEG Tube, Daily, Kenya Leslie MD, 1 tablet at 12/06/21 2024  •  sennosides-docusate (PERICOLACE) 8.6-50 MG per tablet 2 tablet, 2 tablet, Oral, BID **AND** polyethylene glycol (MIRALAX) packet 17 g, 17 g, Oral, Daily PRN **AND** bisacodyl (DULCOLAX) EC tablet 5 mg, 5 mg, Oral, Daily PRN **AND** bisacodyl (DULCOLAX) suppository 10 mg, 10 mg, Rectal, Daily PRN, Kenya Leslie MD  •  chlorhexidine (PERIDEX) 0.12 % solution 15 mL, 15 mL, Mouth/Throat, Q12H, Kenya Leslie MD, 15 mL at 12/06/21 2311  •  Cyclophosphamide (CYTOXAN) chemo capsule 100 mg, 100 mg, Oral, Daily, Kenya Leslie MD  •  dextrose (D50W) (25 g/50 mL) IV injection 25 g, 25 g, Intravenous, Q15 Min PRN, Kenya Leslie MD  •  dextrose (GLUTOSE) oral gel 15 g, 15 g, Oral, Q15 Min PRN, Kenya Leslie MD  •  epoetin tatum-epbx (RETACRIT) injection 10,000 Units, 10,000 Units, Subcutaneous, Once per day on Mon Wed Fri, Kenya Leslie MD  •  famotidine (PEPCID) tablet 20 mg, 20 mg, Per G Tube, Daily, Kenya Leslie MD, 20 mg at 12/06/21 2024  •  ferrous sulfate tablet 325 mg, 325 mg, Oral, BID With Meals,  Wendie Dawkins, KRISTIE  •  glucagon (human recombinant) (GLUCAGEN DIAGNOSTIC) injection 1 mg, 1 mg, Subcutaneous, Q15 Min PRN, Kenya Leslie MD  •  heparin (porcine) 5000 UNIT/ML injection 5,000 Units, 5,000 Units, Subcutaneous, Q12H, Kenya Leslie MD, 5,000 Units at 12/06/21 2311  •  insulin detemir (LEVEMIR) injection 10 Units, 10 Units, Subcutaneous, Nightly, Kenya Leslie MD  •  insulin lispro (humaLOG) injection 0-7 Units, 0-7 Units, Subcutaneous, TID AC, Kenya Leslie MD  •  labetalol (NORMODYNE,TRANDATE) injection 20 mg, 20 mg, Intravenous, Q10 Min PRN, Kenya Leslie MD  •  melatonin tablet 5 mg, 5 mg, Per G Tube, Nightly, Kenya Leslie MD  •  ondansetron (ZOFRAN) injection 4 mg, 4 mg, Intravenous, Q6H PRN, Tejas Nazario MD  •  Pharmacy to dose vancomycin, , Does not apply, Continuous PRN, Kenya Leslie MD  •  piperacillin-tazobactam (ZOSYN) 3.375 g in iso-osmotic dextrose 50 ml (premix), 3.375 g, Intravenous, Q12H, Kenya Leslie MD, 3.375 g at 12/07/21 0422  •  predniSONE (DELTASONE) tablet 30 mg, 30 mg, Per G Tube, Daily With Breakfast, Kenya Leslie MD  •  saccharomyces boulardii (FLORASTOR) capsule 500 mg, 500 mg, Per G Tube, BID, Kenya Leslie MD, 500 mg at 12/06/21 2024  •  sennosides-docusate (PERICOLACE) 8.6-50 MG per tablet 2 tablet, 2 tablet, Per PEG Tube, BID PRN, Kenya Leslie MD  •  sertraline (ZOLOFT) tablet 50 mg, 50 mg, Per G Tube, Daily, Kenya Leslie MD  •  sodium chloride 0.9 % flush 10 mL, 10 mL, Intravenous, PRN, Kenya Leslie MD  •  sodium chloride 0.9 % flush 10 mL, 10 mL, Intravenous, Q12H, Kenya Leslie MD, 10 mL at 12/07/21 0231  •  sodium chloride 0.9 % flush 10 mL, 10 mL, Intravenous, PRN, Kenya Leslie MD  •  [START ON 12/8/2021] vancomycin (VANCOCIN) in iso-osmotic dextrose IVPB 1 g (premix) 200 mL, 1,000 mg, Intravenous, Once per day on Mon Wed Fri, Ray Mcclain, [facility]      Family History:  History reviewed. No pertinent family  "history.    Social History: Pt lives in [place].    Tobacco use: Denies     EtOH use : Denies    Illicit drug use: Denies       Review of Systems:   Constitutional: No fevers, chills or malaise   Eyes: Denies visual changes    Cardiovascular: Denies chest pain, palpitations   Pulmonary: Denies cough or shortness of breath   Abdominal/ GI: See HPI    Genitourinary: Denies dysuria or hematuria   Musculoskeletal: Denies any but chronic joint aches, pains or deformities   Psychiatric: No recent mood changes   Neurologic: No paresthesias or loss of function    /82 (BP Location: Right arm, Patient Position: Lying)   Pulse 89   Temp 98.9 °F (37.2 °C) (Oral)   Resp 19   Ht 172.7 cm (68\")   Wt 95.3 kg (210 lb)   SpO2 93%   BMI 31.93 kg/m²   Body mass index is 31.93 kg/m².    Gen: Awake, alert, sitting up in bed, no acute distress  Head: Normocephalic, atraumatic.   Eyes: Pupils equal, round, react to light and accommodation.   Mouth: Oral mucosa without lesions,   Neck: No masses, lymphadenopathy or carotid bruits bilaterally, tracheostomy site is healed  CV: Rhythm and rate regular, no murmurs, rubs or gallops  Lungs: Symmetric expansion, not labored on room air  Abdomen: Obese, soft, PEG in place in the left upper quadrant bolster at 2 cm at the skin  Groin : No obvious hernias bilaterally   Extremities:  No cyanosis, clubbing or edema bilaterally  Lymphatics: No abnormal lymphadenopathy appreciated   Neurologic: No gross deficits         CBC  Results from last 7 days   Lab Units 12/07/21  0650   WBC 10*3/mm3 6.08   HEMOGLOBIN g/dL 6.3*   HEMATOCRIT % 20.1*   PLATELETS 10*3/mm3 155       CMP  Results from last 7 days   Lab Units 12/07/21  0650   SODIUM mmol/L 141   POTASSIUM mmol/L 4.6   CHLORIDE mmol/L 103   CO2 mmol/L 28.0   BUN mg/dL 57*   CREATININE mg/dL 6.54*   CALCIUM mg/dL 7.8*   BILIRUBIN mg/dL 0.7   ALK PHOS U/L 54   ALT (SGPT) U/L 26   AST (SGOT) U/L 22   GLUCOSE mg/dL 113*       Radiology  Imaging " Results (Last 72 Hours)     Procedure Component Value Units Date/Time    XR Chest 1 View [[id number]] Collected: 12/06/21 1503     Updated: 12/06/21 1509    Narrative:      EXAMINATION: XR CHEST 1 VW-      INDICATION: Shortness of breath     TECHNIQUE: Frontal view of the chest     COMPARISON: 11/12/2021     FINDINGS:      Unchanged right chest dialysis catheter with the tip terminating at the  cavoatrial junction. Tracheostomy is no longer visualized. Stable  cardiomediastinal silhouette which appears mildly prominent diffuse  interstitial prominence remains. Vague perihilar and bibasilar  parenchymal opacities appear somewhat worsened from prior exam. No large  pleural effusion. No pneumothorax. No acute osseous findings.       Impression:         Mild worsening of perihilar and bibasilar parenchymal opacities on  background of diffuse interstitial prominence, favor worsening edema.     This report was finalized on 12/6/2021 3:06 PM by Jeffy Goncalves MD.                 Results Review: I have personally reviewed all of the recent lab and imaging results available at this time.     Assessment:  Mr. Dickens is a 66-year-old gentleman who underwent tracheostomy and PEG placement on October 15 who I been asked to evaluate for PEG removal..  He has been taking all nutrition and medications by mouth for the past month and his PEG tube has not been used.  His PEG tube was removed at the bedside without difficulty.    Plan:  -PEG removed at bedside  -Keep dry bandage to the site as needed for drainage  -We will sign off      I discussed the patient's findings and my recommendations with the patient and/or family, as well as the primary team     Abdon Roberts MD  12/07/21  08:48 EST

## 2021-12-07 NOTE — PROGRESS NOTES
[facility] Medicine Services  PROGRESS NOTE    Patient Name: Keshav Dickens  : 1955  MRN: [id number]    Date of Admission: 2021  Primary Care Physician: Andree Langston MD    Subjective   Subjective     CC:  F/u dyspnea    HPI:  No family present.  Somewhat of a poor historian.  Says he feels improved with his breathing.  Currently on 3L.  Denies fever, cough, hemoptysis.  Dyspnea was worse when lying flat.  Asking about getting PEG out.    ROS:  Gen- No fevers, chills  CV- No chest pain, palpitations  Resp- No cough, + dyspnea  GI- No N/V/D, abd pain    Objective   Objective     Vital Signs:   Temp:  [98.1 °F (36.7 °C)-99.2 °F (37.3 °C)] 98.8 °F (37.1 °C)  Heart Rate:  [] 102  Resp:  [16-20] 19  BP: (151-187)/() 151/79  Flow (L/min):  [1-3] 3     Physical Exam:  Constitutional: No acute distress, awake, alert, sitting up on side of bed  HENT: NCAT, mucous membranes moist, trach site well healed  Respiratory: generally clear, respiratory effort normal 3L  Cardiovascular: RRR, no murmurs, rubs, or gallops.  Tunneled HD line in right chest  Gastrointestinal: Positive bowel sounds, soft, nontender, nondistended, PEG in place  Musculoskeletal: 2+ bilateral ankle edema  Psychiatric: Appropriate affect, cooperative  Neurologic: Oriented x 3, no deficits  Skin: No rashes      Results Reviewed:  LAB RESULTS:      Lab 21  0650 21  1141   WBC 6.08 7.70   HEMOGLOBIN 6.3* 7.2*   HEMATOCRIT 20.1* 22.0*   PLATELETS 155 189   NEUTROS ABS 4.96 6.04   IMMATURE GRANS (ABS) 0.08* 0.17*   LYMPHS ABS 0.37* 0.54*   MONOS ABS 0.44 0.69   EOS ABS 0.19 0.20   .6* 100.0*   PROCALCITONIN 0.73*  --    LACTATE  --  1.3         Lab 21  0650 21  1141   SODIUM 141 141   POTASSIUM 4.6 4.0   CHLORIDE 103 100   CO2 28.0 27.0   ANION GAP 10.0 14.0   BUN 57* 41*   CREATININE 6.54* 4.75*   GLUCOSE 113* 124*   CALCIUM 7.8* 8.9         Lab 21  0650 21  1141    TOTAL PROTEIN 5.4* 6.7   ALBUMIN 3.50 4.10   GLOBULIN 1.9 2.6   ALT (SGPT) 26 35   AST (SGOT) 22 45*   BILIRUBIN 0.7 1.1   ALK PHOS 54 66                 Lab 12/06/21 2004   ABO TYPING A   RH TYPING Positive   ANTIBODY SCREEN Negative         Brief Urine Lab Results  (Last result in the past 365 days)      Color   Clarity   Blood   Leuk Est   Nitrite   Protein   CREAT   Urine HCG        10/04/21 2109 Dora   Cloudy   Large (3+)   Large (3+)   Negative   >=300 mg/dL (3+)                 Microbiology Results Abnormal     Procedure Component Value - Date/Time    COVID PRE-OP / PRE-PROCEDURE SCREENING ORDER (NO ISOLATION) - Swab, Nasopharynx [[id number]]  (Normal) Collected: 12/06/21 1338    Lab Status: Final result Specimen: Swab from Nasopharynx Updated: 12/06/21 1424    Narrative:      The following orders were created for panel order COVID PRE-OP / PRE-PROCEDURE SCREENING ORDER (NO ISOLATION) - Swab, Nasopharynx.  Procedure                               Abnormality         Status                     ---------                               -----------         ------                     COVID-19 and FLU A/B PCR...[[id number]]  Normal              Final result                 Please view results for these tests on the individual orders.    COVID-19 and FLU A/B PCR - Swab, Nasopharynx [[id number]]  (Normal) Collected: 12/06/21 1338    Lab Status: Final result Specimen: Swab from Nasopharynx Updated: 12/06/21 1424     COVID19 Not Detected     Influenza A PCR Not Detected     Influenza B PCR Not Detected    Narrative:      Fact sheet for providers: https://www.fda.gov/media/717522/download    Fact sheet for patients: https://www.fda.gov/media/328616/download    Test performed by PCR.          XR Chest 1 View    Result Date: 12/6/2021  EXAMINATION: XR CHEST 1 VW-  INDICATION: Shortness of breath  TECHNIQUE: Frontal view of the chest  COMPARISON: 11/12/2021  FINDINGS:  Unchanged right chest dialysis catheter with the tip  terminating at the cavoatrial junction. Tracheostomy is no longer visualized. Stable cardiomediastinal silhouette which appears mildly prominent diffuse interstitial prominence remains. Vague perihilar and bibasilar parenchymal opacities appear somewhat worsened from prior exam. No large pleural effusion. No pneumothorax. No acute osseous findings.      Impression:  Mild worsening of perihilar and bibasilar parenchymal opacities on background of diffuse interstitial prominence, favor worsening edema.  This report was finalized on 12/6/2021 3:06 PM by Jeffy Goncalves MD.        Results for orders placed during the hospital encounter of 10/04/21    Adult Transthoracic Echo Complete W/ Cont if Necessary Per Protocol    Interpretation Summary  · Left ventricular ejection fraction appears to be 56 - 60%. Left ventricular systolic function is normal.  · Left ventricular wall thickness is consistent with mild to moderate concentric hypertrophy.  · The inferior vena cava is dilated.      I have reviewed the medications:  Scheduled Meds:atovaquone, 1,500 mg, Oral, Daily  azithromycin, 500 mg, Intravenous, Q24H  b complex-vitamin c-folic acid, 1 tablet, Per PEG Tube, Daily  chlorhexidine, 15 mL, Mouth/Throat, Q12H  cyclophosphamide, 100 mg, Oral, Daily  epoetin tatum-epbx, 10,000 Units, Subcutaneous, Once per day on Mon Wed Fri  famotidine, 20 mg, Per G Tube, Daily  ferrous sulfate, 325 mg, Oral, BID With Meals  heparin (porcine), 5,000 Units, Subcutaneous, Q12H  insulin detemir, 10 Units, Subcutaneous, Nightly  insulin lispro, 0-7 Units, Subcutaneous, TID AC  melatonin, 5 mg, Per G Tube, Nightly  piperacillin-tazobactam, 3.375 g, Intravenous, Q12H  predniSONE, 30 mg, Per G Tube, Daily With Breakfast  saccharomyces boulardii, 500 mg, Per G Tube, BID  senna-docusate sodium, 2 tablet, Oral, BID  sertraline, 50 mg, Per G Tube, Daily  sodium chloride, 10 mL, Intravenous, Q12H  [START ON 12/8/2021] vancomycin, 1,000 mg,  Intravenous, Once per day on Mon Wed Fri      Continuous Infusions:Pharmacy to dose vancomycin,       PRN Meds:.•  acetaminophen  •  senna-docusate sodium **AND** polyethylene glycol **AND** bisacodyl **AND** bisacodyl  •  dextrose  •  dextrose  •  glucagon (human recombinant)  •  labetalol  •  ondansetron  •  Pharmacy to dose vancomycin  •  sennosides-docusate  •  sodium chloride  •  sodium chloride    Assessment/Plan   Assessment & Plan     Active Hospital Problems    Diagnosis  POA   • **Goodpasture Syndrome [M31.0]  Yes     Priority: Medium   • Anemia of chronic disease [D63.8]  Yes   • Hypoxia [R09.02]  Yes   • Lung infiltrate [R91.8]  Yes   • ESRD (end stage renal disease) (HCC) [N18.6]  Yes      Resolved Hospital Problems   No resolved problems to display.        Brief Hospital Course to date:  Keshav Dickens is a 66 y.o. male with a complicated recent medical history in October and November including ultimate diagnosis of Goodpasture syndrome based on positive anti-GBM.  Has been on dialysis and immunosuppression with Cytoxan and prednisone.  Discharged on 11/12 to [facility] and sounds like he was discharged from there within the last few days.  Presents back following dialysis yesterday with some increasing shortness of breath and reported increasing anti-GBM antibody.    Hypoxia  Lung infiltrate  -Personally reviewed chest x-ray which does show some increasing infiltrates compared to November.  Do not see a lobar pneumonia.  Unclear if these infiltrates represent volume overload, lung involvement from his Goodpasture's, atypical infection.  -Hospital stay November was treated twice for Klebsiella pneumonia by Dr. Ruff with infectious disease.  -Currently procalcitonin is mildly elevated at 0.7, white blood cell count normal, no fevers.  Started on empiric antibiotics.  I will ask Dr. Ruff to evaluate the patient and assist with determination of need for antibiotics.  -This may also be related  to volume overload.  Continue dialysis and volume removal per nephrology.    Goodpasture syndrome   - continue on Cytoxan and current dose of 30 mg of prednisone, dosing managed by nephrology  -Continue atovaquone for PCP prophylaxis    End-stage renal disease  -On Monday Wednesday Friday schedule  -May need additional volume taken off    Anemia  -Multifactorial including end-stage renal disease and Cytoxan  -Hemoglobin baseline appears to be 7-8 recently, down to 6.3 this morning.  -Transfuse 1 unit of blood    Debility  -PT and OT consults      DVT prophylaxis:  Medical DVT prophylaxis orders are present.     Disposition: I expect the patient to be discharged home in the next 1 to 2 days if clinically improved.    CODE STATUS:   Code Status and Medical Interventions:   Ordered at: 12/06/21 5095     Level Of Support Discussed With:    Patient     Code Status (Patient has no pulse and is not breathing):    CPR (Attempt to Resuscitate)     Medical Interventions (Patient has pulse or is breathing):    Full Support       Tejas Nazario MD  12/07/21

## 2021-12-07 NOTE — PROGRESS NOTES
"   LOS: 1 day    Patient Care Team:  Andree Langston MD as PCP - General (Internal Medicine)    Chief Complaint:  SOA    Subjective     Interval History:     No acute events overnight. No new complaints   SOA bettter    Review of Systems:   No CP, fever, chills, rigors, rash, N/V, Constipation.       Objective     Vital Sign Min/Max for last 24 hours  Temp  Min: 98.1 °F (36.7 °C)  Max: 99.2 °F (37.3 °C)   BP  Min: 139/82  Max: 187/94   Pulse  Min: 79  Max: 104   Resp  Min: 16  Max: 20   SpO2  Min: 88 %  Max: 93 %   Flow (L/min)  Min: 1  Max: 3   Weight  Min: 95.3 kg (210 lb)  Max: 95.3 kg (210 lb)     Flowsheet Rows      First Filed Value   Admission Height 172.7 cm (68\") Documented at 12/06/2021 1104   Admission Weight 95.3 kg (210 lb) Documented at 12/06/2021 1104          No intake/output data recorded.  I/O last 3 completed shifts:  In: 400 [P.O.:400]  Out: -     Physical Exam:    Gen: Alert, NAD   HENT: NC, AT, EOMI   NECK: Supple, no JVD, Trachea midline   LUNGS: CTA bilaterally, non labored respirtation   CVS: S1/S2 audible, RRR, no murmur   Abd: Soft, NT, ND, BS+   Ext: ++ pedal edema, no cyanosis   CNS: Alert, No focal deficit noted grossly  Psy: Cooperative  Skin: Warm, dry and intact      WBC WBC   Date Value Ref Range Status   12/07/2021 6.08 3.40 - 10.80 10*3/mm3 Final   12/06/2021 7.70 3.40 - 10.80 10*3/mm3 Final      HGB Hemoglobin   Date Value Ref Range Status   12/07/2021 6.3 (C) 13.0 - 17.7 g/dL Final   12/06/2021 7.2 (L) 13.0 - 17.7 g/dL Final      HCT Hematocrit   Date Value Ref Range Status   12/07/2021 20.1 (C) 37.5 - 51.0 % Final   12/06/2021 22.0 (L) 37.5 - 51.0 % Final      Platlets No results found for: LABPLAT   MCV MCV   Date Value Ref Range Status   12/07/2021 103.6 (H) 79.0 - 97.0 fL Final   12/06/2021 100.0 (H) 79.0 - 97.0 fL Final          Sodium Sodium   Date Value Ref Range Status   12/07/2021 141 136 - 145 mmol/L Final   12/06/2021 141 136 - 145 mmol/L Final      Potassium " Potassium   Date Value Ref Range Status   12/07/2021 4.6 3.5 - 5.2 mmol/L Final   12/06/2021 4.0 3.5 - 5.2 mmol/L Final     Comment:     Slight hemolysis detected by analyzer. Results may be affected.      Chloride Chloride   Date Value Ref Range Status   12/07/2021 103 98 - 107 mmol/L Final   12/06/2021 100 98 - 107 mmol/L Final      CO2 CO2   Date Value Ref Range Status   12/07/2021 28.0 22.0 - 29.0 mmol/L Final   12/06/2021 27.0 22.0 - 29.0 mmol/L Final      BUN BUN   Date Value Ref Range Status   12/07/2021 57 (H) 8 - 23 mg/dL Final   12/06/2021 41 (H) 8 - 23 mg/dL Final      Creatinine Creatinine   Date Value Ref Range Status   12/07/2021 6.54 (H) 0.76 - 1.27 mg/dL Final   12/06/2021 4.75 (H) 0.76 - 1.27 mg/dL Final      Calcium Calcium   Date Value Ref Range Status   12/07/2021 7.8 (L) 8.6 - 10.5 mg/dL Final   12/06/2021 8.9 8.6 - 10.5 mg/dL Final      PO4 No results found for: CAPO4   Albumin Albumin   Date Value Ref Range Status   12/07/2021 3.50 3.50 - 5.20 g/dL Final   12/06/2021 4.10 3.50 - 5.20 g/dL Final      Magnesium No results found for: MG   Uric Acid No results found for: URICACID        Results Review:     I reviewed the patient's new clinical results.    atovaquone, 1,500 mg, Oral, Daily  azithromycin, 500 mg, Intravenous, Q24H  b complex-vitamin c-folic acid, 1 tablet, Per PEG Tube, Daily  chlorhexidine, 15 mL, Mouth/Throat, Q12H  cyclophosphamide, 100 mg, Oral, Daily  epoetin tatum-epbx, 10,000 Units, Subcutaneous, Once per day on Mon Wed Fri  famotidine, 20 mg, Per G Tube, Daily  ferrous sulfate, 325 mg, Oral, BID With Meals  heparin (porcine), 5,000 Units, Subcutaneous, Q12H  insulin detemir, 10 Units, Subcutaneous, Nightly  insulin lispro, 0-7 Units, Subcutaneous, TID AC  melatonin, 5 mg, Per G Tube, Nightly  piperacillin-tazobactam, 3.375 g, Intravenous, Q12H  predniSONE, 30 mg, Per G Tube, Daily With Breakfast  saccharomyces boulardii, 500 mg, Per G Tube, BID  senna-docusate sodium, 2  tablet, Oral, BID  sertraline, 50 mg, Per G Tube, Daily  sodium chloride, 10 mL, Intravenous, Q12H  [START ON 12/8/2021] vancomycin, 1,000 mg, Intravenous, Once per day on Mon Wed Fri      Pharmacy to dose vancomycin,         Medication Review:     Assessment/Plan       Goodpasture Syndrome    ESRD (end stage renal disease) (HCC)    Anemia of chronic disease    Hypoxia    Lung infiltrate      1.  Goodspasture: Cytoxan oral until the end of December 2021: Decreasing dose of prednisone.  Patient received plasmapheresis and FFP.  IV Cytoxan per protocol was given, patient received Bactrim, atovaquone, monitoring white count.  2.  Anemia.  3- PIOTR - Anuric /oliguric - on HD - Likely ESRD.  4- Peripheral edema         Plan:  - HD today and tomorrow. UF as tolerated. Patient seen on dialysis  - At this time will recommend to continue with cytoxan and prednisone   - Repeat level of anti GBM- pending.   - At this time if no pulmonary involvement (No hemoptysis) - don't see any benefit from plasmapheresis or escalating immunosuppression.     Janak Menendez MD  12/07/21  08:53 EST

## 2021-12-07 NOTE — PLAN OF CARE
Goal Outcome Evaluation:           Progress: no change  Outcome Summary: VSS. Patient A/Ox4. NSR/ST on telemetry. O2 requirements increased to 3L NC overnight. Patient states that he feels better already. No acute events overnight. Continue POC.

## 2021-12-08 ENCOUNTER — APPOINTMENT (OUTPATIENT)
Dept: NEPHROLOGY | Facility: HOSPITAL | Age: 66
End: 2021-12-08

## 2021-12-08 LAB
BH BB BLOOD EXPIRATION DATE: NORMAL
BH BB BLOOD TYPE BARCODE: 6200
BH BB DISPENSE STATUS: NORMAL
BH BB PRODUCT CODE: NORMAL
BH BB UNIT NUMBER: NORMAL
CROSSMATCH INTERPRETATION: NORMAL
DEPRECATED RDW RBC AUTO: 70.5 FL (ref 37–54)
ERYTHROCYTE [DISTWIDTH] IN BLOOD BY AUTOMATED COUNT: 19.7 % (ref 12.3–15.4)
GBM IGG SER-ACNC: 51 UNITS (ref 0–20)
GLUCOSE BLDC GLUCOMTR-MCNC: 186 MG/DL (ref 70–130)
GLUCOSE BLDC GLUCOMTR-MCNC: 99 MG/DL (ref 70–130)
HAV IGM SERPL QL IA: NORMAL
HBV CORE IGM SERPL QL IA: NORMAL
HBV SURFACE AG SERPL QL IA: NORMAL
HCT VFR BLD AUTO: 21.3 % (ref 37.5–51)
HCV AB SER DONR QL: NORMAL
HGB BLD-MCNC: 7.1 G/DL (ref 13–17.7)
MCH RBC QN AUTO: 32.9 PG (ref 26.6–33)
MCHC RBC AUTO-ENTMCNC: 33.3 G/DL (ref 31.5–35.7)
MCV RBC AUTO: 98.6 FL (ref 79–97)
PLATELET # BLD AUTO: 161 10*3/MM3 (ref 140–450)
PMV BLD AUTO: 10.8 FL (ref 6–12)
RBC # BLD AUTO: 2.16 10*6/MM3 (ref 4.14–5.8)
UNIT  ABO: NORMAL
UNIT  RH: NORMAL
WBC NRBC COR # BLD: 6.53 10*3/MM3 (ref 3.4–10.8)

## 2021-12-08 PROCEDURE — 85027 COMPLETE CBC AUTOMATED: CPT | Performed by: INTERNAL MEDICINE

## 2021-12-08 PROCEDURE — 80074 ACUTE HEPATITIS PANEL: CPT | Performed by: INTERNAL MEDICINE

## 2021-12-08 PROCEDURE — 82962 GLUCOSE BLOOD TEST: CPT

## 2021-12-08 PROCEDURE — 63710000001 PREDNISONE PER 1 MG: Performed by: INTERNAL MEDICINE

## 2021-12-08 PROCEDURE — 63710000001 INSULIN LISPRO (HUMAN) PER 5 UNITS: Performed by: INTERNAL MEDICINE

## 2021-12-08 PROCEDURE — 25010000002 HEPARIN (PORCINE) PER 1000 UNITS: Performed by: INTERNAL MEDICINE

## 2021-12-08 PROCEDURE — 63710000001 INSULIN DETEMIR PER 5 UNITS: Performed by: INTERNAL MEDICINE

## 2021-12-08 PROCEDURE — 25010000002 EPOETIN ALFA-EPBX 10000 UNIT/ML SOLUTION: Performed by: INTERNAL MEDICINE

## 2021-12-08 PROCEDURE — 25010000002 PIPERACILLIN SOD-TAZOBACTAM PER 1 G: Performed by: INTERNAL MEDICINE

## 2021-12-08 PROCEDURE — 99233 SBSQ HOSP IP/OBS HIGH 50: CPT | Performed by: INTERNAL MEDICINE

## 2021-12-08 PROCEDURE — 25010000002 CYCLOPHOSPHAMIDE PER 25 MG: Performed by: INTERNAL MEDICINE

## 2021-12-08 RX ORDER — HEPARIN SODIUM 1000 [USP'U]/ML
1600 INJECTION, SOLUTION INTRAVENOUS; SUBCUTANEOUS AS NEEDED
Status: DISCONTINUED | OUTPATIENT
Start: 2021-12-08 | End: 2021-12-09 | Stop reason: HOSPADM

## 2021-12-08 RX ADMIN — INSULIN DETEMIR 10 UNITS: 100 INJECTION, SOLUTION SUBCUTANEOUS at 21:55

## 2021-12-08 RX ADMIN — PREDNISONE 30 MG: 20 TABLET ORAL at 11:42

## 2021-12-08 RX ADMIN — FERROUS SULFATE TAB 325 MG (65 MG ELEMENTAL FE) 325 MG: 325 (65 FE) TAB at 11:43

## 2021-12-08 RX ADMIN — HEPARIN SODIUM 1600 UNITS: 1000 INJECTION INTRAVENOUS; SUBCUTANEOUS at 11:18

## 2021-12-08 RX ADMIN — CHLORHEXIDINE GLUCONATE 15 ML: 1.2 SOLUTION ORAL at 11:41

## 2021-12-08 RX ADMIN — HEPARIN SODIUM 5000 UNITS: 5000 INJECTION, SOLUTION INTRAVENOUS; SUBCUTANEOUS at 12:47

## 2021-12-08 RX ADMIN — DOCUSATE SODIUM 50 MG AND SENNOSIDES 8.6 MG 2 TABLET: 8.6; 5 TABLET, FILM COATED ORAL at 11:41

## 2021-12-08 RX ADMIN — Medication 500 MG: at 21:50

## 2021-12-08 RX ADMIN — HEPARIN SODIUM 5000 UNITS: 5000 INJECTION, SOLUTION INTRAVENOUS; SUBCUTANEOUS at 21:50

## 2021-12-08 RX ADMIN — DOXYCYCLINE 100 MG: 100 CAPSULE ORAL at 21:50

## 2021-12-08 RX ADMIN — INSULIN LISPRO 5 UNITS: 100 INJECTION, SOLUTION INTRAVENOUS; SUBCUTANEOUS at 13:07

## 2021-12-08 RX ADMIN — TAZOBACTAM SODIUM AND PIPERACILLIN SODIUM 3.38 G: 375; 3 INJECTION, SOLUTION INTRAVENOUS at 13:08

## 2021-12-08 RX ADMIN — CHLORHEXIDINE GLUCONATE 15 ML: 1.2 SOLUTION ORAL at 21:50

## 2021-12-08 RX ADMIN — SODIUM CHLORIDE, PRESERVATIVE FREE 10 ML: 5 INJECTION INTRAVENOUS at 21:51

## 2021-12-08 RX ADMIN — Medication 1 TABLET: at 11:43

## 2021-12-08 RX ADMIN — EPOETIN ALFA-EPBX 10000 UNITS: 10000 INJECTION, SOLUTION INTRAVENOUS; SUBCUTANEOUS at 11:18

## 2021-12-08 RX ADMIN — Medication 1 CAPSULE: at 11:43

## 2021-12-08 RX ADMIN — Medication 5 MG: at 21:50

## 2021-12-08 RX ADMIN — Medication 500 MG: at 11:43

## 2021-12-08 RX ADMIN — DOXYCYCLINE 100 MG: 100 CAPSULE ORAL at 12:46

## 2021-12-08 RX ADMIN — SERTRALINE HYDROCHLORIDE 50 MG: 50 TABLET ORAL at 11:43

## 2021-12-08 RX ADMIN — FERROUS SULFATE TAB 325 MG (65 MG ELEMENTAL FE) 325 MG: 325 (65 FE) TAB at 18:44

## 2021-12-08 RX ADMIN — ATOVAQUONE 1500 MG: 750 SUSPENSION ORAL at 11:40

## 2021-12-08 RX ADMIN — SODIUM CHLORIDE, PRESERVATIVE FREE 10 ML: 5 INJECTION INTRAVENOUS at 12:53

## 2021-12-08 RX ADMIN — CYCLOPHOSPHAMIDE 100 MG: 25 CAPSULE ORAL at 11:40

## 2021-12-08 RX ADMIN — FAMOTIDINE 20 MG: 20 TABLET ORAL at 11:41

## 2021-12-08 RX ADMIN — TAZOBACTAM SODIUM AND PIPERACILLIN SODIUM 3.38 G: 375; 3 INJECTION, SOLUTION INTRAVENOUS at 00:12

## 2021-12-08 NOTE — PLAN OF CARE
Pt vss, remains of 2L NC, AxO, NSR on monitor. No urine sample able to be obtained this shift. Dressing on stomach from PEG tube removal changed once at start of shift per patient request, small spot of yellow drainage present. Pt has no complaints at this time.

## 2021-12-08 NOTE — PROGRESS NOTES
Infectious Disease Progress Note  Keshav Dickens  1955  [id number]    Date of Consult: 12/7/2021  Admission Date: 12/6/2021    Requesting Provider: Dr Nazario  Evaluating Physician: Rodríguez Ruff MD    Chief Complaint: dyspnea    Reason for Consultation: possible pneumonia    History of present illness:   Patient is a 66 y.o.  Yr old male with history of DM2, tobacco abuse.  Initially admitted to [facility] on 9/27/2021 with complaints of hemoptysis for 2 to 3 weeks.  Intubated on 10/1.  CT scan with diffuse groundglass opacities and renal failure.  Due to concern for pulmonary renal syndrome he was treated with pulse dose steroids.  Anti-GBM antibody positive, diagnosed with Goodpasture's syndrome, confirmed by renal biopsy.  Transferred to [facility] on 10/4. Treated with plasmapheresis; also high-dose steroids and Cytoxan.     On 10/10 he developed fever up to 101.  Also had leukocytosis up to 15,000.  He had been on atovaquone for PCP prophylaxis.  Started on vancomycin and Zosyn on 10/12.  Respiratory culture from 10/12 with heavy growth of Klebsiella aerogenes. Improved with meropenen, then cefepime. Hospital course, complicated by volume overload. Had trach and PEG. Slowly improved and woke up.  Hemoptysis resolved.  Treated with a long course of antibiotics, first meropenem and then cefepime.  Just before discharge he developed a macular drug rash which is possibly related to cefepime but is not getting worse prior to discharge.  Discharged to LTAC.     12/7/21: Presented back to [facility] with worsening shortness of breath and hypoxia.  He had left [facility] about 1 week prior. Worsening dyspnea and exercise in tolerance at home. Pt poor historian. Cough at night but not productive? No reported fevers. No outpatient antibiotics. Had dialysis on 12/6 and was more dyspneic. CXR with infiltrates so was sent to [facility] to screen for pneumonia. Started on  vancomycin, Zosyn and azithromycin.  I was consulted for antibiotic recommendations.  Had dialysis today. Afebrile since arrival.  Currently 2 L nasal cannula. Blood pressure elevated. He says he felt much better shortly after first doses of antibiotics. Slept better last night. Had dialysis this afternoon.     12/8/21: Feeling better today.  Nearly back to baseline.  Much less lower extremity swelling.  Decreased cough.  No hemoptysis. Stable on 1 L of oxygen.  Hopeful for discharge home tomorrow     ROS:  No fevers or chills. No n/v/d. No rash. No new ADR to Abx.     No Known Allergies    Antibiotics:  Anti-Infectives (From admission, onward)    Ordered     Dose/Rate Route Frequency Start Stop    12/07/21 1733  doxycycline (MONODOX) capsule 100 mg        Ordering Provider: Rodríguez Ruff MD    100 mg Oral Every 12 Hours Scheduled 12/07/21 2200 12/14/21 2159    12/07/21 1204  vancomycin 500 mg/100 mL 0.9% NS IVPB (mbp)        Ordering Provider: Ray Mcclain [facility]    500 mg Intravenous Once 12/07/21 1400 12/07/21 1607    12/07/21 0745  atovaquone (MEPRON) suspension 1,500 mg        Ordering Provider: Tejas Nazario MD    1,500 mg Oral Daily 12/07/21 0900 01/06/22 0859    12/06/21 1815  vancomycin 1500 mg/500 mL 0.9% NS IVPB ([facility])        Ordering Provider: Kenya Leslie MD    20 mg/kg × 79.2 kg (Adjusted)  333.3 mL/hr over 90 Minutes Intravenous Once 12/06/21 1900 12/06/21 2114    12/06/21 1517  piperacillin-tazobactam (ZOSYN) 3.375 g in iso-osmotic dextrose 50 ml (premix)        Ordering Provider: Herve Centeno MD    3.375 g Intravenous Once 12/06/21 1519 12/06/21 1647          Other Medications:  Current Facility-Administered Medications   Medication Dose Route Frequency Provider Last Rate Last Admin   • acetaminophen (TYLENOL) tablet 650 mg  650 mg Oral Q6H PRN Tejas Nazario MD   650 mg at 12/07/21 0743   • albumin human 25 % IV SOLN 12.5 g  12.5 g Intravenous PRN Janak Menendez MD        • atovaquone (MEPRON) suspension 1,500 mg  1,500 mg Oral Daily Tejas Nazario MD   1,500 mg at 12/08/21 1140   • b complex-vitamin c-folic acid (NEPHRO-MELE) tablet 1 tablet  1 tablet Per PEG Tube Daily Kenya Leslie MD   1 tablet at 12/08/21 1143   • sennosides-docusate (PERICOLACE) 8.6-50 MG per tablet 2 tablet  2 tablet Oral BID Kenya Leslie MD   2 tablet at 12/08/21 1141    And   • polyethylene glycol (MIRALAX) packet 17 g  17 g Oral Daily PRN Kenya Leslie MD        And   • bisacodyl (DULCOLAX) EC tablet 5 mg  5 mg Oral Daily PRN Kenya Leslie MD        And   • bisacodyl (DULCOLAX) suppository 10 mg  10 mg Rectal Daily PRN Kenya Leslie MD       • chlorhexidine (PERIDEX) 0.12 % solution 15 mL  15 mL Mouth/Throat Q12H Kenya Leslie MD   15 mL at 12/08/21 1141   • Cyclophosphamide (CYTOXAN) chemo capsule 100 mg  100 mg Oral Daily Kenya Leslie MD   100 mg at 12/08/21 1140   • dextrose (D50W) (25 g/50 mL) IV injection 25 g  25 g Intravenous Q15 Min PRN Kenya Leslie MD       • dextrose (GLUTOSE) oral gel 15 g  15 g Oral Q15 Min PRN Kenya Leslie MD       • doxycycline (MONODOX) capsule 100 mg  100 mg Oral Q12H Rodríguez Ruff MD   100 mg at 12/08/21 1246   • epoetin tatum-epbx (RETACRIT) injection 10,000 Units  10,000 Units Subcutaneous Once per day on Mon Wed Fri Kenya Leslie MD   10,000 Units at 12/08/21 1118   • famotidine (PEPCID) tablet 20 mg  20 mg Per G Tube Daily Kenya Leslie MD   20 mg at 12/08/21 1141   • ferrous sulfate tablet 325 mg  325 mg Oral BID With Meals Mariza, Wendie, APRN   325 mg at 12/08/21 1143   • glucagon (human recombinant) (GLUCAGEN DIAGNOSTIC) injection 1 mg  1 mg Subcutaneous Q15 Min PRN Kenya Leslie MD       • heparin (porcine) 5000 UNIT/ML injection 5,000 Units  5,000 Units Subcutaneous Q12H Kenya Leslie MD   5,000 Units at 12/08/21 1247   • heparin (porcine) injection 1,600 Units  1,600 Units Intracatheter PRN Janka Menendez  "MD Leah   1,600 Units at 12/08/21 1118   • insulin detemir (LEVEMIR) injection 10 Units  10 Units Subcutaneous Nightly Kenya Leslie MD   10 Units at 12/07/21 2224   • insulin lispro (humaLOG) injection 0-7 Units  0-7 Units Subcutaneous TID AC Kenya Leslie MD   5 Units at 12/08/21 1307   • labetalol (NORMODYNE,TRANDATE) injection 20 mg  20 mg Intravenous Q10 Min PRN Kenya Leslie MD       • lactobacillus acidophilus (RISAQUAD) capsule 1 capsule  1 capsule Oral Daily Rodríguez Ruff MD   1 capsule at 12/08/21 1143   • melatonin tablet 5 mg  5 mg Per G Tube Nightly Kenya Leslie MD   5 mg at 12/07/21 2223   • ondansetron (ZOFRAN) injection 4 mg  4 mg Intravenous Q6H PRN Tejas Nazario MD       • predniSONE (DELTASONE) tablet 30 mg  30 mg Per G Tube Daily With Breakfast Kenya Leslie MD   30 mg at 12/08/21 1142   • saccharomyces boulardii (FLORASTOR) capsule 500 mg  500 mg Per G Tube BID Kenya Leslie MD   500 mg at 12/08/21 1143   • sennosides-docusate (PERICOLACE) 8.6-50 MG per tablet 2 tablet  2 tablet Per PEG Tube BID PRN Kenya Leslie MD       • sertraline (ZOLOFT) tablet 50 mg  50 mg Per G Tube Daily Kenya Leslie MD   50 mg at 12/08/21 1143   • sodium chloride 0.9 % flush 10 mL  10 mL Intravenous PRN Kenya Leslie MD       • sodium chloride 0.9 % flush 10 mL  10 mL Intravenous Q12H Kenya Leslie MD   10 mL at 12/08/21 1253   • sodium chloride 0.9 % flush 10 mL  10 mL Intravenous PRN Kenya Leslie MD           Physical Exam:   Vital Signs   /86 (BP Location: Right arm, Patient Position: Lying)   Pulse 89   Temp 98.1 °F (36.7 °C) (Oral)   Resp 20   Ht 172.7 cm (68\")   Wt 95.3 kg (210 lb)   SpO2 99%   BMI 31.93 kg/m²     GENERAL: Awake and alert, comfortable  HEENT: Normocephalic, atraumatic. EOMI. No conjunctival injection. No icterus   NECK: Supple without nuchal rigidity.   HEART: RRR; No murmur.  LUNGS: Clear bilaterally.  No wheezes or rales  ABDOMEN: " Soft, nontender, nondistended. Positive bowel sounds. No rebound or guarding.  EXT:  1+ pedal edema. improved  : Without Cedeno catheter.  MSK:   No major joint swelling    SKIN: Warm and dry without cutaneous eruptions on Inspection/palpation.    NEURO: Oriented to PPT. No focal deficits on motor/sensory exam at arms/legs.  PSYCHIATRIC: Normal insight and judgement. Cooperative with PE    Laboratory Data    Results from last 7 days   Lab Units 12/08/21  0509 12/07/21  1803 12/07/21  0650 12/06/21  1141 12/06/21  1141   WBC 10*3/mm3 6.53  --  6.08  --  7.70   HEMOGLOBIN g/dL 7.1* 7.8* 6.3*   < > 7.2*   HEMATOCRIT % 21.3* 24.0* 20.1*   < > 22.0*   PLATELETS 10*3/mm3 161  --  155  --  189    < > = values in this interval not displayed.     Results from last 7 days   Lab Units 12/07/21  0650   SODIUM mmol/L 141   POTASSIUM mmol/L 4.6   CHLORIDE mmol/L 103   CO2 mmol/L 28.0   BUN mg/dL 57*   CREATININE mg/dL 6.54*   GLUCOSE mg/dL 113*   CALCIUM mg/dL 7.8*     Estimated Creatinine Clearance: 12.1 mL/min (A) (by C-G formula based on SCr of 6.54 mg/dL (H)).  Results from last 7 days   Lab Units 12/07/21  0650   ALK PHOS U/L 54   BILIRUBIN mg/dL 0.7   ALT (SGPT) U/L 26   AST (SGOT) U/L 22               Microbiology:     Blood Culture   Date Value Ref Range Status   12/06/2021 No growth at 48 hours  Preliminary   12/06/2021 No growth at 48 hours  Preliminary      COVID and flu negative      Radiology:  XR Chest 1 View    Result Date: 12/6/2021   Mild worsening of perihilar and bibasilar parenchymal opacities on background of diffuse interstitial prominence, favor worsening edema.  This report was finalized on 12/6/2021 3:06 PM by Jeffy Goncalves MD.       I personally reviewed the above CXR: bilateral interstitial opacities with lower lobe predominance      Impression:   1. Dyspnea, hypoxia: worse prior to admission now improved. Pulmonary edema alone vs developing pneumonia. No clear signs of sepsis. Very mildly elevated  procal, although this tends to run higher in ESRD pts. Pt does report improvement shortly after getting IV antibiotics. Had not missed HD.  Improved significantly over the past 48 hours after getting dialysis daily suggest that most of this was pulm edema volume.  2. Goodpasture syndrome: recent diagnosis. tx with steroids, Cytoxan and plasmapheresis.   3. Immunocompromised host: still on high dose steroids. High risk for infections  4. Recent Klebsiella VAP during last admission. Also hx of aspiration  5. Renal failure on HD  6. Anemia  7. DM2    PLAN:   - Follow up pending blood cultures, so far negative     No clear signs of sepsis or pneumonia, but he is significantly immunocompromised and is feeling better after getting antibiotics.  Also improved with fluid removal during dialysis.  Continue de-escalating antibiotics:    - stop zosyn  - doxycycline 100 mg BID for 5 more days.  - probiotic    - Continue atovaquone for PCP prophylaxis, until on less than 15 mg of prednisone daily.  - Slowly weaning prednisone per nephrology    Discussed with patient, significant other and RN at bedside.  Stopping IV antibiotics today.  If he continues to improve, okay to discharge home tomorrow after dialysis is planned.       Rodríguez Ruff MD  12/8/2021

## 2021-12-08 NOTE — PROGRESS NOTES
[facility] Medicine Services  PROGRESS NOTE    Patient Name: Keshav Dickens  : 1955  MRN: [id number]    Date of Admission: 2021  Primary Care Physician: Andree Langston MD    Subjective   Subjective     CC:  F/u dyspnea    HPI:  Wife  Present.  He continues to feel improved.  Took 3L off with HD.  LE edema is improved.  Breathing better.  Asking when he can go home.    ROS:  Gen- No fevers, chills  CV- No chest pain, palpitations  Resp- No cough, + dyspnea  GI- No N/V/D, abd pain    Objective   Objective     Vital Signs:   Temp:  [96.5 °F (35.8 °C)-98.2 °F (36.8 °C)] 98.1 °F (36.7 °C)  Heart Rate:  [65-89] 89  Resp:  [18-20] 20  BP: (125-194)/() 164/86  Flow (L/min):  [2] 2     Physical Exam:  Constitutional: No acute distress, awake, alert, sitting up on side of bed  HENT: NCAT, mucous membranes moist, trach site well healed  Respiratory: generally clear, respiratory effort normal 2L  Cardiovascular: RRR, no murmurs, rubs, or gallops.  Tunneled HD line in right chest  Gastrointestinal: Positive bowel sounds, soft, nontender, nondistended, PEG now removed  Musculoskeletal: 2+ bilateral ankle edema  Psychiatric: Appropriate affect, cooperative  Neurologic: Oriented x 3, no deficits  Skin: No rashes      Results Reviewed:  LAB RESULTS:      Lab 21  0509 21  1803 21  0650 21  1141   WBC 6.53  --  6.08 7.70   HEMOGLOBIN 7.1* 7.8* 6.3* 7.2*   HEMATOCRIT 21.3* 24.0* 20.1* 22.0*   PLATELETS 161  --  155 189   NEUTROS ABS  --   --  4.96 6.04   IMMATURE GRANS (ABS)  --   --  0.08* 0.17*   LYMPHS ABS  --   --  0.37* 0.54*   MONOS ABS  --   --  0.44 0.69   EOS ABS  --   --  0.19 0.20   MCV 98.6*  --  103.6* 100.0*   PROCALCITONIN  --   --  0.73*  --    LACTATE  --   --   --  1.3         Lab 21  0650 21  1141   SODIUM 141 141   POTASSIUM 4.6 4.0   CHLORIDE 103 100   CO2 28.0 27.0   ANION GAP 10.0 14.0   BUN 57* 41*   CREATININE 6.54* 4.75*    GLUCOSE 113* 124*   CALCIUM 7.8* 8.9         Lab 12/07/21  0650 12/06/21  1141   TOTAL PROTEIN 5.4* 6.7   ALBUMIN 3.50 4.10   GLOBULIN 1.9 2.6   ALT (SGPT) 26 35   AST (SGOT) 22 45*   BILIRUBIN 0.7 1.1   ALK PHOS 54 66                 Lab 12/06/21 2004   ABO TYPING A   RH TYPING Positive   ANTIBODY SCREEN Negative         Brief Urine Lab Results  (Last result in the past 365 days)      Color   Clarity   Blood   Leuk Est   Nitrite   Protein   CREAT   Urine HCG        10/04/21 2109 Dora   Cloudy   Large (3+)   Large (3+)   Negative   >=300 mg/dL (3+)                 Microbiology Results Abnormal     Procedure Component Value - Date/Time    Blood Culture - Blood, Arm, Right [[id number]]  (Normal) Collected: 12/06/21 1550    Lab Status: Preliminary result Specimen: Blood from Arm, Right Updated: 12/08/21 1616     Blood Culture No growth at 2 days    Blood Culture - Blood, Arm, Right [[id number]]  (Normal) Collected: 12/06/21 1600    Lab Status: Preliminary result Specimen: Blood from Arm, Right Updated: 12/08/21 1616     Blood Culture No growth at 2 days    COVID PRE-OP / PRE-PROCEDURE SCREENING ORDER (NO ISOLATION) - Swab, Nasopharynx [[id number]]  (Normal) Collected: 12/06/21 1338    Lab Status: Final result Specimen: Swab from Nasopharynx Updated: 12/06/21 1424    Narrative:      The following orders were created for panel order COVID PRE-OP / PRE-PROCEDURE SCREENING ORDER (NO ISOLATION) - Swab, Nasopharynx.  Procedure                               Abnormality         Status                     ---------                               -----------         ------                     COVID-19 and FLU A/B PCR...[[id number]]  Normal              Final result                 Please view results for these tests on the individual orders.    COVID-19 and FLU A/B PCR - Swab, Nasopharynx [[id number]]  (Normal) Collected: 12/06/21 1338    Lab Status: Final result Specimen: Swab from Nasopharynx Updated: 12/06/21 1424     COVID19  Not Detected     Influenza A PCR Not Detected     Influenza B PCR Not Detected    Narrative:      Fact sheet for providers: https://www.fda.gov/media/075758/download    Fact sheet for patients: https://www.fda.gov/media/602085/download    Test performed by PCR.          No radiology results from the last 24 hrs    Results for orders placed during the hospital encounter of 10/04/21    Adult Transthoracic Echo Complete W/ Cont if Necessary Per Protocol    Interpretation Summary  · Left ventricular ejection fraction appears to be 56 - 60%. Left ventricular systolic function is normal.  · Left ventricular wall thickness is consistent with mild to moderate concentric hypertrophy.  · The inferior vena cava is dilated.      I have reviewed the medications:  Scheduled Meds:atovaquone, 1,500 mg, Oral, Daily  b complex-vitamin c-folic acid, 1 tablet, Per PEG Tube, Daily  chlorhexidine, 15 mL, Mouth/Throat, Q12H  cyclophosphamide, 100 mg, Oral, Daily  doxycycline, 100 mg, Oral, Q12H  epoetin tatum-epbx, 10,000 Units, Subcutaneous, Once per day on Mon Wed Fri  famotidine, 20 mg, Per G Tube, Daily  ferrous sulfate, 325 mg, Oral, BID With Meals  heparin (porcine), 5,000 Units, Subcutaneous, Q12H  insulin detemir, 10 Units, Subcutaneous, Nightly  insulin lispro, 0-7 Units, Subcutaneous, TID AC  lactobacillus acidophilus, 1 capsule, Oral, Daily  melatonin, 5 mg, Per G Tube, Nightly  predniSONE, 30 mg, Per G Tube, Daily With Breakfast  saccharomyces boulardii, 500 mg, Per G Tube, BID  senna-docusate sodium, 2 tablet, Oral, BID  sertraline, 50 mg, Per G Tube, Daily  sodium chloride, 10 mL, Intravenous, Q12H      Continuous Infusions:   PRN Meds:.•  acetaminophen  •  albumin human  •  senna-docusate sodium **AND** polyethylene glycol **AND** bisacodyl **AND** bisacodyl  •  dextrose  •  dextrose  •  glucagon (human recombinant)  •  heparin (porcine)  •  labetalol  •  ondansetron  •  sennosides-docusate  •  sodium chloride  •  sodium  chloride    Assessment/Plan   Assessment & Plan     Active Hospital Problems    Diagnosis  POA   • **Goodpasture Syndrome [M31.0]  Yes     Priority: Medium   • Anemia of chronic disease [D63.8]  Yes   • Hypoxia [R09.02]  Yes   • Lung infiltrate [R91.8]  Yes   • ESRD (end stage renal disease) (HCC) [N18.6]  Yes      Resolved Hospital Problems   No resolved problems to display.        Brief Hospital Course to date:  Keshav Dickens is a 66 y.o. male with a complicated recent medical history in October and November including ultimate diagnosis of Goodpasture syndrome based on positive anti-GBM.  Has been on dialysis and immunosuppression with Cytoxan and prednisone.  Discharged on 11/12 to [facility] and sounds like he was discharged from there within the last few days.  Presents back following dialysis yesterday with some increasing shortness of breath and reported increasing anti-GBM antibody.    Hypoxia  Lung infiltrate  -Previosult reviewed chest x-ray which does show some increasing infiltrates compared to November.  Do not see a lobar pneumonia.  Unclear if these infiltrates represent volume overload, lung involvement from his Goodpasture's, atypical infection.  -Hospital stay November was treated twice for Klebsiella pneumonia by Dr. Ruff with infectious disease.  -Currently procalcitonin is mildly elevated at 0.7, white blood cell count normal, no fevers.  Started on empiric antibiotics, but de-escalated today to PO doxy only  -Suspect more related to volume overload.  Continue dialysis and volume removal per nephrology.    Goodpasture syndrome   - continue on Cytoxan and current dose of 30 mg of prednisone, dosing managed by nephrology  -Continue atovaquone for PCP prophylaxis    End-stage renal disease  -On Monday Wednesday Friday schedule  -received extra HD today    Anemia  -Multifactorial including end-stage renal disease and Cytoxan  -Hemoglobin baseline appears to be 7-8 recently, down to 6.3 on  12/7 and received 1u PRBC  - down to 7.1 today  - would defer further checks for now    Debility  -doing well, plan home at discharge    DM2  - acceptable control, no change today.  Resume home regimen at discharge.      DVT prophylaxis:  Medical DVT prophylaxis orders are present.     Disposition: I expect the patient to be discharged home in the next 1 to 2 days if clinically improved.    CODE STATUS:   Code Status and Medical Interventions:   Ordered at: 12/06/21 1549     Level Of Support Discussed With:    Patient     Code Status (Patient has no pulse and is not breathing):    CPR (Attempt to Resuscitate)     Medical Interventions (Patient has pulse or is breathing):    Full Support       Tejas Nazario MD  12/08/21

## 2021-12-08 NOTE — PROGRESS NOTES
"   LOS: 2 days    Patient Care Team:  Andree Langston MD as PCP - General (Internal Medicine)    Chief Complaint:  SOA    Subjective     Interval History:     No acute events overnight. No new complaints   SOA bettter    Review of Systems:   No CP, fever, chills, rigors, rash, N/V, Constipation.       Objective     Vital Sign Min/Max for last 24 hours  Temp  Min: 96.3 °F (35.7 °C)  Max: 98.2 °F (36.8 °C)   BP  Min: 125/60  Max: 183/100   Pulse  Min: 71  Max: 93   Resp  Min: 18  Max: 20   SpO2  Min: 88 %  Max: 99 %   Flow (L/min)  Min: 2  Max: 2   No data recorded     Flowsheet Rows      First Filed Value   Admission Height 172.7 cm (68\") Documented at 12/06/2021 1104   Admission Weight 95.3 kg (210 lb) Documented at 12/06/2021 1104          No intake/output data recorded.  I/O last 3 completed shifts:  In: 1863 [P.O.:1453; Blood:360; IV Piggyback:50]  Out: 3130 [Other:3130]    Physical Exam:    Gen: Alert, NAD   HENT: NC, AT, EOMI   NECK: Supple, no JVD, Trachea midline   LUNGS: CTA bilaterally, non labored respirtation   CVS: S1/S2 audible, RRR, no murmur   Abd: Soft, NT, ND, BS+   Ext: ++ pedal edema, no cyanosis   CNS: Alert, No focal deficit noted grossly  Psy: Cooperative  Skin: Warm, dry and intact      WBC WBC   Date Value Ref Range Status   12/08/2021 6.53 3.40 - 10.80 10*3/mm3 Final   12/07/2021 6.08 3.40 - 10.80 10*3/mm3 Final   12/06/2021 7.70 3.40 - 10.80 10*3/mm3 Final      HGB Hemoglobin   Date Value Ref Range Status   12/08/2021 7.1 (L) 13.0 - 17.7 g/dL Final   12/07/2021 7.8 (L) 13.0 - 17.7 g/dL Final   12/07/2021 6.3 (C) 13.0 - 17.7 g/dL Final   12/06/2021 7.2 (L) 13.0 - 17.7 g/dL Final      HCT Hematocrit   Date Value Ref Range Status   12/08/2021 21.3 (L) 37.5 - 51.0 % Final   12/07/2021 24.0 (L) 37.5 - 51.0 % Final   12/07/2021 20.1 (C) 37.5 - 51.0 % Final   12/06/2021 22.0 (L) 37.5 - 51.0 % Final      Platlets No results found for: LABPLAT   MCV MCV   Date Value Ref Range Status   12/08/2021 " 98.6 (H) 79.0 - 97.0 fL Final   12/07/2021 103.6 (H) 79.0 - 97.0 fL Final   12/06/2021 100.0 (H) 79.0 - 97.0 fL Final          Sodium Sodium   Date Value Ref Range Status   12/07/2021 141 136 - 145 mmol/L Final   12/06/2021 141 136 - 145 mmol/L Final      Potassium Potassium   Date Value Ref Range Status   12/07/2021 4.6 3.5 - 5.2 mmol/L Final   12/06/2021 4.0 3.5 - 5.2 mmol/L Final     Comment:     Slight hemolysis detected by analyzer. Results may be affected.      Chloride Chloride   Date Value Ref Range Status   12/07/2021 103 98 - 107 mmol/L Final   12/06/2021 100 98 - 107 mmol/L Final      CO2 CO2   Date Value Ref Range Status   12/07/2021 28.0 22.0 - 29.0 mmol/L Final   12/06/2021 27.0 22.0 - 29.0 mmol/L Final      BUN BUN   Date Value Ref Range Status   12/07/2021 57 (H) 8 - 23 mg/dL Final   12/06/2021 41 (H) 8 - 23 mg/dL Final      Creatinine Creatinine   Date Value Ref Range Status   12/07/2021 6.54 (H) 0.76 - 1.27 mg/dL Final   12/06/2021 4.75 (H) 0.76 - 1.27 mg/dL Final      Calcium Calcium   Date Value Ref Range Status   12/07/2021 7.8 (L) 8.6 - 10.5 mg/dL Final   12/06/2021 8.9 8.6 - 10.5 mg/dL Final      PO4 No results found for: CAPO4   Albumin Albumin   Date Value Ref Range Status   12/07/2021 3.50 3.50 - 5.20 g/dL Final   12/06/2021 4.10 3.50 - 5.20 g/dL Final      Magnesium No results found for: MG   Uric Acid No results found for: URICACID        Results Review:     I reviewed the patient's new clinical results.    atovaquone, 1,500 mg, Oral, Daily  b complex-vitamin c-folic acid, 1 tablet, Per PEG Tube, Daily  chlorhexidine, 15 mL, Mouth/Throat, Q12H  cyclophosphamide, 100 mg, Oral, Daily  doxycycline, 100 mg, Oral, Q12H  epoetin tatum-epbx, 10,000 Units, Subcutaneous, Once per day on Mon Wed Fri  famotidine, 20 mg, Per G Tube, Daily  ferrous sulfate, 325 mg, Oral, BID With Meals  heparin (porcine), 5,000 Units, Subcutaneous, Q12H  insulin detemir, 10 Units, Subcutaneous, Nightly  insulin lispro,  0-7 Units, Subcutaneous, TID AC  lactobacillus acidophilus, 1 capsule, Oral, Daily  melatonin, 5 mg, Per G Tube, Nightly  piperacillin-tazobactam, 3.375 g, Intravenous, Q12H  predniSONE, 30 mg, Per G Tube, Daily With Breakfast  saccharomyces boulardii, 500 mg, Per G Tube, BID  senna-docusate sodium, 2 tablet, Oral, BID  sertraline, 50 mg, Per G Tube, Daily  sodium chloride, 10 mL, Intravenous, Q12H           Medication Review:     Assessment/Plan       Goodpasture Syndrome    ESRD (end stage renal disease) (HCC)    Anemia of chronic disease    Hypoxia    Lung infiltrate      1.  Goodspasture: Cytoxan oral until the end of December 2021: Decreasing dose of prednisone.  Patient received plasmapheresis and FFP.  IV Cytoxan per protocol was given, patient received Bactrim, atovaquone, monitoring white count.  2.  Anemia.  3- PIOTR - Anuric /oliguric - on HD - Likely ESRD.  4- Peripheral edema      Plan:  - Patient seen on dialysis, UF as tolerated.   - Continue with cytoxan and prednisone   - Repeat level of anti GBM- pending.   - At this time if no pulmonary involvement (No hemoptysis) - don't see any benefit from plasmapheresis or escalating immunosuppression. If by the the end of December still on Dialysis - will wean off the immunosuppressive meds.     Janak Menendez MD  12/08/21  08:57 EST

## 2021-12-08 NOTE — CASE MANAGEMENT/SOCIAL WORK
Continued Stay Note   [facility]     Patient Name: Keshav Dickens  MRN: [id number]  Today's Date: 12/8/2021    Admit Date: 12/6/2021     Discharge Plan     Row Name 12/08/21 1527       Plan    Plan Home at discharge with Home health    Patient/Family in Agreement with Plan yes    Plan Comments Patient likely to discharge home tomorrow. Spoke with spouse, plan is to return home with [facility] to follow - Spoke with Olga and placed new home health orders for Skilled nursing, PT, and OT. Will notify them on day of discharge. Also called [facility] in [place] [phone] - confirmed patient will be set to return to previously arranged HD chair time on Friday. CM will fax dc summary to [phone] on day of dc - will continue to follow - ivania [phone]    Final Discharge Disposition Code 06 - home with home health care               Discharge Codes    No documentation.                     Ivania Morrow RN

## 2021-12-09 ENCOUNTER — APPOINTMENT (OUTPATIENT)
Dept: NEPHROLOGY | Facility: HOSPITAL | Age: 66
End: 2021-12-09

## 2021-12-09 VITALS
HEART RATE: 71 BPM | WEIGHT: 210 LBS | BODY MASS INDEX: 31.83 KG/M2 | TEMPERATURE: 97.2 F | HEIGHT: 68 IN | RESPIRATION RATE: 18 BRPM | SYSTOLIC BLOOD PRESSURE: 175 MMHG | OXYGEN SATURATION: 94 % | DIASTOLIC BLOOD PRESSURE: 98 MMHG

## 2021-12-09 LAB
DEPRECATED RDW RBC AUTO: 69.9 FL (ref 37–54)
ERYTHROCYTE [DISTWIDTH] IN BLOOD BY AUTOMATED COUNT: 18.6 % (ref 12.3–15.4)
GLUCOSE BLDC GLUCOMTR-MCNC: 112 MG/DL (ref 70–130)
HCT VFR BLD AUTO: 25.1 % (ref 37.5–51)
HGB BLD-MCNC: 8 G/DL (ref 13–17.7)
MCH RBC QN AUTO: 32.8 PG (ref 26.6–33)
MCHC RBC AUTO-ENTMCNC: 31.9 G/DL (ref 31.5–35.7)
MCV RBC AUTO: 102.9 FL (ref 79–97)
PLATELET # BLD AUTO: 192 10*3/MM3 (ref 140–450)
PMV BLD AUTO: 10.5 FL (ref 6–12)
RBC # BLD AUTO: 2.44 10*6/MM3 (ref 4.14–5.8)
WBC NRBC COR # BLD: 6.92 10*3/MM3 (ref 3.4–10.8)

## 2021-12-09 PROCEDURE — 25010000002 HEPARIN (PORCINE) PER 1000 UNITS: Performed by: INTERNAL MEDICINE

## 2021-12-09 PROCEDURE — 25010000002 CYCLOPHOSPHAMIDE PER 25 MG: Performed by: INTERNAL MEDICINE

## 2021-12-09 PROCEDURE — 85027 COMPLETE CBC AUTOMATED: CPT | Performed by: INTERNAL MEDICINE

## 2021-12-09 PROCEDURE — 63710000001 PREDNISONE PER 1 MG: Performed by: INTERNAL MEDICINE

## 2021-12-09 PROCEDURE — 63710000001 PREDNISONE PER 5 MG: Performed by: INTERNAL MEDICINE

## 2021-12-09 PROCEDURE — 82962 GLUCOSE BLOOD TEST: CPT

## 2021-12-09 PROCEDURE — 99239 HOSP IP/OBS DSCHRG MGMT >30: CPT | Performed by: INTERNAL MEDICINE

## 2021-12-09 RX ORDER — DOXYCYCLINE 100 MG/1
100 CAPSULE ORAL EVERY 12 HOURS SCHEDULED
Qty: 8 CAPSULE | Refills: 0 | Status: SHIPPED | OUTPATIENT
Start: 2021-12-09 | End: 2021-12-13

## 2021-12-09 RX ORDER — ATOVAQUONE 750 MG/5ML
1500 SUSPENSION ORAL DAILY
Qty: 280 ML | Refills: 0 | Status: SHIPPED | OUTPATIENT
Start: 2021-12-10 | End: 2022-01-07

## 2021-12-09 RX ADMIN — Medication 1 TABLET: at 08:34

## 2021-12-09 RX ADMIN — SODIUM CHLORIDE, PRESERVATIVE FREE 10 ML: 5 INJECTION INTRAVENOUS at 08:36

## 2021-12-09 RX ADMIN — CHLORHEXIDINE GLUCONATE 15 ML: 1.2 SOLUTION ORAL at 08:33

## 2021-12-09 RX ADMIN — PREDNISONE 30 MG: 20 TABLET ORAL at 08:33

## 2021-12-09 RX ADMIN — CYCLOPHOSPHAMIDE 100 MG: 25 CAPSULE ORAL at 12:08

## 2021-12-09 RX ADMIN — Medication 500 MG: at 08:33

## 2021-12-09 RX ADMIN — HEPARIN SODIUM 5000 UNITS: 5000 INJECTION, SOLUTION INTRAVENOUS; SUBCUTANEOUS at 08:33

## 2021-12-09 RX ADMIN — DOXYCYCLINE 100 MG: 100 CAPSULE ORAL at 12:09

## 2021-12-09 RX ADMIN — SERTRALINE HYDROCHLORIDE 50 MG: 50 TABLET ORAL at 08:33

## 2021-12-09 RX ADMIN — ATOVAQUONE 1500 MG: 750 SUSPENSION ORAL at 08:39

## 2021-12-09 RX ADMIN — FAMOTIDINE 20 MG: 20 TABLET ORAL at 08:33

## 2021-12-09 RX ADMIN — HEPARIN SODIUM 1600 UNITS: 1000 INJECTION INTRAVENOUS; SUBCUTANEOUS at 12:09

## 2021-12-09 RX ADMIN — FERROUS SULFATE TAB 325 MG (65 MG ELEMENTAL FE) 325 MG: 325 (65 FE) TAB at 08:33

## 2021-12-09 RX ADMIN — Medication 1 CAPSULE: at 08:33

## 2021-12-09 RX ADMIN — DOCUSATE SODIUM 50 MG AND SENNOSIDES 8.6 MG 2 TABLET: 8.6; 5 TABLET, FILM COATED ORAL at 08:33

## 2021-12-09 NOTE — PROGRESS NOTES
"   LOS: 3 days    Patient Care Team:  Andree Langston MD as PCP - General (Internal Medicine)    Chief Complaint:  SOA    Subjective     Interval History:     No acute events overnight. No new complaints   SOA bettter    Review of Systems:   No CP, fever, chills, rigors, rash, N/V, Constipation.       Objective     Vital Sign Min/Max for last 24 hours  Temp  Min: 97.2 °F (36.2 °C)  Max: 98.1 °F (36.7 °C)   BP  Min: 158/82  Max: 184/98   Pulse  Min: 68  Max: 89   Resp  Min: 18  Max: 20   SpO2  Min: 93 %  Max: 97 %   No data recorded   No data recorded     Flowsheet Rows      First Filed Value   Admission Height 172.7 cm (68\") Documented at 12/06/2021 1104   Admission Weight 95.3 kg (210 lb) Documented at 12/06/2021 1104          No intake/output data recorded.  I/O last 3 completed shifts:  In: 1288 [P.O.:928; I.V.:310; IV Piggyback:50]  Out: 4350 [Other:4350]    Physical Exam:    Gen: Alert, NAD   HENT: NC, AT, EOMI   NECK: Supple, no JVD, Trachea midline   LUNGS: CTA bilaterally, non labored respirtation   CVS: S1/S2 audible, RRR, no murmur   Abd: Soft, NT, ND, BS+   Ext: ++ pedal edema, no cyanosis   CNS: Alert, No focal deficit noted grossly  Psy: Cooperative  Skin: Warm, dry and intact      WBC WBC   Date Value Ref Range Status   12/09/2021 6.92 3.40 - 10.80 10*3/mm3 Final   12/08/2021 6.53 3.40 - 10.80 10*3/mm3 Final   12/07/2021 6.08 3.40 - 10.80 10*3/mm3 Final      HGB Hemoglobin   Date Value Ref Range Status   12/09/2021 8.0 (L) 13.0 - 17.7 g/dL Final   12/08/2021 7.1 (L) 13.0 - 17.7 g/dL Final   12/07/2021 7.8 (L) 13.0 - 17.7 g/dL Final   12/07/2021 6.3 (C) 13.0 - 17.7 g/dL Final      HCT Hematocrit   Date Value Ref Range Status   12/09/2021 25.1 (L) 37.5 - 51.0 % Final   12/08/2021 21.3 (L) 37.5 - 51.0 % Final   12/07/2021 24.0 (L) 37.5 - 51.0 % Final   12/07/2021 20.1 (C) 37.5 - 51.0 % Final      Platlets No results found for: LABPLAT   MCV MCV   Date Value Ref Range Status   12/09/2021 102.9 (H) 79.0 - " 97.0 fL Final   12/08/2021 98.6 (H) 79.0 - 97.0 fL Final   12/07/2021 103.6 (H) 79.0 - 97.0 fL Final          Sodium Sodium   Date Value Ref Range Status   12/07/2021 141 136 - 145 mmol/L Final      Potassium Potassium   Date Value Ref Range Status   12/07/2021 4.6 3.5 - 5.2 mmol/L Final      Chloride Chloride   Date Value Ref Range Status   12/07/2021 103 98 - 107 mmol/L Final      CO2 CO2   Date Value Ref Range Status   12/07/2021 28.0 22.0 - 29.0 mmol/L Final      BUN BUN   Date Value Ref Range Status   12/07/2021 57 (H) 8 - 23 mg/dL Final      Creatinine Creatinine   Date Value Ref Range Status   12/07/2021 6.54 (H) 0.76 - 1.27 mg/dL Final      Calcium Calcium   Date Value Ref Range Status   12/07/2021 7.8 (L) 8.6 - 10.5 mg/dL Final      PO4 No results found for: CAPO4   Albumin Albumin   Date Value Ref Range Status   12/07/2021 3.50 3.50 - 5.20 g/dL Final      Magnesium No results found for: MG   Uric Acid No results found for: URICACID        Results Review:     I reviewed the patient's new clinical results.    atovaquone, 1,500 mg, Oral, Daily  b complex-vitamin c-folic acid, 1 tablet, Per PEG Tube, Daily  chlorhexidine, 15 mL, Mouth/Throat, Q12H  cyclophosphamide, 100 mg, Oral, Daily  doxycycline, 100 mg, Oral, Q12H  epoetin tatum-epbx, 10,000 Units, Subcutaneous, Once per day on Mon Wed Fri  famotidine, 20 mg, Per G Tube, Daily  ferrous sulfate, 325 mg, Oral, BID With Meals  heparin (porcine), 5,000 Units, Subcutaneous, Q12H  insulin detemir, 10 Units, Subcutaneous, Nightly  insulin lispro, 0-7 Units, Subcutaneous, TID AC  lactobacillus acidophilus, 1 capsule, Oral, Daily  melatonin, 5 mg, Per G Tube, Nightly  predniSONE, 30 mg, Per G Tube, Daily With Breakfast  saccharomyces boulardii, 500 mg, Per G Tube, BID  senna-docusate sodium, 2 tablet, Oral, BID  sertraline, 50 mg, Per G Tube, Daily  sodium chloride, 10 mL, Intravenous, Q12H           Medication Review:     Assessment/Plan       Goodpasture  Syndrome    ESRD (end stage renal disease) (HCC)    Anemia of chronic disease    Hypoxia    Lung infiltrate      1.  Goodspasture: Cytoxan oral until the end of December 2021: Decreasing dose of prednisone.  Patient received plasmapheresis and FFP.  IV Cytoxan per protocol was given, patient received Bactrim, atovaquone, monitoring white count.  2.  Anemia.  3- PIOTR - Anuric /oliguric - on HD - Likely ESRD.  4- Peripheral edema      Plan:  - Patient seen on dialysis, UF as tolerated.   - Continue with cytoxan and prednisone   - At this time if no pulmonary involvement (No hemoptysis) - don't see any benefit from plasmapheresis or escalating immunosuppression. If by the the end of December still on Dialysis - will wean off the immunosuppressive meds.     Janak Menendez MD  12/09/21  11:41 EST

## 2021-12-09 NOTE — PLAN OF CARE
Pt vss, on RA, AxO, NSR on monitor. Pt PEG dressing changed. Pt resting in bed with no complaints at this time.

## 2021-12-09 NOTE — DISCHARGE SUMMARY
[facility] Medicine Services  DISCHARGE SUMMARY    Patient Name: Keshav Dickens  : 1955  MRN: [id number]    Date of Admission: 2021 11:11 AM  Date of Discharge:  21  Primary Care Physician: Andree Langston MD    Consults     Date and Time Order Name Status Description    2021  8:09 AM Inpatient Infectious Diseases Consult Completed     2021 12:36 AM Inpatient General Surgery Consult Completed     10/13/2021 12:10 PM Inpatient Infectious Diseases Consult Completed     10/13/2021 12:06 PM Inpatient General Surgery Consult Completed     10/6/2021  8:06 AM Inpatient Rheumatology Consult Completed           Hospital Course     Presenting Problem:   Pneumonia of right lower lobe due to infectious organism [J18.9]    Active Hospital Problems    Diagnosis  POA   • **Goodpasture Syndrome [M31.0]  Yes     Priority: Medium   • Anemia of chronic disease [D63.8]  Yes   • Hypoxia [R09.02]  Yes   • Lung infiltrate [R91.8]  Yes   • ESRD (end stage renal disease) ([facility]) [N18.6]  Yes      Resolved Hospital Problems   No resolved problems to display.          Hospital Course:  Keshav Dickens is a 66 y.o. male with a complex recent past medical history including diagnosis of Goodpasture syndrome and October of this year.  Confirmed on renal biopsy.  Has been placed on Cytoxan and high-dose prednisone since that time.  Was transitioned to LTAC where he was discharged last week.  Presented back to the emergency room with complaints of increasing cough and shortness of breath.  Chest x-ray shows some questionable infiltrates suggestive of volume overload.  Started on empiric antibiotics.  During his last stay he was treated twice with meropenem for Klebsiella pneumonia.  He has no fevers or leukocytosis.  He remains on high-dose prednisone and has been on atovaquone for a PJP prophylaxis.  He was seen by nephrology and given extra dialysis while here for consideration of volume  overload.  He has received both Tuesday Wednesday and Thursday of this week.  He will resume regular Monday Wednesday Friday dialysis starting tomorrow.  He clinically improved with volume removal and is currently on room air.  Given his complex history and immunosuppression infectious disease was consulted.  Ultimately unclear if there is any infectious component, but will transition for an additional 5 days of oral doxycycline.  He will follow-up with nephrology as an outpatient and continue to wean prednisone and remain on Cytoxan.  If no evidence of renal recovery they may stop those medications.  He will remain on atovaquone for PJP prophylaxis until on less than 50 mg daily.      Discharge Follow Up Recommendations:  Continue to follow-up with nephrology as an outpatient    Day of Discharge     HPI:   Seen during dialysis.  No new issues today.  Remains off oxygen and feels well overall.  Anxious to get home.    Review of Systems  Gen- No fevers, chills  CV- No chest pain, palpitations  Resp- No cough, dyspnea  GI- No N/V/D, abd pain      Vital Signs:   Temp:  [97.2 °F (36.2 °C)-98.1 °F (36.7 °C)] 97.2 °F (36.2 °C)  Heart Rate:  [68-89] 68  Resp:  [18-20] 18  BP: (158-184)/(73-98) 169/92     Physical Exam:  Constitutional: No acute distress, awake, alert, currently on dialysis  HENT: NCAT, mucous membranes moist  Respiratory: Clear to auscultation bilaterally, respiratory effort normal   Cardiovascular: RRR, no murmurs, rubs, or gallops, dialysis catheter in right chest gastrointestinal: Positive bowel sounds, soft, nontender, nondistended  Musculoskeletal: No bilateral ankle edema  Psychiatric: Appropriate affect, cooperative  Neurologic: Oriented x 3, no focal deficits  Skin: No rashes      Pertinent  and/or Most Recent Results     LAB RESULTS:      Lab 12/09/21  0911 12/08/21  0509 12/07/21  1803 12/07/21  0650 12/06/21  1141   WBC 6.92 6.53  --  6.08 7.70   HEMOGLOBIN 8.0* 7.1* 7.8* 6.3* 7.2*   HEMATOCRIT  25.1* 21.3* 24.0* 20.1* 22.0*   PLATELETS 192 161  --  155 189   NEUTROS ABS  --   --   --  4.96 6.04   IMMATURE GRANS (ABS)  --   --   --  0.08* 0.17*   LYMPHS ABS  --   --   --  0.37* 0.54*   MONOS ABS  --   --   --  0.44 0.69   EOS ABS  --   --   --  0.19 0.20   .9* 98.6*  --  103.6* 100.0*   PROCALCITONIN  --   --   --  0.73*  --    LACTATE  --   --   --   --  1.3         Lab 12/07/21  0650 12/06/21  1141   SODIUM 141 141   POTASSIUM 4.6 4.0   CHLORIDE 103 100   CO2 28.0 27.0   ANION GAP 10.0 14.0   BUN 57* 41*   CREATININE 6.54* 4.75*   GLUCOSE 113* 124*   CALCIUM 7.8* 8.9         Lab 12/07/21  0650 12/06/21  1141   TOTAL PROTEIN 5.4* 6.7   ALBUMIN 3.50 4.10   GLOBULIN 1.9 2.6   ALT (SGPT) 26 35   AST (SGOT) 22 45*   BILIRUBIN 0.7 1.1   ALK PHOS 54 66                 Lab 12/06/21 2004   ABO TYPING A   RH TYPING Positive   ANTIBODY SCREEN Negative         Brief Urine Lab Results  (Last result in the past 365 days)      Color   Clarity   Blood   Leuk Est   Nitrite   Protein   CREAT   Urine HCG        10/04/21 2109 Dora   Cloudy   Large (3+)   Large (3+)   Negative   >=300 mg/dL (3+)               Microbiology Results (last 10 days)     Procedure Component Value - Date/Time    Blood Culture - Blood, Arm, Right [[id number]]  (Normal) Collected: 12/06/21 1600    Lab Status: Preliminary result Specimen: Blood from Arm, Right Updated: 12/08/21 1616     Blood Culture No growth at 2 days    Blood Culture - Blood, Arm, Right [[id number]]  (Normal) Collected: 12/06/21 1550    Lab Status: Preliminary result Specimen: Blood from Arm, Right Updated: 12/08/21 1616     Blood Culture No growth at 2 days    COVID PRE-OP / PRE-PROCEDURE SCREENING ORDER (NO ISOLATION) - Swab, Nasopharynx [[id number]]  (Normal) Collected: 12/06/21 1338    Lab Status: Final result Specimen: Swab from Nasopharynx Updated: 12/06/21 1505    Narrative:      The following orders were created for panel order COVID PRE-OP / PRE-PROCEDURE SCREENING  ORDER (NO ISOLATION) - Swab, Nasopharynx.  Procedure                               Abnormality         Status                     ---------                               -----------         ------                     COVID-19 and FLU A/B PCR...[[id number]]  Normal              Final result                 Please view results for these tests on the individual orders.    COVID-19 and FLU A/B PCR - Swab, Nasopharynx [[id number]]  (Normal) Collected: 12/06/21 1338    Lab Status: Final result Specimen: Swab from Nasopharynx Updated: 12/06/21 1424     COVID19 Not Detected     Influenza A PCR Not Detected     Influenza B PCR Not Detected    Narrative:      Fact sheet for providers: https://www.fda.gov/media/820792/download    Fact sheet for patients: https://www.fda.gov/media/669632/download    Test performed by PCR.          XR Chest 1 View    Result Date: 12/6/2021  EXAMINATION: XR CHEST 1 VW-  INDICATION: Shortness of breath  TECHNIQUE: Frontal view of the chest  COMPARISON: 11/12/2021  FINDINGS:  Unchanged right chest dialysis catheter with the tip terminating at the cavoatrial junction. Tracheostomy is no longer visualized. Stable cardiomediastinal silhouette which appears mildly prominent diffuse interstitial prominence remains. Vague perihilar and bibasilar parenchymal opacities appear somewhat worsened from prior exam. No large pleural effusion. No pneumothorax. No acute osseous findings.       Mild worsening of perihilar and bibasilar parenchymal opacities on background of diffuse interstitial prominence, favor worsening edema.  This report was finalized on 12/6/2021 3:06 PM by Jeffy Goncalves MD.        Results for orders placed during the hospital encounter of 10/04/21    Adult Transthoracic Echo Complete W/ Cont if Necessary Per Protocol    Interpretation Summary  · Left ventricular ejection fraction appears to be 56 - 60%. Left ventricular systolic function is normal.  · Left ventricular wall thickness is  consistent with mild to moderate concentric hypertrophy.  · The inferior vena cava is dilated.      Plan for Follow-up of Pending Labs/Results: I will follow up  Pending Labs     Order Current Status    Blood Culture - Blood, Arm, Right Preliminary result    Blood Culture - Blood, Arm, Right Preliminary result        Discharge Details        Discharge Medications      New Medications      Instructions Start Date   atovaquone 750 MG/5ML suspension  Commonly known as: MEPRON   1,500 mg, Oral, Daily   Start Date: December 10, 2021        Continue These Medications      Instructions Start Date   acetaminophen 325 MG tablet  Commonly known as: TYLENOL   650 mg, Per G Tube, Every 6 Hours PRN      b complex-vitamin c-folic acid 0.8 MG tablet tablet   1 tablet, Per PEG Tube, Daily      chlorhexidine 0.12 % solution  Commonly known as: PERIDEX   15 mL, Mouth/Throat, Every 12 Hours Scheduled      cholecalciferol 25 MCG (1000 UT) tablet  Commonly known as: VITAMIN D3   2,000 Units, Per G Tube, Daily      cyclophosphamide 50 MG capsule   100 mg, Oral, Daily      epoetin tatum-epbx 11649 UNIT/ML injection  Commonly known as: RETACRIT   10,000 Units, Subcutaneous, 3 Times Weekly      famotidine 20 MG tablet  Commonly known as: PEPCID   20 mg, Per G Tube, Daily      ferrous sulfate 300 (60 Fe) MG/5ML syrup   300 mg, Per G Tube, Daily      insulin detemir 100 UNIT/ML injection  Commonly known as: LEVEMIR   10 Units, Subcutaneous, Nightly      insulin regular 100 UNIT/ML injection  Commonly known as: humuLIN R,novoLIN R   0-7 Units, Subcutaneous, Every 6 Hours Scheduled      insulin regular 100 UNIT/ML injection  Commonly known as: humuLIN R,novoLIN R   4 Units, Subcutaneous, Every 6 Hours Scheduled      ipratropium-albuterol 0.5-2.5 mg/3 ml nebulizer  Commonly known as: DUO-NEB   3 mL, Nebulization, Every 4 Hours While Awake - RT      melatonin 5 MG tablet tablet   5 mg, Per G Tube, Nightly      predniSONE 10 MG tablet  Commonly  known as: DELTASONE   30 mg, Per G Tube, Daily With Breakfast      saccharomyces boulardii 250 MG capsule  Commonly known as: FLORASTOR   500 mg, Per G Tube, 2 Times Daily      sennosides-docusate 8.6-50 MG per tablet  Commonly known as: PERICOLACE   2 tablets, Per PEG Tube, 2 Times Daily PRN      sertraline 50 MG tablet  Commonly known as: ZOLOFT   50 mg, Per G Tube, Daily         Stop These Medications    heparin (porcine) 5000 UNIT/ML injection            No Known Allergies      Discharge Disposition:  Home or Self Care    Diet:  Hospital:  Diet Order   Procedures   • Diet Regular       Activity:  As tolerated         CODE STATUS:    Code Status and Medical Interventions:   Ordered at: 12/06/21 2548     Level Of Support Discussed With:    Patient     Code Status (Patient has no pulse and is not breathing):    CPR (Attempt to Resuscitate)     Medical Interventions (Patient has pulse or is breathing):    Full Support       No future appointments.    Additional Instructions for the Follow-ups that You Need to Schedule     Ambulatory Referral to Home Health   As directed      Face to Face Visit Date: 12/8/2021    Follow-up provider for Plan of Care?: I treated the patient in an acute care facility and will not continue treatment after discharge.    Follow-up provider: VJ PELLETIER [[id number]]    Reason/Clinical Findings: complicated recent medical history in October and November including ultimate diagnosis of Goodpasture syndrome based on positive anti-GBM.  Has been on dialysis and immunosuppression with Cytoxan and prednisone    Describe mobility limitations that make leaving home difficult: impaired mobility, impaired endurance, impaired ADLs    Nursing/Therapeutic Services Requested: Skilled Nursing Physical Therapy Occupational Therapy    Skilled nursing orders: Medication education Cardiopulmonary assessments    PT orders: Total joint pathway Therapeutic exercise Gait Training Transfer training Strengthening  Home safety assessment    Weight Bearing Status: As Tolerated    Occupational orders: Activities of daily living Energy conservation Strengthening Cognition Home safety assessment         Discharge Follow-up with Specified Provider: Nephrology per them   As directed      To: Nephrology per them                 Tejas Nazario MD  12/09/21      Time Spent on Discharge:  I spent  32  minutes on this discharge activity which included: face-to-face encounter with the patient, reviewing the data in the system, coordination of the care with the nursing staff as well as consultants, documentation, and entering orders.

## 2021-12-09 NOTE — CASE MANAGEMENT/SOCIAL WORK
Case Management Discharge Note      Final Note: Patient is being discharged home today - He will have [facility] resumed - order placed and called to Carley with [facility]. DC summary faxed to [facility]. He will resume HD on Friday. Wife to transport home, all necessary DME already arranged - [phone]         Selected Continued Care - Admitted Since 12/6/2021     Destination    No services have been selected for the patient.              Durable Medical Equipment    No services have been selected for the patient.              Dialysis/Infusion Coordination complete.    Service Provider Selected Services Address Phone Fax Patient Preferred    [facility] [place]  Dialysis [address], [facility] [address] [phone] --          Home Medical Care Coordination complete.    Service Provider Selected Services Address Phone Fax Patient Preferred    [facility]  Home Health Services [address] [place] [address] [address], [facility] [id number] [phone] -- --          Therapy    No services have been selected for the patient.              Community Resources    No services have been selected for the patient.              Community & DME    No services have been selected for the patient.                Selected Continued Care - Prior Encounters Includes selections from prior encounters from 9/7/2021 to 12/9/2021    Discharged on 11/12/2021 Admission date: 10/4/2021 - Discharge disposition: Long Term Care (DC - External)    Destination     Service Provider Selected Services Address Phone Fax Patient Preferred    [facility]  Long Term Acute Care [facility] [address], [facility] [address] [phone] --                         Final Discharge Disposition Code: 06 - home with home health care

## 2021-12-09 NOTE — DISCHARGE PLACEMENT REQUEST
"Discharging home today   Will resume HD tomorrow      Thank you!     Ivania Morrow [phone]      Keshav Reeder (66 y.o. Male)             Date of Birth Social Security Number Address Home Phone MRN    1955  [id number] [facility] [address] [phone] [id number]    Amish Marital Status             Yazidi        Admission Date Admission Type Admitting Provider Attending Provider Department, Room/Bed    12/6/21 Emergency Tejas Nazario MD Dossett, Lee M, MD [facility] 6B, N626/1    Discharge Date Discharge Disposition Discharge Destination           Home or Self Care              Attending Provider: Tejas Nazario MD    Allergies: No Known Allergies    Isolation: None   Infection: None   Code Status: CPR   Advance Care Planning Activity    Ht: 172.7 cm (68\")   Wt: 95.3 kg (210 lb)    Admission Cmt: None   Principal Problem: Goodpasture Syndrome [M31.0]                 Active Insurance as of 12/6/2021     Primary Coverage     Payor Plan Insurance Group Employer/Plan Group    MEDICARE MEDICARE A & B      Payor Plan Address Payor Plan Phone Number Payor Plan Fax Number Effective Dates    [address] [phone]  4/1/2018 - None Entered    [facility] [id number]       Subscriber Name Subscriber Birth Date Member ID       KESHAV REDEER 1955 [id number]           Secondary Coverage     Payor Plan Insurance Group Employer/Plan Group    [employer] COMMERCIAL [employer] [facility]             Payor Plan Address Payor Plan Phone Number Payor Plan Fax Number Effective Dates    [address] [address]   4/1/2018 - None Entered    [facility] [phone]       Subscriber Name Subscriber Birth Date Member ID       KESHAV REEDER 1955 [id number]                 Emergency Contacts      (Rel.) Home Phone Work Phone Mobile Phone    ReederTamika (Spouse) [phone] -- [phone]    Geeta Reeder (Daughter) -- -- [phone]               Discharge Summary    "   Tejas Nazario MD at 21 1150              [facility] Medicine Services  DISCHARGE SUMMARY    Patient Name: Keshav Dickens  : 1955  MRN: [id number]    Date of Admission: 2021 11:11 AM  Date of Discharge:  21  Primary Care Physician: Andree Langston MD    Consults     Date and Time Order Name Status Description    2021  8:09 AM Inpatient Infectious Diseases Consult Completed     2021 12:36 AM Inpatient General Surgery Consult Completed     10/13/2021 12:10 PM Inpatient Infectious Diseases Consult Completed     10/13/2021 12:06 PM Inpatient General Surgery Consult Completed     10/6/2021  8:06 AM Inpatient Rheumatology Consult Completed           Hospital Course     Presenting Problem:   Pneumonia of right lower lobe due to infectious organism [J18.9]    Active Hospital Problems    Diagnosis  POA   • **Goodpasture Syndrome [M31.0]  Yes     Priority: Medium   • Anemia of chronic disease [D63.8]  Yes   • Hypoxia [R09.02]  Yes   • Lung infiltrate [R91.8]  Yes   • ESRD (end stage renal disease) ([facility]) [N18.6]  Yes      Resolved Hospital Problems   No resolved problems to display.          Hospital Course:  Keshav Dickens is a 66 y.o. male with a complex recent past medical history including diagnosis of Goodpasture syndrome and October of this year.  Confirmed on renal biopsy.  Has been placed on Cytoxan and high-dose prednisone since that time.  Was transitioned to LTAC where he was discharged last week.  Presented back to the emergency room with complaints of increasing cough and shortness of breath.  Chest x-ray shows some questionable infiltrates suggestive of volume overload.  Started on empiric antibiotics.  During his last stay he was treated twice with meropenem for Klebsiella pneumonia.  He has no fevers or leukocytosis.  He remains on high-dose prednisone and has been on atovaquone for a PJP prophylaxis.  He was seen by nephrology and given extra dialysis  while here for consideration of volume overload.  He has received both Tuesday Wednesday and Thursday of this week.  He will resume regular Monday Wednesday Friday dialysis starting tomorrow.  He clinically improved with volume removal and is currently on room air.  Given his complex history and immunosuppression infectious disease was consulted.  Ultimately unclear if there is any infectious component, but will transition for an additional 5 days of oral doxycycline.  He will follow-up with nephrology as an outpatient and continue to wean prednisone and remain on Cytoxan.  If no evidence of renal recovery they may stop those medications.  He will remain on atovaquone for PJP prophylaxis until on less than 50 mg daily.      Discharge Follow Up Recommendations:  Continue to follow-up with nephrology as an outpatient    Day of Discharge     HPI:   Seen during dialysis.  No new issues today.  Remains off oxygen and feels well overall.  Anxious to get home.    Review of Systems  Gen- No fevers, chills  CV- No chest pain, palpitations  Resp- No cough, dyspnea  GI- No N/V/D, abd pain      Vital Signs:   Temp:  [97.2 °F (36.2 °C)-98.1 °F (36.7 °C)] 97.2 °F (36.2 °C)  Heart Rate:  [68-89] 68  Resp:  [18-20] 18  BP: (158-184)/(73-98) 169/92     Physical Exam:  Constitutional: No acute distress, awake, alert, currently on dialysis  HENT: NCAT, mucous membranes moist  Respiratory: Clear to auscultation bilaterally, respiratory effort normal   Cardiovascular: RRR, no murmurs, rubs, or gallops, dialysis catheter in right chest gastrointestinal: Positive bowel sounds, soft, nontender, nondistended  Musculoskeletal: No bilateral ankle edema  Psychiatric: Appropriate affect, cooperative  Neurologic: Oriented x 3, no focal deficits  Skin: No rashes      Pertinent  and/or Most Recent Results     LAB RESULTS:      Lab 12/09/21  0911 12/08/21  0509 12/07/21  1803 12/07/21  0650 12/06/21  1141   WBC 6.92 6.53  --  6.08 7.70   HEMOGLOBIN  8.0* 7.1* 7.8* 6.3* 7.2*   HEMATOCRIT 25.1* 21.3* 24.0* 20.1* 22.0*   PLATELETS 192 161  --  155 189   NEUTROS ABS  --   --   --  4.96 6.04   IMMATURE GRANS (ABS)  --   --   --  0.08* 0.17*   LYMPHS ABS  --   --   --  0.37* 0.54*   MONOS ABS  --   --   --  0.44 0.69   EOS ABS  --   --   --  0.19 0.20   .9* 98.6*  --  103.6* 100.0*   PROCALCITONIN  --   --   --  0.73*  --    LACTATE  --   --   --   --  1.3         Lab 12/07/21  0650 12/06/21  1141   SODIUM 141 141   POTASSIUM 4.6 4.0   CHLORIDE 103 100   CO2 28.0 27.0   ANION GAP 10.0 14.0   BUN 57* 41*   CREATININE 6.54* 4.75*   GLUCOSE 113* 124*   CALCIUM 7.8* 8.9         Lab 12/07/21  0650 12/06/21  1141   TOTAL PROTEIN 5.4* 6.7   ALBUMIN 3.50 4.10   GLOBULIN 1.9 2.6   ALT (SGPT) 26 35   AST (SGOT) 22 45*   BILIRUBIN 0.7 1.1   ALK PHOS 54 66                 Lab 12/06/21 2004   ABO TYPING A   RH TYPING Positive   ANTIBODY SCREEN Negative         Brief Urine Lab Results  (Last result in the past 365 days)      Color   Clarity   Blood   Leuk Est   Nitrite   Protein   CREAT   Urine HCG        10/04/21 2109 Dora   Cloudy   Large (3+)   Large (3+)   Negative   >=300 mg/dL (3+)               Microbiology Results (last 10 days)     Procedure Component Value - Date/Time    Blood Culture - Blood, Arm, Right [[id number]]  (Normal) Collected: 12/06/21 1600    Lab Status: Preliminary result Specimen: Blood from Arm, Right Updated: 12/08/21 1616     Blood Culture No growth at 2 days    Blood Culture - Blood, Arm, Right [[id number]]  (Normal) Collected: 12/06/21 1550    Lab Status: Preliminary result Specimen: Blood from Arm, Right Updated: 12/08/21 1616     Blood Culture No growth at 2 days    COVID PRE-OP / PRE-PROCEDURE SCREENING ORDER (NO ISOLATION) - Swab, Nasopharynx [[id number]]  (Normal) Collected: 12/06/21 1338    Lab Status: Final result Specimen: Swab from Nasopharynx Updated: 12/06/21 1424    Narrative:      The following orders were created for panel order  COVID PRE-OP / PRE-PROCEDURE SCREENING ORDER (NO ISOLATION) - Swab, Nasopharynx.  Procedure                               Abnormality         Status                     ---------                               -----------         ------                     COVID-19 and FLU A/B PCR...[[id number]]  Normal              Final result                 Please view results for these tests on the individual orders.    COVID-19 and FLU A/B PCR - Swab, Nasopharynx [[id number]]  (Normal) Collected: 12/06/21 1338    Lab Status: Final result Specimen: Swab from Nasopharynx Updated: 12/06/21 1424     COVID19 Not Detected     Influenza A PCR Not Detected     Influenza B PCR Not Detected    Narrative:      Fact sheet for providers: https://www.fda.gov/media/347618/download    Fact sheet for patients: https://www.fda.gov/media/510110/download    Test performed by PCR.          XR Chest 1 View    Result Date: 12/6/2021  EXAMINATION: XR CHEST 1 VW-  INDICATION: Shortness of breath  TECHNIQUE: Frontal view of the chest  COMPARISON: 11/12/2021  FINDINGS:  Unchanged right chest dialysis catheter with the tip terminating at the cavoatrial junction. Tracheostomy is no longer visualized. Stable cardiomediastinal silhouette which appears mildly prominent diffuse interstitial prominence remains. Vague perihilar and bibasilar parenchymal opacities appear somewhat worsened from prior exam. No large pleural effusion. No pneumothorax. No acute osseous findings.       Mild worsening of perihilar and bibasilar parenchymal opacities on background of diffuse interstitial prominence, favor worsening edema.  This report was finalized on 12/6/2021 3:06 PM by Jeffy Goncalves MD.        Results for orders placed during the hospital encounter of 10/04/21    Adult Transthoracic Echo Complete W/ Cont if Necessary Per Protocol    Interpretation Summary  · Left ventricular ejection fraction appears to be 56 - 60%. Left ventricular systolic function is normal.  ·  Left ventricular wall thickness is consistent with mild to moderate concentric hypertrophy.  · The inferior vena cava is dilated.      Plan for Follow-up of Pending Labs/Results: I will follow up  Pending Labs     Order Current Status    Blood Culture - Blood, Arm, Right Preliminary result    Blood Culture - Blood, Arm, Right Preliminary result        Discharge Details        Discharge Medications      New Medications      Instructions Start Date   atovaquone 750 MG/5ML suspension  Commonly known as: MEPRON   1,500 mg, Oral, Daily   Start Date: December 10, 2021        Continue These Medications      Instructions Start Date   acetaminophen 325 MG tablet  Commonly known as: TYLENOL   650 mg, Per G Tube, Every 6 Hours PRN      b complex-vitamin c-folic acid 0.8 MG tablet tablet   1 tablet, Per PEG Tube, Daily      chlorhexidine 0.12 % solution  Commonly known as: PERIDEX   15 mL, Mouth/Throat, Every 12 Hours Scheduled      cholecalciferol 25 MCG (1000 UT) tablet  Commonly known as: VITAMIN D3   2,000 Units, Per G Tube, Daily      cyclophosphamide 50 MG capsule   100 mg, Oral, Daily      epoetin tatum-epbx 81457 UNIT/ML injection  Commonly known as: RETACRIT   10,000 Units, Subcutaneous, 3 Times Weekly      famotidine 20 MG tablet  Commonly known as: PEPCID   20 mg, Per G Tube, Daily      ferrous sulfate 300 (60 Fe) MG/5ML syrup   300 mg, Per G Tube, Daily      insulin detemir 100 UNIT/ML injection  Commonly known as: LEVEMIR   10 Units, Subcutaneous, Nightly      insulin regular 100 UNIT/ML injection  Commonly known as: humuLIN R,novoLIN R   0-7 Units, Subcutaneous, Every 6 Hours Scheduled      insulin regular 100 UNIT/ML injection  Commonly known as: humuLIN R,novoLIN R   4 Units, Subcutaneous, Every 6 Hours Scheduled      ipratropium-albuterol 0.5-2.5 mg/3 ml nebulizer  Commonly known as: DUO-NEB   3 mL, Nebulization, Every 4 Hours While Awake - RT      melatonin 5 MG tablet tablet   5 mg, Per G Tube, Nightly       predniSONE 10 MG tablet  Commonly known as: DELTASONE   30 mg, Per G Tube, Daily With Breakfast      saccharomyces boulardii 250 MG capsule  Commonly known as: FLORASTOR   500 mg, Per G Tube, 2 Times Daily      sennosides-docusate 8.6-50 MG per tablet  Commonly known as: PERICOLACE   2 tablets, Per PEG Tube, 2 Times Daily PRN      sertraline 50 MG tablet  Commonly known as: ZOLOFT   50 mg, Per G Tube, Daily         Stop These Medications    heparin (porcine) 5000 UNIT/ML injection            No Known Allergies      Discharge Disposition:  Home or Self Care    Diet:  Hospital:  Diet Order   Procedures   • Diet Regular       Activity:  As tolerated         CODE STATUS:    Code Status and Medical Interventions:   Ordered at: 12/06/21 1484     Level Of Support Discussed With:    Patient     Code Status (Patient has no pulse and is not breathing):    CPR (Attempt to Resuscitate)     Medical Interventions (Patient has pulse or is breathing):    Full Support       No future appointments.    Additional Instructions for the Follow-ups that You Need to Schedule     Ambulatory Referral to Home Health   As directed      Face to Face Visit Date: 12/8/2021    Follow-up provider for Plan of Care?: I treated the patient in an acute care facility and will not continue treatment after discharge.    Follow-up provider: VJ PELLETIER [[id number]]    Reason/Clinical Findings: complicated recent medical history in October and November including ultimate diagnosis of Goodpasture syndrome based on positive anti-GBM.  Has been on dialysis and immunosuppression with Cytoxan and prednisone    Describe mobility limitations that make leaving home difficult: impaired mobility, impaired endurance, impaired ADLs    Nursing/Therapeutic Services Requested: Skilled Nursing Physical Therapy Occupational Therapy    Skilled nursing orders: Medication education Cardiopulmonary assessments    PT orders: Total joint pathway Therapeutic exercise Gait  Training Transfer training Strengthening Home safety assessment    Weight Bearing Status: As Tolerated    Occupational orders: Activities of daily living Energy conservation Strengthening Cognition Home safety assessment         Discharge Follow-up with Specified Provider: Nephrology per them   As directed      To: Nephrology per them                 Tejas Nazario MD  12/09/21      Time Spent on Discharge:  I spent  32  minutes on this discharge activity which included: face-to-face encounter with the patient, reviewing the data in the system, coordination of the care with the nursing staff as well as consultants, documentation, and entering orders.            Electronically signed by Tejas Nazario MD at 12/09/21 1202

## 2021-12-09 NOTE — PLAN OF CARE
Orders for short iHD with [facility] prior to planned DC.     iHD initiated and in progress.    Pt instructed that he will still need to attend scheduled MWF OP iHD.

## 2021-12-09 NOTE — DISCHARGE INSTR - APPOINTMENTS
You have an appointment with Andree Langston MD  on December 16, 2021 @ 10:30 AM.   Call them if you have any questions. Phone: [phone] [address] [facility] [address]     You have an appointment with Abdon Roberts MD  on December 29, 2021 @ 9:45 AM.   Call them if you have any questions. Phone: [phone] [address] [address]

## 2021-12-10 ENCOUNTER — READMISSION MANAGEMENT (OUTPATIENT)
Dept: CALL CENTER | Facility: HOSPITAL | Age: 66
End: 2021-12-10

## 2021-12-10 NOTE — OUTREACH NOTE
Prep Survey      Responses   [facility] facility patient discharged from? [facility]   Is LACE score < 7 ? No   Emergency Room discharge w/ pulse ox? No   Eligibility Readm Mgmt   Discharge diagnosis Goodpasture Syndrome    Does the patient have one of the following disease processes/diagnoses(primary or secondary)? Other   Does the patient have Home health ordered? Yes   What is the Home health agency?  [facility]    General alerts for this patient HD pt    Prep survey completed? Yes          Leigh Atkinson RN

## 2021-12-11 LAB
BACTERIA SPEC AEROBE CULT: NORMAL
BACTERIA SPEC AEROBE CULT: NORMAL

## 2021-12-14 ENCOUNTER — READMISSION MANAGEMENT (OUTPATIENT)
Dept: CALL CENTER | Facility: HOSPITAL | Age: 66
End: 2021-12-14

## 2021-12-14 NOTE — OUTREACH NOTE
Medical Week 1 Survey      Responses   [facility] patient discharged from? [place]   Does the patient have one of the following disease processes/diagnoses(primary or secondary)? Other   Week 1 attempt successful? Yes   Call start time 1056   Call end time 1105   Discharge diagnosis Goodpasture Syndrome    Meds reviewed with patient/caregiver? Yes   Is the patient having any side effects they believe may be caused by any medication additions or changes? No   Does the patient have all medications ordered at discharge? Yes   Is the patient taking all medications as directed (includes completed medication regime)? Yes   Does the patient have a primary care provider?  Yes   Does the patient have an appointment with their PCP within 7 days of discharge? Yes   Has the patient kept scheduled appointments due by today? N/A   What is the Home health agency?  [facility]   Has home health visited the patient within 72 hours of discharge? Yes   Psychosocial issues? No   Did the patient receive a copy of their discharge instructions? Yes   What is the patient's perception of their health status since discharge? Same   Is the patient/caregiver able to teach back the hierarchy of who to call/visit for symptoms/problems? PCP, Specialist, Home health nurse, Urgent Care, ED, 911 Yes   If the patient is a current smoker, are they able to teach back resources for cessation? Not a smoker   Week 1 call completed? Yes   Wrap up additional comments Doing the same dialysis tomorrow.          Cristine Lees RN

## 2021-12-22 ENCOUNTER — READMISSION MANAGEMENT (OUTPATIENT)
Dept: CALL CENTER | Facility: HOSPITAL | Age: 66
End: 2021-12-22

## 2021-12-22 NOTE — OUTREACH NOTE
Medical Week 2 Survey      Responses   [facility] patient discharged from? [place]   Does the patient have one of the following disease processes/diagnoses(primary or secondary)? Other   Week 2 attempt successful? Yes   Call start time 1517   General alerts for this patient HD pt    Discharge diagnosis Goodpasture Syndrome    Call end time 1523   List who call center can speak with Wife   Person spoke with today (if not patient) and relationship Pt and wife   Meds reviewed with patient/caregiver? Yes   Is the patient taking all medications as directed (includes completed medication regime)? Yes   Comments regarding PCP 12/22/21   Has the patient kept scheduled appointments due by today? Yes   Nursing interventions Reviewed instructions with patient   What is the patient's perception of their health status since discharge? Improving  [Pt gets headaches when he has dialysis]   Week 2 Call Completed? Yes   Wrap up additional comments RN went over max daily dose of Tylenol and why pt shouldn't take NSAID's.          Mey Reinoso, RN

## 2023-08-22 NOTE — PLAN OF CARE
Detail Level: Zone Goal Outcome Evaluation:  Plan of Care Reviewed With: patient        Progress: no change   SLP re-evaluation and treatment completed. Will continue to address dysphagia and PMV in tx. Continue NPO at this time w/ plans for re-evaluation tomorrow. Please see note for further details and recommendations.

## (undated) DEVICE — PERCUTANEOUS ENDOSCOPIC GASTROSTOMY KIT: Brand: ENDOVIVE SAFETY PEG KIT

## (undated) DEVICE — DRESSING,OPTIFOAM,NON-ADHESIVE,4X4: Brand: MEDLINE

## (undated) DEVICE — APPL CHLORAPREP TINTED 26ML TEAL

## (undated) DEVICE — ALLEVYN TRACHEOSTOMY 3.5X3.5" CTN 10: Brand: ALLEVYN TRACHEOSTOMY

## (undated) DEVICE — PENCL ROCKRSWCH MEGADYNE W/HOLSTR 10FT SS

## (undated) DEVICE — DRAINBAG,ANTI-REFLUX TOWER,L/F,2000ML,LL: Brand: MEDLINE

## (undated) DEVICE — PK MINOR SPLT 10

## (undated) DEVICE — PATIENT RETURN ELECTRODE, SINGLE-USE, CONTACT QUALITY MONITORING, ADULT, WITH 9FT CORD, FOR PATIENTS WEIGING OVER 33LBS. (15KG): Brand: MEGADYNE

## (undated) DEVICE — SUT ETHLN 2/0 PS 18IN 585H

## (undated) DEVICE — GOWN,PREVENTION PLUS,XXLARGE,STERILE: Brand: MEDLINE

## (undated) DEVICE — HDRST POSTIN FM CRDL TRACH SLOT 9X8X4IN

## (undated) DEVICE — GLV SURG SENSICARE PI MIC PF SZ8 LF STRL

## (undated) DEVICE — Device

## (undated) DEVICE — THE BITE BLOCK MAXI, LATEX FREE STRAP IS USED TO PROTECT THE ENDOSCOPE INSERTION TUBE FROM BEING BITTEN BY THE PATIENT.

## (undated) DEVICE — SUT SILK 3/0 SH CR8 18IN C013D

## (undated) DEVICE — SUT PROLN 2/0 PC3 8833H

## (undated) DEVICE — SYR LL TP 10ML STRL

## (undated) DEVICE — GLV SURG SENSICARE PI MIC PF SZ8.5 LF STRL